# Patient Record
Sex: FEMALE | Race: BLACK OR AFRICAN AMERICAN | Employment: OTHER
[De-identification: names, ages, dates, MRNs, and addresses within clinical notes are randomized per-mention and may not be internally consistent; named-entity substitution may affect disease eponyms.]

---

## 2017-01-13 ENCOUNTER — OFFICE VISIT (OUTPATIENT)
Dept: BARIATRICS/WEIGHT MGMT | Facility: CLINIC | Age: 62
End: 2017-01-13

## 2017-01-13 VITALS
WEIGHT: 293 LBS | BODY MASS INDEX: 41.02 KG/M2 | HEIGHT: 71 IN | HEART RATE: 74 BPM | SYSTOLIC BLOOD PRESSURE: 130 MMHG | DIASTOLIC BLOOD PRESSURE: 70 MMHG

## 2017-01-13 DIAGNOSIS — D50.9 IRON DEFICIENCY ANEMIA, UNSPECIFIED IRON DEFICIENCY ANEMIA TYPE: ICD-10-CM

## 2017-01-13 DIAGNOSIS — K21.9 GASTROESOPHAGEAL REFLUX DISEASE WITHOUT ESOPHAGITIS: ICD-10-CM

## 2017-01-13 DIAGNOSIS — M19.90 ARTHRITIS: ICD-10-CM

## 2017-01-13 DIAGNOSIS — I73.9 PERIPHERAL VASCULAR DISEASE (HCC): ICD-10-CM

## 2017-01-13 DIAGNOSIS — I10 ESSENTIAL HYPERTENSION: Primary | ICD-10-CM

## 2017-01-13 DIAGNOSIS — E66.01 MORBID OBESITY WITH BMI OF 50.0-59.9, ADULT (HCC): ICD-10-CM

## 2017-01-13 DIAGNOSIS — E78.5 HYPERLIPIDEMIA, UNSPECIFIED HYPERLIPIDEMIA TYPE: ICD-10-CM

## 2017-01-13 DIAGNOSIS — N18.30 CKD (CHRONIC KIDNEY DISEASE) STAGE 3, GFR 30-59 ML/MIN (HCC): ICD-10-CM

## 2017-01-13 DIAGNOSIS — G47.30 SLEEP APNEA, UNSPECIFIED TYPE: ICD-10-CM

## 2017-01-13 PROCEDURE — 99213 OFFICE O/P EST LOW 20 MIN: CPT | Performed by: NURSE PRACTITIONER

## 2017-01-13 RX ORDER — FOLIC ACID 1 MG/1
TABLET ORAL
Qty: 30 TABLET | Refills: 0 | Status: SHIPPED | OUTPATIENT
Start: 2017-01-13 | End: 2017-02-06 | Stop reason: SDUPTHER

## 2017-01-31 ENCOUNTER — OFFICE VISIT (OUTPATIENT)
Dept: BARIATRICS/WEIGHT MGMT | Facility: CLINIC | Age: 62
End: 2017-01-31

## 2017-01-31 VITALS
HEART RATE: 70 BPM | SYSTOLIC BLOOD PRESSURE: 128 MMHG | BODY MASS INDEX: 41.02 KG/M2 | WEIGHT: 293 LBS | HEIGHT: 71 IN | DIASTOLIC BLOOD PRESSURE: 78 MMHG | RESPIRATION RATE: 18 BRPM

## 2017-01-31 DIAGNOSIS — I10 ESSENTIAL HYPERTENSION: Primary | ICD-10-CM

## 2017-01-31 DIAGNOSIS — E66.01 MORBID OBESITY WITH BMI OF 50.0-59.9, ADULT (HCC): ICD-10-CM

## 2017-01-31 DIAGNOSIS — N18.30 CKD (CHRONIC KIDNEY DISEASE) STAGE 3, GFR 30-59 ML/MIN (HCC): ICD-10-CM

## 2017-01-31 DIAGNOSIS — G47.30 SLEEP APNEA, UNSPECIFIED TYPE: ICD-10-CM

## 2017-01-31 DIAGNOSIS — K21.9 GASTROESOPHAGEAL REFLUX DISEASE WITHOUT ESOPHAGITIS: ICD-10-CM

## 2017-01-31 DIAGNOSIS — D50.9 IRON DEFICIENCY ANEMIA, UNSPECIFIED IRON DEFICIENCY ANEMIA TYPE: ICD-10-CM

## 2017-01-31 DIAGNOSIS — E78.5 HYPERLIPIDEMIA, UNSPECIFIED HYPERLIPIDEMIA TYPE: ICD-10-CM

## 2017-01-31 DIAGNOSIS — M19.90 ARTHRITIS: ICD-10-CM

## 2017-01-31 PROCEDURE — 99213 OFFICE O/P EST LOW 20 MIN: CPT | Performed by: NURSE PRACTITIONER

## 2017-02-07 RX ORDER — FOLIC ACID 1 MG/1
TABLET ORAL
Qty: 30 TABLET | Refills: 0 | Status: SHIPPED | OUTPATIENT
Start: 2017-02-07 | End: 2017-03-09 | Stop reason: SDUPTHER

## 2017-02-24 ENCOUNTER — OFFICE VISIT (OUTPATIENT)
Dept: PODIATRY | Facility: CLINIC | Age: 62
End: 2017-02-24

## 2017-02-24 VITALS
HEART RATE: 70 BPM | WEIGHT: 293 LBS | SYSTOLIC BLOOD PRESSURE: 141 MMHG | BODY MASS INDEX: 41.95 KG/M2 | HEIGHT: 70 IN | DIASTOLIC BLOOD PRESSURE: 89 MMHG

## 2017-02-24 DIAGNOSIS — M79.675 PAIN IN TOES OF BOTH FEET: ICD-10-CM

## 2017-02-24 DIAGNOSIS — L84 CALLUS: ICD-10-CM

## 2017-02-24 DIAGNOSIS — E66.09 NON MORBID OBESITY DUE TO EXCESS CALORIES: ICD-10-CM

## 2017-02-24 DIAGNOSIS — B35.1 ONYCHOMYCOSIS: Primary | ICD-10-CM

## 2017-02-24 DIAGNOSIS — M79.674 PAIN IN TOES OF BOTH FEET: ICD-10-CM

## 2017-02-24 DIAGNOSIS — M20.12 HAV (HALLUX ABDUCTO VALGUS), LEFT: ICD-10-CM

## 2017-02-24 DIAGNOSIS — M20.42 HAMMER TOE OF LEFT FOOT: ICD-10-CM

## 2017-02-24 DIAGNOSIS — M20.11 HAV (HALLUX ABDUCTO VALGUS), RIGHT: ICD-10-CM

## 2017-02-24 PROCEDURE — 11056 PARNG/CUTG B9 HYPRKR LES 2-4: CPT | Performed by: PODIATRIST

## 2017-02-24 PROCEDURE — 11721 DEBRIDE NAIL 6 OR MORE: CPT | Performed by: PODIATRIST

## 2017-03-07 ENCOUNTER — OFFICE VISIT (OUTPATIENT)
Dept: BARIATRICS/WEIGHT MGMT | Facility: CLINIC | Age: 62
End: 2017-03-07

## 2017-03-07 VITALS
WEIGHT: 293 LBS | HEIGHT: 70 IN | BODY MASS INDEX: 41.95 KG/M2 | HEART RATE: 70 BPM | DIASTOLIC BLOOD PRESSURE: 78 MMHG | SYSTOLIC BLOOD PRESSURE: 126 MMHG

## 2017-03-07 DIAGNOSIS — E66.01 MORBID OBESITY WITH BMI OF 50.0-59.9, ADULT (HCC): ICD-10-CM

## 2017-03-07 DIAGNOSIS — G47.30 SLEEP APNEA, UNSPECIFIED TYPE: ICD-10-CM

## 2017-03-07 DIAGNOSIS — K21.9 GASTROESOPHAGEAL REFLUX DISEASE WITHOUT ESOPHAGITIS: ICD-10-CM

## 2017-03-07 DIAGNOSIS — N18.30 CKD (CHRONIC KIDNEY DISEASE) STAGE 3, GFR 30-59 ML/MIN (HCC): ICD-10-CM

## 2017-03-07 DIAGNOSIS — E78.5 HYPERLIPIDEMIA, UNSPECIFIED HYPERLIPIDEMIA TYPE: ICD-10-CM

## 2017-03-07 DIAGNOSIS — M19.90 ARTHRITIS: ICD-10-CM

## 2017-03-07 DIAGNOSIS — D50.9 IRON DEFICIENCY ANEMIA, UNSPECIFIED IRON DEFICIENCY ANEMIA TYPE: ICD-10-CM

## 2017-03-07 DIAGNOSIS — I10 ESSENTIAL HYPERTENSION: Primary | ICD-10-CM

## 2017-03-07 PROCEDURE — 99213 OFFICE O/P EST LOW 20 MIN: CPT | Performed by: NURSE PRACTITIONER

## 2017-03-09 DIAGNOSIS — J30.89 ALLERGIC RHINITIS DUE TO AMERICAN HOUSE DUST MITE: ICD-10-CM

## 2017-03-09 DIAGNOSIS — I67.9 CEREBROVASCULAR DISEASE: ICD-10-CM

## 2017-03-12 RX ORDER — DOCUSATE SODIUM 100 MG/1
CAPSULE ORAL
Qty: 60 CAPSULE | Refills: 3 | Status: SHIPPED | OUTPATIENT
Start: 2017-03-12 | End: 2017-06-23 | Stop reason: SDUPTHER

## 2017-03-12 RX ORDER — FERROUS SULFATE 325(65) MG
TABLET ORAL
Qty: 30 TABLET | Refills: 3 | Status: SHIPPED | OUTPATIENT
Start: 2017-03-12 | End: 2017-05-09 | Stop reason: SDUPTHER

## 2017-03-12 RX ORDER — LORATADINE 10 MG/1
TABLET ORAL
Qty: 30 TABLET | Refills: 3 | Status: SHIPPED | OUTPATIENT
Start: 2017-03-12 | End: 2017-07-24 | Stop reason: SDUPTHER

## 2017-03-12 RX ORDER — FOLIC ACID 1 MG/1
TABLET ORAL
Qty: 30 TABLET | Refills: 3 | Status: SHIPPED | OUTPATIENT
Start: 2017-03-12 | End: 2017-05-09 | Stop reason: SDUPTHER

## 2017-03-12 RX ORDER — ASPIRIN 81 MG
TABLET, DELAYED RELEASE (ENTERIC COATED) ORAL
Qty: 30 TABLET | Refills: 3 | Status: SHIPPED | OUTPATIENT
Start: 2017-03-12 | End: 2017-05-09 | Stop reason: SDUPTHER

## 2017-04-04 ENCOUNTER — OFFICE VISIT (OUTPATIENT)
Dept: BARIATRICS/WEIGHT MGMT | Age: 62
End: 2017-04-04
Payer: COMMERCIAL

## 2017-04-04 VITALS
HEART RATE: 66 BPM | WEIGHT: 293 LBS | SYSTOLIC BLOOD PRESSURE: 136 MMHG | DIASTOLIC BLOOD PRESSURE: 70 MMHG | BODY MASS INDEX: 41.02 KG/M2 | HEIGHT: 71 IN

## 2017-04-04 DIAGNOSIS — E78.5 HYPERLIPIDEMIA, UNSPECIFIED HYPERLIPIDEMIA TYPE: ICD-10-CM

## 2017-04-04 DIAGNOSIS — M19.90 ARTHRITIS: ICD-10-CM

## 2017-04-04 DIAGNOSIS — I10 ESSENTIAL HYPERTENSION: Primary | ICD-10-CM

## 2017-04-04 DIAGNOSIS — K21.9 GASTROESOPHAGEAL REFLUX DISEASE WITHOUT ESOPHAGITIS: ICD-10-CM

## 2017-04-04 DIAGNOSIS — D50.8 OTHER IRON DEFICIENCY ANEMIA: ICD-10-CM

## 2017-04-04 DIAGNOSIS — D50.9 IRON DEFICIENCY ANEMIA, UNSPECIFIED IRON DEFICIENCY ANEMIA TYPE: ICD-10-CM

## 2017-04-04 DIAGNOSIS — G47.33 OBSTRUCTIVE SLEEP APNEA SYNDROME: ICD-10-CM

## 2017-04-04 DIAGNOSIS — E66.01 MORBID OBESITY WITH BMI OF 50.0-59.9, ADULT (HCC): ICD-10-CM

## 2017-04-04 DIAGNOSIS — I10 ESSENTIAL HYPERTENSION: ICD-10-CM

## 2017-04-04 DIAGNOSIS — N18.30 CKD (CHRONIC KIDNEY DISEASE) STAGE 3, GFR 30-59 ML/MIN (HCC): ICD-10-CM

## 2017-04-04 PROCEDURE — 99213 OFFICE O/P EST LOW 20 MIN: CPT | Performed by: NURSE PRACTITIONER

## 2017-04-05 RX ORDER — CLOPIDOGREL BISULFATE 75 MG/1
TABLET ORAL
Qty: 30 TABLET | Refills: 0 | Status: SHIPPED | OUTPATIENT
Start: 2017-04-05 | End: 2017-04-28 | Stop reason: SDUPTHER

## 2017-04-05 RX ORDER — AMLODIPINE BESYLATE 10 MG/1
TABLET ORAL
Qty: 30 TABLET | Refills: 0 | Status: SHIPPED | OUTPATIENT
Start: 2017-04-05 | End: 2017-04-28 | Stop reason: SDUPTHER

## 2017-04-28 DIAGNOSIS — D50.8 OTHER IRON DEFICIENCY ANEMIA: ICD-10-CM

## 2017-04-28 DIAGNOSIS — I10 ESSENTIAL HYPERTENSION: ICD-10-CM

## 2017-04-28 DIAGNOSIS — K21.9 GASTROESOPHAGEAL REFLUX DISEASE WITHOUT ESOPHAGITIS: ICD-10-CM

## 2017-04-28 DIAGNOSIS — E78.5 HYPERLIPIDEMIA, UNSPECIFIED HYPERLIPIDEMIA TYPE: ICD-10-CM

## 2017-04-29 RX ORDER — LOSARTAN POTASSIUM 25 MG/1
TABLET ORAL
Qty: 30 TABLET | Refills: 0 | Status: SHIPPED | OUTPATIENT
Start: 2017-04-29 | End: 2017-05-09 | Stop reason: SDUPTHER

## 2017-04-29 RX ORDER — CLOPIDOGREL BISULFATE 75 MG/1
TABLET ORAL
Qty: 30 TABLET | Refills: 0 | Status: SHIPPED | OUTPATIENT
Start: 2017-04-29 | End: 2017-05-09 | Stop reason: SDUPTHER

## 2017-04-29 RX ORDER — ATORVASTATIN CALCIUM 40 MG/1
TABLET, FILM COATED ORAL
Qty: 30 TABLET | Refills: 0 | Status: SHIPPED | OUTPATIENT
Start: 2017-04-29 | End: 2017-05-09 | Stop reason: SDUPTHER

## 2017-04-29 RX ORDER — AMLODIPINE BESYLATE 10 MG/1
TABLET ORAL
Qty: 30 TABLET | Refills: 0 | Status: SHIPPED | OUTPATIENT
Start: 2017-04-29 | End: 2017-05-09 | Stop reason: SDUPTHER

## 2017-04-29 RX ORDER — OMEPRAZOLE 20 MG/1
CAPSULE, DELAYED RELEASE ORAL
Qty: 30 CAPSULE | Refills: 0 | Status: SHIPPED | OUTPATIENT
Start: 2017-04-29 | End: 2017-05-09 | Stop reason: SDUPTHER

## 2017-04-29 RX ORDER — HYDROCHLOROTHIAZIDE 25 MG/1
TABLET ORAL
Qty: 30 TABLET | Refills: 0 | Status: SHIPPED | OUTPATIENT
Start: 2017-04-29 | End: 2017-05-09 | Stop reason: SDUPTHER

## 2017-05-01 ENCOUNTER — APPOINTMENT (OUTPATIENT)
Dept: GENERAL RADIOLOGY | Age: 62
End: 2017-05-01
Payer: COMMERCIAL

## 2017-05-01 ENCOUNTER — HOSPITAL ENCOUNTER (EMERGENCY)
Age: 62
Discharge: HOME | End: 2017-05-01
Attending: EMERGENCY MEDICINE
Payer: COMMERCIAL

## 2017-05-01 VITALS
WEIGHT: 293 LBS | TEMPERATURE: 98.1 F | OXYGEN SATURATION: 100 % | SYSTOLIC BLOOD PRESSURE: 158 MMHG | DIASTOLIC BLOOD PRESSURE: 94 MMHG | HEART RATE: 76 BPM | RESPIRATION RATE: 18 BRPM | BODY MASS INDEX: 52.3 KG/M2

## 2017-05-01 DIAGNOSIS — M54.50 ACUTE MIDLINE LOW BACK PAIN WITHOUT SCIATICA: Primary | ICD-10-CM
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
## 2017-05-01 PROCEDURE — 6370000000 HC RX 637 (ALT 250 FOR IP): Performed by: EMERGENCY MEDICINE

## 2017-05-01 PROCEDURE — 72110 X-RAY EXAM L-2 SPINE 4/>VWS: CPT

## 2017-05-01 PROCEDURE — 99284 EMERGENCY DEPT VISIT MOD MDM: CPT

## 2017-05-01 RX ORDER — HYDROCODONE BITARTRATE AND ACETAMINOPHEN 5; 325 MG/1; MG/1
2 TABLET ORAL ONCE
Status: COMPLETED | OUTPATIENT
Start: 2017-05-01 | End: 2017-05-01

## 2017-05-01 RX ORDER — HYDROCODONE BITARTRATE AND ACETAMINOPHEN 5; 325 MG/1; MG/1
1 TABLET ORAL EVERY 6 HOURS PRN
Qty: 11 TABLET | Refills: 0 | Status: SHIPPED | OUTPATIENT
Start: 2017-05-01 | End: 2017-05-08

## 2017-05-01 RX ADMIN — HYDROCODONE BITARTRATE AND ACETAMINOPHEN 2 TABLET: 5; 325 TABLET ORAL at 16:06

## 2017-05-01 ASSESSMENT — PAIN DESCRIPTION - PROGRESSION: CLINICAL_PROGRESSION: NOT CHANGED

## 2017-05-01 ASSESSMENT — PAIN SCALES - GENERAL
PAINLEVEL_OUTOF10: 2
PAINLEVEL_OUTOF10: 5
PAINLEVEL_OUTOF10: 5

## 2017-05-01 ASSESSMENT — PAIN DESCRIPTION - LOCATION
LOCATION: BACK
LOCATION: BACK

## 2017-05-01 ASSESSMENT — PAIN DESCRIPTION - ORIENTATION: ORIENTATION: LOWER

## 2017-05-01 ASSESSMENT — ENCOUNTER SYMPTOMS
RHINORRHEA: 0
BACK PAIN: 1
PHOTOPHOBIA: 0
TROUBLE SWALLOWING: 0
SHORTNESS OF BREATH: 0
ABDOMINAL PAIN: 0

## 2017-05-01 ASSESSMENT — PAIN DESCRIPTION - DESCRIPTORS: DESCRIPTORS: ACHING

## 2017-05-01 ASSESSMENT — PAIN DESCRIPTION - ONSET: ONSET: ON-GOING

## 2017-05-01 ASSESSMENT — PAIN DESCRIPTION - FREQUENCY: FREQUENCY: CONTINUOUS

## 2017-05-09 ENCOUNTER — OFFICE VISIT (OUTPATIENT)
Dept: INTERNAL MEDICINE | Age: 62
End: 2017-05-09
Payer: COMMERCIAL

## 2017-05-09 ENCOUNTER — HOSPITAL ENCOUNTER (OUTPATIENT)
Age: 62
Setting detail: SPECIMEN
Discharge: HOME | End: 2017-05-09
Payer: COMMERCIAL

## 2017-05-09 VITALS
SYSTOLIC BLOOD PRESSURE: 127 MMHG | HEIGHT: 71 IN | DIASTOLIC BLOOD PRESSURE: 76 MMHG | WEIGHT: 293 LBS | BODY MASS INDEX: 41.02 KG/M2 | HEART RATE: 63 BPM

## 2017-05-09 DIAGNOSIS — K21.9 GASTROESOPHAGEAL REFLUX DISEASE WITHOUT ESOPHAGITIS: ICD-10-CM

## 2017-05-09 DIAGNOSIS — D50.9 IRON DEFICIENCY ANEMIA, UNSPECIFIED IRON DEFICIENCY ANEMIA TYPE: ICD-10-CM

## 2017-05-09 DIAGNOSIS — E78.00 PURE HYPERCHOLESTEROLEMIA: ICD-10-CM

## 2017-05-09 DIAGNOSIS — I67.9 CEREBROVASCULAR DISEASE: ICD-10-CM

## 2017-05-09 DIAGNOSIS — N18.30 CKD (CHRONIC KIDNEY DISEASE) STAGE 3, GFR 30-59 ML/MIN (HCC): Primary | ICD-10-CM

## 2017-05-09 DIAGNOSIS — D50.8 OTHER IRON DEFICIENCY ANEMIA: ICD-10-CM

## 2017-05-09 DIAGNOSIS — I63.00 CEREBROVASCULAR ACCIDENT (CVA) DUE TO THROMBOSIS OF PRECEREBRAL ARTERY (HCC): ICD-10-CM

## 2017-05-09 DIAGNOSIS — I10 ESSENTIAL HYPERTENSION: ICD-10-CM

## 2017-05-09 DIAGNOSIS — E78.5 HYPERLIPIDEMIA, UNSPECIFIED HYPERLIPIDEMIA TYPE: ICD-10-CM

## 2017-05-09 DIAGNOSIS — G47.33 OSA (OBSTRUCTIVE SLEEP APNEA): ICD-10-CM

## 2017-05-09 DIAGNOSIS — N18.30 CKD (CHRONIC KIDNEY DISEASE) STAGE 3, GFR 30-59 ML/MIN (HCC): ICD-10-CM

## 2017-05-09 LAB
ABSOLUTE EOS #: 0.2 K/UL (ref 0–0.4)
ABSOLUTE LYMPH #: 1.6 K/UL (ref 1–4.8)
ABSOLUTE MONO #: 0.4 K/UL (ref 0.1–1.2)
ANION GAP SERPL CALCULATED.3IONS-SCNC: 15 MMOL/L (ref 9–17)
BASOPHILS # BLD: 1 %
BASOPHILS ABSOLUTE: 0 K/UL (ref 0–0.2)
BUN BLDV-MCNC: 20 MG/DL (ref 8–23)
BUN/CREAT BLD: ABNORMAL (ref 9–20)
CALCIUM SERPL-MCNC: 9.3 MG/DL (ref 8.6–10.4)
CHLORIDE BLD-SCNC: 101 MMOL/L (ref 98–107)
CO2: 28 MMOL/L (ref 20–31)
CREAT SERPL-MCNC: 1.14 MG/DL (ref 0.5–0.9)
CREATININE URINE: 213.4 MG/DL (ref 28–217)
DIFFERENTIAL TYPE: ABNORMAL
EOSINOPHILS RELATIVE PERCENT: 4 %
GFR AFRICAN AMERICAN: 58 ML/MIN
GFR NON-AFRICAN AMERICAN: 48 ML/MIN
GFR SERPL CREATININE-BSD FRML MDRD: ABNORMAL ML/MIN/{1.73_M2}
GFR SERPL CREATININE-BSD FRML MDRD: ABNORMAL ML/MIN/{1.73_M2}
GLUCOSE BLD-MCNC: 96 MG/DL (ref 70–99)
HCT VFR BLD CALC: 34.7 % (ref 36–46)
HEMOGLOBIN: 10.9 G/DL (ref 12–16)
LYMPHOCYTES # BLD: 29 %
MCH RBC QN AUTO: 22.1 PG (ref 26–34)
MCHC RBC AUTO-ENTMCNC: 31.5 G/DL (ref 31–37)
MCV RBC AUTO: 70.4 FL (ref 80–100)
MICROALBUMIN/CREAT 24H UR: <12 MG/L
MICROALBUMIN/CREAT UR-RTO: 6 MCG/MG CREAT
MONOCYTES # BLD: 7 %
PDW BLD-RTO: 17.5 % (ref 12.5–15.4)
PLATELET # BLD: 325 K/UL (ref 140–450)
PLATELET ESTIMATE: ABNORMAL
PMV BLD AUTO: 8.1 FL (ref 6–12)
POTASSIUM SERPL-SCNC: 4.3 MMOL/L (ref 3.7–5.3)
RBC # BLD: 4.92 M/UL (ref 4–5.2)
RBC # BLD: ABNORMAL 10*6/UL
SEG NEUTROPHILS: 59 %
SEGMENTED NEUTROPHILS ABSOLUTE COUNT: 3.1 K/UL (ref 1.8–7.7)
SODIUM BLD-SCNC: 144 MMOL/L (ref 135–144)
WBC # BLD: 5.3 K/UL (ref 3.5–11)
WBC # BLD: ABNORMAL 10*3/UL

## 2017-05-09 PROCEDURE — 82043 UR ALBUMIN QUANTITATIVE: CPT

## 2017-05-09 PROCEDURE — 85025 COMPLETE CBC W/AUTO DIFF WBC: CPT

## 2017-05-09 PROCEDURE — 80048 BASIC METABOLIC PNL TOTAL CA: CPT

## 2017-05-09 PROCEDURE — 82570 ASSAY OF URINE CREATININE: CPT

## 2017-05-09 PROCEDURE — 99214 OFFICE O/P EST MOD 30 MIN: CPT | Performed by: INTERNAL MEDICINE

## 2017-05-09 PROCEDURE — 36415 COLL VENOUS BLD VENIPUNCTURE: CPT

## 2017-05-09 RX ORDER — LOSARTAN POTASSIUM 25 MG/1
TABLET ORAL
Qty: 30 TABLET | Refills: 2 | Status: SHIPPED | OUTPATIENT
Start: 2017-05-09 | End: 2017-08-17 | Stop reason: SDUPTHER

## 2017-05-09 RX ORDER — FERROUS SULFATE 325(65) MG
325 TABLET ORAL
Qty: 30 TABLET | Refills: 3 | Status: SHIPPED | OUTPATIENT
Start: 2017-05-09 | End: 2017-08-10 | Stop reason: SDUPTHER

## 2017-05-09 RX ORDER — FOLIC ACID 1 MG/1
TABLET ORAL
Qty: 30 TABLET | Refills: 3 | Status: SHIPPED | OUTPATIENT
Start: 2017-05-09 | End: 2017-08-10 | Stop reason: SDUPTHER

## 2017-05-09 RX ORDER — HYDROCHLOROTHIAZIDE 25 MG/1
TABLET ORAL
Qty: 30 TABLET | Refills: 2 | Status: SHIPPED | OUTPATIENT
Start: 2017-05-09 | End: 2017-08-17 | Stop reason: SDUPTHER

## 2017-05-09 RX ORDER — CLOPIDOGREL BISULFATE 75 MG/1
75 TABLET ORAL DAILY
Qty: 30 TABLET | Refills: 2 | Status: SHIPPED | OUTPATIENT
Start: 2017-05-09 | End: 2017-08-17 | Stop reason: SDUPTHER

## 2017-05-09 RX ORDER — ATORVASTATIN CALCIUM 40 MG/1
40 TABLET, FILM COATED ORAL DAILY
Qty: 30 TABLET | Refills: 2 | Status: SHIPPED | OUTPATIENT
Start: 2017-05-09 | End: 2017-08-17 | Stop reason: SDUPTHER

## 2017-05-09 RX ORDER — OMEPRAZOLE 20 MG/1
20 CAPSULE, DELAYED RELEASE ORAL DAILY
Qty: 30 CAPSULE | Refills: 2 | Status: SHIPPED | OUTPATIENT
Start: 2017-05-09 | End: 2017-08-10 | Stop reason: ALTCHOICE

## 2017-05-09 RX ORDER — AMLODIPINE BESYLATE 10 MG/1
10 TABLET ORAL DAILY
Qty: 30 TABLET | Refills: 2 | Status: SHIPPED | OUTPATIENT
Start: 2017-05-09 | End: 2017-08-17 | Stop reason: SDUPTHER

## 2017-05-09 RX ORDER — ACETAMINOPHEN 325 MG/1
650 TABLET ORAL EVERY 4 HOURS PRN
Qty: 60 TABLET | Refills: 3 | Status: SHIPPED | OUTPATIENT
Start: 2017-05-09 | End: 2018-04-19 | Stop reason: SDUPTHER

## 2017-05-09 RX ORDER — ASPIRIN 81 MG/1
TABLET ORAL
Qty: 30 TABLET | Refills: 2 | Status: SHIPPED | OUTPATIENT
Start: 2017-05-09 | End: 2017-08-17 | Stop reason: SDUPTHER

## 2017-05-15 ENCOUNTER — HOSPITAL ENCOUNTER (OUTPATIENT)
Dept: SLEEP CENTER | Age: 62
Discharge: HOME | End: 2017-05-15
Payer: COMMERCIAL

## 2017-05-15 DIAGNOSIS — G47.33 OSA (OBSTRUCTIVE SLEEP APNEA): ICD-10-CM

## 2017-05-15 PROCEDURE — 95810 POLYSOM 6/> YRS 4/> PARAM: CPT

## 2017-05-15 ASSESSMENT — SLEEP AND FATIGUE QUESTIONNAIRES
HOW LIKELY ARE YOU TO NOD OFF OR FALL ASLEEP WHILE SITTING AND TALKING TO SOMEONE: 0
HOW LIKELY ARE YOU TO NOD OFF OR FALL ASLEEP WHILE SITTING INACTIVE IN A PUBLIC PLACE: 0
HOW LIKELY ARE YOU TO NOD OFF OR FALL ASLEEP WHILE SITTING AND READING: 0
HOW LIKELY ARE YOU TO NOD OFF OR FALL ASLEEP WHILE WATCHING TV: 0
HOW LIKELY ARE YOU TO NOD OFF OR FALL ASLEEP IN A CAR, WHILE STOPPED FOR A FEW MINUTES IN TRAFFIC: 0
HOW LIKELY ARE YOU TO NOD OFF OR FALL ASLEEP WHILE LYING DOWN TO REST IN THE AFTERNOON WHEN CIRCUMSTANCES PERMIT: 1
NECK CIRCUMFERENCE (INCHES): 38
HOW LIKELY ARE YOU TO NOD OFF OR FALL ASLEEP WHILE SITTING QUIETLY AFTER LUNCH WITHOUT ALCOHOL: 1
ESS TOTAL SCORE: 2
HOW LIKELY ARE YOU TO NOD OFF OR FALL ASLEEP WHEN YOU ARE A PASSENGER IN A CAR FOR AN HOUR WITHOUT A BREAK: 0

## 2017-05-16 VITALS — HEART RATE: 60 BPM | RESPIRATION RATE: 18 BRPM | BODY MASS INDEX: 41.02 KG/M2 | HEIGHT: 71 IN | WEIGHT: 293 LBS

## 2017-05-24 DIAGNOSIS — G47.33 OSA (OBSTRUCTIVE SLEEP APNEA): Primary | ICD-10-CM

## 2017-06-08 ENCOUNTER — HOSPITAL ENCOUNTER (OUTPATIENT)
Dept: SLEEP CENTER | Age: 62
Discharge: HOME | End: 2017-06-08
Payer: COMMERCIAL

## 2017-06-08 VITALS — HEIGHT: 71 IN | WEIGHT: 293 LBS | BODY MASS INDEX: 41.02 KG/M2

## 2017-06-08 DIAGNOSIS — G47.33 OSA (OBSTRUCTIVE SLEEP APNEA): ICD-10-CM

## 2017-06-08 PROCEDURE — 95811 POLYSOM 6/>YRS CPAP 4/> PARM: CPT

## 2017-06-08 ASSESSMENT — SLEEP AND FATIGUE QUESTIONNAIRES
HOW LIKELY ARE YOU TO NOD OFF OR FALL ASLEEP WHILE SITTING AND READING: 2
HOW LIKELY ARE YOU TO NOD OFF OR FALL ASLEEP WHEN YOU ARE A PASSENGER IN A CAR FOR AN HOUR WITHOUT A BREAK: 3
HOW LIKELY ARE YOU TO NOD OFF OR FALL ASLEEP IN A CAR, WHILE STOPPED FOR A FEW MINUTES IN TRAFFIC: 2
NECK CIRCUMFERENCE (INCHES): 37
HOW LIKELY ARE YOU TO NOD OFF OR FALL ASLEEP WHILE SITTING QUIETLY AFTER LUNCH WITHOUT ALCOHOL: 2
HOW LIKELY ARE YOU TO NOD OFF OR FALL ASLEEP WHILE SITTING INACTIVE IN A PUBLIC PLACE: 3
ESS TOTAL SCORE: 19
HOW LIKELY ARE YOU TO NOD OFF OR FALL ASLEEP WHILE WATCHING TV: 2
HOW LIKELY ARE YOU TO NOD OFF OR FALL ASLEEP WHILE LYING DOWN TO REST IN THE AFTERNOON WHEN CIRCUMSTANCES PERMIT: 3
HOW LIKELY ARE YOU TO NOD OFF OR FALL ASLEEP WHILE SITTING AND TALKING TO SOMEONE: 2

## 2017-06-23 DIAGNOSIS — J30.89 ALLERGIC RHINITIS DUE TO AMERICAN HOUSE DUST MITE: ICD-10-CM

## 2017-06-23 RX ORDER — LORATADINE 10 MG/1
TABLET ORAL
Qty: 30 TABLET | Refills: 0 | Status: CANCELLED | OUTPATIENT
Start: 2017-06-23

## 2017-06-30 ENCOUNTER — TELEPHONE (OUTPATIENT)
Dept: INTERNAL MEDICINE | Age: 62
End: 2017-06-30

## 2017-06-30 DIAGNOSIS — E66.01 MORBID OBESITY WITH BMI OF 50.0-59.9, ADULT (HCC): Primary | ICD-10-CM

## 2017-06-30 DIAGNOSIS — G47.33 OSA (OBSTRUCTIVE SLEEP APNEA): ICD-10-CM

## 2017-07-07 ENCOUNTER — OFFICE VISIT (OUTPATIENT)
Dept: PODIATRY | Age: 62
End: 2017-07-07
Payer: COMMERCIAL

## 2017-07-07 VITALS
HEIGHT: 71 IN | BODY MASS INDEX: 41.02 KG/M2 | HEART RATE: 64 BPM | WEIGHT: 293 LBS | DIASTOLIC BLOOD PRESSURE: 76 MMHG | SYSTOLIC BLOOD PRESSURE: 104 MMHG

## 2017-07-07 DIAGNOSIS — B35.1 ONYCHOMYCOSIS: ICD-10-CM

## 2017-07-07 DIAGNOSIS — M20.11 HAV (HALLUX ABDUCTO VALGUS), RIGHT: Primary | ICD-10-CM

## 2017-07-07 DIAGNOSIS — M79.672 PAIN IN BOTH FEET: ICD-10-CM

## 2017-07-07 DIAGNOSIS — L84 CALLUS OF FOOT: ICD-10-CM

## 2017-07-07 DIAGNOSIS — M79.671 PAIN IN BOTH FEET: ICD-10-CM

## 2017-07-07 DIAGNOSIS — M20.42 HAMMER TOE OF LEFT FOOT: ICD-10-CM

## 2017-07-07 PROCEDURE — 11721 DEBRIDE NAIL 6 OR MORE: CPT | Performed by: PODIATRIST

## 2017-07-07 PROCEDURE — 11056 PARNG/CUTG B9 HYPRKR LES 2-4: CPT | Performed by: PODIATRIST

## 2017-07-19 ENCOUNTER — TELEPHONE (OUTPATIENT)
Dept: INTERNAL MEDICINE | Age: 62
End: 2017-07-19

## 2017-07-19 DIAGNOSIS — Z12.31 SCREENING MAMMOGRAM, ENCOUNTER FOR: Primary | ICD-10-CM

## 2017-07-24 DIAGNOSIS — J30.89 ALLERGIC RHINITIS DUE TO AMERICAN HOUSE DUST MITE: ICD-10-CM

## 2017-07-26 RX ORDER — LORATADINE 10 MG/1
TABLET ORAL
Qty: 30 TABLET | Refills: 3 | Status: SHIPPED | OUTPATIENT
Start: 2017-07-26 | End: 2018-04-19 | Stop reason: ALTCHOICE

## 2017-08-08 RX ORDER — DOCUSATE SODIUM 100 MG/1
CAPSULE ORAL
Qty: 60 CAPSULE | Refills: 0 | Status: SHIPPED | OUTPATIENT
Start: 2017-08-08 | End: 2017-09-19 | Stop reason: SDUPTHER

## 2017-08-08 RX ORDER — FOLIC ACID 1 MG/1
TABLET ORAL
Qty: 30 TABLET | Refills: 0 | Status: SHIPPED | OUTPATIENT
Start: 2017-08-08 | End: 2018-04-12 | Stop reason: ALTCHOICE

## 2017-08-08 RX ORDER — FERROUS SULFATE 325(65) MG
TABLET ORAL
Qty: 30 TABLET | Refills: 0 | Status: SHIPPED | OUTPATIENT
Start: 2017-08-08 | End: 2018-03-08 | Stop reason: SDUPTHER

## 2017-08-10 ENCOUNTER — OFFICE VISIT (OUTPATIENT)
Dept: INTERNAL MEDICINE | Age: 62
End: 2017-08-10
Payer: COMMERCIAL

## 2017-08-10 VITALS
HEART RATE: 52 BPM | WEIGHT: 293 LBS | DIASTOLIC BLOOD PRESSURE: 74 MMHG | BODY MASS INDEX: 41.02 KG/M2 | SYSTOLIC BLOOD PRESSURE: 134 MMHG | HEIGHT: 71 IN | OXYGEN SATURATION: 97 %

## 2017-08-10 DIAGNOSIS — N18.30 CKD (CHRONIC KIDNEY DISEASE) STAGE 3, GFR 30-59 ML/MIN (HCC): ICD-10-CM

## 2017-08-10 DIAGNOSIS — E78.00 PURE HYPERCHOLESTEROLEMIA: ICD-10-CM

## 2017-08-10 DIAGNOSIS — I10 ESSENTIAL HYPERTENSION: Primary | ICD-10-CM

## 2017-08-10 DIAGNOSIS — E66.01 MORBID OBESITY WITH BMI OF 50.0-59.9, ADULT (HCC): ICD-10-CM

## 2017-08-10 DIAGNOSIS — Z11.4 SCREENING FOR HIV (HUMAN IMMUNODEFICIENCY VIRUS): ICD-10-CM

## 2017-08-10 DIAGNOSIS — Z11.59 NEED FOR HEPATITIS C SCREENING TEST: ICD-10-CM

## 2017-08-10 DIAGNOSIS — Z86.2 HISTORY OF ANEMIA DUE TO CHRONIC KIDNEY DISEASE: ICD-10-CM

## 2017-08-10 DIAGNOSIS — N18.9 HISTORY OF ANEMIA DUE TO CHRONIC KIDNEY DISEASE: ICD-10-CM

## 2017-08-10 DIAGNOSIS — K21.9 GASTROESOPHAGEAL REFLUX DISEASE WITHOUT ESOPHAGITIS: ICD-10-CM

## 2017-08-10 DIAGNOSIS — G47.33 OSA (OBSTRUCTIVE SLEEP APNEA): ICD-10-CM

## 2017-08-10 PROCEDURE — 99214 OFFICE O/P EST MOD 30 MIN: CPT | Performed by: STUDENT IN AN ORGANIZED HEALTH CARE EDUCATION/TRAINING PROGRAM

## 2017-08-10 ASSESSMENT — PATIENT HEALTH QUESTIONNAIRE - PHQ9
1. LITTLE INTEREST OR PLEASURE IN DOING THINGS: 0
SUM OF ALL RESPONSES TO PHQ9 QUESTIONS 1 & 2: 0
3. TROUBLE FALLING OR STAYING ASLEEP: 2
2. FEELING DOWN, DEPRESSED OR HOPELESS: 0
9. THOUGHTS THAT YOU WOULD BE BETTER OFF DEAD, OR OF HURTING YOURSELF: 0
7. TROUBLE CONCENTRATING ON THINGS, SUCH AS READING THE NEWSPAPER OR WATCHING TELEVISION: 0
5. POOR APPETITE OR OVEREATING: 0
6. FEELING BAD ABOUT YOURSELF - OR THAT YOU ARE A FAILURE OR HAVE LET YOURSELF OR YOUR FAMILY DOWN: 0
10. IF YOU CHECKED OFF ANY PROBLEMS, HOW DIFFICULT HAVE THESE PROBLEMS MADE IT FOR YOU TO DO YOUR WORK, TAKE CARE OF THINGS AT HOME, OR GET ALONG WITH OTHER PEOPLE: 0
SUM OF ALL RESPONSES TO PHQ QUESTIONS 1-9: 5
8. MOVING OR SPEAKING SO SLOWLY THAT OTHER PEOPLE COULD HAVE NOTICED. OR THE OPPOSITE, BEING SO FIGETY OR RESTLESS THAT YOU HAVE BEEN MOVING AROUND A LOT MORE THAN USUAL: 0
4. FEELING TIRED OR HAVING LITTLE ENERGY: 3

## 2017-08-17 DIAGNOSIS — E78.5 HYPERLIPIDEMIA, UNSPECIFIED HYPERLIPIDEMIA TYPE: ICD-10-CM

## 2017-08-17 DIAGNOSIS — K21.9 GASTROESOPHAGEAL REFLUX DISEASE WITHOUT ESOPHAGITIS: ICD-10-CM

## 2017-08-17 DIAGNOSIS — I10 ESSENTIAL HYPERTENSION: ICD-10-CM

## 2017-08-17 DIAGNOSIS — I67.9 CEREBROVASCULAR DISEASE: ICD-10-CM

## 2017-08-18 ENCOUNTER — HOSPITAL ENCOUNTER (OUTPATIENT)
Dept: MAMMOGRAPHY | Age: 62
Discharge: HOME | End: 2017-08-18
Payer: COMMERCIAL

## 2017-08-18 DIAGNOSIS — Z12.31 SCREENING MAMMOGRAM, ENCOUNTER FOR: ICD-10-CM

## 2017-08-18 PROCEDURE — G0202 SCR MAMMO BI INCL CAD: HCPCS

## 2017-08-18 RX ORDER — CLOPIDOGREL BISULFATE 75 MG/1
TABLET ORAL
Qty: 30 TABLET | Refills: 3 | Status: SHIPPED | OUTPATIENT
Start: 2017-08-18 | End: 2017-12-09 | Stop reason: SDUPTHER

## 2017-08-18 RX ORDER — OMEPRAZOLE 20 MG/1
CAPSULE, DELAYED RELEASE ORAL
Qty: 30 CAPSULE | Refills: 3 | Status: SHIPPED | OUTPATIENT
Start: 2017-08-18 | End: 2018-03-08 | Stop reason: SDUPTHER

## 2017-08-18 RX ORDER — ATORVASTATIN CALCIUM 40 MG/1
TABLET, FILM COATED ORAL
Qty: 30 TABLET | Refills: 3 | Status: SHIPPED | OUTPATIENT
Start: 2017-08-18 | End: 2017-12-09 | Stop reason: SDUPTHER

## 2017-08-18 RX ORDER — HYDROCHLOROTHIAZIDE 25 MG/1
TABLET ORAL
Qty: 30 TABLET | Refills: 3 | Status: SHIPPED | OUTPATIENT
Start: 2017-08-18 | End: 2017-12-09 | Stop reason: SDUPTHER

## 2017-08-18 RX ORDER — AMLODIPINE BESYLATE 10 MG/1
TABLET ORAL
Qty: 30 TABLET | Refills: 3 | Status: SHIPPED | OUTPATIENT
Start: 2017-08-18 | End: 2017-12-09 | Stop reason: SDUPTHER

## 2017-08-18 RX ORDER — LOSARTAN POTASSIUM 25 MG/1
TABLET ORAL
Qty: 30 TABLET | Refills: 3 | Status: SHIPPED | OUTPATIENT
Start: 2017-08-18 | End: 2017-12-09 | Stop reason: SDUPTHER

## 2017-08-18 RX ORDER — ASPIRIN 81 MG
TABLET, DELAYED RELEASE (ENTERIC COATED) ORAL
Qty: 30 TABLET | Refills: 3 | Status: SHIPPED | OUTPATIENT
Start: 2017-08-18 | End: 2017-12-09 | Stop reason: SDUPTHER

## 2017-09-19 RX ORDER — DOCUSATE SODIUM 100 MG/1
CAPSULE, LIQUID FILLED ORAL
Qty: 60 CAPSULE | Refills: 0 | Status: SHIPPED | OUTPATIENT
Start: 2017-09-19 | End: 2017-10-13 | Stop reason: SDUPTHER

## 2017-10-06 ENCOUNTER — OFFICE VISIT (OUTPATIENT)
Dept: PODIATRY | Age: 62
End: 2017-10-06
Payer: COMMERCIAL

## 2017-10-06 VITALS
HEIGHT: 71 IN | WEIGHT: 293 LBS | HEART RATE: 56 BPM | DIASTOLIC BLOOD PRESSURE: 83 MMHG | BODY MASS INDEX: 41.02 KG/M2 | SYSTOLIC BLOOD PRESSURE: 123 MMHG

## 2017-10-06 DIAGNOSIS — M79.674 PAIN IN TOES OF BOTH FEET: ICD-10-CM

## 2017-10-06 DIAGNOSIS — M20.11 HAV (HALLUX ABDUCTO VALGUS), RIGHT: Primary | ICD-10-CM

## 2017-10-06 DIAGNOSIS — B35.1 ONYCHOMYCOSIS: ICD-10-CM

## 2017-10-06 DIAGNOSIS — M20.12 HAV (HALLUX ABDUCTO VALGUS), LEFT: ICD-10-CM

## 2017-10-06 DIAGNOSIS — M79.675 PAIN IN TOES OF BOTH FEET: ICD-10-CM

## 2017-10-06 PROCEDURE — 99213 OFFICE O/P EST LOW 20 MIN: CPT | Performed by: STUDENT IN AN ORGANIZED HEALTH CARE EDUCATION/TRAINING PROGRAM

## 2017-10-06 PROCEDURE — 11721 DEBRIDE NAIL 6 OR MORE: CPT | Performed by: STUDENT IN AN ORGANIZED HEALTH CARE EDUCATION/TRAINING PROGRAM

## 2017-10-06 NOTE — PROGRESS NOTES
Patient instructed to remove shoes and socks, instructed to sit in exam chair. Current PCP name is Dr. Regina Frederick and date of last visit 8/10/17. Do you have a follow up visit scheduled?   Yes or no    If yes the date is 12/14/17

## 2017-10-06 NOTE — PROGRESS NOTES
Subjective:   Diabetes     Patient presents to clinic for follow-up calluses and thickened painful toenails. She states pain is increased when nails are elongated with shoe gear. Denies all other pedal complaints. Unable to trim her own toenails due to Right hemiparesis secondary to previous stroke. No other complaints at this time. Review of Systems   Denies n/v/f/c    Objective:   Physical Exam   Vitals:    10/06/17 1444   BP: 123/83   Pulse: 56     Gen: AAOx3, NAD  Vascular: DP and PT pulses are palpable bilateral. CFT is brisk to all digits. Skin temp is warm to warm proximal to distal, bilateral.   Neurologic: SILT  Dermatologic: Nails 1-10 are thickened, elongated, discolored with the presence of subungual debris. Webspaces 1-4 are c/d/i, bilateral. No open lesions noted. Musculoskeletal: Muscle strength 5/5 for all LE muscle groups. Pain with palpation of hyperkeratotic tissue noted in dermatologic exam. Decreased medial arch noted, b/l. Decreased ROM b/l kojo joint. Contracted digits 2-5 b/l. Abducted hallux noted b/l with medial eminence with right worse than left. Assessment:      1. HAV (hallux abducto valgus), right     2. Onychomycosis     3. Pain in toes of both feet     4. HAV (hallux abducto valgus), left        Plan:   · Patient seen and evaluated. · Discussed treatment options with patient. · Nails debrided in thickness and length 1-5 oanh without incident. · Patient to RTC in 4 months. Electronically signed by Salina Grayson DPM on 10/6/2017 at 3:11 PM    I performed a history and physical examination of the patient and discussed management with the resident. I reviewed the residents note and agree with the documented findings and plan of care. Any areas of disagreement are noted on the chart. I was personally present for the key portions of any procedures. I have documented in the chart those procedures where I was not present during the key portions.  I have reviewed the

## 2017-10-06 NOTE — MR AVS SNAPSHOT
Date Of Birth Sex Race Ethnicity Preferred Language    1955 Female Black Non-/Non  English      Problem List as of 10/6/2017  Date Reviewed: 8/10/2017                TIBURCIO (obstructive sleep apnea)    Morbid obesity with BMI of 50.0-59.9, adult (HCC)    Gastroesophageal reflux disease without esophagitis    Peripheral vascular disease (HCC)    Iron (Fe) deficiency anemia    CKD (chronic kidney disease) stage 3, GFR 30-59 ml/min    Essential hypertension    Pure hypercholesterolemia    Cerebrovascular accident (CVA), 2011, no residual deficit ([address]      Immunizations as of 10/6/2017     Name Date    Pneumococcal Polysaccharide ([id number]) 12/20/2016    Zoster 2/14/2017      Preventive Care        Date Due    Hepatitis C screening is recommended for all adults regardless of risk factors born between [facility] at least once (lifetime) who have never been tested. 1955    HIV screening is recommended for all people regardless of risk factors  aged 15-65 years at least once (lifetime) who have never been HIV tested. 7/14/1968    Yearly Flu Vaccine (1) 10/25/2017 (Originally 9/1/2017)    Tetanus Combination Vaccine (1 - Tdap) 12/10/2017 (Originally 7/14/1972)    Pap Smear 6/7/2019    Mammograms are recommended every 2 years for low/average risk patients aged 48 - 69, and every year for high risk patients per updated national guidelines. However these guidelines can be individualized by your provider. 8/18/2019    Cholesterol Screening 6/8/2021    Colonoscopy 5/30/2023            [employer] Signup           [employer] allows you to send messages to your doctor, view your test results, renew your prescriptions, schedule appointments, view visit notes, and more. How Do I Sign Up? 1. In your Internet browser, go to [email or web address]  2. Click on the Sign Up Now link in the Sign In box. You will see the New Member Sign Up page.

## 2017-10-13 NOTE — TELEPHONE ENCOUNTER
Escribe request for ferrous sulfate and folic acid . Please escribe if appropriate      Next Visit Date:  12/14/17     Health Maintenance   Topic Date Due    Hepatitis C screen  1955    HIV screen  07/14/1968    Flu vaccine (1) 10/25/2017 (Originally 9/1/2017)    DTaP/Tdap/Td vaccine (1 - Tdap) 12/10/2017 (Originally 7/14/1972)    Cervical cancer screen  06/07/2019    Breast cancer screen  08/18/2019    Lipid screen  06/08/2021    Colon cancer screen colonoscopy  05/30/2023    Zostavax vaccine  Completed       Hemoglobin A1C (%)   Date Value   06/08/2016 5.4   06/19/2014 5.5             ( goal A1C is < 7)   Microalb/Crt.  Ratio (mcg/mg creat)   Date Value   05/09/2017 6     LDL Cholesterol (mg/dL)   Date Value   06/08/2016 100   06/08/2016 100       (goal LDL is <100)   AST (U/L)   Date Value   06/08/2016 17     ALT (U/L)   Date Value   06/08/2016 13     BUN (mg/dL)   Date Value   05/09/2017 20     BP Readings from Last 3 Encounters:   10/06/17 123/83   08/10/17 134/74   07/07/17 104/76          (goal 120/80)          Patient Active Problem List:     Essential hypertension     Pure hypercholesterolemia     Cerebrovascular accident (CVA), 2011, no residual deficit (HCC)     Iron (Fe) deficiency anemia     CKD (chronic kidney disease) stage 3, GFR 30-59 ml/min     Peripheral vascular disease (HCC)     TIBURCIO (obstructive sleep apnea)     Morbid obesity with BMI of 50.0-59.9, adult (HCC)     Gastroesophageal reflux disease without esophagitis

## 2017-10-14 RX ORDER — DOCUSATE SODIUM 100 MG/1
CAPSULE, LIQUID FILLED ORAL
Qty: 60 CAPSULE | Refills: 0 | Status: SHIPPED | OUTPATIENT
Start: 2017-10-14 | End: 2017-11-15 | Stop reason: SDUPTHER

## 2017-10-14 RX ORDER — FOLIC ACID 1 MG/1
TABLET ORAL
Qty: 30 TABLET | Refills: 2 | Status: SHIPPED | OUTPATIENT
Start: 2017-10-14 | End: 2018-01-10 | Stop reason: SDUPTHER

## 2017-10-14 RX ORDER — FERROUS SULFATE 325(65) MG
TABLET ORAL
Qty: 30 TABLET | Refills: 2 | Status: SHIPPED | OUTPATIENT
Start: 2017-10-14 | End: 2018-01-10 | Stop reason: SDUPTHER

## 2017-11-16 RX ORDER — DOCUSATE SODIUM 100 MG/1
CAPSULE ORAL
Qty: 60 CAPSULE | Refills: 0 | Status: SHIPPED | OUTPATIENT
Start: 2017-11-16 | End: 2018-06-12 | Stop reason: ALTCHOICE

## 2017-11-20 ENCOUNTER — TELEPHONE (OUTPATIENT)
Dept: INTERNAL MEDICINE | Age: 62
End: 2017-11-20

## 2017-11-28 ENCOUNTER — HOSPITAL ENCOUNTER (OUTPATIENT)
Age: 62
Setting detail: SPECIMEN
Discharge: HOME | End: 2017-11-28
Payer: COMMERCIAL

## 2017-11-28 DIAGNOSIS — Z11.4 SCREENING FOR HIV (HUMAN IMMUNODEFICIENCY VIRUS): ICD-10-CM

## 2017-11-28 DIAGNOSIS — N18.9 HISTORY OF ANEMIA DUE TO CHRONIC KIDNEY DISEASE: ICD-10-CM

## 2017-11-28 DIAGNOSIS — E78.00 PURE HYPERCHOLESTEROLEMIA: ICD-10-CM

## 2017-11-28 DIAGNOSIS — Z86.2 HISTORY OF ANEMIA DUE TO CHRONIC KIDNEY DISEASE: ICD-10-CM

## 2017-11-28 DIAGNOSIS — Z11.59 NEED FOR HEPATITIS C SCREENING TEST: ICD-10-CM

## 2017-11-28 DIAGNOSIS — N18.30 CKD (CHRONIC KIDNEY DISEASE) STAGE 3, GFR 30-59 ML/MIN (HCC): ICD-10-CM

## 2017-11-28 LAB
ABSOLUTE EOS #: 0.29 K/UL (ref 0–0.44)
ABSOLUTE IMMATURE GRANULOCYTE: <0.03 K/UL (ref 0–0.3)
ABSOLUTE LYMPH #: 1.65 K/UL (ref 1.1–3.7)
ABSOLUTE MONO #: 0.43 K/UL (ref 0.1–1.2)
ANION GAP SERPL CALCULATED.3IONS-SCNC: 14 MMOL/L (ref 9–17)
BASOPHILS # BLD: 1 % (ref 0–2)
BASOPHILS ABSOLUTE: 0.06 K/UL (ref 0–0.2)
BUN BLDV-MCNC: 19 MG/DL (ref 8–23)
BUN/CREAT BLD: ABNORMAL (ref 9–20)
CALCIUM SERPL-MCNC: 9.4 MG/DL (ref 8.6–10.4)
CHLORIDE BLD-SCNC: 101 MMOL/L (ref 98–107)
CHOLESTEROL/HDL RATIO: 3.3
CHOLESTEROL: 163 MG/DL
CO2: 29 MMOL/L (ref 20–31)
CREAT SERPL-MCNC: 0.96 MG/DL (ref 0.5–0.9)
DIFFERENTIAL TYPE: ABNORMAL
EOSINOPHILS RELATIVE PERCENT: 5 % (ref 1–4)
FERRITIN: 530 UG/L (ref 13–150)
GFR AFRICAN AMERICAN: >60 ML/MIN
GFR NON-AFRICAN AMERICAN: 59 ML/MIN
GFR SERPL CREATININE-BSD FRML MDRD: ABNORMAL ML/MIN/{1.73_M2}
GFR SERPL CREATININE-BSD FRML MDRD: ABNORMAL ML/MIN/{1.73_M2}
GLUCOSE BLD-MCNC: 88 MG/DL (ref 70–99)
HCT VFR BLD CALC: 36.2 % (ref 36.3–47.1)
HDLC SERPL-MCNC: 49 MG/DL
HEMOGLOBIN: 10.8 G/DL (ref 11.9–15.1)
HEPATITIS C ANTIBODY: NONREACTIVE
HIV AG/AB: NONREACTIVE
IMMATURE GRANULOCYTES: 0 %
IRON SATURATION: 24 % (ref 20–55)
IRON: 51 UG/DL (ref 37–145)
LDL CHOLESTEROL: 92 MG/DL (ref 0–130)
LYMPHOCYTES # BLD: 30 % (ref 24–43)
MCH RBC QN AUTO: 21.8 PG (ref 25.2–33.5)
MCHC RBC AUTO-ENTMCNC: 29.8 G/DL (ref 28.4–34.8)
MCV RBC AUTO: 73 FL (ref 82.6–102.9)
MONOCYTES # BLD: 8 % (ref 3–12)
PDW BLD-RTO: 16.5 % (ref 11.8–14.4)
PLATELET # BLD: 348 K/UL (ref 138–453)
PLATELET ESTIMATE: ABNORMAL
PMV BLD AUTO: 9.9 FL (ref 8.1–13.5)
POTASSIUM SERPL-SCNC: 3.4 MMOL/L (ref 3.7–5.3)
RBC # BLD: 4.96 M/UL (ref 3.95–5.11)
RBC # BLD: ABNORMAL 10*6/UL
SEG NEUTROPHILS: 56 % (ref 36–65)
SEGMENTED NEUTROPHILS ABSOLUTE COUNT: 3.15 K/UL (ref 1.5–8.1)
SODIUM BLD-SCNC: 144 MMOL/L (ref 135–144)
TOTAL IRON BINDING CAPACITY: 215 UG/DL (ref 250–450)
TRIGL SERPL-MCNC: 111 MG/DL
UNSATURATED IRON BINDING CAPACITY: 164 UG/DL (ref 112–347)
VLDLC SERPL CALC-MCNC: NORMAL MG/DL (ref 1–30)
WBC # BLD: 5.6 K/UL (ref 3.5–11.3)
WBC # BLD: ABNORMAL 10*3/UL

## 2017-11-28 PROCEDURE — 36415 COLL VENOUS BLD VENIPUNCTURE: CPT

## 2017-11-28 PROCEDURE — 86803 HEPATITIS C AB TEST: CPT

## 2017-11-28 PROCEDURE — 83540 ASSAY OF IRON: CPT

## 2017-11-28 PROCEDURE — 87389 HIV-1 AG W/HIV-1&-2 AB AG IA: CPT

## 2017-11-28 PROCEDURE — 85025 COMPLETE CBC W/AUTO DIFF WBC: CPT

## 2017-11-28 PROCEDURE — 80061 LIPID PANEL: CPT

## 2017-11-28 PROCEDURE — 82728 ASSAY OF FERRITIN: CPT

## 2017-11-28 PROCEDURE — 80048 BASIC METABOLIC PNL TOTAL CA: CPT

## 2017-11-28 PROCEDURE — 83550 IRON BINDING TEST: CPT

## 2017-12-09 DIAGNOSIS — I10 ESSENTIAL HYPERTENSION: ICD-10-CM

## 2017-12-09 DIAGNOSIS — E78.5 HYPERLIPIDEMIA, UNSPECIFIED HYPERLIPIDEMIA TYPE: ICD-10-CM

## 2017-12-09 DIAGNOSIS — I67.9 CEREBROVASCULAR DISEASE: ICD-10-CM

## 2017-12-14 RX ORDER — ASPIRIN 81 MG/1
TABLET, DELAYED RELEASE ORAL
Qty: 30 TABLET | Refills: 0 | Status: SHIPPED | OUTPATIENT
Start: 2017-12-14 | End: 2018-01-10 | Stop reason: SDUPTHER

## 2017-12-14 RX ORDER — AMLODIPINE BESYLATE 10 MG/1
TABLET ORAL
Qty: 30 TABLET | Refills: 0 | Status: SHIPPED | OUTPATIENT
Start: 2017-12-14 | End: 2018-01-10 | Stop reason: SDUPTHER

## 2017-12-14 RX ORDER — LOSARTAN POTASSIUM 25 MG/1
TABLET ORAL
Qty: 30 TABLET | Refills: 0 | Status: SHIPPED | OUTPATIENT
Start: 2017-12-14 | End: 2018-01-10 | Stop reason: SDUPTHER

## 2017-12-14 RX ORDER — HYDROCHLOROTHIAZIDE 25 MG/1
TABLET ORAL
Qty: 30 TABLET | Refills: 0 | Status: SHIPPED | OUTPATIENT
Start: 2017-12-14 | End: 2018-01-10 | Stop reason: SDUPTHER

## 2017-12-14 RX ORDER — CLOPIDOGREL BISULFATE 75 MG/1
TABLET ORAL
Qty: 30 TABLET | Refills: 0 | Status: SHIPPED | OUTPATIENT
Start: 2017-12-14 | End: 2018-01-10 | Stop reason: SDUPTHER

## 2017-12-14 RX ORDER — ATORVASTATIN CALCIUM 40 MG/1
TABLET, FILM COATED ORAL
Qty: 30 TABLET | Refills: 0 | Status: SHIPPED | OUTPATIENT
Start: 2017-12-14 | End: 2018-01-10 | Stop reason: SDUPTHER

## 2017-12-15 NOTE — TELEPHONE ENCOUNTER
Refill on Omeprazole. Last seen on 8/10/17, medication pending. Next Visit Date:  Future Appointments  Date Time Provider Mary Marinelli   2/9/2018 2:15 PM Ricco Ifeanyi, [place] [address] Maintenance   Topic Date Due    DTaP/Tdap/Td vaccine (1 - Tdap) 07/14/1972    Smoker: low dose lung CT screening  07/14/2008    Flu vaccine (1) 09/01/2017    Cervical cancer screen  06/07/2019    Breast cancer screen  08/18/2019    Lipid screen  11/28/2022    Colon cancer screen colonoscopy  05/30/2023    Zostavax vaccine  Completed    Hepatitis C screen  Completed    HIV screen  Completed       Hemoglobin A1C (%)   Date Value   06/08/2016 5.4   06/19/2014 5.5             ( goal A1C is < 7)   Microalb/Crt.  Ratio (mcg/mg creat)   Date Value   05/09/2017 6     LDL Cholesterol (mg/dL)   Date Value   11/28/2017 92       (goal LDL is <100)   AST (U/L)   Date Value   06/08/2016 17     ALT (U/L)   Date Value   06/08/2016 13     BUN (mg/dL)   Date Value   11/28/2017 19     BP Readings from Last 3 Encounters:   10/06/17 123/83   08/10/17 134/74   07/07/17 104/76          (goal 120/80)    All Future Testing planned in [facility]  Lab Frequency Next Occurrence               Patient Active Problem List:     Essential hypertension     Pure hypercholesterolemia     Cerebrovascular accident (CVA), 2011, no residual deficit (HCC)     Iron (Fe) deficiency anemia     CKD (chronic kidney disease) stage 3, GFR 30-59 ml/min     Peripheral vascular disease (HCC)     TIBURCIO (obstructive sleep apnea)     Morbid obesity with BMI of 50.0-59.9, adult (HCC)     Gastroesophageal reflux disease without esophagitis

## 2017-12-18 RX ORDER — NICOTINE POLACRILEX 4 MG/1
GUM, CHEWING ORAL
Qty: 28 TABLET | Refills: 0 | Status: SHIPPED | OUTPATIENT
Start: 2017-12-18 | End: 2018-01-10 | Stop reason: SDUPTHER

## 2018-01-10 DIAGNOSIS — I67.9 CEREBROVASCULAR DISEASE: ICD-10-CM

## 2018-01-10 DIAGNOSIS — I10 ESSENTIAL HYPERTENSION: ICD-10-CM

## 2018-01-10 DIAGNOSIS — E78.5 HYPERLIPIDEMIA, UNSPECIFIED HYPERLIPIDEMIA TYPE: ICD-10-CM

## 2018-01-10 NOTE — TELEPHONE ENCOUNTER
E-scribing request for medications pt has no future appt. Last seen 8/10/17. Please advise. PC to pt LM on VM    Health Maintenance   Topic Date Due    DTaP/Tdap/Td vaccine (1 - Tdap) 07/14/1972    Smoker: low dose lung CT screening  07/14/2008    Flu vaccine (1) 09/01/2017    Potassium monitoring  11/28/2018    Creatinine monitoring  11/28/2018    Cervical cancer screen  06/07/2019    Breast cancer screen  08/18/2019    Lipid screen  11/28/2022    Colon cancer screen colonoscopy  05/30/2023    Zostavax vaccine  Completed    Hepatitis C screen  Completed    HIV screen  Completed             (applicable per patient's age: Cancer Screenings, Depression Screening, Fall Risk Screening, Immunizations)    Hemoglobin A1C (%)   Date Value   06/08/2016 5.4   06/19/2014 5.5     Microalb/Crt.  Ratio (mcg/mg creat)   Date Value   05/09/2017 6     LDL Cholesterol (mg/dL)   Date Value   11/28/2017 92     AST (U/L)   Date Value   06/08/2016 17     ALT (U/L)   Date Value   06/08/2016 13     BUN (mg/dL)   Date Value   11/28/2017 19      (goal A1C is < 7)   (goal LDL is <100) need 30-50% reduction from baseline     BP Readings from Last 3 Encounters:   10/06/17 123/83   08/10/17 134/74   07/07/17 104/76    (goal /80)      All Future Testing planned in [facility]:  Lab Frequency Next Occurrence       Next Visit Date:  Future Appointments  Date Time Provider Mary Marinelli   2/9/2018 2:15 PM AMINAH [address]            Patient Active Problem List:     Essential hypertension     Pure hypercholesterolemia     Cerebrovascular accident (CVA), 2011, no residual deficit (HCC)     Iron (Fe) deficiency anemia     CKD (chronic kidney disease) stage 3, GFR 30-59 ml/min     Peripheral vascular disease (HCC)     TIBURCIO (obstructive sleep apnea)     Morbid obesity with BMI of 50.0-59.9, adult (HCC)     Gastroesophageal reflux disease without esophagitis

## 2018-01-11 RX ORDER — NICOTINE POLACRILEX 4 MG/1
GUM, CHEWING ORAL
Qty: 28 TABLET | Refills: 0 | Status: SHIPPED | OUTPATIENT
Start: 2018-01-11 | End: 2018-02-02 | Stop reason: SDUPTHER

## 2018-01-11 RX ORDER — CLOPIDOGREL BISULFATE 75 MG/1
TABLET ORAL
Qty: 30 TABLET | Refills: 0 | Status: SHIPPED | OUTPATIENT
Start: 2018-01-11 | End: 2018-02-02 | Stop reason: SDUPTHER

## 2018-01-11 RX ORDER — LOSARTAN POTASSIUM 25 MG/1
TABLET ORAL
Qty: 30 TABLET | Refills: 0 | Status: SHIPPED | OUTPATIENT
Start: 2018-01-11 | End: 2018-02-02 | Stop reason: SDUPTHER

## 2018-01-11 RX ORDER — ATORVASTATIN CALCIUM 40 MG/1
TABLET, FILM COATED ORAL
Qty: 30 TABLET | Refills: 0 | Status: SHIPPED | OUTPATIENT
Start: 2018-01-11 | End: 2018-02-02 | Stop reason: SDUPTHER

## 2018-01-11 RX ORDER — HYDROCHLOROTHIAZIDE 25 MG/1
TABLET ORAL
Qty: 30 TABLET | Refills: 0 | Status: SHIPPED | OUTPATIENT
Start: 2018-01-11 | End: 2018-02-02 | Stop reason: SDUPTHER

## 2018-01-11 RX ORDER — ASPIRIN 81 MG/1
TABLET ORAL
Qty: 30 TABLET | Refills: 0 | Status: SHIPPED | OUTPATIENT
Start: 2018-01-11 | End: 2018-02-02 | Stop reason: SDUPTHER

## 2018-01-11 RX ORDER — FERROUS SULFATE 325(65) MG
TABLET ORAL
Qty: 30 TABLET | Refills: 0 | Status: SHIPPED | OUTPATIENT
Start: 2018-01-11 | End: 2018-02-02 | Stop reason: SDUPTHER

## 2018-01-11 RX ORDER — FOLIC ACID 1 MG/1
TABLET ORAL
Qty: 30 TABLET | Refills: 0 | Status: SHIPPED | OUTPATIENT
Start: 2018-01-11 | End: 2018-02-02 | Stop reason: SDUPTHER

## 2018-01-11 RX ORDER — AMLODIPINE BESYLATE 10 MG/1
TABLET ORAL
Qty: 30 TABLET | Refills: 0 | Status: SHIPPED | OUTPATIENT
Start: 2018-01-11 | End: 2018-02-02 | Stop reason: SDUPTHER

## 2018-01-26 ENCOUNTER — OFFICE VISIT (OUTPATIENT)
Dept: PODIATRY | Age: 63
End: 2018-01-26
Payer: COMMERCIAL

## 2018-01-26 VITALS
HEIGHT: 71 IN | WEIGHT: 293 LBS | HEART RATE: 68 BPM | SYSTOLIC BLOOD PRESSURE: 130 MMHG | DIASTOLIC BLOOD PRESSURE: 74 MMHG | BODY MASS INDEX: 41.02 KG/M2

## 2018-01-26 DIAGNOSIS — M79.671 PAIN IN BOTH FEET: ICD-10-CM

## 2018-01-26 DIAGNOSIS — B35.1 ONYCHOMYCOSIS: Primary | ICD-10-CM

## 2018-01-26 DIAGNOSIS — M79.672 PAIN IN BOTH FEET: ICD-10-CM

## 2018-01-26 PROCEDURE — 11721 DEBRIDE NAIL 6 OR MORE: CPT | Performed by: PODIATRIST

## 2018-01-26 NOTE — PROGRESS NOTES
Subjective:   Diabetes     Patient presents to clinic for follow-up calluses and thickened painful toenails. Patient is nondiabetic. She states her nails are elongated and painful, especially in shoes. She denies numbness, tingling, cramping or pain in the feet otherwise. Review of Systems   Denies n/v/f/c    Objective:   Physical Exam   Vitals:    01/26/18 1313   BP: 130/74   Pulse: 68     Gen: AAOx3, NAD  Vascular: DP and PT pulses are palpable bilateral. CFT is brisk to all digits. Skin temp is warm to warm proximal to distal, bilateral.   Neurologic: SILT  Dermatologic: Nails 1-10 are thickened, elongated, discolored with the presence of subungual debris. Webspaces 1-4 are c/d/i, bilateral. No open lesions noted. Musculoskeletal: Muscle strength 5/5 for all LE muscle groups. Pain with palpation of hyperkeratotic tissue noted in dermatologic exam. Decreased medial arch noted, b/l. Decreased ROM b/l kojo joint. Contracted digits 2-5 b/l. Abducted hallux noted b/l with medial eminence with right worse than left. Assessment:      1. Onychomycosis  05830 - IN DEBRIDEMENT OF NAILS, 6 OR MORE   2. Pain in both feet  31297 - IN DEBRIDEMENT OF NAILS, 6 OR MORE      Plan:   · Patient seen and evaluated. · Discussed treatment options with patient. · Nails debrided in thickness and length 1-5 oanh without incident. · Patient to RTC in 4 months. Electronically signed by Yesenia Knott DPM on 1/26/2018 at 1:33 PM   I performed a history and physical examination of the patient and discussed management with the resident. I reviewed the residents note and agree with the documented findings and plan of care. Any areas of disagreement are noted on the chart. I was personally present for the key portions of any procedures. I have documented in the chart those procedures where I was not present during the key portions. I have reviewed the Podiatry Resident progress note.  I agree with the chief complaint, past medical history, past surgical history, allergies, medications, social and family history as documented unless otherwise noted below. Documentation of the HPI, Physical Exam and Medical Decision Making performed by medical students or scribes is based on my personal performance of the HPI, PE and MDM. I have personally evaluated this patient and have completed at least one if not all key elements of the E/M (history, physical exam, and MDM). Additional findings are as noted. Lisa Lew D.P.M.

## 2018-02-02 DIAGNOSIS — E78.5 HYPERLIPIDEMIA, UNSPECIFIED HYPERLIPIDEMIA TYPE: ICD-10-CM

## 2018-02-02 DIAGNOSIS — I10 ESSENTIAL HYPERTENSION: ICD-10-CM

## 2018-02-02 DIAGNOSIS — I67.9 CEREBROVASCULAR DISEASE: ICD-10-CM

## 2018-02-02 NOTE — TELEPHONE ENCOUNTER
E-scribe request for ALL PENDED MEDICATIONS. Please review and e-scribe if applicable. Last Visit Date:  8/10/17  Next Visit Date:  No-Show 12/17/17 Appt    Hemoglobin A1C (%)   Date Value   06/08/2016 5.4   06/19/2014 5.5             ( goal A1C is < 7)   Microalb/Crt.  Ratio (mcg/mg creat)   Date Value   05/09/2017 6     LDL Cholesterol (mg/dL)   Date Value   11/28/2017 92       (goal LDL is <100)   AST (U/L)   Date Value   06/08/2016 17     ALT (U/L)   Date Value   06/08/2016 13     BUN (mg/dL)   Date Value   11/28/2017 19     BP Readings from Last 3 Encounters:   01/26/18 130/74   10/06/17 123/83   08/10/17 134/74          (goal 120/80)        Patient Active Problem List:     Essential hypertension     Pure hypercholesterolemia     Cerebrovascular accident (CVA), 2011, no residual deficit (HCC)     Iron (Fe) deficiency anemia     CKD (chronic kidney disease) stage 3, GFR 30-59 ml/min     Peripheral vascular disease (HCC)     TIBURCIO (obstructive sleep apnea)     Morbid obesity with BMI of 50.0-59.9, adult (HCC)     Gastroesophageal reflux disease without esophagitis      ----JF

## 2018-02-05 RX ORDER — ATORVASTATIN CALCIUM 40 MG/1
TABLET, FILM COATED ORAL
Qty: 30 TABLET | Refills: 0 | Status: SHIPPED | OUTPATIENT
Start: 2018-02-05 | End: 2018-03-02 | Stop reason: SDUPTHER

## 2018-02-05 RX ORDER — NICOTINE POLACRILEX 4 MG/1
GUM, CHEWING ORAL
Qty: 28 TABLET | Refills: 0 | Status: SHIPPED | OUTPATIENT
Start: 2018-02-05 | End: 2018-03-02 | Stop reason: SDUPTHER

## 2018-02-05 RX ORDER — FERROUS SULFATE 325(65) MG
TABLET ORAL
Qty: 30 TABLET | Refills: 0 | Status: SHIPPED | OUTPATIENT
Start: 2018-02-05 | End: 2018-03-02 | Stop reason: SDUPTHER

## 2018-02-05 RX ORDER — CLOPIDOGREL BISULFATE 75 MG/1
TABLET ORAL
Qty: 30 TABLET | Refills: 0 | Status: SHIPPED | OUTPATIENT
Start: 2018-02-05 | End: 2018-03-02 | Stop reason: SDUPTHER

## 2018-02-05 RX ORDER — AMLODIPINE BESYLATE 10 MG/1
TABLET ORAL
Qty: 30 TABLET | Refills: 0 | Status: SHIPPED | OUTPATIENT
Start: 2018-02-05 | End: 2018-03-02 | Stop reason: SDUPTHER

## 2018-02-05 RX ORDER — FOLIC ACID 1 MG/1
TABLET ORAL
Qty: 30 TABLET | Refills: 0 | Status: SHIPPED | OUTPATIENT
Start: 2018-02-05 | End: 2018-03-02 | Stop reason: SDUPTHER

## 2018-02-05 RX ORDER — HYDROCHLOROTHIAZIDE 25 MG/1
TABLET ORAL
Qty: 30 TABLET | Refills: 0 | Status: SHIPPED | OUTPATIENT
Start: 2018-02-05 | End: 2018-03-02 | Stop reason: SDUPTHER

## 2018-02-05 RX ORDER — ASPIRIN 81 MG/1
TABLET ORAL
Qty: 30 TABLET | Refills: 0 | Status: SHIPPED | OUTPATIENT
Start: 2018-02-05 | End: 2018-03-02 | Stop reason: SDUPTHER

## 2018-02-05 RX ORDER — LOSARTAN POTASSIUM 25 MG/1
TABLET ORAL
Qty: 30 TABLET | Refills: 0 | Status: SHIPPED | OUTPATIENT
Start: 2018-02-05 | End: 2018-03-02 | Stop reason: SDUPTHER

## 2018-02-20 ENCOUNTER — TELEPHONE (OUTPATIENT)
Dept: INTERNAL MEDICINE | Age: 63
End: 2018-02-20

## 2018-02-20 DIAGNOSIS — G47.33 OSA (OBSTRUCTIVE SLEEP APNEA): ICD-10-CM

## 2018-02-20 DIAGNOSIS — E66.01 MORBID OBESITY WITH BMI OF 50.0-59.9, ADULT (HCC): ICD-10-CM

## 2018-02-22 NOTE — TELEPHONE ENCOUNTER
I have printed the handicap placard
PC to pt script is ready for , pt said she will pick it up today. Script placed in  drawer.
ml/min     Peripheral vascular disease (HCC)     TIBURCIO (obstructive sleep apnea)     Morbid obesity with BMI of 50.0-59.9, adult (HCC)     Gastroesophageal reflux disease without esophagitis

## 2018-03-02 DIAGNOSIS — I10 ESSENTIAL HYPERTENSION: ICD-10-CM

## 2018-03-02 DIAGNOSIS — I67.9 CEREBROVASCULAR DISEASE: ICD-10-CM

## 2018-03-02 DIAGNOSIS — E78.5 HYPERLIPIDEMIA, UNSPECIFIED HYPERLIPIDEMIA TYPE: ICD-10-CM

## 2018-03-03 RX ORDER — FOLIC ACID 1 MG/1
TABLET ORAL
Qty: 30 TABLET | Refills: 0 | Status: SHIPPED | OUTPATIENT
Start: 2018-03-03 | End: 2018-03-08 | Stop reason: SDUPTHER

## 2018-03-03 RX ORDER — ATORVASTATIN CALCIUM 40 MG/1
TABLET, FILM COATED ORAL
Qty: 30 TABLET | Refills: 0 | Status: SHIPPED | OUTPATIENT
Start: 2018-03-03 | End: 2018-03-08 | Stop reason: SDUPTHER

## 2018-03-03 RX ORDER — ASPIRIN 81 MG/1
TABLET ORAL
Qty: 30 TABLET | Refills: 0 | Status: SHIPPED | OUTPATIENT
Start: 2018-03-03 | End: 2018-03-08 | Stop reason: SDUPTHER

## 2018-03-03 RX ORDER — HYDROCHLOROTHIAZIDE 25 MG/1
TABLET ORAL
Qty: 30 TABLET | Refills: 0 | Status: SHIPPED | OUTPATIENT
Start: 2018-03-03 | End: 2018-03-08 | Stop reason: SDUPTHER

## 2018-03-03 RX ORDER — NICOTINE POLACRILEX 4 MG/1
GUM, CHEWING ORAL
Qty: 28 TABLET | Refills: 0 | Status: SHIPPED | OUTPATIENT
Start: 2018-03-03 | End: 2018-03-08 | Stop reason: ALTCHOICE

## 2018-03-03 RX ORDER — FERROUS SULFATE 325(65) MG
TABLET ORAL
Qty: 30 TABLET | Refills: 0 | Status: SHIPPED | OUTPATIENT
Start: 2018-03-03 | End: 2018-03-30 | Stop reason: SDUPTHER

## 2018-03-03 RX ORDER — LOSARTAN POTASSIUM 25 MG/1
TABLET ORAL
Qty: 30 TABLET | Refills: 0 | Status: SHIPPED | OUTPATIENT
Start: 2018-03-03 | End: 2018-03-08 | Stop reason: SDUPTHER

## 2018-03-03 RX ORDER — CLOPIDOGREL BISULFATE 75 MG/1
TABLET ORAL
Qty: 30 TABLET | Refills: 0 | Status: SHIPPED | OUTPATIENT
Start: 2018-03-03 | End: 2018-03-08 | Stop reason: SDUPTHER

## 2018-03-03 RX ORDER — AMLODIPINE BESYLATE 10 MG/1
TABLET ORAL
Qty: 30 TABLET | Refills: 0 | Status: SHIPPED | OUTPATIENT
Start: 2018-03-03 | End: 2018-03-08 | Stop reason: SDUPTHER

## 2018-03-08 ENCOUNTER — OFFICE VISIT (OUTPATIENT)
Dept: INTERNAL MEDICINE | Age: 63
End: 2018-03-08
Payer: COMMERCIAL

## 2018-03-08 ENCOUNTER — HOSPITAL ENCOUNTER (OUTPATIENT)
Age: 63
Discharge: HOME | End: 2018-03-10
Payer: COMMERCIAL

## 2018-03-08 ENCOUNTER — HOSPITAL ENCOUNTER (OUTPATIENT)
Dept: GENERAL RADIOLOGY | Age: 63
Discharge: HOME | End: 2018-03-10
Payer: COMMERCIAL

## 2018-03-08 ENCOUNTER — TELEPHONE (OUTPATIENT)
Dept: INTERNAL MEDICINE | Age: 63
End: 2018-03-08

## 2018-03-08 VITALS
HEART RATE: 60 BPM | WEIGHT: 293 LBS | DIASTOLIC BLOOD PRESSURE: 75 MMHG | HEIGHT: 71 IN | SYSTOLIC BLOOD PRESSURE: 143 MMHG | BODY MASS INDEX: 41.02 KG/M2

## 2018-03-08 DIAGNOSIS — M16.12 PRIMARY OSTEOARTHRITIS OF LEFT HIP: Primary | ICD-10-CM

## 2018-03-08 DIAGNOSIS — Z23 NEED FOR DIPHTHERIA-TETANUS-PERTUSSIS (TDAP) VACCINE, ADULT/ADOLESCENT: ICD-10-CM

## 2018-03-08 DIAGNOSIS — M17.0 PRIMARY OSTEOARTHRITIS OF BOTH KNEES: ICD-10-CM

## 2018-03-08 DIAGNOSIS — M79.605 LEFT LEG PAIN: Primary | ICD-10-CM

## 2018-03-08 DIAGNOSIS — G47.33 OSA (OBSTRUCTIVE SLEEP APNEA): ICD-10-CM

## 2018-03-08 DIAGNOSIS — N18.30 CKD (CHRONIC KIDNEY DISEASE) STAGE 3, GFR 30-59 ML/MIN (HCC): ICD-10-CM

## 2018-03-08 DIAGNOSIS — E78.5 HYPERLIPIDEMIA, UNSPECIFIED HYPERLIPIDEMIA TYPE: ICD-10-CM

## 2018-03-08 DIAGNOSIS — M79.605 LEFT LEG PAIN: ICD-10-CM

## 2018-03-08 DIAGNOSIS — E66.01 MORBID OBESITY WITH BMI OF 50.0-59.9, ADULT (HCC): ICD-10-CM

## 2018-03-08 DIAGNOSIS — I67.9 CEREBROVASCULAR DISEASE: ICD-10-CM

## 2018-03-08 DIAGNOSIS — I10 ESSENTIAL HYPERTENSION: ICD-10-CM

## 2018-03-08 PROCEDURE — 73502 X-RAY EXAM HIP UNI 2-3 VIEWS: CPT

## 2018-03-08 PROCEDURE — 90471 IMMUNIZATION ADMIN: CPT | Performed by: STUDENT IN AN ORGANIZED HEALTH CARE EDUCATION/TRAINING PROGRAM

## 2018-03-08 PROCEDURE — 90715 TDAP VACCINE 7 YRS/> IM: CPT | Performed by: INTERNAL MEDICINE

## 2018-03-08 PROCEDURE — 99214 OFFICE O/P EST MOD 30 MIN: CPT

## 2018-03-08 PROCEDURE — 90472 IMMUNIZATION ADMIN EACH ADD: CPT

## 2018-03-08 PROCEDURE — 73560 X-RAY EXAM OF KNEE 1 OR 2: CPT

## 2018-03-08 PROCEDURE — 99214 OFFICE O/P EST MOD 30 MIN: CPT | Performed by: STUDENT IN AN ORGANIZED HEALTH CARE EDUCATION/TRAINING PROGRAM

## 2018-03-08 RX ORDER — AMLODIPINE BESYLATE 10 MG/1
TABLET ORAL
Qty: 30 TABLET | Refills: 5 | Status: SHIPPED | OUTPATIENT
Start: 2018-03-08 | End: 2018-07-13 | Stop reason: SDUPTHER

## 2018-03-08 RX ORDER — ASPIRIN 81 MG/1
TABLET ORAL
Qty: 30 TABLET | Refills: 3 | Status: SHIPPED | OUTPATIENT
Start: 2018-03-08 | End: 2018-07-13 | Stop reason: SDUPTHER

## 2018-03-08 RX ORDER — IBUPROFEN 800 MG/1
800 TABLET ORAL EVERY 8 HOURS PRN
Qty: 30 TABLET | Refills: 0 | Status: SHIPPED | OUTPATIENT
Start: 2018-03-08 | End: 2018-04-11 | Stop reason: SDUPTHER

## 2018-03-08 RX ORDER — LOSARTAN POTASSIUM 25 MG/1
TABLET ORAL
Qty: 30 TABLET | Refills: 5 | Status: SHIPPED | OUTPATIENT
Start: 2018-03-08 | End: 2018-07-13 | Stop reason: SDUPTHER

## 2018-03-08 RX ORDER — CLOPIDOGREL BISULFATE 75 MG/1
TABLET ORAL
Qty: 30 TABLET | Refills: 0 | Status: SHIPPED | OUTPATIENT
Start: 2018-03-08 | End: 2018-04-26 | Stop reason: SDUPTHER

## 2018-03-08 RX ORDER — HYDROCHLOROTHIAZIDE 25 MG/1
TABLET ORAL
Qty: 30 TABLET | Refills: 0 | Status: SHIPPED | OUTPATIENT
Start: 2018-03-08 | End: 2018-04-26 | Stop reason: SDUPTHER

## 2018-03-08 RX ORDER — ATORVASTATIN CALCIUM 40 MG/1
TABLET, FILM COATED ORAL
Qty: 30 TABLET | Refills: 5 | Status: SHIPPED | OUTPATIENT
Start: 2018-03-08 | End: 2018-06-12 | Stop reason: DRUGHIGH

## 2018-03-08 NOTE — PROGRESS NOTES
[facility]    Progress Note    Date of patient's visit: 3/8/2018  YOB: 1955  Patient's Name:  Pablito De    Patient Care Team:  Shani Gaffney MD as PCP - General (Internal Medicine)    REASON FOR VISIT: Routine outpatient follow up for her routine follow-up. History of hypertension well controlled on losartan, HCTZ and norvasc   History of stroke in 2011 and has not seen a neurologist since then. sHe has no residual deficits. she is on aspirin and Plavix both since then. She also has a history of TIBURCIO she had a sleep study done last year in July but  she hasn't received her CPAP machine yet. she is morbidly obese BMI is 52 she was following weight loss clinic last year but lost the referral as she stopped going  GERD is controlled she was taken off Prilosec last encounter and she has been doing fairly off the medication  Chronic  kidney disease stable   Patient does not have iron deficiency anemia as per the studies iron studies are normal but her ferritin is elevated she has anemia of chronic disease, chronic kidney disease. Patient has been advised to cut down the iron tablets  once daily    Pt  has been having pain in her left hip radiating to her groin AND anterior  the part of thigh and aso she has pain around the knee joint. She is not able to raise her left leg she has difficulty getting up from the chair, but she is able to walk, no trouble bearing weight. Denies any back pain, numbness, tingling, difficulty controlling, bladder or bowel habits. Denies any trauma or fall    HISTORY OF PRESENT ILLNESS:    History was obtained from the patient.  Pablito De is a 57 y.o. is here for a      Patient Active Problem List   Diagnosis    Essential hypertension    Pure hypercholesterolemia    Cerebrovascular accident (CVA), 2011, no residual deficit (HCC)    Iron (Fe) deficiency anemia    CKD (chronic kidney disease) stage 3, GFR 30-59 ml/min    Peripheral vascular disease (HCC)    TIBURCIO (obstructive sleep apnea)    Morbid obesity with BMI of 50.0-59.9, adult (HCC)    Gastroesophageal reflux disease without esophagitis       ALLERGIES      Allergies   Allergen Reactions    Duricef [Cefadroxil Monohydrate]     Pcn [Penicillins]          MEDICATIONS:      Current Outpatient Prescriptions on File Prior to Visit   Medication Sig Dispense Refill    amLODIPine (NORVASC) 10 MG tablet TAKE 1 TABLET DAILY 30 tablet 0    aspirin 81 MG EC tablet TAKE 1 TABLET DAILY 30 tablet 0    clopidogrel (PLAVIX) 75 MG tablet TAKE 1 TABLET DAILY 30 tablet 0    atorvastatin (LIPITOR) 40 MG tablet TAKE 1 TABLET DAILY 30 tablet 0    ferrous sulfate 325 (65 Fe) MG tablet TAKE 1 TABLET IN THE MORNING 30 tablet 0    hydrochlorothiazide (HYDRODIURIL) 25 MG tablet TAKE 1 TABLET IN THE MORNING 30 tablet 0    losartan (COZAAR) 25 MG tablet TAKE 1 TABLET DAILY 30 tablet 0    DOCQLACE 100 MG capsule TAKE 1 TABLET BY MOUTH TWICE A DAY AS NEEDED FOR CONSTIPATION 60 capsule 0    folic acid (FOLVITE) 1 MG tablet TAKE 1 TABLET DAILY 30 tablet 0    acetaminophen (AMINOFEN) 325 MG tablet Take 2 tablets by mouth every 4 hours as needed for Pain 60 tablet 3    Handicap Placard MISC by Does not apply route Patient cannot walk more than 200 feet without stopping to rest  Duration of handicap placard 6 months 1 each 0    loratadine (CLARITIN) 10 MG tablet TAKE 1 TABLET DAILY 30 tablet 3     No current facility-administered medications on file prior to visit. SOCIAL HISTORY    Reviewed and no change from previous record. Sukumar Barnhart  reports that she quit smoking about 6 years ago. She has a 70.00 pack-year smoking history.  She has never used smokeless tobacco.    FAMILY HISTORY:    Reviewed and No change from previous visit    REVIEW OF SYSTEMS:    ENT: negative  Respiratory: no cough, shortness of breath, or wheezing  Cardiovascular: no chest pain or dyspnea on

## 2018-03-19 ENCOUNTER — OFFICE VISIT (OUTPATIENT)
Dept: ORTHOPEDIC SURGERY | Age: 63
End: 2018-03-19
Payer: COMMERCIAL

## 2018-03-19 VITALS
SYSTOLIC BLOOD PRESSURE: 126 MMHG | BODY MASS INDEX: 41.02 KG/M2 | HEIGHT: 71 IN | DIASTOLIC BLOOD PRESSURE: 68 MMHG | WEIGHT: 293 LBS

## 2018-03-19 DIAGNOSIS — M17.12 PRIMARY OSTEOARTHRITIS OF LEFT KNEE: Primary | ICD-10-CM

## 2018-03-19 PROCEDURE — 99213 OFFICE O/P EST LOW 20 MIN: CPT | Performed by: STUDENT IN AN ORGANIZED HEALTH CARE EDUCATION/TRAINING PROGRAM

## 2018-03-19 PROCEDURE — G8417 CALC BMI ABV UP PARAM F/U: HCPCS | Performed by: STUDENT IN AN ORGANIZED HEALTH CARE EDUCATION/TRAINING PROGRAM

## 2018-03-19 PROCEDURE — G8427 DOCREV CUR MEDS BY ELIG CLIN: HCPCS | Performed by: STUDENT IN AN ORGANIZED HEALTH CARE EDUCATION/TRAINING PROGRAM

## 2018-03-19 PROCEDURE — 3014F SCREEN MAMMO DOC REV: CPT | Performed by: STUDENT IN AN ORGANIZED HEALTH CARE EDUCATION/TRAINING PROGRAM

## 2018-03-19 PROCEDURE — G8484 FLU IMMUNIZE NO ADMIN: HCPCS | Performed by: STUDENT IN AN ORGANIZED HEALTH CARE EDUCATION/TRAINING PROGRAM

## 2018-03-19 PROCEDURE — 1036F TOBACCO NON-USER: CPT | Performed by: STUDENT IN AN ORGANIZED HEALTH CARE EDUCATION/TRAINING PROGRAM

## 2018-03-19 PROCEDURE — 3017F COLORECTAL CA SCREEN DOC REV: CPT | Performed by: STUDENT IN AN ORGANIZED HEALTH CARE EDUCATION/TRAINING PROGRAM

## 2018-03-19 PROCEDURE — G8598 ASA/ANTIPLAT THER USED: HCPCS | Performed by: STUDENT IN AN ORGANIZED HEALTH CARE EDUCATION/TRAINING PROGRAM

## 2018-03-19 NOTE — PROGRESS NOTES
History:   Social History     Social History    Marital status: Legally      Spouse name: N/A    Number of children: N/A    Years of education: N/A     Occupational History    Not on file. Social History Main Topics    Smoking status: Former Smoker     Packs/day: 2.00     Years: 35.00     Quit date: 10/11/2011    Smokeless tobacco: Never Used    Alcohol use 3.6 oz/week     6 Cans of beer per week      Comment: 6 beers/week(on weekends)    Drug use: No      Comment: per pt did years ago; cocaine & marij    Sexual activity: Yes     Partners: Male     Other Topics Concern    Not on file     Social History Narrative    No narrative on file       Family History:  Family History   Problem Relation Age of Onset    High Blood Pressure Mother     Asthma Mother     Heart Disease Mother     Heart Disease Father     High Blood Pressure Father     Diabetes Brother     High Blood Pressure Brother          REVIEW OF SYSTEMS:  Review of Systems    PHYSICAL EXAM:  /68   Ht 5' 11\" (1.803 m)   Wt (!) 389 lb (176.4 kg)   BMI 54.25 kg/m²   Physical Exam  Gen: alert and oriented  Psych:  Appropriate affect; Appropriate knowledge base; Appropriate mood; No hallucinations; Head: normocephalic atraumatic   Chest: symmetric chest excursion  Pelvis: stable  Ortho Exam  Extremity:L Knee:  No joint effusion or erythema. Skin intact without abrasions. Normal alignment with no gross leg length discrepancy. Able to bear weightwith antalgic and limited gait. No varus/valgus thrust. Thigh circumference and size grossly equal to contralateral. Medial and anterior joint line TTP. Compartments soft. ROM from 0-100 degrees. Stable ligamentously. Mariela's and patellar grind tests positive for pain. No patellar ballottement or abnormal tracking appreciated. Negative log roll. Negative SLR. EHL/FHL/TA/GS complex motor intact. Sural, saphenous, superificial/deep peroneal, and plantar nerve distribution SILT.

## 2018-04-02 ENCOUNTER — HOSPITAL ENCOUNTER (OUTPATIENT)
Dept: PHYSICAL THERAPY | Age: 63
Setting detail: THERAPIES SERIES
Discharge: HOME | End: 2018-04-02
Payer: COMMERCIAL

## 2018-04-02 PROCEDURE — 97162 PT EVAL MOD COMPLEX 30 MIN: CPT

## 2018-04-02 PROCEDURE — 97116 GAIT TRAINING THERAPY: CPT

## 2018-04-02 PROCEDURE — G8979 MOBILITY GOAL STATUS: HCPCS

## 2018-04-02 PROCEDURE — 97110 THERAPEUTIC EXERCISES: CPT

## 2018-04-02 PROCEDURE — G8978 MOBILITY CURRENT STATUS: HCPCS

## 2018-04-02 RX ORDER — FERROUS SULFATE 325(65) MG
TABLET ORAL
Qty: 30 TABLET | Refills: 0 | Status: SHIPPED | OUTPATIENT
Start: 2018-04-02 | End: 2018-04-12 | Stop reason: ALTCHOICE

## 2018-04-02 RX ORDER — FOLIC ACID 1 MG/1
TABLET ORAL
Qty: 30 TABLET | Refills: 0 | Status: SHIPPED | OUTPATIENT
Start: 2018-04-02 | End: 2018-04-26 | Stop reason: SDUPTHER

## 2018-04-04 ENCOUNTER — HOSPITAL ENCOUNTER (OUTPATIENT)
Dept: PHYSICAL THERAPY | Age: 63
Setting detail: THERAPIES SERIES
Discharge: HOME | End: 2018-04-04
Payer: COMMERCIAL

## 2018-04-04 PROCEDURE — 97110 THERAPEUTIC EXERCISES: CPT

## 2018-04-11 ENCOUNTER — HOSPITAL ENCOUNTER (OUTPATIENT)
Dept: PHYSICAL THERAPY | Age: 63
Setting detail: THERAPIES SERIES
Discharge: HOME | End: 2018-04-11
Payer: COMMERCIAL

## 2018-04-11 DIAGNOSIS — M79.605 LEFT LEG PAIN: ICD-10-CM

## 2018-04-11 PROCEDURE — 97110 THERAPEUTIC EXERCISES: CPT

## 2018-04-11 RX ORDER — IBUPROFEN 800 MG/1
800 TABLET ORAL EVERY 8 HOURS PRN
Qty: 30 TABLET | Refills: 0 | Status: SHIPPED | OUTPATIENT
Start: 2018-04-11 | End: 2018-07-13 | Stop reason: SDUPTHER

## 2018-04-12 ENCOUNTER — OFFICE VISIT (OUTPATIENT)
Dept: NEUROLOGY | Age: 63
End: 2018-04-12
Payer: COMMERCIAL

## 2018-04-12 VITALS
HEART RATE: 55 BPM | HEIGHT: 71 IN | WEIGHT: 293 LBS | DIASTOLIC BLOOD PRESSURE: 85 MMHG | SYSTOLIC BLOOD PRESSURE: 140 MMHG | BODY MASS INDEX: 41.02 KG/M2

## 2018-04-12 DIAGNOSIS — Z86.73 HISTORY OF STROKE: Primary | ICD-10-CM

## 2018-04-12 PROCEDURE — 99205 OFFICE O/P NEW HI 60 MIN: CPT | Performed by: PSYCHIATRY & NEUROLOGY

## 2018-04-13 ENCOUNTER — HOSPITAL ENCOUNTER (OUTPATIENT)
Dept: PHYSICAL THERAPY | Age: 63
Setting detail: THERAPIES SERIES
Discharge: HOME | End: 2018-04-13
Payer: COMMERCIAL

## 2018-04-13 PROCEDURE — 97110 THERAPEUTIC EXERCISES: CPT

## 2018-04-17 ENCOUNTER — HOSPITAL ENCOUNTER (OUTPATIENT)
Dept: PHYSICAL THERAPY | Age: 63
Setting detail: THERAPIES SERIES
Discharge: HOME | End: 2018-04-17
Payer: COMMERCIAL

## 2018-04-19 ENCOUNTER — HOSPITAL ENCOUNTER (OUTPATIENT)
Age: 63
Setting detail: SPECIMEN
Discharge: HOME | End: 2018-04-19
Payer: COMMERCIAL

## 2018-04-19 ENCOUNTER — TELEPHONE (OUTPATIENT)
Dept: ONCOLOGY | Age: 63
End: 2018-04-19

## 2018-04-19 ENCOUNTER — OFFICE VISIT (OUTPATIENT)
Dept: INTERNAL MEDICINE | Age: 63
End: 2018-04-19
Payer: COMMERCIAL

## 2018-04-19 VITALS — HEART RATE: 67 BPM | SYSTOLIC BLOOD PRESSURE: 139 MMHG | DIASTOLIC BLOOD PRESSURE: 81 MMHG | HEIGHT: 67 IN

## 2018-04-19 DIAGNOSIS — M17.12 PRIMARY OSTEOARTHRITIS OF LEFT KNEE: ICD-10-CM

## 2018-04-19 DIAGNOSIS — G47.33 OSA (OBSTRUCTIVE SLEEP APNEA): ICD-10-CM

## 2018-04-19 DIAGNOSIS — Z87.891 PERSONAL HISTORY OF TOBACCO USE: ICD-10-CM

## 2018-04-19 DIAGNOSIS — I63.00 CEREBROVASCULAR ACCIDENT (CVA) DUE TO THROMBOSIS OF PRECEREBRAL ARTERY (HCC): ICD-10-CM

## 2018-04-19 DIAGNOSIS — Z86.73 HISTORY OF STROKE: ICD-10-CM

## 2018-04-19 DIAGNOSIS — N18.30 CKD (CHRONIC KIDNEY DISEASE) STAGE 3, GFR 30-59 ML/MIN (HCC): ICD-10-CM

## 2018-04-19 DIAGNOSIS — Z23 NEED FOR PROPHYLACTIC VACCINATION AND INOCULATION AGAINST VARICELLA: ICD-10-CM

## 2018-04-19 DIAGNOSIS — I10 ESSENTIAL HYPERTENSION: Primary | ICD-10-CM

## 2018-04-19 DIAGNOSIS — E66.01 MORBID OBESITY WITH BMI OF 50.0-59.9, ADULT (HCC): ICD-10-CM

## 2018-04-19 LAB
ABSOLUTE EOS #: 0.22 K/UL (ref 0–0.44)
ABSOLUTE IMMATURE GRANULOCYTE: <0.03 K/UL (ref 0–0.3)
ABSOLUTE LYMPH #: 1.38 K/UL (ref 1.1–3.7)
ABSOLUTE MONO #: 0.47 K/UL (ref 0.1–1.2)
ALBUMIN SERPL-MCNC: 4.1 G/DL (ref 3.5–5.2)
ALBUMIN/GLOBULIN RATIO: 1.2 (ref 1–2.5)
ALP BLD-CCNC: 129 U/L (ref 35–104)
ALT SERPL-CCNC: 14 U/L (ref 5–33)
ANION GAP SERPL CALCULATED.3IONS-SCNC: 16 MMOL/L (ref 9–17)
AST SERPL-CCNC: 20 U/L
BASOPHILS # BLD: 1 % (ref 0–2)
BASOPHILS ABSOLUTE: 0.05 K/UL (ref 0–0.2)
BILIRUB SERPL-MCNC: 0.51 MG/DL (ref 0.3–1.2)
BUN BLDV-MCNC: 19 MG/DL (ref 8–23)
BUN/CREAT BLD: ABNORMAL (ref 9–20)
CALCIUM SERPL-MCNC: 9.4 MG/DL (ref 8.6–10.4)
CHLORIDE BLD-SCNC: 96 MMOL/L (ref 98–107)
CHOLESTEROL/HDL RATIO: 3.6
CHOLESTEROL: 154 MG/DL
CO2: 27 MMOL/L (ref 20–31)
CREAT SERPL-MCNC: 1.09 MG/DL (ref 0.5–0.9)
DIFFERENTIAL TYPE: ABNORMAL
EOSINOPHILS RELATIVE PERCENT: 4 % (ref 1–4)
ESTIMATED AVERAGE GLUCOSE: 117 MG/DL
GFR AFRICAN AMERICAN: >60 ML/MIN
GFR NON-AFRICAN AMERICAN: 51 ML/MIN
GFR SERPL CREATININE-BSD FRML MDRD: ABNORMAL ML/MIN/{1.73_M2}
GFR SERPL CREATININE-BSD FRML MDRD: ABNORMAL ML/MIN/{1.73_M2}
GLUCOSE BLD-MCNC: 90 MG/DL (ref 70–99)
HBA1C MFR BLD: 5.7 % (ref 4–6)
HCT VFR BLD CALC: 37.2 % (ref 36.3–47.1)
HDLC SERPL-MCNC: 43 MG/DL
HEMOGLOBIN: 10.8 G/DL (ref 11.9–15.1)
IMMATURE GRANULOCYTES: 0 %
LDL CHOLESTEROL: 85 MG/DL (ref 0–130)
LYMPHOCYTES # BLD: 26 % (ref 24–43)
MCH RBC QN AUTO: 21.5 PG (ref 25.2–33.5)
MCHC RBC AUTO-ENTMCNC: 29 G/DL (ref 28.4–34.8)
MCV RBC AUTO: 74.1 FL (ref 82.6–102.9)
MONOCYTES # BLD: 9 % (ref 3–12)
NRBC AUTOMATED: 0 PER 100 WBC
PDW BLD-RTO: 17 % (ref 11.8–14.4)
PLATELET # BLD: 388 K/UL (ref 138–453)
PLATELET ESTIMATE: ABNORMAL
PMV BLD AUTO: 9.9 FL (ref 8.1–13.5)
POTASSIUM SERPL-SCNC: 4 MMOL/L (ref 3.7–5.3)
RBC # BLD: 5.02 M/UL (ref 3.95–5.11)
RBC # BLD: ABNORMAL 10*6/UL
SEG NEUTROPHILS: 60 % (ref 36–65)
SEGMENTED NEUTROPHILS ABSOLUTE COUNT: 3.09 K/UL (ref 1.5–8.1)
SODIUM BLD-SCNC: 139 MMOL/L (ref 135–144)
TOTAL PROTEIN: 7.4 G/DL (ref 6.4–8.3)
TRIGL SERPL-MCNC: 128 MG/DL
VITAMIN B-12: 761 PG/ML (ref 232–1245)
VLDLC SERPL CALC-MCNC: NORMAL MG/DL (ref 1–30)
WBC # BLD: 5.2 K/UL (ref 3.5–11.3)
WBC # BLD: ABNORMAL 10*3/UL

## 2018-04-19 PROCEDURE — G8427 DOCREV CUR MEDS BY ELIG CLIN: HCPCS | Performed by: INTERNAL MEDICINE

## 2018-04-19 PROCEDURE — 80061 LIPID PANEL: CPT

## 2018-04-19 PROCEDURE — 3017F COLORECTAL CA SCREEN DOC REV: CPT | Performed by: INTERNAL MEDICINE

## 2018-04-19 PROCEDURE — 83036 HEMOGLOBIN GLYCOSYLATED A1C: CPT

## 2018-04-19 PROCEDURE — 1036F TOBACCO NON-USER: CPT | Performed by: INTERNAL MEDICINE

## 2018-04-19 PROCEDURE — 80053 COMPREHEN METABOLIC PANEL: CPT

## 2018-04-19 PROCEDURE — 3014F SCREEN MAMMO DOC REV: CPT | Performed by: INTERNAL MEDICINE

## 2018-04-19 PROCEDURE — 36415 COLL VENOUS BLD VENIPUNCTURE: CPT

## 2018-04-19 PROCEDURE — 99213 OFFICE O/P EST LOW 20 MIN: CPT | Performed by: STUDENT IN AN ORGANIZED HEALTH CARE EDUCATION/TRAINING PROGRAM

## 2018-04-19 PROCEDURE — 82607 VITAMIN B-12: CPT

## 2018-04-19 PROCEDURE — G8417 CALC BMI ABV UP PARAM F/U: HCPCS | Performed by: INTERNAL MEDICINE

## 2018-04-19 PROCEDURE — 85025 COMPLETE CBC W/AUTO DIFF WBC: CPT

## 2018-04-19 PROCEDURE — G0296 VISIT TO DETERM LDCT ELIG: HCPCS | Performed by: STUDENT IN AN ORGANIZED HEALTH CARE EDUCATION/TRAINING PROGRAM

## 2018-04-19 PROCEDURE — 99213 OFFICE O/P EST LOW 20 MIN: CPT

## 2018-04-19 PROCEDURE — G8598 ASA/ANTIPLAT THER USED: HCPCS | Performed by: INTERNAL MEDICINE

## 2018-04-19 RX ORDER — NEBULIZER ACCESSORIES
1 EACH MISCELLANEOUS DAILY
Qty: 1 EACH | Refills: 0 | Status: SHIPPED | OUTPATIENT
Start: 2018-04-19 | End: 2018-07-13

## 2018-04-19 RX ORDER — NEBULIZER ACCESSORIES
1 EACH MISCELLANEOUS DAILY
Qty: 1 EACH | Refills: 0 | Status: SHIPPED | OUTPATIENT
Start: 2018-04-19 | End: 2018-04-19 | Stop reason: SDUPTHER

## 2018-04-19 RX ORDER — ACETAMINOPHEN 325 MG/1
650 TABLET ORAL EVERY 4 HOURS PRN
Qty: 60 TABLET | Refills: 3 | Status: SHIPPED | OUTPATIENT
Start: 2018-04-19 | End: 2018-07-13 | Stop reason: SDUPTHER

## 2018-04-20 ENCOUNTER — HOSPITAL ENCOUNTER (OUTPATIENT)
Dept: PHYSICAL THERAPY | Age: 63
Setting detail: THERAPIES SERIES
Discharge: HOME | End: 2018-04-20
Payer: COMMERCIAL

## 2018-04-20 PROCEDURE — 97110 THERAPEUTIC EXERCISES: CPT

## 2018-04-25 ENCOUNTER — HOSPITAL ENCOUNTER (OUTPATIENT)
Dept: PHYSICAL THERAPY | Age: 63
Setting detail: THERAPIES SERIES
Discharge: HOME | End: 2018-04-25
Payer: COMMERCIAL

## 2018-04-25 PROCEDURE — 97110 THERAPEUTIC EXERCISES: CPT

## 2018-04-26 DIAGNOSIS — I10 ESSENTIAL HYPERTENSION: ICD-10-CM

## 2018-04-26 DIAGNOSIS — I67.9 CEREBROVASCULAR DISEASE: ICD-10-CM

## 2018-04-26 RX ORDER — FOLIC ACID 1 MG/1
TABLET ORAL
Qty: 30 TABLET | Refills: 0 | Status: SHIPPED | OUTPATIENT
Start: 2018-04-26 | End: 2018-05-24 | Stop reason: SDUPTHER

## 2018-04-26 RX ORDER — FERROUS SULFATE 325(65) MG
TABLET ORAL
Qty: 30 TABLET | Refills: 0 | Status: SHIPPED | OUTPATIENT
Start: 2018-04-26 | End: 2018-05-24 | Stop reason: SDUPTHER

## 2018-04-26 RX ORDER — CLOPIDOGREL BISULFATE 75 MG/1
TABLET ORAL
Qty: 30 TABLET | Refills: 0 | Status: SHIPPED | OUTPATIENT
Start: 2018-04-26 | End: 2018-05-24 | Stop reason: SDUPTHER

## 2018-04-26 RX ORDER — HYDROCHLOROTHIAZIDE 25 MG/1
TABLET ORAL
Qty: 30 TABLET | Refills: 0 | Status: SHIPPED | OUTPATIENT
Start: 2018-04-26 | End: 2018-05-24 | Stop reason: SDUPTHER

## 2018-04-27 ENCOUNTER — OFFICE VISIT (OUTPATIENT)
Dept: PODIATRY | Age: 63
End: 2018-04-27
Payer: COMMERCIAL

## 2018-04-27 ENCOUNTER — HOSPITAL ENCOUNTER (OUTPATIENT)
Dept: PHYSICAL THERAPY | Age: 63
Setting detail: THERAPIES SERIES
Discharge: HOME | End: 2018-04-27
Payer: COMMERCIAL

## 2018-04-27 VITALS
HEART RATE: 60 BPM | HEIGHT: 71 IN | SYSTOLIC BLOOD PRESSURE: 115 MMHG | BODY MASS INDEX: 41.02 KG/M2 | DIASTOLIC BLOOD PRESSURE: 74 MMHG | WEIGHT: 293 LBS

## 2018-04-27 DIAGNOSIS — M20.11 HAV (HALLUX ABDUCTO VALGUS), RIGHT: ICD-10-CM

## 2018-04-27 DIAGNOSIS — M79.672 PAIN IN BOTH FEET: ICD-10-CM

## 2018-04-27 DIAGNOSIS — M79.671 PAIN IN BOTH FEET: ICD-10-CM

## 2018-04-27 DIAGNOSIS — B35.1 ONYCHOMYCOSIS: Primary | ICD-10-CM

## 2018-04-27 DIAGNOSIS — M20.12 HAV (HALLUX ABDUCTO VALGUS), LEFT: ICD-10-CM

## 2018-04-27 DIAGNOSIS — L84 CALLUS OF FOOT: ICD-10-CM

## 2018-04-27 PROCEDURE — 11056 PARNG/CUTG B9 HYPRKR LES 2-4: CPT | Performed by: PODIATRIST

## 2018-04-27 PROCEDURE — 11721 DEBRIDE NAIL 6 OR MORE: CPT | Performed by: PODIATRIST

## 2018-04-27 PROCEDURE — G8417 CALC BMI ABV UP PARAM F/U: HCPCS | Performed by: PODIATRIST

## 2018-04-27 PROCEDURE — 1036F TOBACCO NON-USER: CPT | Performed by: PODIATRIST

## 2018-04-27 PROCEDURE — G8427 DOCREV CUR MEDS BY ELIG CLIN: HCPCS | Performed by: PODIATRIST

## 2018-04-27 PROCEDURE — 97110 THERAPEUTIC EXERCISES: CPT

## 2018-04-27 PROCEDURE — 3017F COLORECTAL CA SCREEN DOC REV: CPT | Performed by: PODIATRIST

## 2018-04-27 PROCEDURE — G8598 ASA/ANTIPLAT THER USED: HCPCS | Performed by: PODIATRIST

## 2018-05-01 ENCOUNTER — OFFICE VISIT (OUTPATIENT)
Dept: BARIATRICS/WEIGHT MGMT | Age: 63
End: 2018-05-01
Payer: COMMERCIAL

## 2018-05-01 VITALS
DIASTOLIC BLOOD PRESSURE: 76 MMHG | WEIGHT: 293 LBS | SYSTOLIC BLOOD PRESSURE: 126 MMHG | HEIGHT: 71 IN | RESPIRATION RATE: 20 BRPM | HEART RATE: 72 BPM | BODY MASS INDEX: 41.02 KG/M2

## 2018-05-01 DIAGNOSIS — N18.30 CKD (CHRONIC KIDNEY DISEASE) STAGE 3, GFR 30-59 ML/MIN (HCC): ICD-10-CM

## 2018-05-01 DIAGNOSIS — I10 ESSENTIAL HYPERTENSION: Primary | ICD-10-CM

## 2018-05-01 DIAGNOSIS — E66.01 MORBID OBESITY WITH BMI OF 50.0-59.9, ADULT (HCC): ICD-10-CM

## 2018-05-01 DIAGNOSIS — I73.9 PERIPHERAL VASCULAR DISEASE (HCC): ICD-10-CM

## 2018-05-01 DIAGNOSIS — G47.33 OSA (OBSTRUCTIVE SLEEP APNEA): ICD-10-CM

## 2018-05-01 DIAGNOSIS — M19.90 ARTHRITIS: ICD-10-CM

## 2018-05-01 DIAGNOSIS — I63.00 CEREBROVASCULAR ACCIDENT (CVA) DUE TO THROMBOSIS OF PRECEREBRAL ARTERY (HCC): ICD-10-CM

## 2018-05-01 DIAGNOSIS — E78.00 PURE HYPERCHOLESTEROLEMIA: ICD-10-CM

## 2018-05-01 DIAGNOSIS — G44.229 CHRONIC TENSION-TYPE HEADACHE, NOT INTRACTABLE: ICD-10-CM

## 2018-05-01 PROCEDURE — 1036F TOBACCO NON-USER: CPT | Performed by: NURSE PRACTITIONER

## 2018-05-01 PROCEDURE — G8417 CALC BMI ABV UP PARAM F/U: HCPCS | Performed by: NURSE PRACTITIONER

## 2018-05-01 PROCEDURE — 99214 OFFICE O/P EST MOD 30 MIN: CPT | Performed by: NURSE PRACTITIONER

## 2018-05-01 PROCEDURE — G8598 ASA/ANTIPLAT THER USED: HCPCS | Performed by: NURSE PRACTITIONER

## 2018-05-01 PROCEDURE — 3017F COLORECTAL CA SCREEN DOC REV: CPT | Performed by: NURSE PRACTITIONER

## 2018-05-01 PROCEDURE — G8427 DOCREV CUR MEDS BY ELIG CLIN: HCPCS | Performed by: NURSE PRACTITIONER

## 2018-05-02 ENCOUNTER — HOSPITAL ENCOUNTER (OUTPATIENT)
Dept: PHYSICAL THERAPY | Age: 63
Setting detail: THERAPIES SERIES
Discharge: HOME | End: 2018-05-02
Payer: COMMERCIAL

## 2018-05-02 ENCOUNTER — HOSPITAL ENCOUNTER (OUTPATIENT)
Age: 63
Discharge: HOME | End: 2018-05-02
Payer: COMMERCIAL

## 2018-05-02 DIAGNOSIS — I73.9 PERIPHERAL VASCULAR DISEASE (HCC): ICD-10-CM

## 2018-05-02 DIAGNOSIS — M19.90 ARTHRITIS: ICD-10-CM

## 2018-05-02 DIAGNOSIS — G47.33 OSA (OBSTRUCTIVE SLEEP APNEA): ICD-10-CM

## 2018-05-02 DIAGNOSIS — E78.00 PURE HYPERCHOLESTEROLEMIA: ICD-10-CM

## 2018-05-02 DIAGNOSIS — I63.00 CEREBROVASCULAR ACCIDENT (CVA) DUE TO THROMBOSIS OF PRECEREBRAL ARTERY (HCC): ICD-10-CM

## 2018-05-02 DIAGNOSIS — N18.30 CKD (CHRONIC KIDNEY DISEASE) STAGE 3, GFR 30-59 ML/MIN (HCC): ICD-10-CM

## 2018-05-02 DIAGNOSIS — G44.229 CHRONIC TENSION-TYPE HEADACHE, NOT INTRACTABLE: ICD-10-CM

## 2018-05-02 DIAGNOSIS — I10 ESSENTIAL HYPERTENSION: ICD-10-CM

## 2018-05-02 DIAGNOSIS — E66.01 MORBID OBESITY WITH BMI OF 50.0-59.9, ADULT (HCC): ICD-10-CM

## 2018-05-02 LAB
TSH SERPL DL<=0.05 MIU/L-ACNC: 1.76 MIU/L (ref 0.3–5)
URIC ACID: 7.1 MG/DL (ref 2.4–5.7)

## 2018-05-02 PROCEDURE — 84443 ASSAY THYROID STIM HORMONE: CPT

## 2018-05-02 PROCEDURE — 97110 THERAPEUTIC EXERCISES: CPT

## 2018-05-02 PROCEDURE — 36415 COLL VENOUS BLD VENIPUNCTURE: CPT

## 2018-05-02 PROCEDURE — 84550 ASSAY OF BLOOD/URIC ACID: CPT

## 2018-05-04 ENCOUNTER — HOSPITAL ENCOUNTER (OUTPATIENT)
Dept: PHYSICAL THERAPY | Age: 63
Setting detail: THERAPIES SERIES
Discharge: HOME | End: 2018-05-04
Payer: COMMERCIAL

## 2018-05-04 PROCEDURE — 97110 THERAPEUTIC EXERCISES: CPT

## 2018-05-07 ENCOUNTER — TELEPHONE (OUTPATIENT)
Dept: NEUROLOGY | Age: 63
End: 2018-05-07

## 2018-05-09 ENCOUNTER — HOSPITAL ENCOUNTER (OUTPATIENT)
Dept: PHYSICAL THERAPY | Age: 63
Setting detail: THERAPIES SERIES
Discharge: HOME | End: 2018-05-09
Payer: COMMERCIAL

## 2018-05-10 ENCOUNTER — HOSPITAL ENCOUNTER (OUTPATIENT)
Dept: CT IMAGING | Age: 63
Discharge: HOME | End: 2018-05-12
Payer: COMMERCIAL

## 2018-05-10 DIAGNOSIS — Z87.891 PERSONAL HISTORY OF TOBACCO USE: ICD-10-CM

## 2018-05-10 PROCEDURE — G0297 LDCT FOR LUNG CA SCREEN: HCPCS

## 2018-05-11 ENCOUNTER — HOSPITAL ENCOUNTER (OUTPATIENT)
Dept: PHYSICAL THERAPY | Age: 63
Setting detail: THERAPIES SERIES
Discharge: HOME | End: 2018-05-11
Payer: COMMERCIAL

## 2018-05-14 ENCOUNTER — TELEPHONE (OUTPATIENT)
Dept: CASE MANAGEMENT | Age: 63
End: 2018-05-14

## 2018-05-15 ENCOUNTER — OFFICE VISIT (OUTPATIENT)
Dept: BARIATRICS/WEIGHT MGMT | Age: 63
End: 2018-05-15
Payer: COMMERCIAL

## 2018-05-15 VITALS
SYSTOLIC BLOOD PRESSURE: 126 MMHG | WEIGHT: 293 LBS | HEART RATE: 68 BPM | HEIGHT: 71 IN | DIASTOLIC BLOOD PRESSURE: 74 MMHG | BODY MASS INDEX: 41.02 KG/M2

## 2018-05-15 DIAGNOSIS — G44.229 CHRONIC TENSION-TYPE HEADACHE, NOT INTRACTABLE: ICD-10-CM

## 2018-05-15 DIAGNOSIS — G47.33 OSA (OBSTRUCTIVE SLEEP APNEA): ICD-10-CM

## 2018-05-15 DIAGNOSIS — N18.30 CKD (CHRONIC KIDNEY DISEASE) STAGE 3, GFR 30-59 ML/MIN (HCC): ICD-10-CM

## 2018-05-15 DIAGNOSIS — I73.9 PERIPHERAL VASCULAR DISEASE (HCC): ICD-10-CM

## 2018-05-15 DIAGNOSIS — E66.01 MORBID OBESITY WITH BMI OF 50.0-59.9, ADULT (HCC): ICD-10-CM

## 2018-05-15 DIAGNOSIS — I63.00 CEREBROVASCULAR ACCIDENT (CVA) DUE TO THROMBOSIS OF PRECEREBRAL ARTERY (HCC): ICD-10-CM

## 2018-05-15 DIAGNOSIS — I10 ESSENTIAL HYPERTENSION: Primary | ICD-10-CM

## 2018-05-15 DIAGNOSIS — E78.00 PURE HYPERCHOLESTEROLEMIA: ICD-10-CM

## 2018-05-15 DIAGNOSIS — M19.90 ARTHRITIS: ICD-10-CM

## 2018-05-15 PROCEDURE — G8427 DOCREV CUR MEDS BY ELIG CLIN: HCPCS | Performed by: NURSE PRACTITIONER

## 2018-05-15 PROCEDURE — G8598 ASA/ANTIPLAT THER USED: HCPCS | Performed by: NURSE PRACTITIONER

## 2018-05-15 PROCEDURE — G8417 CALC BMI ABV UP PARAM F/U: HCPCS | Performed by: NURSE PRACTITIONER

## 2018-05-15 PROCEDURE — 99213 OFFICE O/P EST LOW 20 MIN: CPT | Performed by: NURSE PRACTITIONER

## 2018-05-15 PROCEDURE — 3017F COLORECTAL CA SCREEN DOC REV: CPT | Performed by: NURSE PRACTITIONER

## 2018-05-15 PROCEDURE — 1036F TOBACCO NON-USER: CPT | Performed by: NURSE PRACTITIONER

## 2018-05-16 ENCOUNTER — APPOINTMENT (OUTPATIENT)
Dept: PHYSICAL THERAPY | Age: 63
End: 2018-05-16
Payer: COMMERCIAL

## 2018-05-22 ENCOUNTER — HOSPITAL ENCOUNTER (OUTPATIENT)
Dept: NON INVASIVE DIAGNOSTICS | Age: 63
Discharge: HOME | End: 2018-05-22
Payer: COMMERCIAL

## 2018-05-22 ENCOUNTER — HOSPITAL ENCOUNTER (OUTPATIENT)
Dept: MRI IMAGING | Age: 63
Discharge: HOME | End: 2018-05-24
Payer: COMMERCIAL

## 2018-05-22 DIAGNOSIS — Z86.73 HISTORY OF STROKE: ICD-10-CM

## 2018-05-22 DIAGNOSIS — R90.89 ABNORMAL IMAGING OF CENTRAL NERVOUS SYSTEM: Primary | ICD-10-CM

## 2018-05-22 LAB
LV EF: 58 %
LVEF MODALITY: NORMAL

## 2018-05-22 PROCEDURE — 6360000004 HC RX CONTRAST MEDICATION: Performed by: PSYCHIATRY & NEUROLOGY

## 2018-05-22 PROCEDURE — 70549 MR ANGIOGRAPH NECK W/O&W/DYE: CPT

## 2018-05-22 PROCEDURE — 70551 MRI BRAIN STEM W/O DYE: CPT

## 2018-05-22 PROCEDURE — A9579 GAD-BASE MR CONTRAST NOS,1ML: HCPCS | Performed by: PSYCHIATRY & NEUROLOGY

## 2018-05-22 PROCEDURE — 70546 MR ANGIOGRAPH HEAD W/O&W/DYE: CPT

## 2018-05-22 PROCEDURE — 93306 TTE W/DOPPLER COMPLETE: CPT

## 2018-05-22 RX ORDER — SODIUM CHLORIDE 0.9 % (FLUSH) 0.9 %
30 SYRINGE (ML) INJECTION PRN
Status: DISCONTINUED | OUTPATIENT
Start: 2018-05-22 | End: 2018-05-25 | Stop reason: HOSPADM

## 2018-05-22 RX ADMIN — GADOTERIDOL 20 ML: 279.3 INJECTION, SOLUTION INTRAVENOUS at 16:22

## 2018-05-24 DIAGNOSIS — I67.9 CEREBROVASCULAR DISEASE: ICD-10-CM

## 2018-05-24 DIAGNOSIS — I10 ESSENTIAL HYPERTENSION: ICD-10-CM

## 2018-05-25 RX ORDER — HYDROCHLOROTHIAZIDE 25 MG/1
TABLET ORAL
Qty: 30 TABLET | Refills: 0 | Status: SHIPPED | OUTPATIENT
Start: 2018-05-25 | End: 2018-06-20 | Stop reason: SDUPTHER

## 2018-05-25 RX ORDER — FERROUS SULFATE 325(65) MG
TABLET ORAL
Qty: 30 TABLET | Refills: 0 | Status: SHIPPED | OUTPATIENT
Start: 2018-05-25 | End: 2018-06-20 | Stop reason: SDUPTHER

## 2018-05-25 RX ORDER — FOLIC ACID 1 MG/1
TABLET ORAL
Qty: 30 TABLET | Refills: 0 | Status: SHIPPED | OUTPATIENT
Start: 2018-05-25 | End: 2018-06-20 | Stop reason: SDUPTHER

## 2018-05-25 RX ORDER — CLOPIDOGREL BISULFATE 75 MG/1
TABLET ORAL
Qty: 30 TABLET | Refills: 0 | Status: SHIPPED | OUTPATIENT
Start: 2018-05-25 | End: 2018-06-20 | Stop reason: SDUPTHER

## 2018-06-07 ENCOUNTER — HOSPITAL ENCOUNTER (OUTPATIENT)
Dept: CT IMAGING | Age: 63
Discharge: HOME | End: 2018-06-09
Payer: COMMERCIAL

## 2018-06-07 ENCOUNTER — HOSPITAL ENCOUNTER (OUTPATIENT)
Age: 63
Discharge: HOME | End: 2018-06-07
Payer: COMMERCIAL

## 2018-06-07 DIAGNOSIS — I77.1 ARTERY STENOSIS (HCC): Primary | ICD-10-CM

## 2018-06-07 DIAGNOSIS — I63.89 CEREBROVASCULAR ACCIDENT (CVA) DUE TO OTHER MECHANISM (HCC): ICD-10-CM

## 2018-06-07 DIAGNOSIS — R90.89 ABNORMAL IMAGING OF CENTRAL NERVOUS SYSTEM: ICD-10-CM

## 2018-06-07 LAB
CREAT SERPL-MCNC: 1.12 MG/DL (ref 0.5–0.9)
GFR AFRICAN AMERICAN: 59 ML/MIN
GFR NON-AFRICAN AMERICAN: 49 ML/MIN
GFR SERPL CREATININE-BSD FRML MDRD: ABNORMAL ML/MIN/{1.73_M2}
GFR SERPL CREATININE-BSD FRML MDRD: ABNORMAL ML/MIN/{1.73_M2}

## 2018-06-07 PROCEDURE — 70498 CT ANGIOGRAPHY NECK: CPT

## 2018-06-07 PROCEDURE — 6360000004 HC RX CONTRAST MEDICATION: Performed by: PSYCHIATRY & NEUROLOGY

## 2018-06-07 PROCEDURE — 82565 ASSAY OF CREATININE: CPT

## 2018-06-07 PROCEDURE — 70496 CT ANGIOGRAPHY HEAD: CPT

## 2018-06-07 PROCEDURE — 36415 COLL VENOUS BLD VENIPUNCTURE: CPT

## 2018-06-07 RX ADMIN — IOPAMIDOL 90 ML: 755 INJECTION, SOLUTION INTRAVENOUS at 15:08

## 2018-06-08 ENCOUNTER — OFFICE VISIT (OUTPATIENT)
Dept: BARIATRICS/WEIGHT MGMT | Age: 63
End: 2018-06-08
Payer: COMMERCIAL

## 2018-06-08 VITALS
HEART RATE: 68 BPM | DIASTOLIC BLOOD PRESSURE: 74 MMHG | HEIGHT: 71 IN | WEIGHT: 293 LBS | SYSTOLIC BLOOD PRESSURE: 130 MMHG | BODY MASS INDEX: 41.02 KG/M2

## 2018-06-08 DIAGNOSIS — I10 ESSENTIAL HYPERTENSION: Primary | ICD-10-CM

## 2018-06-08 DIAGNOSIS — G44.229 CHRONIC TENSION-TYPE HEADACHE, NOT INTRACTABLE: ICD-10-CM

## 2018-06-08 DIAGNOSIS — N18.30 CKD (CHRONIC KIDNEY DISEASE) STAGE 3, GFR 30-59 ML/MIN (HCC): ICD-10-CM

## 2018-06-08 DIAGNOSIS — G47.33 OSA (OBSTRUCTIVE SLEEP APNEA): ICD-10-CM

## 2018-06-08 DIAGNOSIS — I73.9 PERIPHERAL VASCULAR DISEASE (HCC): ICD-10-CM

## 2018-06-08 DIAGNOSIS — E78.00 PURE HYPERCHOLESTEROLEMIA: ICD-10-CM

## 2018-06-08 DIAGNOSIS — E66.01 MORBID OBESITY WITH BMI OF 50.0-59.9, ADULT (HCC): ICD-10-CM

## 2018-06-08 DIAGNOSIS — M19.90 ARTHRITIS: ICD-10-CM

## 2018-06-08 DIAGNOSIS — I63.00 CEREBROVASCULAR ACCIDENT (CVA) DUE TO THROMBOSIS OF PRECEREBRAL ARTERY (HCC): ICD-10-CM

## 2018-06-08 PROCEDURE — 99213 OFFICE O/P EST LOW 20 MIN: CPT | Performed by: NURSE PRACTITIONER

## 2018-06-08 PROCEDURE — G8427 DOCREV CUR MEDS BY ELIG CLIN: HCPCS | Performed by: NURSE PRACTITIONER

## 2018-06-08 PROCEDURE — 1036F TOBACCO NON-USER: CPT | Performed by: NURSE PRACTITIONER

## 2018-06-08 PROCEDURE — 3017F COLORECTAL CA SCREEN DOC REV: CPT | Performed by: NURSE PRACTITIONER

## 2018-06-08 PROCEDURE — G8417 CALC BMI ABV UP PARAM F/U: HCPCS | Performed by: NURSE PRACTITIONER

## 2018-06-08 PROCEDURE — G8598 ASA/ANTIPLAT THER USED: HCPCS | Performed by: NURSE PRACTITIONER

## 2018-06-12 ENCOUNTER — OFFICE VISIT (OUTPATIENT)
Dept: NEUROLOGY | Age: 63
End: 2018-06-12
Payer: COMMERCIAL

## 2018-06-12 VITALS
HEART RATE: 65 BPM | WEIGHT: 293 LBS | BODY MASS INDEX: 41.02 KG/M2 | HEIGHT: 71 IN | SYSTOLIC BLOOD PRESSURE: 133 MMHG | DIASTOLIC BLOOD PRESSURE: 81 MMHG

## 2018-06-12 DIAGNOSIS — E78.5 HYPERLIPIDEMIA, UNSPECIFIED HYPERLIPIDEMIA TYPE: ICD-10-CM

## 2018-06-12 DIAGNOSIS — I63.9 ISCHEMIC STROKE (HCC): Primary | ICD-10-CM

## 2018-06-12 DIAGNOSIS — Z86.73 HISTORY OF STROKE: ICD-10-CM

## 2018-06-12 PROCEDURE — G8598 ASA/ANTIPLAT THER USED: HCPCS | Performed by: PSYCHIATRY & NEUROLOGY

## 2018-06-12 PROCEDURE — 3017F COLORECTAL CA SCREEN DOC REV: CPT | Performed by: PSYCHIATRY & NEUROLOGY

## 2018-06-12 PROCEDURE — 1036F TOBACCO NON-USER: CPT | Performed by: PSYCHIATRY & NEUROLOGY

## 2018-06-12 PROCEDURE — 99214 OFFICE O/P EST MOD 30 MIN: CPT | Performed by: PSYCHIATRY & NEUROLOGY

## 2018-06-12 PROCEDURE — G8417 CALC BMI ABV UP PARAM F/U: HCPCS | Performed by: PSYCHIATRY & NEUROLOGY

## 2018-06-12 PROCEDURE — G8427 DOCREV CUR MEDS BY ELIG CLIN: HCPCS | Performed by: PSYCHIATRY & NEUROLOGY

## 2018-06-12 RX ORDER — ATORVASTATIN CALCIUM 40 MG/1
TABLET, FILM COATED ORAL
Qty: 45 TABLET | Refills: 5 | Status: SHIPPED | OUTPATIENT
Start: 2018-06-12 | End: 2018-07-13 | Stop reason: SDUPTHER

## 2018-06-15 ENCOUNTER — OFFICE VISIT (OUTPATIENT)
Dept: BARIATRICS/WEIGHT MGMT | Age: 63
End: 2018-06-15
Payer: COMMERCIAL

## 2018-06-15 VITALS
DIASTOLIC BLOOD PRESSURE: 68 MMHG | HEART RATE: 80 BPM | BODY MASS INDEX: 41.02 KG/M2 | HEIGHT: 71 IN | WEIGHT: 293 LBS | SYSTOLIC BLOOD PRESSURE: 124 MMHG

## 2018-06-15 DIAGNOSIS — I10 ESSENTIAL HYPERTENSION: Primary | ICD-10-CM

## 2018-06-15 DIAGNOSIS — E66.01 MORBID OBESITY WITH BMI OF 50.0-59.9, ADULT (HCC): ICD-10-CM

## 2018-06-15 DIAGNOSIS — N18.30 CKD (CHRONIC KIDNEY DISEASE) STAGE 3, GFR 30-59 ML/MIN (HCC): ICD-10-CM

## 2018-06-15 DIAGNOSIS — E78.00 PURE HYPERCHOLESTEROLEMIA: ICD-10-CM

## 2018-06-15 DIAGNOSIS — I63.00 CEREBROVASCULAR ACCIDENT (CVA) DUE TO THROMBOSIS OF PRECEREBRAL ARTERY (HCC): ICD-10-CM

## 2018-06-15 DIAGNOSIS — G47.33 OSA (OBSTRUCTIVE SLEEP APNEA): ICD-10-CM

## 2018-06-15 DIAGNOSIS — M19.90 ARTHRITIS: ICD-10-CM

## 2018-06-15 DIAGNOSIS — I73.9 PERIPHERAL VASCULAR DISEASE (HCC): ICD-10-CM

## 2018-06-15 DIAGNOSIS — G44.229 CHRONIC TENSION-TYPE HEADACHE, NOT INTRACTABLE: ICD-10-CM

## 2018-06-15 PROCEDURE — G8417 CALC BMI ABV UP PARAM F/U: HCPCS | Performed by: NURSE PRACTITIONER

## 2018-06-15 PROCEDURE — G8427 DOCREV CUR MEDS BY ELIG CLIN: HCPCS | Performed by: NURSE PRACTITIONER

## 2018-06-15 PROCEDURE — 99213 OFFICE O/P EST LOW 20 MIN: CPT | Performed by: NURSE PRACTITIONER

## 2018-06-15 PROCEDURE — 3017F COLORECTAL CA SCREEN DOC REV: CPT | Performed by: NURSE PRACTITIONER

## 2018-06-15 PROCEDURE — G8598 ASA/ANTIPLAT THER USED: HCPCS | Performed by: NURSE PRACTITIONER

## 2018-06-15 PROCEDURE — 1036F TOBACCO NON-USER: CPT | Performed by: NURSE PRACTITIONER

## 2018-06-20 DIAGNOSIS — I10 ESSENTIAL HYPERTENSION: ICD-10-CM

## 2018-06-20 DIAGNOSIS — I67.9 CEREBROVASCULAR DISEASE: ICD-10-CM

## 2018-06-20 RX ORDER — FERROUS SULFATE 325(65) MG
TABLET ORAL
Qty: 30 TABLET | Refills: 0 | Status: SHIPPED | OUTPATIENT
Start: 2018-06-20 | End: 2018-07-18 | Stop reason: SDUPTHER

## 2018-06-20 RX ORDER — FOLIC ACID 1 MG/1
TABLET ORAL
Qty: 30 TABLET | Refills: 0 | Status: SHIPPED | OUTPATIENT
Start: 2018-06-20 | End: 2018-07-13 | Stop reason: SDUPTHER

## 2018-06-20 RX ORDER — HYDROCHLOROTHIAZIDE 25 MG/1
TABLET ORAL
Qty: 30 TABLET | Refills: 0 | Status: SHIPPED | OUTPATIENT
Start: 2018-06-20 | End: 2018-07-13 | Stop reason: SDUPTHER

## 2018-06-20 RX ORDER — CLOPIDOGREL BISULFATE 75 MG/1
TABLET ORAL
Qty: 30 TABLET | Refills: 0 | Status: SHIPPED | OUTPATIENT
Start: 2018-06-20 | End: 2018-07-13 | Stop reason: SDUPTHER

## 2018-06-22 ENCOUNTER — OFFICE VISIT (OUTPATIENT)
Dept: BARIATRICS/WEIGHT MGMT | Age: 63
End: 2018-06-22
Payer: COMMERCIAL

## 2018-06-22 VITALS
DIASTOLIC BLOOD PRESSURE: 72 MMHG | HEIGHT: 71 IN | HEART RATE: 74 BPM | WEIGHT: 293 LBS | BODY MASS INDEX: 41.02 KG/M2 | SYSTOLIC BLOOD PRESSURE: 128 MMHG

## 2018-06-22 DIAGNOSIS — I63.00 CEREBROVASCULAR ACCIDENT (CVA) DUE TO THROMBOSIS OF PRECEREBRAL ARTERY (HCC): ICD-10-CM

## 2018-06-22 DIAGNOSIS — E78.00 PURE HYPERCHOLESTEROLEMIA: ICD-10-CM

## 2018-06-22 DIAGNOSIS — G47.33 OSA (OBSTRUCTIVE SLEEP APNEA): ICD-10-CM

## 2018-06-22 DIAGNOSIS — I73.9 PERIPHERAL VASCULAR DISEASE (HCC): ICD-10-CM

## 2018-06-22 DIAGNOSIS — I10 ESSENTIAL HYPERTENSION: Primary | ICD-10-CM

## 2018-06-22 DIAGNOSIS — N18.30 CKD (CHRONIC KIDNEY DISEASE) STAGE 3, GFR 30-59 ML/MIN (HCC): ICD-10-CM

## 2018-06-22 DIAGNOSIS — M19.90 ARTHRITIS: ICD-10-CM

## 2018-06-22 DIAGNOSIS — G44.229 CHRONIC TENSION-TYPE HEADACHE, NOT INTRACTABLE: ICD-10-CM

## 2018-06-22 DIAGNOSIS — E66.01 MORBID OBESITY WITH BMI OF 50.0-59.9, ADULT (HCC): ICD-10-CM

## 2018-06-22 PROCEDURE — G8427 DOCREV CUR MEDS BY ELIG CLIN: HCPCS | Performed by: NURSE PRACTITIONER

## 2018-06-22 PROCEDURE — G8417 CALC BMI ABV UP PARAM F/U: HCPCS | Performed by: NURSE PRACTITIONER

## 2018-06-22 PROCEDURE — 3017F COLORECTAL CA SCREEN DOC REV: CPT | Performed by: NURSE PRACTITIONER

## 2018-06-22 PROCEDURE — 99213 OFFICE O/P EST LOW 20 MIN: CPT | Performed by: NURSE PRACTITIONER

## 2018-06-22 PROCEDURE — G8598 ASA/ANTIPLAT THER USED: HCPCS | Performed by: NURSE PRACTITIONER

## 2018-06-22 PROCEDURE — 1036F TOBACCO NON-USER: CPT | Performed by: NURSE PRACTITIONER

## 2018-06-28 ENCOUNTER — OFFICE VISIT (OUTPATIENT)
Dept: NEUROLOGY | Age: 63
End: 2018-06-28
Payer: COMMERCIAL

## 2018-06-28 VITALS
DIASTOLIC BLOOD PRESSURE: 78 MMHG | BODY MASS INDEX: 41.02 KG/M2 | SYSTOLIC BLOOD PRESSURE: 138 MMHG | WEIGHT: 293 LBS | HEIGHT: 71 IN | HEART RATE: 66 BPM

## 2018-06-28 DIAGNOSIS — I65.21 INTRACRANIAL CAROTID STENOSIS, RIGHT: ICD-10-CM

## 2018-06-28 DIAGNOSIS — I69.30 CHRONIC ISCHEMIC LEFT MIDDLE CEREBRAL ARTERY (MCA) STROKE: Primary | ICD-10-CM

## 2018-06-28 DIAGNOSIS — I65.22 OCCLUSION OF LEFT INTERNAL CAROTID ARTERY: ICD-10-CM

## 2018-06-28 PROCEDURE — G8417 CALC BMI ABV UP PARAM F/U: HCPCS | Performed by: PSYCHIATRY & NEUROLOGY

## 2018-06-28 PROCEDURE — 99204 OFFICE O/P NEW MOD 45 MIN: CPT | Performed by: PSYCHIATRY & NEUROLOGY

## 2018-06-28 PROCEDURE — G8427 DOCREV CUR MEDS BY ELIG CLIN: HCPCS | Performed by: PSYCHIATRY & NEUROLOGY

## 2018-06-28 PROCEDURE — G8598 ASA/ANTIPLAT THER USED: HCPCS | Performed by: PSYCHIATRY & NEUROLOGY

## 2018-06-28 PROCEDURE — 1036F TOBACCO NON-USER: CPT | Performed by: PSYCHIATRY & NEUROLOGY

## 2018-06-28 PROCEDURE — 3017F COLORECTAL CA SCREEN DOC REV: CPT | Performed by: PSYCHIATRY & NEUROLOGY

## 2018-06-28 ASSESSMENT — ENCOUNTER SYMPTOMS
SHORTNESS OF BREATH: 0
COLOR CHANGE: 0
VOMITING: 0
VOICE CHANGE: 0
COUGH: 0
PHOTOPHOBIA: 0
NAUSEA: 0

## 2018-06-29 ENCOUNTER — OFFICE VISIT (OUTPATIENT)
Dept: BARIATRICS/WEIGHT MGMT | Age: 63
End: 2018-06-29
Payer: COMMERCIAL

## 2018-06-29 VITALS
HEART RATE: 74 BPM | DIASTOLIC BLOOD PRESSURE: 70 MMHG | BODY MASS INDEX: 41.02 KG/M2 | WEIGHT: 293 LBS | SYSTOLIC BLOOD PRESSURE: 122 MMHG | HEIGHT: 71 IN

## 2018-06-29 DIAGNOSIS — I73.9 PERIPHERAL VASCULAR DISEASE (HCC): ICD-10-CM

## 2018-06-29 DIAGNOSIS — G47.33 OSA (OBSTRUCTIVE SLEEP APNEA): ICD-10-CM

## 2018-06-29 DIAGNOSIS — I10 ESSENTIAL HYPERTENSION: Primary | ICD-10-CM

## 2018-06-29 DIAGNOSIS — E78.00 PURE HYPERCHOLESTEROLEMIA: ICD-10-CM

## 2018-06-29 DIAGNOSIS — N18.30 CKD (CHRONIC KIDNEY DISEASE) STAGE 3, GFR 30-59 ML/MIN (HCC): ICD-10-CM

## 2018-06-29 DIAGNOSIS — M19.90 ARTHRITIS: ICD-10-CM

## 2018-06-29 DIAGNOSIS — E66.01 MORBID OBESITY WITH BMI OF 50.0-59.9, ADULT (HCC): ICD-10-CM

## 2018-06-29 DIAGNOSIS — I63.00 CEREBROVASCULAR ACCIDENT (CVA) DUE TO THROMBOSIS OF PRECEREBRAL ARTERY (HCC): ICD-10-CM

## 2018-06-29 PROCEDURE — 1036F TOBACCO NON-USER: CPT | Performed by: NURSE PRACTITIONER

## 2018-06-29 PROCEDURE — G8427 DOCREV CUR MEDS BY ELIG CLIN: HCPCS | Performed by: NURSE PRACTITIONER

## 2018-06-29 PROCEDURE — 3017F COLORECTAL CA SCREEN DOC REV: CPT | Performed by: NURSE PRACTITIONER

## 2018-06-29 PROCEDURE — G8598 ASA/ANTIPLAT THER USED: HCPCS | Performed by: NURSE PRACTITIONER

## 2018-06-29 PROCEDURE — G8417 CALC BMI ABV UP PARAM F/U: HCPCS | Performed by: NURSE PRACTITIONER

## 2018-06-29 PROCEDURE — 99213 OFFICE O/P EST LOW 20 MIN: CPT | Performed by: NURSE PRACTITIONER

## 2018-07-06 ENCOUNTER — OFFICE VISIT (OUTPATIENT)
Dept: BARIATRICS/WEIGHT MGMT | Age: 63
End: 2018-07-06
Payer: MEDICARE

## 2018-07-06 VITALS
WEIGHT: 293 LBS | SYSTOLIC BLOOD PRESSURE: 120 MMHG | HEART RATE: 76 BPM | HEIGHT: 71 IN | BODY MASS INDEX: 41.02 KG/M2 | DIASTOLIC BLOOD PRESSURE: 70 MMHG

## 2018-07-06 DIAGNOSIS — I10 ESSENTIAL HYPERTENSION: Primary | ICD-10-CM

## 2018-07-06 DIAGNOSIS — N18.30 CKD (CHRONIC KIDNEY DISEASE) STAGE 3, GFR 30-59 ML/MIN (HCC): ICD-10-CM

## 2018-07-06 DIAGNOSIS — I73.9 PERIPHERAL VASCULAR DISEASE (HCC): ICD-10-CM

## 2018-07-06 DIAGNOSIS — G47.33 OSA (OBSTRUCTIVE SLEEP APNEA): ICD-10-CM

## 2018-07-06 DIAGNOSIS — M19.90 ARTHRITIS: ICD-10-CM

## 2018-07-06 DIAGNOSIS — E78.00 PURE HYPERCHOLESTEROLEMIA: ICD-10-CM

## 2018-07-06 DIAGNOSIS — G44.229 CHRONIC TENSION-TYPE HEADACHE, NOT INTRACTABLE: ICD-10-CM

## 2018-07-06 DIAGNOSIS — I63.00 CEREBROVASCULAR ACCIDENT (CVA) DUE TO THROMBOSIS OF PRECEREBRAL ARTERY (HCC): ICD-10-CM

## 2018-07-06 DIAGNOSIS — E66.01 MORBID OBESITY WITH BMI OF 50.0-59.9, ADULT (HCC): ICD-10-CM

## 2018-07-06 PROCEDURE — 99213 OFFICE O/P EST LOW 20 MIN: CPT | Performed by: NURSE PRACTITIONER

## 2018-07-06 NOTE — PROGRESS NOTES
Grandfather        Social History:  Social History     Social History    Marital status: Legally      Spouse name: N/A    Number of children: N/A    Years of education: N/A     Occupational History    Not on file. Social History Main Topics    Smoking status: Former Smoker     Packs/day: 2.00     Years: 35.00     Quit date: 10/11/2011    Smokeless tobacco: Never Used    Alcohol use 3.6 oz/week     6 Cans of beer per week      Comment: 6 beers/week(on weekends)    Drug use: No      Comment: per pt did years ago; cocaine & marij    Sexual activity: Yes     Partners: Male     Other Topics Concern    Not on file     Social History Narrative    No narrative on file       Current Medications:  Current Outpatient Prescriptions   Medication Sig Dispense Refill    clopidogrel (PLAVIX) 75 MG tablet TAKE 1 TABLET DAILY 30 tablet 0    ferrous sulfate 325 (65 Fe) MG tablet TAKE 1 TABLET IN THE MORNING 30 tablet 0    folic acid (FOLVITE) 1 MG tablet TAKE 1 TABLET DAILY 30 tablet 0    hydrochlorothiazide (HYDRODIURIL) 25 MG tablet TAKE 1 TABLET IN THE MORNING 30 tablet 0    atorvastatin (LIPITOR) 40 MG tablet TAKE 1.5 TABLET DAILY 45 tablet 5    acetaminophen (AMINOFEN) 325 MG tablet Take 2 tablets by mouth every 4 hours as needed for Pain 60 tablet 3    Misc.  Devices (QUAD CANE) MISC 1 each by Does not apply route daily 1 each 0    Respiratory Therapy Supplies (ADULT MASK) MISC 1 each by Does not apply route daily CPAP mask Diagnosis TIBURCIO 1 each 0    ibuprofen (ADVIL;MOTRIN) 800 MG tablet TAKE 1 TABLET BY MOUTH EVERY 8 HOURS AS NEEDED FOR PAIN 30 tablet 0    amLODIPine (NORVASC) 10 MG tablet TAKE 1 TABLET DAILY 30 tablet 5    aspirin 81 MG EC tablet TAKE 1 TABLET DAILY 30 tablet 3    losartan (COZAAR) 25 MG tablet TAKE 1 TABLET DAILY 30 tablet 5    Handicap Placard MISC by Does not apply route Patient cannot walk more than 200 feet without stopping to rest  Duration of handicap placard 6 months 1 each 0     No current facility-administered medications for this visit. Vital Signs:  /70 (Site: Right Arm, Position: Sitting, Cuff Size: Large Adult)   Pulse 76   Ht 5' 10.98\" (1.803 m)   Wt (!) 366 lb 8 oz (166.2 kg)   BMI 51.14 kg/m²     BMI/Height/Weight:  Body mass index is 51.14 kg/m². Review of Systems  Constitutional: Weight loss      Objective:      Physical Exam   Vital signs reviewed. General Appearance: Well-developed and well-nourished. No acute distress. Skin: Warm, dry. Head: Normocephalic. Pulmonary/Chest: Normal respiratory effort. Musculoskeletal: Movement x4. Neurological:  Alert and oriented. Individual goal for this encounter:  Want to lose 150 lbs and stop taking so many meds. [x] Vital signs reviewed and discussed with patient.   [] Labs/EKG reviewed and discussed with patient. [] Blood sugar log reviewed and discussed with patient. [] Physical activity discussed. [] Medication changes recommended. [] Smoking cessation discussed. [x] Specific questions/concerns addressed. Specific group medical question(s) addressed in this encounter:  Discussion about gallbladder disease.     Group discussion topic for this encounter:     [] Welcome Jumpstart   [] Be a Fat & Calorie    [] Healthy Eating   [] Move Those Muscles   [x] Tip the Calorie Balance   [] Take charge of What's Around You   [] Problem Solving    [] Four Keys to Healthy Eating Out   [] [place] of Lifestyle Change   [] Jumpstart your Activity Plan   [] Make Social Cues Work for [place]   [] Ways to Stay Motivated   [] Preparing for Long Term Self-Management   [] More Volume, Fewer Calories   [] Balance Your Thoughts   [] Strengthen your Exercise Program   [] Mindful Eating   [] Stress and Time Managment   [] Standing Up for Your Health   [] Heart Health   [] Stretching:  The Truth About Flexibility   [] Looking Back and Looking Forward    Total time:  90 minutes, greater than 50% of visit spent in group counseling/education. Assessment:       Diagnosis Orders   1. Essential hypertension     2. Pure hypercholesterolemia     3. Cerebrovascular accident (CVA), 2011, no residual deficit ([facility]     4. CKD (chronic kidney disease) stage 3, GFR 30-59 ml/min     5. Peripheral vascular disease ([facility]     6. TIBURCIO (obstructive sleep apnea)     7. Chronic tension-type headache, not intractable     8. Arthritis     9. Morbid obesity with BMI of 50.0-59.9, adult ([facility]         Plan:      Track food and weight. Return to clinic as per group medical appointment schedule. Follow-up:  Return in about 1 week (around 7/13/2018). Orders:  No orders of the defined types were placed in this encounter. Prescriptions:  No orders of the defined types were placed in this encounter.       Electronically signed by:  Joleen Magaña CNP

## 2018-07-13 ENCOUNTER — HOSPITAL ENCOUNTER (OUTPATIENT)
Age: 63
Setting detail: SPECIMEN
Discharge: HOME | End: 2018-07-13
Payer: MEDICARE

## 2018-07-13 ENCOUNTER — OFFICE VISIT (OUTPATIENT)
Dept: INTERNAL MEDICINE | Age: 63
End: 2018-07-13
Payer: MEDICARE

## 2018-07-13 ENCOUNTER — OFFICE VISIT (OUTPATIENT)
Dept: BARIATRICS/WEIGHT MGMT | Age: 63
End: 2018-07-13
Payer: MEDICARE

## 2018-07-13 VITALS
SYSTOLIC BLOOD PRESSURE: 124 MMHG | DIASTOLIC BLOOD PRESSURE: 80 MMHG | WEIGHT: 293 LBS | BODY MASS INDEX: 51.17 KG/M2 | HEART RATE: 68 BPM

## 2018-07-13 VITALS
WEIGHT: 293 LBS | DIASTOLIC BLOOD PRESSURE: 69 MMHG | SYSTOLIC BLOOD PRESSURE: 121 MMHG | BODY MASS INDEX: 51.07 KG/M2 | HEART RATE: 60 BPM

## 2018-07-13 DIAGNOSIS — I63.00 CEREBROVASCULAR ACCIDENT (CVA) DUE TO THROMBOSIS OF PRECEREBRAL ARTERY (HCC): ICD-10-CM

## 2018-07-13 DIAGNOSIS — I67.9 CEREBROVASCULAR DISEASE: ICD-10-CM

## 2018-07-13 DIAGNOSIS — M19.90 ARTHRITIS: ICD-10-CM

## 2018-07-13 DIAGNOSIS — G47.33 OSA (OBSTRUCTIVE SLEEP APNEA): ICD-10-CM

## 2018-07-13 DIAGNOSIS — E66.01 MORBID OBESITY WITH BMI OF 50.0-59.9, ADULT (HCC): ICD-10-CM

## 2018-07-13 DIAGNOSIS — D64.9 ANEMIA, UNSPECIFIED TYPE: ICD-10-CM

## 2018-07-13 DIAGNOSIS — E78.5 HYPERLIPIDEMIA, UNSPECIFIED HYPERLIPIDEMIA TYPE: ICD-10-CM

## 2018-07-13 DIAGNOSIS — E78.00 PURE HYPERCHOLESTEROLEMIA: ICD-10-CM

## 2018-07-13 DIAGNOSIS — G44.229 CHRONIC TENSION-TYPE HEADACHE, NOT INTRACTABLE: ICD-10-CM

## 2018-07-13 DIAGNOSIS — R91.1 LUNG NODULE, SOLITARY: ICD-10-CM

## 2018-07-13 DIAGNOSIS — M17.12 PRIMARY OSTEOARTHRITIS OF LEFT KNEE: ICD-10-CM

## 2018-07-13 DIAGNOSIS — I73.9 PERIPHERAL VASCULAR DISEASE (HCC): ICD-10-CM

## 2018-07-13 DIAGNOSIS — N18.30 CKD (CHRONIC KIDNEY DISEASE) STAGE 3, GFR 30-59 ML/MIN (HCC): ICD-10-CM

## 2018-07-13 DIAGNOSIS — I10 ESSENTIAL HYPERTENSION: Primary | ICD-10-CM

## 2018-07-13 DIAGNOSIS — M79.605 LEFT LEG PAIN: ICD-10-CM

## 2018-07-13 LAB
ABSOLUTE EOS #: 0.27 K/UL (ref 0–0.44)
ABSOLUTE IMMATURE GRANULOCYTE: 0.03 K/UL (ref 0–0.3)
ABSOLUTE LYMPH #: 1.79 K/UL (ref 1.1–3.7)
ABSOLUTE MONO #: 0.53 K/UL (ref 0.1–1.2)
BASOPHILS # BLD: 1 % (ref 0–2)
BASOPHILS ABSOLUTE: 0.07 K/UL (ref 0–0.2)
DIFFERENTIAL TYPE: ABNORMAL
EOSINOPHILS RELATIVE PERCENT: 4 % (ref 1–4)
FERRITIN: 626 UG/L (ref 13–150)
FOLATE: >20 NG/ML
HCT VFR BLD CALC: 37.7 % (ref 36.3–47.1)
HEMOGLOBIN: 10.8 G/DL (ref 11.9–15.1)
IMMATURE GRANULOCYTES: 0 %
IRON SATURATION: 28 % (ref 20–55)
IRON: 67 UG/DL (ref 37–145)
LYMPHOCYTES # BLD: 26 % (ref 24–43)
MCH RBC QN AUTO: 20.9 PG (ref 25.2–33.5)
MCHC RBC AUTO-ENTMCNC: 28.6 G/DL (ref 28.4–34.8)
MCV RBC AUTO: 73.1 FL (ref 82.6–102.9)
MONOCYTES # BLD: 8 % (ref 3–12)
NRBC AUTOMATED: 0 PER 100 WBC
PDW BLD-RTO: 17.6 % (ref 11.8–14.4)
PLATELET # BLD: 376 K/UL (ref 138–453)
PLATELET ESTIMATE: ABNORMAL
PMV BLD AUTO: 10 FL (ref 8.1–13.5)
RBC # BLD: 5.16 M/UL (ref 3.95–5.11)
RBC # BLD: ABNORMAL 10*6/UL
SEG NEUTROPHILS: 61 % (ref 36–65)
SEGMENTED NEUTROPHILS ABSOLUTE COUNT: 4.14 K/UL (ref 1.5–8.1)
TOTAL IRON BINDING CAPACITY: 241 UG/DL (ref 250–450)
UNSATURATED IRON BINDING CAPACITY: 174 UG/DL (ref 112–347)
VITAMIN B-12: 768 PG/ML (ref 232–1245)
WBC # BLD: 6.8 K/UL (ref 3.5–11.3)
WBC # BLD: ABNORMAL 10*3/UL

## 2018-07-13 PROCEDURE — 82728 ASSAY OF FERRITIN: CPT

## 2018-07-13 PROCEDURE — 83540 ASSAY OF IRON: CPT

## 2018-07-13 PROCEDURE — 99213 OFFICE O/P EST LOW 20 MIN: CPT | Performed by: STUDENT IN AN ORGANIZED HEALTH CARE EDUCATION/TRAINING PROGRAM

## 2018-07-13 PROCEDURE — 36415 COLL VENOUS BLD VENIPUNCTURE: CPT

## 2018-07-13 PROCEDURE — 82746 ASSAY OF FOLIC ACID SERUM: CPT

## 2018-07-13 PROCEDURE — 83550 IRON BINDING TEST: CPT

## 2018-07-13 PROCEDURE — 99213 OFFICE O/P EST LOW 20 MIN: CPT | Performed by: NURSE PRACTITIONER

## 2018-07-13 PROCEDURE — 99213 OFFICE O/P EST LOW 20 MIN: CPT | Performed by: INTERNAL MEDICINE

## 2018-07-13 PROCEDURE — 85025 COMPLETE CBC W/AUTO DIFF WBC: CPT

## 2018-07-13 PROCEDURE — 82607 VITAMIN B-12: CPT

## 2018-07-13 RX ORDER — LOSARTAN POTASSIUM 25 MG/1
TABLET ORAL
Qty: 30 TABLET | Refills: 5 | Status: SHIPPED | OUTPATIENT
Start: 2018-07-13 | End: 2018-11-30 | Stop reason: SDUPTHER

## 2018-07-13 RX ORDER — FOLIC ACID 1 MG/1
TABLET ORAL
Qty: 30 TABLET | Refills: 5 | Status: SHIPPED | OUTPATIENT
Start: 2018-07-13 | End: 2019-01-03 | Stop reason: SDUPTHER

## 2018-07-13 RX ORDER — IBUPROFEN 800 MG/1
800 TABLET ORAL EVERY 8 HOURS PRN
Qty: 30 TABLET | Refills: 0 | Status: SHIPPED | OUTPATIENT
Start: 2018-07-13 | End: 2018-10-19

## 2018-07-13 RX ORDER — HYDROCHLOROTHIAZIDE 25 MG/1
TABLET ORAL
Qty: 30 TABLET | Refills: 5 | Status: SHIPPED | OUTPATIENT
Start: 2018-07-13 | End: 2018-11-30 | Stop reason: SDUPTHER

## 2018-07-13 RX ORDER — CLOPIDOGREL BISULFATE 75 MG/1
TABLET ORAL
Qty: 30 TABLET | Refills: 0 | Status: SHIPPED | OUTPATIENT
Start: 2018-07-13 | End: 2018-08-15 | Stop reason: SDUPTHER

## 2018-07-13 RX ORDER — ACETAMINOPHEN 325 MG/1
650 TABLET ORAL EVERY 4 HOURS PRN
Qty: 60 TABLET | Refills: 3 | Status: SHIPPED | OUTPATIENT
Start: 2018-07-13 | End: 2018-11-30 | Stop reason: SDUPTHER

## 2018-07-13 RX ORDER — ASPIRIN 81 MG/1
TABLET ORAL
Qty: 30 TABLET | Refills: 5 | Status: SHIPPED | OUTPATIENT
Start: 2018-07-13 | End: 2018-11-30 | Stop reason: SDUPTHER

## 2018-07-13 RX ORDER — ATORVASTATIN CALCIUM 40 MG/1
TABLET, FILM COATED ORAL
Qty: 45 TABLET | Refills: 5 | Status: SHIPPED | OUTPATIENT
Start: 2018-07-13 | End: 2018-07-26 | Stop reason: SDUPTHER

## 2018-07-13 RX ORDER — AMLODIPINE BESYLATE 10 MG/1
TABLET ORAL
Qty: 30 TABLET | Refills: 5 | Status: SHIPPED | OUTPATIENT
Start: 2018-07-13 | End: 2018-11-30 | Stop reason: SDUPTHER

## 2018-07-13 NOTE — PROGRESS NOTES
Grandfather        Social History:  Social History     Social History    Marital status: Legally      Spouse name: N/A    Number of children: N/A    Years of education: N/A     Occupational History    Not on file. Social History Main Topics    Smoking status: Former Smoker     Packs/day: 2.00     Years: 35.00     Quit date: 10/11/2011    Smokeless tobacco: Never Used    Alcohol use 3.6 oz/week     6 Cans of beer per week      Comment: 6 beers/week(on weekends)    Drug use: No      Comment: per pt did years ago; cocaine & marij    Sexual activity: Yes     Partners: Male     Other Topics Concern    Not on file     Social History Narrative    No narrative on file       Current Medications:  Current Outpatient Prescriptions   Medication Sig Dispense Refill    clopidogrel (PLAVIX) 75 MG tablet TAKE 1 TABLET DAILY 30 tablet 0    losartan (COZAAR) 25 MG tablet TAKE 1 TABLET DAILY 30 tablet 5    aspirin 81 MG EC tablet TAKE 1 TABLET DAILY 30 tablet 5    amLODIPine (NORVASC) 10 MG tablet TAKE 1 TABLET DAILY 30 tablet 5    atorvastatin (LIPITOR) 40 MG tablet TAKE 1.5 TABLET DAILY 45 tablet 5    hydrochlorothiazide (HYDRODIURIL) 25 MG tablet TAKE 1 TABLET IN THE MORNING 30 tablet 5    folic acid (FOLVITE) 1 MG tablet TAKE 1 TABLET DAILY 30 tablet 5    acetaminophen (AMINOFEN) 325 MG tablet Take 2 tablets by mouth every 4 hours as needed for Pain 60 tablet 3    ibuprofen (ADVIL;MOTRIN) 800 MG tablet Take 1 tablet by mouth every 8 hours as needed for Pain 30 tablet 0    ferrous sulfate 325 (65 Fe) MG tablet TAKE 1 TABLET IN THE MORNING 30 tablet 0     No current facility-administered medications for this visit. Vital Signs:  /80 (Site: Right Arm, Position: Sitting, Cuff Size: Large Adult)   Pulse 68   Wt (!) 366 lb 11.2 oz (166.3 kg)   BMI 51.17 kg/m²     BMI/Height/Weight:  Body mass index is 51.17 kg/m².       Review of Systems  Constitutional: Weight loss      Objective:

## 2018-07-13 NOTE — PROGRESS NOTES
Visit Information    Have you changed or started any medications since your last visit including any over-the-counter medicines, vitamins, or herbal medicines? no   Are you having any side effects from any of your medications? -  no  Have you stopped taking any of your medications? Is so, why? -  no    Have you seen any other physician or provider since your last visit? No  Have you had any other diagnostic tests since your last visit? No  Have you been seen in the emergency room and/or had an admission to a hospital since we last saw you? No  Have you had your routine dental cleaning in the past 6 months? yes -     Have you activated your SDI account? If not, what are your barriers?  No:      Patient Care Team:  Leesa Liriano MD as PCP - General (Internal Medicine)  Anant Smith RN as Nurse Navigator    Medical History Review  Past Medical, Family, and Social History reviewed and does contribute to the patient presenting condition    Health Maintenance   Topic Date Due    Flu vaccine (1) 09/01/2018    Shingles Vaccine (2 of 2 - 2 Dose Series) 12/26/2018    A1C test (Diabetic or Prediabetic)  04/19/2019    Potassium monitoring  04/19/2019    Low dose CT lung screening  05/10/2019    Cervical cancer screen  06/07/2019    Creatinine monitoring  06/07/2019    Breast cancer screen  08/18/2019    Lipid screen  04/19/2023    Colon cancer screen colonoscopy  05/30/2023    DTaP/Tdap/Td vaccine (2 - Td) 03/08/2028    Hepatitis C screen  Completed    HIV screen  Completed

## 2018-07-13 NOTE — PATIENT INSTRUCTIONS
programs and medicines. These can increase your chances of quitting for good. · Limit alcohol to 2 drinks a day for men and 1 drink a day for women. Too much alcohol can cause health problems. If you have a BMI higher than 25  · Your doctor may do other tests to check your risk for weight-related health problems. This may include measuring the distance around your waist. A waist measurement of more than 40 inches in men or 35 inches in women can increase the risk of weight-related health problems. · Talk with your doctor about steps you can take to stay healthy or improve your health. You may need to make lifestyle changes to lose weight and stay healthy, such as changing your diet and getting regular exercise. If you have a BMI lower than 18.5  · Your doctor may do other tests to check your risk for health problems. · Talk with your doctor about steps you can take to stay healthy or improve your health. You may need to make lifestyle changes to gain or maintain weight and stay healthy, such as getting more healthy foods in your diet and doing exercises to build muscle. Where can you learn more? Go to [email or web address]://[email or web address] org and sign in to your [email or web address] account. Enter S176 in the Metric Insights box to learn more about \"Body Mass Index: Care Instructions. \"     If you do not have an account, please click on the \"Sign Up Now\" link. Current as of: October 9, 2017  Content Version: 11.6  © 2671-5500 [employer]. Care instructions adapted under license by [facility] ([facility]). If you have questions about a medical condition or this instruction, always ask your healthcare professional. [place] [address] any warranty or liability for your use of this information. Return To Clinic 10/19/2018. After Visit Summary  given and reviewed. It is very important for your care that you keep your appointment.  If for some reason you are unable to keep your appointment it

## 2018-07-13 NOTE — PROGRESS NOTES
[facility]    Progress Note    Date of patient's visit: 7/13/2018  YOB: 1955  Patient's Name:  Keaton Cunningham    Patient Care Team:  Juanita Rich MD as PCP - General (Internal Medicine)  Joshua Xiao RN as Nurse Navigator    REASON FOR VISIT: Routine outpatient follow     HISTORY OF PRESENT ILLNESS:    History was obtained from the patient. Keaton Cunningham is a 57 y.o. is here for a  Follow-up on following chronic problems. Patient Active Problem List   Diagnosis    Essential hypertension    Pure hypercholesterolemia    Cerebrovascular accident (CVA), 2011, no residual deficit (HCC)    CKD (chronic kidney disease) stage 3, GFR 30-59 ml/min    Peripheral vascular disease ([facility] [address]    TIBURCIO (obstructive sleep apnea)    Morbid obesity with BMI of 50.0-59.9, adult (HCC)    Arthritis    Chronic tension-type headache, not intractable     Hypertension- stable on amlodipine 10 and losartan 25 and hydrochlorothiazide 25  Hypercholesterolemia- on Lipitor 40  CVA- saw Dr. Betsey Schmitz -CTA head and neck as below- No plans of any surgical intervention at present. Any intervention in future if patient is symptomatic or new stroke happens. Follow-up with repeat CTA head and neck in one year. On aspirin and Plavix and statin    peripheral vascular disease- stable on aspirin and Plavix and statin  TIBURCIO- patient had CPAP but she did not use it for one week because she had cold, her CPAP machine has been taken back. CTA head  Impression   Occlusion or near occlusion left petrous segment and cavernous segment   internal carotid artery.       Severe focal stenosis cavernous segment right internal carotid artery. CTA  neck  Impression   Complete or near complete occlusion of the left internal carotid artery. Relatively normal left common carotid artery.       Mild stenosis proximal right internal carotid artery.       Moderate stenosis proximal right vertebral artery.     Proximal left vertebral artery not well visualized.           CT lung  A 3 mm calcified anterior middle lobe nodule.       No focal consolidation.       LUNG RADS:   Per ACR Lung-RADS Version 1.0       Category 2, Benign appearance or behavior.  Management:  Continue annual lung   screening with LDCT in 12 months. (probability of malignancy <1%). ALLERGIES      Allergies   Allergen Reactions    Duricef [Cefadroxil Monohydrate]     Fish-Derived Products     Pcn [Penicillins]     Tomato          MEDICATIONS:      Current Outpatient Prescriptions on File Prior to Visit   Medication Sig Dispense Refill    ferrous sulfate 325 (65 Fe) MG tablet TAKE 1 TABLET IN THE MORNING 30 tablet 0     No current facility-administered medications on file prior to visit. SOCIAL HISTORY    Reviewed and no change from previous record. [facility]  reports that she quit smoking about 6 years ago. She has a 70.00 pack-year smoking history.  She has never used smokeless tobacco.    FAMILY HISTORY:    Reviewed and No change from previous visit    REVIEW OF SYSTEMS:    ENT: negative  Respiratory: no cough, shortness of breath, or wheezing  Cardiovascular: no chest pain or dyspnea on exertion  Gastrointestinal: no abdominal pain, black or bloody stools  Neurological: no TIA or stroke symptoms  Endocrine: negative  Genito-Urinary: no dysuria, trouble voiding, or hematuria  Musculoskeletal: negative  Dermatological: negative    PHYSICAL EXAM:      Vitals:    07/13/18 0832   BP: 121/69   Pulse: 60     General appearance - alert, well appearing, and in no distress  Mental status - alert, oriented to person, place, and time  Chest - clear to auscultation, no wheezes, rales or rhonchi, symmetric air entry  Heart - normal rate, regular rhythm, normal S1, S2, no murmurs, rubs, clicks or gallops  Abdomen - soft, nontender, nondistended, no masses or organomegaly  Neurological - alert, oriented, normal speech with some slurring, no focal

## 2018-07-17 ENCOUNTER — HOSPITAL ENCOUNTER (OUTPATIENT)
Age: 63
Discharge: HOME | End: 2018-07-17
Payer: MEDICARE

## 2018-07-17 DIAGNOSIS — Z12.11 COLON CANCER SCREENING: Primary | ICD-10-CM

## 2018-07-17 LAB
CONTROL: NORMAL
HEMOCCULT STL QL: NORMAL

## 2018-07-17 PROCEDURE — 82274 ASSAY TEST FOR BLOOD FECAL: CPT | Performed by: INTERNAL MEDICINE

## 2018-07-18 NOTE — TELEPHONE ENCOUNTER
E-scribe request for Ferrous Sulfate last appt 2018. Please review and e-scribe if applicable. Next Visit Date:  Future Appointments  Date Time Provider Mary Isis   2018 1:00 PM Antonino Harrell, APRN - CNP Weight Mgmt [facility]   2018 1:00 PM SCHEDULE, [facility] MGMT Weight Mgmt [facility]   2018 1:45 PM Abran Kayser, DPM [facility] Podiatry [facility]   8/3/2018 1:00 PM Antonino Harrell APRN - CNP Weight Mgmt [facility]   8/10/2018 1:00 PM Antonino Harrell APRN - CNP Weight Mgmt [facility]   2018 10:00 AM Selene Boast, MD Neuro Spec [facility]   10/19/2018 8:30 AM Fifi Welch MD [facility] IM [facility]   7/3/2019 1:00 PM Jessica Bean MD Neuro Endo [facility]       Hemoglobin A1C (%)   Date Value   2018 5.7   2016 5.4   2014 5.5             ( goal A1C is < 7)   Microalb/Crt.  Ratio (mcg/mg creat)   Date Value   2017 6     LDL Cholesterol (mg/dL)   Date Value   2018 85       (goal LDL is <100)   AST (U/L)   Date Value   2018 20     ALT (U/L)   Date Value   2018 14     BUN (mg/dL)   Date Value   2018 19     BP Readings from Last 3 Encounters:   18 124/80   18 121/69   18 120/70          (goal 120/80)        Patient Active Problem List:     Essential hypertension     Pure hypercholesterolemia     Cerebrovascular accident (CVA), , no residual deficit (HCC)     CKD (chronic kidney disease) stage 3, GFR 30-59 ml/min     Peripheral vascular disease (HCC)     TIBURCIO (obstructive sleep apnea)     Morbid obesity with BMI of 50.0-59.9, adult (HCC)     Arthritis     Chronic tension-type headache, not intractable

## 2018-07-20 ENCOUNTER — OFFICE VISIT (OUTPATIENT)
Dept: BARIATRICS/WEIGHT MGMT | Age: 63
End: 2018-07-20
Payer: MEDICARE

## 2018-07-20 VITALS
BODY MASS INDEX: 41.02 KG/M2 | HEART RATE: 70 BPM | SYSTOLIC BLOOD PRESSURE: 122 MMHG | WEIGHT: 293 LBS | DIASTOLIC BLOOD PRESSURE: 80 MMHG | HEIGHT: 71 IN

## 2018-07-20 DIAGNOSIS — G47.33 OSA (OBSTRUCTIVE SLEEP APNEA): ICD-10-CM

## 2018-07-20 DIAGNOSIS — I73.9 PERIPHERAL VASCULAR DISEASE (HCC): ICD-10-CM

## 2018-07-20 DIAGNOSIS — I10 ESSENTIAL HYPERTENSION: Primary | ICD-10-CM

## 2018-07-20 DIAGNOSIS — N18.30 CKD (CHRONIC KIDNEY DISEASE) STAGE 3, GFR 30-59 ML/MIN (HCC): ICD-10-CM

## 2018-07-20 DIAGNOSIS — E78.00 PURE HYPERCHOLESTEROLEMIA: ICD-10-CM

## 2018-07-20 DIAGNOSIS — I63.00 CEREBROVASCULAR ACCIDENT (CVA) DUE TO THROMBOSIS OF PRECEREBRAL ARTERY (HCC): ICD-10-CM

## 2018-07-20 DIAGNOSIS — E66.01 MORBID OBESITY WITH BMI OF 50.0-59.9, ADULT (HCC): ICD-10-CM

## 2018-07-20 PROCEDURE — 99213 OFFICE O/P EST LOW 20 MIN: CPT | Performed by: NURSE PRACTITIONER

## 2018-07-20 RX ORDER — FERROUS SULFATE 325(65) MG
TABLET ORAL
Qty: 30 TABLET | Refills: 0 | Status: SHIPPED | OUTPATIENT
Start: 2018-07-20 | End: 2018-08-15 | Stop reason: SDUPTHER

## 2018-07-20 NOTE — PROGRESS NOTES
Grandfather        Social History:  Social History     Social History    Marital status: Legally      Spouse name: N/A    Number of children: N/A    Years of education: N/A     Occupational History    Not on file. Social History Main Topics    Smoking status: Former Smoker     Packs/day: 2.00     Years: 35.00     Quit date: 10/11/2011    Smokeless tobacco: Never Used    Alcohol use 3.6 oz/week     6 Cans of beer per week      Comment: 6 beers/week(on weekends)    Drug use: No      Comment: per pt did years ago; cocaine & marij    Sexual activity: Yes     Partners: Male     Other Topics Concern    Not on file     Social History Narrative    No narrative on file       Current Medications:  Current Outpatient Prescriptions   Medication Sig Dispense Refill    ferrous sulfate 325 (65 Fe) MG tablet TAKE 1 TABLET IN THE MORNING 30 tablet 0    clopidogrel (PLAVIX) 75 MG tablet TAKE 1 TABLET DAILY 30 tablet 0    losartan (COZAAR) 25 MG tablet TAKE 1 TABLET DAILY 30 tablet 5    aspirin 81 MG EC tablet TAKE 1 TABLET DAILY 30 tablet 5    amLODIPine (NORVASC) 10 MG tablet TAKE 1 TABLET DAILY 30 tablet 5    atorvastatin (LIPITOR) 40 MG tablet TAKE 1.5 TABLET DAILY 45 tablet 5    hydrochlorothiazide (HYDRODIURIL) 25 MG tablet TAKE 1 TABLET IN THE MORNING 30 tablet 5    folic acid (FOLVITE) 1 MG tablet TAKE 1 TABLET DAILY 30 tablet 5    acetaminophen (AMINOFEN) 325 MG tablet Take 2 tablets by mouth every 4 hours as needed for Pain 60 tablet 3    ibuprofen (ADVIL;MOTRIN) 800 MG tablet Take 1 tablet by mouth every 8 hours as needed for Pain 30 tablet 0     No current facility-administered medications for this visit. Vital Signs:  /80 (Site: Right Arm, Position: Sitting, Cuff Size: Large Adult)   Pulse 70   Ht 5' 10.98\" (1.803 m)   Wt (!) 365 lb 12.8 oz (165.9 kg)   BMI 51.04 kg/m²     BMI/Height/Weight:  Body mass index is 51.04 kg/m².       Review of Systems  Constitutional: Weight 7. Morbid obesity with BMI of 50.0-59.9, adult ([facility] [address]         Plan:      Track food and weight. Return to clinic as per group medical appointment schedule. Follow-up:  Return in about 1 week (around 7/27/2018). Orders:  No orders of the defined types were placed in this encounter. Prescriptions:  No orders of the defined types were placed in this encounter.       Electronically signed by:  Arleen Baum CNP

## 2018-07-25 ENCOUNTER — TELEPHONE (OUTPATIENT)
Dept: INTERNAL MEDICINE | Age: 63
End: 2018-07-25

## 2018-07-25 DIAGNOSIS — E78.5 HYPERLIPIDEMIA, UNSPECIFIED HYPERLIPIDEMIA TYPE: ICD-10-CM

## 2018-07-26 RX ORDER — ATORVASTATIN CALCIUM 40 MG/1
40 TABLET, FILM COATED ORAL DAILY
Qty: 30 TABLET | Refills: 5 | Status: SHIPPED | OUTPATIENT
Start: 2018-07-26 | End: 2018-11-30 | Stop reason: SDUPTHER

## 2018-07-27 ENCOUNTER — OFFICE VISIT (OUTPATIENT)
Dept: PODIATRY | Age: 63
End: 2018-07-27
Payer: MEDICARE

## 2018-07-27 VITALS
SYSTOLIC BLOOD PRESSURE: 131 MMHG | WEIGHT: 293 LBS | HEIGHT: 71 IN | BODY MASS INDEX: 41.02 KG/M2 | DIASTOLIC BLOOD PRESSURE: 77 MMHG | HEART RATE: 52 BPM

## 2018-07-27 DIAGNOSIS — M79.672 PAIN IN BOTH FEET: ICD-10-CM

## 2018-07-27 DIAGNOSIS — B35.1 ONYCHOMYCOSIS: Primary | ICD-10-CM

## 2018-07-27 DIAGNOSIS — M79.671 PAIN IN BOTH FEET: ICD-10-CM

## 2018-07-27 DIAGNOSIS — L84 CALLUS: ICD-10-CM

## 2018-07-27 PROCEDURE — 11721 DEBRIDE NAIL 6 OR MORE: CPT | Performed by: PODIATRIST

## 2018-07-27 PROCEDURE — 11056 PARNG/CUTG B9 HYPRKR LES 2-4: CPT | Performed by: PODIATRIST

## 2018-07-27 PROCEDURE — 99213 OFFICE O/P EST LOW 20 MIN: CPT | Performed by: PODIATRIST

## 2018-07-27 NOTE — PROGRESS NOTES
Patient instructed to remove shoes and socks, instructed to sit in exam chair. Current PCP name is Keisha  and date of last visit 7-13-18. Do you have a follow up visit scheduled?   Yes or no    If yes the date is 10-19-18

## 2018-08-02 ENCOUNTER — TELEPHONE (OUTPATIENT)
Dept: INTERNAL MEDICINE | Age: 63
End: 2018-08-02

## 2018-08-02 DIAGNOSIS — I63.00 CEREBROVASCULAR ACCIDENT (CVA) DUE TO THROMBOSIS OF PRECEREBRAL ARTERY (HCC): ICD-10-CM

## 2018-08-02 DIAGNOSIS — M19.90 ARTHRITIS: ICD-10-CM

## 2018-08-02 DIAGNOSIS — E66.01 MORBID OBESITY WITH BMI OF 50.0-59.9, ADULT (HCC): Primary | ICD-10-CM

## 2018-08-10 ENCOUNTER — OFFICE VISIT (OUTPATIENT)
Dept: BARIATRICS/WEIGHT MGMT | Age: 63
End: 2018-08-10
Payer: MEDICARE

## 2018-08-10 VITALS
DIASTOLIC BLOOD PRESSURE: 80 MMHG | WEIGHT: 293 LBS | SYSTOLIC BLOOD PRESSURE: 124 MMHG | BODY MASS INDEX: 41.02 KG/M2 | HEIGHT: 71 IN | HEART RATE: 80 BPM

## 2018-08-10 DIAGNOSIS — I10 ESSENTIAL HYPERTENSION: Primary | ICD-10-CM

## 2018-08-10 DIAGNOSIS — G44.229 CHRONIC TENSION-TYPE HEADACHE, NOT INTRACTABLE: ICD-10-CM

## 2018-08-10 DIAGNOSIS — E66.01 MORBID OBESITY WITH BMI OF 50.0-59.9, ADULT (HCC): ICD-10-CM

## 2018-08-10 DIAGNOSIS — N18.30 CKD (CHRONIC KIDNEY DISEASE) STAGE 3, GFR 30-59 ML/MIN (HCC): ICD-10-CM

## 2018-08-10 DIAGNOSIS — I63.00 CEREBROVASCULAR ACCIDENT (CVA) DUE TO THROMBOSIS OF PRECEREBRAL ARTERY (HCC): ICD-10-CM

## 2018-08-10 DIAGNOSIS — G47.33 OSA (OBSTRUCTIVE SLEEP APNEA): ICD-10-CM

## 2018-08-10 DIAGNOSIS — I73.9 PERIPHERAL VASCULAR DISEASE (HCC): ICD-10-CM

## 2018-08-10 DIAGNOSIS — M19.90 ARTHRITIS: ICD-10-CM

## 2018-08-10 DIAGNOSIS — E78.00 PURE HYPERCHOLESTEROLEMIA: ICD-10-CM

## 2018-08-10 PROCEDURE — G8427 DOCREV CUR MEDS BY ELIG CLIN: HCPCS | Performed by: NURSE PRACTITIONER

## 2018-08-10 PROCEDURE — 4040F PNEUMOC VAC/ADMIN/RCVD: CPT | Performed by: NURSE PRACTITIONER

## 2018-08-10 PROCEDURE — 1123F ACP DISCUSS/DSCN MKR DOCD: CPT | Performed by: NURSE PRACTITIONER

## 2018-08-10 PROCEDURE — 1101F PT FALLS ASSESS-DOCD LE1/YR: CPT | Performed by: NURSE PRACTITIONER

## 2018-08-10 PROCEDURE — 99213 OFFICE O/P EST LOW 20 MIN: CPT | Performed by: NURSE PRACTITIONER

## 2018-08-10 PROCEDURE — 1036F TOBACCO NON-USER: CPT | Performed by: NURSE PRACTITIONER

## 2018-08-10 PROCEDURE — 1090F PRES/ABSN URINE INCON ASSESS: CPT | Performed by: NURSE PRACTITIONER

## 2018-08-10 PROCEDURE — 3017F COLORECTAL CA SCREEN DOC REV: CPT | Performed by: NURSE PRACTITIONER

## 2018-08-10 PROCEDURE — G8400 PT W/DXA NO RESULTS DOC: HCPCS | Performed by: NURSE PRACTITIONER

## 2018-08-10 PROCEDURE — G8417 CALC BMI ABV UP PARAM F/U: HCPCS | Performed by: NURSE PRACTITIONER

## 2018-08-10 PROCEDURE — G8598 ASA/ANTIPLAT THER USED: HCPCS | Performed by: NURSE PRACTITIONER

## 2018-08-10 NOTE — PROGRESS NOTES
Group Lifestyle Balance Follow-up Progress Note      Subjective     Patient is here for group medical appointment for Group Lifestyle Balance weight management program follow-up for the chronic conditions of HTN, TIBURCIO, HLD, Hx CVA, CKD, PVD, Arthritis, Headache. Patient continues on the program and tolerating well. Total weight loss of 10 lbs. No current issues. Allergies: Allergies   Allergen Reactions    Duricef [Cefadroxil Monohydrate]     Fish-Derived Products     Pcn [Penicillins]     Tomato        Past Medical History:     Past Medical History:   Diagnosis Date    Bleeding     while on aggrenox    Cataracts, bilateral     Cerebrovascular disease 2003,2011    cva    Chronic pain in left foot     CKD (chronic kidney disease) stage 3, GFR 30-59 ml/min     GERD (gastroesophageal reflux disease)     Hx of blood clots     Hyperlipidemia     Hypertension     Iron (Fe) deficiency anemia     Nicotine abuse     Obesity     Osteoarthritis     Peripheral vascular disease ([facility] [address] 3/13/2014    Substance abuse     cocaine, canabis    Thalassemia minor     Unspecified cerebral artery occlusion with cerebral infarction     Unspecified sleep apnea    .     Past Surgical History:  Past Surgical History:   Procedure Laterality Date    BREAST SURGERY      biopsy    COLONOSCOPY  12/2007    adenomatous polyp    COLONOSCOPY  2013    normal, repeat in 5 years   [address]      TUBAL LIGATION      UPPER GASTROINTESTINAL ENDOSCOPY  2009    negative       Family History:  Family History   Problem Relation Age of Onset    High Blood Pressure Mother     Asthma Mother     Heart Disease Mother     Heart Disease Father     High Blood Pressure Father     Diabetes Brother     High Blood Pressure Brother     Heart Disease Brother     High Blood Pressure Maternal Grandmother     High Blood Pressure Maternal Grandfather     Heart Disease Maternal

## 2018-08-15 DIAGNOSIS — I67.9 CEREBROVASCULAR DISEASE: ICD-10-CM

## 2018-08-15 RX ORDER — FERROUS SULFATE 325(65) MG
TABLET ORAL
Qty: 30 TABLET | Refills: 0 | Status: SHIPPED | OUTPATIENT
Start: 2018-08-15 | End: 2018-08-22 | Stop reason: SDUPTHER

## 2018-08-15 RX ORDER — CLOPIDOGREL BISULFATE 75 MG/1
TABLET ORAL
Qty: 30 TABLET | Refills: 0 | Status: SHIPPED | OUTPATIENT
Start: 2018-08-15 | End: 2018-08-22 | Stop reason: SDUPTHER

## 2018-08-15 NOTE — TELEPHONE ENCOUNTER
Pure hypercholesterolemia     Cerebrovascular accident (CVA), 2011, no residual deficit (HCC)     CKD (chronic kidney disease) stage 3, GFR 30-59 ml/min     Peripheral vascular disease (HCC)     TIBURCIO (obstructive sleep apnea)     Morbid obesity with BMI of 50.0-59.9, adult (HCC)     Arthritis     Chronic tension-type headache, not intractable

## 2018-08-22 DIAGNOSIS — I67.9 CEREBROVASCULAR DISEASE: ICD-10-CM

## 2018-08-23 RX ORDER — CLOPIDOGREL BISULFATE 75 MG/1
TABLET ORAL
Qty: 30 TABLET | Refills: 3 | Status: SHIPPED | OUTPATIENT
Start: 2018-08-23 | End: 2018-11-30 | Stop reason: SDUPTHER

## 2018-08-23 RX ORDER — FERROUS SULFATE 325(65) MG
1 TABLET ORAL
Qty: 30 TABLET | Refills: 3 | Status: SHIPPED | OUTPATIENT
Start: 2018-08-23 | End: 2019-01-08 | Stop reason: SDUPTHER

## 2018-08-24 DIAGNOSIS — E78.5 HYPERLIPIDEMIA, UNSPECIFIED HYPERLIPIDEMIA TYPE: ICD-10-CM

## 2018-08-30 NOTE — TELEPHONE ENCOUNTER
Rhett Sy calling requesting clarification on how pt it to be taking Atorvastatin 40 MG-- last script (07/26/18) states take 1 tablet by mouth daily, take 1.5 tablet by mouth daily    States that pt was previously on 1 tablet once daily and then increased to 1.5 tablet daily which was denied per insurance   Pt is bubble packed and packs are to go out tomorrow,     Verbal clarification of script is best per Santos Fleming at pharmacy     Please review and advise

## 2018-08-31 RX ORDER — ATORVASTATIN CALCIUM 40 MG/1
40 TABLET, FILM COATED ORAL DAILY
Qty: 30 TABLET | Refills: 5 | Status: SHIPPED | OUTPATIENT
Start: 2018-08-31 | End: 2018-10-19 | Stop reason: SDUPTHER

## 2018-09-04 NOTE — TELEPHONE ENCOUNTER
Pt called again about her Handicap Placard pt said she has called many times would like this address and done

## 2018-09-07 NOTE — TELEPHONE ENCOUNTER
Patient notified that handicap parking placard is completed and mailed to her per her request.  Copy in media

## 2018-09-11 ENCOUNTER — OFFICE VISIT (OUTPATIENT)
Dept: BARIATRICS/WEIGHT MGMT | Age: 63
End: 2018-09-11
Payer: MEDICARE

## 2018-09-11 VITALS
HEART RATE: 70 BPM | DIASTOLIC BLOOD PRESSURE: 70 MMHG | HEIGHT: 71 IN | WEIGHT: 293 LBS | SYSTOLIC BLOOD PRESSURE: 126 MMHG | BODY MASS INDEX: 41.02 KG/M2

## 2018-09-11 DIAGNOSIS — I10 ESSENTIAL HYPERTENSION: Primary | ICD-10-CM

## 2018-09-11 DIAGNOSIS — G44.229 CHRONIC TENSION-TYPE HEADACHE, NOT INTRACTABLE: ICD-10-CM

## 2018-09-11 DIAGNOSIS — N18.30 CKD (CHRONIC KIDNEY DISEASE) STAGE 3, GFR 30-59 ML/MIN (HCC): ICD-10-CM

## 2018-09-11 DIAGNOSIS — M19.90 ARTHRITIS: ICD-10-CM

## 2018-09-11 DIAGNOSIS — I73.9 PERIPHERAL VASCULAR DISEASE (HCC): ICD-10-CM

## 2018-09-11 DIAGNOSIS — E78.00 PURE HYPERCHOLESTEROLEMIA: ICD-10-CM

## 2018-09-11 DIAGNOSIS — E66.01 MORBID OBESITY WITH BMI OF 50.0-59.9, ADULT (HCC): ICD-10-CM

## 2018-09-11 DIAGNOSIS — I63.00 CEREBROVASCULAR ACCIDENT (CVA) DUE TO THROMBOSIS OF PRECEREBRAL ARTERY (HCC): ICD-10-CM

## 2018-09-11 DIAGNOSIS — G47.33 OSA (OBSTRUCTIVE SLEEP APNEA): ICD-10-CM

## 2018-09-11 PROCEDURE — 99213 OFFICE O/P EST LOW 20 MIN: CPT | Performed by: NURSE PRACTITIONER

## 2018-09-13 ENCOUNTER — OFFICE VISIT (OUTPATIENT)
Dept: NEUROLOGY | Age: 63
End: 2018-09-13
Payer: MEDICARE

## 2018-09-13 VITALS
DIASTOLIC BLOOD PRESSURE: 69 MMHG | SYSTOLIC BLOOD PRESSURE: 138 MMHG | BODY MASS INDEX: 41.02 KG/M2 | HEIGHT: 71 IN | HEART RATE: 49 BPM | WEIGHT: 293 LBS

## 2018-09-13 DIAGNOSIS — R20.2 PARESTHESIA: Primary | ICD-10-CM

## 2018-09-13 DIAGNOSIS — Z86.73 HISTORY OF STROKE: ICD-10-CM

## 2018-09-13 DIAGNOSIS — I63.9 STROKE, LARGE VESSEL (HCC): ICD-10-CM

## 2018-09-13 PROCEDURE — 99214 OFFICE O/P EST MOD 30 MIN: CPT | Performed by: PSYCHIATRY & NEUROLOGY

## 2018-09-13 NOTE — PROGRESS NOTES
Seizures: absent Trouble walking or imbalance: absent, Dizziness: absent, Weakness: absent, Numbness: present Tremor: absent, Spasm: absent, Speech difficulty: absent, Headache: absent, Light sensitivity: absent    PSYCHIATRIC Anxiety: absent, Hallucination: absent, Mood disorder: absent    HEMATOLOGIC Abnormal bleeding: absent, Anemia: absent, Clotting disorder: absent, Lymph gland changes: absent       VITALS  /69 (Site: Left Upper Arm, Position: Sitting)   Pulse (!) 49   Ht 5' 11\" (1.803 m)   Wt (!) 364 lb (165.1 kg)   BMI 50.77 kg/m²       PHYSICAL EXAMINATIONS:     General appearance: cooperative, obese  Skin: no rash or skin lesions. HEENT: normocephalic  Optic Fundi: deferred  Neck: supple, no cervcical adenopathy or carotid bruit  Lungs: clear to auscultation  Heart: Regular rate and rhythm, normal S1, S2. No murmurs, clicks or gallops. Peripheral pulses: radial pulses palpable  Abdominal: BS present, soft, NT, ND  Extremities: no edema    NEUROLOGICAL EXAMINATION:     MS: awake, alert and oriented. No aphasia, dysarthria, or neglect  CNs: PERRLA, EOMI, VF full, sensation intact, face symmetric, hearing intact, soft palate rises on phonation, sternocleidomastoid and trapezius intact. Tongue midline, no fasciculations. Motor: no abnormal movements, tone and bulk okay. RUE: delta 4/5, biceps 4+/5, triceps 4+/5,  4+/5  LUE: delta 4/5, biceps 4+/5, triceps 4+/5,  4+/5  RLE: hf 4/5, ke 4/5, df 4/5, pf 4+/5  LLE: hf 4+/5, ke 4/5, df 4/5, pf 4+/5  Reflexes: 2+ right UE, 1+ in left UE, could not induce in LEs, babinski not present. Coordination: FNF no dysmetria, heel to shin okay, MOISES okay, negative Rhomberg. Gait: wide based, walk with a cane, not able to do Tandem. Sensory: inconsistent to temp/pp on both sides, no extinction.     ASSRSSMENT/PLANS:      //  Paresthesia  - sounds like bilateral CTS  - EMG/NCV  - bengay topical, sling at night    // History of strokes  - BP ok today,

## 2018-09-13 NOTE — PATIENT INSTRUCTIONS
1. EMG/NCV  2.  Continue stroke secondary prevention with good blood pressure, sugar, lipid control    Return in 3-4 months    Nadir Morales MD, MS

## 2018-09-28 ENCOUNTER — HOSPITAL ENCOUNTER (OUTPATIENT)
Dept: MAMMOGRAPHY | Age: 63
Discharge: HOME | End: 2018-09-30
Payer: MEDICARE

## 2018-09-28 DIAGNOSIS — Z12.39 SCREENING BREAST EXAMINATION: ICD-10-CM

## 2018-09-28 PROCEDURE — 77067 SCR MAMMO BI INCL CAD: CPT

## 2018-10-09 ENCOUNTER — OFFICE VISIT (OUTPATIENT)
Dept: BARIATRICS/WEIGHT MGMT | Age: 63
End: 2018-10-09
Payer: MEDICARE

## 2018-10-09 VITALS
HEART RATE: 68 BPM | SYSTOLIC BLOOD PRESSURE: 118 MMHG | DIASTOLIC BLOOD PRESSURE: 74 MMHG | BODY MASS INDEX: 41.02 KG/M2 | WEIGHT: 293 LBS | HEIGHT: 71 IN

## 2018-10-09 DIAGNOSIS — I10 ESSENTIAL HYPERTENSION: Primary | ICD-10-CM

## 2018-10-09 DIAGNOSIS — I73.9 PERIPHERAL VASCULAR DISEASE (HCC): ICD-10-CM

## 2018-10-09 DIAGNOSIS — G47.33 OSA (OBSTRUCTIVE SLEEP APNEA): ICD-10-CM

## 2018-10-09 DIAGNOSIS — M19.90 ARTHRITIS: ICD-10-CM

## 2018-10-09 DIAGNOSIS — E78.00 PURE HYPERCHOLESTEROLEMIA: ICD-10-CM

## 2018-10-09 DIAGNOSIS — E66.01 MORBID OBESITY WITH BMI OF 50.0-59.9, ADULT (HCC): ICD-10-CM

## 2018-10-09 DIAGNOSIS — N18.30 CKD (CHRONIC KIDNEY DISEASE) STAGE 3, GFR 30-59 ML/MIN (HCC): ICD-10-CM

## 2018-10-09 PROCEDURE — 99213 OFFICE O/P EST LOW 20 MIN: CPT | Performed by: NURSE PRACTITIONER

## 2018-10-09 NOTE — PROGRESS NOTES
Maternal Grandfather        Social History:  Social History     Social History    Marital status: Legally      Spouse name: N/A    Number of children: N/A    Years of education: N/A     Occupational History    Not on file.      Social History Main Topics    Smoking status: Former Smoker     Packs/day: 2.00     Years: 35.00     Quit date: 10/11/2011    Smokeless tobacco: Never Used    Alcohol use 3.6 oz/week     6 Cans of beer per week      Comment: 6 beers/week(on weekends)    Drug use: No      Comment: per pt did years ago; cocaine & marij    Sexual activity: Yes     Partners: Male     Other Topics Concern    Not on file     Social History Narrative    No narrative on file       Current Medications:  Current Outpatient Prescriptions   Medication Sig Dispense Refill    Handicap Placard MISC by Does not apply route Cannot walk 200 feet without stopping  Duration - 5 y 1 each 0    atorvastatin (LIPITOR) 40 MG tablet Take 1 tablet by mouth daily TAKE 1 TABLET DAILY 30 tablet 5    Handicap Placard MISC by Does not apply route degenerative arthritis   For 6 mths 1 each 0    ferrous sulfate 325 (65 Fe) MG tablet Take 1 tablet by mouth daily (with breakfast) 30 tablet 3    clopidogrel (PLAVIX) 75 MG tablet TAKE 1 TABLET DAILY 30 tablet 3    atorvastatin (LIPITOR) 40 MG tablet Take 1 tablet by mouth daily TAKE 1.5 TABLET DAILY 30 tablet 5    losartan (COZAAR) 25 MG tablet TAKE 1 TABLET DAILY 30 tablet 5    aspirin 81 MG EC tablet TAKE 1 TABLET DAILY 30 tablet 5    amLODIPine (NORVASC) 10 MG tablet TAKE 1 TABLET DAILY 30 tablet 5    hydrochlorothiazide (HYDRODIURIL) 25 MG tablet TAKE 1 TABLET IN THE MORNING 30 tablet 5    folic acid (FOLVITE) 1 MG tablet TAKE 1 TABLET DAILY 30 tablet 5    acetaminophen (AMINOFEN) 325 MG tablet Take 2 tablets by mouth every 4 hours as needed for Pain 60 tablet 3    ibuprofen (ADVIL;MOTRIN) 800 MG tablet Take 1 tablet by mouth every 8 hours as needed for Pain 30

## 2018-10-11 ENCOUNTER — OFFICE VISIT (OUTPATIENT)
Dept: NEUROLOGY | Age: 63
End: 2018-10-11
Payer: MEDICARE

## 2018-10-11 DIAGNOSIS — R20.2 PARESTHESIA: ICD-10-CM

## 2018-10-11 DIAGNOSIS — R20.0 NUMBNESS IN BOTH HANDS: Primary | ICD-10-CM

## 2018-10-11 PROCEDURE — 95913 NRV CNDJ TEST 13/> STUDIES: CPT | Performed by: PSYCHIATRY & NEUROLOGY

## 2018-10-19 ENCOUNTER — OFFICE VISIT (OUTPATIENT)
Dept: INTERNAL MEDICINE | Age: 63
End: 2018-10-19
Payer: MEDICARE

## 2018-10-19 ENCOUNTER — HOSPITAL ENCOUNTER (OUTPATIENT)
Age: 63
Setting detail: SPECIMEN
Discharge: HOME | End: 2018-10-19
Payer: MEDICARE

## 2018-10-19 VITALS
BODY MASS INDEX: 41.02 KG/M2 | HEART RATE: 62 BPM | WEIGHT: 293 LBS | SYSTOLIC BLOOD PRESSURE: 153 MMHG | DIASTOLIC BLOOD PRESSURE: 79 MMHG | HEIGHT: 71 IN

## 2018-10-19 DIAGNOSIS — I10 ESSENTIAL HYPERTENSION: ICD-10-CM

## 2018-10-19 DIAGNOSIS — G56.03 BILATERAL CARPAL TUNNEL SYNDROME: ICD-10-CM

## 2018-10-19 DIAGNOSIS — Z78.0 POST-MENOPAUSAL: ICD-10-CM

## 2018-10-19 DIAGNOSIS — Z23 NEED FOR PNEUMOCOCCAL VACCINE: ICD-10-CM

## 2018-10-19 DIAGNOSIS — R79.89 AZOTEMIA: ICD-10-CM

## 2018-10-19 DIAGNOSIS — I67.9 CEREBROVASCULAR DISEASE: ICD-10-CM

## 2018-10-19 DIAGNOSIS — Z00.00 HEALTHCARE MAINTENANCE: ICD-10-CM

## 2018-10-19 DIAGNOSIS — E66.01 MORBID OBESITY WITH BMI OF 50.0-59.9, ADULT (HCC): Primary | ICD-10-CM

## 2018-10-19 DIAGNOSIS — Z23 NEEDS FLU SHOT: ICD-10-CM

## 2018-10-19 LAB
ANION GAP SERPL CALCULATED.3IONS-SCNC: 11 MMOL/L (ref 9–17)
BUN BLDV-MCNC: 16 MG/DL (ref 8–23)
BUN/CREAT BLD: ABNORMAL (ref 9–20)
CALCIUM SERPL-MCNC: 9.5 MG/DL (ref 8.6–10.4)
CHLORIDE BLD-SCNC: 99 MMOL/L (ref 98–107)
CO2: 29 MMOL/L (ref 20–31)
CREAT SERPL-MCNC: 1.12 MG/DL (ref 0.5–0.9)
GFR AFRICAN AMERICAN: 59 ML/MIN
GFR NON-AFRICAN AMERICAN: 49 ML/MIN
GFR SERPL CREATININE-BSD FRML MDRD: ABNORMAL ML/MIN/{1.73_M2}
GFR SERPL CREATININE-BSD FRML MDRD: ABNORMAL ML/MIN/{1.73_M2}
GLUCOSE BLD-MCNC: 76 MG/DL (ref 70–99)
POTASSIUM SERPL-SCNC: 3.7 MMOL/L (ref 3.7–5.3)
SODIUM BLD-SCNC: 139 MMOL/L (ref 135–144)

## 2018-10-19 PROCEDURE — 96160 PT-FOCUSED HLTH RISK ASSMT: CPT | Performed by: STUDENT IN AN ORGANIZED HEALTH CARE EDUCATION/TRAINING PROGRAM

## 2018-10-19 PROCEDURE — 99211 OFF/OP EST MAY X REQ PHY/QHP: CPT | Performed by: INTERNAL MEDICINE

## 2018-10-19 PROCEDURE — 90686 IIV4 VACC NO PRSV 0.5 ML IM: CPT | Performed by: INTERNAL MEDICINE

## 2018-10-19 PROCEDURE — 99213 OFFICE O/P EST LOW 20 MIN: CPT | Performed by: STUDENT IN AN ORGANIZED HEALTH CARE EDUCATION/TRAINING PROGRAM

## 2018-10-19 PROCEDURE — 90670 PCV13 VACCINE IM: CPT | Performed by: INTERNAL MEDICINE

## 2018-10-19 PROCEDURE — 36415 COLL VENOUS BLD VENIPUNCTURE: CPT

## 2018-10-19 PROCEDURE — 80048 BASIC METABOLIC PNL TOTAL CA: CPT

## 2018-10-19 ASSESSMENT — PATIENT HEALTH QUESTIONNAIRE - PHQ9
1. LITTLE INTEREST OR PLEASURE IN DOING THINGS: 0
9. THOUGHTS THAT YOU WOULD BE BETTER OFF DEAD, OR OF HURTING YOURSELF: 0
7. TROUBLE CONCENTRATING ON THINGS, SUCH AS READING THE NEWSPAPER OR WATCHING TELEVISION: 0
4. FEELING TIRED OR HAVING LITTLE ENERGY: 0
6. FEELING BAD ABOUT YOURSELF - OR THAT YOU ARE A FAILURE OR HAVE LET YOURSELF OR YOUR FAMILY DOWN: 0
10. IF YOU CHECKED OFF ANY PROBLEMS, HOW DIFFICULT HAVE THESE PROBLEMS MADE IT FOR YOU TO DO YOUR WORK, TAKE CARE OF THINGS AT HOME, OR GET ALONG WITH OTHER PEOPLE: 0
8. MOVING OR SPEAKING SO SLOWLY THAT OTHER PEOPLE COULD HAVE NOTICED. OR THE OPPOSITE, BEING SO FIGETY OR RESTLESS THAT YOU HAVE BEEN MOVING AROUND A LOT MORE THAN USUAL: 0
SUM OF ALL RESPONSES TO PHQ9 QUESTIONS 1 & 2: 0
SUM OF ALL RESPONSES TO PHQ QUESTIONS 1-9: 0
3. TROUBLE FALLING OR STAYING ASLEEP: 0
2. FEELING DOWN, DEPRESSED OR HOPELESS: 0
SUM OF ALL RESPONSES TO PHQ QUESTIONS 1-9: 0
5. POOR APPETITE OR OVEREATING: 0

## 2018-10-19 NOTE — PROGRESS NOTES
04/19/2019    Cervical cancer screen  06/07/2019    Creatinine monitoring  06/07/2019    Breast cancer screen  09/28/2020    Lipid screen  04/19/2023    DTaP/Tdap/Td vaccine (2 - Td) 03/08/2028    Hepatitis C screen  Completed    HIV screen  Completed

## 2018-10-25 ENCOUNTER — HOSPITAL ENCOUNTER (OUTPATIENT)
Dept: MAMMOGRAPHY | Age: 63
Discharge: HOME | End: 2018-10-27
Payer: MEDICARE

## 2018-10-25 DIAGNOSIS — Z00.00 HEALTHCARE MAINTENANCE: ICD-10-CM

## 2018-10-25 DIAGNOSIS — Z78.0 POST-MENOPAUSAL: ICD-10-CM

## 2018-10-25 PROCEDURE — 77081 DXA BONE DENSITY APPENDICULR: CPT

## 2018-11-02 ENCOUNTER — OFFICE VISIT (OUTPATIENT)
Dept: PODIATRY | Age: 63
End: 2018-11-02
Payer: COMMERCIAL

## 2018-11-02 VITALS
SYSTOLIC BLOOD PRESSURE: 114 MMHG | BODY MASS INDEX: 41.02 KG/M2 | HEIGHT: 71 IN | HEART RATE: 71 BPM | DIASTOLIC BLOOD PRESSURE: 73 MMHG | WEIGHT: 293 LBS

## 2018-11-02 DIAGNOSIS — B35.1 ONYCHOMYCOSIS: Primary | ICD-10-CM

## 2018-11-02 DIAGNOSIS — M79.675 PAIN IN TOES OF BOTH FEET: ICD-10-CM

## 2018-11-02 DIAGNOSIS — L84 CALLUS: ICD-10-CM

## 2018-11-02 DIAGNOSIS — M20.12 HAV (HALLUX ABDUCTO VALGUS), LEFT: ICD-10-CM

## 2018-11-02 DIAGNOSIS — M79.674 PAIN IN TOES OF BOTH FEET: ICD-10-CM

## 2018-11-02 DIAGNOSIS — M20.11 HAV (HALLUX ABDUCTO VALGUS), RIGHT: ICD-10-CM

## 2018-11-02 PROCEDURE — 99212 OFFICE O/P EST SF 10 MIN: CPT

## 2018-11-02 PROCEDURE — 11721 DEBRIDE NAIL 6 OR MORE: CPT | Performed by: PODIATRIST

## 2018-11-02 PROCEDURE — 11056 PARNG/CUTG B9 HYPRKR LES 2-4: CPT | Performed by: PODIATRIST

## 2018-11-02 NOTE — PROGRESS NOTES
Patient instructed to remove shoes and socks, instructed to sit in exam chair. Current PCP name is  and date of last visit 10/19/18. Do you have a follow up visit scheduled?   Yes    If yes the date is 11/30/18

## 2018-11-13 ENCOUNTER — OFFICE VISIT (OUTPATIENT)
Dept: BARIATRICS/WEIGHT MGMT | Age: 63
End: 2018-11-13
Payer: MEDICAID

## 2018-11-13 VITALS
WEIGHT: 293 LBS | BODY MASS INDEX: 41.02 KG/M2 | DIASTOLIC BLOOD PRESSURE: 72 MMHG | HEART RATE: 80 BPM | SYSTOLIC BLOOD PRESSURE: 122 MMHG | HEIGHT: 71 IN

## 2018-11-13 DIAGNOSIS — N18.30 CKD (CHRONIC KIDNEY DISEASE) STAGE 3, GFR 30-59 ML/MIN (HCC): ICD-10-CM

## 2018-11-13 DIAGNOSIS — G47.33 OSA (OBSTRUCTIVE SLEEP APNEA): ICD-10-CM

## 2018-11-13 DIAGNOSIS — I73.9 PERIPHERAL VASCULAR DISEASE (HCC): ICD-10-CM

## 2018-11-13 DIAGNOSIS — I10 ESSENTIAL HYPERTENSION: Primary | ICD-10-CM

## 2018-11-13 DIAGNOSIS — E78.00 PURE HYPERCHOLESTEROLEMIA: ICD-10-CM

## 2018-11-13 DIAGNOSIS — E66.01 MORBID OBESITY WITH BMI OF 50.0-59.9, ADULT (HCC): ICD-10-CM

## 2018-11-13 DIAGNOSIS — M19.90 ARTHRITIS: ICD-10-CM

## 2018-11-13 PROCEDURE — 99213 OFFICE O/P EST LOW 20 MIN: CPT | Performed by: NURSE PRACTITIONER

## 2018-11-30 ENCOUNTER — HOSPITAL ENCOUNTER (OUTPATIENT)
Age: 63
Setting detail: SPECIMEN
Discharge: HOME | End: 2018-11-30
Payer: COMMERCIAL

## 2018-11-30 ENCOUNTER — OFFICE VISIT (OUTPATIENT)
Dept: INTERNAL MEDICINE | Age: 63
End: 2018-11-30
Payer: MEDICAID

## 2018-11-30 VITALS
DIASTOLIC BLOOD PRESSURE: 85 MMHG | BODY MASS INDEX: 41.02 KG/M2 | WEIGHT: 293 LBS | HEIGHT: 71 IN | HEART RATE: 59 BPM | SYSTOLIC BLOOD PRESSURE: 147 MMHG

## 2018-11-30 DIAGNOSIS — E55.9 VITAMIN D DEFICIENCY: ICD-10-CM

## 2018-11-30 DIAGNOSIS — G56.03 BILATERAL CARPAL TUNNEL SYNDROME: ICD-10-CM

## 2018-11-30 DIAGNOSIS — I67.9 CEREBROVASCULAR DISEASE: ICD-10-CM

## 2018-11-30 DIAGNOSIS — I10 ESSENTIAL HYPERTENSION: Primary | ICD-10-CM

## 2018-11-30 DIAGNOSIS — E78.5 HYPERLIPIDEMIA, UNSPECIFIED HYPERLIPIDEMIA TYPE: ICD-10-CM

## 2018-11-30 DIAGNOSIS — L30.9 ECZEMA, UNSPECIFIED TYPE: ICD-10-CM

## 2018-11-30 DIAGNOSIS — G47.33 OSA (OBSTRUCTIVE SLEEP APNEA): ICD-10-CM

## 2018-11-30 DIAGNOSIS — M65.321 TRIGGER INDEX FINGER OF RIGHT HAND: ICD-10-CM

## 2018-11-30 DIAGNOSIS — D63.8 ANEMIA OF CHRONIC DISEASE: ICD-10-CM

## 2018-11-30 DIAGNOSIS — M17.12 PRIMARY OSTEOARTHRITIS OF LEFT KNEE: ICD-10-CM

## 2018-11-30 LAB — VITAMIN D 25-HYDROXY: 22.4 NG/ML (ref 30–100)

## 2018-11-30 PROCEDURE — 99211 OFF/OP EST MAY X REQ PHY/QHP: CPT | Performed by: INTERNAL MEDICINE

## 2018-11-30 PROCEDURE — 82306 VITAMIN D 25 HYDROXY: CPT

## 2018-11-30 PROCEDURE — 99214 OFFICE O/P EST MOD 30 MIN: CPT | Performed by: STUDENT IN AN ORGANIZED HEALTH CARE EDUCATION/TRAINING PROGRAM

## 2018-11-30 PROCEDURE — 36415 COLL VENOUS BLD VENIPUNCTURE: CPT

## 2018-11-30 RX ORDER — BETAMETHASONE DIPROPIONATE 0.5 MG/G
CREAM TOPICAL
Qty: 1 TUBE | Refills: 1 | Status: SHIPPED | OUTPATIENT
Start: 2018-11-30 | End: 2018-12-30

## 2018-11-30 RX ORDER — ATORVASTATIN CALCIUM 40 MG/1
40 TABLET, FILM COATED ORAL DAILY
Qty: 30 TABLET | Refills: 5 | Status: SHIPPED | OUTPATIENT
Start: 2018-11-30 | End: 2019-03-25 | Stop reason: ALTCHOICE

## 2018-11-30 RX ORDER — CLOPIDOGREL BISULFATE 75 MG/1
TABLET ORAL
Qty: 30 TABLET | Refills: 3 | Status: SHIPPED | OUTPATIENT
Start: 2018-11-30 | End: 2019-03-30 | Stop reason: SDUPTHER

## 2018-11-30 RX ORDER — IBUPROFEN 800 MG/1
800 TABLET ORAL EVERY 8 HOURS PRN
Qty: 180 TABLET | Refills: 1 | Status: SHIPPED | OUTPATIENT
Start: 2018-11-30 | End: 2018-12-30 | Stop reason: ALTCHOICE

## 2018-11-30 RX ORDER — HYDROCHLOROTHIAZIDE 25 MG/1
TABLET ORAL
Qty: 30 TABLET | Refills: 5 | Status: SHIPPED | OUTPATIENT
Start: 2018-11-30 | End: 2019-06-07 | Stop reason: SDUPTHER

## 2018-11-30 RX ORDER — ACETAMINOPHEN 325 MG/1
650 TABLET ORAL EVERY 4 HOURS PRN
Qty: 60 TABLET | Refills: 3 | Status: SHIPPED | OUTPATIENT
Start: 2018-11-30 | End: 2019-06-07 | Stop reason: SDUPTHER

## 2018-11-30 RX ORDER — ASPIRIN 81 MG/1
TABLET ORAL
Qty: 30 TABLET | Refills: 5 | Status: SHIPPED | OUTPATIENT
Start: 2018-11-30 | End: 2019-01-03 | Stop reason: SDUPTHER

## 2018-11-30 RX ORDER — LOSARTAN POTASSIUM 50 MG/1
TABLET ORAL
Qty: 30 TABLET | Refills: 2 | Status: SHIPPED | OUTPATIENT
Start: 2018-11-30 | End: 2019-03-01 | Stop reason: SDUPTHER

## 2018-11-30 RX ORDER — AMLODIPINE BESYLATE 10 MG/1
TABLET ORAL
Qty: 30 TABLET | Refills: 5 | Status: SHIPPED | OUTPATIENT
Start: 2018-11-30 | End: 2019-06-07 | Stop reason: SDUPTHER

## 2018-11-30 NOTE — PATIENT INSTRUCTIONS
[email or web address] and sign in to your [employer] account. Enter D030 in the [place] box to learn more about \"Carpal Tunnel Syndrome: Exercises. \"     If you do not have an account, please click on the \"Sign Up Now\" link. Current as of: November 29, 2017  Content Version: 11.8  © 1057-6621 Smartsheet. Care instructions adapted under license by [facility] ([facility]). If you have questions about a medical condition or this instruction, always ask your healthcare professional. [address] any warranty or liability for your use of this information. Patient Education        Learning About CPAP for Sleep Apnea  What is CPAP? CPAP is a small machine that you use at home every night while you sleep. It increases air pressure in your throat to keep your airway open. When you have sleep apnea, this can help you sleep better so you feel much better. CPAP stands for \"continuous positive airway pressure. \"  The CPAP machine will have one of the following:  · A mask that covers your nose and mouth  · Prongs that fit into your nose  · A mask that covers your nose only, the most common type. This type is called NCPAP. The N stands for \"nasal.\"  Why is it done? CPAP is usually the best treatment for obstructive sleep apnea. It is the first treatment choice and the most widely used. Your doctor may suggest CPAP if you have:  · Moderate to severe sleep apnea. · Sleep apnea and coronary artery disease (CAD). · Sleep apnea and heart failure. How does it help? · CPAP can help you have more normal sleep, so you feel less sleepy and more alert during the daytime. · CPAP may help keep heart failure or other heart problems from getting worse. · CPAP may help lower your blood pressure. · If you use CPAP, your bed partner may also sleep better because you are not snoring or restless. What are the side effects?   Some people who use CPAP have:  · A dry or stuffy nose and a sore throat. · Irritated skin on the face. · Sore eyes. · Bloating. If you have any of these problems, work with your doctor to fix them. Here are some things you can try:  · Be sure the mask or nasal prongs fit well. · See if your doctor can adjust the pressure of your CPAP. · If your nose is dry, try a humidifier. · If your nose is runny or stuffy, try decongestant medicine or a steroid nasal spray. Be safe with medicines. Read and follow all instructions on the label. Do not use the medicine longer than the label says. If these things do not help, you might try a different type of machine. Some machines have air pressure that adjusts on its own. Others have air pressures that are different when you breathe in than when you breathe out. This may reduce discomfort caused by too much pressure in your nose. Where can you learn more? Go to [email or web address] and sign in to your [employer] account. Enter K525 in the MyColorScreen box to learn more about \"Learning About CPAP for Sleep Apnea. \"     If you do not have an account, please click on the \"Sign Up Now\" link. Current as of: December 6, 2017  Content Version: 11.8  © 9534-0789 Healthwise, Incorporated. Care instructions adapted under license by [facility] ([facility]). If you have questions about a medical condition or this instruction, always ask your healthcare professional. [place] [address] any warranty or liability for your use of this information. Patient Education        DASH Diet: Care Instructions  Your Care Instructions    The DASH diet is an eating plan that can help lower your blood pressure. DASH stands for Dietary Approaches to Stop Hypertension. Hypertension is high blood pressure. The DASH diet focuses on eating foods that are high in calcium, potassium, and magnesium. These nutrients can lower blood pressure.  The foods that are highest in these nutrients are fruits, vegetables, 2017  Content Version: 11.8  © 0500-5899 Healthwise, Incorporated. Care instructions adapted under license by [facility] ([facility]). If you have questions about a medical condition or this instruction, always ask your healthcare professional. [address]  any warranty or liability for your use of this information. Printed script for    Wrist splint  signed and  Mailed  to pt       Follow-up appointment scheduled for 12/21/18    , AVS given to patient. Medications e-scribe to pharmacy of pt's choice. Labs given to patient, they will have them done before their next visit. S[employer] tv

## 2018-12-03 DIAGNOSIS — E55.9 VITAMIN D DEFICIENCY: Primary | ICD-10-CM

## 2018-12-03 RX ORDER — MELATONIN
1 DAILY
Qty: 90 TABLET | Refills: 1 | Status: SHIPPED | OUTPATIENT
Start: 2018-12-03 | End: 2018-12-21 | Stop reason: SDUPTHER

## 2018-12-04 ENCOUNTER — OFFICE VISIT (OUTPATIENT)
Dept: BARIATRICS/WEIGHT MGMT | Age: 63
End: 2018-12-04
Payer: MEDICAID

## 2018-12-04 VITALS
HEART RATE: 74 BPM | SYSTOLIC BLOOD PRESSURE: 126 MMHG | DIASTOLIC BLOOD PRESSURE: 74 MMHG | BODY MASS INDEX: 41.02 KG/M2 | WEIGHT: 293 LBS | HEIGHT: 71 IN

## 2018-12-04 DIAGNOSIS — E78.00 PURE HYPERCHOLESTEROLEMIA: ICD-10-CM

## 2018-12-04 DIAGNOSIS — I63.00 CEREBROVASCULAR ACCIDENT (CVA) DUE TO THROMBOSIS OF PRECEREBRAL ARTERY (HCC): ICD-10-CM

## 2018-12-04 DIAGNOSIS — N18.30 CKD (CHRONIC KIDNEY DISEASE) STAGE 3, GFR 30-59 ML/MIN (HCC): ICD-10-CM

## 2018-12-04 DIAGNOSIS — M19.90 ARTHRITIS: ICD-10-CM

## 2018-12-04 DIAGNOSIS — I73.9 PERIPHERAL VASCULAR DISEASE (HCC): ICD-10-CM

## 2018-12-04 DIAGNOSIS — I10 ESSENTIAL HYPERTENSION: Primary | ICD-10-CM

## 2018-12-04 DIAGNOSIS — G47.33 OSA (OBSTRUCTIVE SLEEP APNEA): ICD-10-CM

## 2018-12-04 DIAGNOSIS — E66.01 MORBID OBESITY WITH BMI OF 50.0-59.9, ADULT (HCC): ICD-10-CM

## 2018-12-04 PROCEDURE — 99213 OFFICE O/P EST LOW 20 MIN: CPT | Performed by: NURSE PRACTITIONER

## 2018-12-17 ENCOUNTER — OFFICE VISIT (OUTPATIENT)
Dept: NEUROLOGY | Age: 63
End: 2018-12-17
Payer: MEDICAID

## 2018-12-17 VITALS
SYSTOLIC BLOOD PRESSURE: 118 MMHG | BODY MASS INDEX: 41.02 KG/M2 | HEIGHT: 71 IN | WEIGHT: 293 LBS | DIASTOLIC BLOOD PRESSURE: 68 MMHG | HEART RATE: 62 BPM

## 2018-12-17 DIAGNOSIS — R29.898 LIMB WEAKNESS: ICD-10-CM

## 2018-12-17 DIAGNOSIS — Z86.73 HISTORY OF STROKE: ICD-10-CM

## 2018-12-17 DIAGNOSIS — G56.03 BILATERAL CARPAL TUNNEL SYNDROME: Primary | ICD-10-CM

## 2018-12-17 DIAGNOSIS — E66.01 OBESITY, MORBID, BMI 50 OR HIGHER (HCC): ICD-10-CM

## 2018-12-17 PROCEDURE — 99215 OFFICE O/P EST HI 40 MIN: CPT | Performed by: PSYCHIATRY & NEUROLOGY

## 2018-12-17 NOTE — PROGRESS NOTES
left frontal cortex near the vertex. Severe cystic encephalomalacia, left greater than right, vertex. MRA head, neck 5/22/2018  SEVERELY MOTION LIMITED EXAM. RECOMMEND FOLLOW-UP CTA. Narrowed left common carotid artery and occluded or nearly occluded left internal carotid artery. Decreased flow proximal right vertebral artery. CTA head, neck 6/7/2018  Complete or near complete occlusion of the left internal carotid artery. Relatively normal left common carotid artery. Mild stenosis proximal right internal carotid artery. Moderate stenosis proximal right vertebral artery. Proximal left vertebral artery not well visualized.     EMG/NCV 10/11/2018  CTS L>R    PMH       Diagnosis Date    Bleeding     while on aggrenox    Cataracts, bilateral     Cerebrovascular disease 2003,2011    cva    Chronic pain in left foot     CKD (chronic kidney disease) stage 3, GFR 30-59 ml/min ([facility])     GERD (gastroesophageal reflux disease)     Hx of blood clots     Hyperlipidemia     Hypertension     Iron (Fe) deficiency anemia     Nicotine abuse     Obesity     Osteoarthritis     Peripheral vascular disease ([facility] [address] 3/13/2014    Substance abuse ([facility])     cocaine, canabis    Thalassemia minor     Unspecified cerebral artery occlusion with cerebral infarction     Unspecified sleep apnea         PSH/SH/FMH: Remain unchanged since last visit  Hospital Outpatient Visit on 11/30/2018   Component Date Value Ref Range Status    Vit D, 25-Hydroxy 11/30/2018 22.4* 30.0 - 100.0 ng/mL Final    Comment:    Reference Range:  Vitamin D status         Range   Deficiency              <20 ng/mL   Mild Deficiency       20-30 ng/mL   Sufficiency           ng/mL   Toxicity               >100 ng/mL         ALLERGIES:   Allergies   Allergen Reactions    Duricef [Cefadroxil Monohydrate]     Fish-Derived Products     Pcn [Penicillins]     Tomato        MEDICATIONS:   Current Outpatient Prescriptions   Medication Sig Dispense Refill    Cholecalciferol (VITAMIN D3) 1000 units TABS Take 1 tablet by mouth daily 90 tablet 1    clopidogrel (PLAVIX) 75 MG tablet TAKE 1 TABLET DAILY 30 tablet 3    atorvastatin (LIPITOR) 40 MG tablet Take 1 tablet by mouth daily TAKE 1.5 TABLET DAILY 30 tablet 5    losartan (COZAAR) 50 MG tablet TAKE 1 TABLET DAILY 30 tablet 2    aspirin 81 MG EC tablet TAKE 1 TABLET DAILY 30 tablet 5    amLODIPine (NORVASC) 10 MG tablet TAKE 1 TABLET DAILY 30 tablet 5    hydrochlorothiazide (HYDRODIURIL) 25 MG tablet TAKE 1 TABLET IN THE MORNING 30 tablet 5    acetaminophen (AMINOFEN) 325 MG tablet Take 2 tablets by mouth every 4 hours as needed for Pain 60 tablet 3    ibuprofen (ADVIL;MOTRIN) 800 MG tablet Take 1 tablet by mouth every 8 hours as needed for Pain (trigger finger) 180 tablet 1    augmented betamethasone dipropionate (DIPROLENE AF) 0.05 % cream Apply topically 2 times daily. 1 Tube 1    Misc. Devices (WRIST BRACE) MISC Use as directed, Dx Carpal tunnel 2 each 0    ferrous sulfate 325 (65 Fe) MG tablet Take 1 tablet by mouth daily (with breakfast) 30 tablet 3    folic acid (FOLVITE) 1 MG tablet TAKE 1 TABLET DAILY 30 tablet 5     No current facility-administered medications for this visit.         LABS & TESTS:      Lab Results   Component Value Date    WBC 6.8 07/13/2018    HGB 10.8 (L) 07/13/2018    HCT 37.7 07/13/2018    MCV 73.1 (L) 07/13/2018     07/13/2018         REVIEW OF SYSTEMS:      CONSTITUTIONAL Weight change: absent, Appetite change: absent, Fatigue: absent    HEENT Ears: normal, Visual disturbance: absent    RESPIRATORY Shortness of breath: absent, Cough: absent    CARDIOVASCULAR Chest pain: absent, Leg swelling :present    GI Constipation: absent, Diarrhea: absent, Swallowing change: absent     Urinary frequency: absent, Urinary urgency: absent    MUSCULOSKELETAL Neck pain: absent, Back pain: absent, Stiffness: absent, Muscle pain: absent, Joint pain: absent Restless legs:

## 2018-12-21 ENCOUNTER — OFFICE VISIT (OUTPATIENT)
Dept: INTERNAL MEDICINE | Age: 63
End: 2018-12-21
Payer: COMMERCIAL

## 2018-12-21 VITALS
SYSTOLIC BLOOD PRESSURE: 138 MMHG | WEIGHT: 293 LBS | HEART RATE: 67 BPM | BODY MASS INDEX: 41.02 KG/M2 | DIASTOLIC BLOOD PRESSURE: 75 MMHG | HEIGHT: 71 IN

## 2018-12-21 DIAGNOSIS — I10 ESSENTIAL HYPERTENSION: ICD-10-CM

## 2018-12-21 DIAGNOSIS — E66.01 MORBID OBESITY WITH BMI OF 50.0-59.9, ADULT (HCC): ICD-10-CM

## 2018-12-21 DIAGNOSIS — E55.9 VITAMIN D DEFICIENCY: ICD-10-CM

## 2018-12-21 DIAGNOSIS — G47.33 OSA (OBSTRUCTIVE SLEEP APNEA): Primary | ICD-10-CM

## 2018-12-21 PROCEDURE — 99213 OFFICE O/P EST LOW 20 MIN: CPT | Performed by: STUDENT IN AN ORGANIZED HEALTH CARE EDUCATION/TRAINING PROGRAM

## 2018-12-21 PROCEDURE — 99211 OFF/OP EST MAY X REQ PHY/QHP: CPT | Performed by: INTERNAL MEDICINE

## 2018-12-21 RX ORDER — MELATONIN
1 DAILY
Qty: 90 TABLET | Refills: 3 | Status: SHIPPED | OUTPATIENT
Start: 2018-12-21 | End: 2019-05-23 | Stop reason: SDUPTHER

## 2018-12-21 NOTE — PROGRESS NOTES
Attending Physician Statement GE  I have discussed the care of Kristi Chiang, including pertinent history and exam findings with the resident. I have reviewed the key elements of all parts of the encounter with the resident.      Pt here to follow up on  Htn- continue current dose of losartan  TIBURCIO- CPAP was taken away because of non-compliance  Needs a new sleep study  Vit D def-she is on supplements  CTS- has appointment with ortho  Obesity- has appointment with weight management  H/o CVA- follows with neuro      Follow up in 3 months    Michael Del Real MD

## 2018-12-21 NOTE — PROGRESS NOTES
with BMI of 50.0-59.9, adult (HCC)    Arthritis    Chronic tension-type headache, not intractable    Vitamin D deficiency       ALLERGIES      Allergies   Allergen Reactions    Duricef [Cefadroxil Monohydrate]     Fish-Derived Products     Pcn [Penicillins]     Tomato          MEDICATIONS:      Current Outpatient Prescriptions on File Prior to Visit   Medication Sig Dispense Refill    clopidogrel (PLAVIX) 75 MG tablet TAKE 1 TABLET DAILY 30 tablet 3    atorvastatin (LIPITOR) 40 MG tablet Take 1 tablet by mouth daily TAKE 1.5 TABLET DAILY 30 tablet 5    losartan (COZAAR) 50 MG tablet TAKE 1 TABLET DAILY 30 tablet 2    aspirin 81 MG EC tablet TAKE 1 TABLET DAILY 30 tablet 5    amLODIPine (NORVASC) 10 MG tablet TAKE 1 TABLET DAILY 30 tablet 5    hydrochlorothiazide (HYDRODIURIL) 25 MG tablet TAKE 1 TABLET IN THE MORNING 30 tablet 5    acetaminophen (AMINOFEN) 325 MG tablet Take 2 tablets by mouth every 4 hours as needed for Pain 60 tablet 3    ibuprofen (ADVIL;MOTRIN) 800 MG tablet Take 1 tablet by mouth every 8 hours as needed for Pain (trigger finger) 180 tablet 1    augmented betamethasone dipropionate (DIPROLENE AF) 0.05 % cream Apply topically 2 times daily. 1 Tube 1    Misc. Devices (WRIST BRACE) MISC Use as directed, Dx Carpal tunnel 2 each 0    ferrous sulfate 325 (65 Fe) MG tablet Take 1 tablet by mouth daily (with breakfast) 30 tablet 3    folic acid (FOLVITE) 1 MG tablet TAKE 1 TABLET DAILY 30 tablet 5     No current facility-administered medications on file prior to visit. SOCIAL HISTORY    Reviewed and no change from previous record. Cathy Martinez  reports that she quit smoking about 7 years ago. She has a 70.00 pack-year smoking history.  She has never used smokeless tobacco.    FAMILY HISTORY:    Reviewed and No change from previous visit    REVIEW OF SYSTEMS:    ENT: negative  Respiratory: no cough, shortness of breath, or wheezing  Cardiovascular: no chest pain or dyspnea on exertion  Gastrointestinal: no abdominal pain, black or bloody stools  Neurological: no TIA or stroke symptoms  Endocrine: negative  Genito-Urinary: no dysuria, trouble voiding, or hematuria  Musculoskeletal: negative  Dermatological: negative    PHYSICAL EXAM:      Vitals:    12/21/18 0832   BP: 138/75   Pulse: 67     General appearance - alert, well appearing, and in no distress  Chest - clear to auscultation, no wheezes, rales or rhonchi, symmetric air entry  Heart - normal rate, regular rhythm, normal S1, S2, no murmurs, rubs, clicks or gallops  Abdomen - soft, nontender, nondistended, no masses or organomegaly  Neurological - alert, oriented, normal speech, no focal findings or movement disorder noted  Musculoskeletal - no joint tenderness, deformity or swelling  Extremities - peripheral pulses normal, no pedal edema, no clubbing or cyanosis  Skin - normal coloration and turgor, no rashes, no suspicious skin lesions noted    LABORATORY FINDINGS:    CBC:  Lab Results   Component Value Date    WBC 6.8 07/13/2018    HGB 10.8 07/13/2018     07/13/2018     04/05/2012     BMP:    Lab Results   Component Value Date     10/19/2018    K 3.7 10/19/2018    CL 99 10/19/2018    CO2 29 10/19/2018    BUN 16 10/19/2018    CREATININE 1.12 10/19/2018    GLUCOSE 76 10/19/2018    GLUCOSE 88 04/12/2012     HEMOGLOBIN A1C:   Lab Results   Component Value Date    LABA1C 5.7 04/19/2018     MICROALBUMIN URINE:   Lab Results   Component Value Date    MICROALBUR <12 05/09/2017     FASTING LIPID PANEL:  Lab Results   Component Value Date    CHOL 154 04/19/2018    HDL 43 04/19/2018    TRIG 128 04/19/2018     LIVER PROFILE:  Lab Results   Component Value Date    ALT 14 04/19/2018    AST 20 04/19/2018    PROT 7.4 04/19/2018    PROT 6.3 10/02/2011    BILITOT 0.51 04/19/2018    BILIDIR 0.10 05/05/2014    LABALBU 4.1 04/19/2018    LABALBU 3.5 10/03/2011      THYROID FUNCTION:   Lab Results   Component Value Date    TSH 1.76 05/02/2018      URINE ANALYSIS: No results found for: LABURIN  ASSESSMENT AND PLAN:    1. TIBURCIO (obstructive sleep apnea)  - will contact the [place] [facility] whether new referral is needed or not  She needs new referral for sleep study - orders in    2. Essential hypertension  - confirmed with pharmacy - was getting 25 mg losartan in bubble pack. Will get 50 mg losartan from now on.    3. Vitamin D deficiency    - Cholecalciferol (VITAMIN D3) 1000 units TABS; Take 1 tablet by mouth daily  Dispense: 90 tablet; Refill: 3    4. Morbid obesity with BMI of 50.0-59.9, adult ([facility] [address]  Follow up with bariatric surgery      FOLLOW UP:       1. Filomena received counseling on the following healthy behaviors: nutrition, exercise, medication adherence and tobacco cessation  2. Patient given educational materials - see patient instructions  3. Discussed use, benefit, and side effects of prescribed medications. Barriers to medication compliance addressed. All patient questions answered. Pt voiced understanding. 4.  Reviewed prior labs and health maintenance  5. Continue current medications, diet and exercise.   Follow up in 3 mths      Meg Mccormick MD, PGY-3 Internal Medicine Resident  [address] [facility]  12/21/2018  11:18 AM

## 2018-12-30 ENCOUNTER — HOSPITAL ENCOUNTER (EMERGENCY)
Age: 63
Discharge: HOME | End: 2018-12-30
Attending: EMERGENCY MEDICINE
Payer: COMMERCIAL

## 2018-12-30 ENCOUNTER — APPOINTMENT (OUTPATIENT)
Dept: GENERAL RADIOLOGY | Age: 63
End: 2018-12-30
Payer: COMMERCIAL

## 2018-12-30 ENCOUNTER — APPOINTMENT (OUTPATIENT)
Dept: CT IMAGING | Age: 63
End: 2018-12-30
Payer: COMMERCIAL

## 2018-12-30 VITALS
TEMPERATURE: 96.1 F | OXYGEN SATURATION: 98 % | HEART RATE: 76 BPM | DIASTOLIC BLOOD PRESSURE: 80 MMHG | RESPIRATION RATE: 18 BRPM | HEIGHT: 71 IN | WEIGHT: 293 LBS | BODY MASS INDEX: 41.02 KG/M2 | SYSTOLIC BLOOD PRESSURE: 162 MMHG

## 2018-12-30 DIAGNOSIS — M79.602 LEFT ARM PAIN: ICD-10-CM

## 2018-12-30 DIAGNOSIS — M25.552 LEFT HIP PAIN: ICD-10-CM
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
## 2018-12-30 DIAGNOSIS — M25.512 ACUTE PAIN OF LEFT SHOULDER: ICD-10-CM

## 2018-12-30 DIAGNOSIS — S39.92XA INJURY OF LOW BACK, INITIAL ENCOUNTER: ICD-10-CM

## 2018-12-30 PROCEDURE — 73090 X-RAY EXAM OF FOREARM: CPT

## 2018-12-30 PROCEDURE — 73552 X-RAY EXAM OF FEMUR 2/>: CPT

## 2018-12-30 PROCEDURE — 73030 X-RAY EXAM OF SHOULDER: CPT

## 2018-12-30 PROCEDURE — 6370000000 HC RX 637 (ALT 250 FOR IP): Performed by: PHYSICIAN ASSISTANT

## 2018-12-30 PROCEDURE — 70450 CT HEAD/BRAIN W/O DYE: CPT

## 2018-12-30 PROCEDURE — 73502 X-RAY EXAM HIP UNI 2-3 VIEWS: CPT

## 2018-12-30 PROCEDURE — 72100 X-RAY EXAM L-S SPINE 2/3 VWS: CPT

## 2018-12-30 PROCEDURE — G0382 LEV 3 HOSP TYPE B ED VISIT: HCPCS

## 2018-12-30 RX ORDER — HYDROCODONE BITARTRATE AND ACETAMINOPHEN 5; 325 MG/1; MG/1
1 TABLET ORAL ONCE
Status: COMPLETED | OUTPATIENT
Start: 2018-12-30 | End: 2018-12-30

## 2018-12-30 RX ORDER — IBUPROFEN 800 MG/1
800 TABLET ORAL EVERY 8 HOURS PRN
Qty: 20 TABLET | Refills: 0 | Status: ON HOLD | OUTPATIENT
Start: 2018-12-30 | End: 2019-08-21 | Stop reason: HOSPADM

## 2018-12-30 RX ORDER — HYDROCODONE BITARTRATE AND ACETAMINOPHEN 5; 325 MG/1; MG/1
1 TABLET ORAL EVERY 8 HOURS PRN
Qty: 9 TABLET | Refills: 0 | Status: SHIPPED | OUTPATIENT
Start: 2018-12-30 | End: 2019-01-02

## 2018-12-30 RX ADMIN — HYDROCODONE BITARTRATE AND ACETAMINOPHEN 1 TABLET: 5; 325 TABLET ORAL at 12:14

## 2018-12-30 ASSESSMENT — ENCOUNTER SYMPTOMS
COUGH: 0
BACK PAIN: 1
VOMITING: 0
NAUSEA: 0
WHEEZING: 0
ABDOMINAL PAIN: 0
COLOR CHANGE: 0

## 2018-12-30 ASSESSMENT — PAIN SCALES - GENERAL
PAINLEVEL_OUTOF10: 8
PAINLEVEL_OUTOF10: 8

## 2019-01-03 ENCOUNTER — OFFICE VISIT (OUTPATIENT)
Dept: ORTHOPEDIC SURGERY | Age: 64
End: 2019-01-03
Payer: MEDICAID

## 2019-01-03 VITALS — WEIGHT: 293 LBS | HEIGHT: 71 IN | BODY MASS INDEX: 41.02 KG/M2

## 2019-01-03 DIAGNOSIS — G56.91 NEUROPATHY OF UPPER EXTREMITY, RIGHT: ICD-10-CM

## 2019-01-03 DIAGNOSIS — I67.9 CEREBROVASCULAR DISEASE: ICD-10-CM

## 2019-01-03 DIAGNOSIS — M65.321 TRIGGER INDEX FINGER OF RIGHT HAND: ICD-10-CM

## 2019-01-03 DIAGNOSIS — G56.92 NEUROPATHY OF LEFT UPPER EXTREMITY: Primary | ICD-10-CM

## 2019-01-03 DIAGNOSIS — G56.03 BILATERAL CARPAL TUNNEL SYNDROME: ICD-10-CM

## 2019-01-03 PROCEDURE — 99204 OFFICE O/P NEW MOD 45 MIN: CPT | Performed by: ORTHOPAEDIC SURGERY

## 2019-01-03 RX ORDER — METHYLPREDNISOLONE 4 MG/1
TABLET ORAL
Qty: 1 KIT | Refills: 0 | Status: ON HOLD | OUTPATIENT
Start: 2019-01-03 | End: 2019-01-06 | Stop reason: HOSPADM

## 2019-01-03 ASSESSMENT — ENCOUNTER SYMPTOMS
CHEST TIGHTNESS: 0
VOMITING: 0
APNEA: 0
ABDOMINAL PAIN: 0
COUGH: 0

## 2019-01-04 ENCOUNTER — TELEPHONE (OUTPATIENT)
Dept: INTERNAL MEDICINE | Age: 64
End: 2019-01-04

## 2019-01-04 RX ORDER — FOLIC ACID 1 MG/1
TABLET ORAL
Qty: 30 TABLET | Refills: 5 | Status: SHIPPED | OUTPATIENT
Start: 2019-01-04 | End: 2019-05-23 | Stop reason: SDUPTHER

## 2019-01-04 RX ORDER — ASPIRIN 81 MG/1
TABLET ORAL
Qty: 30 TABLET | Refills: 5 | Status: SHIPPED | OUTPATIENT
Start: 2019-01-04 | End: 2019-06-07 | Stop reason: SDUPTHER

## 2019-01-05 ENCOUNTER — HOSPITAL ENCOUNTER (INPATIENT)
Age: 64
LOS: 1 days | Discharge: HOME | DRG: 194 | End: 2019-01-06
Attending: EMERGENCY MEDICINE | Admitting: INTERNAL MEDICINE
Payer: MEDICARE

## 2019-01-05 ENCOUNTER — APPOINTMENT (OUTPATIENT)
Dept: GENERAL RADIOLOGY | Age: 64
DRG: 194 | End: 2019-01-05
Payer: MEDICARE

## 2019-01-05 DIAGNOSIS — N18.30 CKD (CHRONIC KIDNEY DISEASE) STAGE 3, GFR 30-59 ML/MIN (HCC): ICD-10-CM

## 2019-01-05 DIAGNOSIS — J18.9 PNEUMONIA DUE TO ORGANISM: Primary | ICD-10-CM

## 2019-01-05 DIAGNOSIS — I10 ESSENTIAL HYPERTENSION: ICD-10-CM

## 2019-01-05 LAB
ABSOLUTE EOS #: <0.03 K/UL (ref 0–0.44)
ABSOLUTE IMMATURE GRANULOCYTE: 0.08 K/UL (ref 0–0.3)
ABSOLUTE LYMPH #: 1.86 K/UL (ref 1.1–3.7)
ABSOLUTE MONO #: 0.27 K/UL (ref 0.1–1.2)
ALBUMIN SERPL-MCNC: 4 G/DL (ref 3.5–5.2)
ALBUMIN/GLOBULIN RATIO: 1.1 (ref 1–2.5)
ALP BLD-CCNC: 105 U/L (ref 35–104)
ALT SERPL-CCNC: 12 U/L (ref 5–33)
ANION GAP SERPL CALCULATED.3IONS-SCNC: 17 MMOL/L (ref 9–17)
AST SERPL-CCNC: 16 U/L
BASOPHILS # BLD: 0 % (ref 0–2)
BASOPHILS ABSOLUTE: 0.04 K/UL (ref 0–0.2)
BILIRUB SERPL-MCNC: <0.1 MG/DL (ref 0.3–1.2)
BILIRUBIN DIRECT: <0.08 MG/DL
BILIRUBIN, INDIRECT: ABNORMAL MG/DL (ref 0–1)
BUN BLDV-MCNC: 22 MG/DL (ref 8–23)
BUN/CREAT BLD: ABNORMAL (ref 9–20)
CALCIUM SERPL-MCNC: 9.7 MG/DL (ref 8.6–10.4)
CHLORIDE BLD-SCNC: 101 MMOL/L (ref 98–107)
CO2: 23 MMOL/L (ref 20–31)
CREAT SERPL-MCNC: 1.1 MG/DL (ref 0.5–0.9)
DIFFERENTIAL TYPE: ABNORMAL
DIRECT EXAM: NORMAL
EOSINOPHILS RELATIVE PERCENT: 0 % (ref 1–4)
GFR AFRICAN AMERICAN: >60 ML/MIN
GFR NON-AFRICAN AMERICAN: 50 ML/MIN
GFR SERPL CREATININE-BSD FRML MDRD: ABNORMAL ML/MIN/{1.73_M2}
GFR SERPL CREATININE-BSD FRML MDRD: ABNORMAL ML/MIN/{1.73_M2}
GLOBULIN: ABNORMAL G/DL (ref 1.5–3.8)
GLUCOSE BLD-MCNC: 159 MG/DL (ref 70–99)
HCT VFR BLD CALC: 35.5 % (ref 36.3–47.1)
HEMOGLOBIN: 10.7 G/DL (ref 11.9–15.1)
IMMATURE GRANULOCYTES: 1 %
LYMPHOCYTES # BLD: 20 % (ref 24–43)
Lab: NORMAL
MCH RBC QN AUTO: 21.7 PG (ref 25.2–33.5)
MCHC RBC AUTO-ENTMCNC: 30.1 G/DL (ref 28.4–34.8)
MCV RBC AUTO: 71.9 FL (ref 82.6–102.9)
MONOCYTES # BLD: 3 % (ref 3–12)
NRBC AUTOMATED: 0 PER 100 WBC
PDW BLD-RTO: 17.1 % (ref 11.8–14.4)
PLATELET # BLD: ABNORMAL K/UL (ref 138–453)
PLATELET ESTIMATE: ABNORMAL
PLATELET, FLUORESCENCE: 363 K/UL (ref 138–453)
PLATELET, IMMATURE FRACTION: 1 % (ref 1.1–10.3)
PMV BLD AUTO: ABNORMAL FL (ref 8.1–13.5)
POTASSIUM SERPL-SCNC: 3.4 MMOL/L (ref 3.7–5.3)
RBC # BLD: 4.94 M/UL (ref 3.95–5.11)
RBC # BLD: ABNORMAL 10*6/UL
SEG NEUTROPHILS: 75 % (ref 36–65)
SEGMENTED NEUTROPHILS ABSOLUTE COUNT: 6.96 K/UL (ref 1.5–8.1)
SODIUM BLD-SCNC: 141 MMOL/L (ref 135–144)
SPECIMEN DESCRIPTION: NORMAL
STATUS: NORMAL
TOTAL PROTEIN: 7.6 G/DL (ref 6.4–8.3)
TROPONIN INTERP: ABNORMAL
TROPONIN T: ABNORMAL NG/ML
TROPONIN, HIGH SENSITIVITY: 18 NG/L (ref 0–14)
WBC # BLD: 9.2 K/UL (ref 3.5–11.3)
WBC # BLD: ABNORMAL 10*3/UL

## 2019-01-05 PROCEDURE — 85025 COMPLETE CBC W/AUTO DIFF WBC: CPT

## 2019-01-05 PROCEDURE — 80048 BASIC METABOLIC PNL TOTAL CA: CPT

## 2019-01-05 PROCEDURE — 6360000002 HC RX W HCPCS: Performed by: STUDENT IN AN ORGANIZED HEALTH CARE EDUCATION/TRAINING PROGRAM

## 2019-01-05 PROCEDURE — 96366 THER/PROPH/DIAG IV INF ADDON: CPT

## 2019-01-05 PROCEDURE — 6360000002 HC RX W HCPCS: Performed by: PHYSICIAN ASSISTANT

## 2019-01-05 PROCEDURE — 87449 NOS EACH ORGANISM AG IA: CPT

## 2019-01-05 PROCEDURE — 85055 RETICULATED PLATELET ASSAY: CPT

## 2019-01-05 PROCEDURE — 86738 MYCOPLASMA ANTIBODY: CPT

## 2019-01-05 PROCEDURE — 87040 BLOOD CULTURE FOR BACTERIA: CPT

## 2019-01-05 PROCEDURE — 93005 ELECTROCARDIOGRAM TRACING: CPT

## 2019-01-05 PROCEDURE — 36415 COLL VENOUS BLD VENIPUNCTURE: CPT

## 2019-01-05 PROCEDURE — 6370000000 HC RX 637 (ALT 250 FOR IP): Performed by: PHYSICIAN ASSISTANT

## 2019-01-05 PROCEDURE — 94760 N-INVAS EAR/PLS OXIMETRY 1: CPT

## 2019-01-05 PROCEDURE — 80076 HEPATIC FUNCTION PANEL: CPT

## 2019-01-05 PROCEDURE — 87899 AGENT NOS ASSAY W/OPTIC: CPT

## 2019-01-05 PROCEDURE — 6370000000 HC RX 637 (ALT 250 FOR IP): Performed by: STUDENT IN AN ORGANIZED HEALTH CARE EDUCATION/TRAINING PROGRAM

## 2019-01-05 PROCEDURE — 84484 ASSAY OF TROPONIN QUANT: CPT

## 2019-01-05 PROCEDURE — 6360000002 HC RX W HCPCS: Performed by: EMERGENCY MEDICINE

## 2019-01-05 PROCEDURE — 99285 EMERGENCY DEPT VISIT HI MDM: CPT

## 2019-01-05 PROCEDURE — 2580000003 HC RX 258: Performed by: STUDENT IN AN ORGANIZED HEALTH CARE EDUCATION/TRAINING PROGRAM

## 2019-01-05 PROCEDURE — 71046 X-RAY EXAM CHEST 2 VIEWS: CPT

## 2019-01-05 PROCEDURE — 96365 THER/PROPH/DIAG IV INF INIT: CPT

## 2019-01-05 PROCEDURE — 87804 INFLUENZA ASSAY W/OPTIC: CPT

## 2019-01-05 PROCEDURE — 1200000000 HC SEMI PRIVATE

## 2019-01-05 PROCEDURE — 94640 AIRWAY INHALATION TREATMENT: CPT

## 2019-01-05 RX ORDER — BENZONATATE 100 MG/1
200 CAPSULE ORAL ONCE
Status: COMPLETED | OUTPATIENT
Start: 2019-01-05 | End: 2019-01-05

## 2019-01-05 RX ORDER — ONDANSETRON 2 MG/ML
4 INJECTION INTRAMUSCULAR; INTRAVENOUS EVERY 8 HOURS PRN
Status: DISCONTINUED | OUTPATIENT
Start: 2019-01-05 | End: 2019-01-06 | Stop reason: HOSPADM

## 2019-01-05 RX ORDER — PREDNISONE 20 MG/1
20 TABLET ORAL DAILY
Status: DISCONTINUED | OUTPATIENT
Start: 2019-01-05 | End: 2019-01-06 | Stop reason: HOSPADM

## 2019-01-05 RX ORDER — ACETAMINOPHEN 325 MG/1
650 TABLET ORAL EVERY 4 HOURS PRN
Status: DISCONTINUED | OUTPATIENT
Start: 2019-01-05 | End: 2019-01-06 | Stop reason: HOSPADM

## 2019-01-05 RX ORDER — SODIUM CHLORIDE 0.9 % (FLUSH) 0.9 %
10 SYRINGE (ML) INJECTION PRN
Status: DISCONTINUED | OUTPATIENT
Start: 2019-01-05 | End: 2019-01-06 | Stop reason: HOSPADM

## 2019-01-05 RX ORDER — IPRATROPIUM BROMIDE AND ALBUTEROL SULFATE 2.5; .5 MG/3ML; MG/3ML
1 SOLUTION RESPIRATORY (INHALATION)
Status: DISCONTINUED | OUTPATIENT
Start: 2019-01-05 | End: 2019-01-05

## 2019-01-05 RX ORDER — CLOPIDOGREL BISULFATE 75 MG/1
75 TABLET ORAL DAILY
Status: DISCONTINUED | OUTPATIENT
Start: 2019-01-05 | End: 2019-01-06 | Stop reason: HOSPADM

## 2019-01-05 RX ORDER — HYDROCHLOROTHIAZIDE 25 MG/1
25 TABLET ORAL DAILY
Status: DISCONTINUED | OUTPATIENT
Start: 2019-01-05 | End: 2019-01-06

## 2019-01-05 RX ORDER — IPRATROPIUM BROMIDE AND ALBUTEROL SULFATE 2.5; .5 MG/3ML; MG/3ML
1 SOLUTION RESPIRATORY (INHALATION)
Status: DISCONTINUED | OUTPATIENT
Start: 2019-01-05 | End: 2019-01-06 | Stop reason: HOSPADM

## 2019-01-05 RX ORDER — ASPIRIN 81 MG/1
81 TABLET ORAL DAILY
Status: DISCONTINUED | OUTPATIENT
Start: 2019-01-05 | End: 2019-01-06 | Stop reason: HOSPADM

## 2019-01-05 RX ORDER — LOSARTAN POTASSIUM 50 MG/1
50 TABLET ORAL DAILY
Status: DISCONTINUED | OUTPATIENT
Start: 2019-01-05 | End: 2019-01-06 | Stop reason: HOSPADM

## 2019-01-05 RX ORDER — LEVOFLOXACIN 5 MG/ML
750 INJECTION, SOLUTION INTRAVENOUS ONCE
Status: COMPLETED | OUTPATIENT
Start: 2019-01-05 | End: 2019-01-05

## 2019-01-05 RX ORDER — LEVOFLOXACIN 750 MG/1
750 TABLET ORAL DAILY
Status: DISCONTINUED | OUTPATIENT
Start: 2019-01-06 | End: 2019-01-06 | Stop reason: HOSPADM

## 2019-01-05 RX ORDER — ALBUTEROL SULFATE 90 UG/1
2 AEROSOL, METERED RESPIRATORY (INHALATION)
Status: DISCONTINUED | OUTPATIENT
Start: 2019-01-05 | End: 2019-01-05

## 2019-01-05 RX ORDER — SODIUM CHLORIDE 0.9 % (FLUSH) 0.9 %
10 SYRINGE (ML) INJECTION EVERY 12 HOURS SCHEDULED
Status: DISCONTINUED | OUTPATIENT
Start: 2019-01-05 | End: 2019-01-06 | Stop reason: HOSPADM

## 2019-01-05 RX ORDER — AMLODIPINE BESYLATE 10 MG/1
10 TABLET ORAL DAILY
Status: DISCONTINUED | OUTPATIENT
Start: 2019-01-05 | End: 2019-01-06 | Stop reason: HOSPADM

## 2019-01-05 RX ORDER — ALBUTEROL SULFATE 2.5 MG/3ML
5 SOLUTION RESPIRATORY (INHALATION)
Status: DISCONTINUED | OUTPATIENT
Start: 2019-01-05 | End: 2019-01-05

## 2019-01-05 RX ORDER — ONDANSETRON 2 MG/ML
4 INJECTION INTRAMUSCULAR; INTRAVENOUS EVERY 6 HOURS PRN
Status: DISCONTINUED | OUTPATIENT
Start: 2019-01-05 | End: 2019-01-06 | Stop reason: HOSPADM

## 2019-01-05 RX ORDER — POTASSIUM CHLORIDE 20 MEQ/1
40 TABLET, EXTENDED RELEASE ORAL PRN
Status: DISCONTINUED | OUTPATIENT
Start: 2019-01-05 | End: 2019-01-06 | Stop reason: HOSPADM

## 2019-01-05 RX ORDER — ATORVASTATIN CALCIUM 40 MG/1
40 TABLET, FILM COATED ORAL DAILY
Status: DISCONTINUED | OUTPATIENT
Start: 2019-01-05 | End: 2019-01-06 | Stop reason: HOSPADM

## 2019-01-05 RX ORDER — POTASSIUM CHLORIDE 20MEQ/15ML
40 LIQUID (ML) ORAL PRN
Status: DISCONTINUED | OUTPATIENT
Start: 2019-01-05 | End: 2019-01-06 | Stop reason: HOSPADM

## 2019-01-05 RX ORDER — FLUTICASONE PROPIONATE 50 MCG
2 SPRAY, SUSPENSION (ML) NASAL DAILY
Status: DISCONTINUED | OUTPATIENT
Start: 2019-01-05 | End: 2019-01-06 | Stop reason: HOSPADM

## 2019-01-05 RX ORDER — POTASSIUM CHLORIDE 7.45 MG/ML
10 INJECTION INTRAVENOUS PRN
Status: DISCONTINUED | OUTPATIENT
Start: 2019-01-05 | End: 2019-01-06 | Stop reason: HOSPADM

## 2019-01-05 RX ADMIN — ASPIRIN 81 MG: 81 TABLET ORAL at 21:24

## 2019-01-05 RX ADMIN — LEVOFLOXACIN 750 MG: 5 INJECTION, SOLUTION INTRAVENOUS at 14:58

## 2019-01-05 RX ADMIN — CLOPIDOGREL 75 MG: 75 TABLET, FILM COATED ORAL at 21:24

## 2019-01-05 RX ADMIN — IPRATROPIUM BROMIDE 0.5 MG: 0.5 SOLUTION RESPIRATORY (INHALATION) at 13:09

## 2019-01-05 RX ADMIN — LOSARTAN POTASSIUM 50 MG: 50 TABLET, FILM COATED ORAL at 21:24

## 2019-01-05 RX ADMIN — AMLODIPINE BESYLATE 10 MG: 10 TABLET ORAL at 21:24

## 2019-01-05 RX ADMIN — POTASSIUM CHLORIDE 40 MEQ: 20 TABLET, EXTENDED RELEASE ORAL at 22:24

## 2019-01-05 RX ADMIN — DESMOPRESSIN ACETATE 40 MG: 0.2 TABLET ORAL at 21:24

## 2019-01-05 RX ADMIN — HYDROCHLOROTHIAZIDE 25 MG: 25 TABLET ORAL at 21:24

## 2019-01-05 RX ADMIN — ENOXAPARIN SODIUM 40 MG: 40 INJECTION SUBCUTANEOUS at 21:25

## 2019-01-05 RX ADMIN — Medication 10 ML: at 21:27

## 2019-01-05 RX ADMIN — ALBUTEROL SULFATE 10 MG: 5 SOLUTION RESPIRATORY (INHALATION) at 13:09

## 2019-01-05 RX ADMIN — PREDNISONE 20 MG: 20 TABLET ORAL at 21:25

## 2019-01-05 RX ADMIN — BENZONATATE 200 MG: 100 CAPSULE ORAL at 13:10

## 2019-01-05 RX ADMIN — IPRATROPIUM BROMIDE AND ALBUTEROL SULFATE 1 AMPULE: .5; 3 SOLUTION RESPIRATORY (INHALATION) at 21:29

## 2019-01-05 RX ADMIN — FLUTICASONE PROPIONATE 2 SPRAY: 50 SPRAY, METERED NASAL at 21:24

## 2019-01-05 ASSESSMENT — ENCOUNTER SYMPTOMS
CHEST TIGHTNESS: 0
STRIDOR: 0
VOICE CHANGE: 0
APNEA: 0
SHORTNESS OF BREATH: 1
WHEEZING: 0
NAUSEA: 0
EYES NEGATIVE: 1
DIARRHEA: 0
BACK PAIN: 0
WHEEZING: 1
SINUS PAIN: 0
COUGH: 1
RHINORRHEA: 0
ABDOMINAL PAIN: 0
FACIAL SWELLING: 0
SORE THROAT: 1
CHOKING: 0
VOMITING: 0
TROUBLE SWALLOWING: 0
SHORTNESS OF BREATH: 0
CONSTIPATION: 0
COLOR CHANGE: 0
ABDOMINAL DISTENTION: 0

## 2019-01-05 ASSESSMENT — PAIN DESCRIPTION - FREQUENCY: FREQUENCY: CONTINUOUS

## 2019-01-05 ASSESSMENT — PAIN DESCRIPTION - PAIN TYPE: TYPE: ACUTE PAIN

## 2019-01-05 ASSESSMENT — PAIN DESCRIPTION - ONSET: ONSET: ON-GOING

## 2019-01-05 ASSESSMENT — PAIN DESCRIPTION - PROGRESSION: CLINICAL_PROGRESSION: GRADUALLY WORSENING

## 2019-01-05 ASSESSMENT — PAIN DESCRIPTION - DESCRIPTORS: DESCRIPTORS: ACHING

## 2019-01-05 ASSESSMENT — PAIN SCALES - GENERAL
PAINLEVEL_OUTOF10: 0
PAINLEVEL_OUTOF10: 0
PAINLEVEL_OUTOF10: 6

## 2019-01-05 ASSESSMENT — PAIN DESCRIPTION - LOCATION: LOCATION: GENERALIZED

## 2019-01-06 VITALS
WEIGHT: 293 LBS | RESPIRATION RATE: 16 BRPM | HEART RATE: 71 BPM | OXYGEN SATURATION: 95 % | DIASTOLIC BLOOD PRESSURE: 67 MMHG | HEIGHT: 71 IN | SYSTOLIC BLOOD PRESSURE: 134 MMHG | BODY MASS INDEX: 41.02 KG/M2 | TEMPERATURE: 98.7 F

## 2019-01-06 PROBLEM — E87.20 LACTIC ACIDOSIS: Status: ACTIVE | Noted: 2019-01-06

## 2019-01-06 PROBLEM — G56.03 CARPAL TUNNEL SYNDROME, BILATERAL: Status: ACTIVE | Noted: 2019-01-06

## 2019-01-06 LAB
ABSOLUTE EOS #: <0.03 K/UL (ref 0–0.44)
ABSOLUTE IMMATURE GRANULOCYTE: 0.06 K/UL (ref 0–0.3)
ABSOLUTE LYMPH #: 0.88 K/UL (ref 1.1–3.7)
ABSOLUTE MONO #: 0.36 K/UL (ref 0.1–1.2)
ANION GAP SERPL CALCULATED.3IONS-SCNC: 16 MMOL/L (ref 9–17)
BASOPHILS # BLD: 0 % (ref 0–2)
BASOPHILS ABSOLUTE: <0.03 K/UL (ref 0–0.2)
BUN BLDV-MCNC: 22 MG/DL (ref 8–23)
BUN/CREAT BLD: ABNORMAL (ref 9–20)
CALCIUM SERPL-MCNC: 9.7 MG/DL (ref 8.6–10.4)
CHLORIDE BLD-SCNC: 97 MMOL/L (ref 98–107)
CO2: 26 MMOL/L (ref 20–31)
CREAT SERPL-MCNC: 0.98 MG/DL (ref 0.5–0.9)
DIFFERENTIAL TYPE: ABNORMAL
DIRECT EXAM: NORMAL
EOSINOPHILS RELATIVE PERCENT: 0 % (ref 1–4)
GFR AFRICAN AMERICAN: >60 ML/MIN
GFR NON-AFRICAN AMERICAN: 57 ML/MIN
GFR SERPL CREATININE-BSD FRML MDRD: ABNORMAL ML/MIN/{1.73_M2}
GFR SERPL CREATININE-BSD FRML MDRD: ABNORMAL ML/MIN/{1.73_M2}
GLUCOSE BLD-MCNC: 132 MG/DL (ref 70–99)
HCT VFR BLD CALC: 33.6 % (ref 36.3–47.1)
HEMOGLOBIN: 9.9 G/DL (ref 11.9–15.1)
IMMATURE GRANULOCYTES: 1 %
LACTIC ACID, WHOLE BLOOD: 2.5 MMOL/L (ref 0.7–2.1)
LACTIC ACID, WHOLE BLOOD: 3 MMOL/L (ref 0.7–2.1)
LACTIC ACID: ABNORMAL MMOL/L
LACTIC ACID: ABNORMAL MMOL/L
LYMPHOCYTES # BLD: 9 % (ref 24–43)
Lab: NORMAL
Lab: NORMAL
MCH RBC QN AUTO: 21.2 PG (ref 25.2–33.5)
MCHC RBC AUTO-ENTMCNC: 29.5 G/DL (ref 28.4–34.8)
MCV RBC AUTO: 71.8 FL (ref 82.6–102.9)
MONOCYTES # BLD: 4 % (ref 3–12)
NRBC AUTOMATED: 0 PER 100 WBC
PDW BLD-RTO: 17.2 % (ref 11.8–14.4)
PLATELET # BLD: 399 K/UL (ref 138–453)
PLATELET ESTIMATE: ABNORMAL
PMV BLD AUTO: 9.6 FL (ref 8.1–13.5)
POTASSIUM SERPL-SCNC: 4.2 MMOL/L (ref 3.7–5.3)
RBC # BLD: 4.68 M/UL (ref 3.95–5.11)
RBC # BLD: ABNORMAL 10*6/UL
SEG NEUTROPHILS: 87 % (ref 36–65)
SEGMENTED NEUTROPHILS ABSOLUTE COUNT: 8.63 K/UL (ref 1.5–8.1)
SODIUM BLD-SCNC: 139 MMOL/L (ref 135–144)
SPECIMEN DESCRIPTION: NORMAL
SPECIMEN DESCRIPTION: NORMAL
STATUS: NORMAL
STATUS: NORMAL
TROPONIN INTERP: ABNORMAL
TROPONIN T: ABNORMAL NG/ML
TROPONIN, HIGH SENSITIVITY: 17 NG/L (ref 0–14)
WBC # BLD: 10 K/UL (ref 3.5–11.3)
WBC # BLD: ABNORMAL 10*3/UL

## 2019-01-06 PROCEDURE — 83605 ASSAY OF LACTIC ACID: CPT

## 2019-01-06 PROCEDURE — 84484 ASSAY OF TROPONIN QUANT: CPT

## 2019-01-06 PROCEDURE — 2580000003 HC RX 258: Performed by: STUDENT IN AN ORGANIZED HEALTH CARE EDUCATION/TRAINING PROGRAM

## 2019-01-06 PROCEDURE — 85025 COMPLETE CBC W/AUTO DIFF WBC: CPT

## 2019-01-06 PROCEDURE — 36415 COLL VENOUS BLD VENIPUNCTURE: CPT

## 2019-01-06 PROCEDURE — 80048 BASIC METABOLIC PNL TOTAL CA: CPT

## 2019-01-06 PROCEDURE — 94640 AIRWAY INHALATION TREATMENT: CPT

## 2019-01-06 PROCEDURE — 97530 THERAPEUTIC ACTIVITIES: CPT

## 2019-01-06 PROCEDURE — 6360000002 HC RX W HCPCS: Performed by: STUDENT IN AN ORGANIZED HEALTH CARE EDUCATION/TRAINING PROGRAM

## 2019-01-06 PROCEDURE — 6370000000 HC RX 637 (ALT 250 FOR IP): Performed by: STUDENT IN AN ORGANIZED HEALTH CARE EDUCATION/TRAINING PROGRAM

## 2019-01-06 PROCEDURE — 99223 1ST HOSP IP/OBS HIGH 75: CPT | Performed by: INTERNAL MEDICINE

## 2019-01-06 PROCEDURE — 97162 PT EVAL MOD COMPLEX 30 MIN: CPT

## 2019-01-06 RX ORDER — SODIUM CHLORIDE 9 MG/ML
INJECTION, SOLUTION INTRAVENOUS CONTINUOUS
Status: DISCONTINUED | OUTPATIENT
Start: 2019-01-06 | End: 2019-01-06 | Stop reason: HOSPADM

## 2019-01-06 RX ORDER — BENZONATATE 100 MG/1
100 CAPSULE ORAL 3 TIMES DAILY PRN
Status: DISCONTINUED | OUTPATIENT
Start: 2019-01-06 | End: 2019-01-06 | Stop reason: HOSPADM

## 2019-01-06 RX ORDER — BENZONATATE 100 MG/1
100 CAPSULE ORAL 3 TIMES DAILY PRN
Qty: 21 CAPSULE | Refills: 0 | Status: SHIPPED | OUTPATIENT
Start: 2019-01-06 | End: 2019-01-13

## 2019-01-06 RX ORDER — LEVOFLOXACIN 750 MG/1
750 TABLET ORAL DAILY
Qty: 7 TABLET | Refills: 0 | Status: SHIPPED | OUTPATIENT
Start: 2019-01-07 | End: 2019-01-14

## 2019-01-06 RX ORDER — FLUTICASONE PROPIONATE 50 MCG
2 SPRAY, SUSPENSION (ML) NASAL DAILY
Qty: 1 BOTTLE | Refills: 3 | Status: SHIPPED | OUTPATIENT
Start: 2019-01-07 | End: 2019-03-25 | Stop reason: ALTCHOICE

## 2019-01-06 RX ORDER — PREDNISONE 20 MG/1
20 TABLET ORAL DAILY
Qty: 5 TABLET | Refills: 0 | Status: SHIPPED | OUTPATIENT
Start: 2019-01-07 | End: 2019-01-12

## 2019-01-06 RX ADMIN — LOSARTAN POTASSIUM 50 MG: 50 TABLET, FILM COATED ORAL at 08:50

## 2019-01-06 RX ADMIN — PREDNISONE 20 MG: 20 TABLET ORAL at 08:50

## 2019-01-06 RX ADMIN — AMLODIPINE BESYLATE 10 MG: 10 TABLET ORAL at 08:50

## 2019-01-06 RX ADMIN — IPRATROPIUM BROMIDE AND ALBUTEROL SULFATE 1 AMPULE: .5; 3 SOLUTION RESPIRATORY (INHALATION) at 09:29

## 2019-01-06 RX ADMIN — HYDROCHLOROTHIAZIDE 25 MG: 25 TABLET ORAL at 08:50

## 2019-01-06 RX ADMIN — ENOXAPARIN SODIUM 40 MG: 40 INJECTION SUBCUTANEOUS at 08:50

## 2019-01-06 RX ADMIN — SODIUM CHLORIDE: 9 INJECTION, SOLUTION INTRAVENOUS at 09:33

## 2019-01-06 RX ADMIN — CLOPIDOGREL 75 MG: 75 TABLET, FILM COATED ORAL at 08:50

## 2019-01-06 RX ADMIN — BENZONATATE 100 MG: 100 CAPSULE ORAL at 14:11

## 2019-01-06 RX ADMIN — Medication 10 ML: at 08:50

## 2019-01-06 RX ADMIN — ASPIRIN 81 MG: 81 TABLET ORAL at 08:50

## 2019-01-06 RX ADMIN — DESMOPRESSIN ACETATE 40 MG: 0.2 TABLET ORAL at 08:50

## 2019-01-06 RX ADMIN — IPRATROPIUM BROMIDE AND ALBUTEROL SULFATE 1 AMPULE: .5; 3 SOLUTION RESPIRATORY (INHALATION) at 12:25

## 2019-01-06 RX ADMIN — LEVOFLOXACIN 750 MG: 750 TABLET, FILM COATED ORAL at 08:50

## 2019-01-06 RX ADMIN — FLUTICASONE PROPIONATE 2 SPRAY: 50 SPRAY, METERED NASAL at 08:50

## 2019-01-06 ASSESSMENT — ENCOUNTER SYMPTOMS
ALLERGIC/IMMUNOLOGIC NEGATIVE: 1
CHOKING: 0
CHEST TIGHTNESS: 0
FACIAL SWELLING: 0
SHORTNESS OF BREATH: 0
EYES NEGATIVE: 1
COUGH: 0
ABDOMINAL DISTENTION: 0
ABDOMINAL PAIN: 0

## 2019-01-06 ASSESSMENT — PAIN SCALES - GENERAL: PAINLEVEL_OUTOF10: 0

## 2019-01-07 ENCOUNTER — TELEPHONE (OUTPATIENT)
Dept: INTERNAL MEDICINE | Age: 64
End: 2019-01-07

## 2019-01-07 LAB
EKG ATRIAL RATE: 75 BPM
EKG P AXIS: 57 DEGREES
EKG P-R INTERVAL: 166 MS
EKG Q-T INTERVAL: 410 MS
EKG QRS DURATION: 104 MS
EKG QTC CALCULATION (BAZETT): 457 MS
EKG R AXIS: 23 DEGREES
EKG T AXIS: 26 DEGREES
EKG VENTRICULAR RATE: 75 BPM
MYCOPLASMA PNEUMONIAE IGM: 0.66

## 2019-01-08 ENCOUNTER — OFFICE VISIT (OUTPATIENT)
Dept: BARIATRICS/WEIGHT MGMT | Age: 64
End: 2019-01-08
Payer: COMMERCIAL

## 2019-01-08 VITALS
WEIGHT: 293 LBS | DIASTOLIC BLOOD PRESSURE: 72 MMHG | SYSTOLIC BLOOD PRESSURE: 122 MMHG | HEIGHT: 71 IN | HEART RATE: 77 BPM | BODY MASS INDEX: 41.02 KG/M2

## 2019-01-08 DIAGNOSIS — I63.00 CEREBROVASCULAR ACCIDENT (CVA) DUE TO THROMBOSIS OF PRECEREBRAL ARTERY (HCC): ICD-10-CM

## 2019-01-08 DIAGNOSIS — G47.33 OSA (OBSTRUCTIVE SLEEP APNEA): ICD-10-CM

## 2019-01-08 DIAGNOSIS — N18.30 CKD (CHRONIC KIDNEY DISEASE) STAGE 3, GFR 30-59 ML/MIN (HCC): ICD-10-CM

## 2019-01-08 DIAGNOSIS — M19.90 ARTHRITIS: ICD-10-CM

## 2019-01-08 DIAGNOSIS — E66.01 OBESITY, CLASS III, BMI 40-49.9 (MORBID OBESITY) (HCC): ICD-10-CM

## 2019-01-08 DIAGNOSIS — I73.9 PERIPHERAL VASCULAR DISEASE (HCC): ICD-10-CM

## 2019-01-08 DIAGNOSIS — I10 ESSENTIAL HYPERTENSION: Primary | ICD-10-CM

## 2019-01-08 DIAGNOSIS — E78.00 PURE HYPERCHOLESTEROLEMIA: ICD-10-CM

## 2019-01-08 PROCEDURE — 99213 OFFICE O/P EST LOW 20 MIN: CPT | Performed by: NURSE PRACTITIONER

## 2019-01-09 RX ORDER — PNV NO.95/FERROUS FUM/FOLIC AC 28MG-0.8MG
TABLET ORAL
Qty: 30 TABLET | Refills: 0 | Status: SHIPPED | OUTPATIENT
Start: 2019-01-09 | End: 2019-02-04 | Stop reason: SDUPTHER

## 2019-01-11 LAB
CULTURE: NORMAL
CULTURE: NORMAL
Lab: NORMAL
Lab: NORMAL
SPECIMEN DESCRIPTION: NORMAL
SPECIMEN DESCRIPTION: NORMAL
STATUS: NORMAL
STATUS: NORMAL

## 2019-01-17 ENCOUNTER — OFFICE VISIT (OUTPATIENT)
Dept: INTERNAL MEDICINE | Age: 64
End: 2019-01-17
Payer: MEDICARE

## 2019-01-17 VITALS
DIASTOLIC BLOOD PRESSURE: 66 MMHG | BODY MASS INDEX: 41.95 KG/M2 | WEIGHT: 293 LBS | SYSTOLIC BLOOD PRESSURE: 112 MMHG | HEIGHT: 70 IN | HEART RATE: 81 BPM

## 2019-01-17 DIAGNOSIS — I10 ESSENTIAL HYPERTENSION: ICD-10-CM

## 2019-01-17 DIAGNOSIS — E66.01 OBESITY, CLASS III, BMI 40-49.9 (MORBID OBESITY) (HCC): ICD-10-CM

## 2019-01-17 DIAGNOSIS — J18.9 PNEUMONIA OF BOTH LOWER LOBES DUE TO INFECTIOUS ORGANISM: Primary | ICD-10-CM

## 2019-01-17 PROCEDURE — 99214 OFFICE O/P EST MOD 30 MIN: CPT | Performed by: INTERNAL MEDICINE

## 2019-01-17 PROCEDURE — 1111F DSCHRG MED/CURRENT MED MERGE: CPT | Performed by: INTERNAL MEDICINE

## 2019-01-17 PROCEDURE — 99211 OFF/OP EST MAY X REQ PHY/QHP: CPT | Performed by: INTERNAL MEDICINE

## 2019-01-18 ENCOUNTER — HOSPITAL ENCOUNTER (OUTPATIENT)
Dept: SLEEP CENTER | Age: 64
Discharge: HOME | End: 2019-01-20
Payer: MEDICAID

## 2019-01-18 DIAGNOSIS — G47.33 OSA (OBSTRUCTIVE SLEEP APNEA): ICD-10-CM

## 2019-01-18 PROCEDURE — 95810 POLYSOM 6/> YRS 4/> PARAM: CPT

## 2019-01-24 ENCOUNTER — OFFICE VISIT (OUTPATIENT)
Dept: ORTHOPEDIC SURGERY | Age: 64
End: 2019-01-24
Payer: COMMERCIAL

## 2019-01-24 VITALS — WEIGHT: 293 LBS | HEIGHT: 70 IN | BODY MASS INDEX: 41.95 KG/M2

## 2019-01-24 DIAGNOSIS — G56.03 BILATERAL CARPAL TUNNEL SYNDROME: ICD-10-CM

## 2019-01-24 DIAGNOSIS — M25.512 LEFT SHOULDER PAIN, UNSPECIFIED CHRONICITY: ICD-10-CM

## 2019-01-24 DIAGNOSIS — G56.92 NEUROPATHY OF LEFT UPPER EXTREMITY: Primary | ICD-10-CM

## 2019-01-24 PROCEDURE — 99213 OFFICE O/P EST LOW 20 MIN: CPT | Performed by: ORTHOPAEDIC SURGERY

## 2019-01-24 ASSESSMENT — ENCOUNTER SYMPTOMS
APNEA: 0
COUGH: 0
CHEST TIGHTNESS: 0
ABDOMINAL PAIN: 0
VOMITING: 0

## 2019-01-25 RX ORDER — METHYLPREDNISOLONE 4 MG/1
TABLET ORAL
Qty: 1 KIT | Refills: 0 | Status: SHIPPED | OUTPATIENT
Start: 2019-01-25 | End: 2019-01-30

## 2019-02-05 ENCOUNTER — OFFICE VISIT (OUTPATIENT)
Dept: BARIATRICS/WEIGHT MGMT | Age: 64
End: 2019-02-05
Payer: COMMERCIAL

## 2019-02-05 VITALS
HEART RATE: 76 BPM | WEIGHT: 293 LBS | HEIGHT: 70 IN | SYSTOLIC BLOOD PRESSURE: 124 MMHG | DIASTOLIC BLOOD PRESSURE: 74 MMHG | BODY MASS INDEX: 41.95 KG/M2

## 2019-02-05 DIAGNOSIS — I10 ESSENTIAL HYPERTENSION: Primary | ICD-10-CM

## 2019-02-05 DIAGNOSIS — I73.9 PERIPHERAL VASCULAR DISEASE (HCC): ICD-10-CM

## 2019-02-05 DIAGNOSIS — E78.00 PURE HYPERCHOLESTEROLEMIA: ICD-10-CM

## 2019-02-05 DIAGNOSIS — G47.33 OSA (OBSTRUCTIVE SLEEP APNEA): ICD-10-CM

## 2019-02-05 DIAGNOSIS — I63.00 CEREBROVASCULAR ACCIDENT (CVA) DUE TO THROMBOSIS OF PRECEREBRAL ARTERY (HCC): ICD-10-CM

## 2019-02-05 DIAGNOSIS — M19.90 ARTHRITIS: ICD-10-CM

## 2019-02-05 DIAGNOSIS — G44.229 CHRONIC TENSION-TYPE HEADACHE, NOT INTRACTABLE: ICD-10-CM

## 2019-02-05 DIAGNOSIS — E66.01 MORBID OBESITY WITH BMI OF 50.0-59.9, ADULT (HCC): ICD-10-CM

## 2019-02-05 DIAGNOSIS — N18.30 CKD (CHRONIC KIDNEY DISEASE) STAGE 3, GFR 30-59 ML/MIN (HCC): ICD-10-CM

## 2019-02-05 PROCEDURE — 99213 OFFICE O/P EST LOW 20 MIN: CPT | Performed by: NURSE PRACTITIONER

## 2019-02-05 RX ORDER — PNV NO.95/FERROUS FUM/FOLIC AC 28MG-0.8MG
TABLET ORAL
Qty: 30 TABLET | Refills: 0 | Status: SHIPPED | OUTPATIENT
Start: 2019-02-05 | End: 2019-03-01 | Stop reason: SDUPTHER

## 2019-02-07 ENCOUNTER — TELEPHONE (OUTPATIENT)
Dept: INTERNAL MEDICINE | Age: 64
End: 2019-02-07

## 2019-02-07 DIAGNOSIS — M19.90 ARTHRITIS: Primary | ICD-10-CM

## 2019-02-08 ENCOUNTER — OFFICE VISIT (OUTPATIENT)
Dept: PODIATRY | Age: 64
End: 2019-02-08
Payer: MEDICARE

## 2019-02-08 VITALS
HEIGHT: 71 IN | DIASTOLIC BLOOD PRESSURE: 84 MMHG | WEIGHT: 293 LBS | SYSTOLIC BLOOD PRESSURE: 132 MMHG | BODY MASS INDEX: 41.02 KG/M2

## 2019-02-08 DIAGNOSIS — M79.675 PAIN IN TOES OF BOTH FEET: ICD-10-CM

## 2019-02-08 DIAGNOSIS — M20.11 HAV (HALLUX ABDUCTO VALGUS), RIGHT: ICD-10-CM

## 2019-02-08 DIAGNOSIS — M79.674 PAIN IN TOES OF BOTH FEET: ICD-10-CM

## 2019-02-08 DIAGNOSIS — M20.12 HAV (HALLUX ABDUCTO VALGUS), LEFT: ICD-10-CM

## 2019-02-08 DIAGNOSIS — B35.1 ONYCHOMYCOSIS: Primary | ICD-10-CM

## 2019-02-08 PROCEDURE — 99212 OFFICE O/P EST SF 10 MIN: CPT | Performed by: PODIATRIST

## 2019-02-08 PROCEDURE — 99213 OFFICE O/P EST LOW 20 MIN: CPT | Performed by: PODIATRIST

## 2019-02-08 PROCEDURE — 11721 DEBRIDE NAIL 6 OR MORE: CPT | Performed by: PODIATRIST

## 2019-02-10 ENCOUNTER — HOSPITAL ENCOUNTER (EMERGENCY)
Age: 64
Discharge: HOME | End: 2019-02-10
Attending: EMERGENCY MEDICINE
Payer: MEDICARE

## 2019-02-10 ENCOUNTER — APPOINTMENT (OUTPATIENT)
Dept: GENERAL RADIOLOGY | Age: 64
End: 2019-02-10
Payer: MEDICARE

## 2019-02-10 VITALS
HEIGHT: 71 IN | BODY MASS INDEX: 41.02 KG/M2 | HEART RATE: 75 BPM | OXYGEN SATURATION: 97 % | RESPIRATION RATE: 16 BRPM | DIASTOLIC BLOOD PRESSURE: 81 MMHG | TEMPERATURE: 96.6 F | SYSTOLIC BLOOD PRESSURE: 127 MMHG | WEIGHT: 293 LBS

## 2019-02-10 DIAGNOSIS — M25.531 RIGHT WRIST PAIN: ICD-10-CM
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
## 2019-02-10 DIAGNOSIS — S80.01XA CONTUSION OF RIGHT KNEE, INITIAL ENCOUNTER: ICD-10-CM

## 2019-02-10 PROCEDURE — 73100 X-RAY EXAM OF WRIST: CPT

## 2019-02-10 PROCEDURE — 6370000000 HC RX 637 (ALT 250 FOR IP): Performed by: EMERGENCY MEDICINE

## 2019-02-10 PROCEDURE — 73090 X-RAY EXAM OF FOREARM: CPT

## 2019-02-10 PROCEDURE — 99283 EMERGENCY DEPT VISIT LOW MDM: CPT

## 2019-02-10 PROCEDURE — 73590 X-RAY EXAM OF LOWER LEG: CPT

## 2019-02-10 PROCEDURE — 73562 X-RAY EXAM OF KNEE 3: CPT

## 2019-02-10 RX ORDER — IBUPROFEN 800 MG/1
800 TABLET ORAL ONCE
Status: COMPLETED | OUTPATIENT
Start: 2019-02-10 | End: 2019-02-10

## 2019-02-10 RX ORDER — ACETAMINOPHEN 325 MG/1
650 TABLET ORAL ONCE
Status: COMPLETED | OUTPATIENT
Start: 2019-02-10 | End: 2019-02-10

## 2019-02-10 RX ADMIN — IBUPROFEN 800 MG: 800 TABLET, FILM COATED ORAL at 10:53

## 2019-02-10 RX ADMIN — ACETAMINOPHEN 650 MG: 325 TABLET ORAL at 10:53

## 2019-02-10 ASSESSMENT — ENCOUNTER SYMPTOMS
DIARRHEA: 0
NAUSEA: 0
COUGH: 0
ABDOMINAL PAIN: 0
CONSTIPATION: 0
SHORTNESS OF BREATH: 0
COLOR CHANGE: 1

## 2019-02-10 ASSESSMENT — PAIN SCALES - GENERAL
PAINLEVEL_OUTOF10: 5
PAINLEVEL_OUTOF10: 5

## 2019-02-14 LAB — STATUS: NORMAL

## 2019-02-15 ENCOUNTER — HOSPITAL ENCOUNTER (OUTPATIENT)
Dept: SLEEP CENTER | Age: 64
Discharge: HOME | End: 2019-02-17
Payer: MEDICAID

## 2019-02-15 DIAGNOSIS — G47.33 OSA (OBSTRUCTIVE SLEEP APNEA): ICD-10-CM

## 2019-02-15 PROCEDURE — 95811 POLYSOM 6/>YRS CPAP 4/> PARM: CPT | Performed by: PSYCHIATRY & NEUROLOGY

## 2019-02-15 PROCEDURE — 95811 POLYSOM 6/>YRS CPAP 4/> PARM: CPT

## 2019-02-16 VITALS — HEART RATE: 53 BPM | HEIGHT: 71 IN | WEIGHT: 293 LBS | RESPIRATION RATE: 14 BRPM | BODY MASS INDEX: 41.02 KG/M2

## 2019-03-01 ENCOUNTER — HOSPITAL ENCOUNTER (OUTPATIENT)
Age: 64
Discharge: HOME | End: 2019-03-03
Payer: MEDICARE

## 2019-03-01 ENCOUNTER — HOSPITAL ENCOUNTER (OUTPATIENT)
Age: 64
Discharge: HOME | End: 2019-03-01
Payer: MEDICARE

## 2019-03-01 ENCOUNTER — OFFICE VISIT (OUTPATIENT)
Dept: INTERNAL MEDICINE | Age: 64
End: 2019-03-01
Payer: MEDICARE

## 2019-03-01 ENCOUNTER — HOSPITAL ENCOUNTER (OUTPATIENT)
Dept: GENERAL RADIOLOGY | Age: 64
Discharge: HOME | End: 2019-03-03
Payer: MEDICARE

## 2019-03-01 VITALS
HEART RATE: 62 BPM | SYSTOLIC BLOOD PRESSURE: 121 MMHG | WEIGHT: 293 LBS | DIASTOLIC BLOOD PRESSURE: 81 MMHG | BODY MASS INDEX: 41.02 KG/M2 | HEIGHT: 71 IN

## 2019-03-01 DIAGNOSIS — J18.9 PNEUMONIA OF BOTH LOWER LOBES DUE TO INFECTIOUS ORGANISM: ICD-10-CM

## 2019-03-01 DIAGNOSIS — D50.9 MICROCYTIC ANEMIA: ICD-10-CM

## 2019-03-01 DIAGNOSIS — M19.90 ARTHRITIS: ICD-10-CM

## 2019-03-01 DIAGNOSIS — I10 ESSENTIAL HYPERTENSION: ICD-10-CM

## 2019-03-01 DIAGNOSIS — N18.30 CKD (CHRONIC KIDNEY DISEASE) STAGE 3, GFR 30-59 ML/MIN (HCC): ICD-10-CM

## 2019-03-01 DIAGNOSIS — G47.33 OSA (OBSTRUCTIVE SLEEP APNEA): ICD-10-CM

## 2019-03-01 DIAGNOSIS — I63.00 CEREBROVASCULAR ACCIDENT (CVA) DUE TO THROMBOSIS OF PRECEREBRAL ARTERY (HCC): ICD-10-CM

## 2019-03-01 DIAGNOSIS — E66.01 OBESITY, CLASS III, BMI 40-49.9 (MORBID OBESITY) (HCC): Primary | ICD-10-CM

## 2019-03-01 DIAGNOSIS — M17.12 PRIMARY OSTEOARTHRITIS OF LEFT KNEE: ICD-10-CM

## 2019-03-01 PROBLEM — E87.20 LACTIC ACIDOSIS: Status: RESOLVED | Noted: 2019-01-06 | Resolved: 2019-03-01

## 2019-03-01 LAB
ABSOLUTE EOS #: 0.23 K/UL (ref 0–0.44)
ABSOLUTE IMMATURE GRANULOCYTE: <0.03 K/UL (ref 0–0.3)
ABSOLUTE LYMPH #: 1.44 K/UL (ref 1.1–3.7)
ABSOLUTE MONO #: 0.46 K/UL (ref 0.1–1.2)
ABSOLUTE RETIC #: 0.09 M/UL (ref 0.03–0.08)
BASOPHILS # BLD: 1 % (ref 0–2)
BASOPHILS ABSOLUTE: 0.04 K/UL (ref 0–0.2)
DIFFERENTIAL TYPE: ABNORMAL
EOSINOPHILS RELATIVE PERCENT: 4 % (ref 1–4)
HCT VFR BLD CALC: 34.5 % (ref 36.3–47.1)
HEMOGLOBIN: 10.5 G/DL (ref 11.9–15.1)
IMMATURE GRANULOCYTES: 0 %
IMMATURE RETIC FRACT: 11.7 % (ref 2.7–18.3)
LYMPHOCYTES # BLD: 24 % (ref 24–43)
MCH RBC QN AUTO: 22.4 PG (ref 25.2–33.5)
MCHC RBC AUTO-ENTMCNC: 30.4 G/DL (ref 28.4–34.8)
MCV RBC AUTO: 73.6 FL (ref 82.6–102.9)
MONOCYTES # BLD: 8 % (ref 3–12)
NRBC AUTOMATED: 0 PER 100 WBC
PDW BLD-RTO: 18.4 % (ref 11.8–14.4)
PLATELET # BLD: 367 K/UL (ref 138–453)
PLATELET ESTIMATE: ABNORMAL
PMV BLD AUTO: 9.6 FL (ref 8.1–13.5)
RBC # BLD: 4.69 M/UL (ref 3.95–5.11)
RBC # BLD: ABNORMAL 10*6/UL
RETIC %: 1.9 % (ref 0.5–1.9)
RETIC HEMOGLOBIN: 25.3 PG (ref 28.2–35.7)
SEG NEUTROPHILS: 63 % (ref 36–65)
SEGMENTED NEUTROPHILS ABSOLUTE COUNT: 3.73 K/UL (ref 1.5–8.1)
STATUS: NORMAL
WBC # BLD: 5.9 K/UL (ref 3.5–11.3)
WBC # BLD: ABNORMAL 10*3/UL

## 2019-03-01 PROCEDURE — 71046 X-RAY EXAM CHEST 2 VIEWS: CPT

## 2019-03-01 PROCEDURE — 83020 HEMOGLOBIN ELECTROPHORESIS: CPT

## 2019-03-01 PROCEDURE — 99211 OFF/OP EST MAY X REQ PHY/QHP: CPT | Performed by: INTERNAL MEDICINE

## 2019-03-01 PROCEDURE — 36415 COLL VENOUS BLD VENIPUNCTURE: CPT

## 2019-03-01 PROCEDURE — 85025 COMPLETE CBC W/AUTO DIFF WBC: CPT

## 2019-03-01 PROCEDURE — 99213 OFFICE O/P EST LOW 20 MIN: CPT | Performed by: STUDENT IN AN ORGANIZED HEALTH CARE EDUCATION/TRAINING PROGRAM

## 2019-03-01 PROCEDURE — 85045 AUTOMATED RETICULOCYTE COUNT: CPT

## 2019-03-04 LAB
HGB ELECTROPHORESIS INTERP: NORMAL
PATHOLOGIST REVIEW: NORMAL
PATHOLOGIST: NORMAL
SURGICAL PATHOLOGY REPORT: NORMAL

## 2019-03-08 RX ORDER — PNV NO.95/FERROUS FUM/FOLIC AC 28MG-0.8MG
TABLET ORAL
Qty: 30 TABLET | Refills: 0 | Status: SHIPPED | OUTPATIENT
Start: 2019-03-08 | End: 2019-03-30 | Stop reason: SDUPTHER

## 2019-03-08 RX ORDER — LOSARTAN POTASSIUM 50 MG/1
TABLET ORAL
Qty: 30 TABLET | Refills: 2 | Status: SHIPPED | OUTPATIENT
Start: 2019-03-08 | End: 2019-05-23 | Stop reason: SDUPTHER

## 2019-03-18 ENCOUNTER — TELEPHONE (OUTPATIENT)
Dept: INTERNAL MEDICINE | Age: 64
End: 2019-03-18

## 2019-03-18 DIAGNOSIS — D50.9 IRON DEFICIENCY ANEMIA, UNSPECIFIED IRON DEFICIENCY ANEMIA TYPE: Primary | ICD-10-CM

## 2019-03-25 ENCOUNTER — HOSPITAL ENCOUNTER (OUTPATIENT)
Facility: MEDICAL CENTER | Age: 64
Discharge: HOME | End: 2019-03-25
Payer: MEDICARE

## 2019-03-25 ENCOUNTER — INITIAL CONSULT (OUTPATIENT)
Dept: ONCOLOGY | Age: 64
End: 2019-03-25
Payer: MEDICAID

## 2019-03-25 ENCOUNTER — TELEPHONE (OUTPATIENT)
Dept: ONCOLOGY | Age: 64
End: 2019-03-25

## 2019-03-25 VITALS
TEMPERATURE: 98 F | SYSTOLIC BLOOD PRESSURE: 140 MMHG | HEART RATE: 56 BPM | RESPIRATION RATE: 24 BRPM | HEIGHT: 68 IN | WEIGHT: 293 LBS | BODY MASS INDEX: 44.41 KG/M2 | DIASTOLIC BLOOD PRESSURE: 91 MMHG

## 2019-03-25 DIAGNOSIS — E61.1 IRON DEFICIENCY: ICD-10-CM

## 2019-03-25 DIAGNOSIS — D64.9 ANEMIA, UNSPECIFIED TYPE: ICD-10-CM

## 2019-03-25 DIAGNOSIS — D50.9 MICROCYTIC ANEMIA: ICD-10-CM

## 2019-03-25 DIAGNOSIS — E66.01 MORBID OBESITY WITH BMI OF 50.0-59.9, ADULT (HCC): ICD-10-CM

## 2019-03-25 DIAGNOSIS — D50.9 MICROCYTIC ANEMIA: Primary | ICD-10-CM

## 2019-03-25 DIAGNOSIS — G47.33 OSA (OBSTRUCTIVE SLEEP APNEA): ICD-10-CM

## 2019-03-25 DIAGNOSIS — E55.9 VITAMIN D DEFICIENCY: ICD-10-CM

## 2019-03-25 LAB
ABSOLUTE EOS #: 0.28 K/UL (ref 0–0.4)
ABSOLUTE IMMATURE GRANULOCYTE: ABNORMAL K/UL (ref 0–0.3)
ABSOLUTE LYMPH #: 1.65 K/UL (ref 1–4.8)
ABSOLUTE MONO #: 0.5 K/UL (ref 0.2–0.8)
ABSOLUTE RETIC #: 0.12 M/UL (ref 0.02–0.1)
ALBUMIN SERPL-MCNC: 4.1 G/DL (ref 3.5–5.2)
ALBUMIN/GLOBULIN RATIO: ABNORMAL (ref 1–2.5)
ALP BLD-CCNC: 102 U/L (ref 35–104)
ALT SERPL-CCNC: 11 U/L (ref 5–33)
ANION GAP SERPL CALCULATED.3IONS-SCNC: 11 MMOL/L (ref 9–17)
AST SERPL-CCNC: 14 U/L
BASOPHILS # BLD: 1 %
BASOPHILS ABSOLUTE: 0.06 K/UL (ref 0–0.2)
BILIRUB SERPL-MCNC: 0.29 MG/DL (ref 0.3–1.2)
BUN BLDV-MCNC: 22 MG/DL (ref 8–23)
BUN/CREAT BLD: 21 (ref 9–20)
CALCIUM SERPL-MCNC: 9.8 MG/DL (ref 8.6–10.4)
CHLORIDE BLD-SCNC: 102 MMOL/L (ref 98–107)
CO2: 29 MMOL/L (ref 20–31)
CREAT SERPL-MCNC: 1.05 MG/DL (ref 0.5–0.9)
DIFFERENTIAL TYPE: ABNORMAL
EOSINOPHILS RELATIVE PERCENT: 5 % (ref 1–4)
FERRITIN: 461 UG/L (ref 13–150)
FREE KAPPA/LAMBDA RATIO: 1.37 (ref 0.26–1.65)
GFR AFRICAN AMERICAN: >60 ML/MIN
GFR NON-AFRICAN AMERICAN: 53 ML/MIN
GFR SERPL CREATININE-BSD FRML MDRD: ABNORMAL ML/MIN/{1.73_M2}
GFR SERPL CREATININE-BSD FRML MDRD: ABNORMAL ML/MIN/{1.73_M2}
GLUCOSE BLD-MCNC: 89 MG/DL (ref 70–99)
HAPTOGLOBIN: 202 MG/DL (ref 30–200)
HCT VFR BLD CALC: 33.5 % (ref 36–46)
HEMOGLOBIN: 10.6 G/DL (ref 12–16)
IMMATURE GRANULOCYTES: ABNORMAL %
IMMATURE RETIC FRACT: ABNORMAL %
IRON SATURATION: 19 % (ref 20–55)
IRON: 43 UG/DL (ref 37–145)
KAPPA FREE LIGHT CHAINS QNT: 2.12 MG/DL (ref 0.37–1.94)
LACTATE DEHYDROGENASE: 200 U/L (ref 135–214)
LAMBDA FREE LIGHT CHAINS QNT: 1.55 MG/DL (ref 0.57–2.63)
LYMPHOCYTES # BLD: 30 % (ref 24–44)
MCH RBC QN AUTO: 22.5 PG (ref 26–34)
MCHC RBC AUTO-ENTMCNC: 31.6 G/DL (ref 31–37)
MCV RBC AUTO: 71.2 FL (ref 80–100)
MONOCYTES # BLD: 9 % (ref 1–7)
MORPHOLOGY: ABNORMAL
NRBC AUTOMATED: ABNORMAL PER 100 WBC
PDW BLD-RTO: 16 % (ref 11.5–14.5)
PLATELET # BLD: 414 K/UL (ref 130–400)
PLATELET ESTIMATE: ABNORMAL
PMV BLD AUTO: ABNORMAL FL (ref 6–12)
POTASSIUM SERPL-SCNC: 4.2 MMOL/L (ref 3.7–5.3)
RBC # BLD: 4.71 M/UL (ref 4–5.2)
RBC # BLD: ABNORMAL 10*6/UL
RETIC %: 2.6 % (ref 0.5–2)
RETIC HEMOGLOBIN: ABNORMAL PG (ref 28.2–35.7)
SEG NEUTROPHILS: 55 % (ref 36–66)
SEGMENTED NEUTROPHILS ABSOLUTE COUNT: 3.01 K/UL (ref 1.8–7.7)
SODIUM BLD-SCNC: 142 MMOL/L (ref 135–144)
TOTAL IRON BINDING CAPACITY: 228 UG/DL (ref 250–450)
TOTAL PROTEIN: 7.3 G/DL (ref 6.4–8.3)
UNSATURATED IRON BINDING CAPACITY: 185 UG/DL (ref 112–347)
WBC # BLD: 5.5 K/UL (ref 3.5–11)
WBC # BLD: ABNORMAL 10*3/UL

## 2019-03-25 PROCEDURE — 85045 AUTOMATED RETICULOCYTE COUNT: CPT

## 2019-03-25 PROCEDURE — 84165 PROTEIN E-PHORESIS SERUM: CPT

## 2019-03-25 PROCEDURE — 83615 LACTATE (LD) (LDH) ENZYME: CPT

## 2019-03-25 PROCEDURE — 36415 COLL VENOUS BLD VENIPUNCTURE: CPT

## 2019-03-25 PROCEDURE — 82728 ASSAY OF FERRITIN: CPT

## 2019-03-25 PROCEDURE — 81269 HBA1/HBA2 GENE DUP/DEL VRNTS: CPT

## 2019-03-25 PROCEDURE — 83010 ASSAY OF HAPTOGLOBIN QUANT: CPT

## 2019-03-25 PROCEDURE — 84155 ASSAY OF PROTEIN SERUM: CPT

## 2019-03-25 PROCEDURE — 83540 ASSAY OF IRON: CPT

## 2019-03-25 PROCEDURE — 83655 ASSAY OF LEAD: CPT

## 2019-03-25 PROCEDURE — 99204 OFFICE O/P NEW MOD 45 MIN: CPT | Performed by: INTERNAL MEDICINE

## 2019-03-25 PROCEDURE — 80053 COMPREHEN METABOLIC PANEL: CPT

## 2019-03-25 PROCEDURE — 83883 ASSAY NEPHELOMETRY NOT SPEC: CPT

## 2019-03-25 PROCEDURE — 86334 IMMUNOFIX E-PHORESIS SERUM: CPT

## 2019-03-25 PROCEDURE — 83550 IRON BINDING TEST: CPT

## 2019-03-25 PROCEDURE — 85025 COMPLETE CBC W/AUTO DIFF WBC: CPT

## 2019-03-26 LAB
ALBUMIN (CALCULATED): 4.3 G/DL (ref 3.2–5.2)
ALBUMIN PERCENT: 62 % (ref 45–65)
ALPHA 1 PERCENT: 3 % (ref 3–6)
ALPHA 2 PERCENT: 12 % (ref 6–13)
ALPHA-1-GLOBULIN: 0.2 G/DL (ref 0.1–0.4)
ALPHA-2-GLOBULIN: 0.9 G/DL (ref 0.5–0.9)
BETA GLOBULIN: 0.8 G/DL (ref 0.5–1.1)
BETA PERCENT: 11 % (ref 11–19)
GAMMA GLOBULIN %: 12 % (ref 9–20)
GAMMA GLOBULIN: 0.8 G/DL (ref 0.5–1.5)
LEAD BLOOD: 1 UG/DL (ref 0–4)
PATHOLOGIST: NORMAL
PATHOLOGIST: NORMAL
PROTEIN ELECTROPHORESIS, SERUM: NORMAL
SERUM IFX INTERP: NORMAL
TOTAL PROT. SUM,%: 100 % (ref 98–102)
TOTAL PROT. SUM: 7 G/DL (ref 6.3–8.2)
TOTAL PROTEIN: 7 G/DL (ref 6.4–8.3)

## 2019-03-30 DIAGNOSIS — I67.9 CEREBROVASCULAR DISEASE: ICD-10-CM

## 2019-04-01 NOTE — TELEPHONE ENCOUNTER
Escribe request for Plavix . Please escribe if appropriate      Next Visit Date:  4/19/2019     Health Maintenance   Topic Date Due    Colon cancer screen colonoscopy  05/30/2018    Shingles Vaccine (3 of 3) 08/21/2018    A1C test (Diabetic or Prediabetic)  04/19/2019    Low dose CT lung screening  05/10/2019    Cervical cancer screen  06/07/2019    Potassium monitoring  03/25/2020    Creatinine monitoring  03/25/2020    Breast cancer screen  09/28/2020    Pneumococcal 65+ years Vaccine (2 of 2 - PPSV23) 12/20/2021    Lipid screen  04/19/2023    DTaP/Tdap/Td vaccine (2 - Td) 03/08/2028    DEXA (modify frequency per FRAX score)  Completed    Flu vaccine  Completed    Hepatitis C screen  Completed    HIV screen  Completed       Hemoglobin A1C (%)   Date Value   04/19/2018 5.7   06/08/2016 5.4   06/19/2014 5.5             ( goal A1C is < 7)   Microalb/Crt.  Ratio (mcg/mg creat)   Date Value   05/09/2017 6     LDL Cholesterol (mg/dL)   Date Value   04/19/2018 85       (goal LDL is <100)   AST (U/L)   Date Value   03/25/2019 14     ALT (U/L)   Date Value   03/25/2019 11     BUN (mg/dL)   Date Value   03/25/2019 22     BP Readings from Last 3 Encounters:   03/25/19 (!) 140/91   03/01/19 121/81   02/10/19 127/81          (goal 120/80)          Patient Active Problem List:     Essential hypertension     Pure hypercholesterolemia     Cerebrovascular accident (CVA), 2011, no residual deficit (HCC)     CKD (chronic kidney disease) stage 3, GFR 30-59 ml/min (HCC)     Peripheral vascular disease (HCC)     TIBURCIO (obstructive sleep apnea)     Morbid obesity with BMI of 50.0-59.9, adult (HCC)     Primary osteoarthritis of left knee     Chronic tension-type headache, not intractable     Vitamin D deficiency     Carpal tunnel syndrome, bilateral-not on medication due to kidney disease

## 2019-04-03 LAB
SEND OUT REPORT: NORMAL
TEST NAME: NORMAL

## 2019-04-05 RX ORDER — PNV NO.95/FERROUS FUM/FOLIC AC 28MG-0.8MG
TABLET ORAL
Qty: 30 TABLET | Refills: 0 | Status: SHIPPED | OUTPATIENT
Start: 2019-04-05 | End: 2019-04-26 | Stop reason: SDUPTHER

## 2019-04-05 RX ORDER — CLOPIDOGREL BISULFATE 75 MG/1
TABLET ORAL
Qty: 30 TABLET | Refills: 3 | Status: SHIPPED | OUTPATIENT
Start: 2019-04-05 | End: 2019-06-07 | Stop reason: SDUPTHER

## 2019-04-09 ENCOUNTER — HOSPITAL ENCOUNTER (OUTPATIENT)
Facility: MEDICAL CENTER | Age: 64
End: 2019-04-09
Payer: MEDICAID

## 2019-04-09 ENCOUNTER — OFFICE VISIT (OUTPATIENT)
Dept: BARIATRICS/WEIGHT MGMT | Age: 64
End: 2019-04-09
Payer: MEDICAID

## 2019-04-09 VITALS
HEIGHT: 68 IN | HEART RATE: 72 BPM | BODY MASS INDEX: 44.41 KG/M2 | DIASTOLIC BLOOD PRESSURE: 78 MMHG | SYSTOLIC BLOOD PRESSURE: 118 MMHG | WEIGHT: 293 LBS

## 2019-04-09 DIAGNOSIS — I73.9 PERIPHERAL VASCULAR DISEASE (HCC): ICD-10-CM

## 2019-04-09 DIAGNOSIS — N18.30 CKD (CHRONIC KIDNEY DISEASE) STAGE 3, GFR 30-59 ML/MIN (HCC): ICD-10-CM

## 2019-04-09 DIAGNOSIS — I63.00 CEREBROVASCULAR ACCIDENT (CVA) DUE TO THROMBOSIS OF PRECEREBRAL ARTERY (HCC): ICD-10-CM

## 2019-04-09 DIAGNOSIS — G44.229 CHRONIC TENSION-TYPE HEADACHE, NOT INTRACTABLE: ICD-10-CM

## 2019-04-09 DIAGNOSIS — M19.90 ARTHRITIS: ICD-10-CM

## 2019-04-09 DIAGNOSIS — E66.01 MORBID OBESITY WITH BMI OF 50.0-59.9, ADULT (HCC): ICD-10-CM

## 2019-04-09 DIAGNOSIS — E78.00 PURE HYPERCHOLESTEROLEMIA: ICD-10-CM

## 2019-04-09 DIAGNOSIS — G47.33 OSA (OBSTRUCTIVE SLEEP APNEA): ICD-10-CM

## 2019-04-09 DIAGNOSIS — I10 ESSENTIAL HYPERTENSION: Primary | ICD-10-CM

## 2019-04-09 PROCEDURE — 99213 OFFICE O/P EST LOW 20 MIN: CPT | Performed by: NURSE PRACTITIONER

## 2019-04-09 NOTE — PROGRESS NOTES
Group Lifestyle Balance Follow-up Progress Note      Subjective     Patient is here for group medical appointment for Group Lifestyle Balance weight management program follow-up for the chronic conditions of HTN, TIBURCIO, HLD, Hx CVA, CKD, PVD, Arthritis, Headache. Patient continues on the program and tolerating well. Total weight loss of 6 lbs. No current issues. Allergies: Allergies   Allergen Reactions    Duricef [Cefadroxil Monohydrate]     Fish-Derived Products     Pcn [Penicillins]     Tomato        Past Medical History:     Past Medical History:   Diagnosis Date    Bleeding     while on aggrenox    Cataracts, bilateral     Cerebrovascular disease 2003,2011    cva    Chronic pain in left foot     CKD (chronic kidney disease) stage 3, GFR 30-59 ml/min ([facility])     GERD (gastroesophageal reflux disease)     Hx of blood clots     Hyperlipidemia     Hypertension     Iron (Fe) deficiency anemia     Nicotine abuse     Obesity     Osteoarthritis     Peripheral vascular disease ([facility] [address] 3/13/2014    Substance abuse ([facility])     cocaine, canabis    Thalassemia minor     Unspecified cerebral artery occlusion with cerebral infarction     Unspecified sleep apnea    .     Past Surgical History:  Past Surgical History:   Procedure Laterality Date    BREAST SURGERY      biopsy    COLONOSCOPY  12/2007    adenomatous polyp    COLONOSCOPY  2013    normal, repeat in 5 years   [address]      TUBAL LIGATION      UPPER GASTROINTESTINAL ENDOSCOPY  2009    negative       Family History:  Family History   Problem Relation Age of Onset    High Blood Pressure Mother     Asthma Mother     Heart Disease Mother     Heart Disease Father     High Blood Pressure Father     Diabetes Brother     High Blood Pressure Brother     Heart Disease Brother     High Blood Pressure Maternal Grandmother     High Blood Pressure Maternal Grandfather     Heart Disease Maternal Grandfather        Social History:  Social History     Socioeconomic History    Marital status:      Spouse name: Not on file    Number of children: Not on file    Years of education: Not on file    Highest education level: Not on file   Occupational History    Not on file   Social Needs    Financial resource strain: Not on file    Food insecurity:     Worry: Not on file     Inability: Not on file    Transportation needs:     Medical: Not on file     Non-medical: Not on file   Tobacco Use    Smoking status: Former Smoker     Packs/day: 2.00     Years: 35.00     Pack years: 70.00     Last attempt to quit: 10/11/2011     Years since quittin.4    Smokeless tobacco: Never Used   Substance and Sexual Activity    Alcohol use:  Yes     Alcohol/week: 3.6 oz     Types: 6 Cans of beer per week     Comment: 6 beers/week(on weekends)    Drug use: No     Types: Cocaine, Marijuana     Comment: per pt did years ago; cocaine & marij    Sexual activity: Yes     Partners: Male   Lifestyle    Physical activity:     Days per week: Not on file     Minutes per session: Not on file    Stress: Not on file   Relationships    Social connections:     Talks on phone: Not on file     Gets together: Not on file     Attends Yazdanism service: Not on file     Active member of club or organization: Not on file     Attends meetings of clubs or organizations: Not on file     Relationship status: Not on file    Intimate partner violence:     Fear of current or ex partner: Not on file     Emotionally abused: Not on file     Physically abused: Not on file     Forced sexual activity: Not on file   Other Topics Concern    Not on file   Social History Narrative    Not on file       Current Medications:  Current Outpatient Medications   Medication Sig Dispense Refill    Ferrous Sulfate (IRON) 325 (65 Fe) MG TABS TAKE 1 TABLET IN THE MORNING WITH BREAKFAST 30 tablet 0    clopidogrel (PLAVIX) 75 MG tablet TAKE 1 TABLET DAILY 30 tablet 3    losartan (COZAAR) 50 MG tablet TAKE 1 TABLET DAILY 30 tablet 2    diclofenac sodium 1 % GEL Apply 2 g topically 2 times daily 1 Tube 3    aspirin 81 MG EC tablet TAKE 1 TABLET DAILY 30 tablet 5    folic acid (FOLVITE) 1 MG tablet TAKE 1 TABLET DAILY 30 tablet 5    ibuprofen (ADVIL;MOTRIN) 800 MG tablet Take 1 tablet by mouth every 8 hours as needed for Pain 20 tablet 0    Cholecalciferol (VITAMIN D3) 1000 units TABS Take 1 tablet by mouth daily 90 tablet 3    amLODIPine (NORVASC) 10 MG tablet TAKE 1 TABLET DAILY 30 tablet 5    hydrochlorothiazide (HYDRODIURIL) 25 MG tablet TAKE 1 TABLET IN THE MORNING 30 tablet 5    acetaminophen (AMINOFEN) 325 MG tablet Take 2 tablets by mouth every 4 hours as needed for Pain 60 tablet 3     No current facility-administered medications for this visit. Vital Signs:  /78 (Site: Right Upper Arm, Position: Sitting, Cuff Size: Large Adult)   Pulse 72   Ht 5' 7.99\" (1.727 m)   Wt (!) 367 lb (166.5 kg)   BMI 55.82 kg/m²     BMI/Height/Weight:  Body mass index is 55.82 kg/m². Review of Systems  Constitutional: Weight loss      Objective:      Physical Exam   Vital signs reviewed. General Appearance: Well-developed and well-nourished. No acute distress. Skin: Warm, dry. Head: Normocephalic. Pulmonary/Chest: Normal respiratory effort. Musculoskeletal: Movement x4. Neurological:  Alert and oriented. Individual goal for this encounter:  Want to lose 150 lbs and stop taking so many meds. [x] Vital signs reviewed and discussed with patient.   [] Labs/EKG reviewed and discussed with patient. [] Blood sugar log reviewed and discussed with patient. [] Physical activity discussed. [] Medication changes recommended. [] Smoking cessation discussed. [x] Specific questions/concerns addressed.       Specific group medical question(s) addressed in this encounter:  Discussion about Vitamin D.    Group discussion topic for this encounter:     [] Welcome Breanna Mata   [] Be a Fat & Calorie    [] Healthy Eating   [] Move Those Muscles   [] Tip the Calorie Balance   [x] Take charge of What's Around You   [] Problem Solving    [] Four Keys to Healthy Eating Out   [] [place] of Lifestyle Change   [] Jumpstart your Activity Plan   [] Make Social Cues Work for [place]   [] Ways to Stay Motivated   [] Preparing for Long Term Self-Management   [] More Volume, Fewer Calories   [] Balance Your Thoughts   [] Strengthen your Exercise Program   [] Mindful Eating   [] Stress and Time Managment   [] Standing Up for Your Health   [] Heart Health   [] Stretching:  The Truth About Flexibility   [] Looking Back and Looking Forward    Total time:  90 minutes, greater than 50% of visit spent in group counseling/education. Assessment:       Diagnosis Orders   1. Essential hypertension     2. Pure hypercholesterolemia     3. Cerebrovascular accident (CVA), 2011, no residual deficit ([address]     4. CKD (chronic kidney disease) stage 3, GFR 30-59 ml/min (HCC)     5. Peripheral vascular disease ([address]     6. TIBURCIO (obstructive sleep apnea)     7. Primary osteoarthritis of left knee     8. Chronic tension-type headache, not intractable     9. Morbid obesity with BMI of 50.0-59.9, adult ([address]         Plan:      Track food and weight. Return to clinic as per group medical appointment schedule. Follow-up:  Return in about 1 month (around 5/9/2019). Orders:  No orders of the defined types were placed in this encounter. Prescriptions:  No orders of the defined types were placed in this encounter.       Electronically signed by:  Heidy Degroot CNP

## 2019-04-15 ENCOUNTER — TELEPHONE (OUTPATIENT)
Dept: ONCOLOGY | Age: 64
End: 2019-04-15

## 2019-04-15 ENCOUNTER — OFFICE VISIT (OUTPATIENT)
Dept: ONCOLOGY | Age: 64
End: 2019-04-15
Payer: MEDICARE

## 2019-04-15 VITALS
BODY MASS INDEX: 56.15 KG/M2 | WEIGHT: 293 LBS | HEART RATE: 56 BPM | DIASTOLIC BLOOD PRESSURE: 83 MMHG | TEMPERATURE: 98.1 F | SYSTOLIC BLOOD PRESSURE: 149 MMHG

## 2019-04-15 DIAGNOSIS — D56.3 THALASSEMIA TRAIT, ALPHA: ICD-10-CM

## 2019-04-15 DIAGNOSIS — D64.9 ANEMIA, UNSPECIFIED TYPE: Primary | ICD-10-CM

## 2019-04-15 DIAGNOSIS — E61.1 IRON DEFICIENCY: ICD-10-CM

## 2019-04-15 PROCEDURE — 99214 OFFICE O/P EST MOD 30 MIN: CPT | Performed by: INTERNAL MEDICINE

## 2019-04-15 PROCEDURE — 99212 OFFICE O/P EST SF 10 MIN: CPT | Performed by: INTERNAL MEDICINE

## 2019-04-15 NOTE — TELEPHONE ENCOUNTER
Marge Hull MD VISIT  DR CAMPA IN TO SEE PATIENT  ORDERS RECEIVED  REFERRAL TO DR SU  RV 4 MONTHS W/ LABS PRIOR  LABS CDP FE TIBC FERRITIN 8/12/19  REFERRAL TO DR SU 6/27/19 @ 1:45 PM  MD VISIT 8/19/19 @ 11AM  AVS PRINTED AND GIVEN TO PATIENT W/ INSTRUCTIONS  PATIENT DISCHARGED AMBULATORY

## 2019-04-15 NOTE — PROGRESS NOTES
foot, CKD (chronic kidney disease) stage 3, GFR 30-59 ml/min ([facility]), GERD (gastroesophageal reflux disease), Hx of blood clots, Hyperlipidemia, Hypertension, Iron (Fe) deficiency anemia, Nicotine abuse, Obesity, Osteoarthritis, Peripheral vascular disease ([facility] [address] Substance abuse ([facility] [address] Thalassemia minor, Unspecified cerebral artery occlusion with cerebral infarction, and Unspecified sleep apnea. Past Surgical History:   has a past surgical history that includes Tonsillectomy and adenoidectomy; Tubal ligation; Breast surgery; Endometrial biopsy (1998); Upper gastrointestinal endoscopy (2009); Colonoscopy (12/2007); and Colonoscopy (2013). Medications:    Prior to Admission medications    Medication Sig Start Date End Date Taking?  Authorizing Provider   Ferrous Sulfate (IRON) 325 (65 Fe) MG TABS TAKE 1 TABLET IN THE MORNING WITH BREAKFAST 4/5/19  Yes Trini Rao MD   clopidogrel (PLAVIX) 75 MG tablet TAKE 1 TABLET DAILY 4/5/19  Yes Trini Rao MD   losartan (COZAAR) 50 MG tablet TAKE 1 TABLET DAILY 3/8/19  Yes Trini Rao MD   diclofenac sodium 1 % GEL Apply 2 g topically 2 times daily 2/8/19  Yes Trini Rao MD   aspirin 81 MG EC tablet TAKE 1 TABLET DAILY 1/4/19  Yes Trini Rao MD   folic acid (FOLVITE) 1 MG tablet TAKE 1 TABLET DAILY 1/4/19  Yes Trini Rao MD   ibuprofen (ADVIL;MOTRIN) 800 MG tablet Take 1 tablet by mouth every 8 hours as needed for Pain 12/30/18  Yes Abdi Dewey PA-C   Cholecalciferol (VITAMIN D3) 1000 units TABS Take 1 tablet by mouth daily 12/21/18  Yes Danita Martin MD   amLODIPine (NORVASC) 10 MG tablet TAKE 1 TABLET DAILY 11/30/18  Yes Martha Rousseau MD   hydrochlorothiazide (HYDRODIURIL) 25 MG tablet TAKE 1 TABLET IN THE MORNING 11/30/18  Yes Martha Rousseau MD   acetaminophen (AMINOFEN) 325 MG tablet Take 2 tablets by mouth every 4 hours as needed for Pain 11/30/18  Yes Martha Rousseau MD     Current Outpatient Medications   Medication Sig Dispense Refill    Ferrous Sulfate (IRON) 325 (65 Fe) MG TABS TAKE 1 TABLET IN THE MORNING WITH BREAKFAST 30 tablet 0    clopidogrel (PLAVIX) 75 MG tablet TAKE 1 TABLET DAILY 30 tablet 3    losartan (COZAAR) 50 MG tablet TAKE 1 TABLET DAILY 30 tablet 2    diclofenac sodium 1 % GEL Apply 2 g topically 2 times daily 1 Tube 3    aspirin 81 MG EC tablet TAKE 1 TABLET DAILY 30 tablet 5    folic acid (FOLVITE) 1 MG tablet TAKE 1 TABLET DAILY 30 tablet 5    ibuprofen (ADVIL;MOTRIN) 800 MG tablet Take 1 tablet by mouth every 8 hours as needed for Pain 20 tablet 0    Cholecalciferol (VITAMIN D3) 1000 units TABS Take 1 tablet by mouth daily 90 tablet 3    amLODIPine (NORVASC) 10 MG tablet TAKE 1 TABLET DAILY 30 tablet 5    hydrochlorothiazide (HYDRODIURIL) 25 MG tablet TAKE 1 TABLET IN THE MORNING 30 tablet 5    acetaminophen (AMINOFEN) 325 MG tablet Take 2 tablets by mouth every 4 hours as needed for Pain 60 tablet 3     No current facility-administered medications for this visit. Allergies:  Duricef [cefadroxil monohydrate]; Fish-derived products; Pcn [penicillins]; and Tomato    Social History:   reports that she quit smoking about 7 years ago. She has a 70.00 pack-year smoking history. She has never used smokeless tobacco. She reports that she drinks about 3.6 oz of alcohol per week. She reports that she does not use drugs. Family History: family history includes Asthma in her mother; Diabetes in her brother; Heart Disease in her brother, father, maternal grandfather, and mother; High Blood Pressure in her brother, father, maternal grandfather, maternal grandmother, and mother. REVIEW OF SYSTEMS:      Constitutional: No fever or chills.  No night sweats, no weight loss   Eyes: No eye discharge, double vision, or eye pain   HEENT: negative for sore mouth, sore throat, hoarseness and voice change   Respiratory: negative for cough , sputum, dyspnea, wheezing, hemoptysis, chest pain   Cardiovascular: negative for chest pain, dyspnea, palpitations, orthopnea, PND   Gastrointestinal: negative for nausea, vomiting, diarrhea, constipation, abdominal pain, Dysphagia, hematemesis and hematochezia   Genitourinary: negative for frequency, dysuria, nocturia, urinary incontinence, and hematuria   Integument: negative for rash, skin lesions, bruises.    Hematologic/Lymphatic: negative for easy bruising, bleeding, lymphadenopathy, or petechiae   Endocrine: negative for heat or cold intolerance,weight changes, change in bowel habits and hair loss   Musculoskeletal: negative for myalgias, arthralgias, pain, joint swelling,and bone pain   Neurological: negative for headaches, dizziness, seizures, weakness, numbness    PHYSICAL EXAM:        BP (!) 149/83   Pulse 56   Temp 98.1 °F (36.7 °C)   Wt (!) 369 lb 3.2 oz (167.5 kg)   BMI 56.15 kg/m²    General appearance - well appearing, no in pain or distress   Mental status - alert and cooperative   Eyes - pupils equal and reactive, extraocular eye movements intact   Ears - bilateral TM's and external ear canals normal   Mouth - mucous membranes moist, pharynx normal without lesions   Neck - supple, no significant adenopathy   Lymphatics - no palpable lymphadenopathy, no hepatosplenomegaly   Chest - clear to auscultation, no wheezes, rales or rhonchi, symmetric air entry   Heart - normal rate, regular rhythm, normal S1, S2, no murmurs  Abdomen - soft, nontender, nondistended, no masses or organomegaly   Neurological - alert, oriented, normal speech, no focal findings or movement disorder noted   Musculoskeletal - no joint tenderness, deformity or swelling   Extremities - peripheral pulses normal, no pedal edema, no clubbing or cyanosis   Skin - normal coloration and turgor, no rashes, no suspicious skin lesions noted ,      DATA:      Labs:     Results for orders placed or performed during the hospital encounter of 03/25/19   Immunofixation Serum Profile   Result Value Ref Range    Serum IFX Interp IMMUNOFIXATION IS NEGATIVE FOR MONOCLONAL IMMUNOGLOBULIN. Pathologist ELECTRONICALLY SIGNED. Kylie Nguyen M.D. Reticulocytes   Result Value Ref Range    Retic % 2.6 (H) 0.5 - 2.0 %    Absolute Retic # 0.125 (H) 0.0245 - 0.098 M/uL    Immature Retic Fract NOT REPORTED %    Retic Hemoglobin NOT REPORTED 28.2 - 35.7 pg   Lactate Dehydrogenase   Result Value Ref Range     135 - 214 U/L   Iron and TIBC   Result Value Ref Range    Iron 43 37 - 145 ug/dL    TIBC 228 (L) 250 - 450 ug/dL    Iron Saturation 19 (L) 20 - 55 %    UIBC 185 112 - 347 ug/dL   Haptoglobin   Result Value Ref Range    Haptoglobin 202 (H) 30 - 200 mg/dL   [place]/Lambda Free Lt Chains, Serum Quant   Result Value Ref Range    Kappa Free Light Chains QNT 2.12 (H) 0.37 - 1.94 mg/dL    Lambda Free Light Chains QNT 1.55 0.57 - 2.63 mg/dL    Free Kappa/Lambda Ratio 1.37 0.26 - 1.65   Electrophoresis Protein, Serum without Reflex to Immunofixation   Result Value Ref Range    Total Protein 7.0 6.4 - 8.3 g/dL    Albumin (calculated) 4.3 3.2 - 5.2 g/dL    Albumin % 62 45 - 65 %    Alpha-1-Globulin 0.2 0.1 - 0.4 g/dL    Alpha 1 % 3 3 - 6 %    Alpha-2-Globulin 0.9 0.5 - 0.9 g/dL    Alpha 2 % 12 6 - 13 %    Beta Globulin 0.8 0.5 - 1.1 g/dL    Beta Percent 11 11 - 19 %    Gamma Globulin 0.8 0.5 - 1.5 g/dL    Gamma Globulin % 12 9 - 20 %    Total Prot. Sum 7.0 6.3 - 8.2 g/dL    Total Prot. Sum,% 100 98 - 102 %    Protein Electrophoresis, Serum NORMAL ELECTROPHORETIC PATTERN     Pathologist ELECTRONICALLY SIGNED.  Kylie Nguyen M.D.    CBC Auto Differential   Result Value Ref Range    WBC 5.5 3.5 - 11.0 k/uL    RBC 4.71 4.0 - 5.2 m/uL    Hemoglobin 10.6 (L) 12.0 - 16.0 g/dL    Hematocrit 33.5 (L) 36 - 46 %    MCV 71.2 (L) 80 - 100 fL    MCH 22.5 (L) 26 - 34 pg    MCHC 31.6 31 - 37 g/dL    RDW 16.0 (H) 11.5 - 14.5 %    Platelets 683 (H) 836 - 400 k/uL    MPV NOT REPORTED 6.0 - 12.0 fL    NRBC Automated NOT REPORTED per 100 WBC    Differential Type NOT REPORTED     Immature Granulocytes NOT REPORTED 0 %    Absolute Immature Granulocyte NOT REPORTED 0.00 - 0.30 k/uL    WBC Morphology NOT REPORTED     RBC Morphology NOT REPORTED     Platelet Estimate NOT REPORTED     Seg Neutrophils 55 36 - 66 %    Lymphocytes 30 24 - 44 %    Monocytes 9 (H) 1 - 7 %    Eosinophils % 5 (H) 1 - 4 %    Basophils 1 %    Segs Absolute 3.01 1.8 - 7.7 k/uL    Absolute Lymph # 1.65 1.0 - 4.8 k/uL    Absolute Mono # 0.50 0.2 - 0.8 k/uL    Absolute Eos # 0.28 0.0 - 0.4 k/uL    Basophils # 0.06 0.0 - 0.2 k/uL    Morphology HYPOCHROMIA PRESENT    Comprehensive Metabolic Panel   Result Value Ref Range    Glucose 89 70 - 99 mg/dL    BUN 22 8 - 23 mg/dL    CREATININE 1.05 (H) 0.50 - 0.90 mg/dL    Bun/Cre Ratio 21 (H) 9 - 20    Calcium 9.8 8.6 - 10.4 mg/dL    Sodium 142 135 - 144 mmol/L    Potassium 4.2 3.7 - 5.3 mmol/L    Chloride 102 98 - 107 mmol/L    CO2 29 20 - 31 mmol/L    Anion Gap 11 9 - 17 mmol/L    Alkaline Phosphatase 102 35 - 104 U/L    ALT 11 5 - 33 U/L    AST 14 <32 U/L    Total Bilirubin 0.29 (L) 0.3 - 1.2 mg/dL    Total Protein 7.3 6.4 - 8.3 g/dL    Alb 4.1 3.5 - 5.2 g/dL    Albumin/Globulin Ratio NOT REPORTED 1.0 - 2.5    GFR Non-African American 53 (L) >60 mL/min    GFR African American >60 >60 mL/min    GFR Comment          GFR Staging NOT REPORTED    Ferritin   Result Value Ref Range    Ferritin 461 (H) 13 - 150 ug/L   MISCELLANEOUS TESTING   Result Value Ref Range    Test Name Christopher Purvis [id number] ALPHA DEL DUP THALASEMIA     Send Out Report          Lead, Blood   Result Value Ref Range    Lead 1 0 - 4 ug/dL     Hgb Electrophoresis Interp 03/01/2019 10:37  [address] NORMAL Hb ELECTROPHORETIC PATTERN. THE % HbA2 IS NORMAL. DECREASED MCV MAY BE OBSERVED WITH Fe DEFICIENCY AND/OR ALPHA THALASSEMIA TRAIT. SUGGEST Fe STUDIES. IMPRESSION:  NORMAL Hb ELECTROPHORETIC PATTERN WITH MICROCYTOSIS. Comment: HBA = 97.9%. HBA2 = 2.1%. MCV = 73.4 (82.6 .9). Date Complete:     3/1/2019     By:  Elda Abad M.D. Date Reported:     3/4/2019       INTERPRETATION     Peripheral Blood:          Microcytic hypochromic anemia with reticulocytosis consistent   with response to iron therapy for hypoproliferative anemia. Clinical   correlation is necessary. IMAGING DATA:    Xr Chest Standard (2 Vw)    Result Date: 3/1/2019  EXAMINATION: TWO VIEWS OF THE CHEST 3/1/2019 10:12 am COMPARISON: January 5, 2019, October 1, 2011 HISTORY: ORDERING SYSTEM PROVIDED HISTORY: Pneumonia of both lower lobes due to infectious organism [facility]) TECHNOLOGIST PROVIDED HISTORY: follow up on pneumonia resolution FINDINGS: The cardiomediastinal silhouette is unchanged in appearance. Mild cardiac silhouette enlargement. There is no consolidation, pneumothorax, or evidence of edema. No effusion is appreciated. The osseous structures are unchanged in appearance. Degenerative change in the midthoracic spine and chronic appearing mild vertebral body height loss is again noted. No acute airspace disease identified. Impression No mammographic findings of malignancy. BIRADS: BIRADS - CATEGORY 1   Negative, no evidence of malignancy. Normal interval follow-up is recommended in 12 months. OVERALL ASSESSMENT - NEGATIVE   A letter of notification will be sent to the patient regarding the results. The [employer] recommends annual mammograms for women 40 years and older. IMPRESSION:   Microcytic anemia, chronic  Alpha thalassemia trait  History of iron deficiency now on iron supplement  Morbid obesity  Obstructive sleep apnea  Vitamin D deficiency    RECOMMENDATIONS:  Personally reviewed the results of lab workup imaging studies and other relevant clinical data with the patient   I believe patient microcytic anemia is due to alpha thalassemia trait as well as iron deficiency.   Patient still has not been seen by the GI team.  I will put in a new request for a referral for evaluation for iron deficiency anemia. Patient continues to be on iron supplement. Discussed Natural history of for alpha thalassemia trait. Discussed possible implications. None off patient's kids has any severe anemia. We will see patient back in office in 4 months for repeat iron studies and CBC. We will also follow-up on evaluation by the GI team.  Continue vitamin D supplements  Patient was counseled on active lifestyle. Continue age-appropriate screening studies. She had multiple questions which I answered to the best of my ability. Patient expressed understanding of the plan and was in agreement. Thank you for asking us to see this patient. Santiago Irizarry MD          This note is created with the assistance of a speech recognition program.  While intending to generate a document that actually reflects the content of the visit, the document can still have some errors including those of syntax and sound a like substitutions which may escape proof reading. It such instances, actual meaning can be extrapolated by contextual diversion.

## 2019-04-19 ENCOUNTER — APPOINTMENT (OUTPATIENT)
Dept: CT IMAGING | Age: 64
End: 2019-04-19
Payer: MEDICARE

## 2019-04-19 ENCOUNTER — HOSPITAL ENCOUNTER (EMERGENCY)
Age: 64
Discharge: HOME | End: 2019-04-19
Attending: EMERGENCY MEDICINE
Payer: MEDICARE

## 2019-04-19 VITALS
DIASTOLIC BLOOD PRESSURE: 82 MMHG | SYSTOLIC BLOOD PRESSURE: 176 MMHG | RESPIRATION RATE: 16 BRPM | OXYGEN SATURATION: 98 % | TEMPERATURE: 97.9 F | HEART RATE: 84 BPM

## 2019-04-19 DIAGNOSIS — S16.1XXA STRAIN OF NECK MUSCLE, INITIAL ENCOUNTER: ICD-10-CM

## 2019-04-19 DIAGNOSIS — S06.0X0A CONCUSSION WITHOUT LOSS OF CONSCIOUSNESS, INITIAL ENCOUNTER: ICD-10-CM
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
## 2019-04-19 DIAGNOSIS — S39.012A BACK STRAIN, INITIAL ENCOUNTER: ICD-10-CM

## 2019-04-19 PROCEDURE — 72131 CT LUMBAR SPINE W/O DYE: CPT

## 2019-04-19 PROCEDURE — 72125 CT NECK SPINE W/O DYE: CPT

## 2019-04-19 PROCEDURE — 70450 CT HEAD/BRAIN W/O DYE: CPT

## 2019-04-19 PROCEDURE — 99284 EMERGENCY DEPT VISIT MOD MDM: CPT

## 2019-04-19 PROCEDURE — 72128 CT CHEST SPINE W/O DYE: CPT

## 2019-04-19 RX ORDER — CYCLOBENZAPRINE HCL 5 MG
5 TABLET ORAL 3 TIMES DAILY PRN
Qty: 30 TABLET | Refills: 0 | Status: SHIPPED | OUTPATIENT
Start: 2019-04-19 | End: 2019-04-29

## 2019-04-19 ASSESSMENT — ENCOUNTER SYMPTOMS
BACK PAIN: 1
NAUSEA: 0
VOMITING: 0
SHORTNESS OF BREATH: 0
COUGH: 0
ABDOMINAL PAIN: 0
WHEEZING: 0
COLOR CHANGE: 0
RHINORRHEA: 0

## 2019-04-19 ASSESSMENT — PAIN DESCRIPTION - DESCRIPTORS: DESCRIPTORS: ACHING;SHARP

## 2019-04-19 ASSESSMENT — PAIN DESCRIPTION - PROGRESSION: CLINICAL_PROGRESSION: GRADUALLY WORSENING

## 2019-04-19 ASSESSMENT — PAIN DESCRIPTION - LOCATION: LOCATION: HEAD;NECK

## 2019-04-19 ASSESSMENT — PAIN DESCRIPTION - PAIN TYPE: TYPE: ACUTE PAIN

## 2019-04-19 ASSESSMENT — PAIN SCALES - GENERAL: PAINLEVEL_OUTOF10: 6

## 2019-04-19 ASSESSMENT — PAIN DESCRIPTION - FREQUENCY: FREQUENCY: CONTINUOUS

## 2019-04-19 ASSESSMENT — PAIN DESCRIPTION - ORIENTATION: ORIENTATION: POSTERIOR

## 2019-04-19 ASSESSMENT — PAIN DESCRIPTION - ONSET: ONSET: SUDDEN

## 2019-04-19 NOTE — ED PROVIDER NOTES
[facility] ED     Emergency Department     Faculty Attestation        I performed a history and physical examination of the patient and discussed management with the resident. I reviewed the residents note and agree with the documented findings and plan of care. Any areas of disagreement are noted on the chart. I was personally present for the key portions of any procedures. I have documented in the chart those procedures where I was not present during the key portions. I have reviewed the emergency nurses triage note. I agree with the chief complaint, past medical history, past surgical history, allergies, medications, social and family history as documented unless otherwise noted below. For Physician Assistant/ Nurse Practitioner cases/documentation I have personally evaluated this patient and have completed at least one if not all key elements of the E/M (history, physical exam, and MDM). Additional findings are as noted. PCP:  Trini Rao MD    Pertinent Comments:     Patient is a 49-year-old female yesterday had a mechanical fall and steps slipping down 6-8 steps on her buttocks and back with injury or head as well as neck and lower lumbar spine. Patient does have midline tenderness and no bony step-offs is currently neurologically intact with denial of loss of bowel or bladder function and no numbness/tingling in extremities as well as denial of weakness in the extremities. Patient is on Plavix however. Physical examination: Clear to auscultation bilaterally with midline trachea, heart regular rate and rhythm, abdomen soft/nontender/nondistended with no pelvic tenderness to palpation and no pelvic instability. No obvious pain in any of the extremities. Cranial nerves II through XII intact as well.       Plan: We'll obtain CT of the head as well as CT of the C-spine/T-spine/L spine as well as symptomatic control and reevaluate after    Critical Care  None      (Please note that portions of this note were completed with a voice recognition program. Efforts were made to edit the dictations but occasionally words are mis-transcribed.  Whenever words are used in this note in any gender, they shall be construed as though they were used in the gender appropriate to the circumstances; and whenever words are used in this note in the singular or plural form, they shall be construed as though they were used in the form appropriate to the circumstances.)    MD Radha Mackey  Attending Emergency Medicine Physician              Marica Hay MD  04/19/19 [id number]       Marcia Hay MD  04/19/19 [id number]

## 2019-04-19 NOTE — ED TRIAGE NOTES
Pt reports that yesterday she was walking down a set of stairs when she slipped and fell, landing on her bottom and hit her head. Pt denies any loss of consciousness, but states that she was dazed. Pt reports that she has a hedache and posterior c-spine pain with palpation, no step offs noted. Pt reports that she also has an episode of urinary incontinence shortly after the fall. Pt reports that she is on aspirin and plavix. Pt placed in cervical collar. Call light within reach, will continue to monitor. Resident at bedside.

## 2019-04-19 NOTE — ED PROVIDER NOTES
[facility]  ED  Emergency Department Encounter  Emergency Medicine Resident     Pt Name: Sabas Salas  MRN: [id number]  Marygfkalpesh 1955  Date ofevaluation: 4/19/19  PCP:  Chinyere Cook MD    [address] [place] [address]       Chief Complaint   Patient presents with   Edwena Blood    Headache    Neck Pain       HISTORY OF PRESENT ILLNESS  (Location/Symptom, Timing/Onset, Context/Setting, Quality, Duration, Modifying Factors, Severity.)      Sabas Salas is a 70 y.o. female who presents with complaints of neck pain, headache, and back pain after a fall. Patient says that she fell yesterday afternoon. She says she fell down approximately 6 steps. She did hit her head but did not lose consciousness. She says that she takes Plavix but no anticoagulants. She denies any weakness, numbness, or tingling. She says that she is having neck pain and thoracic and lumbar pain. She is able to ambulate. She denies any bowel or bladder dysfunction. She denies any chest pain, abdominal pain, nausea, vomiting. She denies any vision changes. Her symptoms have no alleviating or exacerbating factors. PAST MEDICAL / SURGICAL / SOCIAL / FAMILY HISTORY      has a past medical history of Bleeding, Cataracts, bilateral, Cerebrovascular disease, Chronic pain in left foot, CKD (chronic kidney disease) stage 3, GFR 30-59 ml/min ([facility]), GERD (gastroesophageal reflux disease), Hx of blood clots, Hyperlipidemia, Hypertension, Iron (Fe) deficiency anemia, Nicotine abuse, Obesity, Osteoarthritis, Peripheral vascular disease ([address] Substance abuse ([address] Thalassemia minor, Unspecified cerebral artery occlusion with cerebral infarction, and Unspecified sleep apnea. has a past surgical history that includes Tonsillectomy and adenoidectomy; Tubal ligation; Breast surgery; Endometrial biopsy (1998); Upper gastrointestinal endoscopy (2009); Colonoscopy (12/2007); and Colonoscopy (2013).     Social History     Socioeconomic History    Marital status:      Spouse name: Not on file    Number of children: Not on file    Years of education: Not on file    Highest education level: Not on file   Occupational History    Not on file   Social Needs    Financial resource strain: Not on file    Food insecurity:     Worry: Not on file     Inability: Not on file    Transportation needs:     Medical: Not on file     Non-medical: Not on file   Tobacco Use    Smoking status: Former Smoker     Packs/day: 2.00     Years: 35.00     Pack years: 70.00     Last attempt to quit: 10/11/2011     Years since quittin.5    Smokeless tobacco: Never Used   Substance and Sexual Activity    Alcohol use:  Yes     Alcohol/week: 3.6 oz     Types: 6 Cans of beer per week     Comment: 6 beers/week(on weekends)    Drug use: No     Types: Cocaine, Marijuana     Comment: per pt did years ago; cocaine & marij    Sexual activity: Yes     Partners: Male   Lifestyle    Physical activity:     Days per week: Not on file     Minutes per session: Not on file    Stress: Not on file   Relationships    Social connections:     Talks on phone: Not on file     Gets together: Not on file     Attends Buddhism service: Not on file     Active member of club or organization: Not on file     Attends meetings of clubs or organizations: Not on file     Relationship status: Not on file    Intimate partner violence:     Fear of current or ex partner: Not on file     Emotionally abused: Not on file     Physically abused: Not on file     Forced sexual activity: Not on file   Other Topics Concern    Not on file   Social History Narrative    Not on file       Family History   Problem Relation Age of Onset    High Blood Pressure Mother     Asthma Mother     Heart Disease Mother     Heart Disease Father     High Blood Pressure Father     Diabetes Brother     High Blood Pressure Brother     Heart Disease Brother     High Blood Pressure Maternal Grandmother     High for abdominal pain, nausea and vomiting. Genitourinary: Negative for dysuria and hematuria. Musculoskeletal: Positive for back pain and neck pain. Skin: Negative for color change and rash. Neurological: Positive for headaches. Negative for dizziness, weakness, light-headedness and numbness. Psychiatric/Behavioral: Negative for agitation and confusion. PHYSICAL EXAM   (up to 7 for level 4, 8or more for level 5)      INITIAL VITALS:   BP (!) 176/82   Pulse 84   Temp 97.9 °F (36.6 °C) (Oral)   Resp 16   SpO2 98%     Physical Exam   Constitutional: She is oriented to person, place, and time. She appears well-developed and well-nourished. HENT:   Head: Normocephalic and atraumatic. Eyes: Conjunctivae and EOM are normal.   Neck:   Tenderness palpation over C7 spinous process. No step-offs or deformities. Cardiovascular: Normal rate, regular rhythm and normal heart sounds. Exam reveals no gallop and no friction rub. No murmur heard. Pulmonary/Chest: Effort normal and breath sounds normal. No respiratory distress. She has no wheezes. She has no rales. Abdominal: Soft. She exhibits no distension. There is no tenderness. There is no rebound. Musculoskeletal: Normal range of motion. She exhibits no edema. Tenderness to palpation over the thoracic and lumbar spinous processes. No step-offs or deformities. No ecchymosis, crepitus, or other overt signs of trauma   Neurological: She is alert and oriented to person, place, and time. Skin: Skin is warm and dry. No rash noted. No erythema. Psychiatric: She has a normal mood and affect.  Thought content normal.       DIFFERENTIAL  DIAGNOSIS     PLAN (LABS / IMAGING / EKG):  Orders Placed This Encounter   Procedures    CT HEAD WO CONTRAST    CT CERVICAL SPINE WO CONTRAST    CT THORACIC SPINE WO CONTRAST    CT Lumbar Spine WO Contrast       MEDICATIONS ORDERED:  Orders Placed This Encounter   Medications    cyclobenzaprine (FLEXERIL) 5 MG tablet     Sig: Take 1 tablet by mouth 3 times daily as needed for Muscle spasms     Dispense:  30 tablet     Refill:  0       DDX: Fracture, strain, ICH, post traumatic pain, soft tissue injury      DIAGNOSTIC RESULTS / [address] [place] [address] / [facility]     LABS:  No results found for this visit on 04/19/19. RADIOLOGY:  Ct Head Wo Contrast    Result Date: 4/19/2019  EXAMINATION: CT OF THE HEAD WITHOUT CONTRAST  4/19/2019 9:35 am TECHNIQUE: CT of the head was performed without the administration of intravenous contrast. Dose modulation, iterative reconstruction, and/or weight based adjustment of the mA/kV was utilized to reduce the radiation dose to as low as reasonably achievable. COMPARISON: December 30, 2018 HISTORY: ORDERING SYSTEM PROVIDED HISTORY: Fall yesterday, headache TECHNOLOGIST PROVIDED HISTORY: FINDINGS: BRAIN/VENTRICLES: No acute intracranial hemorrhage, mass effect or midline shift. No abnormal extra-axial fluid collection. Stable left greater than right extensive bilateral high frontoparietal encephalomalacia consistent with remote ischemia. Stable focus of hyperdensity likely calcification in the left centrum semiovale within the encephalomalacia. Mild chronic small vessel ischemic changes are noted. No evidence of hydrocephalus. ORBITS: The visualized portion of the orbits demonstrate no acute abnormality. SINUSES: Stable retention cyst or polyp right maxillary sinus otherwise sinuses and mastoids appear clear. SOFT TISSUES/SKULL:  No acute abnormality of the visualized skull or soft tissues. Stable CT brain with no acute intracranial abnormality and chronic ischemic findings as described.      Ct Cervical Spine Wo Contrast    Result Date: 4/19/2019  EXAMINATION: CT OF THE THORACIC SPINE WITHOUT CONTRAST; CT OF THE CERVICAL SPINE WITHOUT CONTRAST; CT OF THE LUMBAR SPINE WITHOUT CONTRAST  4/19/2019 9:36 am; 4/19/2019 9:35 am TECHNIQUE: CT of the thoracic spine was performed without the administration of intravenous contrast. Multiplanar reformatted images are provided for review. Dose modulation, iterative reconstruction, and/or weight based adjustment of the mA/kV was utilized to reduce the radiation dose to as low as reasonably achievable. CT of the cervical spine was performed without the administration of intravenous contrast. Multiplanar reformatted images are provided for review. Dose modulation, iterative reconstruction, and/or weight based adjustment of the mA/kV was utilized to reduce the radiation dose to as low as reasonably achievable. CT of the lumbar spine was performed without the administration of intravenous contrast. Multiplanar reformatted images are provided for review. Dose modulation, iterative reconstruction, and/or weight based adjustment of the mA/kV was utilized to reduce the radiation dose to as low as reasonably achievable. COMPARISON: None HISTORY: ORDERING SYSTEM PROVIDED HISTORY: Fall, TTP over thoracic spine; ORDERING SYSTEM PROVIDED HISTORY: Fall, TTP over C7; ORDERING SYSTEM PROVIDED HISTORY: Fall, TTP over thoracic spine TECHNOLOGIST PROVIDED HISTORY: Fall, TTP over thoracic spine FINDINGS: Cervical spine: No acute fracture. No subluxation. Mild multilevel degenerative changes. No prevertebral soft tissue swelling. Thoracic spine: No acute fracture. No subluxation. Mild multilevel degenerative changes. No acute paraspinal abnormality. Lumbar spine: No acute fracture. 4 mm L3-L4 anterolisthesis. Multilevel degenerative changes. No acute paraspinal abnormality. No acute traumatic findings within the cervical, thoracic, or lumbar spine.      Ct Thoracic Spine Wo Contrast    Result Date: 4/19/2019  EXAMINATION: CT OF THE THORACIC SPINE WITHOUT CONTRAST; CT OF THE CERVICAL SPINE WITHOUT CONTRAST; CT OF THE LUMBAR SPINE WITHOUT CONTRAST  4/19/2019 9:36 am; 4/19/2019 9:35 am TECHNIQUE: CT of the thoracic spine was performed without the administration of intravenous contrast. Multiplanar reformatted images are provided for review. Dose modulation, iterative reconstruction, and/or weight based adjustment of the mA/kV was utilized to reduce the radiation dose to as low as reasonably achievable. CT of the cervical spine was performed without the administration of intravenous contrast. Multiplanar reformatted images are provided for review. Dose modulation, iterative reconstruction, and/or weight based adjustment of the mA/kV was utilized to reduce the radiation dose to as low as reasonably achievable. CT of the lumbar spine was performed without the administration of intravenous contrast. Multiplanar reformatted images are provided for review. Dose modulation, iterative reconstruction, and/or weight based adjustment of the mA/kV was utilized to reduce the radiation dose to as low as reasonably achievable. COMPARISON: None HISTORY: ORDERING SYSTEM PROVIDED HISTORY: Fall, TTP over thoracic spine; ORDERING SYSTEM PROVIDED HISTORY: Fall, TTP over C7; ORDERING SYSTEM PROVIDED HISTORY: Fall, TTP over thoracic spine TECHNOLOGIST PROVIDED HISTORY: Fall, TTP over thoracic spine FINDINGS: Cervical spine: No acute fracture. No subluxation. Mild multilevel degenerative changes. No prevertebral soft tissue swelling. Thoracic spine: No acute fracture. No subluxation. Mild multilevel degenerative changes. No acute paraspinal abnormality. Lumbar spine: No acute fracture. 4 mm L3-L4 anterolisthesis. Multilevel degenerative changes. No acute paraspinal abnormality. No acute traumatic findings within the cervical, thoracic, or lumbar spine.      Ct Lumbar Spine Wo Contrast    Result Date: 4/19/2019  EXAMINATION: CT OF THE THORACIC SPINE WITHOUT CONTRAST; CT OF THE CERVICAL SPINE WITHOUT CONTRAST; CT OF THE LUMBAR SPINE WITHOUT CONTRAST  4/19/2019 9:36 am; 4/19/2019 9:35 am TECHNIQUE: CT of the thoracic spine was performed without the administration of intravenous contrast. Multiplanar reformatted images are provided for review. Dose modulation, iterative reconstruction, and/or weight based adjustment of the mA/kV was utilized to reduce the radiation dose to as low as reasonably achievable. CT of the cervical spine was performed without the administration of intravenous contrast. Multiplanar reformatted images are provided for review. Dose modulation, iterative reconstruction, and/or weight based adjustment of the mA/kV was utilized to reduce the radiation dose to as low as reasonably achievable. CT of the lumbar spine was performed without the administration of intravenous contrast. Multiplanar reformatted images are provided for review. Dose modulation, iterative reconstruction, and/or weight based adjustment of the mA/kV was utilized to reduce the radiation dose to as low as reasonably achievable. COMPARISON: None HISTORY: ORDERING SYSTEM PROVIDED HISTORY: Fall, TTP over thoracic spine; ORDERING SYSTEM PROVIDED HISTORY: Fall, TTP over C7; ORDERING SYSTEM PROVIDED HISTORY: Fall, TTP over thoracic spine TECHNOLOGIST PROVIDED HISTORY: Fall, TTP over thoracic spine FINDINGS: Cervical spine: No acute fracture. No subluxation. Mild multilevel degenerative changes. No prevertebral soft tissue swelling. Thoracic spine: No acute fracture. No subluxation. Mild multilevel degenerative changes. No acute paraspinal abnormality. Lumbar spine: No acute fracture. 4 mm L3-L4 anterolisthesis. Multilevel degenerative changes. No acute paraspinal abnormality. No acute traumatic findings within the cervical, thoracic, or lumbar spine. EKG  None     All EKG's are interpreted by the Emergency Department Physician who either signs or Co-signs this chart in the absence of a cardiologist.    EMERGENCY DEPARTMENT COURSE:  [facility] emergency department for evaluation of headache, neck pain, back pain after fall down approximately 6 steps the night prior.   On initial evaluation, vitals

## 2019-04-26 NOTE — TELEPHONE ENCOUNTER
Ferrous sulfate pending for refill     Health Maintenance   Topic Date Due    Colon cancer screen colonoscopy  05/30/2018    Shingles Vaccine (3 of 3) 08/21/2018    A1C test (Diabetic or Prediabetic)  04/19/2019    Low dose CT lung screening  05/10/2019    Cervical cancer screen  06/07/2019    Potassium monitoring  03/25/2020    Creatinine monitoring  03/25/2020    Breast cancer screen  09/28/2020    Pneumococcal 65+ years Vaccine (2 of 2 - PPSV23) 12/20/2021    Lipid screen  04/19/2023    DTaP/Tdap/Td vaccine (2 - Td) 03/08/2028    DEXA (modify frequency per FRAX score)  Completed    Flu vaccine  Completed    Hepatitis C screen  Completed    HIV screen  Completed             (applicable per patient's age: Cancer Screenings, Depression Screening, Fall Risk Screening, Immunizations)    Hemoglobin A1C (%)   Date Value   04/19/2018 5.7   06/08/2016 5.4   06/19/2014 5.5     Microalb/Crt.  Ratio (mcg/mg creat)   Date Value   05/09/2017 6     LDL Cholesterol (mg/dL)   Date Value   04/19/2018 85     AST (U/L)   Date Value   03/25/2019 14     ALT (U/L)   Date Value   03/25/2019 11     BUN (mg/dL)   Date Value   03/25/2019 22      (goal A1C is < 7)   (goal LDL is <100) need 30-50% reduction from baseline     BP Readings from Last 3 Encounters:   04/19/19 (!) 176/82   04/15/19 (!) 149/83   04/09/19 118/78    (goal /80)      All Future Testing planned in [facility]:  Lab Frequency Next Occurrence   CTA Neck W Contrast Once 06/26/2019   BUN & Creatinine Once 74/35/1307   Basic Metabolic Panel Once 80/71/6606   Iron And TIBC Once 07/31/2019   Ferritin Once 07/31/2019   Transferrin Once 07/31/2019   Reticulocytes Once 07/31/2019   Vitamin B12 & Folate Once 07/31/2019   Miscellaneous Sendout 1 Once 03/25/2019   Lead, Blood Once 03/25/2019   Ferritin Once 04/15/2019   Iron and TIBC Once 04/15/2019   Comprehensive Metabolic Panel     CBC Auto Differential     Electrophoresis Protein, Serum without Reflex to Immunofixation     [place]/Lambda Free Lt Chains, Serum Quant     CBC Auto Differential         Next Visit Date:  Future Appointments   Date Time Provider Mary Marinelli   5/13/2019  1:15 PM Atif Dupont DPM [address] [facility] Podiatry [facility]   6/7/2019  8:30 AM Roselyn Dela Cruz MD [facility] IM [facility]   6/17/2019  9:00 AM Taylor Prado MD Neuro Spec [address] [facility]   6/27/2019  1:45 PM Aicha Shaikh MD grtlk exc [facility]   7/3/2019  1:00 PM Virginia Monahan MD Neuro Endo [facility]   8/12/2019 10:00 AM SCHEDULE, [facility] CANCER [facility]   8/19/2019 11:00 AM Dunia Egan MD [facility]            Patient Active Problem List:     Essential hypertension     Pure hypercholesterolemia     Cerebrovascular accident (CVA), 2011, no residual deficit (HCC)     CKD (chronic kidney disease) stage 3, GFR 30-59 ml/min (HCC)     Peripheral vascular disease (HCC)     TIBURCIO (obstructive sleep apnea)     Morbid obesity with BMI of 50.0-59.9, adult (HCC)     Primary osteoarthritis of left knee     Chronic tension-type headache, not intractable     Vitamin D deficiency     Carpal tunnel syndrome, bilateral-not on medication due to kidney disease

## 2019-05-01 RX ORDER — PNV NO.95/FERROUS FUM/FOLIC AC 28MG-0.8MG
TABLET ORAL
Qty: 30 TABLET | Refills: 0 | Status: SHIPPED | OUTPATIENT
Start: 2019-05-01 | End: 2019-05-23 | Stop reason: SDUPTHER

## 2019-05-23 DIAGNOSIS — I10 ESSENTIAL HYPERTENSION: ICD-10-CM

## 2019-05-23 DIAGNOSIS — E55.9 VITAMIN D DEFICIENCY: ICD-10-CM

## 2019-05-23 NOTE — TELEPHONE ENCOUNTER
Differential     Electrophoresis Protein, Serum without Reflex to Immunofixation     [place]/Lambda Free Lt Chains, Serum Quant     CBC Auto Differential         Next Visit Date:  Future Appointments   Date Time Provider Mary Marinelli   6/3/2019  2:15 PM Nathan Hart DPM [facility]   6/7/2019  8:30 AM Trini Rao MD [address] IM [facility]   6/17/2019  9:00 AM Marivel Wesley MD Neuro Spec [address] [facility]   6/27/2019  1:45 PM Tacho Thompson MD grtlk exc [facility]   7/3/2019  1:00 PM Piedad Garcia MD Neuro Endo [facility]   8/12/2019 10:00 AM SCHEDULE, [facility] SV CANCER [facility]   8/19/2019 11:00 AM Meliza Stroud MD [facility]            Patient Active Problem List:     Essential hypertension     Pure hypercholesterolemia     Cerebrovascular accident (CVA), 2011, no residual deficit (HCC)     CKD (chronic kidney disease) stage 3, GFR 30-59 ml/min (HCC)     Peripheral vascular disease (HCC)     TIBURCIO (obstructive sleep apnea)     Morbid obesity with BMI of 50.0-59.9, adult (HCC)     Primary osteoarthritis of left knee     Chronic tension-type headache, not intractable     Vitamin D deficiency     Carpal tunnel syndrome, bilateral-not on medication due to kidney disease

## 2019-05-23 NOTE — TELEPHONE ENCOUNTER
E-scribe request for VItamin D. Please review and e-scribe if applicable. Next Visit Date:  5/23/2019    Health Maintenance   Topic Date Due    Colon cancer screen colonoscopy  05/30/2018    Shingles Vaccine (3 of 3) 08/21/2018    A1C test (Diabetic or Prediabetic)  04/19/2019    Low dose CT lung screening  05/10/2019    Cervical cancer screen  06/07/2019    Potassium monitoring  03/25/2020    Creatinine monitoring  03/25/2020    Breast cancer screen  09/28/2020    Pneumococcal 65+ years Vaccine (2 of 2 - PPSV23) 12/20/2021    Lipid screen  04/19/2023    DTaP/Tdap/Td vaccine (2 - Td) 03/08/2028    DEXA (modify frequency per FRAX score)  Completed    Flu vaccine  Completed    Hepatitis C screen  Completed    HIV screen  Completed             (applicable per patient's age: Cancer Screenings, Depression Screening, Fall Risk Screening, Immunizations)    Hemoglobin A1C (%)   Date Value   04/19/2018 5.7   06/08/2016 5.4   06/19/2014 5.5     Microalb/Crt.  Ratio (mcg/mg creat)   Date Value   05/09/2017 6     LDL Cholesterol (mg/dL)   Date Value   04/19/2018 85     AST (U/L)   Date Value   03/25/2019 14     ALT (U/L)   Date Value   03/25/2019 11     BUN (mg/dL)   Date Value   03/25/2019 22      (goal A1C is < 7)   (goal LDL is <100) need 30-50% reduction from baseline     BP Readings from Last 3 Encounters:   04/19/19 (!) 176/82   04/15/19 (!) 149/83   04/09/19 118/78    (goal /80)      All Future Testing planned in [facility]:  Lab Frequency Next Occurrence   CTA Neck W Contrast Once 06/26/2019   BUN & Creatinine Once 69/91/5774   Basic Metabolic Panel Once 24/01/1740   Iron And TIBC Once 07/31/2019   Ferritin Once 07/31/2019   Transferrin Once 07/31/2019   Reticulocytes Once 07/31/2019   Vitamin B12 & Folate Once 07/31/2019   Miscellaneous Sendout 1 Once 03/25/2019   Lead, Blood Once 03/25/2019   Ferritin Once 04/15/2019   Iron and TIBC Once 04/15/2019   Comprehensive Metabolic Panel     CBC Auto Differential     Electrophoresis Protein, Serum without Reflex to Immunofixation     [place]/Lambda Free Lt Chains, Serum Quant     CBC Auto Differential         Next Visit Date:  Future Appointments   Date Time Provider Mary Bakeri   6/3/2019  2:15 PM Tyrone Castle DPM [facility]   6/7/2019  8:30 AM Quintin Orozco MD [address] IM [facility]   6/17/2019  9:00 AM Stephan Kline MD Neuro Spec [address] [facility]   6/27/2019  1:45 PM Emilia Cruz MD grtlk exc [facility]   7/3/2019  1:00 PM Xenia Talbot MD Neuro Endo [facility]   8/12/2019 10:00 AM SCHEDULE, [facility] SV CANCER [facility]   8/19/2019 11:00 AM Yoel Villeda MD [facility]            Patient Active Problem List:     Essential hypertension     Pure hypercholesterolemia     Cerebrovascular accident (CVA), 2011, no residual deficit (HCC)     CKD (chronic kidney disease) stage 3, GFR 30-59 ml/min (HCC)     Peripheral vascular disease (HCC)     TIBURCIO (obstructive sleep apnea)     Morbid obesity with BMI of 50.0-59.9, adult (HCC)     Primary osteoarthritis of left knee     Chronic tension-type headache, not intractable     Vitamin D deficiency     Carpal tunnel syndrome, bilateral-not on medication due to kidney disease

## 2019-05-23 NOTE — TELEPHONE ENCOUNTER
E-scribe request for Folic Acid and Ferrous Sulfate . Please review and e-scribe if applicable. Next Visit Date:  5/23/2019    Health Maintenance   Topic Date Due    Colon cancer screen colonoscopy  05/30/2018    Shingles Vaccine (3 of 3) 08/21/2018    A1C test (Diabetic or Prediabetic)  04/19/2019    Low dose CT lung screening  05/10/2019    Cervical cancer screen  06/07/2019    Potassium monitoring  03/25/2020    Creatinine monitoring  03/25/2020    Breast cancer screen  09/28/2020    Pneumococcal 65+ years Vaccine (2 of 2 - PPSV23) 12/20/2021    Lipid screen  04/19/2023    DTaP/Tdap/Td vaccine (2 - Td) 03/08/2028    DEXA (modify frequency per FRAX score)  Completed    Flu vaccine  Completed    Hepatitis C screen  Completed    HIV screen  Completed             (applicable per patient's age: Cancer Screenings, Depression Screening, Fall Risk Screening, Immunizations)    Hemoglobin A1C (%)   Date Value   04/19/2018 5.7   06/08/2016 5.4   06/19/2014 5.5     Microalb/Crt.  Ratio (mcg/mg creat)   Date Value   05/09/2017 6     LDL Cholesterol (mg/dL)   Date Value   04/19/2018 85     AST (U/L)   Date Value   03/25/2019 14     ALT (U/L)   Date Value   03/25/2019 11     BUN (mg/dL)   Date Value   03/25/2019 22      (goal A1C is < 7)   (goal LDL is <100) need 30-50% reduction from baseline     BP Readings from Last 3 Encounters:   04/19/19 (!) 176/82   04/15/19 (!) 149/83   04/09/19 118/78    (goal /80)      All Future Testing planned in [facility]:  Lab Frequency Next Occurrence   CTA Neck W Contrast Once 06/26/2019   BUN & Creatinine Once 00/23/2696   Basic Metabolic Panel Once 89/35/6000   Iron And TIBC Once 07/31/2019   Ferritin Once 07/31/2019   Transferrin Once 07/31/2019   Reticulocytes Once 07/31/2019   Vitamin B12 & Folate Once 07/31/2019   Miscellaneous Sendout 1 Once 03/25/2019   Lead, Blood Once 03/25/2019   Ferritin Once 04/15/2019   Iron and TIBC Once 04/15/2019   Comprehensive Metabolic Panel     CBC Auto Differential     Electrophoresis Protein, Serum without Reflex to Immunofixation     [place]/Lambda Free Lt Chains, Serum Quant     CBC Auto Differential         Next Visit Date:  Future Appointments   Date Time Provider Mary Bakeri   6/3/2019  2:15 PM Vipul Lynne DPM [facility]   6/7/2019  8:30 AM Lincoln Alvarenga MD [address] [place] [address] [address] [facility]   6/17/2019  9:00 AM Tamara Godorich MD Neuro Spec [address] [employer]   6/27/2019  1:45 PM Lindy Seip, MD grtlk exc [facility]   7/3/2019  1:00 PM Cristine Kocher, MD Neuro Endo [facility]   8/12/2019 10:00 AM SCHEDULE, [facility]   8/19/2019 11:00 AM Jennifer Hernandez MD [facility] [address] [facility]            Patient Active Problem List:     Essential hypertension     Pure hypercholesterolemia     Cerebrovascular accident (CVA), 2011, no residual deficit (HCC)     CKD (chronic kidney disease) stage 3, GFR 30-59 ml/min (HCC)     Peripheral vascular disease (HCC)     TIBURCIO (obstructive sleep apnea)     Morbid obesity with BMI of 50.0-59.9, adult (HCC)     Primary osteoarthritis of left knee     Chronic tension-type headache, not intractable     Vitamin D deficiency     Carpal tunnel syndrome, bilateral-not on medication due to kidney disease

## 2019-05-24 RX ORDER — MELATONIN
Qty: 90 TABLET | Refills: 1 | Status: SHIPPED | OUTPATIENT
Start: 2019-05-24 | End: 2019-06-07 | Stop reason: SDUPTHER

## 2019-05-24 RX ORDER — FOLIC ACID 1 MG/1
TABLET ORAL
Qty: 30 TABLET | Refills: 5 | Status: SHIPPED | OUTPATIENT
Start: 2019-05-24 | End: 2019-11-06 | Stop reason: SDUPTHER

## 2019-05-24 RX ORDER — LOSARTAN POTASSIUM 50 MG/1
TABLET ORAL
Qty: 30 TABLET | Refills: 2 | Status: SHIPPED | OUTPATIENT
Start: 2019-05-24 | End: 2019-06-07 | Stop reason: SDUPTHER

## 2019-05-24 RX ORDER — PNV NO.95/FERROUS FUM/FOLIC AC 28MG-0.8MG
TABLET ORAL
Qty: 30 TABLET | Refills: 0 | Status: SHIPPED | OUTPATIENT
Start: 2019-05-24 | End: 2019-06-24 | Stop reason: SDUPTHER

## 2019-06-03 ENCOUNTER — OFFICE VISIT (OUTPATIENT)
Dept: PODIATRY | Age: 64
End: 2019-06-03
Payer: MEDICARE

## 2019-06-03 VITALS
HEART RATE: 68 BPM | DIASTOLIC BLOOD PRESSURE: 76 MMHG | HEIGHT: 71 IN | SYSTOLIC BLOOD PRESSURE: 113 MMHG | BODY MASS INDEX: 41.02 KG/M2 | WEIGHT: 293 LBS

## 2019-06-03 DIAGNOSIS — M79.675 PAIN IN TOES OF BOTH FEET: ICD-10-CM

## 2019-06-03 DIAGNOSIS — M19.90 ARTHRITIS: ICD-10-CM

## 2019-06-03 DIAGNOSIS — M20.12 HAV (HALLUX ABDUCTO VALGUS), LEFT: ICD-10-CM

## 2019-06-03 DIAGNOSIS — L84 CALLUS: ICD-10-CM

## 2019-06-03 DIAGNOSIS — M20.11 HAV (HALLUX ABDUCTO VALGUS), RIGHT: ICD-10-CM

## 2019-06-03 DIAGNOSIS — B35.1 ONYCHOMYCOSIS: Primary | ICD-10-CM

## 2019-06-03 DIAGNOSIS — M79.674 PAIN IN TOES OF BOTH FEET: ICD-10-CM

## 2019-06-03 PROCEDURE — 11055 PARING/CUTG B9 HYPRKER LES 1: CPT | Performed by: STUDENT IN AN ORGANIZED HEALTH CARE EDUCATION/TRAINING PROGRAM

## 2019-06-03 PROCEDURE — 11721 DEBRIDE NAIL 6 OR MORE: CPT | Performed by: STUDENT IN AN ORGANIZED HEALTH CARE EDUCATION/TRAINING PROGRAM

## 2019-06-03 PROCEDURE — 99212 OFFICE O/P EST SF 10 MIN: CPT | Performed by: STUDENT IN AN ORGANIZED HEALTH CARE EDUCATION/TRAINING PROGRAM

## 2019-06-03 NOTE — TELEPHONE ENCOUNTER
Diclofenac gel pending for refill   Health Maintenance   Topic Date Due    Colon cancer screen colonoscopy  05/30/2018    Shingles Vaccine (3 of 3) 08/21/2018    A1C test (Diabetic or Prediabetic)  04/19/2019    Low dose CT lung screening  05/10/2019    Cervical cancer screen  06/07/2019    Potassium monitoring  03/25/2020    Creatinine monitoring  03/25/2020    Breast cancer screen  09/28/2020    Pneumococcal 65+ years Vaccine (2 of 2 - PPSV23) 12/20/2021    Lipid screen  04/19/2023    DTaP/Tdap/Td vaccine (2 - Td) 03/08/2028    DEXA (modify frequency per FRAX score)  Completed    Flu vaccine  Completed    Hepatitis C screen  Completed    HIV screen  Completed             (applicable per patient's age: Cancer Screenings, Depression Screening, Fall Risk Screening, Immunizations)    Hemoglobin A1C (%)   Date Value   04/19/2018 5.7   06/08/2016 5.4   06/19/2014 5.5     Microalb/Crt.  Ratio (mcg/mg creat)   Date Value   05/09/2017 6     LDL Cholesterol (mg/dL)   Date Value   04/19/2018 85     AST (U/L)   Date Value   03/25/2019 14     ALT (U/L)   Date Value   03/25/2019 11     BUN (mg/dL)   Date Value   03/25/2019 22      (goal A1C is < 7)   (goal LDL is <100) need 30-50% reduction from baseline     BP Readings from Last 3 Encounters:   04/19/19 (!) 176/82   04/15/19 (!) 149/83   04/09/19 118/78    (goal /80)      All Future Testing planned in [facility]:  Lab Frequency Next Occurrence   CTA Neck W Contrast Once 06/26/2019   BUN & Creatinine Once 25/50/3283   Basic Metabolic Panel Once 54/88/7909   Iron And TIBC Once 07/31/2019   Ferritin Once 07/31/2019   Transferrin Once 07/31/2019   Reticulocytes Once 07/31/2019   Vitamin B12 & Folate Once 07/31/2019   Miscellaneous Sendout 1 Once 03/25/2019   Lead, Blood Once 03/25/2019   Ferritin Once 04/15/2019   Iron and TIBC Once 04/15/2019   Comprehensive Metabolic Panel     CBC Auto Differential     Electrophoresis Protein, Serum without Reflex to Immunofixation     [place]/Lambda Free Lt Chains, Serum Quant     CBC Auto Differential         Next Visit Date:  Future Appointments   Date Time Provider Mary Marinelli   6/3/2019  2:15 PM Kiersten Freitas, DPM [address] [facility] Podiatry [facility]   6/7/2019  8:30 AM Li Locke MD [facility] IM [facility]   6/17/2019  9:00 AM Madhavi Rossi MD Neuro Spec [facility]   6/27/2019  1:45 PM Hillary Barajas MD grtlk exc [facility]   7/3/2019  1:00 PM Xavier Elias MD Neuro Endo [facility]   8/12/2019 10:00 AM SCHEDULE, [facility] SV CANCER [facility]   8/19/2019 11:00 AM Louis Angeles MD [facility]            Patient Active Problem List:     Essential hypertension     Pure hypercholesterolemia     Cerebrovascular accident (CVA), 2011, no residual deficit (HCC)     CKD (chronic kidney disease) stage 3, GFR 30-59 ml/min (HCC)     Peripheral vascular disease (HCC)     TIBURCIO (obstructive sleep apnea)     Morbid obesity with BMI of 50.0-59.9, adult (HCC)     Primary osteoarthritis of left knee     Chronic tension-type headache, not intractable     Vitamin D deficiency     Carpal tunnel syndrome, bilateral-not on medication due to kidney disease

## 2019-06-03 NOTE — PROGRESS NOTES
Subjective:   Diabetes     Toe Pain      Patient is a 70 y.o. female that presents to clinic for follow-up of thickened painful toenails. Patient is non-diabetic. She is unable to reach her toe nails due to obesity. States that the nails get painful when they get too long. Hallux on the right has been aching at night. Relates that she has been doing more walking lately. No injury. She denies numbness, tingling, cramping or pain in the feet otherwise. Denies open wounds. Denies fevers, chills, nausea, or vomiting.      Review of Systems   Denies n/v/f/c    Past Medical History:   Diagnosis Date    Bleeding     while on aggrenox    Cataracts, bilateral     Cerebrovascular disease 2003,2011    cva    Chronic pain in left foot     CKD (chronic kidney disease) stage 3, GFR 30-59 ml/min ([facility])     GERD (gastroesophageal reflux disease)     Hx of blood clots     Hyperlipidemia     Hypertension     Iron (Fe) deficiency anemia     Nicotine abuse     Obesity     Osteoarthritis     Peripheral vascular disease ([facility] [address] 3/13/2014    Substance abuse ([facility])     cocaine, canabis    Thalassemia minor     Unspecified cerebral artery occlusion with cerebral infarction     Unspecified sleep apnea      Current Outpatient Medications on File Prior to Visit   Medication Sig Dispense Refill    folic acid (FOLVITE) 1 MG tablet TAKE 1 TABLET DAILY 30 tablet 5    Cholecalciferol (VITAMIN D3) 1000 units TABS TAKE 1 TABLET DAILY 90 tablet 1    losartan (COZAAR) 50 MG tablet TAKE 1 TABLET BY MOUTH DAILY 30 tablet 2    Ferrous Sulfate (IRON) 325 (65 Fe) MG TABS TAKE 1 TABLET IN THE MORNING 30 tablet 0    clopidogrel (PLAVIX) 75 MG tablet TAKE 1 TABLET DAILY 30 tablet 3    diclofenac sodium 1 % GEL Apply 2 g topically 2 times daily 1 Tube 3    aspirin 81 MG EC tablet TAKE 1 TABLET DAILY 30 tablet 5    ibuprofen (ADVIL;MOTRIN) 800 MG tablet Take 1 tablet by mouth every 8 hours as needed for Pain 20 tablet 0    amLODIPine (NORVASC) 10 MG tablet TAKE 1 TABLET DAILY 30 tablet 5    hydrochlorothiazide (HYDRODIURIL) 25 MG tablet TAKE 1 TABLET IN THE MORNING 30 tablet 5    acetaminophen (AMINOFEN) 325 MG tablet Take 2 tablets by mouth every 4 hours as needed for Pain 60 tablet 3     No current facility-administered medications on file prior to visit. Objective:   Physical Exam   Vitals:    06/03/19 1414   BP: 113/76   Pulse: 68        Lab Results   Component Value Date    LABA1C 5.7 04/19/2018       Gen: AAOx3, NAD    Vascular: DP and PT pulses are not palpable bilateral. CFT is brisk to all digits. Skin temp is warm to warm proximal to distal, bilateral.     Neurologic: SILT to the digits. Dermatologic: Nails 1-10 are thickened, elongated, discolored with the presence of subungual debris. Webspaces 1-4 are c/d/i, bilateral. No open lesions noted. 2nd digit L distal tuft callus. Musculoskeletal: Muscle strength 5/5 for all LE muscle groups. Decreased medial arch noted, b/l. Decreased ROM b/l kojo joint. Contracted digits 2-5 b/l. Abducted hallux noted b/l with medial eminence with right worse than left. Assessment:      Diagnosis Orders   1. Onychomycosis  UT DEBRIDEMENT OF NAILS, 6 OR MORE   2. Hav (hallux abducto valgus), right     3. Hav (hallux abducto valgus), left     4. Pain in toes of both feet  UT DEBRIDEMENT OF NAILS, 6 OR MORE    UT TRIM HYPERKERATOTIC SKIN LESION, ONE   5. Callus  UT TRIM HYPERKERATOTIC SKIN LESION, ONE      Plan:   · Patient seen and evaluated. · Discussed treatment options with patient. · Nails debrided with sterile nail nippers in thickness and length 1-5 oanh without incident. · Pared callus to 2nd digit Left foot with sterile #15 blade without incident. Pt tolerated well with improved pain. · Recommended supportive wide toe box shoe gear. · Patient to RTC in 3 months for foot care. No orders of the defined types were placed in this encounter.     Orders Placed This Encounter Procedures    CO DEBRIDEMENT OF NAILS, 6 OR MORE    CO TRIM HYPERKERATOTIC SKIN LESION, ONE         Electronically signed by Keyana Jewell DPM on 6/3/2019 at 3:11 PM

## 2019-06-03 NOTE — PATIENT INSTRUCTIONS
if:    · You have signs of infection, such as:  ? Increased pain, swelling, warmth, or redness. ? Red streaks leading from the site. ? Pus draining from the site. ? A fever.     · You have new or increased toe pain.    Watch closely for changes in your health, and be sure to contact your doctor if:    · You do not get better as expected. Where can you learn more? Go to [email or web address] and sign in to your [employer] account. Enter D202 in the [employer] box to learn more about \"Toenail Fungus: Care Instructions. \"     If you do not have an account, please click on the \"Sign Up Now\" link. Current as of: April 17, 2018  Content Version: 12.0  © 5902-3993 Healthwise, Incorporated. Care instructions adapted under license by [facility] ([facility]). If you have questions about a medical condition or this instruction, always ask your healthcare professional. [place] [address] any warranty or liability for your use of this information.

## 2019-06-06 ENCOUNTER — TELEPHONE (OUTPATIENT)
Dept: ONCOLOGY | Age: 64
End: 2019-06-06

## 2019-06-06 DIAGNOSIS — Z87.891 PERSONAL HISTORY OF NICOTINE DEPENDENCE: Primary | ICD-10-CM

## 2019-06-07 ENCOUNTER — OFFICE VISIT (OUTPATIENT)
Dept: INTERNAL MEDICINE | Age: 64
End: 2019-06-07
Payer: COMMERCIAL

## 2019-06-07 VITALS
HEART RATE: 74 BPM | SYSTOLIC BLOOD PRESSURE: 134 MMHG | OXYGEN SATURATION: 97 % | WEIGHT: 293 LBS | TEMPERATURE: 98 F | HEIGHT: 71 IN | DIASTOLIC BLOOD PRESSURE: 79 MMHG | BODY MASS INDEX: 41.02 KG/M2

## 2019-06-07 DIAGNOSIS — Z00.00 HEALTH CARE MAINTENANCE: ICD-10-CM

## 2019-06-07 DIAGNOSIS — E55.9 VITAMIN D DEFICIENCY: ICD-10-CM

## 2019-06-07 DIAGNOSIS — I10 ESSENTIAL HYPERTENSION: ICD-10-CM

## 2019-06-07 DIAGNOSIS — M17.12 PRIMARY OSTEOARTHRITIS OF LEFT KNEE: ICD-10-CM

## 2019-06-07 DIAGNOSIS — G56.03 BILATERAL CARPAL TUNNEL SYNDROME: ICD-10-CM

## 2019-06-07 DIAGNOSIS — Z12.11 COLON CANCER SCREENING: Primary | ICD-10-CM

## 2019-06-07 DIAGNOSIS — I67.9 CEREBROVASCULAR DISEASE: ICD-10-CM

## 2019-06-07 LAB — HBA1C MFR BLD: 5.4 %

## 2019-06-07 PROCEDURE — 83036 HEMOGLOBIN GLYCOSYLATED A1C: CPT | Performed by: STUDENT IN AN ORGANIZED HEALTH CARE EDUCATION/TRAINING PROGRAM

## 2019-06-07 PROCEDURE — 99214 OFFICE O/P EST MOD 30 MIN: CPT | Performed by: STUDENT IN AN ORGANIZED HEALTH CARE EDUCATION/TRAINING PROGRAM

## 2019-06-07 PROCEDURE — 99211 OFF/OP EST MAY X REQ PHY/QHP: CPT | Performed by: INTERNAL MEDICINE

## 2019-06-07 RX ORDER — LOSARTAN POTASSIUM 50 MG/1
TABLET ORAL
Qty: 30 TABLET | Refills: 2 | Status: SHIPPED | OUTPATIENT
Start: 2019-06-07 | End: 2019-09-25

## 2019-06-07 RX ORDER — ACETAMINOPHEN 325 MG/1
650 TABLET ORAL EVERY 4 HOURS PRN
Qty: 60 TABLET | Refills: 3 | Status: SHIPPED | OUTPATIENT
Start: 2019-06-07 | End: 2019-06-12 | Stop reason: SDUPTHER

## 2019-06-07 RX ORDER — MELATONIN
Qty: 90 TABLET | Refills: 1 | Status: SHIPPED | OUTPATIENT
Start: 2019-06-07 | End: 2019-11-27 | Stop reason: SDUPTHER

## 2019-06-07 RX ORDER — ASPIRIN 81 MG/1
TABLET ORAL
Qty: 30 TABLET | Refills: 5 | Status: SHIPPED | OUTPATIENT
Start: 2019-06-07 | End: 2019-11-27 | Stop reason: SDUPTHER

## 2019-06-07 RX ORDER — ATORVASTATIN CALCIUM 40 MG/1
40 TABLET, FILM COATED ORAL DAILY
Qty: 30 TABLET | Refills: 5 | Status: SHIPPED | OUTPATIENT
Start: 2019-06-07 | End: 2019-11-27 | Stop reason: SDUPTHER

## 2019-06-07 RX ORDER — HYDROCHLOROTHIAZIDE 25 MG/1
TABLET ORAL
Qty: 30 TABLET | Refills: 5 | Status: SHIPPED | OUTPATIENT
Start: 2019-06-07 | End: 2019-11-27 | Stop reason: SDUPTHER

## 2019-06-07 RX ORDER — CLOPIDOGREL BISULFATE 75 MG/1
TABLET ORAL
Qty: 30 TABLET | Refills: 3 | Status: SHIPPED | OUTPATIENT
Start: 2019-06-07 | End: 2019-11-27 | Stop reason: SDUPTHER

## 2019-06-07 RX ORDER — AMLODIPINE BESYLATE 10 MG/1
TABLET ORAL
Qty: 30 TABLET | Refills: 5 | Status: SHIPPED | OUTPATIENT
Start: 2019-06-07 | End: 2019-11-27 | Stop reason: SDUPTHER

## 2019-06-07 ASSESSMENT — PATIENT HEALTH QUESTIONNAIRE - PHQ9
2. FEELING DOWN, DEPRESSED OR HOPELESS: 0
SUM OF ALL RESPONSES TO PHQ QUESTIONS 1-9: 0
SUM OF ALL RESPONSES TO PHQ QUESTIONS 1-9: 0
SUM OF ALL RESPONSES TO PHQ9 QUESTIONS 1 & 2: 0
1. LITTLE INTEREST OR PLEASURE IN DOING THINGS: 0

## 2019-06-07 NOTE — PROGRESS NOTES
Visit Information    Have you changed or started any medications since your last visit including any over-the-counter medicines, vitamins, or herbal medicines? no   Have you stopped taking any of your medications? Is so, why? -  no  Are you having any side effects from any of your medications? - no    Have you seen any other physician or provider since your last visit?  no   Have you had any other diagnostic tests since your last visit?  no   Have you been seen in the emergency room and/or had an admission in a hospital since we last saw you?  no   Have you had your routine dental cleaning in the past 6 months?  no     Do you have an active MyChart account? If no, what is the barrier?   Yes    Patient Care Team:  Sallie Lemon MD as PCP - General (Internal Medicine)  May Marino RN as Nurse Navigator    Medical History Review  Past Medical, Family, and Social History reviewed and does contribute to the patient presenting condition    Health Maintenance   Topic Date Due    Colon cancer screen colonoscopy  05/30/2018    Shingles Vaccine (3 of 3) 08/21/2018    A1C test (Diabetic or Prediabetic)  04/19/2019    Low dose CT lung screening  05/10/2019    Cervical cancer screen  06/07/2019    Potassium monitoring  03/25/2020    Creatinine monitoring  03/25/2020    Breast cancer screen  09/28/2020    Pneumococcal 65+ years Vaccine (2 of 2 - PPSV23) 12/20/2021    Lipid screen  04/19/2023    DTaP/Tdap/Td vaccine (2 - Td) 03/08/2028    DEXA (modify frequency per FRAX score)  Completed    Flu vaccine  Completed    Hepatitis C screen  Completed    HIV screen  Completed

## 2019-06-07 NOTE — PROGRESS NOTES
tablet 3    aspirin 81 MG EC tablet TAKE 1 TABLET DAILY 30 tablet 5    ibuprofen (ADVIL;MOTRIN) 800 MG tablet Take 1 tablet by mouth every 8 hours as needed for Pain 20 tablet 0    amLODIPine (NORVASC) 10 MG tablet TAKE 1 TABLET DAILY 30 tablet 5    hydrochlorothiazide (HYDRODIURIL) 25 MG tablet TAKE 1 TABLET IN THE MORNING 30 tablet 5    acetaminophen (AMINOFEN) 325 MG tablet Take 2 tablets by mouth every 4 hours as needed for Pain 60 tablet 3     No current facility-administered medications on file prior to visit. SOCIAL HISTORY    Reviewed and no change from previous record. Kindra Martínez  reports that she quit smoking about 7 years ago. She has a 70.00 pack-year smoking history.  She has never used smokeless tobacco.    FAMILY HISTORY:    Reviewed and No change from previous visit    REVIEW OF SYSTEMS:    ENT: negative  Respiratory: no cough, shortness of breath, or wheezing  Cardiovascular: no chest pain or dyspnea on exertion  Gastrointestinal: no abdominal pain, black or bloody stools  Neurological: no TIA or stroke symptoms  Endocrine: negative  Genito-Urinary: no dysuria, trouble voiding, or hematuria  Musculoskeletal:c/o knee pains rt >lt  Dermatological: negative    PHYSICAL EXAM:      Vitals:    06/07/19 0815   BP: 134/79   Pulse: 74   Temp: 98 °F (36.7 °C)   SpO2: 97%     General appearance - alert, well appearing, and in no distress, morbid obesity  Chest - clear to auscultation, no wheezes, rales or rhonchi, symmetric air entry  Heart - normal rate, regular rhythm, normal S1, S2, no murmurs, rubs, clicks or gallops  Abdomen - soft, nontender, nondistended, no masses or organomegaly  Neurological - alert, oriented, normal speech, no focal findings or movement disorder noted  Musculoskeletal - ROM restricted, b/l OA of knees  Extremities - peripheral pulses normal, no pedal edema, no clubbing or cyanosis, thenar wasting b/l  Skin - normal coloration and turgor, no rashes, no suspicious skin lesions noted    LABORATORY FINDINGS:    CBC:  Lab Results   Component Value Date    WBC 5.5 03/25/2019    HGB 10.6 03/25/2019     03/25/2019     04/05/2012     BMP:    Lab Results   Component Value Date     03/25/2019    K 4.2 03/25/2019     03/25/2019    CO2 29 03/25/2019    BUN 22 03/25/2019    CREATININE 1.05 03/25/2019    GLUCOSE 89 03/25/2019    GLUCOSE 88 04/12/2012     HEMOGLOBIN A1C:   Lab Results   Component Value Date    LABA1C 5.7 04/19/2018     MICROALBUMIN URINE:   Lab Results   Component Value Date    MICROALBUR <12 05/09/2017     FASTING LIPID PANEL:  Lab Results   Component Value Date    CHOL 154 04/19/2018    HDL 43 04/19/2018    TRIG 128 04/19/2018     LIVER PROFILE:  Lab Results   Component Value Date    ALT 11 03/25/2019    AST 14 03/25/2019    PROT 7.0 03/25/2019    PROT 7.3 03/25/2019    PROT 6.3 10/02/2011    BILITOT 0.29 03/25/2019    BILIDIR <0.08 01/05/2019    LABALBU 4.1 03/25/2019    LABALBU 3.5 10/03/2011      THYROID FUNCTION:   Lab Results   Component Value Date    TSH 1.76 05/02/2018      URINE ANALYSIS: No results found for: LABURIN  ASSESSMENT AND PLAN:    1. Primary osteoarthritis of left knee    - acetaminophen (AMINOFEN) 325 MG tablet; Take 2 tablets by mouth every 4 hours as needed for Pain  Dispense: 60 tablet; Refill: 3  - Ibuprofen prn  - is geting home physical therapy equipment    2. Essential hypertension    - hydrochlorothiazide (HYDRODIURIL) 25 MG tablet; TAKE 1 TABLET IN THE MORNING  Dispense: 30 tablet; Refill: 5  - amLODIPine (NORVASC) 10 MG tablet; TAKE 1 TABLET DAILY  Dispense: 30 tablet; Refill: 5  - losartan (COZAAR) 50 MG tablet  Dispense: 30 tablet; Refill: 2    3. Cerebrovascular disease    - aspirin 81 MG EC tablet; TAKE 1 TABLET DAILY  Dispense: 30 tablet; Refill: 5  - clopidogrel (PLAVIX) 75 MG tablet  Dispense: 30 tablet; Refill: 3    4. Vitamin D deficiency    - Cholecalciferol (VITAMIN D3) 1000 units TABS  Dispense: 90 tablet;  Refill: 1    5. Colon cancer screening - GI appointment on June27    6. Morbid obesity - in follow up with bariatric surgery    7. Microcytic anemia - GI appointment coming up 6/27/19    8. Alpha thalassemia trait    9/ B/l carpel tunnel syndrome - EMG  Not able to wear wrist splints at night due to CPAP usage  Having weakness and dropping/ slipping things from hands      FOLLOW UP:       1. Filomena received counseling on the following healthy behaviors: nutrition, exercise and medication adherence  2. Patient given educational materials - see patient instructions  3. Discussed use, benefit, and side effects of prescribed medications. Barriers to medication compliance addressed. All patient questions answered. Pt voiced understanding. 4.  Reviewed prior labs and health maintenance  5. Continue current medications, diet and exercise.   Follow up in 3 mths         Orlando Caruso MD, PGY-3 Internal Medicine Resident  [facility]  6/7/2019  8:56 AM

## 2019-06-07 NOTE — PATIENT INSTRUCTIONS
Order for EMG faxed to [address] they will all pt for appt. Please call [phone] in not heard within 2 weeks. Return To Clinic 9/11/2019 at 8:30 am. After Visit Summary  given and reviewed. It is very important for your care that you keep your appointment. If for some reason you are unable to keep your appointment it is equally important that you call our office at [phone] to cancel your appointment and reschedule. Failure to do so may result in your termination from our practice.     SL

## 2019-06-07 NOTE — PROGRESS NOTES
ATTENDING PHYSICIAN STATEMENT     I have discussed the care of Bryson Barnett, including pertinent history and exam findings with the resident. I have reviewed the key elements of all parts of the encounter with the resident. I have seen and examined the patient with the resident and the key elements of all parts of the encounter have been performed by me. I agree with the assessment, and status of the problem list as documented. The plan and orders should include   Diagnosis Orders   1. Colon cancer screening     2. Primary osteoarthritis of left knee  acetaminophen (AMINOFEN) 325 MG tablet   3. Essential hypertension  hydrochlorothiazide (HYDRODIURIL) 25 MG tablet    amLODIPine (NORVASC) 10 MG tablet    losartan (COZAAR) 50 MG tablet   4. Cerebrovascular disease  aspirin 81 MG EC tablet    clopidogrel (PLAVIX) 75 MG tablet    atorvastatin (LIPITOR) 40 MG tablet   5. Vitamin D deficiency  Cholecalciferol (VITAMIN D3) 1000 units TABS   6. Bilateral carpal tunnel syndrome  EMG   7. Health care maintenance     8. 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 John Hugo MD  Attending and Faculty Internal Medicine  [facility]  Internal Medicine [address] [place] [address]   6/7/19 9:11 AM        This note is created with the assistance of a speech-recognition program. While intending to generate a document that actually reflects the content of the visit, the document can still have some mistakes which may not have been identified and corrected by editing.

## 2019-06-12 DIAGNOSIS — M17.12 PRIMARY OSTEOARTHRITIS OF LEFT KNEE: ICD-10-CM

## 2019-06-12 RX ORDER — ACETAMINOPHEN 325 MG/1
TABLET, COATED ORAL
Qty: 60 TABLET | Refills: 3 | Status: SHIPPED | OUTPATIENT
Start: 2019-06-12 | End: 2019-11-27 | Stop reason: SDUPTHER

## 2019-06-12 NOTE — TELEPHONE ENCOUNTER
E-scribing request for tylenol. Pt has future appt. Health Maintenance   Topic Date Due    Colon cancer screen colonoscopy  05/30/2018    Shingles Vaccine (3 of 3) 08/21/2018    Low dose CT lung screening  05/10/2019    Cervical cancer screen  06/07/2019    Potassium monitoring  03/25/2020    Creatinine monitoring  03/25/2020    Breast cancer screen  09/28/2020    Pneumococcal 65+ years Vaccine (2 of 2 - PPSV23) 12/20/2021    Lipid screen  04/19/2023    DTaP/Tdap/Td vaccine (2 - Td) 03/08/2028    DEXA (modify frequency per FRAX score)  Completed    Flu vaccine  Completed    Hepatitis C screen  Completed    HIV screen  Completed             (applicable per patient's age: Cancer Screenings, Depression Screening, Fall Risk Screening, Immunizations)    Hemoglobin A1C (%)   Date Value   06/07/2019 5.4   04/19/2018 5.7   06/08/2016 5.4     Microalb/Crt.  Ratio (mcg/mg creat)   Date Value   05/09/2017 6     LDL Cholesterol (mg/dL)   Date Value   04/19/2018 85     AST (U/L)   Date Value   03/25/2019 14     ALT (U/L)   Date Value   03/25/2019 11     BUN (mg/dL)   Date Value   03/25/2019 22      (goal A1C is < 7)   (goal LDL is <100) need 30-50% reduction from baseline     BP Readings from Last 3 Encounters:   06/07/19 134/79   06/03/19 113/76   04/19/19 (!) 176/82    (goal /80)      All Future Testing planned in [facility]:  Lab Frequency Next Occurrence   CTA Neck W Contrast Once 06/26/2019   BUN & Creatinine Once 05/71/0443   Basic Metabolic Panel Once 00/84/1861   Iron And TIBC Once 07/31/2019   Ferritin Once 07/31/2019   Transferrin Once 07/31/2019   Reticulocytes Once 07/31/2019   Vitamin B12 & Folate Once 07/31/2019   Miscellaneous Sendout 1 Once 03/25/2019   Lead, Blood Once 03/25/2019   Ferritin Once 04/15/2019   Iron and TIBC Once 04/15/2019   EMG Once 08/06/2019   Comprehensive Metabolic Panel     CBC Auto Differential     Electrophoresis Protein, Serum without Reflex to Immunofixation [place]/Lambda Free Lt Chains, Serum Quant     CBC Auto Differential         Next Visit Date:  Future Appointments   Date Time Provider Mary Isis   6/17/2019  9:00 AM Raad De Dios MD Neuro Spec [facility]   6/27/2019  1:45 PM Roldan Desouza MD grtlk exc [facility]   7/3/2019  1:00 PM Leatha Llanes MD Neuro Endo [facility]   8/12/2019 10:00 AM SCHEDULE, [facility] SV CANCER [facility]   8/14/2019  1:30 PM [facility] EMG  [facility] EMG None   8/19/2019 11:00 AM Mckayla Bourgeois MD SV Cancer Ct [facility]   9/9/2019  2:15 PM Saima Cheung DPM [facility] Podiatry [facility]   9/11/2019  8:30 AM Nakul Ortiz MD [address] [place] [address] Aime [facility]            Patient Active Problem List:     Essential hypertension     Pure hypercholesterolemia     Cerebrovascular accident (CVA), 2011, no residual deficit (HCC)     CKD (chronic kidney disease) stage 3, GFR 30-59 ml/min (HCC)     Peripheral vascular disease (HCC)     TIBURCIO (obstructive sleep apnea)     Morbid obesity with BMI of 50.0-59.9, adult (HCC)     Primary osteoarthritis of left knee     Chronic tension-type headache, not intractable     Vitamin D deficiency     Carpal tunnel syndrome, bilateral-not on medication due to kidney disease

## 2019-06-17 ENCOUNTER — OFFICE VISIT (OUTPATIENT)
Dept: NEUROLOGY | Age: 64
End: 2019-06-17
Payer: COMMERCIAL

## 2019-06-17 VITALS
HEIGHT: 71 IN | WEIGHT: 293 LBS | DIASTOLIC BLOOD PRESSURE: 76 MMHG | HEART RATE: 52 BPM | BODY MASS INDEX: 41.02 KG/M2 | SYSTOLIC BLOOD PRESSURE: 135 MMHG

## 2019-06-17 DIAGNOSIS — G62.9 PERIPHERAL POLYNEUROPATHY: ICD-10-CM

## 2019-06-17 DIAGNOSIS — M54.2 NECK PAIN: Primary | ICD-10-CM

## 2019-06-17 DIAGNOSIS — M79.10 MUSCLE PAIN: ICD-10-CM

## 2019-06-17 DIAGNOSIS — R29.898 LIMB WEAKNESS: ICD-10-CM

## 2019-06-17 DIAGNOSIS — E66.01 MORBID OBESITY WITH BMI OF 50.0-59.9, ADULT (HCC): ICD-10-CM

## 2019-06-17 DIAGNOSIS — G56.03 BILATERAL CARPAL TUNNEL SYNDROME: ICD-10-CM

## 2019-06-17 DIAGNOSIS — G47.33 OSA (OBSTRUCTIVE SLEEP APNEA): ICD-10-CM

## 2019-06-17 PROCEDURE — 99214 OFFICE O/P EST MOD 30 MIN: CPT | Performed by: PSYCHIATRY & NEUROLOGY

## 2019-06-17 NOTE — PROGRESS NOTES
[facility]            [facility], [address]; [employer], [address]    [address], [address], [place], [address] [facility]            Dept: [phone]          Dept Fax: [phone]             Claudene Roe, MD Glo Longest, MD Ahmed B. Linda Schultze, MD Doneen Gambles, MD Brandon Spalding, MD Aloysius Highman, CNP               NEUROLOGY FOLLOW UP NOTE                                          PATIENT NAME: Nina Moseley   PATIENT MRN: [id number]  FOLLOW UP TODAY: 6/17/2019     Dear Dr. Evita Moe MD,     I had the pleasure of seeing your patient Nina Moseley, who comes for follow up. CHIEF COMPLAINT: Follow-up and Neurologic Problem     INITIAL & INTERVAL HISTORY:     Baldev Fonseca is a 70year old LH AAF, I saw her on 12/17/2018, pt returns for follow up regarding her stroke and bilateral carpal tunnel. Last time, referred her to orthopedics for CTS (EMG/NCV on 10/11/2018: bilateral CTS), pt was seen by Dr. Iker León twice, s/p oral steroid, pt told surgeon that even after two days of taking oral steroid, her pain and numbness in hands and shoulders got better, pt said strength in both arms better too. Sometime neck pain, 3/10, 2-3 times a week, lasted for 5 minutes, sometime radiated to shoulders but not to arms. She has pending EMG/NCV. No issues with bowel movement or urination. Mood is good. Since last visit, she has had no new stroke like symptoms. She had left ICA occlusion, right carotid stenosis, followed with endovascular. She had sleep study done in Feb, was told she has TIBURCIO, she used CPAP every night, sleep is better. She follows with physical therapist currently. Initial clinic visit on 4/12/2018  Stroke x2  First one 2011  Features: speech problem, both hand numbness, hospitalized at [facility] for 1 week.    Risk factors: TIBURCIO, HTN, HLD. No family history of stroke  Social: lives alone, weekend drinking beer, no smoking, sleep with [facility]  Medications: on aspirin and Plavix      NEUROLOGICAL TESTS  MRI brain 5/22/2018  Possible isolated punctate infarct left frontal cortex near the vertex. Severe cystic encephalomalacia, left greater than right, vertex. MRA head, neck 5/22/2018  SEVERELY MOTION LIMITED EXAM. RECOMMEND FOLLOW-UP CTA. Narrowed left common carotid artery and occluded or nearly occluded left internal carotid artery. Decreased flow proximal right vertebral artery. CTA head, neck 6/7/2018  Complete or near complete occlusion of the left internal carotid artery. Relatively normal left common carotid artery. Mild stenosis proximal right internal carotid artery. Moderate stenosis proximal right vertebral artery. Proximal left vertebral artery not well visualized. EMG/NCV 10/11/2018  CTS L>R     PMH/PSH/SH/FMH: Remain unchanged since last visit except those listed in the interval history    Office Visit on 06/07/2019   Component Date Value Ref Range Status    Hemoglobin A1C 06/07/2019 5.4  % Final    except those listed in the interval history.        Diagnosis Date    Bleeding     while on aggrenox    Cataracts, bilateral     Cerebrovascular disease 2003,2011    cva    Chronic pain in left foot     CKD (chronic kidney disease) stage 3, GFR 30-59 ml/min ([facility])     GERD (gastroesophageal reflux disease)     Hx of blood clots     Hyperlipidemia     Hypertension     Iron (Fe) deficiency anemia     Nicotine abuse     Obesity     Osteoarthritis     Peripheral vascular disease ([facility] [address] 3/13/2014    Substance abuse ([facility])     cocaine, canabis    Thalassemia minor     Unspecified cerebral artery occlusion with cerebral infarction     Unspecified sleep apnea         ALLERGIES:   Allergies   Allergen Reactions    Duricef [Cefadroxil Monohydrate]     Fish-Derived Products     Pcn [Penicillins]  Tomato        MEDICATIONS:   Current Outpatient Medications   Medication Sig Dispense Refill    SM PAIN RELIEVER 325 MG tablet TAKE 2 TABLETS BY MOUTH EVERY 4 HOURS AS NEEDED FOR PAIN 60 tablet 3    hydrochlorothiazide (HYDRODIURIL) 25 MG tablet TAKE 1 TABLET IN THE MORNING 30 tablet 5    amLODIPine (NORVASC) 10 MG tablet TAKE 1 TABLET DAILY 30 tablet 5    aspirin 81 MG EC tablet TAKE 1 TABLET DAILY 30 tablet 5    clopidogrel (PLAVIX) 75 MG tablet Once every day by mouth 30 tablet 3    losartan (COZAAR) 50 MG tablet TAKE 1 TABLET BY MOUTH DAILY 30 tablet 2    Cholecalciferol (VITAMIN D3) 1000 units TABS TAKE 1 TABLET DAILY 90 tablet 1    atorvastatin (LIPITOR) 40 MG tablet Take 1 tablet by mouth daily 30 tablet 5    diclofenac sodium 1 % GEL APPLY 2GRAMS TOPICALLY TWICE A  g 3    folic acid (FOLVITE) 1 MG tablet TAKE 1 TABLET DAILY 30 tablet 5    Ferrous Sulfate (IRON) 325 (65 Fe) MG TABS TAKE 1 TABLET IN THE MORNING 30 tablet 0    ibuprofen (ADVIL;MOTRIN) 800 MG tablet Take 1 tablet by mouth every 8 hours as needed for Pain 20 tablet 0     No current facility-administered medications for this visit.         LABS & TESTS:      Lab Results   Component Value Date    WBC 5.5 03/25/2019    HGB 10.6 (L) 03/25/2019    HCT 33.5 (L) 03/25/2019    MCV 71.2 (L) 03/25/2019     (H) 03/25/2019       REVIEW OF SYSTEMS:      CONSTITUTIONAL Weight change: absent, Appetite change: absent, Fatigue: absent    HEENT Ears: normal, Visual disturbance: absent    RESPIRATORY Shortness of breath: present, Cough: absent    CARDIOVASCULAR Chest pain: absent, Leg swelling :present    GI Constipation: absent, Diarrhea: absent, Swallowing change: absent     Urinary frequency: absent, Urinary urgency: absent, Urinary incontinence: absent    MUSCULOSKELETAL Neck pain: absent, Back pain: absent, Stiffness: absent, Muscle pain: absent, Joint pain: absent Restless legs: absent    DERMATOLOGIC Hair loss: absent, Skin changes: absent    NEUROLOGIC Memory loss: absent, Confusion: absent, Seizures: absent Trouble walking or imbalance: present, Dizziness: absent, Weakness: absent, Numbness: present Tremor: absent, Spasm: absent, Speech difficulty: absent, Headache: present, Light sensitivity: absent    PSYCHIATRIC Anxiety: absent, Hallucination: absent, Mood disorder: absent    HEMATOLOGIC Abnormal bleeding: absent, Anemia: absent, Clotting disorder: absent, Lymph gland changes: absent     VITALS  /76 (Site: Left Lower Arm, Position: Sitting)   Pulse 52   Ht 5' 11\" (1.803 m)   Wt (!) 367 lb (166.5 kg)   BMI 51.19 kg/m²        PHYSICAL EXAMINATIONS:     General appearance: cooperative, obese  Skin: no rash or skin lesions. HEENT: normocephalic  Optic Fundi: deferred  Neck: supple, no cervcical adenopathy or carotid bruit  Lungs: clear to auscultation  Heart: Regular rate and rhythm, normal S1, S2. No murmurs, clicks or gallops. Peripheral pulses: radial pulses palpable  Abdominal: BS present, soft, NT, ND  Extremities: no edema    NEUROLOGICAL EXAMINATION:     MS: awake, alert and oriented. No aphasia, dysarthria, or neglect  CNs: PERRLA, EOMI, VF full, sensation intact, face symmetric, hearing intact, soft palate rises on phonation, sternocleidomastoid and trapezius intact. Tongue midline, no fasciculations. Motor: no abnormal movements, tone and bulk okay. RUE: delta 4/5, biceps 4/5, triceps 4+/5,  4+/5  LUE: delta 4/5, biceps 4/5, triceps 4+/5,  4+/5  RLE: hf 4-/5, ke 4-/5, df 4/5, pf 4/5  LLE: hf 4/5, ke 4/5, df 4/5, pf 4/5  Reflexes: 2+ in UEs, could not induce in LEs, babinski not present. Coordination: FNF no dysmetria, heel to shin could not do due to body habitus, negative Rhomberg. Gait: wide based, very cautious, very slow, unsteady, not able to do Tandem. Sensory: stock distribution reduction to temp/pp below knees and elbows. no extinction.     ASSRSSMENT/PLANS:      // Limb weakness  - both upper and lower extremities, worse on proximal UEs, and right leg  - MRI brain showed extensive encephalomalacia, left more than right, which could be, or at least partially the reason for her bilateral weakness  - MRI cervical to r/o any cervical patholgy  - repeat EMG/NCV pending  - continue PT    // peripheral polyneuropathy  - length dependent  - unclear etiology, no history of drinking or DM, A1c this month 5.4  - EMG/NCV, blood work    // diffuse pain  - B12, ESR, CRP, B12, folate check    // CTS  - last EMG/NCV reported CTS  - followed with orthopedics  - repeat EMG/NCV     // History of stroke  - left ICA occlusion, right carotid stenosis, followed with endovascular  - continue stroke secondary prevention  - continue ASA and plavix, control LDL, BG     // Obesity  - BMI 51.5  - encouraged pt to lose weight    // TIBURCIO  - continue CPAP, and lose weight      RTC in 8-9 weeks      Keyana Yip MD, MS

## 2019-06-17 NOTE — PATIENT INSTRUCTIONS
1. Get EMG/NCV done  2. MRI cervical   3. Continue Physical therapy  4. Fall precaution  5.  Blood work including CPK    Return in 8-9 weeks    Artemio Saeed MD, MS

## 2019-06-18 ENCOUNTER — HOSPITAL ENCOUNTER (OUTPATIENT)
Age: 64
Discharge: HOME | End: 2019-06-18
Payer: MEDICARE

## 2019-06-18 DIAGNOSIS — M79.10 MUSCLE PAIN: ICD-10-CM

## 2019-06-18 LAB
C-REACTIVE PROTEIN: 29.7 MG/L (ref 0–5)
FOLATE: >20 NG/ML
SEDIMENTATION RATE, ERYTHROCYTE: 37 MM (ref 0–20)
TOTAL CK: 188 U/L (ref 26–192)
VITAMIN B-12: 748 PG/ML (ref 232–1245)

## 2019-06-18 PROCEDURE — 86140 C-REACTIVE PROTEIN: CPT

## 2019-06-18 PROCEDURE — 82746 ASSAY OF FOLIC ACID SERUM: CPT

## 2019-06-18 PROCEDURE — 82550 ASSAY OF CK (CPK): CPT

## 2019-06-18 PROCEDURE — 82607 VITAMIN B-12: CPT

## 2019-06-18 PROCEDURE — 85651 RBC SED RATE NONAUTOMATED: CPT

## 2019-06-18 PROCEDURE — 36415 COLL VENOUS BLD VENIPUNCTURE: CPT

## 2019-06-19 ENCOUNTER — TELEPHONE (OUTPATIENT)
Dept: INTERNAL MEDICINE | Age: 64
End: 2019-06-19

## 2019-06-21 ENCOUNTER — TELEPHONE (OUTPATIENT)
Dept: CARDIOLOGY | Age: 64
End: 2019-06-21

## 2019-06-21 ENCOUNTER — HOSPITAL ENCOUNTER (OUTPATIENT)
Dept: MRI IMAGING | Age: 64
Discharge: HOME | End: 2019-06-23
Payer: MEDICARE

## 2019-06-21 DIAGNOSIS — R29.898 LIMB WEAKNESS: ICD-10-CM

## 2019-06-21 DIAGNOSIS — Z87.891 PERSONAL HISTORY OF TOBACCO USE, PRESENTING HAZARDS TO HEALTH: ICD-10-CM

## 2019-06-21 DIAGNOSIS — Z12.2 ENCOUNTER FOR SCREENING FOR LUNG CANCER: Primary | ICD-10-CM

## 2019-06-21 DIAGNOSIS — Z12.2 ENCOUNTER FOR SCREENING FOR MALIGNANT NEOPLASM OF LUNG: ICD-10-CM

## 2019-06-21 DIAGNOSIS — M48.02 CERVICAL SPINAL STENOSIS: Primary | ICD-10-CM

## 2019-06-21 DIAGNOSIS — M54.2 NECK PAIN: ICD-10-CM

## 2019-06-21 PROCEDURE — 72141 MRI NECK SPINE W/O DYE: CPT

## 2019-06-25 ENCOUNTER — TELEPHONE (OUTPATIENT)
Dept: INTERNAL MEDICINE | Age: 64
End: 2019-06-25

## 2019-06-25 NOTE — TELEPHONE ENCOUNTER
Lashell Arriaga from centralized scheduling called. .. Patient can not have testing done until the order is changed. .. She has been unable to reach the office, I tried to reach someone but was also unable to reach anyone. .. Please call back. .. [phone]. .. Thanks!

## 2019-06-25 NOTE — TELEPHONE ENCOUNTER
PC to scheduling--- DX code needed to be corrected on order-- dx code corrected and they will contact pt to set up an appt

## 2019-06-26 RX ORDER — PNV NO.95/FERROUS FUM/FOLIC AC 28MG-0.8MG
TABLET ORAL
Qty: 30 TABLET | Refills: 0 | Status: SHIPPED | OUTPATIENT
Start: 2019-06-26 | End: 2019-07-19 | Stop reason: SDUPTHER

## 2019-06-26 NOTE — TELEPHONE ENCOUNTER
Scheduling calling Order for CT is not correct the order that is pending is correct. Please sign order that is pending on 6/6/19. Pt last saw Dr Huber Plummer who was attended by Dr Fahad Schuler.

## 2019-06-27 ENCOUNTER — OFFICE VISIT (OUTPATIENT)
Dept: GASTROENTEROLOGY | Age: 64
End: 2019-06-27
Payer: COMMERCIAL

## 2019-06-27 VITALS — HEART RATE: 58 BPM | DIASTOLIC BLOOD PRESSURE: 78 MMHG | SYSTOLIC BLOOD PRESSURE: 133 MMHG

## 2019-06-27 DIAGNOSIS — D50.9 IRON DEFICIENCY ANEMIA, UNSPECIFIED IRON DEFICIENCY ANEMIA TYPE: Primary | ICD-10-CM

## 2019-06-27 PROCEDURE — 1036F TOBACCO NON-USER: CPT | Performed by: INTERNAL MEDICINE

## 2019-06-27 PROCEDURE — 99205 OFFICE O/P NEW HI 60 MIN: CPT | Performed by: INTERNAL MEDICINE

## 2019-06-27 PROCEDURE — G8427 DOCREV CUR MEDS BY ELIG CLIN: HCPCS | Performed by: INTERNAL MEDICINE

## 2019-06-27 PROCEDURE — 4040F PNEUMOC VAC/ADMIN/RCVD: CPT | Performed by: INTERNAL MEDICINE

## 2019-06-27 PROCEDURE — G8399 PT W/DXA RESULTS DOCUMENT: HCPCS | Performed by: INTERNAL MEDICINE

## 2019-06-27 PROCEDURE — G8417 CALC BMI ABV UP PARAM F/U: HCPCS | Performed by: INTERNAL MEDICINE

## 2019-06-27 PROCEDURE — 1090F PRES/ABSN URINE INCON ASSESS: CPT | Performed by: INTERNAL MEDICINE

## 2019-06-27 PROCEDURE — 3017F COLORECTAL CA SCREEN DOC REV: CPT | Performed by: INTERNAL MEDICINE

## 2019-06-27 PROCEDURE — 1123F ACP DISCUSS/DSCN MKR DOCD: CPT | Performed by: INTERNAL MEDICINE

## 2019-06-27 PROCEDURE — G8598 ASA/ANTIPLAT THER USED: HCPCS | Performed by: INTERNAL MEDICINE

## 2019-06-27 RX ORDER — POLYETHYLENE GLYCOL 3350, SODIUM CHLORIDE, POTASSIUM CHLORIDE, SODIUM BICARBONATE, AND SODIUM SULFATE 240; 5.84; 2.98; 6.72; 22.72 G/4L; G/4L; G/4L; G/4L; G/4L
4000 POWDER, FOR SOLUTION ORAL ONCE
Qty: 4000 ML | Refills: 0 | Status: SHIPPED | OUTPATIENT
Start: 2019-06-27 | End: 2019-06-27

## 2019-06-27 NOTE — PROGRESS NOTES
INITIAL NOTE    HISTORY OF PRESENT ILLNESS: Ms. Karishma Plaza is a 70 y.o. female referred for evaluation of Fe def anemia. She reports no gross bleeding. No change in bowel habits. No weight loss. No prior EGD or colonoscopy. She is on aspirin and Plavix for prior CVA. Past Medical, Family, and Social History reviewed and does not contribute to the patient presenting condition. Patient's PMH/PSH,SH,PSYCH Hx, MEDs, ALLERGIES, and ROS were all reviewed and updated in the appropriate sections.     PAST MEDICAL HISTORY:  Past Medical History:   Diagnosis Date    Bleeding     while on aggrenox    Cataracts, bilateral     Cerebrovascular disease 2003,2011    cva    Chronic pain in left foot     CKD (chronic kidney disease) stage 3, GFR 30-59 ml/min (HCC)     GERD (gastroesophageal reflux disease)     Hx of blood clots     Hyperlipidemia     Hypertension     Iron (Fe) deficiency anemia     Nicotine abuse     Obesity     Osteoarthritis     Peripheral vascular disease ([facility] [address] 3/13/2014    Substance abuse ([facility])     cocaine, canabis    Thalassemia minor     Unspecified cerebral artery occlusion with cerebral infarction     Unspecified sleep apnea        Past Surgical History:   Procedure Laterality Date    BREAST SURGERY      biopsy    COLONOSCOPY  12/2007    adenomatous polyp    COLONOSCOPY  2013    normal, repeat in 5 years   [address] [facility] GASTROINTESTINAL ENDOSCOPY  2009    negative       CURRENT MEDICATIONS:    Current Outpatient Medications:     Ferrous Sulfate (IRON) 325 (65 Fe) MG TABS, TAKE 1 TABLET IN THE MORNING, Disp: 30 tablet, Rfl: 0    SM PAIN RELIEVER 325 MG tablet, TAKE 2 TABLETS BY MOUTH EVERY 4 HOURS AS NEEDED FOR PAIN, Disp: 60 tablet, Rfl: 3    hydrochlorothiazide (HYDRODIURIL) 25 MG tablet, TAKE 1 TABLET IN THE MORNING, Disp: 30 tablet, Rfl: 5    amLODIPine (NORVASC) 10 MG tablet, TAKE 1 TABLET DAILY, Disp: 30 tablet, Rfl: 5    aspirin 81 MG EC tablet, TAKE 1 TABLET DAILY, Disp: 30 tablet, Rfl: 5    clopidogrel (PLAVIX) 75 MG tablet, Once every day by mouth, Disp: 30 tablet, Rfl: 3    losartan (COZAAR) 50 MG tablet, TAKE 1 TABLET BY MOUTH DAILY, Disp: 30 tablet, Rfl: 2    Cholecalciferol (VITAMIN D3) 1000 units TABS, TAKE 1 TABLET DAILY, Disp: 90 tablet, Rfl: 1    atorvastatin (LIPITOR) 40 MG tablet, Take 1 tablet by mouth daily, Disp: 30 tablet, Rfl: 5    diclofenac sodium 1 % GEL, APPLY 2GRAMS TOPICALLY TWICE A DAY, Disp: 100 g, Rfl: 3    folic acid (FOLVITE) 1 MG tablet, TAKE 1 TABLET DAILY, Disp: 30 tablet, Rfl: 5    ibuprofen (ADVIL;MOTRIN) 800 MG tablet, Take 1 tablet by mouth every 8 hours as needed for Pain, Disp: 20 tablet, Rfl: 0    ALLERGIES:   Allergies   Allergen Reactions    Duricef [Cefadroxil Monohydrate]     Fish-Derived Products     Pcn [Penicillins]     Tomato        FAMILY HISTORY:       Problem Relation Age of Onset    High Blood Pressure Mother     Asthma Mother     Heart Disease Mother     Heart Disease Father     High Blood Pressure Father     Diabetes Brother     High Blood Pressure Brother     Heart Disease Brother     High Blood Pressure Maternal Grandmother     High Blood Pressure Maternal Grandfather     Heart Disease Maternal Grandfather      No known GI malignancy.     SOCIAL HISTORY:   Social History     Socioeconomic History    Marital status:      Spouse name: Not on file    Number of children: Not on file    Years of education: Not on file    Highest education level: Not on file   Occupational History    Not on file   Social Needs    Financial resource strain: Not on file    Food insecurity:     Worry: Not on file     Inability: Not on file    Transportation needs:     Medical: Not on file     Non-medical: Not on file   Tobacco Use    Smoking status: Former Smoker     Packs/day: 2.00     Years: 35.00     Pack years: 70.00 Cardiovascular: S1 S2 RRR no rubs or murmurs. Pulmonary: clear BL. No accessory muscle usage. Abdominal Exam: Soft, NT ND, no hepato or spleno megaly, +BS, no ascites. Morbidly obese  No groin masses or lymphadenopathy. Extremities: no lower ext edema. Skin: Warm skin. No skin rash. No spider nevi palmar erythema nail dystrophy. Joint: No joint swelling or deformity. Neurological: intact sensory. DTR+. LABORATORY DATA: Reviewed  Lab Results   Component Value Date    WBC 5.5 03/25/2019    HGB 10.6 (L) 03/25/2019    HCT 33.5 (L) 03/25/2019    MCV 71.2 (L) 03/25/2019     (H) 03/25/2019     03/25/2019    K 4.2 03/25/2019     03/25/2019    CO2 29 03/25/2019    BUN 22 03/25/2019    CREATININE 1.05 (H) 03/25/2019    LABPROT 5.9 (L) 10/03/2011    LABALBU 4.1 03/25/2019    BILITOT 0.29 (L) 03/25/2019    ALKPHOS 102 03/25/2019    AST 14 03/25/2019    ALT 11 03/25/2019    INR 0.9 10/06/2011         Lab Results   Component Value Date    RBC 4.71 03/25/2019    HGB 10.6 (L) 03/25/2019    MCV 71.2 (L) 03/25/2019    MCH 22.5 (L) 03/25/2019    MCHC 31.6 03/25/2019    RDW 16.0 (H) 03/25/2019    MPV NOT REPORTED 03/25/2019    BASOPCT 1 03/25/2019    LYMPHSABS 1.65 03/25/2019    MONOSABS 0.50 03/25/2019    NEUTROABS 3.01 03/25/2019    EOSABS 0.28 03/25/2019    BASOSABS 0.06 03/25/2019         DIAGNOSTIC TESTING:     Mri Cervical Spine Wo Contrast    Result Date: 6/21/2019  EXAMINATION: MRI OF THE CERVICAL SPINE WITHOUT CONTRAST 6/21/2019 12:10 pm TECHNIQUE: Multiplanar multisequence MRI of the cervical spine was performed without the administration of intravenous contrast. COMPARISON: None. HISTORY: ORDERING SYSTEM PROVIDED HISTORY: Neck pain TECHNOLOGIST PROVIDED HISTORY: neck pain, arm/leg weakness Initial evaluation. FINDINGS: BONES/ALIGNMENT: The vertebral body heights appear maintained. No evidence of spondylolisthesis. Minimal loss of disc space height at C4-C5.   The bone marrow signal demonstrates no acute abnormality. SPINAL CORD: No abnormal cord signal is seen. SOFT TISSUES: No paraspinal mass identified. C2-C3: There is no significant disc bulge, spinal canal stenosis or neural foraminal narrowing. C3-C4: There is a disc bulge with buckling of the ligamentum flavum contributing to minimal spinal canal stenosis. Uncovertebral joint and facet arthrosis contributes mild-to-moderate right and moderate left neural foraminal narrowing. C4-C5: There is a disc bulge with buckling of the ligamentum flavum contributing to mild-to-moderate spinal canal stenosis. Uncovertebral joint and facet arthrosis contribute to moderate bilateral neural foraminal narrowing. C5-C6: There is a disc bulge with a left paracentral disc protrusion along with buckling of the ligamentum flavum contributing to moderate narrowing of the left aspect of the spinal canal.  Uncovertebral joint and facet arthrosis contribute to severe right and moderate to severe left neural foraminal narrowing. C6-C7: There is a disc bulge with buckling of the ligamentum flavum along with a left paracentral disc protrusion. Mild-to-moderate narrowing of the left aspect of the spinal canal.  Uncovertebral joint and facet arthrosis contribute to moderate right and moderate to severe left neural foraminal narrowing. C7-T1: There is no significant disc bulge, spinal canal stenosis or neural foraminal narrowing. 1. Moderate spinal canal stenosis at C5-C6 with mild-to-moderate spinal canal stenosis at C4-C5 and C6-C7. Minimal spinal canal stenosis at C3-C4. 2. Multilevel neural foraminal narrowing of the cervical spine as described above. 3. No evidence of spondylolisthesis. IMPRESSION: Ms. Rita Brandt is a 70 y.o. female with iron deficiency anemia. We discussed differential diagnosis. She needs EGD and colonoscopy. Has multiple comorbidities. She is a high risk due to that.   Given prior CVA we will continue on aspirin and Plavix for the procedure. Thank you for allowing me to participate in the care of Ms. Romana Bureau. For any further questions please do not hesitate to contact me. Rashida Ricci MD [facility]    Please note that this chart was generated using voice recognition Dragon dictation software. Although every effort was made to ensure the accuracy of this automated transcription, some errors in transcription may have occurred.

## 2019-07-03 ENCOUNTER — OFFICE VISIT (OUTPATIENT)
Dept: NEUROLOGY | Age: 64
End: 2019-07-03
Payer: COMMERCIAL

## 2019-07-03 VITALS
SYSTOLIC BLOOD PRESSURE: 125 MMHG | WEIGHT: 293 LBS | HEART RATE: 81 BPM | BODY MASS INDEX: 51.33 KG/M2 | DIASTOLIC BLOOD PRESSURE: 80 MMHG

## 2019-07-03 DIAGNOSIS — I65.22 OCCLUSION OF LEFT INTERNAL CAROTID ARTERY: ICD-10-CM

## 2019-07-03 DIAGNOSIS — I69.30 CHRONIC LEFT ARTERIAL ISCHEMIC STROKE, MCA (MIDDLE CEREBRAL ARTERY): ICD-10-CM

## 2019-07-03 DIAGNOSIS — I65.21 INTRACRANIAL CAROTID STENOSIS, RIGHT: ICD-10-CM

## 2019-07-03 PROCEDURE — 4040F PNEUMOC VAC/ADMIN/RCVD: CPT | Performed by: PSYCHIATRY & NEUROLOGY

## 2019-07-03 PROCEDURE — 3017F COLORECTAL CA SCREEN DOC REV: CPT | Performed by: PSYCHIATRY & NEUROLOGY

## 2019-07-03 PROCEDURE — 1123F ACP DISCUSS/DSCN MKR DOCD: CPT | Performed by: PSYCHIATRY & NEUROLOGY

## 2019-07-03 PROCEDURE — G8427 DOCREV CUR MEDS BY ELIG CLIN: HCPCS | Performed by: PSYCHIATRY & NEUROLOGY

## 2019-07-03 PROCEDURE — 1090F PRES/ABSN URINE INCON ASSESS: CPT | Performed by: PSYCHIATRY & NEUROLOGY

## 2019-07-03 PROCEDURE — G8598 ASA/ANTIPLAT THER USED: HCPCS | Performed by: PSYCHIATRY & NEUROLOGY

## 2019-07-03 PROCEDURE — 99214 OFFICE O/P EST MOD 30 MIN: CPT | Performed by: PSYCHIATRY & NEUROLOGY

## 2019-07-03 PROCEDURE — G8417 CALC BMI ABV UP PARAM F/U: HCPCS | Performed by: PSYCHIATRY & NEUROLOGY

## 2019-07-03 PROCEDURE — G8399 PT W/DXA RESULTS DOCUMENT: HCPCS | Performed by: PSYCHIATRY & NEUROLOGY

## 2019-07-03 PROCEDURE — 1036F TOBACCO NON-USER: CPT | Performed by: PSYCHIATRY & NEUROLOGY

## 2019-07-03 NOTE — PATIENT INSTRUCTIONS
TESTING INSTRUCTIONS    Your doctor has ordered a/an CTA NEck and US carotid for you, this will be done at any [facility] location of your choice    CTA done ASAP and the US done in one year prior to next appointment with Dr Yossi Ramos. On the day of your appointment, please report to the Admitting Department, located on the main floor of the medical center behind the information desk. If you cannot keep this appointment, please call [phone] to cancel and reschedule your appointment.

## 2019-07-03 NOTE — PROGRESS NOTES
Endovascular Neurosurgery - [address], [address] [facility], [address] [place] [address]  P: [phone]  F: [phone]      Dear Dr. Suzi Owen    HPI:     HPI    Sherin Quiles is a 70 y.o. female right handed. History of 2011 cva with expressive aphasia at that time from the left mca cva, htn, hld, obesity and ongoing workup of her weakness. She last saw me 6- for initial evaluation of her chronic left ica occlusion with intracranial right carotid stenosis. She has been doing well without any recurrent stroke symptoms. She has not had any followup imaging in the interim period since her last visit. Allergies   Allergen Reactions    Duricef [Cefadroxil Monohydrate]     Fish-Derived Products     Pcn [Penicillins]     Tomato      Current Outpatient Medications   Medication Sig Dispense Refill    Ferrous Sulfate (IRON) 325 (65 Fe) MG TABS TAKE 1 TABLET IN THE MORNING 30 tablet 0    SM PAIN RELIEVER 325 MG tablet TAKE 2 TABLETS BY MOUTH EVERY 4 HOURS AS NEEDED FOR PAIN 60 tablet 3    hydrochlorothiazide (HYDRODIURIL) 25 MG tablet TAKE 1 TABLET IN THE MORNING 30 tablet 5    amLODIPine (NORVASC) 10 MG tablet TAKE 1 TABLET DAILY 30 tablet 5    aspirin 81 MG EC tablet TAKE 1 TABLET DAILY 30 tablet 5    clopidogrel (PLAVIX) 75 MG tablet Once every day by mouth 30 tablet 3    losartan (COZAAR) 50 MG tablet TAKE 1 TABLET BY MOUTH DAILY 30 tablet 2    Cholecalciferol (VITAMIN D3) 1000 units TABS TAKE 1 TABLET DAILY 90 tablet 1    atorvastatin (LIPITOR) 40 MG tablet Take 1 tablet by mouth daily 30 tablet 5    diclofenac sodium 1 % GEL APPLY 2GRAMS TOPICALLY TWICE A  g 3    folic acid (FOLVITE) 1 MG tablet TAKE 1 TABLET DAILY 30 tablet 5    ibuprofen (ADVIL;MOTRIN) 800 MG tablet Take 1 tablet by mouth every 8 hours as needed for Pain 20 tablet 0     No current facility-administered medications for this visit.       Past Medical History:   Diagnosis Date    Bleeding     while on aggrenox    Cataracts, bilateral     Cerebrovascular disease 2573,0757    cva    Chronic pain in left foot     CKD (chronic kidney disease) stage 3, GFR 30-59 ml/min ([facility])     GERD (gastroesophageal reflux disease)     Hx of blood clots     Hyperlipidemia     Hypertension     Iron (Fe) deficiency anemia     Nicotine abuse     Obesity     Osteoarthritis     Peripheral vascular disease ([facility] [address] 3/13/2014    Substance abuse ([facility])     cocaine, canabis    Thalassemia minor     Unspecified cerebral artery occlusion with cerebral infarction     Unspecified sleep apnea       Past Surgical History:   Procedure Laterality Date    BREAST SURGERY      biopsy    COLONOSCOPY  2007    adenomatous polyp    COLONOSCOPY      normal, repeat in 5 years   [address] UPPER GASTROINTESTINAL ENDOSCOPY  2009    negative     Family History   Problem Relation Age of Onset    High Blood Pressure Mother     Asthma Mother     Heart Disease Mother     Heart Disease Father     High Blood Pressure Father     Diabetes Brother     High Blood Pressure Brother     Heart Disease Brother     High Blood Pressure Maternal Grandmother     High Blood Pressure Maternal Grandfather     Heart Disease Maternal Grandfather      Social History     Tobacco Use    Smoking status: Former Smoker     Packs/day: 2.00     Years: 35.00     Pack years: 70.00     Last attempt to quit: 10/11/2011     Years since quittin.7    Smokeless tobacco: Never Used   Substance Use Topics    Alcohol use:  Yes     Alcohol/week: 3.6 oz     Types: 6 Cans of beer per week     Comment: 6 beers/week(on weekends)        Subjective:     Review of Systems      Objective:     /80 (Site: Right Upper Arm, Position: Sitting, Cuff Size: Large Adult)   Pulse 81   Wt (!) 368 lb (166.9 kg)   BMI 51.33 kg/m²   Physical Exam  Gen: Sitting in chair, nad  CV:RRR  NEURO: alert and oriented x 3 intact language attention and knowledge  CN: eomi, perrl, v1-v3 intact no facial asymmetry, midline tongue  Motor 5/5 bue/ble  COORD: on dysmetria  Sensory: intact lt  Assessment:        Heron Downing is a 70 y.o. female who with 2011 left mca cva from occluded left cervical carotid doing well with no new deficits. Diagnosis Orders   1. Chronic left arterial ischemic stroke, MCA (middle cerebral artery)  CTA Neck W Contrast    BUN & Creatinine    VL DUP CAROTID BILATERAL   2. Occlusion of left internal carotid artery  CTA Neck W Contrast    BUN & Creatinine    VL DUP CAROTID BILATERAL   3. Intracranial carotid stenosis, right  CTA Neck W Contrast    BUN & Creatinine    VL DUP CAROTID BILATERAL       Recommendations:      Return in about 1 year (around 7/3/2020) for carotid u/s prior to visit. Orders Placed This Encounter   Procedures    VL DUP CAROTID BILATERAL     Standing Status:   Future     Standing Expiration Date:   1/3/2021     Scheduling Instructions:      One year followup     Order Specific Question:   Reason for exam:     Answer:   one year followup left carotid occlusion    BUN & Creatinine     Standing Status:   Future     Standing Expiration Date:   7/2/2020     1. cta neck with contrast for followup  2. Carotid u/s in one year with office visit for annual followup  3. Cont aspirin, clopidogrel, statins,  4. Sbp<140, avoid hypotension         Established Patient Visit Time: 25 minutes  Time Spent in Counseling:  15 minutes  Greater than 50% of the time in this visit was spent counseling and coordinating care of this patient. Discussed use, benefit, and side effects of prescribed medications. Personally reviewed imaging with patients and all questions answered. Pt voiced understanding. Patient agreed with treatment plan. Follow up as directed above. If you have any questions, please do not hesitate to call me.   I look forward to following Stephanie Correa

## 2019-07-08 ENCOUNTER — OFFICE VISIT (OUTPATIENT)
Dept: ORTHOPEDIC SURGERY | Age: 64
End: 2019-07-08
Payer: COMMERCIAL

## 2019-07-08 DIAGNOSIS — M48.02 CERVICAL STENOSIS OF SPINE: Primary | ICD-10-CM

## 2019-07-08 DIAGNOSIS — M54.2 NECK PAIN: ICD-10-CM

## 2019-07-08 DIAGNOSIS — M54.12 CERVICAL RADICULAR PAIN: ICD-10-CM

## 2019-07-08 PROCEDURE — 99203 OFFICE O/P NEW LOW 30 MIN: CPT | Performed by: ORTHOPAEDIC SURGERY

## 2019-07-10 ENCOUNTER — TELEPHONE (OUTPATIENT)
Dept: INTERNAL MEDICINE | Age: 64
End: 2019-07-10

## 2019-07-10 DIAGNOSIS — F17.201 NICOTINE DEPENDENCE IN REMISSION, UNSPECIFIED NICOTINE PRODUCT TYPE: Primary | ICD-10-CM

## 2019-07-16 NOTE — TELEPHONE ENCOUNTER
PC from Brian Erwin Dr at Scheduling-- she is still waiting for the CT LUNG SCREEN to be signed-- order is pended in this encounter with all the correct information it just needs to be signed. Can you please sign this. THank you!

## 2019-07-20 RX ORDER — PNV NO.95/FERROUS FUM/FOLIC AC 28MG-0.8MG
TABLET ORAL
Qty: 30 TABLET | Refills: 0 | Status: SHIPPED | OUTPATIENT
Start: 2019-07-20 | End: 2019-08-14 | Stop reason: SDUPTHER

## 2019-07-24 ENCOUNTER — TELEPHONE (OUTPATIENT)
Dept: ONCOLOGY | Age: 64
End: 2019-07-24

## 2019-07-24 ENCOUNTER — HOSPITAL ENCOUNTER (OUTPATIENT)
Dept: CT IMAGING | Age: 64
Discharge: HOME | End: 2019-07-26
Payer: COMMERCIAL

## 2019-07-24 DIAGNOSIS — I69.30 CHRONIC LEFT ARTERIAL ISCHEMIC STROKE, MCA (MIDDLE CEREBRAL ARTERY): ICD-10-CM

## 2019-07-24 LAB
CREAT SERPL-MCNC: 1.06 MG/DL (ref 0.5–0.9)
GFR AFRICAN AMERICAN: >60 ML/MIN
GFR NON-AFRICAN AMERICAN: 52 ML/MIN
GFR SERPL CREATININE-BSD FRML MDRD: ABNORMAL ML/MIN/{1.73_M2}
GFR SERPL CREATININE-BSD FRML MDRD: ABNORMAL ML/MIN/{1.73_M2}

## 2019-07-24 PROCEDURE — 36415 COLL VENOUS BLD VENIPUNCTURE: CPT

## 2019-07-24 PROCEDURE — 82565 ASSAY OF CREATININE: CPT

## 2019-07-24 PROCEDURE — 2580000003 HC RX 258: Performed by: PSYCHIATRY & NEUROLOGY

## 2019-07-24 PROCEDURE — 6360000004 HC RX CONTRAST MEDICATION: Performed by: PSYCHIATRY & NEUROLOGY

## 2019-07-24 PROCEDURE — 70498 CT ANGIOGRAPHY NECK: CPT

## 2019-07-24 RX ORDER — 0.9 % SODIUM CHLORIDE 0.9 %
80 INTRAVENOUS SOLUTION INTRAVENOUS ONCE
Status: COMPLETED | OUTPATIENT
Start: 2019-07-24 | End: 2019-07-24

## 2019-07-24 RX ORDER — SODIUM CHLORIDE 0.9 % (FLUSH) 0.9 %
10 SYRINGE (ML) INJECTION
Status: COMPLETED | OUTPATIENT
Start: 2019-07-24 | End: 2019-07-24

## 2019-07-24 RX ADMIN — Medication 10 ML: at 14:11

## 2019-07-24 RX ADMIN — IOPAMIDOL 75 ML: 755 INJECTION, SOLUTION INTRAVENOUS at 14:10

## 2019-07-24 RX ADMIN — SODIUM CHLORIDE 80 ML: 9 INJECTION, SOLUTION INTRAVENOUS at 14:10

## 2019-07-31 ENCOUNTER — HOSPITAL ENCOUNTER (OUTPATIENT)
Dept: CT IMAGING | Age: 64
Discharge: HOME | End: 2019-08-02
Payer: COMMERCIAL

## 2019-07-31 DIAGNOSIS — Z87.891 PERSONAL HISTORY OF NICOTINE DEPENDENCE: ICD-10-CM

## 2019-07-31 PROCEDURE — G0297 LDCT FOR LUNG CA SCREEN: HCPCS

## 2019-08-06 ENCOUNTER — HOSPITAL ENCOUNTER (OUTPATIENT)
Facility: MEDICAL CENTER | Age: 64
End: 2019-08-06
Payer: COMMERCIAL

## 2019-08-11 ENCOUNTER — HOSPITAL ENCOUNTER (EMERGENCY)
Age: 64
Discharge: HOME | End: 2019-08-11
Attending: EMERGENCY MEDICINE
Payer: COMMERCIAL

## 2019-08-11 ENCOUNTER — APPOINTMENT (OUTPATIENT)
Dept: GENERAL RADIOLOGY | Age: 64
End: 2019-08-11
Payer: COMMERCIAL

## 2019-08-11 VITALS
WEIGHT: 293 LBS | HEART RATE: 60 BPM | SYSTOLIC BLOOD PRESSURE: 126 MMHG | TEMPERATURE: 97.7 F | BODY MASS INDEX: 51.6 KG/M2 | DIASTOLIC BLOOD PRESSURE: 74 MMHG | RESPIRATION RATE: 18 BRPM | OXYGEN SATURATION: 99 %

## 2019-08-11 DIAGNOSIS — M25.561 ACUTE PAIN OF RIGHT KNEE: Primary | ICD-10-CM

## 2019-08-11 PROCEDURE — 96372 THER/PROPH/DIAG INJ SC/IM: CPT

## 2019-08-11 PROCEDURE — 6360000002 HC RX W HCPCS: Performed by: STUDENT IN AN ORGANIZED HEALTH CARE EDUCATION/TRAINING PROGRAM

## 2019-08-11 PROCEDURE — 99283 EMERGENCY DEPT VISIT LOW MDM: CPT

## 2019-08-11 PROCEDURE — 73562 X-RAY EXAM OF KNEE 3: CPT

## 2019-08-11 RX ORDER — KETOROLAC TROMETHAMINE 30 MG/ML
60 INJECTION, SOLUTION INTRAMUSCULAR; INTRAVENOUS ONCE
Status: COMPLETED | OUTPATIENT
Start: 2019-08-11 | End: 2019-08-11

## 2019-08-11 RX ADMIN — KETOROLAC TROMETHAMINE 60 MG: 30 INJECTION, SOLUTION INTRAMUSCULAR at 14:50

## 2019-08-11 ASSESSMENT — PAIN SCALES - GENERAL: PAINLEVEL_OUTOF10: 8

## 2019-08-11 NOTE — ED PROVIDER NOTES
without acute bony   process. Medical decision making  (MDM) / ED Course     Patient presents with right knee pain and mild swelling after injury. Neurovascularly intact distally. Given focal tenderness, considered ligamentous injury but there is no gross instability. No tibial plateau tenderness. XR without chel fracture. Low suspicion for vascular injury with dislocation-relocation. No ankle or hip pain. No back pain with low supicion for significant axial load. No systemic symptoms and nontoxic; given exam and history, low suspicion for septic arthritis, pyomyositis or necrotizing fascitis. No evidence of compartment syndrome or DVT. Pain control. Follow up with PMD and ortho as needed. Cautious return precautions discussed w/ full understanding. Procedures     None    Final impression      1.  Acute pain of right knee         Disposition with plan     Disposition: Decision To Discharge    PATIENT REFERRED TO:  Jovany Zavala MD  [address] [place] [address]  [phone]    Schedule an appointment as soon as possible for a visit       Vannessa Mercedes DO  [facility]  [facility], [address]  [phone]    Schedule an appointment as soon as possible for a visit   As needed, If symptoms worsen      DISCHARGE MEDICATIONS:  New Prescriptions    No medications on file       Josh Zamora MD  Emergency Medicine Resident    (Please note that portions of this note were completed with a voice recognition program.  Efforts were made to edit the dictations but occasionally words are mis-transcribed.)       Diogo Curiel MD  Resident  08/11/19 2207

## 2019-08-12 ENCOUNTER — HOSPITAL ENCOUNTER (OUTPATIENT)
Age: 64
Discharge: HOME | End: 2019-08-12
Payer: COMMERCIAL

## 2019-08-12 DIAGNOSIS — I65.22 OCCLUSION OF LEFT INTERNAL CAROTID ARTERY: ICD-10-CM

## 2019-08-12 DIAGNOSIS — I65.21 INTRACRANIAL CAROTID STENOSIS, RIGHT: ICD-10-CM

## 2019-08-12 DIAGNOSIS — I69.30 CHRONIC LEFT ARTERIAL ISCHEMIC STROKE, MCA (MIDDLE CEREBRAL ARTERY): ICD-10-CM

## 2019-08-12 LAB
BUN BLDV-MCNC: 25 MG/DL (ref 8–23)
CREAT SERPL-MCNC: 1.18 MG/DL (ref 0.5–0.9)
GFR AFRICAN AMERICAN: 56 ML/MIN
GFR NON-AFRICAN AMERICAN: 46 ML/MIN
GFR SERPL CREATININE-BSD FRML MDRD: ABNORMAL ML/MIN/{1.73_M2}
GFR SERPL CREATININE-BSD FRML MDRD: ABNORMAL ML/MIN/{1.73_M2}

## 2019-08-12 PROCEDURE — 36415 COLL VENOUS BLD VENIPUNCTURE: CPT

## 2019-08-12 PROCEDURE — 84520 ASSAY OF UREA NITROGEN: CPT

## 2019-08-12 PROCEDURE — 82565 ASSAY OF CREATININE: CPT

## 2019-08-14 ENCOUNTER — HOSPITAL ENCOUNTER (OUTPATIENT)
Facility: MEDICAL CENTER | Age: 64
End: 2019-08-14
Payer: COMMERCIAL

## 2019-08-14 ENCOUNTER — HOSPITAL ENCOUNTER (OUTPATIENT)
Dept: NEUROLOGY | Age: 64
Discharge: HOME | End: 2019-08-14
Payer: COMMERCIAL

## 2019-08-14 DIAGNOSIS — I10 ESSENTIAL HYPERTENSION: ICD-10-CM

## 2019-08-14 PROCEDURE — 95910 NRV CNDJ TEST 7-8 STUDIES: CPT

## 2019-08-14 PROCEDURE — 95886 MUSC TEST DONE W/N TEST COMP: CPT

## 2019-08-16 RX ORDER — LOSARTAN POTASSIUM 50 MG/1
TABLET ORAL
Qty: 30 TABLET | Refills: 0 | Status: SHIPPED | OUTPATIENT
Start: 2019-08-16 | End: 2019-09-13 | Stop reason: SDUPTHER

## 2019-08-16 RX ORDER — PNV NO.95/FERROUS FUM/FOLIC AC 28MG-0.8MG
TABLET ORAL
Qty: 30 TABLET | Refills: 0 | Status: SHIPPED | OUTPATIENT
Start: 2019-08-16 | End: 2019-09-13 | Stop reason: SDUPTHER

## 2019-08-19 ENCOUNTER — TELEPHONE (OUTPATIENT)
Dept: GASTROENTEROLOGY | Age: 64
End: 2019-08-19

## 2019-08-19 ENCOUNTER — HOSPITAL ENCOUNTER (OUTPATIENT)
Dept: VASCULAR LAB | Age: 64
Discharge: HOME | End: 2019-08-19
Payer: COMMERCIAL

## 2019-08-19 DIAGNOSIS — I69.30 CHRONIC LEFT ARTERIAL ISCHEMIC STROKE, MCA (MIDDLE CEREBRAL ARTERY): ICD-10-CM

## 2019-08-19 DIAGNOSIS — I65.21 INTRACRANIAL CAROTID STENOSIS, RIGHT: ICD-10-CM

## 2019-08-19 DIAGNOSIS — I65.22 OCCLUSION OF LEFT INTERNAL CAROTID ARTERY: ICD-10-CM

## 2019-08-19 PROCEDURE — 93880 EXTRACRANIAL BILAT STUDY: CPT

## 2019-08-20 ENCOUNTER — TELEPHONE (OUTPATIENT)
Dept: ONCOLOGY | Age: 64
End: 2019-08-20

## 2019-08-20 ENCOUNTER — OFFICE VISIT (OUTPATIENT)
Dept: NEUROLOGY | Age: 64
End: 2019-08-20
Payer: COMMERCIAL

## 2019-08-20 VITALS
WEIGHT: 293 LBS | SYSTOLIC BLOOD PRESSURE: 115 MMHG | HEART RATE: 52 BPM | HEIGHT: 70 IN | DIASTOLIC BLOOD PRESSURE: 71 MMHG | BODY MASS INDEX: 41.95 KG/M2

## 2019-08-20 DIAGNOSIS — G47.33 OSA (OBSTRUCTIVE SLEEP APNEA): ICD-10-CM

## 2019-08-20 DIAGNOSIS — M48.02 CERVICAL STENOSIS OF SPINAL CANAL: ICD-10-CM

## 2019-08-20 DIAGNOSIS — D50.9 MICROCYTIC ANEMIA: ICD-10-CM

## 2019-08-20 DIAGNOSIS — Z86.73 HISTORY OF STROKE: ICD-10-CM

## 2019-08-20 DIAGNOSIS — R29.898 LIMB WEAKNESS: Primary | ICD-10-CM

## 2019-08-20 DIAGNOSIS — G62.9 PERIPHERAL POLYNEUROPATHY: ICD-10-CM

## 2019-08-20 DIAGNOSIS — D56.3 THALASSEMIA TRAIT, ALPHA: ICD-10-CM

## 2019-08-20 DIAGNOSIS — R52 DIFFUSE PAIN: ICD-10-CM

## 2019-08-20 DIAGNOSIS — D64.9 ANEMIA, UNSPECIFIED TYPE: ICD-10-CM

## 2019-08-20 DIAGNOSIS — M25.50 ARTHRALGIA, UNSPECIFIED JOINT: ICD-10-CM

## 2019-08-20 DIAGNOSIS — E61.1 IRON DEFICIENCY: ICD-10-CM

## 2019-08-20 DIAGNOSIS — E66.01 MORBID OBESITY WITH BMI OF 50.0-59.9, ADULT (HCC): ICD-10-CM

## 2019-08-20 DIAGNOSIS — E66.01 MORBID OBESITY (HCC): ICD-10-CM

## 2019-08-20 PROCEDURE — 99215 OFFICE O/P EST HI 40 MIN: CPT | Performed by: PSYCHIATRY & NEUROLOGY

## 2019-08-20 NOTE — PROGRESS NOTES
changes, canal stenosis, foraminal stenosis, pt does not want surgery   - EMG/NCV pending report  - continue exercise     // peripheral polyneuropathy  - length dependent  - no history of drinking or DM   cervical MRI showed multilevel neural foraminal narrowing and canal stenosis    // Cervical canal stenosis  - shown on MRI   - candidate per surgeon but pt has no interested in surgery     // diffuse pain, diffuse joint pain   - last EMG/NCV reported CTS, followed with orthopedics  - B12, folate check, A1c WNL   - ESR/CRP elevated, follow with PCP for inflammation/infection workup  - discussed with pt about rheumatology referral, pt would like to wait     // History of stroke  - left ICA occlusion, right carotid stenosis, followed with endovascular  - continue stroke secondary prevention  - continue ASA and plavix, control LDL, BG     // Obesity  - BMI 51.5->50.91, continue exercise, pt follows with bariatric      // TIBURCIO  - continue CPAP, and lose weight    >50% of 40 minute face to face time spent counseling patient, multiple issues discussed, all questions answered.      RTC in 6-8 months or PRN      Addison Velez MD, MS

## 2019-08-21 ENCOUNTER — ANESTHESIA EVENT (OUTPATIENT)
Dept: ENDOSCOPY | Age: 64
End: 2019-08-21
Payer: COMMERCIAL

## 2019-08-21 ENCOUNTER — HOSPITAL ENCOUNTER (OUTPATIENT)
Age: 64
Setting detail: OUTPATIENT SURGERY
Discharge: HOME | End: 2019-08-21
Attending: INTERNAL MEDICINE | Admitting: INTERNAL MEDICINE
Payer: COMMERCIAL

## 2019-08-21 ENCOUNTER — ANESTHESIA (OUTPATIENT)
Dept: ENDOSCOPY | Age: 64
End: 2019-08-21
Payer: COMMERCIAL

## 2019-08-21 VITALS
DIASTOLIC BLOOD PRESSURE: 70 MMHG | OXYGEN SATURATION: 98 % | TEMPERATURE: 97.2 F | WEIGHT: 293 LBS | BODY MASS INDEX: 41.95 KG/M2 | HEART RATE: 50 BPM | HEIGHT: 70 IN | RESPIRATION RATE: 15 BRPM | SYSTOLIC BLOOD PRESSURE: 139 MMHG

## 2019-08-21 VITALS
SYSTOLIC BLOOD PRESSURE: 141 MMHG | OXYGEN SATURATION: 100 % | DIASTOLIC BLOOD PRESSURE: 84 MMHG | RESPIRATION RATE: 23 BRPM

## 2019-08-21 DIAGNOSIS — G56.03 BILATERAL CARPAL TUNNEL SYNDROME: ICD-10-CM

## 2019-08-21 PROCEDURE — 2709999900 HC NON-CHARGEABLE SUPPLY: Performed by: INTERNAL MEDICINE

## 2019-08-21 PROCEDURE — 3700000000 HC ANESTHESIA ATTENDED CARE: Performed by: INTERNAL MEDICINE

## 2019-08-21 PROCEDURE — 43239 EGD BIOPSY SINGLE/MULTIPLE: CPT | Performed by: INTERNAL MEDICINE

## 2019-08-21 PROCEDURE — 88342 IMHCHEM/IMCYTCHM 1ST ANTB: CPT

## 2019-08-21 PROCEDURE — 3700000001 HC ADD 15 MINUTES (ANESTHESIA): Performed by: INTERNAL MEDICINE

## 2019-08-21 PROCEDURE — 45385 COLONOSCOPY W/LESION REMOVAL: CPT | Performed by: INTERNAL MEDICINE

## 2019-08-21 PROCEDURE — 3609012400 HC EGD TRANSORAL BIOPSY SINGLE/MULTIPLE: Performed by: INTERNAL MEDICINE

## 2019-08-21 PROCEDURE — 6360000002 HC RX W HCPCS: Performed by: NURSE ANESTHETIST, CERTIFIED REGISTERED

## 2019-08-21 PROCEDURE — 45380 COLONOSCOPY AND BIOPSY: CPT | Performed by: INTERNAL MEDICINE

## 2019-08-21 PROCEDURE — 3609010600 HC COLONOSCOPY POLYPECTOMY SNARE/COLD BIOPSY: Performed by: INTERNAL MEDICINE

## 2019-08-21 PROCEDURE — 88305 TISSUE EXAM BY PATHOLOGIST: CPT

## 2019-08-21 PROCEDURE — 7100000011 HC PHASE II RECOVERY - ADDTL 15 MIN: Performed by: INTERNAL MEDICINE

## 2019-08-21 PROCEDURE — 2500000003 HC RX 250 WO HCPCS: Performed by: NURSE ANESTHETIST, CERTIFIED REGISTERED

## 2019-08-21 PROCEDURE — 2580000003 HC RX 258: Performed by: NURSE ANESTHETIST, CERTIFIED REGISTERED

## 2019-08-21 PROCEDURE — 7100000010 HC PHASE II RECOVERY - FIRST 15 MIN: Performed by: INTERNAL MEDICINE

## 2019-08-21 PROCEDURE — 2580000003 HC RX 258: Performed by: INTERNAL MEDICINE

## 2019-08-21 RX ORDER — GLYCOPYRROLATE 1 MG/5 ML
SYRINGE (ML) INTRAVENOUS PRN
Status: DISCONTINUED | OUTPATIENT
Start: 2019-08-21 | End: 2019-08-21 | Stop reason: SDUPTHER

## 2019-08-21 RX ORDER — OMEPRAZOLE 40 MG/1
40 CAPSULE, DELAYED RELEASE ORAL
Qty: 30 CAPSULE | Refills: 5 | Status: SHIPPED | OUTPATIENT
Start: 2019-08-21 | End: 2019-08-23

## 2019-08-21 RX ORDER — SODIUM CHLORIDE 9 MG/ML
INJECTION, SOLUTION INTRAVENOUS CONTINUOUS PRN
Status: DISCONTINUED | OUTPATIENT
Start: 2019-08-21 | End: 2019-08-21 | Stop reason: SDUPTHER

## 2019-08-21 RX ORDER — LIDOCAINE HYDROCHLORIDE 10 MG/ML
INJECTION, SOLUTION EPIDURAL; INFILTRATION; INTRACAUDAL; PERINEURAL PRN
Status: DISCONTINUED | OUTPATIENT
Start: 2019-08-21 | End: 2019-08-21 | Stop reason: SDUPTHER

## 2019-08-21 RX ORDER — SODIUM CHLORIDE 9 MG/ML
INJECTION, SOLUTION INTRAVENOUS ONCE
Status: COMPLETED | OUTPATIENT
Start: 2019-08-21 | End: 2019-08-21

## 2019-08-21 RX ORDER — PROPOFOL 10 MG/ML
INJECTION, EMULSION INTRAVENOUS PRN
Status: DISCONTINUED | OUTPATIENT
Start: 2019-08-21 | End: 2019-08-21 | Stop reason: SDUPTHER

## 2019-08-21 RX ADMIN — Medication 0.2 MG: at 12:15

## 2019-08-21 RX ADMIN — SODIUM CHLORIDE: 9 INJECTION, SOLUTION INTRAVENOUS at 11:36

## 2019-08-21 RX ADMIN — PROPOFOL 550 MG: 10 INJECTION, EMULSION INTRAVENOUS at 12:20

## 2019-08-21 RX ADMIN — SODIUM CHLORIDE: 9 INJECTION, SOLUTION INTRAVENOUS at 12:05

## 2019-08-21 RX ADMIN — Medication 0.2 MG: at 12:29

## 2019-08-21 RX ADMIN — LIDOCAINE HYDROCHLORIDE 25 MG: 10 INJECTION, SOLUTION EPIDURAL; INFILTRATION; INTRACAUDAL at 12:20

## 2019-08-21 ASSESSMENT — LIFESTYLE VARIABLES: SMOKING_STATUS: 0

## 2019-08-21 ASSESSMENT — PAIN SCALES - GENERAL
PAINLEVEL_OUTOF10: 0

## 2019-08-21 ASSESSMENT — PAIN DESCRIPTION - FREQUENCY: FREQUENCY: CONTINUOUS

## 2019-08-21 NOTE — ANESTHESIA PRE PROCEDURE
Allergies:     Allergies   Allergen Reactions    Duricef [Cefadroxil Monohydrate]     Fish-Derived Products     Pcn [Penicillins]     Tomato        Problem List:    Patient Active Problem List   Diagnosis Code    Essential hypertension I10    Pure hypercholesterolemia E78.00    Cerebrovascular accident (CVA), 2011, no residual deficit ([facility]) I63.00    CKD (chronic kidney disease) stage 3, GFR 30-59 ml/min ([facility]) N18.3    Peripheral vascular disease ([facility]) I73.9    TIBURCIO (obstructive sleep apnea) G47.33    Morbid obesity with BMI of 50.0-59.9, adult ([facility]) E66.01, Z68.43    Primary osteoarthritis of left knee M19.90    Chronic tension-type headache, not intractable G44.229    Vitamin D deficiency E55.9    Carpal tunnel syndrome, bilateral-not on medication due to kidney disease G56.03       Past Medical History:        Diagnosis Date    Bleeding     while on aggrenox    Cataracts, bilateral     Cerebrovascular disease 2003,2011    cva    Chronic pain in left foot     CKD (chronic kidney disease) stage 3, GFR 30-59 ml/min ([facility])     GERD (gastroesophageal reflux disease)     Hx of blood clots     Hyperlipidemia     Hypertension     Iron (Fe) deficiency anemia     Nicotine abuse     Obesity     Osteoarthritis     Peripheral vascular disease ([facility] [address] 3/13/2014    Substance abuse ([facility])     cocaine, canabis    Thalassemia minor     Unspecified cerebral artery occlusion with cerebral infarction     Unspecified sleep apnea        Past Surgical History:        Procedure Laterality Date    BREAST SURGERY      biopsy    COLONOSCOPY  12/2007    adenomatous polyp    COLONOSCOPY  2013    normal, repeat in 5 years    ENDOMETRIAL BIOPSY  1998    TONSILLECTOMY AND ADENOIDECTOMY      TUBAL LIGATION      UPPER GASTROINTESTINAL ENDOSCOPY  2009    negative       Social History:    Social History     Tobacco Use    Smoking status: Former Smoker     Packs/day: 2.00     Years: 35.00     Pack years: 70.00 9.7 10/06/2011    INR 0.9 10/06/2011    APTT 22.7 10/02/2011       HCG (If Applicable): No results found for: PREGTESTUR, PREGSERUM, HCG, HCGQUANT     ABGs: No results found for: PHART, PO2ART, VZA5GGH, OZZ9VAZ, BEART, E6VIFEJA     Type & Screen (If Applicable):  No results found for: LABABO, [address]    Anesthesia Evaluation  Patient summary reviewed no history of anesthetic complications:   Airway: Mallampati: III        Dental:    (+) edentulous      Pulmonary:normal exam  breath sounds clear to auscultation  (+) sleep apnea: on CPAP,      (-) not a current smoker                           Cardiovascular:    (+) hypertension:,     (-) past MI, CAD and  CHF      Rhythm: regular  Rate: normal                    Neuro/Psych:   (+) CVA:, neuromuscular disease:, headaches:,              ROS comment: Substance abuse (HCC) cocaine, canabis  GI/Hepatic/Renal:   (+) GERD:, renal disease:, morbid obesity          Endo/Other: Negative Endo/Other ROS       (-) diabetes mellitus, hypothyroidism, hyperthyroidism               Abdominal:           Vascular:   + PVD, aortic or cerebral, . Anesthesia Plan      MAC     ASA 3       Induction: intravenous. Anesthetic plan and risks discussed with patient. Plan discussed with CRNA.                   Tomeka Gonsalez MD   8/21/2019

## 2019-08-21 NOTE — OP NOTE
[facility]     PROCEDURE DATE: 08/21/19    REFERRING PHYSICIAN: No ref. provider found     PRIMARY CARE PROVIDER: Austyn Steiner MD    ATTENDING PHYSICIAN: Abel Zhang MD     HISTORY: Ms. Tiffanie Barcenas is a 75 y.o. female who presents to the [place] [address] Endoscopy unit for colonoscopy. The patient's clinical history is remarkable for Fe def anemia. On ASA and Plavix. She is currently medically stable and appropriate for the planned procedure. PREOPERATIVE DIAGNOSIS: iron deficiency anemia. PROCEDURES:   Transanal Colonoscopy with polypectomy (cold snare), polypectomy (cold biopsy). POSTPROCEDURE DIAGNOSIS:  Two polyps  Sub-optimal prep  Mild left sided diverticulosis    MEDICATIONS:     MAC per anesthesia    EBL: minimal    INSTRUMENT: Olympus PCF-H190 AL flexible Colonoscope. PREPARATION: The nature and character of the procedure as well as risks, benefits, and alternatives were discussed with the patient and informed consent was obtained. Complications were said to include, but were not limited to: medication allergy, medication reaction, cardiovascular and respiratory problems, bleeding, perforation, infection, and/or missed diagnosis. Following arrival in the endoscopy room, the patient was placed in the left lateral decubitus position and final time-out accomplished in the presence of the nursing staff. Baseline vital signs were obtained and reviewed, and IV sedation was subsequently initiated. FINDINGS: Rectal examination demonstrated no significant visible external abnormality and digital palpation was unremarkable. Following adequate conscious sedation the colonoscope was introduced and advanced under direct visualization to the cecum, which was identified by the ileocecal valve and appendiceal orifice. The bowel preparation was felt to be sub-optimal. This included small amounts of thick stool that was not able to be adequately irrigated and aspirated.
NSAIDs. 2) Follow up on pathology report. 3) Omeprazole 40 mg haddad. 4) proceed with planned colonoscopy.              Lefty Adhikari Wallace

## 2019-08-21 NOTE — H&P
 Father      Brother  Alive    MGM  (Not Specified)    MGF  (Not Specified)         Social History  Social History     Socioeconomic History    Marital status:      Spouse name: Not on file    Number of children: Not on file    Years of education: Not on file    Highest education level: Not on file   Occupational History    Not on file   Social Needs    Financial resource strain: Not on file    Food insecurity:     Worry: Not on file     Inability: Not on file    Transportation needs:     Medical: Not on file     Non-medical: Not on file   Tobacco Use    Smoking status: Former Smoker     Packs/day: 2.00     Years: 35.00     Pack years: 70.00     Last attempt to quit: 10/11/2011     Years since quittin.8    Smokeless tobacco: Never Used   Substance and Sexual Activity    Alcohol use: Yes     Alcohol/week: 6.0 standard drinks     Types: 6 Cans of beer per week     Comment: 6 beers/week(on weekends)    Drug use: No     Types: Cocaine, Marijuana     Comment: per pt did years ago; cocaine & marij    Sexual activity: Yes     Partners: Male   Lifestyle    Physical activity:     Days per week: Not on file     Minutes per session: Not on file    Stress: Not on file   Relationships    Social connections:     Talks on phone: Not on file     Gets together: Not on file     Attends Gnosticism service: Not on file     Active member of club or organization: Not on file     Attends meetings of clubs or organizations: Not on file     Relationship status: Not on file    Intimate partner violence:     Fear of current or ex partner: Not on file     Emotionally abused: Not on file     Physically abused: Not on file     Forced sexual activity: Not on file   Other Topics Concern    Not on file   Social History Narrative    Not on file                 Hobbies:   Great grandmother , plays cards     OBJECTIVE:   VITALS:  height is 5' 10\" (1.778 m) and weight is 354 lb (160.6 kg) (abnormal).  Her blood

## 2019-08-21 NOTE — ANESTHESIA POSTPROCEDURE EVALUATION
Department of Anesthesiology  Postprocedure Note    Patient: Maico Rainey  MRN: [id number]  YOB: 1955  Date of evaluation: 8/21/2019  Time:  3:05 PM     Procedure Summary     Date:  08/21/19 Room / Location:  [facility] 04 / [facility]    Anesthesia Start:  9860 Anesthesia Stop:  1250    Procedures:       EGD BIOPSY (N/A )      COLONOSCOPY POLYPECTOMY SNARE/COLD BIOPSY (N/A ) Diagnosis:  (IRON DEFICIENCY ANEMIA, UNSPECIFIED)    Surgeon:  Hugo Verdin MD Responsible Provider:  Nahomi Mayer MD    Anesthesia Type:  MAC ASA Status:  3          Anesthesia Type: MAC    Fadia Phase I:      Fadia Phase II: Fadia Score: 10    Last vitals: Reviewed and per EMR flowsheets.        Anesthesia Post Evaluation    Patient location during evaluation: PACU  Patient participation: complete - patient participated  Level of consciousness: awake  Pain score: 1  Airway patency: patent  Nausea & Vomiting: no nausea and no vomiting  Complications: no  Cardiovascular status: blood pressure returned to baseline and hemodynamically stable  Respiratory status: acceptable  Hydration status: euvolemic

## 2019-08-23 ENCOUNTER — OFFICE VISIT (OUTPATIENT)
Dept: BARIATRICS/WEIGHT MGMT | Age: 64
End: 2019-08-23
Payer: COMMERCIAL

## 2019-08-23 VITALS
DIASTOLIC BLOOD PRESSURE: 70 MMHG | WEIGHT: 293 LBS | HEIGHT: 67 IN | SYSTOLIC BLOOD PRESSURE: 110 MMHG | BODY MASS INDEX: 45.99 KG/M2 | HEART RATE: 64 BPM

## 2019-08-23 DIAGNOSIS — I10 ESSENTIAL HYPERTENSION: ICD-10-CM

## 2019-08-23 DIAGNOSIS — G47.33 OBSTRUCTIVE SLEEP APNEA: Primary | ICD-10-CM

## 2019-08-23 DIAGNOSIS — E66.01 MORBID OBESITY (HCC): ICD-10-CM

## 2019-08-23 LAB — SURGICAL PATHOLOGY REPORT: NORMAL

## 2019-08-23 PROCEDURE — 99204 OFFICE O/P NEW MOD 45 MIN: CPT | Performed by: SURGERY

## 2019-08-25 NOTE — PROGRESS NOTES
chest pains when walking up two flights of stairs? [] Yes     [x] No    Do you or have you had any of the following? Cardiovascular YES NO Respiratory YES NO   High Blood Pressure   [x]   [] COPD   []   [x]   Heart Attack   []   [x] TB/Positive skin Test   []   [x]   Congestive Heart Failure   []   [x] Obstructive Sleep Apnea   [x]   []   Coronary Artery Disease   []   [x] Asthma   []   [x]   Circulation Problems   []   [x]      Activity Intolerance   []   [x] Gastrointestinal YES NO   Peripheral Vascular Disease   []   [x] Gastric Problems   []   [x]        Colorectal problems   []   [x]   Hematological YES NO Ulcer disease   []   [x]   Bleeding Tendencies   []   [x] Liver disease   []   [x]   Blood Transfusion last 30d   []   [x] Gallstones   []   [x]   Anemia   []   [x] Refulx or Heartburn   []   [x]   Blood Clots   [x]   []      High Cholesterol   [x]   [] Muscoloskeletal YES NO   High Triglycerides   []   [x] Joint Limitations   []   [x]      Muscle Weakness   []   [x]   Eyes, Ears, Nose, Throat YES NO Multiple Sclerosis   []   [x]   Cataracts   [x]   [] Arthritis   [x]   []   Glasses   [x]   []      Blurred Vision   []   [x] Cancer   []   [x]   Hearing Aids   []   [x] Type:     Ringing in Ears   []   [x]      Difficulty Swallowing   []   [x] Encodrine YES NO      Diabetes   []   [x]   Neurological YES NO Thyroid   []   [x]   Stroke   []   [x]      Seizure   []   [x] Psychiatric Disorder YES NO   Dizziness/Blackouts/Fainting   []   [x] Depression   []   [x]   Memory Impairement   []   [x] Bipolar   []   [x]   Parkinson's   []   [x] Anxiety disorder   []   [x]           Genitourinary/Gyn YES NO Skin Intact   [x]   []   Urinary Infection   []   [x]      Stones   []   [x] Sleep   YES NO   Kidney Disease   []   [x] Excessive daytime sleepiness   [x]   []   Incontinent   []   [x] Snoring   [x]   []   Irregular menstrual cycles   []   [] Unrefreshed sleep   [x]   []   Possibly Pregnant?    []     [] Other: possibility of insufficient weight loss or weight gain after 2 years post-operative time. PLAN:       Diagnosis Orders   1. Obstructive sleep apnea  CBC Auto Differential    Comprehensive Metabolic Panel    Ferritin    Iron and TIBC    Lipid Panel    Magnesium    PTH, Intact    T4, Free    TSH without Reflex    Vitamin D 25 Hydroxy    Zinc    Vitamin B1    Vitamin A    Amb External Referral To Cardiology   2. Essential hypertension  CBC Auto Differential    Comprehensive Metabolic Panel    Ferritin    Iron and TIBC    Lipid Panel    Magnesium    PTH, Intact    T4, Free    TSH without Reflex    Vitamin D 25 Hydroxy    Zinc    Vitamin B1    Vitamin A    Amb External Referral To Cardiology   3. Morbid obesity (HCC)  CBC Auto Differential    Comprehensive Metabolic Panel    Ferritin    Iron and TIBC    Lipid Panel    Magnesium    PTH, Intact    T4, Free    TSH without Reflex    Vitamin D 25 Hydroxy    Zinc    Vitamin B1    Vitamin A    Amb External Referral To Cardiology          Initial Testing     Primary Procedure: Sleeve Gastrectomy     Other Procedures:None    Labwork: Initial Pre-surgical Lab Tests (CMP, TSH, Fasting Lipid Profile, Mg, Zinc, Vit B1 (whole blood), Vit B12, 25-OH Vit D, Fe,  Ferritin,  Folate)    Imaging: None    Psychological Assessment: Psychological Evaluation and Clearance    Nutrition Assessment: Bariatric Nutrition Assessment and Clearance    Cardiology Risk Stratification:  Cardiology consult for clearance    Pulmonary Evaluation: CPAP Settings    Other  Consultations: PCP clearance    Physician Supervised Diet and Exercise required by the patients insurance company: 6 months.       Surgical Diet requirement:  2 weeks    Final Testing  Screening Chest Xray  and EKG within 6 months of date of surgery    Labwork:  Final Lab Tests  within 3 months of date of surgery (CBC, PT/PTT, BMP)     Electronically signed by Ramirez Rene DO on 8/25/2019 at 6:44 PM

## 2019-08-26 ENCOUNTER — HOSPITAL ENCOUNTER (OUTPATIENT)
Age: 64
Discharge: HOME | End: 2019-08-26
Payer: COMMERCIAL

## 2019-08-26 ENCOUNTER — OFFICE VISIT (OUTPATIENT)
Dept: ONCOLOGY | Age: 64
End: 2019-08-26
Payer: COMMERCIAL

## 2019-08-26 ENCOUNTER — HOSPITAL ENCOUNTER (OUTPATIENT)
Facility: MEDICAL CENTER | Age: 64
Discharge: HOME | End: 2019-08-26
Payer: COMMERCIAL

## 2019-08-26 ENCOUNTER — TELEPHONE (OUTPATIENT)
Dept: ONCOLOGY | Age: 64
End: 2019-08-26

## 2019-08-26 VITALS
RESPIRATION RATE: 18 BRPM | SYSTOLIC BLOOD PRESSURE: 112 MMHG | HEART RATE: 53 BPM | DIASTOLIC BLOOD PRESSURE: 63 MMHG | WEIGHT: 293 LBS | TEMPERATURE: 98 F | BODY MASS INDEX: 55.77 KG/M2

## 2019-08-26 DIAGNOSIS — D56.3 THALASSEMIA TRAIT, ALPHA: ICD-10-CM

## 2019-08-26 DIAGNOSIS — D64.9 ANEMIA, UNSPECIFIED TYPE: ICD-10-CM

## 2019-08-26 DIAGNOSIS — E61.1 IRON DEFICIENCY: ICD-10-CM

## 2019-08-26 DIAGNOSIS — D64.9 ANEMIA, UNSPECIFIED TYPE: Primary | ICD-10-CM

## 2019-08-26 LAB
ABSOLUTE EOS #: 0.29 K/UL (ref 0–0.44)
ABSOLUTE EOS #: NORMAL K/UL (ref 0–0.4)
ABSOLUTE IMMATURE GRANULOCYTE: 0.02 K/UL (ref 0–0.3)
ABSOLUTE IMMATURE GRANULOCYTE: NORMAL K/UL (ref 0–0.3)
ABSOLUTE LYMPH #: 1.69 K/UL (ref 1.1–3.7)
ABSOLUTE LYMPH #: NORMAL K/UL (ref 1–4.8)
ABSOLUTE MONO #: 0.42 K/UL (ref 0.1–1.2)
ABSOLUTE MONO #: NORMAL K/UL (ref 0.2–0.8)
BASOPHILS # BLD: 1 % (ref 0–2)
BASOPHILS # BLD: NORMAL % (ref 0–2)
BASOPHILS ABSOLUTE: 0.07 K/UL (ref 0–0.2)
BASOPHILS ABSOLUTE: NORMAL K/UL (ref 0–0.2)
DIFFERENTIAL TYPE: ABNORMAL
DIFFERENTIAL TYPE: NORMAL
EOSINOPHILS RELATIVE PERCENT: 5 % (ref 1–4)
EOSINOPHILS RELATIVE PERCENT: NORMAL % (ref 1–4)
FERRITIN: 701 UG/L (ref 13–150)
FOLATE: >20 NG/ML
HCT VFR BLD CALC: 37.3 % (ref 36.3–47.1)
HCT VFR BLD CALC: NORMAL % (ref 36–46)
HEMOGLOBIN: 10.9 G/DL (ref 11.9–15.1)
HEMOGLOBIN: NORMAL G/DL (ref 12–16)
IMMATURE GRANULOCYTES: 0 %
IMMATURE GRANULOCYTES: NORMAL %
IRON SATURATION: 13 % (ref 20–55)
IRON: 34 UG/DL (ref 37–145)
LYMPHOCYTES # BLD: 28 % (ref 24–43)
LYMPHOCYTES # BLD: NORMAL % (ref 24–44)
MCH RBC QN AUTO: 21.4 PG (ref 25.2–33.5)
MCH RBC QN AUTO: NORMAL PG (ref 26–34)
MCHC RBC AUTO-ENTMCNC: 29.2 G/DL (ref 28.4–34.8)
MCHC RBC AUTO-ENTMCNC: NORMAL G/DL (ref 31–37)
MCV RBC AUTO: 73.3 FL (ref 82.6–102.9)
MCV RBC AUTO: NORMAL FL (ref 80–100)
MONOCYTES # BLD: 7 % (ref 3–12)
MONOCYTES # BLD: NORMAL % (ref 1–7)
NRBC AUTOMATED: 0 PER 100 WBC
NRBC AUTOMATED: NORMAL PER 100 WBC
PDW BLD-RTO: 16.9 % (ref 11.8–14.4)
PDW BLD-RTO: NORMAL % (ref 11.5–14.5)
PLATELET # BLD: 371 K/UL (ref 138–453)
PLATELET # BLD: NORMAL K/UL (ref 130–400)
PLATELET ESTIMATE: ABNORMAL
PLATELET ESTIMATE: NORMAL
PMV BLD AUTO: 9.5 FL (ref 8.1–13.5)
PMV BLD AUTO: NORMAL FL (ref 6–12)
RBC # BLD: 5.09 M/UL (ref 3.95–5.11)
RBC # BLD: ABNORMAL 10*6/UL
RBC # BLD: NORMAL 10*6/UL
RBC # BLD: NORMAL M/UL (ref 4–5.2)
SEG NEUTROPHILS: 59 % (ref 36–65)
SEG NEUTROPHILS: NORMAL % (ref 36–66)
SEGMENTED NEUTROPHILS ABSOLUTE COUNT: 3.59 K/UL (ref 1.5–8.1)
SEGMENTED NEUTROPHILS ABSOLUTE COUNT: NORMAL K/UL (ref 1.8–7.7)
TOTAL IRON BINDING CAPACITY: 258 UG/DL (ref 250–450)
UNSATURATED IRON BINDING CAPACITY: 224 UG/DL (ref 112–347)
VITAMIN B-12: 900 PG/ML (ref 232–1245)
WBC # BLD: 6.1 K/UL (ref 3.5–11.3)
WBC # BLD: ABNORMAL 10*3/UL
WBC # BLD: NORMAL 10*3/UL
WBC # BLD: NORMAL K/UL (ref 3.5–11)

## 2019-08-26 PROCEDURE — 83540 ASSAY OF IRON: CPT

## 2019-08-26 PROCEDURE — 82746 ASSAY OF FOLIC ACID SERUM: CPT

## 2019-08-26 PROCEDURE — 36415 COLL VENOUS BLD VENIPUNCTURE: CPT

## 2019-08-26 PROCEDURE — 82728 ASSAY OF FERRITIN: CPT

## 2019-08-26 PROCEDURE — 99213 OFFICE O/P EST LOW 20 MIN: CPT | Performed by: INTERNAL MEDICINE

## 2019-08-26 PROCEDURE — 85025 COMPLETE CBC W/AUTO DIFF WBC: CPT

## 2019-08-26 PROCEDURE — 83550 IRON BINDING TEST: CPT

## 2019-08-26 PROCEDURE — 99211 OFF/OP EST MAY X REQ PHY/QHP: CPT | Performed by: INTERNAL MEDICINE

## 2019-08-26 PROCEDURE — 82607 VITAMIN B-12: CPT

## 2019-08-26 NOTE — PROGRESS NOTES
([facility] [address] GERD (gastroesophageal reflux disease), Hx of blood clots, Hyperlipidemia, Hypertension, Iron (Fe) deficiency anemia, Nicotine abuse, Obesity, Osteoarthritis, Peripheral vascular disease ([facility] [address] Substance abuse ([facility] [address] Thalassemia minor, Unspecified cerebral artery occlusion with cerebral infarction, and Unspecified sleep apnea. Past Surgical History:   has a past surgical history that includes Tonsillectomy and adenoidectomy; Tubal ligation; Breast surgery; Endometrial biopsy (1998); Upper gastrointestinal endoscopy (2009); Colonoscopy (12/2007); Colonoscopy (2013); Upper gastrointestinal endoscopy (N/A, 8/21/2019); and Colonoscopy (N/A, 8/21/2019). Medications:    Prior to Admission medications    Medication Sig Start Date End Date Taking?  Authorizing Provider   Ferrous Sulfate (IRON) 325 (65 Fe) MG TABS TAKE 1 TABLET BY MOUTH IN THE MORNING 8/16/19   Stanton Marshall DO   losartan (COZAAR) 50 MG tablet TAKE 1 TABLET BY MOUTH DAILY 8/16/19   Stanton Marshall DO   SM PAIN RELIEVER 325 MG tablet TAKE 2 TABLETS BY MOUTH EVERY 4 HOURS AS NEEDED FOR PAIN 6/12/19   Harshad Echavarria MD   hydrochlorothiazide (HYDRODIURIL) 25 MG tablet TAKE 1 TABLET IN THE MORNING 6/7/19   Jose Long Arm, MD   amLODIPine (NORVASC) 10 MG tablet TAKE 1 TABLET DAILY 6/7/19   Jose Long Arm, MD   aspirin 81 MG EC tablet TAKE 1 TABLET DAILY 6/7/19   Jose Long Arm, MD   clopidogrel (PLAVIX) 75 MG tablet Once every day by mouth 6/7/19   Jose Long Arm, MD   losartan (COZAAR) 50 MG tablet TAKE 1 TABLET BY MOUTH DAILY 6/7/19   Jose Long Arm, MD   Cholecalciferol (VITAMIN D3) 1000 units TABS TAKE 1 TABLET DAILY 6/7/19   Jose Long Arm, MD   atorvastatin (LIPITOR) 40 MG tablet Take 1 tablet by mouth daily 6/7/19   Jaleel Phipps MD   diclofenac sodium 1 % GEL APPLY 2GRAMS TOPICALLY TWICE A DAY 6/5/19   Harshad Echavarria MD   folic acid (FOLVITE) 1 MG tablet TAKE 1 TABLET DAILY 5/24/19   Harshad Echavarria MD     Current Outpatient Medications   Medication Sig Dispense Refill    Ferrous Sulfate (IRON) 325 (65 Fe) MG TABS TAKE 1 TABLET BY MOUTH IN THE MORNING 30 tablet 0    losartan (COZAAR) 50 MG tablet TAKE 1 TABLET BY MOUTH DAILY 30 tablet 0    SM PAIN RELIEVER 325 MG tablet TAKE 2 TABLETS BY MOUTH EVERY 4 HOURS AS NEEDED FOR PAIN 60 tablet 3    hydrochlorothiazide (HYDRODIURIL) 25 MG tablet TAKE 1 TABLET IN THE MORNING 30 tablet 5    amLODIPine (NORVASC) 10 MG tablet TAKE 1 TABLET DAILY 30 tablet 5    aspirin 81 MG EC tablet TAKE 1 TABLET DAILY 30 tablet 5    clopidogrel (PLAVIX) 75 MG tablet Once every day by mouth 30 tablet 3    losartan (COZAAR) 50 MG tablet TAKE 1 TABLET BY MOUTH DAILY 30 tablet 2    Cholecalciferol (VITAMIN D3) 1000 units TABS TAKE 1 TABLET DAILY 90 tablet 1    atorvastatin (LIPITOR) 40 MG tablet Take 1 tablet by mouth daily 30 tablet 5    diclofenac sodium 1 % GEL APPLY 2GRAMS TOPICALLY TWICE A  g 3    folic acid (FOLVITE) 1 MG tablet TAKE 1 TABLET DAILY 30 tablet 5     No current facility-administered medications for this visit. Allergies:  Duricef [cefadroxil monohydrate]; Fish-derived products; Pcn [penicillins]; and Tomato    Social History:   reports that she quit smoking about 7 years ago. She has a 70.00 pack-year smoking history. She has never used smokeless tobacco. She reports that she drinks about 6.0 standard drinks of alcohol per week. She reports that she does not use drugs. Family History: family history includes Asthma in her mother; Diabetes in her brother; Heart Disease in her brother, father, maternal grandfather, and mother; High Blood Pressure in her brother, father, maternal grandfather, maternal grandmother, and mother. REVIEW OF SYSTEMS:      Constitutional: No fever or chills.  No night sweats, no weight loss   Eyes: No eye discharge, double vision, or eye pain   HEENT: negative for sore mouth, sore throat, hoarseness and voice change   Respiratory: negative for cough , sputum, dyspnea, wheezing, hemoptysis, chest pain   Cardiovascular: negative for chest pain, dyspnea, palpitations, orthopnea, PND   Gastrointestinal: negative for nausea, vomiting, diarrhea, constipation, abdominal pain, Dysphagia, hematemesis and hematochezia   Genitourinary: negative for frequency, dysuria, nocturia, urinary incontinence, and hematuria   Integument: negative for rash, skin lesions, bruises.    Hematologic/Lymphatic: negative for easy bruising, bleeding, lymphadenopathy, or petechiae   Endocrine: negative for heat or cold intolerance,weight changes, change in bowel habits and hair loss   Musculoskeletal: negative for myalgias, arthralgias, pain, joint swelling,and bone pain   Neurological: negative for headaches, dizziness, seizures, weakness, numbness    PHYSICAL EXAM:        /63   Pulse 53   Temp 98 °F (36.7 °C) (Oral)   Resp 18   Wt (!) 356 lb 1.6 oz (161.5 kg)   BMI 55.77 kg/m²    General appearance - well appearing, no in pain or distress   Mental status - alert and cooperative   Eyes - pupils equal and reactive, extraocular eye movements intact   Ears - bilateral TM's and external ear canals normal   Mouth - mucous membranes moist, pharynx normal without lesions   Neck - supple, no significant adenopathy   Lymphatics - no palpable lymphadenopathy, no hepatosplenomegaly   Chest - clear to auscultation, no wheezes, rales or rhonchi, symmetric air entry   Heart - normal rate, regular rhythm, normal S1, S2, no murmurs  Abdomen - soft, nontender, nondistended, no masses or organomegaly   Neurological - alert, oriented, normal speech, no focal findings or movement disorder noted   Musculoskeletal - no joint tenderness, deformity or swelling   Extremities - peripheral pulses normal, no pedal edema, no clubbing or cyanosis   Skin - normal coloration and turgor, no rashes, no suspicious skin lesions noted ,      DATA:      Labs:     Results for orders placed or performed during the hospital encounter of 08/21/19   Surgical Pathology   Result Value Ref Range    Surgical Pathology Report       [id number]  [facility]  ANATOMIC PATHOLOGY  [address] [place] [address],  [address]. [facility], [address]  [phone]  Fax: [phone] [address] [facility]     Patient Name: Mart Stern Med Rec: [id number]  Path Number: [id number]  Collected: 8/21/2019  Received: 8/21/2019  Reported: 8/23/2019 10:56    -- Diagnosis --    1. Stomach, biopsy:         Mild chronic inflammation. Negative for Helicobacter. 2.  Colon, polyps, biopsies:          Sessile serrated adenoma. Hyperplastic polyp. GURJIT Rodriguez  **Electronically Signed Out**         rdd/8/22/2019       Clinical Information  Pre-op Diagnosis:  IRON DEFICIENCY ANEMIA, UNSPECIFIED   Operative Findings:  GASTRIC BX'S PLEASE INCLUDE H. PYLORI; COLON  POLYPS  Operation Performed:  EGD BIOPSY, COLONOSCOPY, POLYPECTOMY SNARE/COLD  BIOPSY     Source of Specimen  1: GASTRIC BXS (A)  2: COLON POLYPS (B)    Gross Description  1. \"RICO LARANCE, GASTRIC BX'S\" Tw o tan-white tissue fragments from  0.3 to 0.7 cm and are 0.7 x 0.7 x 0.1 cm in aggregate. Entirely 1cs. 2. \"RICO LARANCE, COLON POLYPS\" Four tan-white tissue fragments  from 0.1 to 0.6 cm and are 1.5 x 0.6 x 0.1 cm in aggregate. Entirely  1cs. po tm      Microscopic Description  1. Oxyntic mucosa shows mild patchy increase in lamina propria  lymphocytes and plasma cells. Architecture is intact and there is no  intestinal metaplasia and no dysplasia. There is no malignancy. Because of the inflammation, staining for Helicobacter detection is  performed. The control reactive immunostain for H. pylori antigen is  negative. 2.  Microscopic examination performed. EGD and colonoscopy:    PREOPERATIVE DIAGNOSIS: iron deficiency anemia.       PROCEDURES:   Transanal Colonoscopy with polypectomy (cold snare), polypectomy (cold biopsy). POSTPROCEDURE DIAGNOSIS:  Two polyps  Sub-optimal prep  Mild left sided diverticulosis      PREOPERATIVE DIAGNOSIS: Fe def anemia. PROCEDURES:   1) Transoral Upper Endoscopy, biopsy. POSTOPERATIVE DIAGNOSIS:  Erosive gastritis          Hgb Electrophoresis Interp 03/01/2019 10:37  [address] NORMAL Hb ELECTROPHORETIC PATTERN. THE % HbA2 IS NORMAL. DECREASED MCV MAY BE OBSERVED WITH Fe DEFICIENCY AND/OR ALPHA THALASSEMIA TRAIT. SUGGEST Fe STUDIES. IMPRESSION:  NORMAL Hb ELECTROPHORETIC PATTERN WITH MICROCYTOSIS. Comment: HBA = 97.9%. HBA2 = 2.1%. MCV = 73.4 (82.6 .9). Date Complete:     3/1/2019     By:  Estelita Martinze M.D. Date Reported:     3/4/2019       INTERPRETATION     Peripheral Blood:          Microcytic hypochromic anemia with reticulocytosis consistent   with response to iron therapy for hypoproliferative anemia. Clinical   correlation is necessary. IMAGING DATA:    Xr Chest Standard (2 Vw)    Result Date: 3/1/2019  EXAMINATION: TWO VIEWS OF THE CHEST 3/1/2019 10:12 am COMPARISON: January 5, 2019, October 1, 2011 HISTORY: ORDERING SYSTEM PROVIDED HISTORY: Pneumonia of both lower lobes due to infectious organism [facility]) TECHNOLOGIST PROVIDED HISTORY: follow up on pneumonia resolution FINDINGS: The cardiomediastinal silhouette is unchanged in appearance. Mild cardiac silhouette enlargement. There is no consolidation, pneumothorax, or evidence of edema. No effusion is appreciated. The osseous structures are unchanged in appearance. Degenerative change in the midthoracic spine and chronic appearing mild vertebral body height loss is again noted. No acute airspace disease identified. Impression No mammographic findings of malignancy. BIRADS: BIRADS - CATEGORY 1   Negative, no evidence of malignancy. Normal interval follow-up is recommended in 12 months.    OVERALL ASSESSMENT - NEGATIVE   A letter of

## 2019-09-09 ENCOUNTER — OFFICE VISIT (OUTPATIENT)
Dept: PODIATRY | Age: 64
End: 2019-09-09
Payer: COMMERCIAL

## 2019-09-09 VITALS
WEIGHT: 293 LBS | SYSTOLIC BLOOD PRESSURE: 126 MMHG | BODY MASS INDEX: 45.99 KG/M2 | HEART RATE: 67 BPM | HEIGHT: 67 IN | DIASTOLIC BLOOD PRESSURE: 80 MMHG

## 2019-09-09 DIAGNOSIS — M79.675 PAIN IN TOES OF BOTH FEET: ICD-10-CM

## 2019-09-09 DIAGNOSIS — M20.12 HAV (HALLUX ABDUCTO VALGUS), LEFT: ICD-10-CM

## 2019-09-09 DIAGNOSIS — B35.1 ONYCHOMYCOSIS: Primary | ICD-10-CM

## 2019-09-09 DIAGNOSIS — M20.11 HAV (HALLUX ABDUCTO VALGUS), RIGHT: ICD-10-CM

## 2019-09-09 DIAGNOSIS — M79.674 PAIN IN TOES OF BOTH FEET: ICD-10-CM

## 2019-09-09 PROCEDURE — 99213 OFFICE O/P EST LOW 20 MIN: CPT | Performed by: STUDENT IN AN ORGANIZED HEALTH CARE EDUCATION/TRAINING PROGRAM

## 2019-09-09 PROCEDURE — 99212 OFFICE O/P EST SF 10 MIN: CPT

## 2019-09-10 ENCOUNTER — NURSE ONLY (OUTPATIENT)
Dept: BARIATRICS/WEIGHT MGMT | Age: 64
End: 2019-09-10

## 2019-09-10 VITALS — WEIGHT: 293 LBS | BODY MASS INDEX: 54.19 KG/M2

## 2019-09-11 ENCOUNTER — HOSPITAL ENCOUNTER (OUTPATIENT)
Age: 64
Setting detail: SPECIMEN
Discharge: HOME | End: 2019-09-11
Payer: COMMERCIAL

## 2019-09-11 DIAGNOSIS — E66.01 MORBID OBESITY (HCC): ICD-10-CM

## 2019-09-11 DIAGNOSIS — G47.33 OBSTRUCTIVE SLEEP APNEA: ICD-10-CM

## 2019-09-11 DIAGNOSIS — I10 ESSENTIAL HYPERTENSION: ICD-10-CM

## 2019-09-11 LAB
ABSOLUTE EOS #: 0.22 K/UL (ref 0–0.44)
ABSOLUTE IMMATURE GRANULOCYTE: <0.03 K/UL (ref 0–0.3)
ABSOLUTE LYMPH #: 1.61 K/UL (ref 1.1–3.7)
ABSOLUTE MONO #: 0.35 K/UL (ref 0.1–1.2)
ALBUMIN SERPL-MCNC: 3.9 G/DL (ref 3.5–5.2)
ALBUMIN/GLOBULIN RATIO: 1.2 (ref 1–2.5)
ALP BLD-CCNC: 115 U/L (ref 35–104)
ALT SERPL-CCNC: 13 U/L (ref 5–33)
ANION GAP SERPL CALCULATED.3IONS-SCNC: 14 MMOL/L (ref 9–17)
AST SERPL-CCNC: 18 U/L
BASOPHILS # BLD: 1 % (ref 0–2)
BASOPHILS ABSOLUTE: 0.07 K/UL (ref 0–0.2)
BILIRUB SERPL-MCNC: 0.41 MG/DL (ref 0.3–1.2)
BUN BLDV-MCNC: 20 MG/DL (ref 8–23)
BUN/CREAT BLD: ABNORMAL (ref 9–20)
CALCIUM SERPL-MCNC: 9.7 MG/DL (ref 8.6–10.4)
CHLORIDE BLD-SCNC: 100 MMOL/L (ref 98–107)
CHOLESTEROL/HDL RATIO: 3.3
CHOLESTEROL: 138 MG/DL
CO2: 27 MMOL/L (ref 20–31)
CREAT SERPL-MCNC: 1.05 MG/DL (ref 0.5–0.9)
DIFFERENTIAL TYPE: ABNORMAL
EOSINOPHILS RELATIVE PERCENT: 4 % (ref 1–4)
FERRITIN: 646 UG/L (ref 13–150)
GFR AFRICAN AMERICAN: >60 ML/MIN
GFR NON-AFRICAN AMERICAN: 52 ML/MIN
GFR SERPL CREATININE-BSD FRML MDRD: ABNORMAL ML/MIN/{1.73_M2}
GFR SERPL CREATININE-BSD FRML MDRD: ABNORMAL ML/MIN/{1.73_M2}
GLUCOSE BLD-MCNC: 93 MG/DL (ref 70–99)
HCT VFR BLD CALC: 36.1 % (ref 36.3–47.1)
HDLC SERPL-MCNC: 42 MG/DL
HEMOGLOBIN: 10.3 G/DL (ref 11.9–15.1)
IMMATURE GRANULOCYTES: 0 %
IRON SATURATION: 30 % (ref 20–55)
IRON: 62 UG/DL (ref 37–145)
LDL CHOLESTEROL: 77 MG/DL (ref 0–130)
LYMPHOCYTES # BLD: 30 % (ref 24–43)
MAGNESIUM: 2.3 MG/DL (ref 1.6–2.6)
MCH RBC QN AUTO: 20.9 PG (ref 25.2–33.5)
MCHC RBC AUTO-ENTMCNC: 28.5 G/DL (ref 28.4–34.8)
MCV RBC AUTO: 73.4 FL (ref 82.6–102.9)
MONOCYTES # BLD: 6 % (ref 3–12)
NRBC AUTOMATED: 0 PER 100 WBC
PDW BLD-RTO: 17.1 % (ref 11.8–14.4)
PLATELET # BLD: 365 K/UL (ref 138–453)
PLATELET ESTIMATE: ABNORMAL
PMV BLD AUTO: 10.1 FL (ref 8.1–13.5)
POTASSIUM SERPL-SCNC: 3.5 MMOL/L (ref 3.7–5.3)
PTH INTACT: 43.21 PG/ML (ref 15–65)
RBC # BLD: 4.92 M/UL (ref 3.95–5.11)
RBC # BLD: ABNORMAL 10*6/UL
SEG NEUTROPHILS: 59 % (ref 36–65)
SEGMENTED NEUTROPHILS ABSOLUTE COUNT: 3.18 K/UL (ref 1.5–8.1)
SODIUM BLD-SCNC: 141 MMOL/L (ref 135–144)
THYROXINE, FREE: 1.26 NG/DL (ref 0.93–1.7)
TOTAL IRON BINDING CAPACITY: 204 UG/DL (ref 250–450)
TOTAL PROTEIN: 7.1 G/DL (ref 6.4–8.3)
TRIGL SERPL-MCNC: 96 MG/DL
TSH SERPL DL<=0.05 MIU/L-ACNC: 1.57 MIU/L (ref 0.3–5)
UNSATURATED IRON BINDING CAPACITY: 142 UG/DL (ref 112–347)
VITAMIN D 25-HYDROXY: 35.3 NG/ML (ref 30–100)
VLDLC SERPL CALC-MCNC: NORMAL MG/DL (ref 1–30)
WBC # BLD: 5.5 K/UL (ref 3.5–11.3)
WBC # BLD: ABNORMAL 10*3/UL

## 2019-09-11 PROCEDURE — 84630 ASSAY OF ZINC: CPT

## 2019-09-11 PROCEDURE — 80061 LIPID PANEL: CPT

## 2019-09-11 PROCEDURE — 82728 ASSAY OF FERRITIN: CPT

## 2019-09-11 PROCEDURE — 83540 ASSAY OF IRON: CPT

## 2019-09-11 PROCEDURE — 83970 ASSAY OF PARATHORMONE: CPT

## 2019-09-11 PROCEDURE — 84443 ASSAY THYROID STIM HORMONE: CPT

## 2019-09-11 PROCEDURE — 85025 COMPLETE CBC W/AUTO DIFF WBC: CPT

## 2019-09-11 PROCEDURE — 84439 ASSAY OF FREE THYROXINE: CPT

## 2019-09-11 PROCEDURE — 80053 COMPREHEN METABOLIC PANEL: CPT

## 2019-09-11 PROCEDURE — 84425 ASSAY OF VITAMIN B-1: CPT

## 2019-09-11 PROCEDURE — 82306 VITAMIN D 25 HYDROXY: CPT

## 2019-09-11 PROCEDURE — 36415 COLL VENOUS BLD VENIPUNCTURE: CPT

## 2019-09-11 PROCEDURE — 83550 IRON BINDING TEST: CPT

## 2019-09-11 PROCEDURE — 84590 ASSAY OF VITAMIN A: CPT

## 2019-09-11 PROCEDURE — 83735 ASSAY OF MAGNESIUM: CPT

## 2019-09-13 DIAGNOSIS — I10 ESSENTIAL HYPERTENSION: ICD-10-CM

## 2019-09-13 LAB
RETINYL PALMITATE: <0.02 MG/L (ref 0–0.1)
VITAMIN A LEVEL: 0.61 MG/L (ref 0.3–1.2)
VITAMIN A, INTERP: NORMAL
ZINC: 93.8 UG/DL (ref 60–120)

## 2019-09-13 NOTE — TELEPHONE ENCOUNTER
E-scribing request for losartan and Ferrous Sulfate. Pt has future appt. Health Maintenance   Topic Date Due    Annual Wellness Visit (AWV)  06/27/2019    Flu vaccine (1) 09/01/2019    Low dose CT lung screening  07/31/2020    Potassium monitoring  09/11/2020    Creatinine monitoring  09/11/2020    Breast cancer screen  09/28/2020    Pneumococcal 65+ years Vaccine (2 of 2 - PPSV23) 12/20/2021    Colon cancer screen colonoscopy  08/21/2024    Lipid screen  09/11/2024    DTaP/Tdap/Td vaccine (2 - Td) 03/08/2028    DEXA (modify frequency per FRAX score)  Completed    Shingles Vaccine  Completed    Hepatitis C screen  Completed             (applicable per patient's age: Cancer Screenings, Depression Screening, Fall Risk Screening, Immunizations)    Hemoglobin A1C (%)   Date Value   06/07/2019 5.4   04/19/2018 5.7   06/08/2016 5.4     Microalb/Crt.  Ratio (mcg/mg creat)   Date Value   05/09/2017 6     LDL Cholesterol (mg/dL)   Date Value   09/11/2019 77     AST (U/L)   Date Value   09/11/2019 18     ALT (U/L)   Date Value   09/11/2019 13     BUN (mg/dL)   Date Value   09/11/2019 20      (goal A1C is < 7)   (goal LDL is <100) need 30-50% reduction from baseline     BP Readings from Last 3 Encounters:   09/09/19 126/80   08/26/19 112/63   08/23/19 110/70    (goal /80)      All Future Testing planned in [facility]:  Lab Frequency Next Occurrence   BUN & Creatinine Once 48/36/7241   Basic Metabolic Panel Once 77/09/9794   Iron And TIBC Once 10/08/2019   Ferritin Once 10/08/2019   Transferrin Once 10/08/2019   Reticulocytes Once 10/08/2019   Vitamin B12 & Folate Once 10/08/2019   EGD Once 09/27/2019   Comprehensive Metabolic Panel     CBC Auto Differential     Electrophoresis Protein, Serum without Reflex to Immunofixation     Rubicon/Lambda Free Lt Chains, Serum Quant     CBC Auto Differential     CBC Auto Differential         Next Visit Date:  Future Appointments   Date Time Provider Mary Marinelli

## 2019-09-15 RX ORDER — LOSARTAN POTASSIUM 50 MG/1
TABLET ORAL
Qty: 30 TABLET | Refills: 0 | Status: SHIPPED | OUTPATIENT
Start: 2019-09-15 | End: 2019-10-09 | Stop reason: SDUPTHER

## 2019-09-15 RX ORDER — PNV NO.95/FERROUS FUM/FOLIC AC 28MG-0.8MG
TABLET ORAL
Qty: 30 TABLET | Refills: 0 | Status: SHIPPED | OUTPATIENT
Start: 2019-09-15 | End: 2019-10-09 | Stop reason: SDUPTHER

## 2019-09-18 LAB — VITAMIN B1 WHOLE BLOOD: 63 NMOL/L (ref 70–180)

## 2019-09-25 ENCOUNTER — OFFICE VISIT (OUTPATIENT)
Dept: INTERNAL MEDICINE | Age: 64
End: 2019-09-25
Payer: COMMERCIAL

## 2019-09-25 VITALS
HEIGHT: 67 IN | SYSTOLIC BLOOD PRESSURE: 117 MMHG | BODY MASS INDEX: 45.99 KG/M2 | WEIGHT: 293 LBS | DIASTOLIC BLOOD PRESSURE: 69 MMHG | HEART RATE: 72 BPM

## 2019-09-25 DIAGNOSIS — I10 ESSENTIAL HYPERTENSION: Primary | ICD-10-CM

## 2019-09-25 DIAGNOSIS — I63.00 CEREBROVASCULAR ACCIDENT (CVA) DUE TO THROMBOSIS OF PRECEREBRAL ARTERY (HCC): ICD-10-CM

## 2019-09-25 DIAGNOSIS — Z23 FLU VACCINE NEED: ICD-10-CM

## 2019-09-25 DIAGNOSIS — E66.01 OBESITY, CLASS III, BMI 40-49.9 (MORBID OBESITY) (HCC): ICD-10-CM

## 2019-09-25 DIAGNOSIS — M17.12 PRIMARY OSTEOARTHRITIS OF LEFT KNEE: ICD-10-CM

## 2019-09-25 DIAGNOSIS — D50.9 IRON DEFICIENCY ANEMIA, UNSPECIFIED IRON DEFICIENCY ANEMIA TYPE: ICD-10-CM

## 2019-09-25 DIAGNOSIS — N18.30 CKD (CHRONIC KIDNEY DISEASE) STAGE 3, GFR 30-59 ML/MIN (HCC): ICD-10-CM

## 2019-09-25 PROCEDURE — 90688 IIV4 VACCINE SPLT 0.5 ML IM: CPT | Performed by: STUDENT IN AN ORGANIZED HEALTH CARE EDUCATION/TRAINING PROGRAM

## 2019-09-25 PROCEDURE — 99211 OFF/OP EST MAY X REQ PHY/QHP: CPT | Performed by: INTERNAL MEDICINE

## 2019-09-25 PROCEDURE — 99213 OFFICE O/P EST LOW 20 MIN: CPT | Performed by: STUDENT IN AN ORGANIZED HEALTH CARE EDUCATION/TRAINING PROGRAM

## 2019-09-25 NOTE — PATIENT INSTRUCTIONS
Return To Clinic 1/29/2020 for 3 month follow up. After Visit Summary given and reviewed. The medication list included in this document is our record of what you are currently taking, including any changes that were made at today's visit. If you find any differences when compared to your medications at home, or have any questions that were not answered at your visit, please contact the office. It is very important for your care that you keep your appointment. If for some reason you are unable to keep your appointment, it is equally important that you call our office at [phone] to cancel your appointment and reschedule. Failure to do so may result in your termination from our practice. Patient was administered vaccination(s) for Influenza in the office. VIS given to patient for each vaccination given. An order for [address] was given to patient. Patient was advised that this order must be taken to a [facility] in order to receive product.  List of DME Suppliers was given to patient along with order.     -TERI Farah

## 2019-09-25 NOTE — PROGRESS NOTES
(modify frequency per FRAX score)  Completed    Shingles Vaccine  Completed    Hepatitis C screen  Completed
Jovana Manley was evaluated today and a DME order was entered for a wheeled walker with seat because she requires this to successfully complete daily living tasks of eating, bathing, toileting, personal cares, ambulating, meal preparation and taking own medications. A wheeled walker with seat is necessary due to the patient's unsteady gait, upper body weakness, inability to  and ambulation device, ambulating only short distances by pushing a walker, and the need to sit for a short time before resuming ambulation. These tasks cannot be completed with a lesser ambulation device such as a cane, crutch, or standard walker. The need for this equipment was discussed with the patient and she understands and is in agreement. FOLLOW UP AND INSTRUCTIONS:  · Follow up in 3-4 months after surgery    · Pablo Aguilar received counseling on the following healthy behaviors: nutrition, exercise, medication adherence, tobacco cessation and decrease in alcohol consumption    · Discussed use, benefit, and side effects of prescribed medications. Barriers to medication compliance addressed. All patient questions answered. Pt voiced understanding. · Patient given educational materials - see patient instructions    Charleen Landaverde MD  PGY-2, Department of Internal Medicine  [address] [place] [address]  9/25/2019 9:35 AM    This note is created with the assistance of a speech-recognition program. While intending to generate a document that actually reflects the content of thevisit, the document can still have some mistakes which may not have been identified and corrected by editing. Attending Physician Statement  I have discussed the care of Anny Grubbs, including pertinent history and exam findings,  with the resident. I have reviewed the key elements of all parts of the encounter with the resident. I agree with the assessment, plan and orders as documented by the resident.   (GE Modifier)    Will provide

## 2019-10-04 ENCOUNTER — OFFICE VISIT (OUTPATIENT)
Dept: BARIATRICS/WEIGHT MGMT | Age: 64
End: 2019-10-04

## 2019-10-04 VITALS
HEIGHT: 67 IN | HEART RATE: 76 BPM | RESPIRATION RATE: 18 BRPM | BODY MASS INDEX: 45.99 KG/M2 | SYSTOLIC BLOOD PRESSURE: 118 MMHG | DIASTOLIC BLOOD PRESSURE: 78 MMHG | WEIGHT: 293 LBS

## 2019-10-04 DIAGNOSIS — I73.9 PERIPHERAL VASCULAR DISEASE (HCC): ICD-10-CM

## 2019-10-04 DIAGNOSIS — G47.33 OSA (OBSTRUCTIVE SLEEP APNEA): ICD-10-CM

## 2019-10-04 DIAGNOSIS — E78.00 PURE HYPERCHOLESTEROLEMIA: ICD-10-CM

## 2019-10-04 DIAGNOSIS — I63.00 CEREBROVASCULAR ACCIDENT (CVA) DUE TO THROMBOSIS OF PRECEREBRAL ARTERY (HCC): ICD-10-CM

## 2019-10-04 DIAGNOSIS — N18.30 CKD (CHRONIC KIDNEY DISEASE) STAGE 3, GFR 30-59 ML/MIN (HCC): ICD-10-CM

## 2019-10-04 DIAGNOSIS — G44.229 CHRONIC TENSION-TYPE HEADACHE, NOT INTRACTABLE: ICD-10-CM

## 2019-10-04 DIAGNOSIS — I10 ESSENTIAL HYPERTENSION: Primary | ICD-10-CM

## 2019-10-04 DIAGNOSIS — M19.90 ARTHRITIS: ICD-10-CM

## 2019-10-04 DIAGNOSIS — E66.01 MORBID OBESITY WITH BMI OF 50.0-59.9, ADULT (HCC): ICD-10-CM

## 2019-10-04 DIAGNOSIS — E87.6 HYPOKALEMIA: ICD-10-CM

## 2019-10-04 RX ORDER — POTASSIUM CHLORIDE 750 MG/1
10 TABLET, EXTENDED RELEASE ORAL DAILY
Qty: 30 TABLET | Refills: 0 | Status: SHIPPED | OUTPATIENT
Start: 2019-10-04 | End: 2019-11-27 | Stop reason: SDUPTHER

## 2019-10-07 ENCOUNTER — NURSE ONLY (OUTPATIENT)
Dept: BARIATRICS/WEIGHT MGMT | Age: 64
End: 2019-10-07

## 2019-10-09 DIAGNOSIS — I10 ESSENTIAL HYPERTENSION: ICD-10-CM

## 2019-10-11 RX ORDER — PNV NO.95/FERROUS FUM/FOLIC AC 28MG-0.8MG
TABLET ORAL
Qty: 30 TABLET | Refills: 0 | Status: SHIPPED | OUTPATIENT
Start: 2019-10-11 | End: 2019-11-06 | Stop reason: SDUPTHER

## 2019-10-11 RX ORDER — LOSARTAN POTASSIUM 50 MG/1
TABLET ORAL
Qty: 30 TABLET | Refills: 0 | Status: SHIPPED | OUTPATIENT
Start: 2019-10-11 | End: 2019-11-06 | Stop reason: SDUPTHER

## 2019-10-18 ENCOUNTER — TELEPHONE (OUTPATIENT)
Dept: INTERNAL MEDICINE | Age: 64
End: 2019-10-18

## 2019-10-22 ENCOUNTER — CLINICAL DOCUMENTATION (OUTPATIENT)
Dept: INTERNAL MEDICINE CLINIC | Age: 64
End: 2019-10-22

## 2019-10-22 ENCOUNTER — TELEPHONE (OUTPATIENT)
Dept: INTERNAL MEDICINE CLINIC | Age: 64
End: 2019-10-22

## 2019-11-06 DIAGNOSIS — I10 ESSENTIAL HYPERTENSION: ICD-10-CM

## 2019-11-06 DIAGNOSIS — E55.9 VITAMIN D DEFICIENCY: ICD-10-CM

## 2019-11-06 RX ORDER — MELATONIN
Qty: 90 TABLET | Refills: 1 | Status: SHIPPED | OUTPATIENT
Start: 2019-11-06 | End: 2019-11-08 | Stop reason: SDUPTHER

## 2019-11-06 RX ORDER — PNV NO.95/FERROUS FUM/FOLIC AC 28MG-0.8MG
TABLET ORAL
Qty: 30 TABLET | Refills: 0 | Status: SHIPPED | OUTPATIENT
Start: 2019-11-06 | End: 2019-11-27 | Stop reason: SDUPTHER

## 2019-11-06 RX ORDER — LOSARTAN POTASSIUM 50 MG/1
TABLET ORAL
Qty: 30 TABLET | Refills: 0 | Status: SHIPPED | OUTPATIENT
Start: 2019-11-06 | End: 2019-11-27 | Stop reason: SDUPTHER

## 2019-11-06 RX ORDER — FOLIC ACID 1 MG/1
TABLET ORAL
Qty: 30 TABLET | Refills: 5 | Status: SHIPPED | OUTPATIENT
Start: 2019-11-06 | End: 2019-11-26 | Stop reason: SDUPTHER

## 2019-11-08 ENCOUNTER — OFFICE VISIT (OUTPATIENT)
Dept: BARIATRICS/WEIGHT MGMT | Age: 64
End: 2019-11-08
Payer: COMMERCIAL

## 2019-11-08 VITALS
RESPIRATION RATE: 20 BRPM | WEIGHT: 293 LBS | DIASTOLIC BLOOD PRESSURE: 76 MMHG | SYSTOLIC BLOOD PRESSURE: 128 MMHG | HEART RATE: 76 BPM | BODY MASS INDEX: 45.99 KG/M2 | HEIGHT: 67 IN

## 2019-11-08 DIAGNOSIS — G47.33 OSA (OBSTRUCTIVE SLEEP APNEA): ICD-10-CM

## 2019-11-08 DIAGNOSIS — E66.01 MORBID OBESITY WITH BMI OF 50.0-59.9, ADULT (HCC): ICD-10-CM

## 2019-11-08 DIAGNOSIS — E78.00 PURE HYPERCHOLESTEROLEMIA: ICD-10-CM

## 2019-11-08 DIAGNOSIS — N18.30 CKD (CHRONIC KIDNEY DISEASE) STAGE 3, GFR 30-59 ML/MIN (HCC): ICD-10-CM

## 2019-11-08 DIAGNOSIS — I10 ESSENTIAL HYPERTENSION: Primary | ICD-10-CM

## 2019-11-08 DIAGNOSIS — G44.229 CHRONIC TENSION-TYPE HEADACHE, NOT INTRACTABLE: ICD-10-CM

## 2019-11-08 DIAGNOSIS — M19.90 ARTHRITIS: ICD-10-CM

## 2019-11-08 DIAGNOSIS — I73.9 PERIPHERAL VASCULAR DISEASE (HCC): ICD-10-CM

## 2019-11-08 DIAGNOSIS — I63.00 CEREBROVASCULAR ACCIDENT (CVA) DUE TO THROMBOSIS OF PRECEREBRAL ARTERY (HCC): ICD-10-CM

## 2019-11-08 PROCEDURE — 99213 OFFICE O/P EST LOW 20 MIN: CPT | Performed by: NURSE PRACTITIONER

## 2019-11-08 RX ORDER — VITAMIN B COMPLEX
1 CAPSULE ORAL DAILY
COMMUNITY
End: 2020-07-16

## 2019-11-26 DIAGNOSIS — M17.12 PRIMARY OSTEOARTHRITIS OF LEFT KNEE: ICD-10-CM

## 2019-11-26 DIAGNOSIS — E87.6 HYPOKALEMIA: ICD-10-CM

## 2019-11-26 DIAGNOSIS — I10 ESSENTIAL HYPERTENSION: ICD-10-CM

## 2019-11-26 DIAGNOSIS — E55.9 VITAMIN D DEFICIENCY: ICD-10-CM

## 2019-11-26 DIAGNOSIS — I67.9 CEREBROVASCULAR DISEASE: ICD-10-CM

## 2019-11-27 RX ORDER — CLOPIDOGREL BISULFATE 75 MG/1
TABLET ORAL
Qty: 30 TABLET | Refills: 3 | Status: SHIPPED | OUTPATIENT
Start: 2019-11-27 | End: 2020-03-11

## 2019-11-27 RX ORDER — HYDROCHLOROTHIAZIDE 25 MG/1
TABLET ORAL
Qty: 30 TABLET | Refills: 5 | Status: SHIPPED | OUTPATIENT
Start: 2019-11-27 | End: 2020-04-29

## 2019-11-27 RX ORDER — ASPIRIN 81 MG/1
TABLET ORAL
Qty: 30 TABLET | Refills: 5 | Status: SHIPPED | OUTPATIENT
Start: 2019-11-27 | End: 2020-04-29

## 2019-11-27 RX ORDER — MELATONIN
Qty: 90 TABLET | Refills: 1 | Status: SHIPPED | OUTPATIENT
Start: 2019-11-27 | End: 2020-04-29

## 2019-11-27 RX ORDER — ACETAMINOPHEN 325 MG/1
TABLET ORAL
Qty: 60 TABLET | Refills: 3 | Status: SHIPPED | OUTPATIENT
Start: 2019-11-27 | End: 2021-12-03 | Stop reason: SDUPTHER

## 2019-11-27 RX ORDER — ATORVASTATIN CALCIUM 40 MG/1
40 TABLET, FILM COATED ORAL DAILY
Qty: 30 TABLET | Refills: 5 | Status: SHIPPED | OUTPATIENT
Start: 2019-11-27 | End: 2020-04-29

## 2019-11-27 RX ORDER — POTASSIUM CHLORIDE 750 MG/1
10 TABLET, EXTENDED RELEASE ORAL DAILY
Qty: 30 TABLET | Refills: 0 | Status: SHIPPED | OUTPATIENT
Start: 2019-11-27 | End: 2020-01-29

## 2019-11-27 RX ORDER — AMLODIPINE BESYLATE 10 MG/1
TABLET ORAL
Qty: 30 TABLET | Refills: 5 | Status: SHIPPED | OUTPATIENT
Start: 2019-11-27 | End: 2020-04-29

## 2019-11-27 RX ORDER — PNV NO.95/FERROUS FUM/FOLIC AC 28MG-0.8MG
TABLET ORAL
Qty: 30 TABLET | Refills: 2 | Status: SHIPPED | OUTPATIENT
Start: 2019-11-27 | End: 2020-03-11

## 2019-11-27 RX ORDER — FOLIC ACID 1 MG/1
1 TABLET ORAL DAILY
Qty: 30 TABLET | Refills: 5 | Status: SHIPPED | OUTPATIENT
Start: 2019-11-27 | End: 2020-04-29

## 2019-11-27 RX ORDER — LOSARTAN POTASSIUM 50 MG/1
TABLET ORAL
Qty: 30 TABLET | Refills: 3 | Status: SHIPPED | OUTPATIENT
Start: 2019-11-27 | End: 2020-04-02

## 2019-12-09 DIAGNOSIS — E87.6 HYPOKALEMIA: ICD-10-CM

## 2019-12-09 RX ORDER — POTASSIUM CHLORIDE 750 MG/1
TABLET, FILM COATED, EXTENDED RELEASE ORAL
Qty: 30 TABLET | Refills: 0 | OUTPATIENT
Start: 2019-12-09

## 2019-12-11 ENCOUNTER — OFFICE VISIT (OUTPATIENT)
Dept: BARIATRICS/WEIGHT MGMT | Age: 64
End: 2019-12-11
Payer: COMMERCIAL

## 2019-12-11 VITALS
HEIGHT: 67 IN | BODY MASS INDEX: 45.99 KG/M2 | HEART RATE: 68 BPM | SYSTOLIC BLOOD PRESSURE: 118 MMHG | WEIGHT: 293 LBS | DIASTOLIC BLOOD PRESSURE: 80 MMHG

## 2019-12-11 DIAGNOSIS — E66.01 MORBID OBESITY WITH BMI OF 50.0-59.9, ADULT (HCC): ICD-10-CM

## 2019-12-11 DIAGNOSIS — N18.30 CKD (CHRONIC KIDNEY DISEASE) STAGE 3, GFR 30-59 ML/MIN (HCC): ICD-10-CM

## 2019-12-11 DIAGNOSIS — I73.9 PERIPHERAL VASCULAR DISEASE (HCC): ICD-10-CM

## 2019-12-11 DIAGNOSIS — M19.90 ARTHRITIS: ICD-10-CM

## 2019-12-11 DIAGNOSIS — E78.00 PURE HYPERCHOLESTEROLEMIA: ICD-10-CM

## 2019-12-11 DIAGNOSIS — G47.33 OSA (OBSTRUCTIVE SLEEP APNEA): ICD-10-CM

## 2019-12-11 DIAGNOSIS — I10 ESSENTIAL HYPERTENSION: Primary | ICD-10-CM

## 2019-12-11 DIAGNOSIS — I63.00 CEREBROVASCULAR ACCIDENT (CVA) DUE TO THROMBOSIS OF PRECEREBRAL ARTERY (HCC): ICD-10-CM

## 2019-12-11 DIAGNOSIS — G44.229 CHRONIC TENSION-TYPE HEADACHE, NOT INTRACTABLE: ICD-10-CM

## 2019-12-11 PROCEDURE — 99213 OFFICE O/P EST LOW 20 MIN: CPT | Performed by: NURSE PRACTITIONER

## 2019-12-12 ENCOUNTER — OFFICE VISIT (OUTPATIENT)
Dept: NEUROLOGY | Age: 64
End: 2019-12-12
Payer: COMMERCIAL

## 2019-12-12 VITALS
WEIGHT: 293 LBS | DIASTOLIC BLOOD PRESSURE: 77 MMHG | HEART RATE: 66 BPM | BODY MASS INDEX: 45.99 KG/M2 | SYSTOLIC BLOOD PRESSURE: 112 MMHG | HEIGHT: 67 IN

## 2019-12-12 DIAGNOSIS — R29.898 LIMB WEAKNESS: ICD-10-CM

## 2019-12-12 DIAGNOSIS — Z86.73 HISTORY OF STROKE: Primary | ICD-10-CM

## 2019-12-12 DIAGNOSIS — E66.01 MORBID OBESITY (HCC): ICD-10-CM

## 2019-12-12 DIAGNOSIS — R20.2 PARESTHESIA: ICD-10-CM

## 2019-12-12 DIAGNOSIS — M48.02 CERVICAL STENOSIS OF SPINAL CANAL: ICD-10-CM

## 2019-12-12 PROCEDURE — 99214 OFFICE O/P EST MOD 30 MIN: CPT | Performed by: PSYCHIATRY & NEUROLOGY

## 2019-12-12 RX ORDER — OMEPRAZOLE 40 MG/1
40 CAPSULE, DELAYED RELEASE ORAL DAILY
COMMUNITY
End: 2020-01-29

## 2019-12-20 DIAGNOSIS — E87.6 HYPOKALEMIA: ICD-10-CM

## 2019-12-22 RX ORDER — POTASSIUM CHLORIDE 750 MG/1
10 TABLET, EXTENDED RELEASE ORAL DAILY
Qty: 30 TABLET | Refills: 0 | OUTPATIENT
Start: 2019-12-22

## 2020-01-22 ENCOUNTER — OFFICE VISIT (OUTPATIENT)
Dept: BARIATRICS/WEIGHT MGMT | Age: 65
End: 2020-01-22
Payer: COMMERCIAL

## 2020-01-22 VITALS
BODY MASS INDEX: 45.99 KG/M2 | SYSTOLIC BLOOD PRESSURE: 118 MMHG | RESPIRATION RATE: 18 BRPM | HEART RATE: 72 BPM | HEIGHT: 67 IN | DIASTOLIC BLOOD PRESSURE: 70 MMHG | WEIGHT: 293 LBS

## 2020-01-22 PROCEDURE — 99213 OFFICE O/P EST LOW 20 MIN: CPT | Performed by: NURSE PRACTITIONER

## 2020-01-22 NOTE — PROGRESS NOTES
Medical Nutrition Therapy   Metabolic and Bariatric Surgery         Supervised diet and exercise preparation  Visit 4 out of 3  Pt reports:     Pt currently following structured meal plan see goal 23 for weight management. Reviewed with pt. Vitals: Wt Readings from Last 3 Encounters:   01/22/20 (!) 337 lb (152.9 kg)   12/12/19 (!) 334 lb 6.4 oz (151.7 kg)   12/11/19 (!) 334 lb (151.5 kg)     gained 3 lbs over 1 month. Nutrition Assessment:   PES: Knowledge deficit related to healthy behaviors that support weight management post weight loss surgery as evidenced by Body mass index is 52.77 kg/m². Nutrition Assessment of Goal Attainment:  TREATMENT GOALS:    1. Pt  Completed 6 out of 6 goals. 2.TREATMENT GOALS FOR UPCOMING WEEK: continue all previous goals and add: # 0    All goals were planned with and agreed on by the patient. Goal Card  Name                                                                                                                           Milderd Petra  1. I will read the entire patient educational binder provided to me prior to my second appointment at [facility]. 2. I will make my psychological evaluation appointment prior to my second appointment at [facility]. 3. I will bring this tracking tool to every appointment with a health care provider at [facility]. 4. I will eliminate all nicotine, tobacco and e-cigarettes prior to surgery. 5. I will limit alcoholic beverages prior to surgery to 1 mixed drink or glass of wine (4-6oz). 6. I will limit dining out including fast food to 3 times a week prior to surgery. 7. I will eliminate sugary beverages prior to surgery. 8. I will eliminate carbonated beverages prior to surgery. 9. I will eliminate drinking with a straw prior to surgery. 10. I will limit caffeinated beverages prior to my surgery to [address] daily. 11. I will eliminate cold cereals prepared with milk prior to surgery.     12. I will do a 5 minute 3  x  18    100        3  x  19    100         x   20                21            4    22            4   I will eat 3 times daily. I will follow portion controlled diet structure. 23  100 100 100            24                 25                                                                   Do you understand your goals? y    Do you have the information you need to achieve your goals? y    Do you have any questions  right now? n        [x]  Consistent goal achievement in the program thus far and further success with goals is expected. []  Unable to consistently make progress in goal achievement. At this time patient is not moving forward  in developing the skills needed for success after surgery. Plan:    Continue to follow monthly and review goals.          []  Nutrition visits complete    [x]

## 2020-01-22 NOTE — PROGRESS NOTES
ENDOSCOPY  2009    negative    UPPER GASTROINTESTINAL ENDOSCOPY N/A 2019    EGD BIOPSY performed by Jefferson Mejia MD at [facility]       Family History:  Family History   Problem Relation Age of Onset    High Blood Pressure Mother     Asthma Mother     Heart Disease Mother     Heart Disease Father     High Blood Pressure Father     Diabetes Brother     High Blood Pressure Brother     Heart Disease Brother     High Blood Pressure Maternal Grandmother     High Blood Pressure Maternal Grandfather     Heart Disease Maternal Grandfather        Social History:  Social History     Socioeconomic History    Marital status:      Spouse name: Not on file    Number of children: Not on file    Years of education: Not on file    Highest education level: Not on file   Occupational History    Not on file   Social Needs    Financial resource strain: Not on file    Food insecurity:     Worry: Not on file     Inability: Not on file    Transportation needs:     Medical: Not on file     Non-medical: Not on file   Tobacco Use    Smoking status: Former Smoker     Packs/day: 2.00     Years: 35.00     Pack years: 70.00     Last attempt to quit: 10/11/2011     Years since quittin.2    Smokeless tobacco: Never Used   Substance and Sexual Activity    Alcohol use: Not Currently     Alcohol/week: 6.0 standard drinks     Types: 6 Cans of beer per week     Comment: 6 beers/week(on weekends)    Drug use: No     Types: Cocaine, Marijuana     Comment: per pt did years ago; cocaine & marij, 17 denies current use    Sexual activity: Yes     Partners: Male   Lifestyle    Physical activity:     Days per week: Not on file     Minutes per session: Not on file    Stress: Not on file   Relationships    Social connections:     Talks on phone: Not on file     Gets together: Not on file     Attends Latter day service: Not on file     Active member of club or organization: Not on file     Attends meetings of clubs or organizations: Not on file     Relationship status: Not on file    Intimate partner violence:     Fear of current or ex partner: Not on file     Emotionally abused: Not on file     Physically abused: Not on file     Forced sexual activity: Not on file   Other Topics Concern    Not on file   Social History Narrative    Not on file       Current Medications:  Current Outpatient Medications   Medication Sig Dispense Refill    omeprazole (PRILOSEC) 40 MG delayed release capsule Take 40 mg by mouth daily      folic acid (FOLVITE) 1 MG tablet Take 1 tablet by mouth daily 30 tablet 5    losartan (COZAAR) 50 MG tablet TAKE 1 TABLET DAILY 30 tablet 3    Ferrous Sulfate (IRON) 325 (65 Fe) MG TABS TAKE 1 TABLET IN THE MORNING 30 tablet 2    potassium chloride (KLOR-CON M) 10 MEQ extended release tablet Take 1 tablet by mouth daily 30 tablet 0    acetaminophen (SM PAIN RELIEVER) 325 MG tablet TAKE 2 TABLETS BY MOUTH EVERY 4 HOURS AS NEEDED FOR PAIN 60 tablet 3    hydrochlorothiazide (HYDRODIURIL) 25 MG tablet TAKE 1 TABLET IN THE MORNING 30 tablet 5    aspirin 81 MG EC tablet TAKE 1 TABLET DAILY 30 tablet 5    amLODIPine (NORVASC) 10 MG tablet TAKE 1 TABLET DAILY 30 tablet 5    clopidogrel (PLAVIX) 75 MG tablet Once every day by mouth 30 tablet 3    Cholecalciferol (VITAMIN D3) 25 MCG (1000 UT) TABS TAKE 1 TABLET DAILY 90 tablet 1    atorvastatin (LIPITOR) 40 MG tablet Take 1 tablet by mouth daily 30 tablet 5    b complex vitamins capsule Take 1 capsule by mouth daily      diclofenac sodium 1 % GEL APPLY 2GRAMS TOPICALLY TWICE A  g 3     No current facility-administered medications for this visit. Vital Signs:  /70 (Site: Right Upper Arm, Position: Sitting, Cuff Size: Large Adult)   Pulse 72   Resp 18   Ht 5' 7.01\" (1.702 m)   Wt (!) 337 lb (152.9 kg)   BMI 52.77 kg/m²     BMI/Height/Weight:  Body mass index is 52.77 kg/m².       Review of Systems - A review of systems was

## 2020-01-29 ENCOUNTER — HOSPITAL ENCOUNTER (OUTPATIENT)
Age: 65
Setting detail: SPECIMEN
Discharge: HOME | End: 2020-01-29
Payer: COMMERCIAL

## 2020-01-29 ENCOUNTER — OFFICE VISIT (OUTPATIENT)
Dept: INTERNAL MEDICINE | Age: 65
End: 2020-01-29
Payer: COMMERCIAL

## 2020-01-29 VITALS
WEIGHT: 293 LBS | HEART RATE: 62 BPM | SYSTOLIC BLOOD PRESSURE: 124 MMHG | DIASTOLIC BLOOD PRESSURE: 77 MMHG | HEIGHT: 67 IN | BODY MASS INDEX: 45.99 KG/M2

## 2020-01-29 LAB
ANION GAP SERPL CALCULATED.3IONS-SCNC: 13 MMOL/L (ref 9–17)
BUN BLDV-MCNC: 22 MG/DL (ref 8–23)
BUN/CREAT BLD: ABNORMAL (ref 9–20)
CALCIUM SERPL-MCNC: 9.6 MG/DL (ref 8.6–10.4)
CHLORIDE BLD-SCNC: 99 MMOL/L (ref 98–107)
CO2: 28 MMOL/L (ref 20–31)
CREAT SERPL-MCNC: 1.13 MG/DL (ref 0.5–0.9)
GFR AFRICAN AMERICAN: 58 ML/MIN
GFR NON-AFRICAN AMERICAN: 48 ML/MIN
GFR SERPL CREATININE-BSD FRML MDRD: ABNORMAL ML/MIN/{1.73_M2}
GFR SERPL CREATININE-BSD FRML MDRD: ABNORMAL ML/MIN/{1.73_M2}
GLUCOSE BLD-MCNC: 82 MG/DL (ref 70–99)
POTASSIUM SERPL-SCNC: 4 MMOL/L (ref 3.7–5.3)
SODIUM BLD-SCNC: 140 MMOL/L (ref 135–144)

## 2020-01-29 PROCEDURE — 99211 OFF/OP EST MAY X REQ PHY/QHP: CPT | Performed by: INTERNAL MEDICINE

## 2020-01-29 PROCEDURE — 80048 BASIC METABOLIC PNL TOTAL CA: CPT

## 2020-01-29 PROCEDURE — 36415 COLL VENOUS BLD VENIPUNCTURE: CPT

## 2020-01-29 PROCEDURE — 99213 OFFICE O/P EST LOW 20 MIN: CPT | Performed by: STUDENT IN AN ORGANIZED HEALTH CARE EDUCATION/TRAINING PROGRAM

## 2020-01-29 RX ORDER — OMEPRAZOLE 40 MG/1
CAPSULE, DELAYED RELEASE ORAL
Qty: 30 CAPSULE | Refills: 5 | Status: SHIPPED | OUTPATIENT
Start: 2020-01-29 | End: 2020-07-16

## 2020-01-29 ASSESSMENT — PATIENT HEALTH QUESTIONNAIRE - PHQ9
SUM OF ALL RESPONSES TO PHQ9 QUESTIONS 1 & 2: 0
2. FEELING DOWN, DEPRESSED OR HOPELESS: 0
1. LITTLE INTEREST OR PLEASURE IN DOING THINGS: 0
SUM OF ALL RESPONSES TO PHQ QUESTIONS 1-9: 0
SUM OF ALL RESPONSES TO PHQ QUESTIONS 1-9: 0

## 2020-01-29 NOTE — PROGRESS NOTES
Patient  is here for follow-up of hypertension and other chronic problems. Revealed to have bariatric surgery soon. She is morbidly obese. She is compliant with medications. She is up-to-date with her health maintenance issues. She is up-to-date with flu and pneumococcal and Shingrix vaccines. He has microcytic anemia. Iron panel was normal.  Follows up with hematology for this. She has a history of alpha thalassemia trait as well. Attending Physician Statement  I have discussed the care of Judy Vences, including pertinent history and exam findings,  with the resident. I have reviewed the key elements of all parts of the encounter with the resident. I agree with the assessment, plan and orders as documented by the resident.   (GE Modifier)
 Flu vaccine  Completed    Shingles Vaccine  Completed    Hepatitis C screen  Completed

## 2020-02-14 ENCOUNTER — HOSPITAL ENCOUNTER (OUTPATIENT)
Facility: MEDICAL CENTER | Age: 65
End: 2020-02-14

## 2020-02-21 ENCOUNTER — APPOINTMENT (OUTPATIENT)
Dept: GENERAL RADIOLOGY | Age: 65
End: 2020-02-21
Payer: COMMERCIAL

## 2020-02-21 ENCOUNTER — HOSPITAL ENCOUNTER (EMERGENCY)
Age: 65
Discharge: HOME | End: 2020-02-21
Attending: EMERGENCY MEDICINE
Payer: COMMERCIAL

## 2020-02-21 VITALS
HEIGHT: 67 IN | OXYGEN SATURATION: 99 % | DIASTOLIC BLOOD PRESSURE: 76 MMHG | WEIGHT: 293 LBS | HEART RATE: 67 BPM | BODY MASS INDEX: 45.99 KG/M2 | TEMPERATURE: 97.2 F | RESPIRATION RATE: 18 BRPM | SYSTOLIC BLOOD PRESSURE: 132 MMHG

## 2020-02-21 PROCEDURE — 73630 X-RAY EXAM OF FOOT: CPT

## 2020-02-21 PROCEDURE — 6370000000 HC RX 637 (ALT 250 FOR IP): Performed by: EMERGENCY MEDICINE

## 2020-02-21 PROCEDURE — 99283 EMERGENCY DEPT VISIT LOW MDM: CPT

## 2020-02-21 PROCEDURE — 73610 X-RAY EXAM OF ANKLE: CPT

## 2020-02-21 RX ORDER — ACETAMINOPHEN 500 MG
1000 TABLET ORAL ONCE
Status: COMPLETED | OUTPATIENT
Start: 2020-02-21 | End: 2020-02-21

## 2020-02-21 RX ADMIN — ACETAMINOPHEN 1000 MG: 500 TABLET ORAL at 12:45

## 2020-02-21 ASSESSMENT — PAIN DESCRIPTION - LOCATION: LOCATION: FOOT

## 2020-02-21 ASSESSMENT — PAIN SCALES - GENERAL
PAINLEVEL_OUTOF10: 7
PAINLEVEL_OUTOF10: 7

## 2020-02-21 ASSESSMENT — PAIN DESCRIPTION - ORIENTATION: ORIENTATION: LEFT

## 2020-02-21 ASSESSMENT — ENCOUNTER SYMPTOMS
VOMITING: 0
SHORTNESS OF BREATH: 0
ABDOMINAL PAIN: 0
NAUSEA: 0
COUGH: 0
BACK PAIN: 0

## 2020-02-21 ASSESSMENT — PAIN DESCRIPTION - PAIN TYPE: TYPE: ACUTE PAIN

## 2020-02-21 NOTE — ED PROVIDER NOTES
[facility] ED  Emergency Department Encounter  EmergencyMedicine Resident     Pt Name:Filomena Murphy  MRN: [id number]  [place] 1955  Date of evaluation: 2/21/20  PCP:  Lexi Nelson MD    [address] [place] [address]       Chief Complaint   Patient presents with    Foot Injury     Pt c/o twisting foot on Tuesday while walking, pain located to arch area of foot         HISTORY OF PRESENT ILLNESS  (Location/Symptom, Timing/Onset, Context/Setting, Quality, Duration,Modifying Factors, Severity.)      Ro Umana is a 75 y.o. female who presents with foot pain. Patient had a mechanical slip in the bathtub on Tuesday this past week. Says that she caught herself did not hit her head or fall the way to the ground. She injured her left foot during this fall. States that since then the pain is been worsening. She was initially able to bear weight but now says that she is limping. She is tried Motrin for the pain which took the edge off. Nothing seems to make the pain completely go away. Pain made worse with ambulation. No numbness no tingling or weakness. No previous injuries to this foot. Pain located on medial aspect. Severity is mild duration is constant context is left foot pain. She does follow with a foot doctor. PAST MEDICAL / SURGICAL / SOCIAL / FAMILY HISTORY      has a past medical history of Bleeding, Cataracts, bilateral, Cerebrovascular disease, Chronic pain in left foot, CKD (chronic kidney disease) stage 3, GFR 30-59 ml/min ([facility]), GERD (gastroesophageal reflux disease), Hx of blood clots, Hyperlipidemia, Hypertension, Iron (Fe) deficiency anemia, Nicotine abuse, Obesity, Osteoarthritis, Peripheral vascular disease ([address] Substance abuse ([address] Thalassemia minor, Unspecified cerebral artery occlusion with cerebral infarction, and Unspecified sleep apnea. has a past surgical history that includes Tonsillectomy and adenoidectomy;  Tubal ligation; Breast surgery; to avoid tobacco use. Family History   Problem Relation Age of Onset    High Blood Pressure Mother     Asthma Mother     Heart Disease Mother     Heart Disease Father     High Blood Pressure Father     Diabetes Brother     High Blood Pressure Brother     Heart Disease Brother     High Blood Pressure Maternal Grandmother     High Blood Pressure Maternal Grandfather     Heart Disease Maternal Grandfather         Allergies:  Duricef [cefadroxil monohydrate]; Fish-derived products; Pcn [penicillins]; and Tomato    HomeMedications:  Prior to Admission medications    Medication Sig Start Date End Date Taking?  Authorizing Provider   omeprazole (PRILOSEC) 40 MG delayed release capsule TAKE 1 CAPSULE BY MOUTH EVERY MORNING (BEFORE BREAKFAST) 1/29/20   Sherri Griffin MD   folic acid (FOLVITE) 1 MG tablet Take 1 tablet by mouth daily 11/27/19   Darrius Pereira MD   losartan (COZAAR) 50 MG tablet TAKE 1 TABLET DAILY 11/27/19   Darrius Pereira MD   Ferrous Sulfate (IRON) 325 (65 Fe) MG TABS TAKE 1 TABLET IN THE MORNING 11/27/19   Darrius Pereira MD   acetaminophen (SM PAIN RELIEVER) 325 MG tablet TAKE 2 TABLETS BY MOUTH EVERY 4 HOURS AS NEEDED FOR PAIN 11/27/19   Darrius Pereira MD   hydrochlorothiazide (HYDRODIURIL) 25 MG tablet TAKE 1 TABLET IN THE MORNING 11/27/19   Darrius Pereira MD   aspirin 81 MG EC tablet TAKE 1 TABLET DAILY 11/27/19   Darrius Pereira MD   amLODIPine (NORVASC) 10 MG tablet TAKE 1 TABLET DAILY 11/27/19   Darrius Pereira MD   clopidogrel (PLAVIX) 75 MG tablet Once every day by mouth 11/27/19   Darrius Pereira MD   Cholecalciferol (VITAMIN D3) 25 MCG (1000 UT) TABS TAKE 1 TABLET DAILY 11/27/19   Darrius Pereira MD   atorvastatin (LIPITOR) 40 MG tablet Take 1 tablet by mouth daily 11/27/19   Darrius Pereira MD   b complex vitamins capsule Take 1 capsule by mouth daily    Historical Provider, MD   diclofenac sodium 1 % GEL APPLY 2GRAMS TOPICALLY TWICE A DAY 6/5/19   Lobito Rueda MD       REVIEW appointment as soon as possible for a visit       [facility] ED  [address] [facility]  [phone]    As needed, If symptoms persist or worsen    [place], [address]  [phone]    Schedule an appointment as soon as possible for a visit         DISCHARGE MEDICATIONS:  Discharge Medication List as of 2/21/2020  1:23 PM          Yonatan Nice DO  Emergency Medicine Resident    (Please note that portions of thisnote were completed with a voice recognition program.  Efforts were made to edit the dictations but occasionally words are mis-transcribed.)     Yonatan Nice DO  02/21/20 1506

## 2020-02-21 NOTE — ED PROVIDER NOTES
[address] [facility]     Emergency Department     Faculty Note/ Attestation      Pt Name: Freedom Lambert                                       MRN: [id number]  [place] 1955  Date of evaluation: 2/21/2020    Patients PCP:    Reina Saul MD      Attestation  I performed a history and physical examination of the patient and discussed management with the resident. I reviewed the residents note and agree with the documented findings and plan of care. Any areas of disagreement are noted on the chart. I was personally present for the key portions of any procedures. I have documented in the chart those procedures where I was not present during the key portions. I have reviewed the emergency nurses triage note. I agree with the chief complaint, past medical history, past surgical history, allergies, medications, social and family history as documented unless otherwise noted below. For Physician Assistant/ Nurse Practitioner cases/documentation I have personally evaluated this patient and have completed at least one if not all key elements of the E/M (history, physical exam, and MDM). Additional findings are as noted.       Initial Screens:        Darden Coma Scale  Eye Opening: Spontaneous  Best Verbal Response: Oriented  Best Motor Response: Obeys commands  Martha Coma Scale Score: 15    Vitals:    Vitals:    02/21/20 1210   BP: 132/76   Pulse: 67   Resp: 20   Temp: 97.2 °F (36.2 °C)   TempSrc: Oral   SpO2: 99%   Weight: (!) 340 lb (154.2 kg)   Height: 5' 7\" (1.702 m)       CHIEF COMPLAINT       Chief Complaint   Patient presents with    Foot Injury     Pt c/o twisting foot on Tuesday while walking, pain located to arch area of foot             DIAGNOSTIC RESULTS             RADIOLOGY:   XR FOOT LEFT (MIN 3 VIEWS)   Final Result   Diffuse soft tissue swelling of the left foot and ankle without fracture or   dislocation         XR ANKLE LEFT (MIN 3 VIEWS)   Final Result   Diffuse soft tissue swelling of the left foot and ankle without fracture or   dislocation               LABS:  Labs Reviewed - No data to display      EMERGENCY DEPARTMENT COURSE:     -------------------------  BP: 132/76, Temp: 97.2 °F (36.2 °C), Pulse: 67, Resp: 20      Comments    XR neg, follows with podiatry      (Please note that portions of this note were completed with a voice recognition program.  Efforts were made to edit the dictations but occasionally words are mis-transcribed.)      Marcus MD  Attending Emergency Physician         Rama Hobbs MD  02/21/20 8736

## 2020-02-24 ENCOUNTER — HOSPITAL ENCOUNTER (OUTPATIENT)
Facility: MEDICAL CENTER | Age: 65
Discharge: HOME | End: 2020-02-24
Payer: COMMERCIAL

## 2020-02-24 ENCOUNTER — TELEPHONE (OUTPATIENT)
Dept: ONCOLOGY | Age: 65
End: 2020-02-24

## 2020-02-24 ENCOUNTER — OFFICE VISIT (OUTPATIENT)
Dept: ONCOLOGY | Age: 65
End: 2020-02-24
Payer: COMMERCIAL

## 2020-02-24 VITALS
DIASTOLIC BLOOD PRESSURE: 83 MMHG | TEMPERATURE: 96.3 F | RESPIRATION RATE: 20 BRPM | HEART RATE: 51 BPM | SYSTOLIC BLOOD PRESSURE: 135 MMHG | WEIGHT: 293 LBS | BODY MASS INDEX: 53.42 KG/M2

## 2020-02-24 DIAGNOSIS — D50.9 MICROCYTIC ANEMIA: ICD-10-CM

## 2020-02-24 DIAGNOSIS — D56.3 THALASSEMIA TRAIT, ALPHA: ICD-10-CM

## 2020-02-24 DIAGNOSIS — D64.9 ANEMIA, UNSPECIFIED TYPE: ICD-10-CM

## 2020-02-24 DIAGNOSIS — E66.01 MORBID OBESITY WITH BMI OF 50.0-59.9, ADULT (HCC): ICD-10-CM

## 2020-02-24 DIAGNOSIS — E61.1 IRON DEFICIENCY: ICD-10-CM

## 2020-02-24 LAB
ABSOLUTE EOS #: 0.27 K/UL (ref 0–0.44)
ABSOLUTE IMMATURE GRANULOCYTE: 0.02 K/UL (ref 0–0.3)
ABSOLUTE LYMPH #: 1.39 K/UL (ref 1.1–3.7)
ABSOLUTE MONO #: 0.4 K/UL (ref 0.1–1.2)
ALBUMIN SERPL-MCNC: 3.8 G/DL (ref 3.5–5.2)
ALBUMIN/GLOBULIN RATIO: ABNORMAL (ref 1–2.5)
ALP BLD-CCNC: 123 U/L (ref 35–104)
ALT SERPL-CCNC: 9 U/L (ref 5–33)
ANION GAP SERPL CALCULATED.3IONS-SCNC: 11 MMOL/L (ref 9–17)
AST SERPL-CCNC: 15 U/L
BASOPHILS # BLD: 1 % (ref 0–2)
BASOPHILS ABSOLUTE: 0.06 K/UL (ref 0–0.2)
BILIRUB SERPL-MCNC: 0.3 MG/DL (ref 0.3–1.2)
BUN BLDV-MCNC: 21 MG/DL (ref 8–23)
BUN/CREAT BLD: 17 (ref 9–20)
CALCIUM SERPL-MCNC: 9.5 MG/DL (ref 8.6–10.4)
CHLORIDE BLD-SCNC: 98 MMOL/L (ref 98–107)
CO2: 30 MMOL/L (ref 20–31)
CREAT SERPL-MCNC: 1.21 MG/DL (ref 0.5–0.9)
DIFFERENTIAL TYPE: ABNORMAL
EOSINOPHILS RELATIVE PERCENT: 5 % (ref 1–4)
FREE KAPPA/LAMBDA RATIO: 1.25 (ref 0.26–1.65)
GFR AFRICAN AMERICAN: 54 ML/MIN
GFR NON-AFRICAN AMERICAN: 45 ML/MIN
GFR SERPL CREATININE-BSD FRML MDRD: ABNORMAL ML/MIN/{1.73_M2}
GFR SERPL CREATININE-BSD FRML MDRD: ABNORMAL ML/MIN/{1.73_M2}
GLUCOSE BLD-MCNC: 81 MG/DL (ref 70–99)
HCT VFR BLD CALC: 33.8 % (ref 36.3–47.1)
HEMOGLOBIN: 10 G/DL (ref 11.9–15.1)
IMMATURE GRANULOCYTES: 0 %
KAPPA FREE LIGHT CHAINS QNT: 2.3 MG/DL (ref 0.37–1.94)
LAMBDA FREE LIGHT CHAINS QNT: 1.84 MG/DL (ref 0.57–2.63)
LYMPHOCYTES # BLD: 25 % (ref 24–43)
MCH RBC QN AUTO: 21.7 PG (ref 25.2–33.5)
MCHC RBC AUTO-ENTMCNC: 29.6 G/DL (ref 28.4–34.8)
MCV RBC AUTO: 73.3 FL (ref 82.6–102.9)
MONOCYTES # BLD: 7 % (ref 3–12)
NRBC AUTOMATED: 0 PER 100 WBC
PDW BLD-RTO: 16.9 % (ref 11.8–14.4)
PLATELET # BLD: 367 K/UL (ref 138–453)
PLATELET ESTIMATE: ABNORMAL
PMV BLD AUTO: 9.5 FL (ref 8.1–13.5)
POTASSIUM SERPL-SCNC: 3.6 MMOL/L (ref 3.7–5.3)
RBC # BLD: 4.61 M/UL (ref 3.95–5.11)
RBC # BLD: ABNORMAL 10*6/UL
SEG NEUTROPHILS: 62 % (ref 36–65)
SEGMENTED NEUTROPHILS ABSOLUTE COUNT: 3.45 K/UL (ref 1.5–8.1)
SODIUM BLD-SCNC: 139 MMOL/L (ref 135–144)
TOTAL PROTEIN: 7 G/DL (ref 6.4–8.3)
WBC # BLD: 5.6 K/UL (ref 3.5–11.3)
WBC # BLD: ABNORMAL 10*3/UL

## 2020-02-24 PROCEDURE — 84155 ASSAY OF PROTEIN SERUM: CPT

## 2020-02-24 PROCEDURE — 36415 COLL VENOUS BLD VENIPUNCTURE: CPT

## 2020-02-24 PROCEDURE — 80053 COMPREHEN METABOLIC PANEL: CPT

## 2020-02-24 PROCEDURE — 85025 COMPLETE CBC W/AUTO DIFF WBC: CPT

## 2020-02-24 PROCEDURE — 99213 OFFICE O/P EST LOW 20 MIN: CPT | Performed by: INTERNAL MEDICINE

## 2020-02-24 PROCEDURE — 99211 OFF/OP EST MAY X REQ PHY/QHP: CPT | Performed by: INTERNAL MEDICINE

## 2020-02-24 PROCEDURE — 83883 ASSAY NEPHELOMETRY NOT SPEC: CPT

## 2020-02-24 PROCEDURE — 84165 PROTEIN E-PHORESIS SERUM: CPT

## 2020-02-24 NOTE — PROGRESS NOTES
Today's Date: 2/24/2020  Patient Name: Ricco Aragon  Patient's age: 75 y.o., 1955      Reason for Consult: management recommendations    CHIEF COMPLAINT:  Microcytic anemia    History Obtained From:  patient, electronic medical record    Interval history:    Patient presents to the clinic for a follow-up visit and to discuss results of her blood work-up. Unfortunately patient did not get any labs done. EGD and colonoscopy done previously did not show any obvious source of bleeding. Patient denies any nausea vomiting fever chills. Overall denies any interval event. During this visit patient's allergy, social, medical, surgical history and medications were reviewed and updated. HISTORY OF PRESENT ILLNESS:      The patient is a 75 y.o.  female who presents to the office to establish care and for further treatment evaluation and recommendations. Patient gives a long-standing history of microcytic anemia. According to the patient she was first diagnosed with anemia when she was pregnant and has been anemic since. Patient has had GI workup done in the past including colonoscopy which only revealed a polyp without any malignancy. Patient's blood workup in the past has shown iron deficiency but her latest iron studies showed sufficient iron stores, K79 and folic acid levels. Patient complains of fatigue at times. Denies noticing any gross bleeding. Patient is on iron supplement with one tablet a day. Patient peripheral blood smear showed microcytic anemia with reticulocytosis. Patient also had hemoglobin electrophoresis which came back within normal range. Patient denies any history of gastric bypass surgery.     Past Medical History:   has a past medical history of Bleeding, Cataracts, bilateral, Cerebrovascular disease, Chronic pain in left foot, CKD (chronic kidney disease) stage 3, GFR 30-59 ml/min ([facility]), GERD (gastroesophageal reflux disease), Hx of blood clots, correlation is necessary. IMAGING DATA:    Xr Chest Standard (2 Vw)    Result Date: 3/1/2019  EXAMINATION: TWO VIEWS OF THE CHEST 3/1/2019 10:12 am COMPARISON: January 5, 2019, October 1, 2011 HISTORY: ORDERING SYSTEM PROVIDED HISTORY: Pneumonia of both lower lobes due to infectious organism [facility]) TECHNOLOGIST PROVIDED HISTORY: follow up on pneumonia resolution FINDINGS: The cardiomediastinal silhouette is unchanged in appearance. Mild cardiac silhouette enlargement. There is no consolidation, pneumothorax, or evidence of edema. No effusion is appreciated. The osseous structures are unchanged in appearance. Degenerative change in the midthoracic spine and chronic appearing mild vertebral body height loss is again noted. No acute airspace disease identified. Impression No mammographic findings of malignancy. BIRADS: BIRADS - CATEGORY 1   Negative, no evidence of malignancy. Normal interval follow-up is recommended in 12 months. OVERALL ASSESSMENT - NEGATIVE   A letter of notification will be sent to the patient regarding the results. The [employer] recommends annual mammograms for women 40 years and older. 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 Based on the previous studies patient iron is suggested sufficient iron stores. I believe patient's microcytic anemia secondary to alpha thalassemia trait. No intervention recommended at this point. We will continue to follow labs. Thank you for asking us to see this patient.     Rickie Irizarry MD            This note is created with the assistance of a speech recognition program.  While intending to generate a document that actually reflects the content of the visit, the document can still have some errors including those of syntax and sound a like substitutions which may escape proof reading. It such instances, actual meaning can be extrapolated by contextual diversion.

## 2020-02-26 LAB
ALBUMIN (CALCULATED): 3.8 G/DL (ref 3.2–5.2)
ALBUMIN PERCENT: 58 % (ref 45–65)
ALPHA 1 PERCENT: 3 % (ref 3–6)
ALPHA 2 PERCENT: 14 % (ref 6–13)
ALPHA-1-GLOBULIN: 0.2 G/DL (ref 0.1–0.4)
ALPHA-2-GLOBULIN: 0.9 G/DL (ref 0.5–0.9)
BETA GLOBULIN: 0.8 G/DL (ref 0.5–1.1)
BETA PERCENT: 12 % (ref 11–19)
GAMMA GLOBULIN %: 14 % (ref 9–20)
GAMMA GLOBULIN: 0.9 G/DL (ref 0.5–1.5)
PATHOLOGIST: ABNORMAL
PROTEIN ELECTROPHORESIS, SERUM: ABNORMAL
TOTAL PROT. SUM,%: 101 % (ref 98–102)
TOTAL PROT. SUM: 6.6 G/DL (ref 6.3–8.2)
TOTAL PROTEIN: 6.6 G/DL (ref 6.4–8.3)

## 2020-03-02 ENCOUNTER — OFFICE VISIT (OUTPATIENT)
Dept: PODIATRY | Age: 65
End: 2020-03-02
Payer: COMMERCIAL

## 2020-03-02 VITALS
DIASTOLIC BLOOD PRESSURE: 76 MMHG | HEIGHT: 67 IN | HEART RATE: 65 BPM | WEIGHT: 293 LBS | BODY MASS INDEX: 45.99 KG/M2 | SYSTOLIC BLOOD PRESSURE: 133 MMHG

## 2020-03-02 PROCEDURE — 99213 OFFICE O/P EST LOW 20 MIN: CPT | Performed by: STUDENT IN AN ORGANIZED HEALTH CARE EDUCATION/TRAINING PROGRAM

## 2020-03-02 PROCEDURE — 11721 DEBRIDE NAIL 6 OR MORE: CPT | Performed by: STUDENT IN AN ORGANIZED HEALTH CARE EDUCATION/TRAINING PROGRAM

## 2020-03-02 PROCEDURE — 99212 OFFICE O/P EST SF 10 MIN: CPT | Performed by: STUDENT IN AN ORGANIZED HEALTH CARE EDUCATION/TRAINING PROGRAM

## 2020-03-02 NOTE — PROGRESS NOTES
[address] [facility] [address] [facility],  NAHUM Colbert  Tel: [phone]   Fax: [phone]    Subjective     CC: L ankle sprain    HPI:  Pascale Martines is a 75y.o. year old female who presents to clinic today for f/u L ankle sprain. Patient was seen in ED on 2/21/20 and reported mechanical fall in bathtub on 2/17/20. Patient relates an eversion mechanism. XR foot/ankle in ED was negative for fracture/dislocation. She was discharged with air cast, was offered crutches but deferred and decided to use her cane. She states pain has improved but she still relates some difficulty full weightbearing. Admits she has not been wearing air cast because it slides off easily. Patient also requesting debridement of thickened/elongated/painful nails. Denies burning/numbness/tingling to feet or cramping in legs with activity or at rest. A1C wnl on 6/7/19. No open wounds or any issues. Primary care physician is Surekha Schafer MD.    ROS:    Constitutional: Denies nausea, vomiting, fever, chills. Neurologic: Denies numbness, tingling, and burning in the feet. Vascular: Denies symptoms of lower extremity claudication. Skin: Denies open wounds. Otherwise negative except as noted in the HPI.      PMH:  Past Medical History:   Diagnosis Date    Bleeding     while on aggrenox    Cataracts, bilateral     Cerebrovascular disease 2003,2011    cva    Chronic pain in left foot     CKD (chronic kidney disease) stage 3, GFR 30-59 ml/min ([facility])     GERD (gastroesophageal reflux disease)     Hx of blood clots     Hyperlipidemia     Hypertension     Iron (Fe) deficiency anemia     Nicotine abuse     Obesity     Osteoarthritis     Peripheral vascular disease ([facility] [address] 3/13/2014    Substance abuse ([facility])     cocaine, canabis    Thalassemia minor     Unspecified cerebral artery occlusion with cerebral infarction     Unspecified sleep apnea        Surgical History:   Past Surgical History: Marium Lopez MD   amLODIPine (NORVASC) 10 MG tablet TAKE 1 TABLET DAILY 11/27/19  Yes Xiang Moreau MD   clopidogrel (PLAVIX) 75 MG tablet Once every day by mouth 11/27/19  Yes Xiang Moreau MD   Cholecalciferol (VITAMIN D3) 25 MCG (1000 UT) TABS TAKE 1 TABLET DAILY 11/27/19  Yes Xiang Moreau MD   atorvastatin (LIPITOR) 40 MG tablet Take 1 tablet by mouth daily 11/27/19  Yes Xiang Moreau MD   b complex vitamins capsule Take 1 capsule by mouth daily   Yes Historical Provider, MD   diclofenac sodium 1 % GEL APPLY 2GRAMS TOPICALLY TWICE A DAY 6/5/19  Yes Valeriano Murcia MD     Objective     Vitals:    03/02/20 1433   BP: 133/76   Pulse: 65       Lab Results   Component Value Date    LABA1C 5.4 06/07/2019   Physical Exam:  General:  Alert and oriented x3. In no acute distress. Lower Extremity Physical Exam:    Vascular: DP pulses palpable, bilateral. PT pulses non-palpable, Bilateral. CFT <3 sec to all digits. Skin temp is warm to warm proximal to distal, bilateral.     Neuro: Saph/sural/SP/DP/plantar sensation intact to light touch. SWMF intact to all 10/10 sites b/l as tested w/ a SWMF. Musculoskeletal: Focal TTP to L medial malleolus distal aspect along course of deltoid ligaments. No TTP along LLE proximal medial malleolus, fibula, medial/lateral ankle gutters, posterior tibial tendon, peroneal tendons, 5th metatarsal base, navicular or ATFL. Pain with resisted eversion, LLE. No pain with resisted inversion, LLE. TTP with palpation nails 1-5, b/l. Dermatologic: Nails 1-10 are thickened, elongated, discolored with the presence of subungual debris. Webspaces 1-4   are c/d/i, bilateral. No open lesions noted. No focal HKT tissue.     2/21/20 L Foot/Ankle XR:  Narrative   EXAMINATION:   THREE XRAY VIEWS OF THE LEFT FOOT; THREE XRAY VIEWS OF THE LEFT ANKLE       2/21/2020 1:07 pm       COMPARISON:   None.       HISTORY:   ORDERING SYSTEM PROVIDED HISTORY: fall, medial mal pain   TECHNOLOGIST PROVIDED HISTORY: fall, medial mal pain       FINDINGS:   There is diffuse swelling of the ankle into the foot.  No fracture is   demonstrated throughout the left ankle and foot and no dislocation           Impression   Diffuse soft tissue swelling of the left foot and ankle without fracture or   dislocation             Assessment   Nahum Christian is a 75 y.o. female with     Diagnosis Orders   1. Sprain of left ankle, unspecified ligament, initial encounter     2. Tear of deltoid ligament of ankle, initial encounter     3. Acute left ankle pain     4. Pain due to onychomycosis of toenails of both feet  [address]1462 - [place] DEBRIDEMENT OF NAILS, 6 OR MORE   5. Hav (hallux abducto valgus), right     6. Hav (hallux abducto valgus), left       Plan   · Patient examined and evaluated  · Diagnosis and treatment options discussed in detail  · L foot/ankle XR reviewed - no evidence of fracture/dislocation, preserved medial clear space  · Clinically suspect eversion ankle sprain affecting deltoids  · Dispensed ACE bandage for edema control L ankle  · Dispensed CAM boot to properly immobilize LLE   · Instructed patient regarding RICE therapy  · Nails 1-5 b/l sharply debrided w/ sterile nipper w/o incident  · Patient to RTC in 2 weeks for f/u or sooner if issues arise  · Please, call the office with any questions or concerns   · Discussed w/ Dr. Lan Camarillo    No orders of the defined types were placed in this encounter.     Orders Placed This Encounter   Procedures    [address] - [place] DEBRIDEMENT OF NAILS, [place] [address], [address] [facility]   3/2/2020 at 3:50 PM

## 2020-03-16 ENCOUNTER — TELEPHONE (OUTPATIENT)
Dept: PODIATRY | Age: 65
End: 2020-03-16

## 2020-06-26 ENCOUNTER — OFFICE VISIT (OUTPATIENT)
Dept: PODIATRY | Age: 65
End: 2020-06-26
Payer: COMMERCIAL

## 2020-06-26 VITALS
WEIGHT: 293 LBS | HEART RATE: 66 BPM | TEMPERATURE: 96.8 F | BODY MASS INDEX: 45.99 KG/M2 | DIASTOLIC BLOOD PRESSURE: 73 MMHG | SYSTOLIC BLOOD PRESSURE: 124 MMHG | HEIGHT: 67 IN

## 2020-06-26 PROCEDURE — G0127 TRIM NAIL(S): HCPCS | Performed by: STUDENT IN AN ORGANIZED HEALTH CARE EDUCATION/TRAINING PROGRAM

## 2020-06-26 PROCEDURE — 99213 OFFICE O/P EST LOW 20 MIN: CPT | Performed by: STUDENT IN AN ORGANIZED HEALTH CARE EDUCATION/TRAINING PROGRAM

## 2020-06-26 PROCEDURE — 99212 OFFICE O/P EST SF 10 MIN: CPT

## 2020-06-26 NOTE — PROGRESS NOTES
Patient instructed to remove shoes and socks, instructed to sit in exam chair. Current PCP name is Mauricio Guzman and date of last visit 1/29/20.  Do you have a follow up visit scheduled?  no    If yes the date is

## 2020-06-28 ENCOUNTER — HOSPITAL ENCOUNTER (EMERGENCY)
Age: 65
Discharge: HOME | End: 2020-06-28
Attending: EMERGENCY MEDICINE
Payer: COMMERCIAL

## 2020-06-28 ENCOUNTER — APPOINTMENT (OUTPATIENT)
Dept: GENERAL RADIOLOGY | Age: 65
End: 2020-06-28
Payer: COMMERCIAL

## 2020-06-28 VITALS
HEART RATE: 80 BPM | RESPIRATION RATE: 19 BRPM | OXYGEN SATURATION: 98 % | DIASTOLIC BLOOD PRESSURE: 92 MMHG | TEMPERATURE: 98.1 F | SYSTOLIC BLOOD PRESSURE: 169 MMHG

## 2020-06-28 PROCEDURE — 99283 EMERGENCY DEPT VISIT LOW MDM: CPT

## 2020-06-28 PROCEDURE — 73502 X-RAY EXAM HIP UNI 2-3 VIEWS: CPT

## 2020-06-28 PROCEDURE — 6370000000 HC RX 637 (ALT 250 FOR IP): Performed by: STUDENT IN AN ORGANIZED HEALTH CARE EDUCATION/TRAINING PROGRAM

## 2020-06-28 RX ORDER — LIDOCAINE 50 MG/G
1 PATCH TOPICAL DAILY
Qty: 30 PATCH | Refills: 0 | Status: SHIPPED | OUTPATIENT
Start: 2020-06-28 | End: 2020-08-24

## 2020-06-28 RX ORDER — HYDROCODONE BITARTRATE AND ACETAMINOPHEN 5; 325 MG/1; MG/1
2 TABLET ORAL ONCE
Status: COMPLETED | OUTPATIENT
Start: 2020-06-28 | End: 2020-06-28

## 2020-06-28 RX ADMIN — HYDROCODONE BITARTRATE AND ACETAMINOPHEN 2 TABLET: 5; 325 TABLET ORAL at 17:16

## 2020-06-28 ASSESSMENT — ENCOUNTER SYMPTOMS
RHINORRHEA: 0
SHORTNESS OF BREATH: 0
ABDOMINAL PAIN: 0
VOMITING: 0

## 2020-06-28 ASSESSMENT — PAIN DESCRIPTION - FREQUENCY: FREQUENCY: CONTINUOUS

## 2020-06-28 ASSESSMENT — PAIN SCALES - GENERAL
PAINLEVEL_OUTOF10: 10
PAINLEVEL_OUTOF10: 10

## 2020-06-28 ASSESSMENT — PAIN DESCRIPTION - DESCRIPTORS: DESCRIPTORS: THROBBING

## 2020-06-28 ASSESSMENT — PAIN DESCRIPTION - PAIN TYPE: TYPE: ACUTE PAIN

## 2020-06-28 ASSESSMENT — PAIN DESCRIPTION - ORIENTATION: ORIENTATION: LEFT

## 2020-06-28 ASSESSMENT — PAIN DESCRIPTION - LOCATION: LOCATION: HIP;LEG

## 2020-06-28 NOTE — ED PROVIDER NOTES
[address] [facility]     Emergency Department     Faculty Attestation        I performed a history and physical examination of the patient and discussed management with the resident. I reviewed the residents note and agree with the documented findings and plan of care. Any areas of disagreement are noted on the chart. I was personally present for the key portions of any procedures. I have documented in the chart those procedures where I was not present during the key portions. I have reviewed the emergency nurses triage note. I agree with the chief complaint, past medical history, past surgical history, allergies, medications, social and family history as documented unless otherwise noted below. For mid-level providers such as nurse practitioners as well as physicians assistants:    I have personally seen and evaluated the patient. I find the patient's history and physical exam are consistent with NP/PA documentation. I agree with the care provided, treatment rendered, disposition, & follow-up plan. Additional findings are as noted. Vital Signs: There were no vitals taken for this visit. PCP:  Idalia Linder MD    Pertinent Comments:     Patient presents the emergency department for evaluation of left hip pain. She ran to an object in her house and hit her left hip and since then has had hip pain. She is been able walk and bear weight. She denies numbness tingling bowel or bladder incontinence.   Will check x-rays, symptomatic treatment, reassessment      Critical Care  None          Sendy Levy MD  Attending Emergency Medicine Physician              Vikram Pro MD  06/28/20 1170

## 2020-06-28 NOTE — ED NOTES
Writer went over discharge instructions with patient. Patient states she understands instructions.  All questions addressed     Sarthak Mcgee RN  06/28/20 9934

## 2020-06-28 NOTE — ED NOTES
Patient came to ED via EMS- Patient alert and orientated x4. Patient states last week she was walking and hit left hip on counter. Since hitting hip she has had increased pain in left hip and increased pain in leg. Patient rates pain 10/10.  Patient took ibuprofen and tylenol this morning     Cristian Ceja RN  06/28/20 7055

## 2020-06-28 NOTE — ED NOTES
Bed: 27  Expected date:   Expected time:   Means of arrival:   Comments:  [address] [facility] Montse Real RN  06/28/20 1218

## 2020-06-28 NOTE — ED PROVIDER NOTES
[facility] ED  Emergency Department Encounter  EmergencyMedicine Resident     Pt Name:Filomena Knapp  MRN: [id number]  [place] 1955  Date of evaluation: 6/28/20  PCP:  Kirsty Estrada MD    [address] [place] [address]       Chief Complaint   Patient presents with    Hip Pain     left hip    Leg Pain     left leg pain       HISTORY OF PRESENT ILLNESS  (Location/Symptom, Timing/Onset, Context/Setting, Quality, Duration, Modifying Factors, Severity.)      Luma Auguste is a 75 y.o. female who presents with complaint of left hip pain. Patient states that approximate 1 week ago she bumped into the counter at her home in this area she has been ambulating since with some discomfort however this is been controlled by home doses of acetaminophen. Patient states that today she had worsening of her pain and called EMS to bring her to the emergency department for further evaluation. No loss of consciousness patient denies any other injuries or any recent trauma outside of this 1 event that occurred approximately a week ago. PAST MEDICAL / SURGICAL / SOCIAL / FAMILY HISTORY      has a past medical history of Bleeding, Cataracts, bilateral, Cerebrovascular disease, Chronic pain in left foot, CKD (chronic kidney disease) stage 3, GFR 30-59 ml/min ([facility]), GERD (gastroesophageal reflux disease), Hx of blood clots, Hyperlipidemia, Hypertension, Iron (Fe) deficiency anemia, Nicotine abuse, Obesity, Osteoarthritis, Peripheral vascular disease ([address] Substance abuse ([address] Thalassemia minor, Unspecified cerebral artery occlusion with cerebral infarction, and Unspecified sleep apnea. has a past surgical history that includes Tonsillectomy and adenoidectomy; Tubal ligation; Breast surgery; Endometrial biopsy (1998); Upper gastrointestinal endoscopy (2009); Colonoscopy (12/2007); Colonoscopy (2013); Upper gastrointestinal endoscopy (N/A, 8/21/2019); and Colonoscopy (N/A, 8/21/2019).       Social History Socioeconomic History    Marital status:      Spouse name: Not on file    Number of children: Not on file    Years of education: Not on file    Highest education level: Not on file   Occupational History    Not on file   Social Needs    Financial resource strain: Not on file    Food insecurity     Worry: Not on file     Inability: Not on file    Transportation needs     Medical: Not on file     Non-medical: Not on file   Tobacco Use    Smoking status: Former Smoker     Packs/day: 2.00     Years: 35.00     Pack years: 70.00     Last attempt to quit: 10/11/2011     Years since quittin.7    Smokeless tobacco: Never Used   Substance and Sexual Activity    Alcohol use: Not Currently     Alcohol/week: 6.0 standard drinks     Types: 6 Cans of beer per week     Comment: 6 beers/week(on weekends)    Drug use: No     Types: Cocaine, Marijuana     Comment: per pt did years ago; cocaine & jasonj, 17 denies current use    Sexual activity: Yes     Partners: Male   Lifestyle    Physical activity     Days per week: Not on file     Minutes per session: Not on file    Stress: Not on file   Relationships    Social connections     Talks on phone: Not on file     Gets together: Not on file     Attends [employer] service: Not on file     Active member of club or organization: Not on file     Attends meetings of clubs or organizations: Not on file     Relationship status: Not on file    Intimate partner violence     Fear of current or ex partner: Not on file     Emotionally abused: Not on file     Physically abused: Not on file     Forced sexual activity: Not on file   Other Topics Concern    Not on file   Social History Narrative    Not on file       Family History   Problem Relation Age of Onset    High Blood Pressure Mother     Asthma Mother     Heart Disease Mother     Heart Disease Father     High Blood Pressure Father     Diabetes Brother     High Blood Pressure Brother     Heart Disease Brother     High Blood Pressure Maternal Grandmother     High Blood Pressure Maternal Grandfather     Heart Disease Maternal Grandfather        Allergies:  Duricef [cefadroxil monohydrate]; Fish-derived products; Pcn [penicillins]; and Tomato    Home Medications:  Prior to Admission medications    Medication Sig Start Date End Date Taking? Authorizing Provider   amLODIPine (NORVASC) 10 MG tablet TAKE 1 TABLET DAILY 4/29/20  Yes Sue Tanner MD   aspirin (ASPIRIN LOW DOSE) 81 MG EC tablet TAKE 1 TABLET DAILY 4/29/20  Yes Sue Tanner MD   atorvastatin (LIPITOR) 40 MG tablet TAKE 1 TABLET BY MOUTH DAILY 4/29/20  Yes Sue Tanner MD   folic acid (FOLVITE) 1 MG tablet TAKE 1 TABLET BY MOUTH DAILY 4/29/20  Yes Sue Tanner MD   hydroCHLOROthiazide (HYDRODIURIL) 25 MG tablet TAKE 1 TABLET BY MOUTH DAILY 4/29/20  Yes Sue Tanner MD   Vitamin D (CHOLECALCIFEROL) 25 MCG (1000 UT) TABS tablet TAKE 1 TABLET DAILY 4/29/20  Yes Sue Tanner MD   losartan (COZAAR) 50 MG tablet TAKE 1 TABLET DAILY 4/2/20  Yes Sue Tanner MD   clopidogrel (PLAVIX) 75 MG tablet TAEK 1 TABLET BY MOUTH DAILY 3/11/20  Yes Sue Tanner MD   Ferrous Sulfate (IRON) 325 (65 Fe) MG TABS TAKE 1 TABLET IN THE MORNING 3/11/20  Yes Sue Tanner MD   omeprazole (PRILOSEC) 40 MG delayed release capsule TAKE 1 CAPSULE BY MOUTH EVERY MORNING (BEFORE BREAKFAST) 1/29/20  Yes Steven Owen MD   acetaminophen (SM PAIN RELIEVER) 325 MG tablet TAKE 2 TABLETS BY MOUTH EVERY 4 HOURS AS NEEDED FOR PAIN 11/27/19  Yes uSe Tanner MD   b complex vitamins capsule Take 1 capsule by mouth daily   Yes Historical Provider, MD   diclofenac sodium 1 % GEL APPLY 2GRAMS TOPICALLY TWICE A DAY 6/5/19  Yes Kun Schafer MD       REVIEW OF SYSTEMS    (2-9 systems for level 4, 10 or more for level 5)      Review of Systems   Constitutional: Negative for fever. HENT: Negative for congestion and rhinorrhea.     Respiratory: Negative for shortness of breath. Cardiovascular: Negative for chest pain. Gastrointestinal: Negative for abdominal pain and vomiting. Genitourinary: Negative for difficulty urinating and dysuria. Musculoskeletal: Positive for arthralgias. Negative for neck pain. Skin: Negative for rash and wound. Neurological: Negative for weakness and numbness. Psychiatric/Behavioral: Negative for agitation. PHYSICAL EXAM   (up to 7 for level 4, 8 or more for level 5)      INITIAL VITALS:   There were no vitals taken for this visit. Physical Exam  Vitals signs and nursing note reviewed. Constitutional:       Appearance: She is obese. She is not toxic-appearing or diaphoretic. HENT:      Head: Normocephalic. Right Ear: External ear normal.      Left Ear: External ear normal.      Nose: Nose normal.      Mouth/Throat:      Mouth: Mucous membranes are moist.   Eyes:      Extraocular Movements: Extraocular movements intact. Neck:      Musculoskeletal: Normal range of motion. Cardiovascular:      Rate and Rhythm: Normal rate. Pulmonary:      Effort: Pulmonary effort is normal.   Abdominal:      Palpations: Abdomen is soft. Tenderness: There is no abdominal tenderness. There is no guarding. Musculoskeletal:         General: Tenderness present. No deformity. Skin:     General: Skin is warm. Capillary Refill: Capillary refill takes less than 2 seconds. Neurological:      Mental Status: She is alert and oriented to person, place, and time.    Psychiatric:         Mood and Affect: Mood normal.         DIFFERENTIAL  DIAGNOSIS     PLAN (LABS / IMAGING / EKG):  Orders Placed This Encounter   Procedures    XR Hip Left Min 4Vw W Pelvis       MEDICATIONS ORDERED:  Orders Placed This Encounter   Medications    HYDROcodone-acetaminophen ([address]) 5-325 MG per tablet 2 tablet       DDX: Occult fracture, muscle strain    DIAGNOSTIC RESULTS / [address] [place] [address] / [facility]   LAB RESULTS:  No results found for this visit [address] [id number]  [phone]  Go to   As needed, If symptoms worsen     [address] [place] [address]  [phone]  Call   If symptoms worsen      DISCHARGE MEDICATIONS:  New Prescriptions    DICLOFENAC SODIUM (VOLTAREN) 1 % GEL    Apply 4 g topically 4 times daily    LIDOCAINE (LIDODERM) 5 %    Place 1 patch onto the skin daily 12 hours on, 12 hours off.        Charis Santiago DO  Emergency Medicine Resident    (Please note that portions of thisnote were completed with a voice recognition program.  Efforts were made to edit the dictations but occasionally words are mis-transcribed.)       Charis Santiago DO  Resident  06/28/20 0609

## 2020-07-14 ENCOUNTER — OFFICE VISIT (OUTPATIENT)
Dept: NEUROLOGY | Age: 65
End: 2020-07-14
Payer: COMMERCIAL

## 2020-07-14 VITALS
TEMPERATURE: 97.4 F | DIASTOLIC BLOOD PRESSURE: 79 MMHG | SYSTOLIC BLOOD PRESSURE: 135 MMHG | HEIGHT: 67 IN | HEART RATE: 66 BPM | WEIGHT: 293 LBS | BODY MASS INDEX: 45.99 KG/M2

## 2020-07-14 PROCEDURE — 99215 OFFICE O/P EST HI 40 MIN: CPT | Performed by: PSYCHIATRY & NEUROLOGY

## 2020-07-14 NOTE — PATIENT INSTRUCTIONS
1. Continue aspirin, plavix and lipitor  2. Continue good blood pressure control  3. Follow with weight management   4.  Fall precaution    Return in 6 months    Sarahp MD Precious, MS

## 2020-07-14 NOTE — PROGRESS NOTES
[facility]            [facility], [address]; [facility], [address] [facility]    [address], [address], [place], [address] [facility]            Dept: [phone]          Dept Fax: [phone]             MD Javy Zafar MD Ahmed B. Earlene Mouton, MD Seferino Ano, MD Veverly Puff, MD Zona Churn, CNP               NEUROLOGY FOLLOW UP NOTE                                          PATIENT NAME: Valeria Diaz   PATIENT MRN: [id number]  FOLLOW UP TODAY: 7/14/2020     Dear Dr. Bhavna Vasquez MD,     I had the pleasure of seeing your patient Valeria Diaz, who comes for follow up. CHIEF COMPLAINT: Follow-up; Cerebrovascular Accident; and Extremity Weakness     INITIAL & INTERVAL HISTORY:     James Waldrop is a 77year old  AAF, I saw her on 12/2/2019, pt returns for follow up regarding her stroke and bilateral carpal tunnel.      Pt came to clinic alone today. Since last visit, no new stroke but she said she has had worsening left arm/leg numbness for one month although she has baseline bilateral numbness, left more than right. Pt had left ICA occlusion, two strokes, one in 2011, one in 2018, she has been on ASA and plavix. BP well controlled. She takes over the counter B1 vitamin supplement. TSH, B12 WNL. LDL last year was 68 which is close to goal (<70), she is also on lipitor. She also has left hip down to left knee pain for the past one week even though on 6/28/2020, she had ED visit for left hip pain.  Due to covid-19 pandemic, originally planned bariatric surgery was cancelled, she has not been told when will be scheduled again.      EMG/NCV on 8/21/2018, reported bilateral median nerve mononeuropathy of the wrist, indicated CTS, moderate on right, moderate to severe on left, also evidence of chronic bilateral c5-C6 radiculopathy. She has never followed with orthopedics, she does not want surgery because her granddaughter had CTS surgery but very poor outcome. She also has cervical pathology, MRI on  6/21/2019, showed moderate spinal canal stenosis at c5-c6 and mild to moderate spinal canal stenosis at c4-C5 and C6-C7. She was evaluated by Dr. Delon Hendrickson, discussed about C5 corpectomy with anterior cervical disectomy and fusion of C4-5 and C5-6 but pt said she has no intention to do surgery. Sleep is good, on CPAP. Bowel movement and urination all fine. Mood is good.      Initial clinic visit on 4/12/2018  Stroke x2  First one 2011  Features: speech problem, both hand numbness, hospitalized at [facility] for 1 week. Risk factors: TIBURCIO, HTN, HLD. No family history of stroke  Social: lives alone, weekend drinking beer, no smoking, sleep with [facility]  Medications: on aspirin and Plavix      NEUROLOGICAL TESTS  MRI brain 5/22/2018  Possible isolated punctate infarct left frontal cortex near the vertex. Severe cystic encephalomalacia, left greater than right, vertex.            MRA head, neck 5/22/2018  SEVERELY MOTION LIMITED EXAM. RECOMMEND FOLLOW-UP CTA. Narrowed left common carotid artery and occluded or nearly occluded left internal carotid artery. Decreased flow proximal right vertebral artery.     MRI cervical 6/21/2019  1. Moderate spinal canal stenosis at C5-C6 with mild-to-moderate spinal canal   stenosis at C4-C5 and C6-C7.  Minimal spinal canal stenosis at C3-C4. 2. Multilevel neural foraminal narrowing of the cervical spine as described   above. 3. No evidence of spondylolisthesis.          CTA head, neck 6/7/2018  Complete or near complete occlusion of the left internal carotid artery. Relatively normal left common carotid artery. Mild stenosis proximal right internal carotid artery. Moderate stenosis proximal right vertebral artery.   Proximal left vertebral artery not well visualized.     EMG/NCV 10/11/2018  CTS L>R     9/11/2020   B1: 63    6/18/2019  CRP 29.7  ESR 37    PMH/PSH/SH/FMH: Remain unchanged since last visit except those listed in the interval history    Hospital Outpatient Visit on 02/24/2020   Component Date Value Ref Range Status    WBC 02/24/2020 5.6  3.5 - 11.3 k/uL Final    RBC 02/24/2020 4.61  3.95 - 5.11 m/uL Final    Hemoglobin 02/24/2020 10.0* 11.9 - 15.1 g/dL Final    Hematocrit 02/24/2020 33.8* 36.3 - 47.1 % Final    MCV 02/24/2020 73.3* 82.6 - 102.9 fL Final    MCH 02/24/2020 21.7* 25.2 - 33.5 pg Final    MCHC 02/24/2020 29.6  28.4 - 34.8 g/dL Final    RDW 02/24/2020 16.9* 11.8 - 14.4 % Final    Platelets 32/67/1865 367  138 - 453 k/uL Final    MPV 02/24/2020 9.5  8.1 - 13.5 fL Final    NRBC Automated 02/24/2020 0.0  0.0 per 100 WBC Final    Differential Type 02/24/2020 NOT REPORTED   Final    Seg Neutrophils 02/24/2020 62  36 - 65 % Final    Lymphocytes 02/24/2020 25  24 - 43 % Final    Monocytes 02/24/2020 7  3 - 12 % Final    Eosinophils % 02/24/2020 5* 1 - 4 % Final    Basophils 02/24/2020 1  0 - 2 % Final    Immature Granulocytes 02/24/2020 0  0 % Final    Segs Absolute 02/24/2020 3.45  1.50 - 8.10 k/uL Final    Absolute Lymph # 02/24/2020 1.39  1.10 - 3.70 k/uL Final    Absolute Mono # 02/24/2020 0.40  0.10 - 1.20 k/uL Final    Absolute Eos # 02/24/2020 0.27  0.00 - 0.44 k/uL Final    Basophils Absolute 02/24/2020 0.06  0.00 - 0.20 k/uL Final    Absolute Immature Granulocyte 02/24/2020 0.02  0.00 - 0.30 k/uL Final    WBC Morphology 02/24/2020 NOT REPORTED   Final    RBC Morphology 02/24/2020 ANISOCYTOSIS PRESENT   Final    MICROCYTOSIS PRESENT    Platelet Estimate 78/31/7787 NOT REPORTED   Final    [place] Free Light Chains QNT 02/24/2020 2.30* 0.37 - 1.94 mg/dL Final    Lambda Free Light Chains QNT 02/24/2020 1.84  0.57 - 2.63 mg/dL Final    Free Kappa/Lambda Ratio 02/24/2020 1.25  0.26 - 1.65 Final    Total Protein 02/24/2020 6.6  6.4 - 8.3 g/dL Final    Albumin (calculated) 02/24/2020 3.8  3.2 - 5.2 g/dL Final    Albumin % 02/24/2020 58  45 - 65 % Final    Alpha-1-Globulin 02/24/2020 0.2  0.1 - 0.4 g/dL Final    Alpha 1 % 02/24/2020 3  3 - 6 % Final    Alpha-2-Globulin 02/24/2020 0.9  0.5 - 0.9 g/dL Final    Alpha 2 % 02/24/2020 14* 6 - 13 % Final    Beta Globulin 02/24/2020 0.8  0.5 - 1.1 g/dL Final    Beta Percent 02/24/2020 12  11 - 19 % Final    Gamma Globulin 02/24/2020 0.9  0.5 - 1.5 g/dL Final    Gamma Globulin % 02/24/2020 14  9 - 20 % Final    Total Prot. Sum 02/24/2020 6.6  6.3 - 8.2 g/dL Final    Total Prot. Sum,% 02/24/2020 101  98 - 102 % Final    Protein Electrophoresis, Serum 02/24/2020 NORMAL ELECTROPHORETIC PATTERN   Final    Pathologist 02/24/2020 ELECTRONICALLY SIGNED. Anat Gold M.D.    Final    Glucose 02/24/2020 81  70 - 99 mg/dL Final    BUN 02/24/2020 21  8 - 23 mg/dL Final    CREATININE 02/24/2020 1.21* 0.50 - 0.90 mg/dL Final    Bun/Cre Ratio 02/24/2020 17  9 - 20 Final    Calcium 02/24/2020 9.5  8.6 - 10.4 mg/dL Final    Sodium 02/24/2020 139  135 - 144 mmol/L Final    Potassium 02/24/2020 3.6* 3.7 - 5.3 mmol/L Final    Chloride 02/24/2020 98  98 - 107 mmol/L Final    CO2 02/24/2020 30  20 - 31 mmol/L Final    Anion Gap 02/24/2020 11  9 - 17 mmol/L Final    Alkaline Phosphatase 02/24/2020 123* 35 - 104 U/L Final    ALT 02/24/2020 9  5 - 33 U/L Final    AST 02/24/2020 15  <32 U/L Final    Total Bilirubin 02/24/2020 0.30  0.3 - 1.2 mg/dL Final    Total Protein 02/24/2020 7.0  6.4 - 8.3 g/dL Final    Alb 02/24/2020 3.8  3.5 - 5.2 g/dL Final    Albumin/Globulin Ratio 02/24/2020 NOT REPORTED  1.0 - 2.5 Final    GFR Non- 02/24/2020 45* >60 mL/min Final    GFR  02/24/2020 54* >60 mL/min Final    GFR Comment 02/24/2020        Final    Comment: Average GFR for 61-76 years old:   80 mL/min/1.73sq m  Chronic Kidney Disease:   <60 mL/min/1.73sq m  Kidney failure:   <15 mL/min/1.73sq m              eGFR calculated using average adult body mass. Additional eGFR calculator available at:        [email or web address]            GFR Staging 02/24/2020 NOT REPORTED   Final    except those listed in the interval history.        Diagnosis Date    Bleeding     while on aggrenox    Cataracts, bilateral     Cerebrovascular disease 2003,2011    cva    Chronic pain in left foot     CKD (chronic kidney disease) stage 3, GFR 30-59 ml/min (HCC)     GERD (gastroesophageal reflux disease)     Hx of blood clots     Hyperlipidemia     Hypertension     Iron (Fe) deficiency anemia     Nicotine abuse     Obesity     Osteoarthritis     Peripheral vascular disease ([facility] 3/13/2014    Substance abuse ([facility])     cocaine, canabis    Thalassemia minor     Unspecified cerebral artery occlusion with cerebral infarction     Unspecified sleep apnea         ALLERGIES:   Allergies   Allergen Reactions    Duricef [Cefadroxil Monohydrate]     Fish-Derived Products     Pcn [Penicillins]     Tomato        MEDICATIONS:   Current Outpatient Medications   Medication Sig Dispense Refill    losartan (COZAAR) 50 MG tablet TAKE 1 TABLET DAILY 60 tablet 2    clopidogrel (PLAVIX) 75 MG tablet TAKE 1 TABLET BY MOUTH IN THE MORNING 60 tablet 2    ferrous sulfate (FEROSUL) 325 (65 Fe) MG tablet TAKE 1 TABLET BY MOUTH IN THE MORNING 120 tablet 2    diclofenac sodium (VOLTAREN) 1 % GEL Apply 4 g topically 4 times daily 4 Tube 1    amLODIPine (NORVASC) 10 MG tablet TAKE 1 TABLET DAILY 60 tablet 3    aspirin (ASPIRIN LOW DOSE) 81 MG EC tablet TAKE 1 TABLET DAILY 60 tablet 3    atorvastatin (LIPITOR) 40 MG tablet TAKE 1 TABLET BY MOUTH DAILY 60 tablet 3    folic acid (FOLVITE) 1 MG tablet TAKE 1 TABLET BY MOUTH DAILY 60 tablet 3    hydroCHLOROthiazide (HYDRODIURIL) 25 MG tablet TAKE 1 TABLET BY MOUTH DAILY 60 tablet 3    Vitamin D (CHOLECALCIFEROL) 25 MCG (1000 UT) TABS tablet TAKE 1 TABLET DAILY 60 tablet 3    omeprazole (PRILOSEC) 40 MG delayed release capsule TAKE 1 CAPSULE BY MOUTH EVERY MORNING (BEFORE BREAKFAST) 30 capsule 5    acetaminophen (SM PAIN RELIEVER) 325 MG tablet TAKE 2 TABLETS BY MOUTH EVERY 4 HOURS AS NEEDED FOR PAIN 60 tablet 3    b complex vitamins capsule Take 1 capsule by mouth daily      lidocaine (LIDODERM) 5 % Place 1 patch onto the skin daily 12 hours on, 12 hours off. (Patient not taking: Reported on 7/14/2020) 30 patch 0     No current facility-administered medications for this visit.         LABS & TESTS:      Lab Results   Component Value Date    WBC 5.6 02/24/2020    HGB 10.0 (L) 02/24/2020    HCT 33.8 (L) 02/24/2020    MCV 73.3 (L) 02/24/2020     02/24/2020     REVIEW OF SYSTEMS:      CONSTITUTIONAL Weight change: absent, Appetite change: absent, Fatigue: present    HEENT Ears: normal, Visual disturbance: absent    RESPIRATORY Shortness of breath: present, Cough: present    CARDIOVASCULAR Chest pain: absent, Leg swelling :present    GI Constipation: present, Diarrhea: absent, Swallowing change: absent     Urinary frequency: absent, Urinary urgency: absent, Urinary incontinence: absent    MUSCULOSKELETAL Neck pain: absent, Back pain: absent, Stiffness: absent, Muscle pain: absent, Joint pain: absent Restless legs: absent    DERMATOLOGIC Hair loss: absent, Skin changes: absent    NEUROLOGIC Memory loss: absent, Confusion: absent, Seizures: absent Trouble walking or imbalance: present, Dizziness: present, Weakness: present, Numbness: present Tremor: absent, Spasm: absent, Speech difficulty: absent, Headache: absent, Light sensitivity: absent    PSYCHIATRIC Anxiety: absent, Hallucination: absent, Mood disorder: absent    HEMATOLOGIC Abnormal bleeding: absent, Anemia: absent, Clotting disorder: absent, Lymph gland changes: absent     VITALS  /79 (Site: Left Upper Arm, Position: Sitting)   Pulse 66   Temp 97.4 °F (36.3 °C)   Ht 5' 7\" (1.702 m) Wt (!) 338 lb (153.3 kg)   BMI 52.94 kg/m²      PHYSICAL EXAMINATIONS:     General appearance: cooperative  Skin: no rash or skin lesions. HEENT: normocephalic  Optic Fundi: deferred  Neck: supple, no cervcical adenopathy or carotid bruit  Lungs: clear to auscultation  Heart: Regular rate and rhythm, normal S1, S2. No murmurs, clicks or gallops. Peripheral pulses: radial pulses palpable  Abdominal: BS present, soft, NT, ND  Extremities: no edema    NEUROLOGICAL EXAMINATION:     MS: awake, alert and oriented. No aphasia, dysarthria, or neglect  CNs: PERRLA, EOMI, VF full, sensation intact, face symmetric, hearing intact, soft palate rises on phonation, sternocleidomastoid and trapezius intact. Tongue midline, no fasciculations. Motor: no abnormal movements, tone and bulk okay. RUE: delta 4/5, biceps 4/5, triceps 4+/5,  4+/5  LUE: delta 4-/5, biceps 4-/5, triceps 4+/5,  4+/5  RLE: hf 4/5, ke 4/5, df 4/5, pf 4+/5  LLE: hf 4-/5, ke 4-/5, df 4-/5, pf 4+/5   Reflexes: 2+ in UEs, could not induce in LEs, babinski not present. Coordination: FNF no dysmetria, heel to shin deferred, MOISES okay, negative Rhomberg.    Gait: walks with a cane, slow, cautious, slightly wide based, Tandem deferred  Sensory: stock distribution reduction to temp/pp below knees and elbows, no extinction     ASSRSSMENT/PLANS:      // Cervical stenosis  - Paresthesia/weakness, bilateral, left more than right, multifactorial, cervical stenosis, foraminal stenosis, history stroke   MRI brain showed extensive encephalomalacia, left more than right, which are not consistent with patient's more weak/tingling/numbess on left but patient also has bilateral CTS  - pt has no interest in cervical or CTS surgery  - conservative management like exercise, splint, NSAID     // History of stroke  - Continue ASA, plavix, lipitor  - continue stroke secondary prevention with good BP, BG  - follow with PCP for yearly lipid panel check    // Elevated ESR,

## 2020-07-16 ENCOUNTER — OFFICE VISIT (OUTPATIENT)
Dept: INTERNAL MEDICINE | Age: 65
End: 2020-07-16
Payer: COMMERCIAL

## 2020-07-16 ENCOUNTER — HOSPITAL ENCOUNTER (OUTPATIENT)
Age: 65
Setting detail: SPECIMEN
Discharge: HOME | End: 2020-07-16
Payer: COMMERCIAL

## 2020-07-16 VITALS
HEIGHT: 67 IN | TEMPERATURE: 96.1 F | HEART RATE: 74 BPM | BODY MASS INDEX: 45.99 KG/M2 | WEIGHT: 293 LBS | DIASTOLIC BLOOD PRESSURE: 71 MMHG | SYSTOLIC BLOOD PRESSURE: 117 MMHG

## 2020-07-16 LAB
ANION GAP SERPL CALCULATED.3IONS-SCNC: 15 MMOL/L (ref 9–17)
BUN BLDV-MCNC: 27 MG/DL (ref 8–23)
BUN/CREAT BLD: ABNORMAL (ref 9–20)
CALCIUM SERPL-MCNC: 9.7 MG/DL (ref 8.6–10.4)
CHLORIDE BLD-SCNC: 99 MMOL/L (ref 98–107)
CO2: 27 MMOL/L (ref 20–31)
CREAT SERPL-MCNC: 1.38 MG/DL (ref 0.5–0.9)
GFR AFRICAN AMERICAN: 46 ML/MIN
GFR NON-AFRICAN AMERICAN: 38 ML/MIN
GFR SERPL CREATININE-BSD FRML MDRD: ABNORMAL ML/MIN/{1.73_M2}
GFR SERPL CREATININE-BSD FRML MDRD: ABNORMAL ML/MIN/{1.73_M2}
GLUCOSE BLD-MCNC: 79 MG/DL (ref 70–99)
POTASSIUM SERPL-SCNC: 3.7 MMOL/L (ref 3.7–5.3)
SODIUM BLD-SCNC: 141 MMOL/L (ref 135–144)

## 2020-07-16 PROCEDURE — 99211 OFF/OP EST MAY X REQ PHY/QHP: CPT | Performed by: INTERNAL MEDICINE

## 2020-07-16 PROCEDURE — 99213 OFFICE O/P EST LOW 20 MIN: CPT | Performed by: STUDENT IN AN ORGANIZED HEALTH CARE EDUCATION/TRAINING PROGRAM

## 2020-07-16 RX ORDER — TRAMADOL HYDROCHLORIDE 50 MG/1
50 TABLET ORAL EVERY 6 HOURS PRN
Qty: 30 TABLET | Refills: 0 | Status: SHIPPED | OUTPATIENT
Start: 2020-07-16 | End: 2020-07-19

## 2020-07-16 ASSESSMENT — ENCOUNTER SYMPTOMS
COLOR CHANGE: 0
BACK PAIN: 0
SHORTNESS OF BREATH: 0
ABDOMINAL DISTENTION: 0
DIARRHEA: 0
CONSTIPATION: 0
VOMITING: 0
CHEST TIGHTNESS: 0
WHEEZING: 0
NAUSEA: 0

## 2020-07-16 NOTE — PROGRESS NOTES
Visit Information    Have you changed or started any medications since your last visit including any over-the-counter medicines, vitamins, or herbal medicines? yes - icy hot   Have you stopped taking any of your medications? Is so, why? -  no  Are you having any side effects from any of your medications? - no    Have you seen any other physician or provider since your last visit?  no   Have you had any other diagnostic tests since your last visit?  no   Have you been seen in the emergency room and/or had an admission in a hospital since we last saw you?  yes -    Have you had your routine dental cleaning in the past 6 months?  no     Do you have an active MyChart account? If no, what is the barrier?   Yes    Patient Care Team:  Nabila Walker MD as PCP - General (Internal Medicine)  Inge Abad RN as Nurse Navigator  Chas Hendricks DPM (Podiatry)    Medical History Review  Past Medical, Family, and Social History reviewed and does contribute to the patient presenting condition    Health Maintenance   Topic Date Due    Annual Wellness Visit (AWV)  06/27/2019    Low dose CT lung screening  07/31/2020    Flu vaccine (1) 09/01/2020    Lipid screen  09/11/2020    Breast cancer screen  09/28/2020    Potassium monitoring  02/24/2021    Creatinine monitoring  02/24/2021    Pneumococcal 65+ years Vaccine (2 of 2 - PPSV23) 12/20/2021    Colon cancer screen colonoscopy  08/21/2022    DTaP/Tdap/Td vaccine (2 - Td) 03/08/2028    DEXA (modify frequency per FRAX score)  Completed    Shingles Vaccine  Completed    Hepatitis C screen  Completed    Hepatitis A vaccine  Aged Out    Hepatitis B vaccine  Aged Out    Hib vaccine  Aged Out    Meningococcal (ACWY) vaccine  Aged Out

## 2020-07-16 NOTE — PATIENT INSTRUCTIONS
Printed script for Tramadol signed and given to pt    Referral to PT was placed, summary of care printed and faxed to office, phone numbers given to pt, they will contact office for an appt    Labs given to patient, they will have them done before their next visit. Patient was put on a wait list and will be contacted to schedule their next follow up appointment once the schedule is available. If the patient is in need of an appointment before their next visit please call the office at [phone]. After Visit Summary  given and reviewed.     SL

## 2020-07-16 NOTE — PROGRESS NOTES
Attending Physician Statement  I have discussed the care of [place] [address] including pertinent history and exam findings,  with the resident. I have reviewed the key elements of all parts of the encounter with the resident. I agree with the assessment, plan and orders as documented by the resident.   (GE Modifier)    MD JANIA Ballesteros  Attending Physician, [address] [place] [address], Internal Medicine Residency Program  [address] [place] [address]  7/16/2020, 3:36 PM

## 2020-07-16 NOTE — PROGRESS NOTES
[facility] [address] [address]  Dept: [phone]  Dept Fax: [phone]    Office Progress/Follow Up Note  Date ofpatient's visit: 7/16/2020  Patient's Name:  Darren Rojas YOB: 1955            Patient Care Team:  Cyndi An MD as PCP - General (Internal Medicine)  Yuli Lam RN as Nurse Navigator  Jeyson Villar DPM (Podiatry)  ================================================================    REASON FOR VISIT/CHIEF COMPLAINT:  Establish Care    HISTORY OF PRESENTING ILLNESS:  History was obtained from: patient, electronic medical record. Concepción pressley 77 y.o. is here for a routine follow-up visit and follow-up after discharge from the hospital.    The patient presented to the emergency department in June for complaints of pain in the left hip. X-ray of the left hip was done which showed mild degenerative changes with no evidence of an acute injury. The patient does not have any muscle weakness or any sensory weakness in the lower extremities. She just feels numb in the anterior thigh of the left lower extremity sometimes. The patient is able to walk and able to bear weight. She denies any loss of bowel or bladder control. The patient takes Tylenol, ibuprofen and Voltaren gel for pain. She has a history of CKD stage III so advised her not to take ibuprofen. The patient has worked with physical therapy in the past and is agreeable to working with physical therapy again.     Patient Active Problem List   Diagnosis    Essential hypertension    Pure hypercholesterolemia    Cerebrovascular accident (CVA), 2011, no residual deficit (HCC)    CKD (chronic kidney disease) stage 3, GFR 30-59 ml/min (HCC)    Peripheral vascular disease ([facility]    TIBURCIO (obstructive sleep apnea)    Morbid obesity with BMI of 50.0-59.9, adult ([facility]    Primary osteoarthritis of left knee    Chronic tension-type headache, not intractable    Vitamin D deficiency    Carpal tunnel syndrome, bilateral-not on medication due to kidney disease    Hypokalemia       Health Maintenance Due   Topic Date Due    Annual Wellness Visit (AWV)  2019       Allergies   Allergen Reactions    Duricef [Cefadroxil Monohydrate]     Fish-Derived Products     Pcn [Penicillins]     Tomato          Current Outpatient Medications   Medication Sig Dispense Refill    clopidogrel (PLAVIX) 75 MG tablet TAKE 1 TABLET BY MOUTH IN THE MORNING 60 tablet 2    amLODIPine (NORVASC) 10 MG tablet TAKE 1 TABLET DAILY 60 tablet 3    aspirin (ASPIRIN LOW DOSE) 81 MG EC tablet TAKE 1 TABLET DAILY 60 tablet 3    acetaminophen (SM PAIN RELIEVER) 325 MG tablet TAKE 2 TABLETS BY MOUTH EVERY 4 HOURS AS NEEDED FOR PAIN 60 tablet 3    losartan (COZAAR) 50 MG tablet TAKE 1 TABLET DAILY 60 tablet 2    ferrous sulfate (FEROSUL) 325 (65 Fe) MG tablet TAKE 1 TABLET BY MOUTH IN THE MORNING 120 tablet 2    diclofenac sodium (VOLTAREN) 1 % GEL Apply 4 g topically 4 times daily 4 Tube 1    lidocaine (LIDODERM) 5 % Place 1 patch onto the skin daily 12 hours on, 12 hours off. (Patient not taking: Reported on 2020) 30 patch 0    atorvastatin (LIPITOR) 40 MG tablet TAKE 1 TABLET BY MOUTH DAILY 60 tablet 3    folic acid (FOLVITE) 1 MG tablet TAKE 1 TABLET BY MOUTH DAILY 60 tablet 3    hydroCHLOROthiazide (HYDRODIURIL) 25 MG tablet TAKE 1 TABLET BY MOUTH DAILY 60 tablet 3    Vitamin D (CHOLECALCIFEROL) 25 MCG (1000 UT) TABS tablet TAKE 1 TABLET DAILY 60 tablet 3     No current facility-administered medications for this visit. Social History     Tobacco Use    Smoking status: Former Smoker     Packs/day: 2.00     Years: 35.00     Pack years: 70.00     Last attempt to quit: 10/11/2011     Years since quittin.7    Smokeless tobacco: Never Used   Substance Use Topics    Alcohol use:  Yes     Alcohol/week: 6.0 standard drinks     Types: 6 Cans of beer per week Comment: rare    Drug use: No     Types: Cocaine, Marijuana     Comment: per pt did years ago; cocaine & marij, 8-23-19 denies current use       Family History   Problem Relation Age of Onset    High Blood Pressure Mother     Asthma Mother     Heart Disease Mother     Heart Disease Father     High Blood Pressure Father     Diabetes Brother     High Blood Pressure Brother     Heart Disease Brother     High Blood Pressure Maternal Grandmother     High Blood Pressure Maternal Grandfather     Heart Disease Maternal Grandfather         REVIEW OF SYSTEMS:  Review of Systems   Constitutional: Negative for chills, diaphoresis, fatigue and fever. HENT: Negative for congestion. Respiratory: Negative for chest tightness, shortness of breath and wheezing. Cardiovascular: Negative for chest pain, palpitations and leg swelling. Gastrointestinal: Negative for abdominal distention, constipation, diarrhea, nausea and vomiting. Genitourinary: Negative for difficulty urinating. Musculoskeletal: Positive for arthralgias. Negative for back pain, gait problem, joint swelling and myalgias. Skin: Negative for color change, pallor, rash and wound. Neurological: Negative for dizziness, facial asymmetry, light-headedness and numbness. PHYSICAL EXAM:  Vitals:    07/16/20 1421   BP: 117/71   Site: Left Lower Arm   Position: Sitting   Cuff Size: Medium Adult   Pulse: 74   Temp: 96.1 °F (35.6 °C)   TempSrc: Temporal   Weight: (!) 339 lb (153.8 kg)   Height: 5' 7\" (1.702 m)     BP Readings from Last 3 Encounters:   07/16/20 117/71   07/14/20 135/79   06/28/20 (!) 169/92        Physical Exam  Constitutional:       Appearance: Normal appearance. She is normal weight. Comments: Patient is morbidly obese. HENT:      Head: Normocephalic and atraumatic. Mouth/Throat:      Mouth: Mucous membranes are dry. Pharynx: Oropharynx is clear. Eyes:      Pupils: Pupils are equal, round, and reactive to light. Neck:      Musculoskeletal: Normal range of motion and neck supple. Cardiovascular:      Rate and Rhythm: Normal rate and regular rhythm. Pulmonary:      Effort: Pulmonary effort is normal.      Breath sounds: Normal breath sounds. Abdominal:      General: Abdomen is flat. Palpations: Abdomen is soft. Musculoskeletal: Normal range of motion. Skin:     General: Skin is warm and dry. Neurological:      General: No focal deficit present. Mental Status: She is alert and oriented to person, place, and time. DIAGNOSTIC FINDINGS:  CBC:  Lab Results   Component Value Date    WBC 5.6 02/24/2020    HGB 10.0 02/24/2020     02/24/2020     04/05/2012       BMP:    Lab Results   Component Value Date     02/24/2020    K 3.6 02/24/2020    CL 98 02/24/2020    CO2 30 02/24/2020    BUN 21 02/24/2020    CREATININE 1.21 02/24/2020    GLUCOSE 81 02/24/2020    GLUCOSE 88 04/12/2012       HEMOGLOBIN A1C:   Lab Results   Component Value Date    LABA1C 5.4 06/07/2019       FASTING LIPID PANEL:  Lab Results   Component Value Date    CHOL 138 09/11/2019    HDL 42 09/11/2019    TRIG 96 09/11/2019       ASSESSMENT AND PLAN:  Farzaneh Mcguire was seen today for establish care. Diagnoses and all orders for this visit:    Primary osteoarthritis of left hip  Considering patient's history of CKD stage III and use of Tylenol, ibuprofen and Voltaren gel in the past, we will start the patient on tramadol. We will give her 30 pills prescription one-time and see her response. At high risk for falls  On the basis of positive falls risk screening, assessment and plan is as follows: home safety tips provided, medications adjusted- , referral to physical therapy provided for strength and balance training, vitamin D supplementation started. Essential hypertension  Continue taking Norvasc 10 mg daily, losartan 50 mg daily and hydrochlorothiazide 25 mg daily.     CKD (chronic kidney disease) stage 3, GFR 30-59 ml/min (HCC)  Will check BMP. FOLLOW UP AND INSTRUCTIONS:  Follow up in three months. · Suyapa Farfan received counseling on the following healthy behaviors: nutrition, exercise, medication adherence, tobacco cessation and decrease in alcohol consumption    · Discussed use, benefit, and side effects of prescribed medications. Barriers to medication compliance addressed. All patient questions answered. Pt voiced understanding. · Patient given educational materials - see patient instructions    Miriam Montano MD  PGY-3, Department of Internal Medicine  [place], [place]  7/16/2020 3:16 PM    This note is created with the assistance of a speech-recognition program. While intending to generate a document that actually reflects the content of thevisit, the document can still have some mistakes which may not have been identified and corrected by editing.

## 2020-07-28 ENCOUNTER — HOSPITAL ENCOUNTER (OUTPATIENT)
Dept: PHYSICAL THERAPY | Age: 65
Setting detail: THERAPIES SERIES
Discharge: HOME | End: 2020-07-28
Payer: COMMERCIAL

## 2020-07-28 NOTE — FLOWSHEET NOTE
[x] [address]  [address]    [phone]  F: [phone]   [] [address] [facility]   [address]  P: [phone]  F: [phone]  [] [address]  [address] [facility]  P: [phone]  F: [phone]  [] [address]  [facility]   [address]   [place]: [phone]  F: [phone]   [] [place] [address] [facility] [address]  [place]: [phone]   F: [phone]     Physical Therapy Cancel/No Show note    Date: 2020  Patient: Bushra Florez  : 1955  MRN: [id number]    Cancels/No Shows to date:     For today's appointment patient:    [x]  Cancelled    [x] Rescheduled appointment    [] No-show     Reason given by patient:    []  Patient ill    [x]  Conflicting appointment    [] No transportation      [] Conflict with work    [] No reason given    [] Weather related    [] COVID-19    [] Other:      Comments: Pt cancelled initial evaluation this date due to a conflicting appt.        [] Next appointment was confirmed    Electronically signed by: Karena Boss PT

## 2020-08-03 ENCOUNTER — HOSPITAL ENCOUNTER (OUTPATIENT)
Dept: PHYSICAL THERAPY | Age: 65
Setting detail: THERAPIES SERIES
Discharge: HOME | End: 2020-08-03
Payer: COMMERCIAL

## 2020-08-03 PROCEDURE — 97110 THERAPEUTIC EXERCISES: CPT

## 2020-08-03 PROCEDURE — 97161 PT EVAL LOW COMPLEX 20 MIN: CPT

## 2020-08-03 NOTE — CONSULTS
[x] [address] [facility]        Outpatient Physical                Therapy       [address]       Phone: [phone]       Fax: [phone] [] [facility]       Promotion at [address] [facility]       Phone: [phone]       Fax: [phone] [] Chanel Corona      for Health Promotion    [address]     Phone: [phone]     Fax:  [phone]     Physical Therapy Lower Extremity Evaluation    Date:  8/3/2020  Patient: Charissa Dennison  : 1955  MRN: [id number]  Physician: Jackelyn Joe MD  Insurance:   Medical Diagnosis: Primary OA of left hip; at high risk for falls  Rehab Codes: M25.652, R53.1, R29.3, R26.9, M25.552  Onset date: 7/3/20  Next Dr's appt.: TBD with PCP        Subjective:   CC: Pt reports L hip pain after bumping it into a cabinet about 4 weeks ago - didn't think anything of it, but then pain started occurring in posterior hip down into knee. Went to ER, x-rays negative for any significant pathology, mild OA. Notes she also is getting L foot and calf numbness now as well. Intermittent pain reported, inconsistent things worsen pain, but sometimes with laying down or longer walking makes the hip hurt more. Notes LLE does occasionally give out on her, denies any falls since this past February. No lasting injuries, xrays negative for fractures at that time.       Prior Level of Function: walking unlimited distances, unable to do flight of stairs prior to injury     Current Level of Function: limited ability for prolonged standing/sitting >10 min, not greeting at [employer] anymore, has to take even more time when going up/down stairs now and needs bilateral railing      Red Flags:      Yes  No Comments   Fatigue [] [x]     Fever/chills/sweats [] [x]    Malaise  [] [x]    Mental status change [] [x]     Paresthesias/numbness/weakness [x] []     Unexplained weight changes [] [x]     Lightheaded/dizziness standing/ambulation  4. ? Function: Pt will be able to ambulate at least 100 ft with least restrictive device, minimal trunk lean, and improving step length and heel strike bilaterally to improve efficiency of gait after injury  5. Independent with Home Exercise Programs  6. Demonstrate Knowledge of fall prevention  LTG: (to be met in 12 treatments)     7. ? Pain: No more than 2/10 max pain reported with daily activity. 8. ? ROM:   a. Improvement in quad length as evidenced by ability to complete supine quad stretch to knee flexion of 100 deg with hip extended to ~20 deg  b. L hip ROM to the following degrees to indicate improving mobility post injury:  i. Flexion to 100 deg, ER/IR to 40/30 deg respectfully  9. ? Strength: At least 5-/5 hip ext/abd strength to improve stability in hip with prolonged standing/ambulation  10. ? Function:   a. Pt will be able to ambulate at least 300 ft with least restrictive device, minimal trunk lean, and consistently equal, typical step length and heel strike bilaterally to improve efficiency of gait after injury  b. Pt will report at least 45% fxn per LEFI to indicate improving function of L hip. Patient goals: \"to get better\"      Outcome Measure on Initial Eval:  LEFI: 24/80, 30% function     Additional Outcome Measures Used: None        Rehab Potential:  [x] Good  [] Fair  [] Poor   Suggested Professional Referral:  [x] No  [] Yes:  Barriers to Goal Achievement[de-identified]  [] No  [x] Yes: limited mobility at baseline,   Domestic Concerns:  [x] No  [] Yes:    Pt. Education:  [x] Plans/Goals, Risks/Benefits discussed  [x] Home exercise program    Method of Education: [x] Verbal  [x] Demo  [x] Written  Comprehension of Education:  [x] Verbalizes understanding. [x] Demonstrates understanding. [] Needs Review. [] Demonstrates/verbalizes understanding of HEP/Ed previously given.       Treatment Plan:  [x] Therapeutic Exercise   40150  [] Iontophoresis: 4 mg/mL Dexamethasone Sodium Phosphate  mAmin  88950   [x] Therapeutic Activity  73924 [] Vasopneumatic cold with compression  28099    [x] Gait Training   94118 [] Ultrasound   52869   [x] Neuromuscular Re-education  45205 [] Electrical Stimulation Unattended  13777   [x] Manual Therapy  96608 [] Electrical Stimulation Attended  71782   [x] Instruction in HEP  [] Dry Needling   [] Aquatic Therapy   92549 [x] Cold/hotpack    [] Massage   24342      [] Lumbar/Cervical Traction  15074     []  Medication allergies reviewed for use of    Dexamethasone Sodium Phosphate 4mg/ml     with iontophoresis treatments. Pt is not allergic.     Frequency:  2 x/week for 12 visits        Todays Treatment:  Modalities:   Precautions:  Exercises:  Exercise Reps/ Time Weight/ Level Comments   Seated quad stretch 2x30\"  Leg under table, look/lean away   Seated figure four stretch 2x30\"  Cross left ankle over right, hinge forward at hips         Standing hip ext 15x  Leaning forward on table   Standing hip abd 15x  Standing upright, hand support at table   Other: Pt education provided re: current impairments, PT POC to address this and progress to function - billed with therex    Specific Instructions for next treatment: FALL PREVENTION HANDOUT, L hip PROM, hip flexor/quad stretching, piriformis/glute med/IT band stretching, global hip strengthening all planes in both supine/standing, progress gait to increased heel strike/step length and less trunk lean; glute med strengthening in standing for less hip drop      Evaluation Complexity:  History (Personal factors, comorbidities) [] 0 [] 1-2 [x] 3+   Exam (limitations, restrictions) [] 1-2 [] 3 [x] 4+   Clinical presentation (progression) [x] Stable [] Evolving  [] Unstable   Decision Making [x] Low [] Moderate [] High    [x] Low Complexity [] Moderate Complexity [] High Complexity       Treatment Charges: Mins Units   [x] Evaluation       [x]  Low       []  Moderate       []  High 28 1 []  Modalities     [x]  Ther Exercise 12 1   []  Manual Therapy     []  Ther Activities     []  Aquatics     []  Vasocompression     []  Other       TOTAL TREATMENT TIME: 40 min    Time in: 3:04 pm   Time Out: 3:48 pm    Electronically signed by: Yaya Joe PT        Physician Signature:________________________________Date:__________________  By signing above or cosigning this note, I have reviewed this plan of care and certify a need for medically necessary rehabilitation services.      *PLEASE SIGN ABOVE AND FAX BACK ALL PAGES*

## 2020-08-03 NOTE — FLOWSHEET NOTE
[facility] Fall Risk Assessment    Patient Name:  Cecily Brown  : 1955        Risk Factor Scale  Score   History of Falls [x] Yes  [] No 25  0 25   Secondary Diagnosis [] Yes  [x] No 15  0 0   Ambulatory Aid [] Furniture  [x] Crutches/cane/walker  [] None/bedrest/wheelchair/nurse 30  15  0 15   IV/Heparin Lock [] Yes  [x] No 20  0 0   Gait/Transferring [] Impaired  [] Weak  [x] Normal/bedrest/immobile 20  10  0 0   Mental Status [] Forgets limitations  [x] Oriented to own ability 15  0 0      Total: 40     Based on the Assessment score: check the appropriate box.     []  No intervention needed   Low =   Score of 0-24    [x]  Use standard prevention interventions Moderate =  Score of 24-44   [x] Give patient handout and discuss fall prevention strategies - WILL PROVIDE IN UPCOMING SESSION   [x] Establish goal of education for patient/family RE: fall prevention strategies    []  Use high risk prevention interventions High = Score of 45 and higher   [] Give patient handout and discuss fall prevention strategies   [] Establish goal of education for patient/family Re: fall prevention strategies   [] Discuss lifeline / other resources    Electronically signed by:   Lacey Quiñones PT  Date: 8/3/2020

## 2020-08-10 NOTE — PRE-CERTIFICATION NOTE
[x] [facility]  Therapy  [address]  [phone]  F: [phone] [] [address] [facility] [address]   [address]  P: [phone]  F: [phone] [] [place]  [address] [facility]  P: [phone]  F: [phone] [] [address]  [facility]   [address]   [place]: [phone]  F: [phone]  [x] [facility]   Outpatient Occupational Therapy  [address]  P: [phone]  F: [phone]          Therapy Pre-certification Note      8/10/2020    Andrae Tejada  1955   [id number]      Insurance approval was received for Physical Therapy from [facility] on 8/10/20. Approval was received for 12 visits, from 8/3/20 to 9/18/20. Authorization number [id number].     Patient was contacted to be scheduled and is scheduled for 8/13/20 per her request.    Approved for   [facility]  [phone]      Electronically signed by Tobi Marion PT on 8/10/2020 at 10:48 AM

## 2020-08-13 ENCOUNTER — HOSPITAL ENCOUNTER (OUTPATIENT)
Dept: PHYSICAL THERAPY | Age: 65
Setting detail: THERAPIES SERIES
Discharge: HOME | End: 2020-08-13
Payer: COMMERCIAL

## 2020-08-13 PROCEDURE — 97110 THERAPEUTIC EXERCISES: CPT

## 2020-08-13 NOTE — FLOWSHEET NOTE
[x] [facility]) [facility] &  Therapy  [address]  [phone]  F: [phone] [] [address]  [facility]   [address]  P: [phone]  F: [phone] [] [place]  [address]  P: [phone]  F: [phone] [] [address]  [facility]   [address]   [place]: [phone]  F: [phone]      Physical Therapy Daily Treatment Note    Date:  2020  Patient Name:  Steven Canada    :  1955  MRN: [id number]  Physician: Alanis Mooney MD  Insurance: [employer] Approval was received for 12 visits, from 8/3/20 to 20. Authorization number [id number]. Medical Diagnosis: Primary OA of left hip; at high risk for falls  Rehab Codes: M25.652, R53.1, R29.3, R26.9, M25.552  Onset date: 7/3/20                 Next Dr's appt.: TBD with PCP    Visit# / total visits: 2     Cancels/No Shows: 0/0    Subjective:     Pain:  []? Yes  [x]? No   Location: N/A today, but reports lateral pain into lateral thigh  Pain Rating: (0-10 scale) 0/10  Pain altered Tx:  []? Yes  [x]? No  Action:   Comments: Patient arrives stating no pain today that she would rate numerically.      Objective:  Modalities:   Precautions:  Exercises:  Exercise Reps/ Time Weight/ Level Comments   Scifit 5m 2.0          Gastroc stretch 3x30\"           Standing      Hip abd 15x     Hip ext 15x     Marches 15x     HS curls 15x           Seated      Marches 15x     HS curls 10x Lime    LAQ 15x     HS stretch 3x20\"  Stool   Quad stretch 3x20\"  Leg under table         Supine      SLR 15x     Bridge 10x     SAQ 10x     Glut set 10x3\"     Piriformis stretch 3x20\"  Knee to opp shoulder, towel         Sidelying   Next   clamshell 15x     Hip abduction 15x           Other: Pt education provided re: current impairments, PT POC to address this and progress to function - billed with therex     Specific Instructions for next treatment: FALL PREVENTION HANDOUT, L hip PROM, hip flexor/quad stretching, piriformis/glute med/IT band stretching, global hip strengthening all planes in both supine/standing, progress gait to increased heel strike/step length and less trunk lean; glute med strengthening in standing for less hip drop        Treatment Charges: Mins Units   []  Modalities     [x]  Ther Exercise 45 3   []  Manual Therapy     []  Ther Activities     []  Aquatics     []  Vasocompression     []  Other     Total Treatment time 45 3       Assessment: [x] Progressing toward goals. Initiated therapeutic exercise program as noted above with fair tolerance from patient. Patient stated significant stretch felt with newly added HS and gastroc stretch this date, educated patient to keep all stretches within tolerable pain-free ROM with good carryover post. Patient with continuous verbalizations of feeling quad activation throughout all mat exercises, educated that feeling activation is good and some fatigue through muscles being worked by exercises is normal. Issued written handout including seated HS stretch, bridges, and glut sets for HEP with good verbal understanding noted. [] No change. [] Other:  [x] Patient would continue to benefit from skilled physical therapy services in order to: decrease pain in hip and decrease risk of falls    STG: (to be met in 8 treatments)  1. ? Pain: No more than 4/10 max pain reported with daily activity. 2. ? ROM:   a. Improvement in quad length as evidenced by ability to complete supine quad stretch to knee flexion of 90 deg with hip extended to ~10 deg  b. L hip ROM to the following degrees to indicate improving mobility post injury:  i. Flexion to 90 deg, ER/IR to 35/20 deg respectfully  3. ? Strength:  At least 4+/5 hip ext/abd strength to improve stability in hip with prolonged standing/ambulation  4. ? Function: Pt will be able to ambulate at least 100 ft with least restrictive device, minimal trunk lean, and improving step length and heel strike bilaterally to improve efficiency of gait after injury  5. Independent with Home Exercise Programs  6. Demonstrate Knowledge of fall prevention    LTG: (to be met in 12 treatments)     7. ? Pain: No more than 2/10 max pain reported with daily activity. 8. ? ROM:   a. Improvement in quad length as evidenced by ability to complete supine quad stretch to knee flexion of 100 deg with hip extended to ~20 deg  b. L hip ROM to the following degrees to indicate improving mobility post injury:  i. Flexion to 100 deg, ER/IR to 40/30 deg respectfully  9. ? Strength: At least 5-/5 hip ext/abd strength to improve stability in hip with prolonged standing/ambulation  10. ? Function:   a. Pt will be able to ambulate at least 300 ft with least restrictive device, minimal trunk lean, and consistently equal, typical step length and heel strike bilaterally to improve efficiency of gait after injury  b. Pt will report at least 45% fxn per LEFI to indicate improving function of L hip.                    Patient goals: \"to get better\"    Pt. Education:  [x] Yes  [] No  [] Reviewed Prior HEP/Ed  Method of Education: [x] Verbal  [] Demo  [x] Written  Comprehension of Education:  [] Verbalizes understanding. [] Demonstrates understanding. [x] Needs review. [] Demonstrates/verbalizes HEP/Ed previously given. Plan: [x] Continue current frequency toward long and short term goals.     [x] Specific Instructions for subsequent treatments:     Frequency:  2 x/week for 12 visits      Time In: 205 pm            Time Out: 251 pm    Electronically signed by:  Koki Garcia PTA

## 2020-08-18 ENCOUNTER — HOSPITAL ENCOUNTER (OUTPATIENT)
Facility: MEDICAL CENTER | Age: 65
End: 2020-08-18

## 2020-08-21 ENCOUNTER — HOSPITAL ENCOUNTER (OUTPATIENT)
Dept: PHYSICAL THERAPY | Age: 65
Setting detail: THERAPIES SERIES
Discharge: HOME | End: 2020-08-21
Payer: COMMERCIAL

## 2020-08-21 PROCEDURE — 97110 THERAPEUTIC EXERCISES: CPT

## 2020-08-21 NOTE — FLOWSHEET NOTE
[x] [phone]  - [facility] &  Therapy  [address]  [phone]  F: [phone] [] [address]  [place] [address]   [address]  P: [phone]  F: [phone] [] [place]  [address]  P: [phone]  F: [phone] [] [address]  [facility]   [address]   [place]: [phone]  F: [phone]      Physical Therapy Daily Treatment Note    Date:  2020  Patient Name:  Darren Rojas    :  1955  MRN: [id number]  Physician: Ollie Cordero MD  Insurance: [employer] Approval was received for 12 visits, from 8/3/20 to 20. Authorization number [id number]. Medical Diagnosis: Primary OA of left hip; at high risk for falls  Rehab Codes: M25.652, R53.1, R29.3, R26.9, M25.552  Onset date: 7/3/20                 Next Dr's appt.: TBD with PCP    Visit# / total visits: 3/12     Cancels/No Shows: 0/1    Subjective:     Pain:  []? Yes  [x]? No   Location: oanh hip/knees  Pain Rating: (0-10 scale) 0/10  Pain altered Tx:  []? Yes  [x]? No  Action:   Comments: Patient arrives noting no pain today and feeling well.       Objective:  Modalities:   Precautions:  Exercises:  Exercise Reps/ Time Weight/ Level Comments   Scifit 5m 2.0          Gastroc stretch 3x30\"           Standing      Hip abd 15x     Hip ext 15x     Marches 15x     HS curls 15x           Seated      Marches 15x     HS curls 10x Lime    LAQ 15x     HS stretch 3x20\"  Stool   Quad stretch 3x20\"  Leg under table         Supine      SLR 15x     Bridge 10x     SAQ 15x     Glut set 10x3\"     Piriformis stretch 3x20\"  Knee to opp shoulder, towel         Sidelying      clamshell 10x     Hip abduction 10x  Manual assist           Specific Instructions for next treatment: L hip PROM, hip flexor/quad stretching, piriformis/glute med/IT band stretching, global hip strengthening all planes in both supine/standing, progress gait to increased heel strike/step length and less trunk lean; glute med strengthening in standing for less hip drop        Treatment Charges: Mins Units   []  Modalities     [x]  Ther Exercise 40 3   []  Manual Therapy     []  Ther Activities     []  Aquatics     []  Vasocompression     []  Other     Total Treatment time 40 3       Assessment: [x] Progressing toward goals. Continued with therapeutic exercise program as noted above with addition of sidelying hip abduction and clamshells for strengthening through oanh hip musculature with fair tolerance. Patient with difficulty assuming proper sidelying position due to body composition, required tactile and verbal cueing for proper technique and muscle activation with poor-fair carryover. Required manual assist to lift LE. Patient states overall fatigue through oanh LE, latrice quad, however notes no onset of pain and feels good post treatment. Issued and reviewed fall prevention handout with patient this date and encouraged her to ask any questions at following treatments. [] No change. [] Other:  [x] Patient would continue to benefit from skilled physical therapy services in order to: decrease pain in hip and decrease risk of falls    STG: (to be met in 8 treatments)  1. ? Pain: No more than 4/10 max pain reported with daily activity. 2. ? ROM:   a. Improvement in quad length as evidenced by ability to complete supine quad stretch to knee flexion of 90 deg with hip extended to ~10 deg  b. L hip ROM to the following degrees to indicate improving mobility post injury:  i. Flexion to 90 deg, ER/IR to 35/20 deg respectfully  3. ? Strength:  At least 4+/5 hip ext/abd strength to improve stability in hip with prolonged standing/ambulation  4. ? Function: Pt will be able to ambulate at least 100 ft with least restrictive device, minimal trunk lean, and improving step length

## 2020-08-24 ENCOUNTER — TELEPHONE (OUTPATIENT)
Dept: ONCOLOGY | Age: 65
End: 2020-08-24

## 2020-08-24 ENCOUNTER — HOSPITAL ENCOUNTER (OUTPATIENT)
Facility: MEDICAL CENTER | Age: 65
Discharge: HOME | End: 2020-08-24
Payer: COMMERCIAL

## 2020-08-24 ENCOUNTER — OFFICE VISIT (OUTPATIENT)
Dept: ONCOLOGY | Age: 65
End: 2020-08-24
Payer: COMMERCIAL

## 2020-08-24 VITALS
TEMPERATURE: 97.9 F | BODY MASS INDEX: 53.69 KG/M2 | WEIGHT: 293 LBS | DIASTOLIC BLOOD PRESSURE: 79 MMHG | SYSTOLIC BLOOD PRESSURE: 128 MMHG | HEART RATE: 64 BPM | RESPIRATION RATE: 20 BRPM

## 2020-08-24 DIAGNOSIS — E66.01 MORBID OBESITY WITH BMI OF 50.0-59.9, ADULT (HCC): ICD-10-CM

## 2020-08-24 DIAGNOSIS — D64.9 ANEMIA, UNSPECIFIED TYPE: ICD-10-CM

## 2020-08-24 DIAGNOSIS — D50.9 MICROCYTIC ANEMIA: ICD-10-CM

## 2020-08-24 DIAGNOSIS — D56.3 THALASSEMIA TRAIT, ALPHA: ICD-10-CM

## 2020-08-24 LAB
ABSOLUTE EOS #: 0.25 K/UL (ref 0–0.44)
ABSOLUTE IMMATURE GRANULOCYTE: 0.03 K/UL (ref 0–0.3)
ABSOLUTE LYMPH #: 1.65 K/UL (ref 1.1–3.7)
ABSOLUTE MONO #: 0.45 K/UL (ref 0.1–1.2)
BASOPHILS # BLD: 1 % (ref 0–2)
BASOPHILS ABSOLUTE: 0.07 K/UL (ref 0–0.2)
DIFFERENTIAL TYPE: ABNORMAL
EOSINOPHILS RELATIVE PERCENT: 4 % (ref 1–4)
HCT VFR BLD CALC: 35.6 % (ref 36.3–47.1)
HEMOGLOBIN: 10.5 G/DL (ref 11.9–15.1)
IMMATURE GRANULOCYTES: 1 %
LYMPHOCYTES # BLD: 27 % (ref 24–43)
MCH RBC QN AUTO: 21.6 PG (ref 25.2–33.5)
MCHC RBC AUTO-ENTMCNC: 29.5 G/DL (ref 28.4–34.8)
MCV RBC AUTO: 73.1 FL (ref 82.6–102.9)
MONOCYTES # BLD: 7 % (ref 3–12)
NRBC AUTOMATED: 0 PER 100 WBC
PDW BLD-RTO: 16.6 % (ref 11.8–14.4)
PLATELET # BLD: 345 K/UL (ref 138–453)
PLATELET ESTIMATE: ABNORMAL
PMV BLD AUTO: 8.9 FL (ref 8.1–13.5)
RBC # BLD: 4.87 M/UL (ref 3.95–5.11)
RBC # BLD: ABNORMAL 10*6/UL
SEG NEUTROPHILS: 60 % (ref 36–65)
SEGMENTED NEUTROPHILS ABSOLUTE COUNT: 3.76 K/UL (ref 1.5–8.1)
WBC # BLD: 6.2 K/UL (ref 3.5–11.3)
WBC # BLD: ABNORMAL 10*3/UL

## 2020-08-24 PROCEDURE — 36415 COLL VENOUS BLD VENIPUNCTURE: CPT

## 2020-08-24 PROCEDURE — 85025 COMPLETE CBC W/AUTO DIFF WBC: CPT

## 2020-08-24 PROCEDURE — 99213 OFFICE O/P EST LOW 20 MIN: CPT | Performed by: INTERNAL MEDICINE

## 2020-08-24 PROCEDURE — 99211 OFF/OP EST MAY X REQ PHY/QHP: CPT | Performed by: INTERNAL MEDICINE

## 2020-08-24 NOTE — PROGRESS NOTES
Obesity, Osteoarthritis, Peripheral vascular disease ([facility] Substance abuse ([facility] [facility] Thalassemia minor, Unspecified cerebral artery occlusion with cerebral infarction, and Unspecified sleep apnea. Past Surgical History:   has a past surgical history that includes Tonsillectomy and adenoidectomy; Tubal ligation; Breast surgery; Endometrial biopsy (1998); Upper gastrointestinal endoscopy (2009); Colonoscopy (12/2007); Colonoscopy (2013); Upper gastrointestinal endoscopy (N/A, 8/21/2019); and Colonoscopy (N/A, 8/21/2019). Medications:    Prior to Admission medications    Medication Sig Start Date End Date Taking?  Authorizing Provider   losartan (COZAAR) 50 MG tablet TAKE 1 TABLET DAILY 6/30/20  Yes Oz Casillas MD   clopidogrel (PLAVIX) 75 MG tablet TAKE 1 TABLET BY MOUTH IN THE MORNING 6/30/20  Yes Oz Casillas MD   ferrous sulfate (FEROSUL) 325 (65 Fe) MG tablet TAKE 1 TABLET BY MOUTH IN THE MORNING 6/30/20  Yes Oz Casillas MD   diclofenac sodium (VOLTAREN) 1 % GEL Apply 4 g topically 4 times daily 6/28/20  Yes Wilber John DO   amLODIPine (NORVASC) 10 MG tablet TAKE 1 TABLET DAILY 4/29/20  Yes Oz Casillas MD   aspirin (ASPIRIN LOW DOSE) 81 MG EC tablet TAKE 1 TABLET DAILY 4/29/20  Yes Oz Casillas MD   atorvastatin (LIPITOR) 40 MG tablet TAKE 1 TABLET BY MOUTH DAILY 4/29/20  Yes Oz Casillas MD   folic acid (FOLVITE) 1 MG tablet TAKE 1 TABLET BY MOUTH DAILY 4/29/20  Yes Oz Casillas MD   hydroCHLOROthiazide (HYDRODIURIL) 25 MG tablet TAKE 1 TABLET BY MOUTH DAILY 4/29/20  Yes Oz Casillas MD   Vitamin D (CHOLECALCIFEROL) 25 MCG (1000 UT) TABS tablet TAKE 1 TABLET DAILY 4/29/20  Yes Oz Casillas MD   acetaminophen (SM PAIN RELIEVER) 325 MG tablet TAKE 2 TABLETS BY MOUTH EVERY 4 HOURS AS NEEDED FOR PAIN 11/27/19  Yes Oz Casillas MD     Current Outpatient Medications   Medication Sig Dispense Refill    losartan (COZAAR) 50 MG tablet TAKE 1 TABLET DAILY 60 tablet 2    clopidogrel (PLAVIX) 75 MG tablet TAKE 1 TABLET BY MOUTH IN THE MORNING 60 tablet 2    ferrous sulfate (FEROSUL) 325 (65 Fe) MG tablet TAKE 1 TABLET BY MOUTH IN THE MORNING 120 tablet 2    diclofenac sodium (VOLTAREN) 1 % GEL Apply 4 g topically 4 times daily 4 Tube 1    amLODIPine (NORVASC) 10 MG tablet TAKE 1 TABLET DAILY 60 tablet 3    aspirin (ASPIRIN LOW DOSE) 81 MG EC tablet TAKE 1 TABLET DAILY 60 tablet 3    atorvastatin (LIPITOR) 40 MG tablet TAKE 1 TABLET BY MOUTH DAILY 60 tablet 3    folic acid (FOLVITE) 1 MG tablet TAKE 1 TABLET BY MOUTH DAILY 60 tablet 3    hydroCHLOROthiazide (HYDRODIURIL) 25 MG tablet TAKE 1 TABLET BY MOUTH DAILY 60 tablet 3    Vitamin D (CHOLECALCIFEROL) 25 MCG (1000 UT) TABS tablet TAKE 1 TABLET DAILY 60 tablet 3    acetaminophen (SM PAIN RELIEVER) 325 MG tablet TAKE 2 TABLETS BY MOUTH EVERY 4 HOURS AS NEEDED FOR PAIN 60 tablet 3     No current facility-administered medications for this visit. Allergies:  Duricef [cefadroxil monohydrate]; Fish-derived products; Pcn [penicillins]; and Tomato    Social History:   reports that she quit smoking about 8 years ago. She has a 70.00 pack-year smoking history. She has never used smokeless tobacco. She reports current alcohol use of about 6.0 standard drinks of alcohol per week. She reports that she does not use drugs. Family History: family history includes Asthma in her mother; Diabetes in her brother; Heart Disease in her brother, father, maternal grandfather, and mother; High Blood Pressure in her brother, father, maternal grandfather, maternal grandmother, and mother. REVIEW OF SYSTEMS:      Constitutional: No fever or chills.  No night sweats, no weight loss   Eyes: No eye discharge, double vision, or eye pain   HEENT: negative for sore mouth, sore throat, hoarseness and voice change   Respiratory: negative for cough , sputum, dyspnea, wheezing, hemoptysis, chest pain   Cardiovascular: negative for chest pain, dyspnea, palpitations, orthopnea, PND   Gastrointestinal: negative for nausea, vomiting, diarrhea, constipation, abdominal pain, Dysphagia, hematemesis and hematochezia   Genitourinary: negative for frequency, dysuria, nocturia, urinary incontinence, and hematuria   Integument: negative for rash, skin lesions, bruises.    Hematologic/Lymphatic: negative for easy bruising, bleeding, lymphadenopathy, or petechiae   Endocrine: negative for heat or cold intolerance,weight changes, change in bowel habits and hair loss   Musculoskeletal: negative for myalgias, arthralgias, pain, joint swelling,and bone pain   Neurological: negative for headaches, dizziness, seizures, weakness, numbness    PHYSICAL EXAM:        /79   Pulse 64   Temp 97.9 °F (36.6 °C) (Temporal)   Resp 20   Wt (!) 342 lb 12.8 oz (155.5 kg)   BMI 53.69 kg/m²    General appearance - well appearing, no in pain or distress   Mental status - alert and cooperative   Eyes - pupils equal and reactive, extraocular eye movements intact   Ears - bilateral TM's and external ear canals normal   Mouth - mucous membranes moist, pharynx normal without lesions   Neck - supple, no significant adenopathy   Lymphatics - no palpable lymphadenopathy, no hepatosplenomegaly   Chest - clear to auscultation, no wheezes, rales or rhonchi, symmetric air entry   Heart - normal rate, regular rhythm, normal S1, S2, no murmurs  Abdomen - soft, nontender, nondistended, no masses or organomegaly   Neurological - alert, oriented, normal speech, no focal findings or movement disorder noted   Musculoskeletal - no joint tenderness, deformity or swelling   Extremities - peripheral pulses normal, no pedal edema, no clubbing or cyanosis   Skin - normal coloration and turgor, no rashes, no suspicious skin lesions noted ,      DATA:      Labs:     Results for orders placed or performed during the hospital encounter of 81/29/61   Basic Metabolic Panel   Result Value Ref Range    Glucose 79 70 - 99 mg/dL    BUN 27 (H) 8 - 23 mg/dL    CREATININE 1.38 (H) 0.50 - 0.90 mg/dL    Bun/Cre Ratio NOT REPORTED 9 - 20    Calcium 9.7 8.6 - 10.4 mg/dL    Sodium 141 135 - 144 mmol/L    Potassium 3.7 3.7 - 5.3 mmol/L    Chloride 99 98 - 107 mmol/L    CO2 27 20 - 31 mmol/L    Anion Gap 15 9 - 17 mmol/L    GFR Non-African American 38 (L) >60 mL/min    GFR  46 (L) >60 mL/min    GFR Comment          GFR Staging NOT REPORTED      EGD and colonoscopy:    PREOPERATIVE DIAGNOSIS: iron deficiency anemia. PROCEDURES:   Transanal Colonoscopy with polypectomy (cold snare), polypectomy (cold biopsy). POSTPROCEDURE DIAGNOSIS:  Two polyps  Sub-optimal prep  Mild left sided diverticulosis      PREOPERATIVE DIAGNOSIS: Fe def anemia. PROCEDURES:   1) Transoral Upper Endoscopy, biopsy. POSTOPERATIVE DIAGNOSIS:  Erosive gastritis          Hgb Electrophoresis Interp 03/01/2019 10:37  [facility] NORMAL Hb ELECTROPHORETIC PATTERN. THE % HbA2 IS NORMAL. DECREASED MCV MAY BE OBSERVED WITH Fe DEFICIENCY AND/OR ALPHA THALASSEMIA TRAIT. SUGGEST Fe STUDIES. IMPRESSION:  NORMAL Hb ELECTROPHORETIC PATTERN WITH MICROCYTOSIS. Comment: HBA = 97.9%. HBA2 = 2.1%. MCV = 73.4 (82.6 .9). Date Complete:     3/1/2019     By:  Erika Dunn M.D. Date Reported:     3/4/2019       INTERPRETATION     Peripheral Blood:          Microcytic hypochromic anemia with reticulocytosis consistent   with response to iron therapy for hypoproliferative anemia. Clinical   correlation is necessary.        IMAGING DATA:    Xr Chest Standard (2 Vw)    Result Date: 3/1/2019  EXAMINATION: TWO VIEWS OF THE CHEST 3/1/2019 10:12 am COMPARISON: January 5, 2019, October 1, 2011 HISTORY: ORDERING SYSTEM PROVIDED HISTORY: Pneumonia of both lower lobes due to infectious organism [facility] TECHNOLOGIST PROVIDED HISTORY: follow up on pneumonia resolution FINDINGS: The cardiomediastinal silhouette is unchanged in appearance. Mild cardiac silhouette enlargement. There is no consolidation, pneumothorax, or evidence of edema. No effusion is appreciated. The osseous structures are unchanged in appearance. Degenerative change in the midthoracic spine and chronic appearing mild vertebral body height loss is again noted. No acute airspace disease identified. Impression No mammographic findings of malignancy. BIRADS: BIRADS - CATEGORY 1   Negative, no evidence of malignancy. Normal interval follow-up is recommended in 12 months. OVERALL ASSESSMENT - NEGATIVE   A letter of notification will be sent to the patient regarding the results. The [employer] recommends annual mammograms for women 40 years and older. 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 Patient hemoglobin is stable at 10.5. We do not have any iron studies drawn recently. Patient is on on supplement of iron daily. Recommend continued surveillance. I believe alpha thalassemia trait is also contributing to patient's anemia. We will recheck iron studies with the next office visit. If iron studies show sufficient iron stores we will discontinue iron supplements. Return to clinic in 6 months with repeat labs. Thank you for asking us to see this patient. Umair Irizarry MD            This note is created with the assistance of a speech recognition program.  While intending to generate a document that actually reflects the content of the visit, the document can still have some errors including those of syntax and sound a like substitutions which may escape proof reading. It such instances, actual meaning can be extrapolated by contextual diversion.

## 2020-08-25 ENCOUNTER — HOSPITAL ENCOUNTER (OUTPATIENT)
Dept: PHYSICAL THERAPY | Age: 65
Setting detail: THERAPIES SERIES
Discharge: HOME | End: 2020-08-25
Payer: COMMERCIAL

## 2020-08-25 PROCEDURE — 97110 THERAPEUTIC EXERCISES: CPT

## 2020-08-28 ENCOUNTER — APPOINTMENT (OUTPATIENT)
Dept: PHYSICAL THERAPY | Age: 65
End: 2020-08-28
Payer: COMMERCIAL

## 2020-08-31 ENCOUNTER — HOSPITAL ENCOUNTER (OUTPATIENT)
Dept: PHYSICAL THERAPY | Age: 65
Setting detail: THERAPIES SERIES
Discharge: HOME | End: 2020-08-31
Payer: COMMERCIAL

## 2020-08-31 PROCEDURE — 97110 THERAPEUTIC EXERCISES: CPT

## 2020-08-31 NOTE — FLOWSHEET NOTE
[x] [facility] &  Therapy  [address]  [phone]  F: [phone] [] [address]  [facility]   [address]  P: [phone]  F: [phone] [] [address]  [address] [facility] [address]  P: [phone]  F: [phone] [] [address]  [facility]   [address]   [place]: [phone]  F: [phone]      Physical Therapy Daily Treatment Note    Date:  2020  Patient Name:  Charissa Dennison    :  1955  MRN: [id number]  Physician: Jackelyn Joe MD  Insurance: [employer] Approval was received for 12 visits, from 8/3/20 to 20. Authorization number [id number]. Medical Diagnosis: Primary OA of left hip; at high risk for falls  Rehab Codes: M25.652, R53.1, R29.3, R26.9, M25.552  Onset date: 7/3/20                 Next Dr's appt.: TBD with PCP    Visit# / total visits:      Cancels/No Shows: 0/1    Subjective:     Pain:  []? Yes  [x]? No   Location: oanh hip/knees  Pain Rating: (0-10 scale) 0/10  Pain altered Tx:  []? Yes  [x]? No  Action:   Comments: Patient continues rate no pain or significant issues.       Objective:  Modalities:   Precautions:  Exercises:  Exercise Reps/ Time Weight/ Level Comments   Scifit 6m 2.0          Gastroc stretch 3x30\"           Standing      Heel raises 20x     Hip abd 20x     Hip ext 20x     Marches 20x     HS curls 20x     Step ups 10x 4\" Added 8.         Seated      Marches 15x     HS curls 20x blue    LAQ 20x     HS stretch 3x20\"  Stool   Quad stretch 3x20\"  Leg under table         Supine      SLR 15x     Bridge 20x     SAQ 20x     Glut set 10x3\"     Piriformis stretch 3x20\"  Knee to opp shoulder, towel         Sidelying      clamshell 10x     Hip abduction 10x  Manual assist           Specific Instructions for next treatment: L hip complete supine quad stretch to knee flexion of 100 deg with hip extended to ~20 deg  b. L hip ROM to the following degrees to indicate improving mobility post injury:  i. Flexion to 100 deg, ER/IR to 40/30 deg respectfully  9. ? Strength: At least 5-/5 hip ext/abd strength to improve stability in hip with prolonged standing/ambulation  10. ? Function:   a. Pt will be able to ambulate at least 300 ft with least restrictive device, minimal trunk lean, and consistently equal, typical step length and heel strike bilaterally to improve efficiency of gait after injury  b. Pt will report at least 45% fxn per LEFI to indicate improving function of L hip.                    Patient goals: \"to get better\"    Pt. Education:  [x] Yes  [] No  [] Reviewed Prior HEP/Ed  Method of Education: [x] Verbal  [] Demo  [x] Written  Comprehension of Education:  [x] Verbalizes understanding. [] Demonstrates understanding. [x] Needs review. [] Demonstrates/verbalizes HEP/Ed previously given. Plan: [x] Continue current frequency toward long and short term goals.     [x] Specific Instructions for subsequent treatments:     Frequency:  2 x/week for 12 visits      Time In: 210 pm            Time Out: 350 pm    Electronically signed by:  Saima Dyson PTA

## 2020-09-02 ENCOUNTER — APPOINTMENT (OUTPATIENT)
Dept: PHYSICAL THERAPY | Age: 65
End: 2020-09-02
Payer: COMMERCIAL

## 2020-09-03 ENCOUNTER — VIRTUAL VISIT (OUTPATIENT)
Dept: INTERNAL MEDICINE | Age: 65
End: 2020-09-03
Payer: COMMERCIAL

## 2020-09-03 ENCOUNTER — HOSPITAL ENCOUNTER (OUTPATIENT)
Dept: PHYSICAL THERAPY | Age: 65
Setting detail: THERAPIES SERIES
Discharge: HOME | End: 2020-09-03
Payer: COMMERCIAL

## 2020-09-03 PROBLEM — D56.3 ALPHA THALASSEMIA TRAIT: Status: ACTIVE | Noted: 2020-09-03

## 2020-09-03 PROBLEM — D50.9 MICROCYTIC ANEMIA: Status: ACTIVE | Noted: 2020-09-03

## 2020-09-03 PROCEDURE — 97110 THERAPEUTIC EXERCISES: CPT

## 2020-09-03 PROCEDURE — 99443 PR PHYS/QHP TELEPHONE EVALUATION 21-30 MIN: CPT | Performed by: INTERNAL MEDICINE

## 2020-09-03 RX ORDER — VITAMIN B COMPLEX
TABLET ORAL
Qty: 60 TABLET | Refills: 3 | Status: SHIPPED | OUTPATIENT
Start: 2020-09-03 | End: 2021-04-15 | Stop reason: SDUPTHER

## 2020-09-03 RX ORDER — AMLODIPINE BESYLATE 10 MG/1
10 TABLET ORAL DAILY
Qty: 60 TABLET | Refills: 3 | Status: SHIPPED | OUTPATIENT
Start: 2020-09-03 | End: 2021-04-15 | Stop reason: SDUPTHER

## 2020-09-03 RX ORDER — ATORVASTATIN CALCIUM 40 MG/1
40 TABLET, FILM COATED ORAL DAILY
Qty: 60 TABLET | Refills: 3 | Status: SHIPPED | OUTPATIENT
Start: 2020-09-03 | End: 2021-04-15 | Stop reason: SDUPTHER

## 2020-09-03 RX ORDER — ASPIRIN 81 MG/1
TABLET ORAL
Qty: 60 TABLET | Refills: 3 | Status: SHIPPED | OUTPATIENT
Start: 2020-09-03 | End: 2021-04-15 | Stop reason: SDUPTHER

## 2020-09-03 RX ORDER — FOLIC ACID 1 MG/1
1 TABLET ORAL DAILY
Qty: 60 TABLET | Refills: 3 | Status: SHIPPED | OUTPATIENT
Start: 2020-09-03 | End: 2021-04-15 | Stop reason: SDUPTHER

## 2020-09-03 NOTE — FLOWSHEET NOTE
[x] [facility]  [address]  [phone]  F: [phone] [] [address]  [facility]   [address]  P: [phone]  F: [phone] [] [place]  [address]  P: [phone]  F: [phone] [] [address]  [facility]   [address]   [place]: [phone]  F: [phone]      Physical Therapy Daily Treatment Note    Date:  9/3/2020  Patient Name:  Herb Martel    :  1955  MRN: [id number]  Physician: Emily Stroud MD  Insurance: [employer] Approval was received for 12 visits, from 8/3/20 to 20. Authorization number [id number]. Medical Diagnosis: Primary OA of left hip; at high risk for falls  Rehab Codes: M25.652, R53.1, R29.3, R26.9, M25.552  Onset date: 7/3/20                 Next Dr's appt.: TBD with PCP    Visit# / total visits:      Cancels/No Shows: 0/1    Subjective:     Pain:  []? Yes  [x]? No   Location: oanh hip/knees  Pain Rating: (0-10 scale) 0/10  Pain altered Tx:  []? Yes  [x]? No  Action:   Comments: Patient reported no pain this date.      Objective:  Modalities:   Precautions:  Exercises:  Exercise Reps/ Time Weight/ Level Comments   Scifit 6m 2.0          Gastroc stretch 3x30\"           Standing      Heel raises 20x     Hip abd 20x     Hip ext 20x     Marches 20x     HS curls 20x     Step ups 10x 4\" Added 8.31.20         Seated      Marches 15x     HS curls 20x blue    LAQ 20x     HS stretch 3x20\"  Stool   Quad stretch 3x20\"  Leg under table         Supine      SLR 15x     Bridge 20x     SAQ 20x     Glut set 15x3\"     Piriformis stretch 3x20\"  Knee to opp shoulder, towel         Sidelying      clamshell 10x     Hip abduction 10x  Manual assist           Specific Instructions for next treatment: L hip PROM, hip flexor/quad stretching, piriformis/glute med/IT band stretching, global hip strengthening all planes in both supine/standing, progress gait to increased heel strike/step length and less trunk lean; glute med strengthening in standing for less hip drop        Treatment Charges: Mins Units   []  Modalities     [x]  Ther Exercise 30 2   []  Manual Therapy     []  Ther Activities     []  Aquatics     []  Vasocompression     []  Other     Total Treatment time 30 2       Assessment: [x] Progressing toward goals. Pt continued with activities listed above, held side lying activities due to patient time constraint. No progressions this date due to pt fatigue upon arrival.    [] No change. [] Other:  [x] Patient would continue to benefit from skilled physical therapy services in order to: decrease pain in hip and decrease risk of falls    STG: (to be met in 8 treatments)  1. ? Pain: No more than 4/10 max pain reported with daily activity. 2. ? ROM:   a. Improvement in quad length as evidenced by ability to complete supine quad stretch to knee flexion of 90 deg with hip extended to ~10 deg  b. L hip ROM to the following degrees to indicate improving mobility post injury:  i. Flexion to 90 deg, ER/IR to 35/20 deg respectfully  3. ? Strength: At least 4+/5 hip ext/abd strength to improve stability in hip with prolonged standing/ambulation  4. ? Function: Pt will be able to ambulate at least 100 ft with least restrictive device, minimal trunk lean, and improving step length and heel strike bilaterally to improve efficiency of gait after injury  5. Independent with Home Exercise Programs  6. Demonstrate Knowledge of fall prevention - MET on 8/21    LTG: (to be met in 12 treatments)     7. ? Pain: No more than 2/10 max pain reported with daily activity. 8. ? ROM:   a.  Improvement in quad length as evidenced by ability to complete supine quad stretch to knee flexion of 100 deg with hip extended to ~20 deg  b. L hip ROM to the

## 2020-09-03 NOTE — PATIENT INSTRUCTIONS
Your doctor has ordered fasting blood work. It can be done at [facility] or at the [facility]. Paulieljadalberto Clineck will need to fast for 12 hours and bring order with you to registration department. Medications e-scribed to pharmacy of patient's choice. Order for   CT OF LUNGS      faxed to [address] [place] [address] they will all pt for appt. Please call [phone] in not heard within 2 weeks. Order for mammogram faxed to scheduling, they will contact patient with an appt original order given to patient along with scheduling phone number       OFFICE WILL CALL PT IN LATE November TO SCHEDULE December APPOINTMENT     TV      Your doctor has ordered fasting blood work. It can be done at [facility] or at the [facility]. Paulieljadalberto Olsoncock will need to fast for 12 hours and bring order with you to registration department. Order for mammogram faxed to scheduling, they will contact patient with an appt original order given to patient along with scheduling phone number     Order for     Ct of the lungs  faxed to [facility] Scheduling they will all pt for appt. Please call [phone] in not heard within 2 weeks.       medicare annual wellness visit scheduled for 09/10/2020    tv

## 2020-09-03 NOTE — PROGRESS NOTES
Luma Auguste is a 77 y.o. female evaluated via telephone on 9/3/2020. Consent:  She and/or health care decision maker is aware that that she may receive a bill for this telephone service, depending on her insurance coverage, and has provided verbal consent to proceed: Yes      Documentation:  I communicated with the patient and/or health care decision maker about hypertension, hip pain. Details of this discussion including any medical advice provided: The patient is a 68-year-old female with past medical history of hypertension, CKD stage III with a baseline creatinine of 1.1-1.2. Today, during this virtual visit, the patient's review of system was negative for any pain. The patient denied any complaints of fever, chills, lightheadedness, dizziness, chest pain, palpitations. The patient requested refills for medications. Upon discussion, the patient agreed to getting the low-dose CT scan of the chest for annual screening of lung cancer and mammogram done. The patient verbalized understanding. The patient also discussed that she was told about a medicine which she is supposed to stop. The patient is not sure about the name of the medicine. Went through the patient's medication list, all the medications are up-to-date. Will update the pharmacy regarding the bubble pack. Commended the patient to follow-up in person in 3 months. If there is anything urgent or acute, the patient can schedule an appointment early. I affirm this is a Patient Initiated Episode with a Patient who has not had a related appointment within my department in the past 7 days or scheduled within the next 24 hours.     Patient identification was verified at the start of the visit: Yes    Total Time: minutes: 21-30 minutes    Note: not billable if this call serves to triage the patient into an appointment for the relevant concern    Kirsty Estrada MD  PGY-3, Department of Internal Medicine  [address] [facility] [place], [place]  9/3/2020 1:18 PM

## 2020-09-08 ENCOUNTER — HOSPITAL ENCOUNTER (OUTPATIENT)
Dept: PHYSICAL THERAPY | Age: 65
Setting detail: THERAPIES SERIES
Discharge: HOME | End: 2020-09-08
Payer: COMMERCIAL

## 2020-09-08 NOTE — FLOWSHEET NOTE
[] [address]  [address]    [phone]  F: [phone]   [] [address]  [facility] [address]   [address]  P: [phone]  F: [phone]  [] [address]  [address] [facility]  P: [phone]  F: [phone]  [] [address]  [address] [facility] [address]   [address]   [place]: [phone]  F: [phone]   [] [place] [id number] [facility] [address]  [place]: [phone]   F: [phone]     Physical Therapy Cancel/No Show note    Date: 2020  Patient: Media Lamp  : 1955  MRN: [id number]    Cancels/No Shows to date:     For today's appointment patient:    [x]  Cancelled    [] Rescheduled appointment    [] No-show     Reason given by patient:    []  Patient ill    []  Conflicting appointment    [] No transportation      [] Conflict with work    [x] No reason given    [] Weather related    [] [id number]    [] Other:      Comments:        [x] Next appointment was confirmed    Electronically signed by: Nieves Vergara PTA

## 2020-09-10 ENCOUNTER — VIRTUAL VISIT (OUTPATIENT)
Dept: INTERNAL MEDICINE | Age: 65
End: 2020-09-10
Payer: COMMERCIAL

## 2020-09-10 PROCEDURE — G0438 PPPS, INITIAL VISIT: HCPCS | Performed by: NURSE PRACTITIONER

## 2020-09-10 ASSESSMENT — LIFESTYLE VARIABLES
HOW OFTEN DO YOU HAVE A DRINK CONTAINING ALCOHOL: 1
HOW MANY STANDARD DRINKS CONTAINING ALCOHOL DO YOU HAVE ON A TYPICAL DAY: 0

## 2020-09-10 ASSESSMENT — PATIENT HEALTH QUESTIONNAIRE - PHQ9
SUM OF ALL RESPONSES TO PHQ QUESTIONS 1-9: 0
SUM OF ALL RESPONSES TO PHQ QUESTIONS 1-9: 0
1. LITTLE INTEREST OR PLEASURE IN DOING THINGS: 0
SUM OF ALL RESPONSES TO PHQ9 QUESTIONS 1 & 2: 0
2. FEELING DOWN, DEPRESSED OR HOPELESS: 0

## 2020-09-10 NOTE — PATIENT INSTRUCTIONS
Body Mass Index: Care Instructions  Your Care Instructions     Body mass index (BMI) can help you see if your weight is raising your risk for health problems. It uses a formula to compare how much you weigh with how tall you are. · A BMI lower than 18.5 is considered underweight. · A BMI between 18.5 and 24.9 is considered healthy. · A BMI between 25 and 29.9 is considered overweight. A BMI of 30 or higher is considered obese. If your BMI is in the normal range, it means that you have a lower risk for weight-related health problems. If your BMI is in the overweight or obese range, you may be at increased risk for weight-related health problems, such as high blood pressure, heart disease, stroke, arthritis or joint pain, and diabetes. If your BMI is in the underweight range, you may be at increased risk for health problems such as fatigue, lower protection (immunity) against illness, muscle loss, bone loss, hair loss, and hormone problems. BMI is just one measure of your risk for weight-related health problems. You may be at higher risk for health problems if you are not active, you eat an unhealthy diet, or you drink too much alcohol or use tobacco products. Follow-up care is a key part of your treatment and safety. Be sure to make and go to all appointments, and call your doctor if you are having problems. It's also a good idea to know your test results and keep a list of the medicines you take. How can you care for yourself at home? · Practice healthy eating habits. This includes eating plenty of fruits, vegetables, whole grains, lean protein, and low-fat dairy. · If your doctor recommends it, get more exercise. Walking is a good choice. Bit by bit, increase the amount you walk every day. Try for at least 30 minutes on most days of the week. · Do not smoke. Smoking can increase your risk for health problems. If you need help quitting, talk to your doctor about stop-smoking programs and medicines. yogurt, and starchy vegetables like potatoes, beans, and corn. It also avoids foods and drinks that have added sugar. Instead, low-carb diets include foods that are high in protein and fat. Why might you follow a low-carb diet? Low-carb diets may be used for a variety of reasons, such as for weight loss. People who have diabetes may use a low-carb diet to help manage their blood sugar levels. What should you do before you start the diet? Talk to your doctor before you try any diet. This is even more important if you have health problems like kidney disease, heart disease, or diabetes. Your doctor may suggest that you meet with a registered dietitian. A dietitian can help you make an eating plan that works for you. What foods do you eat on a low-carb diet? On a low-carb diet, you choose foods that are high in protein and fat. Examples of these are:  · Meat, poultry, and fish. · Eggs. · Nuts, such as walnuts, pecans, almonds, and peanuts. · Peanut butter and other nut butters. · Tofu. · Avocado. · Tamra Red. · Non-starchy vegetables like broccoli, cauliflower, green beans, mushrooms, peppers, lettuce, and spinach. · Unsweetened non-dairy milks like almond milk and coconut milk. · Cheese, cottage cheese, and cream cheese. Current as of: August 22, 2019               Content Version: 12.5  © 3750-7938 Healthwise, [employer]. Care instructions adapted under license by [facility] ([facility]). If you have questions about a medical condition or this instruction, always ask your healthcare professional. [place] [address] any warranty or liability for your use of this information. Eating Healthy Foods: Care Instructions  Your Care Instructions     Eating healthy foods can help lower your risk for disease. Healthy food gives you energy and keeps your heart strong, your brain active, your muscles working, and your bones strong.   A healthy diet includes a variety of foods from the basic food groups: grains, vegetables, fruits, milk and milk products, and meat and beans. Some people may eat more of their favorite foods from only one food group and, as a result, miss getting the nutrients they need. So, it is important to pay attention not only to what you eat but also to what you are missing from your diet. You can eat a healthy, balanced diet by making a few small changes. Follow-up care is a key part of your treatment and safety. Be sure to make and go to all appointments, and call your doctor if you are having problems. It's also a good idea to know your test results and keep a list of the medicines you take. How can you care for yourself at home? Look at what you eat  · Keep a food diary for a week or two and record everything you eat or drink. Track the number of servings you eat from each food group. · For a balanced diet every day, eat a variety of:  ? 6 or more ounce-equivalents of grains, such as cereals, breads, crackers, rice, or pasta, every day. An ounce-equivalent is 1 slice of bread, 1 cup of ready-to-eat cereal, or ½ cup of cooked rice, cooked pasta, or cooked cereal.  ? 2½ cups of vegetables, especially:  § Dark-green vegetables such as broccoli and spinach. § Orange vegetables such as carrots and sweet potatoes. § Dry beans (such as doshi and kidney beans) and peas (such as lentils). ? 2 cups of fresh, frozen, or canned fruit. A small apple or 1 banana or orange equals 1 cup. ? 3 cups of nonfat or low-fat milk, yogurt, or other milk products. ? 5½ ounces of meat and beans, such as chicken, fish, lean meat, beans, nuts, and seeds. One egg, 1 tablespoon of peanut butter, ½ ounce nuts or seeds, or ¼ cup of cooked beans equals 1 ounce of meat. · Learn how to read food labels for serving sizes and ingredients. Fast-food and convenience-food meals often contain few or no fruits or vegetables. Make sure you eat some fruits and vegetables to make the meal more nutritious.   · Look at your food diary. For each food group, add up what you have eaten and then divide the total by the number of days. This will give you an idea of how much you are eating from each food group. See if you can find some ways to change your diet to make it more healthy. Start small  · Do not try to make dramatic changes to your diet all at once. You might feel that you are missing out on your favorite foods and then be more likely to fail. · Start slowly, and gradually change your habits. Try some of the following:  ? Use whole wheat bread instead of white bread. ? Use nonfat or low-fat milk instead of whole milk. ? Eat brown rice instead of white rice, and eat whole wheat pasta instead of white-flour pasta. ? Try low-fat cheeses and low-fat yogurt. ? Add more fruits and vegetables to meals and have them for snacks. ? Add lettuce, tomato, cucumber, and onion to sandwiches. ? Add fruit to yogurt and cereal.  Enjoy food  · You can still eat your favorite foods. You just may need to eat less of them. If your favorite foods are high in fat, salt, and sugar, limit how often you eat them, but do not cut them out entirely. · Eat a wide variety of foods. Make healthy choices when eating out  · The type of restaurant you choose can help you make healthy choices. Even fast-food chains are now offering more low-fat or healthier choices on the menu. · Choose smaller portions, or take half of your meal home. · When eating out, try:  ? A veggie pizza with a whole wheat crust or grilled chicken (instead of sausage or pepperoni). ? Pasta with roasted vegetables, grilled chicken, or marinara sauce instead of cream sauce. ? A vegetable wrap or grilled chicken wrap. ? Broiled or poached food instead of fried or breaded items. Make healthy choices easy  · Buy packaged, prewashed, ready-to-eat fresh vegetables and fruits, such as baby carrots, salad mixes, and chopped or shredded broccoli and cauliflower.   · Buy packaged, presliced fruits, such as melon or pineapple. · Choose 100% fruit or vegetable juice instead of soda. Limit juice intake to 4 to 6 oz (½ to ¾ cup) a day. · Blend low-fat yogurt, fruit juice, and canned or frozen fruit to make a smoothie for breakfast or a snack. Where can you learn more? Go to https://BuscoTurnopepiceweb.Next [employer] Greatness. org and sign in to your [employer] account. Enter O141 in the [employer] box to learn more about \"Eating Healthy Foods: Care Instructions. \"     If you do not have an account, please click on the \"Sign Up Now\" link. Current as of: August 22, 2019               Content Version: 12.5  © 9318-2376 Healthwise, [employer]. Care instructions adapted under license by [facility] ([facility]). If you have questions about a medical condition or this instruction, always ask your healthcare professional. [place] [address] any warranty or liability for your use of this information. Personalized Preventive Plan for Paulina Amaya - 9/10/2020  Medicare offers a range of preventive health benefits. Some of the tests and screenings are paid in full while other may be subject to a deductible, co-insurance, and/or copay. Some of these benefits include a comprehensive review of your medical history including lifestyle, illnesses that may run in your family, and various assessments and screenings as appropriate. After reviewing your medical record and screening and assessments performed today your provider may have ordered immunizations, labs, imaging, and/or referrals for you. A list of these orders (if applicable) as well as your Preventive Care list are included within your After Visit Summary for your review. Other Preventive Recommendations:    · A preventive eye exam performed by an eye specialist is recommended every 1-2 years to screen for glaucoma; cataracts, macular degeneration, and other eye disorders.   · A preventive dental visit is recommended every 6 months. · Try to get at least 150 minutes of exercise per week or 10,000 steps per day on a pedometer . · Order or download the FREE \"Exercise & Physical Activity: Your Everyday Guide\" from [employer]. Call 4-106.284.5909 or search [employer] online. · You need 3021-0373 mg of calcium and 0806-1996 IU of vitamin D per day. It is possible to meet your calcium requirement with diet alone, but a vitamin D supplement is usually necessary to meet this goal.  · When exposed to the sun, use a sunscreen that protects against both UVA and UVB radiation with an SPF of 30 or greater. Reapply every 2 to 3 hours or after sweating, drying off with a towel, or swimming. · Always wear a seat belt when traveling in a car. Always wear a helmet when riding a bicycle or motorcycle.

## 2020-09-10 NOTE — PROGRESS NOTES
cva    Chronic pain in left foot     CKD (chronic kidney disease) stage 3, GFR 30-59 ml/min ([facility])     GERD (gastroesophageal reflux disease)     Hx of blood clots     Hyperlipidemia     Hypertension     Iron (Fe) deficiency anemia     Nicotine abuse     Obesity     Osteoarthritis     Peripheral vascular disease ([facility] [address] 3/13/2014    Substance abuse ([facility])     cocaine, canabis    Thalassemia minor     Unspecified cerebral artery occlusion with cerebral infarction     Unspecified sleep apnea        Past Surgical History:   Procedure Laterality Date    BREAST SURGERY      biopsy    COLONOSCOPY  12/2007    adenomatous polyp    COLONOSCOPY  2013    normal, repeat in 5 years    COLONOSCOPY N/A 8/21/2019    COLONOSCOPY POLYPECTOMY SNARE/COLD BIOPSY performed by Ermelinda Veloz MD at [facility]. Darian Ellen [address]      UPPER GASTROINTESTINAL ENDOSCOPY  2009    negative    UPPER GASTROINTESTINAL ENDOSCOPY N/A 8/21/2019    EGD BIOPSY performed by Ermelinda Veloz MD at [facility]         Family History   Problem Relation Age of Onset    High Blood Pressure Mother     Asthma Mother     Heart Disease Mother     Heart Disease Father     High Blood Pressure Father     Diabetes Brother     High Blood Pressure Brother     Heart Disease Brother     High Blood Pressure Maternal Grandmother     High Blood Pressure Maternal Grandfather     Heart Disease Maternal Grandfather        CareTeam (Including outside providers/suppliers regularly involved in providing care):   Patient Care Team:  Lyndsey Bealsey MD as PCP - General (Internal Medicine)  Niesha Hooks, RN as Nurse Navigator  Estelle Curling, DPM (Podiatry)    Wt Readings from Last 3 Encounters:   08/24/20 (!) 342 lb 12.8 oz (155.5 kg)   07/16/20 (!) 339 lb (153.8 kg)   07/14/20 (!) 338 lb (153.3 kg)      No flowsheet data found.    There is no height or weight on file to calculate BMI.    Based upon direct observation of the patient, evaluation of cognition reveals recent and remote memory intact. Wt Readings from Last 3 Encounters:   08/24/20 (!) 342 lb 12.8 oz (155.5 kg)   07/16/20 (!) 339 lb (153.8 kg)   07/14/20 (!) 338 lb (153.3 kg)     Temp Readings from Last 3 Encounters:   08/24/20 97.9 °F (36.6 °C) (Temporal)   07/16/20 96.1 °F (35.6 °C) (Temporal)   07/14/20 97.4 °F (36.3 °C)     BP Readings from Last 3 Encounters:   08/24/20 128/79   07/16/20 117/71   07/14/20 135/79     Pulse Readings from Last 3 Encounters:   08/24/20 64   07/16/20 74   07/14/20 66       Patient's complete Health Risk Assessment and screening values have been reviewed and are found in Flowsheets. The following problems were reviewed today and where indicated follow up appointments were made and/or referrals ordered. Positive Risk Factor Screenings with Interventions:     Cognitive: Words recalled: 2 Words Recalled  Clock Drawing Test (CDT) Score: (sarah)  Total Score Interpretation: Positive Mini-Cog  Cognitive Impairment Interventions:  · Patient declines any further evaluation/treatment for cognitive impairment    General Health:  General  In general, how would you say your health is?: Very Good  In the past 7 days, have you experienced any of the following? New or Increased Pain, New or Increased Fatigue, Loneliness, Social Isolation, Stress or Anger?: None of These  Do you get the social and emotional support that you need?: Yes  Do you have a Living Will?: (!) No  General Health Risk Interventions:  · No Living Will: patient declined    Health Habits/Nutrition:  Health Habits/Nutrition  Do you exercise for at least 20 minutes 2-3 times per week?: (!) No  Have you lost any weight without trying in the past 3 months?: No  Do you eat fewer than 2 meals per day?: No  Have you seen a dentist within the past year?: (!) No  There is no height or weight on file to calculate BMI.   Health Habits/Nutrition Interventions:  · Inadequate physical activity:  patient is not ready to increase his/her physical activity level at this time  · Nutritional issues:  educational materials for healthy, well-balanced diet provided  · Dental exam overdue:  patient declines dental evaluation    Personalized Preventive Plan   Current Health Maintenance Status  Immunization History   Administered Date(s) Administered    Influenza Virus Vaccine 09/25/2019    Influenza, Tami Fling, IM, (6 mo and older Fluzone, Flulaval, Fluarix and 3 yrs and older Afluria) 09/25/2019    Influenza, Quadv, IM, PF (6 mo and older Fluzone, Flulaval, Fluarix, and 3 yrs and older Afluria) 10/19/2018    Pneumococcal Conjugate 13-valent ([id number]) 10/19/2018    Pneumococcal Polysaccharide ([id number]) 12/20/2016    Tdap (Boostrix, Adacel) 03/08/2018    Zoster Live (Zostavax) 08/03/2016, 02/14/2017    Zoster Recombinant (Shingrix) 04/20/2018, 06/26/2018        Health Maintenance   Topic Date Due    Annual Wellness Visit (AWV)  06/27/2019    Low dose CT lung screening  07/31/2020    Flu vaccine (1) 09/01/2020    Lipid screen  09/11/2020    Breast cancer screen  09/28/2020    Potassium monitoring  07/16/2021    Creatinine monitoring  07/16/2021    Pneumococcal 65+ years Vaccine (2 of 2 - PPSV23) 12/20/2021    Colon cancer screen colonoscopy  08/21/2022    DTaP/Tdap/Td vaccine (2 - Td) 03/08/2028    DEXA (modify frequency per FRAX score)  Completed    Shingles Vaccine  Completed    Hepatitis C screen  Completed    Hepatitis A vaccine  Aged Out    Hepatitis B vaccine  Aged Out    Hib vaccine  Aged Out    Meningococcal (ACWY) vaccine  Aged Out     Recommendations for [employer] Due: see orders and patient instructions/AVS.  . Recommended screening schedule for the next 5-10 years is provided to the patient in written form: see Patient Beena Prado was seen today for medicare awv.     Diagnoses and all orders for this visit:    BMI 50.0-59.9, adult [facility])    Routine general medical examination at a health care facility  -     [address] [facility] is a 77 y.o. female being evaluated by a Virtual Visit (phone) encounter to address concerns as mentioned above. A caregiver was present when appropriate. Due to this being a TeleHealth encounter (During UHGJY-29 public health emergency), evaluation of the following organ systems was limited: Vitals/Constitutional/EENT/Resp/CV/GI//MS/Neuro/Skin/Heme-Lymph-Imm. Pursuant to the emergency declaration under the [facility], [address] [place] [address] authority and the [employer] Act, this Virtual Visit was conducted with patient's (and/or legal guardian's) consent, to reduce the patient's risk of exposure to COVID-19 and provide necessary medical care. The patient (and/or legal guardian) has also been advised to contact this office for worsening conditions or problems, and seek emergency medical treatment and/or call 911 if deemed necessary. Patient identification was verified at the start of the visit: Yes    Services were provided through phone to substitute for in-person clinic visit. Patient and provider were located at their individual homes. --LOY Scanlon CNP on 9/10/2020 at 2:20 PM    An electronic signature was used to authenticate this note. Obesity Counseling: Assessed behavioral health risks and factors affecting choice of behavior. Suggested weight control approaches, including dietary changes behavioral modification and follow up plan. Provided educational and support documentation.   Time spent (minutes): 3

## 2020-09-11 ENCOUNTER — HOSPITAL ENCOUNTER (OUTPATIENT)
Dept: PHYSICAL THERAPY | Age: 65
Setting detail: THERAPIES SERIES
Discharge: HOME | End: 2020-09-11
Payer: COMMERCIAL

## 2020-09-11 PROCEDURE — 97110 THERAPEUTIC EXERCISES: CPT

## 2020-09-11 NOTE — FLOWSHEET NOTE
[x] [facility]) [facility] &  Therapy  [address]  [phone]  F: [phone] [] [address]  [facility]   [address]  P: [phone]  F: [phone] [] [place]  [address] [facility]  P: [phone]  F: [phone] [] [address]  [facility]   [address]   [place]: [phone]  F: [phone]      Physical Therapy Daily Treatment Note    Date:  2020  Patient Name:  Herb Martel    :  1955  MRN: [id number]  Physician: Emily Stroud MD  Insurance: [facility] Approval was received for 12 visits, from 8/3/20 to 20. Authorization number [id number]. Medical Diagnosis: Primary OA of left hip; at high risk for falls  Rehab Codes: M25.652, R53.1, R29.3, R26.9, M25.552  Onset date: 7/3/20                 Next Dr's appt.: TBD with PCP    Visit# / total visits:      Cancels/No Shows: 0/1    Subjective:     Pain:  []? Yes  [x]? No   Location: oanh hip/knees  Pain Rating: (0-10 scale) 0/10  Pain altered Tx:  []? Yes  [x]? No  Action:   Comments: Patient continues to report no pain or issues.       Objective:  Modalities:   Precautions:  Exercises:  Exercise Reps/ Time Weight/ Level Comments   Scifit 6m 2.0          Gastroc stretch 3x30\"           Standing      Heel raises 20x     Hip abd 20x     Hip ext 20x     Marches 20x     HS curls 20x     Step ups 10x 4\" Fwd/lat         Seated      Marches 20x     HS curls 20x blue    LAQ 20x     HS stretch 3x20\"  Stool   Quad stretch 3x20\"  Leg under table         Supine      SLR 15x 1lb    Bridge 20x     SAQ 20x     Glut set 15x3\"     Piriformis stretch   Knee to opp shoulder, towel         Sidelying      clamshell 20     Hip abduction 20x 1lb            Specific Instructions for next treatment: L hip PROM, hip flexor/quad stretching, piriformis/glute med/IT band stretching, global hip strengthening all planes in both supine/standing, progress gait to increased heel strike/step length and less trunk lean; glute med strengthening in standing for less hip drop        Treatment Charges: Mins Units   []  Modalities     [x]  Ther Exercise 40 3   []  Manual Therapy     []  Ther Activities     []  Aquatics     []  Vasocompression     []  Other     Total Treatment time 40 3       Assessment: [x] Progressing toward goals. Progressed this date with addition of lateral step ups for further oanh hip strengthening with good tolerance. No rest break required during standing program this date. Also added 1lb ankle weights for supine and sidelying exs with fair tolerance. Limited ROM with sidelying hip abduction due to body habitus, however has improved with AROM and activation of hip musculature. [] No change. [] Other:   [x] Patient would continue to benefit from skilled physical therapy services in order to: decrease pain in hip and decrease risk of falls    STG: (to be met in 8 treatments)  1. ? Pain: No more than 4/10 max pain reported with daily activity. 2. ? ROM:   a. Improvement in quad length as evidenced by ability to complete supine quad stretch to knee flexion of 90 deg with hip extended to ~10 deg  b. L hip ROM to the following degrees to indicate improving mobility post injury:  i. Flexion to 90 deg, ER/IR to 35/20 deg respectfully  3. ? Strength: At least 4+/5 hip ext/abd strength to improve stability in hip with prolonged standing/ambulation  4. ? Function: Pt will be able to ambulate at least 100 ft with least restrictive device, minimal trunk lean, and improving step length and heel strike bilaterally to improve efficiency of gait after injury  5. Independent with Home Exercise Programs  6.  Demonstrate Knowledge of fall prevention - MET on 8/21    LTG: (to be met in 12 treatments)     7. ? Pain: No more than 2/10 max pain reported with daily activity. 8. ? ROM:   a. Improvement in quad length as evidenced by ability to complete supine quad stretch to knee flexion of 100 deg with hip extended to ~20 deg  b. L hip ROM to the following degrees to indicate improving mobility post injury:  i. Flexion to 100 deg, ER/IR to 40/30 deg respectfully  9. ? Strength: At least 5-/5 hip ext/abd strength to improve stability in hip with prolonged standing/ambulation  10. ? Function:   a. Pt will be able to ambulate at least 300 ft with least restrictive device, minimal trunk lean, and consistently equal, typical step length and heel strike bilaterally to improve efficiency of gait after injury  b. Pt will report at least 45% fxn per LEFI to indicate improving function of L hip.                    Patient goals: \"to get better\"    Pt. Education:  [x] Yes  [] No  [] Reviewed Prior HEP/Ed  Method of Education: [x] Verbal  [] Demo  [x] Written  Comprehension of Education:  [x] Verbalizes understanding. [] Demonstrates understanding. [x] Needs review. [] Demonstrates/verbalizes HEP/Ed previously given. Plan: [x] Continue current frequency toward long and short term goals.     [x] Specific Instructions for subsequent treatments:     Frequency:  2 x/week for 12 visits      Time In: 255 pm            Time Out: 340 pm    Electronically signed by:  Wing Francois PTA

## 2020-09-17 ENCOUNTER — HOSPITAL ENCOUNTER (OUTPATIENT)
Dept: PHYSICAL THERAPY | Age: 65
Setting detail: THERAPIES SERIES
Discharge: HOME | End: 2020-09-17
Payer: COMMERCIAL

## 2020-09-17 PROCEDURE — 97110 THERAPEUTIC EXERCISES: CPT

## 2020-09-17 NOTE — FLOWSHEET NOTE
[x] [facility] &  Therapy  [address]  [phone]  F: [phone] [] [address]  [place] [address]   [address]  P: [phone]  F: [phone] [] [place]  [address] [facility]  P: [phone]  F: [phone] [] [address]  [facility]   [address]   [place]: [phone]  F: [phone]      Physical Therapy Daily Treatment Note    Date:  2020  Patient Name:  Lety Sosa    :  1955  MRN: [id number]  Physician: Kt Nunez MD  Insurance: [employer] Approval was received for 12 visits, from 8/3/20 to 20. Authorization number [id number]. Medical Diagnosis: Primary OA of left hip; at high risk for falls  Rehab Codes: M25.652, R53.1, R29.3, R26.9, M25.552  Onset date: 7/3/20                 Next Dr's appt.: TBD with PCP    Visit# / total visits:      Cancels/No Shows: 0/1    Subjective:     Pain:  []? Yes  [x]? No   Location: oanh hip/knees  Pain Rating: (0-10 scale) 0/10  Pain altered Tx:  []? Yes  [x]? No  Action:   Comments:   Continues to note no pain, just tired from doing some running around prior to therapy this date.      Objective:  Modalities:   Precautions:  Exercises:  Exercise Reps/ Time Weight/ Level Comments   Scifit 6m 2.0          Gastroc stretch 3x30\"           Standing      Heel raises 20x     Hip abd 20x     Hip ext 20x     Marches 20x     HS curls 20x     Step ups 10x 4\" Fwd/lat   Mini squats 2x10  Chair behind for improved form          Seated      Marches 20x     HS curls 20x blue    LAQ 20x  Yellow ball b/n knees    HS stretch 3x20\"  Stool   Quad stretch 3x20\"  Leg under table   Hip adduction 10x2\"  Yellow ball    Supine      SLR 20x 1lb    Bridge 20x     SAQ 20x 1lb     Glut set 15x3\"     Piriformis stretch   Knee to opp shoulder, towel         Sidelying      clamshell 20     Hip abduction 20x 1lb            Specific Instructions for next treatment: L hip PROM, hip flexor/quad stretching, piriformis/glute med/IT band stretching, global hip strengthening all planes in both supine/standing, progress gait to increased heel strike/step length and less trunk lean; glute med strengthening in standing for less hip drop        Treatment Charges: Mins Units   []  Modalities     [x]  Ther Exercise 45 3   []  Manual Therapy     []  Ther Activities     []  Aquatics     []  Vasocompression     []  Other     Total Treatment time 45 3       Assessment: [x] Progressing toward goals. Able to add mini lunges to standing program this date with fair tolerance. Verbal cues for proper technique and pushing back into gluts and keeping knees behind toes with fair carryover following adding chair behind for visual cue. Patient verbalized post treatment she feels her strength is improving in oanh LE and she is able to stand for longer periods of time. No rest breaks required throughout standing program this date. [] No change. [] Other:   [x] Patient would continue to benefit from skilled physical therapy services in order to: decrease pain in hip and decrease risk of falls    STG: (to be met in 8 treatments)  1. ? Pain: No more than 4/10 max pain reported with daily activity. 2. ? ROM:   a. Improvement in quad length as evidenced by ability to complete supine quad stretch to knee flexion of 90 deg with hip extended to ~10 deg  b. L hip ROM to the following degrees to indicate improving mobility post injury:  i. Flexion to 90 deg, ER/IR to 35/20 deg respectfully  3. ? Strength:  At least 4+/5 hip ext/abd strength to improve stability in hip with prolonged standing/ambulation  4. ? Function: Pt will be able to ambulate at least 100 ft with least restrictive device, minimal trunk lean, and improving step length and heel strike bilaterally to improve efficiency of gait after injury  5. Independent with Home Exercise Programs  6. Demonstrate Knowledge of fall prevention - MET on 8/21    LTG: (to be met in 12 treatments)     7. ? Pain: No more than 2/10 max pain reported with daily activity. 8. ? ROM:   a. Improvement in quad length as evidenced by ability to complete supine quad stretch to knee flexion of 100 deg with hip extended to ~20 deg  b. L hip ROM to the following degrees to indicate improving mobility post injury:  i. Flexion to 100 deg, ER/IR to 40/30 deg respectfully  9. ? Strength: At least 5-/5 hip ext/abd strength to improve stability in hip with prolonged standing/ambulation  10. ? Function:   a. Pt will be able to ambulate at least 300 ft with least restrictive device, minimal trunk lean, and consistently equal, typical step length and heel strike bilaterally to improve efficiency of gait after injury  b. Pt will report at least 45% fxn per LEFI to indicate improving function of L hip.                    Patient goals: \"to get better\"    Pt. Education:  [x] Yes  [] No  [] Reviewed Prior HEP/Ed  Method of Education: [x] Verbal  [] Demo  [x] Written  Comprehension of Education:  [x] Verbalizes understanding. [] Demonstrates understanding. [x] Needs review. [] Demonstrates/verbalizes HEP/Ed previously given. Plan: [x] Continue current frequency toward long and short term goals.     [x] Specific Instructions for subsequent treatments:     Frequency:  2 x/week for 12 visits      Time In: 255 pm            Time Out: 350 pm    Electronically signed by:  Floridalma Brandt PTA

## 2020-09-18 ENCOUNTER — HOSPITAL ENCOUNTER (OUTPATIENT)
Dept: PHYSICAL THERAPY | Age: 65
Setting detail: THERAPIES SERIES
Discharge: HOME | End: 2020-09-18
Payer: COMMERCIAL

## 2020-09-24 ENCOUNTER — TELEPHONE (OUTPATIENT)
Dept: INTERNAL MEDICINE | Age: 65
End: 2020-09-24

## 2020-09-24 NOTE — TELEPHONE ENCOUNTER
Order came off fax, states,\" Medicare will not accept diagnosis code of z12.2 encounter for screening for lung cancer the only mcare compliant diagnosis for a CT Lung screening is Z87.891. Please put update order in epic.  Thanks

## 2020-09-29 ENCOUNTER — HOSPITAL ENCOUNTER (OUTPATIENT)
Age: 65
Setting detail: SPECIMEN
Discharge: HOME | End: 2020-09-29
Payer: COMMERCIAL

## 2020-09-29 LAB
CHOLESTEROL, FASTING: 164 MG/DL
CHOLESTEROL/HDL RATIO: 2.6
HDLC SERPL-MCNC: 64 MG/DL
LDL CHOLESTEROL: 85 MG/DL (ref 0–130)
TRIGLYCERIDE, FASTING: 76 MG/DL
VLDLC SERPL CALC-MCNC: NORMAL MG/DL (ref 1–30)

## 2020-10-09 ENCOUNTER — OFFICE VISIT (OUTPATIENT)
Dept: PODIATRY | Age: 65
End: 2020-10-09
Payer: COMMERCIAL

## 2020-10-09 ENCOUNTER — HOSPITAL ENCOUNTER (OUTPATIENT)
Dept: VASCULAR LAB | Age: 65
Discharge: HOME | End: 2020-10-09
Payer: COMMERCIAL

## 2020-10-09 VITALS
WEIGHT: 293 LBS | DIASTOLIC BLOOD PRESSURE: 78 MMHG | HEART RATE: 72 BPM | SYSTOLIC BLOOD PRESSURE: 137 MMHG | HEIGHT: 67 IN | OXYGEN SATURATION: 98 % | BODY MASS INDEX: 45.99 KG/M2

## 2020-10-09 PROCEDURE — 93970 EXTREMITY STUDY: CPT

## 2020-10-09 PROCEDURE — 11719 TRIM NAIL(S) ANY NUMBER: CPT | Performed by: STUDENT IN AN ORGANIZED HEALTH CARE EDUCATION/TRAINING PROGRAM

## 2020-10-09 PROCEDURE — 99213 OFFICE O/P EST LOW 20 MIN: CPT | Performed by: STUDENT IN AN ORGANIZED HEALTH CARE EDUCATION/TRAINING PROGRAM

## 2020-10-09 RX ORDER — AMMONIUM LACTATE 12 G/100G
LOTION TOPICAL
Qty: 1 BOTTLE | Refills: 2 | Status: SHIPPED | OUTPATIENT
Start: 2020-10-09 | End: 2021-09-20

## 2020-10-09 NOTE — PROGRESS NOTES
[address] [facility] [address] [facility],  [address]  Tel: [phone]   Fax: [phone]    Subjective     CC: Painful toenails    HPI:  Abdiel Rubio is a 77y.o. year old female who presents to clinic today for painful elongated toenails. Patient is non-diabetic. She is unable to reach her toe nails due to obesity. States that the nails get painful when they get too long. She relates occasional numbness, tingling to feet. No new trauma/falls. Denies any claudication pain. Relates ongoing swelling in legs, notes increasing swelling within last few weeks. Relates hx of blood clots and stroke, on plavix. Denies open wounds. Denies fevers, chills, nausea, or vomiting, new SOB/CP. Does not wear/have compression stockings. Primary care physician is Rosalio Moore MD.    ROS:    Constitutional: Denies nausea, vomiting, fever, chills. Neurologic: Denies numbness, tingling, and burning in the feet. Vascular: Denies symptoms of lower extremity claudication. Skin: Denies open wounds. Otherwise negative except as noted in the HPI.      PMH:  Past Medical History:   Diagnosis Date    Bleeding     while on aggrenox    Cataracts, bilateral     Cerebrovascular disease 2003,2011    cva    Chronic pain in left foot     CKD (chronic kidney disease) stage 3, GFR 30-59 ml/min ([facility])     GERD (gastroesophageal reflux disease)     Hx of blood clots     Hyperlipidemia     Hypertension     Iron (Fe) deficiency anemia     Nicotine abuse     Obesity     Osteoarthritis     Peripheral vascular disease ([facility] [address] 3/13/2014    Substance abuse ([facility] [address]     cocaine, canabis    Thalassemia minor     Unspecified cerebral artery occlusion with cerebral infarction     Unspecified sleep apnea        Surgical History:   Past Surgical History:   Procedure Laterality Date    BREAST SURGERY      biopsy    COLONOSCOPY  12/2007    adenomatous polyp    COLONOSCOPY  2013    normal, repeat in 5 years    COLONOSCOPY N/A 2019    COLONOSCOPY POLYPECTOMY SNARE/COLD BIOPSY performed by Dong Ann MD at [address] [facility] GASTROINTESTINAL ENDOSCOPY  2009    negative    UPPER GASTROINTESTINAL ENDOSCOPY N/A 2019    EGD BIOPSY performed by Dong Ann MD at [facility]       Social History:  Social History     Tobacco Use    Smoking status: Former Smoker     Packs/day: 2.00     Years: 35.00     Pack years: 70.00     Last attempt to quit: 10/11/2011     Years since quittin.0    Smokeless tobacco: Never Used   Substance Use Topics    Alcohol use: Yes     Alcohol/week: 6.0 standard drinks     Types: 6 Cans of beer per week     Comment: rare    Drug use: No     Types: Cocaine, Marijuana     Comment: per pt did years ago; cocaine & marij, 17 denies current use       Medications:  Prior to Admission medications    Medication Sig Start Date End Date Taking? Authorizing Provider   ammonium lactate (LAC-HYDRIN) 12 % lotion Apply topically daily.  10/9/20  Yes González Mead DPM   amLODIPine (NORVASC) 10 MG tablet Take 1 tablet by mouth daily 9/3/20  Yes Juany Patiño MD   aspirin (ASPIRIN LOW DOSE) 81 MG EC tablet TAKE 1 TABLET DAILY 9/3/20  Yes Juany Patiño MD   atorvastatin (LIPITOR) 40 MG tablet Take 1 tablet by mouth daily 9/3/20  Yes Juany Patiño MD   folic acid (FOLVITE) 1 MG tablet Take 1 tablet by mouth daily 9/3/20  Yes Juany Patiño MD   Vitamin D (CHOLECALCIFEROL) 25 MCG (1000 UT) TABS tablet TAKE 1 TABLET DAILY 9/3/20  Yes Juany Patiño MD   losartan (COZAAR) 50 MG tablet TAKE 1 TABLET DAILY 20  Yes Elsie Wagner MD   clopidogrel (PLAVIX) 75 MG tablet TAKE 1 TABLET BY MOUTH IN THE MORNING 20  Yes Elsie Wagner MD   ferrous sulfate (FEROSUL) 325 (65 Fe) MG tablet TAKE 1 TABLET BY MOUTH IN THE MORNING 20  Yes Elsie Wagner MD   diclofenac sodium (VOLTAREN) 1 % GEL

## 2020-10-09 NOTE — PROGRESS NOTES
Patient instructed to remove shoes and socks, instructed to sit in exam chair. Current PCP name is Marika De La Cruz and date of last visit 9/10/20.  Do you have a follow up visit scheduled?   no    If yes the date is

## 2020-10-15 ENCOUNTER — TELEPHONE (OUTPATIENT)
Dept: PODIATRY | Age: 65
End: 2020-10-15

## 2020-10-15 NOTE — LETTER
[facility] Podiatry [facility]  [address] [facility] [address] [facility] [address]  Phone: [phone]  Fax: AWILDA/Jonathon Fu 1106, AMIN[place]        October 15, 2020    Myke Abdi  [address] [facility] [address] [place] [address]      Dear Jessica Cost:    PLEASE HAVE THIS TEST DONE 3 DAYS PRIOR TO YOUR NEXT APPOINTMENT. THE ORDER IS ATTACHED WITH SCHEDULING'S NUMBER. NEXT APPT IS 1/15/21 AT 1:45 PM.     If you have any questions or concerns, please don't hesitate to call.     Sincerely,        Maggi Hanna DPM

## 2020-10-19 ENCOUNTER — TELEPHONE (OUTPATIENT)
Dept: INTERNAL MEDICINE | Age: 65
End: 2020-10-19

## 2020-10-19 NOTE — LETTER
[address]  [address] [address]  Phone: [phone]  Fax: [phone]    Mariano Bauer MD        October 19, 2020    Juan Hauser  [address] [place] [id number]      Dear Miguel Severance: We are sending this letter because your PCP ordered [facility] for you to have done at your last visit here and they have not yet been completed. If you can please come to our office on the 2nd floor to  your orders to have them compelted. If you do not have a follow-up appointment scheduled you can either contact the office to make an appointment with us or you can make one when you come in to pick-up your orders. If you have any questions or concerns, please don't hesitate to call.     Sincerely,        Mariano Bauer MD

## 2020-10-19 NOTE — DISCHARGE SUMMARY
[x] [facility]        Outpatient Physical                Therapy       [address]       Phone: [phone]       Fax: [phone] [] [facility]       Promotion at [address] [facility]       Phone: [phone]       Fax: [phone] [] [facility]      for Health Promotion     [address] [facility]     Phone: [phone]     Fax:  [phone]     Physical Therapy Discharge Note    Date: 10/19/2020      Patient: Kat Streeter  : 1955  MRN: [id number]    Dong Garcia MD  Insurance: [facility] [employer] Approval was received for 12 visits, from 8/3/20 to 20.  Authorization number [id number]. Medical Diagnosis: Primary OA of left hip; at high risk for falls  Rehab Codes: M25.652, R53.1, R29.3, R26.9, M25.552  Onset date: 7/3/20                 Next 's appt.: TBD with PCP     Total visits attended:   Cancels/No shows: 0 cx / 1 ns  Date of initial visit: 8/3/20                Date of final visit: 20        Discharge Status:     Pt failed to make additional appointments for therapy after end date extension. Pt. Is now discharged. Electronically signed by: Deacon Underwood PT    If you have any questions or concerns, please don't hesitate to call.   Thank you for your referral.

## 2020-10-21 ENCOUNTER — HOSPITAL ENCOUNTER (OUTPATIENT)
Dept: VASCULAR LAB | Age: 65
Discharge: HOME | End: 2020-10-21
Payer: COMMERCIAL

## 2020-10-21 PROCEDURE — 93923 UPR/LXTR ART STDY 3+ LVLS: CPT

## 2020-11-04 ENCOUNTER — HOSPITAL ENCOUNTER (OUTPATIENT)
Dept: MAMMOGRAPHY | Age: 65
Discharge: HOME | End: 2020-11-06
Payer: COMMERCIAL

## 2020-11-04 PROCEDURE — 77063 BREAST TOMOSYNTHESIS BI: CPT

## 2020-12-17 ENCOUNTER — VIRTUAL VISIT (OUTPATIENT)
Dept: INTERNAL MEDICINE | Age: 65
End: 2020-12-17
Payer: COMMERCIAL

## 2020-12-17 PROCEDURE — 99442 PR PHYS/QHP TELEPHONE EVALUATION 11-20 MIN: CPT | Performed by: INTERNAL MEDICINE

## 2020-12-17 NOTE — PROGRESS NOTES
Capo Bui is a 77 y.o. female evaluated via telephone on 12/17/2020. Consent:  She and/or health care decision maker is aware that that she may receive a bill for this telephone service, depending on her insurance coverage, and has provided verbal consent to proceed: Yes      Documentation:  I communicated with the patient and/or health care decision maker about patient's hypertension, history of stroke, encounter for lung cancer screening. .   Details of this discussion including any medical advice provided: The patient is a 78-year-old female with no active complaints at this time. She has been taking her medications regularly. For blood pressure, she is on hydrochlorothiazide, losartan and amlodipine. She checks her blood pressure and states that blood pressure is usually in good control. The patient denies any active complaints at this time. She has microcytic anemia with low iron stores and she follows up with hematology oncology at [facility].  The patient has a history of vitamin D deficiency as well. The patient verbalized understanding when it was discussed that she has outstanding labs by her hematologist oncologist that needs to be performed. The patient is also due for her CT scan low-dose for lung cancer screening. She states that she has called them multiple times but she is unable to schedule an appointment. Told her that I will help her from the clinic in order to scheduling her appointment. Will discuss with MA.    I affirm this is a Patient Initiated Episode with a Patient who has not had a related appointment within my department in the past 7 days or scheduled within the next 24 hours.     Patient identification was verified at the start of the visit: Yes    Total Time: minutes: 11-20 minutes    Note: not billable if this call serves to triage the patient into an appointment for the relevant concern    Elsie Wagner MD  PGY-3, Department of Internal Medicine [facility], [place], [place]  12/17/2020 4:11 PM

## 2020-12-17 NOTE — PROGRESS NOTES
Attending Physician Statement  I have discussed the care of Sergey Garcia, including pertinent history and exam findings with the resident. I agree with the assessment, and status of the problem list as documented. Diagnosis Orders   1. Essential hypertension     2. Cerebrovascular disease     3. Encounter for screening for lung cancer     4. BMI 50.0-59.9, adult ([address]     5. Vitamin D deficiency  Vitamin D 25 Hydroxy   6. Microcytic anemia       The plan and orders should include   Orders Placed This Encounter   Procedures    Vitamin D 25 Hydroxy    and this was also documented by the resident.        Dr Araceli Rich MD, [address]  Associate , Department of Internal Medicine  Resident Ambulatory Site Medical Director  [address] [facility] Internal Medicine  [address] [facility],4Th Floor  Internal Medicine Clerkship - Hyacinth Waggoner    12/17/2020, 4:15 PM

## 2021-01-04 ENCOUNTER — TELEPHONE (OUTPATIENT)
Dept: INTERNAL MEDICINE | Age: 66
End: 2021-01-04

## 2021-01-04 NOTE — LETTER
[address]  [address] [address]  Phone: [phone]  Fax: [phone]    Florian Osgood, MD        January 4, 2021    Maninder Carlin  [address] [facility] [address]      Dear Berto Brooks: This letter is a reminder that you may have diagnostic testing that has not been completed. It is important to your well-being that these test(s) are performed. Please find the outstanding test(s) attached. If you could please have these completed before your next appointment. You can have the test completed at any [facility] facility or Lab. Please see the order for scheduling instructions. Otherwise can be done at any [facility]. Please call our office at Dept: [phone] for additional information on the outstanding tests or let us know if they have been completed so we may update your chart. If you have any questions or concerns, please don't hesitate to call.     Sincerely,        Florian Osgood, MD

## 2021-01-06 ENCOUNTER — TELEPHONE (OUTPATIENT)
Dept: ONCOLOGY | Age: 66
End: 2021-01-06

## 2021-01-06 DIAGNOSIS — Z87.891 PERSONAL HISTORY OF NICOTINE DEPENDENCE: Primary | ICD-10-CM

## 2021-01-11 ENCOUNTER — TELEPHONE (OUTPATIENT)
Dept: ONCOLOGY | Age: 66
End: 2021-01-11

## 2021-01-11 NOTE — TELEPHONE ENCOUNTER
[facility] [employer], need CT lung screening order signed per Dr Lily Rahman, or will not be able to proceed with test scheduled on 1/20/2021. IM to Janet Stuart regarding the above.

## 2021-01-13 ENCOUNTER — TELEPHONE (OUTPATIENT)
Dept: ONCOLOGY | Age: 66
End: 2021-01-13

## 2021-01-13 NOTE — LETTER
1/13/2021        Antonia White  [address] [facility] [place] [address]    Dear Antonia White: Your healthcare provider has ordered a low dose CT scan of the chest for lung cancer screening. You will find enclosed, information about CT lung screening. Please review the statement of understanding, you will be asked to sign a copy of this at the time of your CT scan    If you have not already been contacted to make the appointment for your scan, please call our scheduling department at [phone]    Keep in mind that CT lung screening does not take the place of smoking cessation. If you are a current smoker, you will find enclosed smoking cessation resources. Please do not hesitate to contact me if you have any questions or concerns.     [address] [facility],      [facility] Lung Screening Program  [phone]

## 2021-01-15 ENCOUNTER — OFFICE VISIT (OUTPATIENT)
Dept: PODIATRY | Age: 66
End: 2021-01-15
Payer: COMMERCIAL

## 2021-01-15 VITALS
HEART RATE: 74 BPM | DIASTOLIC BLOOD PRESSURE: 81 MMHG | HEIGHT: 67 IN | WEIGHT: 293 LBS | SYSTOLIC BLOOD PRESSURE: 147 MMHG | BODY MASS INDEX: 45.99 KG/M2

## 2021-01-15 DIAGNOSIS — I73.9 PVD (PERIPHERAL VASCULAR DISEASE) (HCC): ICD-10-CM

## 2021-01-15 DIAGNOSIS — L60.3 DYSTROPHIC NAIL: ICD-10-CM

## 2021-01-15 DIAGNOSIS — R60.0 EDEMA OF LOWER EXTREMITY: ICD-10-CM

## 2021-01-15 DIAGNOSIS — E66.01 CLASS 3 SEVERE OBESITY WITH BODY MASS INDEX (BMI) OF 50.0 TO 59.9 IN ADULT, UNSPECIFIED OBESITY TYPE, UNSPECIFIED WHETHER SERIOUS COMORBIDITY PRESENT (HCC): Primary | ICD-10-CM

## 2021-01-15 DIAGNOSIS — I89.0 LYMPHEDEMA: ICD-10-CM

## 2021-01-15 PROCEDURE — 99213 OFFICE O/P EST LOW 20 MIN: CPT | Performed by: STUDENT IN AN ORGANIZED HEALTH CARE EDUCATION/TRAINING PROGRAM

## 2021-01-15 PROCEDURE — 99212 OFFICE O/P EST SF 10 MIN: CPT | Performed by: STUDENT IN AN ORGANIZED HEALTH CARE EDUCATION/TRAINING PROGRAM

## 2021-01-15 PROCEDURE — G0127 TRIM NAIL(S): HCPCS | Performed by: STUDENT IN AN ORGANIZED HEALTH CARE EDUCATION/TRAINING PROGRAM

## 2021-01-15 NOTE — PROGRESS NOTES
Patient instructed to remove shoes and socks and instructed to sit in exam chair. Current PCP is Rin Miramontes MD and date of last visit was 12-17-20. Do you have a follow up visit scheduled?   No

## 2021-01-15 NOTE — PATIENT INSTRUCTIONS
Please obtain 20-30 mmHg below knee compression stockings and supportive shoes  Elevate legs at rest

## 2021-01-15 NOTE — PROGRESS NOTES
[address]  Tel: [phone]   Fax: [phone]    Subjective     CC: long nails difficult to trim, leg swelling    HPI:  Maninder Carlin is a 77y.o. year old female who presents to clinic today for long nails that are difficult to trim. Presents in non supportive shoes. Obtained PVRs and venous US ordered previous visit. Was not able to get venous reflux. Venous duplex was negative. Notes chronic swelling to ble, with occasional burning and tingling to feet. Continues to not wear compression stockings as recommended as pt relates elevating legs controls swelling. No claudication pain. No new pedal complaints/pain. Denies diabetes. Last a1c  5.4 (6/7/19)    Primary care physician is Florian Osgood, MD.    ROS:    Constitutional: Denies nausea, vomiting, fever, chills. Neurologic: Denies numbness, tingling, and burning in the feet. Vascular: Denies symptoms of lower extremity claudication. Skin: Denies open wounds. Otherwise negative except as noted in the HPI.      PMH:  Past Medical History:   Diagnosis Date    Bleeding     while on aggrenox    Cataracts, bilateral     Cerebrovascular disease 2003,2011    cva    Chronic pain in left foot     CKD (chronic kidney disease) stage 3, GFR 30-59 ml/min     GERD (gastroesophageal reflux disease)     Hx of blood clots     Hyperlipidemia     Hypertension     Iron (Fe) deficiency anemia     Nicotine abuse     Obesity     Osteoarthritis     Peripheral vascular disease ([facility] 3/13/2014    Substance abuse ([facility]     cocaine, canabis    Thalassemia minor     Unspecified cerebral artery occlusion with cerebral infarction     Unspecified sleep apnea        Surgical History:   Past Surgical History:   Procedure Laterality Date    BREAST SURGERY      biopsy    COLONOSCOPY  12/2007    adenomatous polyp    COLONOSCOPY  2013    normal, repeat in 5 years    COLONOSCOPY N/A 8/21/2019 COLONOSCOPY POLYPECTOMY SNARE/COLD BIOPSY performed by Pradip Reynolds MD at [address] [facility] GASTROINTESTINAL ENDOSCOPY  2009    negative    UPPER GASTROINTESTINAL ENDOSCOPY N/A 2019    EGD BIOPSY performed by Pradip Reynolds MD at [facility]       Social History:  Social History     Tobacco Use    Smoking status: Former Smoker     Packs/day: 2.00     Years: 35.00     Pack years: 70.00     Quit date: 10/11/2011     Years since quittin.2    Smokeless tobacco: Never Used   Substance Use Topics    Alcohol use: Yes     Alcohol/week: 6.0 standard drinks     Types: 6 Cans of beer per week     Comment: rare    Drug use: No     Types: Cocaine, Marijuana     Comment: per pt did years ago; cocaine & marij, 17 denies current use       Medications:  Prior to Admission medications    Medication Sig Start Date End Date Taking? Authorizing Provider   losartan (COZAAR) 50 MG tablet TAKE 1 TABLET DAILY 12/15/20  Yes Rin Miramontes MD   hydroCHLOROthiazide (HYDRODIURIL) 25 MG tablet TAKE 1 TABLET DAILY 12/15/20  Yes Rin Miramontes MD   clopidogrel (PLAVIX) 75 MG tablet TAKE 1 TABLET BY MOUTH IN THE MORNING 12/15/20  Yes Rin Miramontes MD   ammonium lactate (LAC-HYDRIN) 12 % lotion Apply topically daily.  10/9/20  Yes Huseyin Yeh DPM   amLODIPine (NORVASC) 10 MG tablet Take 1 tablet by mouth daily 9/3/20  Yes Gabriella Hatch MD   aspirin (ASPIRIN LOW DOSE) 81 MG EC tablet TAKE 1 TABLET DAILY 9/3/20  Yes Gabriella Hatch MD   atorvastatin (LIPITOR) 40 MG tablet Take 1 tablet by mouth daily 9/3/20  Yes Gabriella Hatch MD   folic acid (FOLVITE) 1 MG tablet Take 1 tablet by mouth daily 9/3/20  Yes Gabriella Hatch MD   Vitamin D (CHOLECALCIFEROL) 25 MCG (1000 UT) TABS tablet TAKE 1 TABLET DAILY 9/3/20  Yes Gabriella Hatch MD   ferrous sulfate (FEROSUL) 325 (65 Fe) MG tablet TAKE 1 TABLET BY MOUTH IN THE MORNING findings  []Claudication   []Temperature changes   [x]Edema   [x]Paresthesia   [x]Burning    Q Modifier Met: Q9: One Class B and two Class C findings      Assessment   Jean Paul Garza is a 77 y.o. female with     Diagnosis Orders   1. Class 3 severe obesity with body mass index (BMI) of 50.0 to 59.9 in adult, unspecified obesity type, unspecified whether serious comorbidity present ([facility]     2. Lymphedema     3. PVD (peripheral vascular disease) ([facility]     4. Edema of lower extremity     5. Dystrophic nail          Plan   · Patient examined and evaluated  · Diagnosis and treatment options discussed in detail  · Trimmed dystrophic nails x10 with sterile nail nippers woi  Discussed importance of proper sugar control and daily foot inspections  Educated and instructed patient to obtain supportive shoe gear  · Educated patient on signs of infection and to report to ED if arise  · PVRs reviewed - mild arterial occlusive dz, R 0.85; L 0.8. Pt is asymptomatic, continue to monitor for claudication symptoms and daily foot inspections. · Discussed compression stockings to minimize BLE edema. Pt defers and opted to wrap BLE with ace wraps. Instructed pt to  elevate legs at rest.  · Patient to RTC in 3 month(s) or sooner if problems arise  · Please, call the office with any questions or concerns     No orders of the defined types were placed in this encounter. No orders of the defined types were placed in this encounter.       David Varner DPM  Podiatric Medicine & Surgery   1/20/2021 at 10:12 AM

## 2021-01-20 ENCOUNTER — HOSPITAL ENCOUNTER (OUTPATIENT)
Dept: CT IMAGING | Age: 66
Discharge: HOME | End: 2021-01-22
Payer: COMMERCIAL

## 2021-01-20 DIAGNOSIS — Z87.891 PERSONAL HISTORY OF NICOTINE DEPENDENCE: ICD-10-CM

## 2021-01-20 PROCEDURE — 71271 CT THORAX LUNG CANCER SCR C-: CPT

## 2021-02-02 ENCOUNTER — HOSPITAL ENCOUNTER (OUTPATIENT)
Facility: MEDICAL CENTER | Age: 66
End: 2021-02-02
Payer: COMMERCIAL

## 2021-02-09 ENCOUNTER — HOSPITAL ENCOUNTER (OUTPATIENT)
Facility: MEDICAL CENTER | Age: 66
End: 2021-02-09
Payer: COMMERCIAL

## 2021-02-10 ENCOUNTER — HOSPITAL ENCOUNTER (OUTPATIENT)
Facility: MEDICAL CENTER | Age: 66
Discharge: HOME | End: 2021-02-10
Payer: COMMERCIAL

## 2021-02-10 DIAGNOSIS — D56.3 THALASSEMIA TRAIT, ALPHA: ICD-10-CM

## 2021-02-10 DIAGNOSIS — D50.9 MICROCYTIC ANEMIA: ICD-10-CM

## 2021-02-10 DIAGNOSIS — D50.9 MICROCYTIC ANEMIA: Primary | ICD-10-CM

## 2021-02-10 DIAGNOSIS — D64.9 ANEMIA, UNSPECIFIED TYPE: ICD-10-CM

## 2021-02-10 LAB
ABSOLUTE EOS #: 0.21 K/UL (ref 0–0.44)
ABSOLUTE IMMATURE GRANULOCYTE: 0.02 K/UL (ref 0–0.3)
ABSOLUTE LYMPH #: 1.63 K/UL (ref 1.1–3.7)
ABSOLUTE MONO #: 0.55 K/UL (ref 0.1–1.2)
BASOPHILS # BLD: 1 % (ref 0–2)
BASOPHILS ABSOLUTE: 0.06 K/UL (ref 0–0.2)
DIFFERENTIAL TYPE: ABNORMAL
EOSINOPHILS RELATIVE PERCENT: 4 % (ref 1–4)
FERRITIN: 742 UG/L (ref 13–150)
FOLATE: >20 NG/ML
HCT VFR BLD CALC: 35.1 % (ref 36.3–47.1)
HEMOGLOBIN: 10.3 G/DL (ref 11.9–15.1)
IMMATURE GRANULOCYTES: 0 %
IRON SATURATION: 28 % (ref 20–55)
IRON: 63 UG/DL (ref 37–145)
LYMPHOCYTES # BLD: 29 % (ref 24–43)
MCH RBC QN AUTO: 21.9 PG (ref 25.2–33.5)
MCHC RBC AUTO-ENTMCNC: 29.3 G/DL (ref 28.4–34.8)
MCV RBC AUTO: 74.7 FL (ref 82.6–102.9)
MONOCYTES # BLD: 10 % (ref 3–12)
NRBC AUTOMATED: 0 PER 100 WBC
PDW BLD-RTO: 17.2 % (ref 11.8–14.4)
PLATELET # BLD: 350 K/UL (ref 138–453)
PLATELET ESTIMATE: ABNORMAL
PMV BLD AUTO: 9.2 FL (ref 8.1–13.5)
RBC # BLD: 4.7 M/UL (ref 3.95–5.11)
RBC # BLD: ABNORMAL 10*6/UL
SEG NEUTROPHILS: 56 % (ref 36–65)
SEGMENTED NEUTROPHILS ABSOLUTE COUNT: 3.2 K/UL (ref 1.5–8.1)
TOTAL IRON BINDING CAPACITY: 229 UG/DL (ref 250–450)
UNSATURATED IRON BINDING CAPACITY: 166 UG/DL (ref 112–347)
VITAMIN B-12: 842 PG/ML (ref 232–1245)
WBC # BLD: 5.7 K/UL (ref 3.5–11.3)
WBC # BLD: ABNORMAL 10*3/UL

## 2021-02-10 PROCEDURE — 82728 ASSAY OF FERRITIN: CPT

## 2021-02-10 PROCEDURE — 83540 ASSAY OF IRON: CPT

## 2021-02-10 PROCEDURE — 82746 ASSAY OF FOLIC ACID SERUM: CPT

## 2021-02-10 PROCEDURE — 36415 COLL VENOUS BLD VENIPUNCTURE: CPT

## 2021-02-10 PROCEDURE — 85025 COMPLETE CBC W/AUTO DIFF WBC: CPT

## 2021-02-10 PROCEDURE — 82607 VITAMIN B-12: CPT

## 2021-02-10 PROCEDURE — 83550 IRON BINDING TEST: CPT

## 2021-02-22 ENCOUNTER — TELEPHONE (OUTPATIENT)
Dept: ONCOLOGY | Age: 66
End: 2021-02-22

## 2021-02-22 NOTE — TELEPHONE ENCOUNTER
PATIENT CALLED TO RESCHEDULE MISSED APPOINTMENT ON 2/15/21, VIA VM @ 9:32AM ON 2/22/21. I TRIED TO CALL PATIENT BACK BUT I HAD TO LEAVE A VM @ 10:33AM, AS NO ANSWER.

## 2021-02-23 ENCOUNTER — OFFICE VISIT (OUTPATIENT)
Dept: NEUROLOGY | Age: 66
End: 2021-02-23
Payer: COMMERCIAL

## 2021-02-23 VITALS
DIASTOLIC BLOOD PRESSURE: 76 MMHG | TEMPERATURE: 96.7 F | WEIGHT: 293 LBS | BODY MASS INDEX: 45.99 KG/M2 | SYSTOLIC BLOOD PRESSURE: 133 MMHG | HEIGHT: 67 IN | HEART RATE: 76 BPM

## 2021-02-23 DIAGNOSIS — R63.5 WEIGHT GAIN: ICD-10-CM

## 2021-02-23 DIAGNOSIS — Z86.73 HISTORY OF STROKE: ICD-10-CM

## 2021-02-23 DIAGNOSIS — N88.2 CERVICAL STENOSIS (UTERINE CERVIX): Primary | ICD-10-CM

## 2021-02-23 DIAGNOSIS — G56.03 BILATERAL CARPAL TUNNEL SYNDROME: ICD-10-CM

## 2021-02-23 PROCEDURE — 99214 OFFICE O/P EST MOD 30 MIN: CPT | Performed by: PSYCHIATRY & NEUROLOGY

## 2021-02-23 NOTE — PROGRESS NOTES
[facility]            [facility], [address]; [facility], [address]  [address] [facility], [address], [place], [address]    [address], [address], [place], [address] [facility]            Dept: [phone]          Dept Fax: [phone]             Marce Cowboy, MD Mavis Sage, MD Ahmed B. Wilene Brittle, MD Ladd Rea, MD Blair Landry, MD Luella Flake, CNP               NEUROLOGY FOLLOW UP NOTE                                          PATIENT NAME: Christiano Mclaughlin   PATIENT MRN: [id number]  FOLLOW UP TODAY: 2/23/2021     Dear Dr. Mis Godinez MD,     I had the pleasure of seeing your patient Christiano Mclaughlin, who comes for follow up. CHIEF COMPLAINT: Cerebrovascular Accident     INITIAL & INTERVAL HISTORY:     Iglesia Swan is a 77year old  AAF, I saw her on 7/14/2020, pt returns for follow up regarding her stroke and bilateral carpal tunnel.      Pt came to clinic alone today. Since last visit, she has had no new strokes, still suffers from tinging/numbness on both arms, also left leg below knee. She has EMG/NCV in 8/2018, at that time, reported evidence of bilateral carpal tunnel syndrome, also C5-6 radiculopathy. MRI cervical in 6/2019, showed moderate spinal canal stenosis at C5-6 and mild to moderate at C4-5, and C6-7. In the past, she was seen by orthopedics who suggested C5 corpectomy, fusion but pt has been firm that she does not want surgery for her neck and for her CTS because her granddaughter did not have good outcome from her CTS surgery. Bowel movement and urination fine. She has been on ASA and plavix. Blood pressure has been well controlled. She does not have DM. Mood is good. Sleep is fine with CPAP for her TIBURCIO. She has not got information regarding her bariatric surgery due to covid pandemic.  She has gained 22 lbs since last visit. She has not done much exercise lately. Initial clinic visit on 4/12/2018  Stroke x2  First one 2011  Features: speech problem, both hand numbness, hospitalized at [facility] for 1 week. Risk factors: TIBURCIO, HTN, HLD. No family history of stroke  Social: lives alone, weekend drinking beer, no smoking, sleep with [facility]  Medications: on aspirin and Plavix      NEUROLOGICAL TESTS  MRI brain 5/22/2018  Possible isolated punctate infarct left frontal cortex near the vertex. Severe cystic encephalomalacia, left greater than right, vertex.            MRA head, neck 5/22/2018  SEVERELY MOTION LIMITED EXAM. RECOMMEND FOLLOW-UP CTA. Narrowed left common carotid artery and occluded or nearly occluded left internal carotid artery. Decreased flow proximal right vertebral artery.     MRI cervical 6/21/2019  1. Moderate spinal canal stenosis at C5-C6 with mild-to-moderate spinal canal   stenosis at C4-C5 and C6-C7.  Minimal spinal canal stenosis at C3-C4. 2. Multilevel neural foraminal narrowing of the cervical spine as described   above. 3. No evidence of spondylolisthesis.         CTA head, neck 6/7/2018  Complete or near complete occlusion of the left internal carotid artery. Relatively normal left common carotid artery. Mild stenosis proximal right internal carotid artery. Moderate stenosis proximal right vertebral artery. Proximal left vertebral artery not well visualized.     EMG/NCV 10/11/2018  CTS L>R     2/2021  B12: 842  LDL 85  Ferritin 742  Hb 10.3    9/11/2020   B1: 63     6/18/2019  CRP 29.7  ESR 37     PMH/PSH/SH/FMH: Remain unchanged since last visit except those listed in the interval history.        Diagnosis Date    Bleeding     while on aggrenox    Cataracts, bilateral     Cerebrovascular disease 2003,2011    cva    Chronic pain in left foot     CKD (chronic kidney disease) stage 3, GFR 30-59 ml/min     GERD (gastroesophageal reflux disease)     Hx of blood clots     Hyperlipidemia     Hypertension     Iron (Fe) deficiency anemia     Nicotine abuse     Obesity     Osteoarthritis     Peripheral vascular disease ([facility] [facility] 3/13/2014    Substance abuse (HCC)     cocaine, canabis    Thalassemia minor     Unspecified cerebral artery occlusion with cerebral infarction     Unspecified sleep apnea         ALLERGIES:   Allergies   Allergen Reactions    Duricef [Cefadroxil Monohydrate]     Fish-Derived Products     Pcn [Penicillins]     Tomato        MEDICATIONS:   Current Outpatient Medications   Medication Sig Dispense Refill    losartan (COZAAR) 50 MG tablet TAKE 1 TABLET DAILY 30 tablet 3    hydroCHLOROthiazide (HYDRODIURIL) 25 MG tablet TAKE 1 TABLET DAILY 30 tablet 3    clopidogrel (PLAVIX) 75 MG tablet TAKE 1 TABLET BY MOUTH IN THE MORNING 30 tablet 3    ammonium lactate (LAC-HYDRIN) 12 % lotion Apply topically daily. 1 Bottle 2    amLODIPine (NORVASC) 10 MG tablet Take 1 tablet by mouth daily 60 tablet 3    aspirin (ASPIRIN LOW DOSE) 81 MG EC tablet TAKE 1 TABLET DAILY 60 tablet 3    atorvastatin (LIPITOR) 40 MG tablet Take 1 tablet by mouth daily 60 tablet 3    folic acid (FOLVITE) 1 MG tablet Take 1 tablet by mouth daily 60 tablet 3    Vitamin D (CHOLECALCIFEROL) 25 MCG (1000 UT) TABS tablet TAKE 1 TABLET DAILY 60 tablet 3    ferrous sulfate (FEROSUL) 325 (65 Fe) MG tablet TAKE 1 TABLET BY MOUTH IN THE MORNING 120 tablet 2    diclofenac sodium (VOLTAREN) 1 % GEL Apply 4 g topically 4 times daily 4 Tube 1    acetaminophen (SM PAIN RELIEVER) 325 MG tablet TAKE 2 TABLETS BY MOUTH EVERY 4 HOURS AS NEEDED FOR PAIN 60 tablet 3     No current facility-administered medications for this visit. LABS & TESTS:      Lab Results   Component Value Date    WBC 5.7 02/10/2021    HGB 10.3 (L) 02/10/2021    HCT 35.1 (L) 02/10/2021    MCV 74.7 (L) 02/10/2021     02/10/2021       REVIEW OF SYSTEMS:       All items selected indicate a positive finding. Those items not selected are negative.   Constitutional [x] Weight loss/gain   [] Fatigue  [] Fever/Chills   HEENT [] Hearing Loss  [] Visual Disturbance  [] Tinnitus  [] Eye pain   Respiratory [] Shortness of Breath  [] Cough  [] Snoring   Cardiovascular [] Chest Pain  [] Palpitations  [] Lightheaded   GI [] Constipation  [] Diarrhea  [] Swallowing change  [] Nausea/vomiting    [] Urinary Frequency  [] Urinary Urgency   Musculoskeletal [] Neck pain  [] Back pain  [] Muscle pain  [x] Restless legs   Dermatologic [] Skin changes   Neurologic [] Memory loss/confusion  [] Seizures  [x] Trouble walking or imbalance  [] Dizziness  [] Sleep disturbance  [x] Weakness  [x] Numbness  [] Tremors  [] Speech Difficulty  [] Headaches  [] Light Sensitivity  [] Sound Sensitivity   Endocrinology []Excessive thirst  []Excessive hunger   Psychiatric [] Anxiety/Depression  [] Hallucination   Allergy/immunology []Hives/environmental allergies   Hematologic/lymph [] Abnormal bleeding  [] Abnormal bruising     VITALS  /76 (Site: Left Lower Arm, Position: Sitting, Cuff Size: Medium Adult)   Pulse 76   Temp 96.7 °F (35.9 °C)   Ht 5' 7\" (1.702 m)   Wt (!) 360 lb (163.3 kg)   BMI 56.38 kg/m²       PHYSICAL EXAMINATION:       General Appearance:  Alert, cooperative, no signs of distress, appears stated age, obese   Skin:  No rash or lesions   HEENT:  Normocephalic, conjunctiva/corneas clear; eyelids intact   Ears:  Normal external ear and canals   Nose: Nares normal, mucosa normal, no drainage    Throat: Lips and tongue normal; teeth normal;  gums normal   Neck: Supple, intact flexion, extension and rotation;   trachea midline;  no adenopathy;   thyroid: not enlarged;   no carotid pulse abnormality   Lungs:   Respirations unlabored   Heart: Regular rate and rhythm   Abdominal: BS present, soft, NT, ND   Extremities: No cyanosis, no edema   Psych Normal affect        NEUROLOGICAL EXAMINATION:     Mental status    Awake, alert and oriented; no aphasia, no dysarthria      Cranial nerves    II - visual fields intact to confrontation                                                III, IV, VI - PERRLA, EOMI, no pupillary defect; no MICHAEL, no nystagmus, no ptosis   V - normal facial sensation                                                               VII - normal facial symmetry                                                             VIII - intact hearing                                                                             IX, X - symmetrical palate                                                                  XI - symmetrical shoulder shrug                                                       XII - midline tongue without atrophy or fasciculation      Motor function  No abnormal movements; tone and bulk okay  Muscle strength:   RUE: delta 4/5, biceps 4/5, triceps 4/5,  4+/5  LUE: delta 4-/5, biceps 4-/5, triceps 4/5,  4+/5  RLE: hf 4/5, ke 4/5, kf 4/5, df 4/5, pf 4/5  LLE: hf 4/5, ke 4/5, kf 4/5, df 4/5, pf 4/5     Coordination FNF no dysmetria, heel to galo deferred, Romberg deferred      Reflex function  2+ in UEs, could not induce in LEs, Negative Babinski      Gait                  Walks with a cane, slow, cautious, Tandem defered      Sensory function Stock distribution reduction to temp/pp below knees, no extinction     ASSESSMENT:    1. Cervical stenosis, pt has no intention for surgery   2. Bilateral CTS: pt does not want surgery  3. History of stroke  4. Weight gain   5. Weakness, likely due to cervical pathology    PLANS:    1. Continue stroke secondary prevention with ASA, plavix, good BP, BG, LDL control    2. Exercise as tolerated  3. NSAIDs for CTS, splint at night  4. Encouraged pt to lose weight   5. Discussed with pt about surgical intervention for her CTS and cervical stenosis, pt once again declined.    6. Fall precaution       >50% of 30 minute face to face time spent counseling patient, multiple issues discussed, all questions answered.      RTC in 6-8 months      Ross Kline MD, MS

## 2021-02-23 NOTE — PATIENT INSTRUCTIONS
1. Continue aspirin and plavix  2. Continue good blood pressure control   3.  Exercise, healthy diet    Return in 6-8 months    Luis Rios MD, MS

## 2021-03-03 ENCOUNTER — HOSPITAL ENCOUNTER (OUTPATIENT)
Facility: MEDICAL CENTER | Age: 66
End: 2021-03-03
Payer: COMMERCIAL

## 2021-03-08 ENCOUNTER — OFFICE VISIT (OUTPATIENT)
Dept: ONCOLOGY | Age: 66
End: 2021-03-08
Payer: COMMERCIAL

## 2021-03-08 ENCOUNTER — TELEPHONE (OUTPATIENT)
Dept: ONCOLOGY | Age: 66
End: 2021-03-08

## 2021-03-08 VITALS
DIASTOLIC BLOOD PRESSURE: 78 MMHG | BODY MASS INDEX: 57.84 KG/M2 | SYSTOLIC BLOOD PRESSURE: 143 MMHG | WEIGHT: 293 LBS | RESPIRATION RATE: 20 BRPM | HEART RATE: 74 BPM | TEMPERATURE: 97 F

## 2021-03-08 DIAGNOSIS — R79.89 ELEVATED FERRITIN: ICD-10-CM

## 2021-03-08 DIAGNOSIS — D50.9 MICROCYTIC ANEMIA: Primary | ICD-10-CM

## 2021-03-08 DIAGNOSIS — E66.01 MORBID OBESITY WITH BMI OF 50.0-59.9, ADULT (HCC): ICD-10-CM

## 2021-03-08 DIAGNOSIS — D56.3 THALASSEMIA TRAIT, ALPHA: ICD-10-CM

## 2021-03-08 DIAGNOSIS — Z87.891 PERSONAL HISTORY OF NICOTINE DEPENDENCE: ICD-10-CM

## 2021-03-08 PROCEDURE — 99211 OFF/OP EST MAY X REQ PHY/QHP: CPT | Performed by: INTERNAL MEDICINE

## 2021-03-08 PROCEDURE — 99213 OFFICE O/P EST LOW 20 MIN: CPT | Performed by: INTERNAL MEDICINE

## 2021-03-08 NOTE — TELEPHONE ENCOUNTER
[place] ARRIVES AMBULATORY FOR MD VISIT   DR CAMPA IN TO SEE PATIENT  ORDERS RECEIVED  RV 6 MONTHS W/LABS BEFORE RV  LABS CDP FE TIBC FERRITIN VITAMIN B12 & FOLATE 9/7/21  MD VISIT 9/13/21 @2PM  AVS PRINTED AND GIVEN TO PATIENT WITH INSTRUCTIONS  PATIENT DISCHARGED AMBULATORY

## 2021-03-08 NOTE — PROGRESS NOTES
Today's Date: 3/8/2021  Patient Name: Alexa Sutherland  Patient's age: 77 y.o., 1955      Reason for Consult: management recommendations    CHIEF COMPLAINT:  Microcytic anemia    History Obtained From:  patient, electronic medical record    Interval history:    Patient presents to the clinic for a follow-up visit and to discuss results of lab work-up and other relevant clinical data. Hemoglobin remained stable. Iron saturation is within range. Denies any bleeding. During this visit patient's allergy, social, medical, surgical history and medications were reviewed and updated. HISTORY OF PRESENT ILLNESS:      The patient is a 77 y.o.  female who presents to the office to establish care and for further treatment evaluation and recommendations. Patient gives a long-standing history of microcytic anemia. According to the patient she was first diagnosed with anemia when she was pregnant and has been anemic since. Patient has had GI workup done in the past including colonoscopy which only revealed a polyp without any malignancy. Patient's blood workup in the past has shown iron deficiency but her latest iron studies showed sufficient iron stores, F05 and folic acid levels. Patient complains of fatigue at times. Denies noticing any gross bleeding. Patient is on iron supplement with one tablet a day. Patient peripheral blood smear showed microcytic anemia with reticulocytosis. Patient also had hemoglobin electrophoresis which came back within normal range. Patient denies any history of gastric bypass surgery.     Past Medical History:   has a past medical history of Bleeding, Cataracts, bilateral, Cerebrovascular disease, Chronic pain in left foot, CKD (chronic kidney disease) stage 3, GFR 30-59 ml/min, GERD (gastroesophageal reflux disease), Hx of blood clots, Hyperlipidemia, Hypertension, Iron (Fe) deficiency anemia, Nicotine abuse, Obesity, Osteoarthritis, Peripheral vascular disease ([facility] Substance abuse ([facility] Thalassemia minor, Unspecified cerebral artery occlusion with cerebral infarction, and Unspecified sleep apnea. Past Surgical History:   has a past surgical history that includes Tonsillectomy and adenoidectomy; Tubal ligation; Breast surgery; Endometrial biopsy (1998); Upper gastrointestinal endoscopy (2009); Colonoscopy (12/2007); Colonoscopy (2013); Upper gastrointestinal endoscopy (N/A, 8/21/2019); and Colonoscopy (N/A, 8/21/2019). Medications:    Prior to Admission medications    Medication Sig Start Date End Date Taking? Authorizing Provider   Thiamine HCl (VITAMIN B-1 PO) Take by mouth daily   Yes Historical Provider, MD   losartan (COZAAR) 50 MG tablet TAKE 1 TABLET DAILY 12/15/20  Yes Sri Lewis MD   hydroCHLOROthiazide (HYDRODIURIL) 25 MG tablet TAKE 1 TABLET DAILY 12/15/20  Yes Sri Lewis MD   clopidogrel (PLAVIX) 75 MG tablet TAKE 1 TABLET BY MOUTH IN THE MORNING 12/15/20  Yes Sri Lewis MD   amLODIPine (NORVASC) 10 MG tablet Take 1 tablet by mouth daily 9/3/20  Yes Sofia Aguila MD   aspirin (ASPIRIN LOW DOSE) 81 MG EC tablet TAKE 1 TABLET DAILY 9/3/20  Yes Sofia Aguila MD   atorvastatin (LIPITOR) 40 MG tablet Take 1 tablet by mouth daily 9/3/20  Yes Sofia Aguila MD   folic acid (FOLVITE) 1 MG tablet Take 1 tablet by mouth daily 9/3/20  Yes Sofia Aguila MD   Vitamin D (CHOLECALCIFEROL) 25 MCG (1000 UT) TABS tablet TAKE 1 TABLET DAILY 9/3/20  Yes Sofia Aguila MD   ferrous sulfate (FEROSUL) 325 (65 Fe) MG tablet TAKE 1 TABLET BY MOUTH IN THE MORNING 6/30/20  Yes Sri Lewis MD   diclofenac sodium (VOLTAREN) 1 % GEL Apply 4 g topically 4 times daily 6/28/20  Yes Wilber John DO   acetaminophen (SM PAIN RELIEVER) 325 MG tablet TAKE 2 TABLETS BY MOUTH EVERY 4 HOURS AS NEEDED FOR PAIN 11/27/19  Yes Sri Lewis MD   ammonium lactate (LAC-HYDRIN) 12 % lotion Apply topically daily.   Patient not taking: Reported on 3/8/2021 10/9/20   Freedom Waggoner DPM     Current Outpatient Medications   Medication Sig Dispense Refill    Thiamine HCl (VITAMIN B-1 PO) Take by mouth daily      losartan (COZAAR) 50 MG tablet TAKE 1 TABLET DAILY 30 tablet 3    hydroCHLOROthiazide (HYDRODIURIL) 25 MG tablet TAKE 1 TABLET DAILY 30 tablet 3    clopidogrel (PLAVIX) 75 MG tablet TAKE 1 TABLET BY MOUTH IN THE MORNING 30 tablet 3    amLODIPine (NORVASC) 10 MG tablet Take 1 tablet by mouth daily 60 tablet 3    aspirin (ASPIRIN LOW DOSE) 81 MG EC tablet TAKE 1 TABLET DAILY 60 tablet 3    atorvastatin (LIPITOR) 40 MG tablet Take 1 tablet by mouth daily 60 tablet 3    folic acid (FOLVITE) 1 MG tablet Take 1 tablet by mouth daily 60 tablet 3    Vitamin D (CHOLECALCIFEROL) 25 MCG (1000 UT) TABS tablet TAKE 1 TABLET DAILY 60 tablet 3    ferrous sulfate (FEROSUL) 325 (65 Fe) MG tablet TAKE 1 TABLET BY MOUTH IN THE MORNING 120 tablet 2    diclofenac sodium (VOLTAREN) 1 % GEL Apply 4 g topically 4 times daily 4 Tube 1    acetaminophen (SM PAIN RELIEVER) 325 MG tablet TAKE 2 TABLETS BY MOUTH EVERY 4 HOURS AS NEEDED FOR PAIN 60 tablet 3    ammonium lactate (LAC-HYDRIN) 12 % lotion Apply topically daily. (Patient not taking: Reported on 3/8/2021) 1 Bottle 2     No current facility-administered medications for this visit. Allergies:  Duricef [cefadroxil monohydrate], Fish-derived products, Pcn [penicillins], and Tomato    Social History:   reports that she quit smoking about 9 years ago. She has a 70.00 pack-year smoking history. She has never used smokeless tobacco. She reports current alcohol use of about 6.0 standard drinks of alcohol per week. She reports that she does not use drugs. Family History: family history includes Asthma in her mother; Diabetes in her brother; Heart Disease in her brother, father, maternal grandfather, and mother; High Blood Pressure in her brother, father, maternal grandfather, maternal grandmother, and mother.     REVIEW OF SYSTEMS:      Constitutional: No fever or chills. No night sweats, no weight loss   Eyes: No eye discharge, double vision, or eye pain   HEENT: negative for sore mouth, sore throat, hoarseness and voice change   Respiratory: negative for cough , sputum, dyspnea, wheezing, hemoptysis, chest pain   Cardiovascular: negative for chest pain, dyspnea, palpitations, orthopnea, PND   Gastrointestinal: negative for nausea, vomiting, diarrhea, constipation, abdominal pain, Dysphagia, hematemesis and hematochezia   Genitourinary: negative for frequency, dysuria, nocturia, urinary incontinence, and hematuria   Integument: negative for rash, skin lesions, bruises.    Hematologic/Lymphatic: negative for easy bruising, bleeding, lymphadenopathy, or petechiae   Endocrine: negative for heat or cold intolerance,weight changes, change in bowel habits and hair loss   Musculoskeletal: negative for myalgias, arthralgias, pain, joint swelling,and bone pain   Neurological: negative for headaches, dizziness, seizures, weakness, numbness    PHYSICAL EXAM:        BP (!) 143/78   Pulse 74   Temp 97 °F (36.1 °C) (Temporal)   Resp 20   Wt (!) 369 lb 4.8 oz (167.5 kg)   BMI 57.84 kg/m²    General appearance - well appearing, no in pain or distress   Mental status - alert and cooperative   Eyes - pupils equal and reactive, extraocular eye movements intact   Ears - bilateral TM's and external ear canals normal   Mouth - mucous membranes moist, pharynx normal without lesions   Neck - supple, no significant adenopathy   Lymphatics - no palpable lymphadenopathy, no hepatosplenomegaly   Chest - clear to auscultation, no wheezes, rales or rhonchi, symmetric air entry   Heart - normal rate, regular rhythm, normal S1, S2, no murmurs  Abdomen - soft, nontender, nondistended, no masses or organomegaly   Neurological - alert, oriented, normal speech, no focal findings or movement disorder noted   Musculoskeletal - no joint tenderness, deformity or swelling   Extremities - peripheral pulses normal, no pedal edema, no clubbing or cyanosis   Skin - normal coloration and turgor, no rashes, no suspicious skin lesions noted ,      DATA:      Labs:     Results for orders placed or performed during the hospital encounter of 02/10/21   Vitamin B12 & Folate   Result Value Ref Range    Vitamin B-12 842 232 - 1245 pg/mL    Folate >20.0 >4.8 ng/mL   Iron and TIBC   Result Value Ref Range    Iron 63 37 - 145 ug/dL    TIBC 229 (L) 250 - 450 ug/dL    Iron Saturation 28 20 - 55 %    UIBC 166 112 - 347 ug/dL   Ferritin   Result Value Ref Range    Ferritin 742 (H) 13 - 150 ug/L   CBC Auto Differential   Result Value Ref Range    WBC 5.7 3.5 - 11.3 k/uL    RBC 4.70 3.95 - 5.11 m/uL    Hemoglobin 10.3 (L) 11.9 - 15.1 g/dL    Hematocrit 35.1 (L) 36.3 - 47.1 %    MCV 74.7 (L) 82.6 - 102.9 fL    MCH 21.9 (L) 25.2 - 33.5 pg    MCHC 29.3 28.4 - 34.8 g/dL    RDW 17.2 (H) 11.8 - 14.4 %    Platelets 712 084 - 629 k/uL    MPV 9.2 8.1 - 13.5 fL    NRBC Automated 0.0 0.0 per 100 WBC    Differential Type NOT REPORTED     Seg Neutrophils 56 36 - 65 %    Lymphocytes 29 24 - 43 %    Monocytes 10 3 - 12 %    Eosinophils % 4 1 - 4 %    Basophils 1 0 - 2 %    Immature Granulocytes 0 0 %    Segs Absolute 3.20 1.50 - 8.10 k/uL    Absolute Lymph # 1.63 1.10 - 3.70 k/uL    Absolute Mono # 0.55 0.10 - 1.20 k/uL    Absolute Eos # 0.21 0.00 - 0.44 k/uL    Basophils Absolute 0.06 0.00 - 0.20 k/uL    Absolute Immature Granulocyte 0.02 0.00 - 0.30 k/uL    WBC Morphology NOT REPORTED     RBC Morphology ANISOCYTOSIS PRESENT     Platelet Estimate NOT REPORTED      EGD and colonoscopy:    PREOPERATIVE DIAGNOSIS: iron deficiency anemia. PROCEDURES:   Transanal Colonoscopy with polypectomy (cold snare), polypectomy (cold biopsy). POSTPROCEDURE DIAGNOSIS:  Two polyps  Sub-optimal prep  Mild left sided diverticulosis      PREOPERATIVE DIAGNOSIS: Fe def anemia.       PROCEDURES:   1) Transoral Upper Endoscopy, biopsy. POSTOPERATIVE DIAGNOSIS:  Erosive gastritis          Hgb Electrophoresis Interp 03/01/2019 10:37  [address] NORMAL Hb ELECTROPHORETIC PATTERN. THE % HbA2 IS NORMAL. DECREASED MCV MAY BE OBSERVED WITH Fe DEFICIENCY AND/OR ALPHA THALASSEMIA TRAIT. SUGGEST Fe STUDIES. IMPRESSION:  NORMAL Hb ELECTROPHORETIC PATTERN WITH MICROCYTOSIS. Comment: HBA = 97.9%. HBA2 = 2.1%. MCV = 73.4 (82.6 .9). Date Complete:     3/1/2019     By:  Harish Murphy M.D. Date Reported:     3/4/2019       INTERPRETATION     Peripheral Blood:          Microcytic hypochromic anemia with reticulocytosis consistent   with response to iron therapy for hypoproliferative anemia. Clinical   correlation is necessary. IMAGING DATA:    Xr Chest Standard (2 Vw)    Result Date: 3/1/2019  EXAMINATION: TWO VIEWS OF THE CHEST 3/1/2019 10:12 am COMPARISON: January 5, 2019, October 1, 2011 HISTORY: ORDERING SYSTEM PROVIDED HISTORY: Pneumonia of both lower lobes due to infectious organism [facility] TECHNOLOGIST PROVIDED HISTORY: follow up on pneumonia resolution FINDINGS: The cardiomediastinal silhouette is unchanged in appearance. Mild cardiac silhouette enlargement. There is no consolidation, pneumothorax, or evidence of edema. No effusion is appreciated. The osseous structures are unchanged in appearance. Degenerative change in the midthoracic spine and chronic appearing mild vertebral body height loss is again noted. No acute airspace disease identified. Impression No mammographic findings of malignancy. BIRADS: BIRADS - CATEGORY 1   Negative, no evidence of malignancy. Normal interval follow-up is recommended in 12 months. OVERALL ASSESSMENT - NEGATIVE   A letter of notification will be sent to the patient regarding the results. The [employer] recommends annual mammograms for women 40 years and older.      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 We will continue to monitor. DC iron supplementation  Continue surveillance of hemoglobin iron stores. Return to clinic in 6 months or sooner if clinically indicated. Thank you for asking us to see this patient. Arleth Irizarry MD            This note is created with the assistance of a speech recognition program.  While intending to generate a document that actually reflects the content of the visit, the document can still have some errors including those of syntax and sound a like substitutions which may escape proof reading. It such instances, actual meaning can be extrapolated by contextual diversion.

## 2021-03-30 DIAGNOSIS — I10 ESSENTIAL HYPERTENSION: ICD-10-CM

## 2021-03-30 NOTE — TELEPHONE ENCOUNTER
Request for hydrochlorothiazide and losartan. Next Visit Date:  Future Appointments   Date Time Provider Mary Marinelli   4/15/2021  1:45 PM Annelise Lowe MD [facility] IM [facility]   4/16/2021  1:45 PM Shukri Suarez DPM [facility] Podiatry [facility]   9/7/2021 10:00 AM SCHEDULE, [facility]   9/13/2021  2:00 PM [facility]   9/23/2021  1:00 PM LOY Watts - CNP Neuro Spec [address] Maintenance   Topic Date Due    COVID-19 Vaccine (1) Never done    Flu vaccine (1) 12/17/2021 (Originally 9/1/2020)    Potassium monitoring  07/16/2021    Creatinine monitoring  07/16/2021    Annual Wellness Visit (AWV)  09/11/2021    Lipid screen  09/29/2021    Pneumococcal 65+ years Vaccine (2 of 2 - PPSV23) 12/20/2021    Low dose CT lung screening  01/20/2022    Colon cancer screen colonoscopy  08/21/2022    Breast cancer screen  11/04/2022    DTaP/Tdap/Td vaccine (2 - Td) 03/08/2028    DEXA (modify frequency per FRAX score)  Completed    Shingles Vaccine  Completed    Hepatitis C screen  Completed    Hepatitis A vaccine  Aged Out    Hepatitis B vaccine  Aged Out    Hib vaccine  Aged Out    Meningococcal (ACWY) vaccine  Aged Out       Hemoglobin A1C (%)   Date Value   06/07/2019 5.4   04/19/2018 5.7   06/08/2016 5.4             ( goal A1C is < 7)   Microalb/Crt.  Ratio (mcg/mg creat)   Date Value   05/09/2017 6     LDL Cholesterol (mg/dL)   Date Value   09/29/2020 85       (goal LDL is <100)   AST (U/L)   Date Value   02/24/2020 15     ALT (U/L)   Date Value   02/24/2020 9     BUN (mg/dL)   Date Value   07/16/2020 27 (H)     BP Readings from Last 3 Encounters:   03/08/21 (!) 143/78   02/23/21 133/76   01/15/21 (!) 147/81          (goal 120/80)    All Future Testing planned in [facility]  Lab Frequency Next Occurrence   CT lung screen [Initial/Annual] Once 04/04/2021   CT lung screen [Initial/Annual] Once 04/26/2021   Reflux study/Reflux Venous Insufficiency Bilateral Once 10/09/2021   Vitamin D 25 Hydroxy Once 03/28/2021   CBC Auto Differential Once 03/08/2021   Ferritin Once 03/08/2021   Iron and TIBC Once 03/08/2021   Vitamin B12 & Folate Once 03/08/2021   Ferritin Once 03/08/2021   Iron and TIBC Once 03/08/2021         Patient Active Problem List:     Essential hypertension     Pure hypercholesterolemia     Cerebrovascular accident (CVA), 2011, no residual deficit (HCC)     CKD (chronic kidney disease) stage 3, GFR 30-59 ml/min ([facility])     Peripheral vascular disease (HCC)     TIBURCIO (obstructive sleep apnea)     Morbid obesity with BMI of 50.0-59.9, adult ([facility] [address]     Primary osteoarthritis of left knee     Chronic tension-type headache, not intractable     Vitamin D deficiency     Carpal tunnel syndrome, bilateral-not on medication due to kidney disease     Hypokalemia     Microcytic anemia     Alpha thalassemia trait

## 2021-04-01 RX ORDER — LOSARTAN POTASSIUM 50 MG/1
TABLET ORAL
Qty: 28 TABLET | Refills: 3 | Status: SHIPPED | OUTPATIENT
Start: 2021-04-01 | End: 2021-04-15 | Stop reason: SDUPTHER

## 2021-04-01 RX ORDER — HYDROCHLOROTHIAZIDE 25 MG/1
TABLET ORAL
Qty: 28 TABLET | Refills: 3 | Status: SHIPPED | OUTPATIENT
Start: 2021-04-01 | End: 2021-04-15 | Stop reason: SDUPTHER

## 2021-04-15 ENCOUNTER — OFFICE VISIT (OUTPATIENT)
Dept: INTERNAL MEDICINE | Age: 66
End: 2021-04-15
Payer: COMMERCIAL

## 2021-04-15 VITALS
SYSTOLIC BLOOD PRESSURE: 127 MMHG | HEART RATE: 102 BPM | BODY MASS INDEX: 45.99 KG/M2 | HEIGHT: 67 IN | DIASTOLIC BLOOD PRESSURE: 80 MMHG | WEIGHT: 293 LBS

## 2021-04-15 DIAGNOSIS — M17.12 PRIMARY OSTEOARTHRITIS OF LEFT KNEE: ICD-10-CM

## 2021-04-15 DIAGNOSIS — N18.31 STAGE 3A CHRONIC KIDNEY DISEASE (HCC): ICD-10-CM

## 2021-04-15 DIAGNOSIS — D50.9 MICROCYTIC ANEMIA: ICD-10-CM

## 2021-04-15 DIAGNOSIS — I10 ESSENTIAL HYPERTENSION: Primary | ICD-10-CM

## 2021-04-15 DIAGNOSIS — G47.33 OSA (OBSTRUCTIVE SLEEP APNEA): ICD-10-CM

## 2021-04-15 DIAGNOSIS — M48.02 CERVICAL STENOSIS OF SPINAL CANAL: ICD-10-CM

## 2021-04-15 DIAGNOSIS — E66.01 CLASS 3 SEVERE OBESITY DUE TO EXCESS CALORIES WITH SERIOUS COMORBIDITY AND BODY MASS INDEX (BMI) OF 50.0 TO 59.9 IN ADULT (HCC): ICD-10-CM

## 2021-04-15 DIAGNOSIS — D56.3 ALPHA THALASSEMIA TRAIT: ICD-10-CM

## 2021-04-15 DIAGNOSIS — E78.00 PURE HYPERCHOLESTEROLEMIA: ICD-10-CM

## 2021-04-15 DIAGNOSIS — E55.9 VITAMIN D DEFICIENCY: ICD-10-CM

## 2021-04-15 DIAGNOSIS — I63.00 CEREBROVASCULAR ACCIDENT (CVA) DUE TO THROMBOSIS OF PRECEREBRAL ARTERY (HCC): ICD-10-CM

## 2021-04-15 DIAGNOSIS — M19.90 ARTHRITIS: ICD-10-CM

## 2021-04-15 PROBLEM — E87.6 HYPOKALEMIA: Status: RESOLVED | Noted: 2019-10-04 | Resolved: 2021-04-15

## 2021-04-15 PROCEDURE — 99213 OFFICE O/P EST LOW 20 MIN: CPT | Performed by: STUDENT IN AN ORGANIZED HEALTH CARE EDUCATION/TRAINING PROGRAM

## 2021-04-15 PROCEDURE — 99211 OFF/OP EST MAY X REQ PHY/QHP: CPT | Performed by: INTERNAL MEDICINE

## 2021-04-15 RX ORDER — ASPIRIN 81 MG/1
TABLET ORAL
Qty: 60 TABLET | Refills: 3 | Status: SHIPPED | OUTPATIENT
Start: 2021-04-15 | End: 2021-12-15

## 2021-04-15 RX ORDER — CLOPIDOGREL BISULFATE 75 MG/1
75 TABLET ORAL DAILY
Qty: 30 TABLET | Refills: 3 | Status: SHIPPED | OUTPATIENT
Start: 2021-04-15 | End: 2021-07-31

## 2021-04-15 RX ORDER — FERROUS SULFATE 325(65) MG
TABLET ORAL
Qty: 120 TABLET | Refills: 2 | Status: CANCELLED | OUTPATIENT
Start: 2021-04-15

## 2021-04-15 RX ORDER — FOLIC ACID 1 MG/1
1 TABLET ORAL DAILY
Qty: 60 TABLET | Refills: 3 | Status: SHIPPED | OUTPATIENT
Start: 2021-04-15 | End: 2021-12-15

## 2021-04-15 RX ORDER — AMLODIPINE BESYLATE 10 MG/1
10 TABLET ORAL DAILY
Qty: 60 TABLET | Refills: 3 | Status: SHIPPED | OUTPATIENT
Start: 2021-04-15 | End: 2021-12-15

## 2021-04-15 RX ORDER — LOSARTAN POTASSIUM 50 MG/1
50 TABLET ORAL DAILY
Qty: 60 TABLET | Refills: 3 | Status: SHIPPED | OUTPATIENT
Start: 2021-04-15 | End: 2022-03-02 | Stop reason: SDUPTHER

## 2021-04-15 RX ORDER — VITAMIN B COMPLEX
TABLET ORAL
Qty: 60 TABLET | Refills: 3 | Status: SHIPPED | OUTPATIENT
Start: 2021-04-15 | End: 2021-09-24

## 2021-04-15 RX ORDER — ATORVASTATIN CALCIUM 80 MG/1
80 TABLET, FILM COATED ORAL DAILY
Qty: 60 TABLET | Refills: 3 | Status: SHIPPED | OUTPATIENT
Start: 2021-04-15 | End: 2021-12-15

## 2021-04-15 RX ORDER — HYDROCHLOROTHIAZIDE 25 MG/1
25 TABLET ORAL DAILY
Qty: 60 TABLET | Refills: 3 | Status: SHIPPED | OUTPATIENT
Start: 2021-04-15 | End: 2022-03-02 | Stop reason: SDUPTHER

## 2021-04-15 RX ORDER — THIAMINE MONONITRATE (VIT B1) 100 MG
100 TABLET ORAL DAILY
Qty: 30 TABLET | Refills: 3 | Status: SHIPPED | OUTPATIENT
Start: 2021-04-15

## 2021-04-15 ASSESSMENT — PATIENT HEALTH QUESTIONNAIRE - PHQ9: SUM OF ALL RESPONSES TO PHQ QUESTIONS 1-9: 0

## 2021-04-15 NOTE — PROGRESS NOTES
Attending Physician Statement  I have discussed the care of [place] [id number] including pertinent history and exam findings,  with the resident. I have reviewed the key elements of all parts of the encounter with the resident. I agree with the assessment, plan and orders as documented by the resident.   (GE Modifier)    MD JANIA Mata  Attending Physician, [address] [place] [address], Internal Medicine Residency Program  [address] [place] [address]  4/15/2021, 2:57 PM

## 2021-04-15 NOTE — PROGRESS NOTES
[facility] IM [address] [address]  Dept: [phone]  Dept Fax: [phone]    Office Progress/Follow Up Note  Date ofpatient's visit: 4/15/2021  Patient's Name:  Danika Alvarenga YOB: 1955            Patient Care Team:  Nael Cook MD as PCP - General (Internal Medicine)  Trini Linda RN as Nurse Navigator  Andreina Hess DPM (Podiatry)  ================================================================    REASON FOR VISIT/CHIEF COMPLAINT:  3 Month Follow-Up (HTN)    HISTORY OF PRESENTING ILLNESS:  History was obtained from: patient, electronic medical record. Cheryl Gary a 77 y.o. is here for a follow-up. The patient has a history of hypertension for which she is on Norvasc 10 mg daily, losartan 50 mg and hydrochlorothiazide 25 mg daily. Patient's last echo was in 2018 which was normal.    The patient has a history of stroke in 2011. She follows neurology as outpatient. Her last lipid panel was in September 2020 which showed cholesterol of 165, HDL of 64 and LDL of 85. LDL goal should be less than 70. The patient is on aspirin 81 and Lipitor 40 mg daily and Plavix 75. The patient also has microcytic anemia secondary to iron deficiency anemia and follows hematology oncology as outpatient. The patient also has alpha thalassemia trait. The patient is on thiamine and folic acid. Her last iron profile was normal with elevated ferritin in February 2021. The patient has morbid obesity with a BMI of 57. Patient refuses bariatric surgery today and plans to discuss it in the next visit. She had an EGD in August 2019 which showed erosive gastritis. The patient also has obstructive sleep apnea with sleep study in 2019. She uses CPAP at home. The patient has cervical stenosis on MRI in 2019 as well as cervical radiculopathy. Her cervical MRI showed moderate stenosis.   The patient has refused surgery in the past.    Regarding health maintenance screening, the patient had a negative lung cancer screening in January 2021. The patient had a negative breast cancer screening in November 2020. The patient had a negative Pap smear in 2016. The patient had a negative DEXA scan in 2018. The patient had colon cancer screening in August 2019 which showed suboptimal preparation with 2 polyps which were sessile serrated polyps. She is recommended to have a repeat colonoscopy in 3 years which would be next year. Patient Active Problem List   Diagnosis    Essential hypertension    Pure hypercholesterolemia    Cerebrovascular accident (CVA), 2011, no residual deficit (HCC)    CKD (chronic kidney disease) stage 3, GFR 30-59 ml/min (HCC)    Peripheral vascular disease ([address]    TIBURCIO (obstructive sleep apnea)    Class 3 severe obesity due to excess calories with serious comorbidity and body mass index (BMI) of 50.0 to 59.9 in [facility])    Primary osteoarthritis of left knee    Chronic tension-type headache, not intractable    Carpal tunnel syndrome, bilateral-not on medication due to kidney disease    Microcytic anemia    Alpha thalassemia trait    Cervical stenosis of spinal canal       There are no preventive care reminders to display for this patient. Allergies   Allergen Reactions    Duricef [Cefadroxil Monohydrate]     Fish-Derived Products     Pcn [Penicillins]     Tomato          Current Outpatient Medications   Medication Sig Dispense Refill    losartan (COZAAR) 50 MG tablet TAKE 1 TABLET DAILY 28 tablet 3    hydroCHLOROthiazide (HYDRODIURIL) 25 MG tablet TAKE 1 TABLET DAILY 28 tablet 3    Thiamine HCl (VITAMIN B-1 PO) Take by mouth daily      clopidogrel (PLAVIX) 75 MG tablet TAKE 1 TABLET BY MOUTH IN THE MORNING 30 tablet 3    ammonium lactate (LAC-HYDRIN) 12 % lotion Apply topically daily.  (Patient taking differently: as needed As needed) 1 Bottle 2    amLODIPine (NORVASC) 10 MG tablet Take 1 tablet by mouth daily 60 tablet 3    aspirin (ASPIRIN LOW DOSE) 81 MG EC tablet TAKE 1 TABLET DAILY 60 tablet 3    atorvastatin (LIPITOR) 40 MG tablet Take 1 tablet by mouth daily 60 tablet 3    folic acid (FOLVITE) 1 MG tablet Take 1 tablet by mouth daily 60 tablet 3    Vitamin D (CHOLECALCIFEROL) 25 MCG (1000 UT) TABS tablet TAKE 1 TABLET DAILY 60 tablet 3    diclofenac sodium (VOLTAREN) 1 % GEL Apply 4 g topically 4 times daily 4 Tube 1    acetaminophen (SM PAIN RELIEVER) 325 MG tablet TAKE 2 TABLETS BY MOUTH EVERY 4 HOURS AS NEEDED FOR PAIN (Patient not taking: Reported on 4/15/2021) 60 tablet 3     No current facility-administered medications for this visit. Social History     Tobacco Use    Smoking status: Former Smoker     Packs/day: 2.00     Years: 35.00     Pack years: 70.00     Quit date: 10/11/2011     Years since quittin.5    Smokeless tobacco: Never Used   Substance Use Topics    Alcohol use:  Yes     Alcohol/week: 6.0 standard drinks     Types: 6 Cans of beer per week     Comment: rare    Drug use: No     Types: Cocaine, Marijuana     Comment: per pt did years ago; cocaine & marij, 17 denies current use       Family History   Problem Relation Age of Onset    High Blood Pressure Mother     Asthma Mother     Heart Disease Mother     Heart Disease Father     High Blood Pressure Father     Diabetes Brother     High Blood Pressure Brother     Heart Disease Brother     High Blood Pressure Maternal Grandmother     High Blood Pressure Maternal Grandfather     Heart Disease Maternal Grandfather         REVIEW OF SYSTEMS:  Review of Systems    PHYSICAL EXAM:  Vitals:    04/15/21 1345   BP: 127/80   Site: Left Upper Arm   Position: Sitting   Cuff Size: Medium Adult   Pulse: 102   Weight: (!) 367 lb (166.5 kg)   Height: 5' 7\" (1.702 m)     BP Readings from Last 3 Encounters:   04/15/21 127/80   21 (!) 143/78   21 133/76        Physical Exam      DIAGNOSTIC FINDINGS:  CBC:  Lab Results   Component Value Date    WBC 5.7 02/10/2021    HGB 10.3 02/10/2021     02/10/2021     04/05/2012       BMP:    Lab Results   Component Value Date     07/16/2020    K 3.7 07/16/2020    CL 99 07/16/2020    CO2 27 07/16/2020    BUN 27 07/16/2020    CREATININE 1.38 07/16/2020    GLUCOSE 79 07/16/2020    GLUCOSE 88 04/12/2012       HEMOGLOBIN A1C:   Lab Results   Component Value Date    LABA1C 5.4 06/07/2019       FASTING LIPID PANEL:  Lab Results   Component Value Date    CHOL 138 09/11/2019    HDL 64 09/29/2020    TRIG 96 09/11/2019       ASSESSMENT AND PLAN:  Lexy Webb was seen today for 3 month follow-up. Diagnoses and all orders for this visit:    Essential hypertension  Continue Norvasc, losartan hydrochlorothiazide. Pure hypercholesterolemia  The patient is on Lipitor 40 mg. The patient's goal LDL should be less than 70. Will increase the Lipitor to 80 mg.    Cerebrovascular accident (CVA), 2011, no residual deficit (HCC)  Continue to take aspirin and increased dose of Lipitor 80 mg.    Cervical stenosis of spinal canal  Patient  follows neurology as outpatient. Refuses surgery at this time. Stage 3a chronic kidney disease  Stable. TIBURCIO (obstructive sleep apnea)  Patient uses CPAP at home. Primary osteoarthritis of left knee  Diclofenac gel. Alpha thalassemia trait    Microcytic anemia  Continue supplementation. Stop iron tablets. Continue to follow pathology oncology as outpatient. Vitamin D deficiency  Resolved    Primary osteoarthritis of left knee    Class 3 severe obesity due to excess calories with serious comorbidity and body mass index (BMI) of 50.0 to 59.9 in adult [facility])  Will discuss bariatric surgery referral in the next visit. Patient refuses at this time. FOLLOW UP AND INSTRUCTIONS:  Return in about 3 months (around 7/15/2021).     · Filomena received counseling on the following healthy behaviors: nutrition, exercise and

## 2021-04-16 ENCOUNTER — OFFICE VISIT (OUTPATIENT)
Dept: PODIATRY | Age: 66
End: 2021-04-16
Payer: COMMERCIAL

## 2021-04-16 VITALS
WEIGHT: 293 LBS | HEIGHT: 67 IN | SYSTOLIC BLOOD PRESSURE: 140 MMHG | DIASTOLIC BLOOD PRESSURE: 73 MMHG | BODY MASS INDEX: 45.99 KG/M2

## 2021-04-16 DIAGNOSIS — E66.01 CLASS 3 SEVERE OBESITY WITH BODY MASS INDEX (BMI) OF 50.0 TO 59.9 IN ADULT, UNSPECIFIED OBESITY TYPE, UNSPECIFIED WHETHER SERIOUS COMORBIDITY PRESENT (HCC): ICD-10-CM

## 2021-04-16 DIAGNOSIS — R60.0 EDEMA OF LOWER EXTREMITY: ICD-10-CM

## 2021-04-16 DIAGNOSIS — L60.3 DYSTROPHIC NAIL: Primary | ICD-10-CM

## 2021-04-16 DIAGNOSIS — I73.9 PVD (PERIPHERAL VASCULAR DISEASE) (HCC): ICD-10-CM

## 2021-04-16 PROCEDURE — G0127 TRIM NAIL(S): HCPCS | Performed by: PODIATRIST

## 2021-04-16 PROCEDURE — 99212 OFFICE O/P EST SF 10 MIN: CPT | Performed by: STUDENT IN AN ORGANIZED HEALTH CARE EDUCATION/TRAINING PROGRAM

## 2021-04-16 PROCEDURE — 99213 OFFICE O/P EST LOW 20 MIN: CPT | Performed by: STUDENT IN AN ORGANIZED HEALTH CARE EDUCATION/TRAINING PROGRAM

## 2021-04-16 PROCEDURE — G0127 TRIM NAIL(S): HCPCS | Performed by: STUDENT IN AN ORGANIZED HEALTH CARE EDUCATION/TRAINING PROGRAM

## 2021-04-16 NOTE — PROGRESS NOTES
[address] [address] [address] [place]  Tel: [phone]   Fax: [phone]    Subjective     CC: long nails difficult to trim, leg swelling    HPI:  Brodie Martinez is a 77y.o. year old female who presents to clinic today for follow-up of elongated, painful nails that she states are difficult to trim. Presents in non supportive shoes. Admits to chronic swelling to both legs, with occasional burning and tingling to feet. Continues to not wear compression stockings as recommended as pt relates elevating legs controls swelling. No claudication pain. No new pedal complaints/pain. Denies diabetes. Last a1c  5.4 (6/7/19)    Primary care physician is Shayla Bosworth, MD.    ROS:    Constitutional: Denies nausea, vomiting, fever, chills. Neurologic: Denies numbness, tingling, and burning in the feet. Vascular: Denies symptoms of lower extremity claudication. Skin: Denies open wounds. Otherwise negative except as noted in the HPI.      PMH:  Past Medical History:   Diagnosis Date    Bleeding     while on aggrenox    Cataracts, bilateral     Cerebrovascular disease 2003,2011    cva    Chronic pain in left foot     CKD (chronic kidney disease) stage 3, GFR 30-59 ml/min     GERD (gastroesophageal reflux disease)     Hx of blood clots     Hyperlipidemia     Hypertension     Iron (Fe) deficiency anemia     Nicotine abuse     Obesity     Osteoarthritis     Peripheral vascular disease ([address] 3/13/2014    Substance abuse ([facility] [address]     cocaine, canabis    Thalassemia minor     Unspecified cerebral artery occlusion with cerebral infarction     Unspecified sleep apnea        Surgical History:   Past Surgical History:   Procedure Laterality Date    BREAST SURGERY      biopsy    COLONOSCOPY  12/2007    adenomatous polyp    COLONOSCOPY  2013    normal, repeat in 5 years    COLONOSCOPY N/A 8/21/2019    COLONOSCOPY POLYPECTOMY SNARE/COLD BIOPSY performed by Shaunna Pardo, MD at [address] [place] [address]     TONSILLECTOMY AND ADENOIDECTOMY      TUBAL LIGATION      UPPER GASTROINTESTINAL ENDOSCOPY  2009    negative    UPPER GASTROINTESTINAL ENDOSCOPY N/A 2019    EGD BIOPSY performed by Rocky Marroquin MD at [facility]       Social History:  Social History     Tobacco Use    Smoking status: Former Smoker     Packs/day: 2.00     Years: 35.00     Pack years: 70.00     Quit date: 10/11/2011     Years since quittin.5    Smokeless tobacco: Never Used   Substance Use Topics    Alcohol use: Yes     Alcohol/week: 6.0 standard drinks     Types: 6 Cans of beer per week     Comment: rare    Drug use: No     Types: Cocaine, Marijuana     Comment: per pt did years ago; cocaine & marij, 17 denies current use       Medications:  Prior to Admission medications    Medication Sig Start Date End Date Taking?  Authorizing Provider   clopidogrel (PLAVIX) 75 MG tablet Take 1 tablet by mouth daily 4/15/21  Yes Isai June MD   amLODIPine (NORVASC) 10 MG tablet Take 1 tablet by mouth daily 4/15/21  Yes Isai June MD   hydroCHLOROthiazide (HYDRODIURIL) 25 MG tablet Take 1 tablet by mouth daily 4/15/21  Yes Isai June MD   losartan (COZAAR) 50 MG tablet Take 1 tablet by mouth daily 4/15/21  Yes Isai June MD   aspirin (ASPIRIN LOW DOSE) 81 MG EC tablet TAKE 1 TABLET DAILY 4/15/21  Yes Isai June MD   atorvastatin (LIPITOR) 80 MG tablet Take 1 tablet by mouth daily 4/15/21  Yes Isai June MD   folic acid (FOLVITE) 1 MG tablet Take 1 tablet by mouth daily 4/15/21  Yes Isai June MD   Vitamin D (CHOLECALCIFEROL) 25 MCG (1000 UT) TABS tablet TAKE 1 TABLET DAILY 4/15/21  Yes Isai June MD   vitamin B-1 (THIAMINE) 100 MG tablet Take 1 tablet by mouth daily 4/15/21  Yes Isai June MD   Thiamine HCl (VITAMIN B-1 PO) Take by mouth daily  21 Yes Historical Provider, MD   ammonium lactate (LAC-HYDRIN) 12 % lotion Apply topically daily. Patient taking differently: as needed As needed 10/9/20  Yes Dyllan Garcia DPM   diclofenac sodium (VOLTAREN) 1 % GEL Apply 4 g topically 4 times daily 6/28/20  Yes Wilber John DO   acetaminophen (SM PAIN RELIEVER) 325 MG tablet TAKE 2 TABLETS BY MOUTH EVERY 4 HOURS AS NEEDED FOR PAIN 11/27/19  Yes Indy Guerin MD       Objective     Vitals:    04/16/21 1359   BP: (!) 140/73       Lab Results   Component Value Date    LABA1C 5.4 06/07/2019       Physical Exam:  General:  Alert and oriented x3. In no acute distress. Lower Extremity Physical Exam:    Vascular: DP/PT pulses non palpable, but biphasic on doppler. Bilateral. CFT <5 seconds to all digits, Bilateral.  nonpitting edema, Bilateral LE. Hair growth is absent to the level of the digits, Bilateral.     Neuro: Saph/sural/SP/DP/plantar sensation intact to light touch. Protective sensation is intact to 10/10 sites as tested with a 5.07g SWMF, Bilateral.     Musculoskeletal: EHL/FHL/GS/TA gross motor intact. Tenderness to palpation absent. Decreased medial arch noted, b/l. Decreased ROM b/l kojo joint. Contracted digits 2-5 b/l. Abducted hallux noted b/l with medial eminence with right worse than left. Neg pain calf squeeze ble. All LE m. Compartments soft and compressible. Dermatologic: No open lesions, Bilateral.  Interdigital maceration absent, Bilateral.  Nails 1-5are elongated, discolored, dystrophic b/l Skin is dry with no scaling or peeling.        Class A Findings (Q7) - One Class A finding  [] Non traumatic amputation of foot or integral skeletal portion thereof  Class B Findings (Q8) - Two Class B findings  [x]Absent posterior tibial pulse   [x]Absent dorsalis pedis pulse   []Advanced trophic changes; three of the following are required:   []hair growth (decrease or absence)   []nail changes (thickening)   []pigmentary changes (discoloration)   []skin texture (thin, shiny)   []skin color (rubor or redness)  Class C Findings (Q9) -

## 2021-04-16 NOTE — PROGRESS NOTES
Patient instructed to remove shoes and socks and instructed to sit in exam chair. Current PCP is Lefty Santacruz MD and date of last visit was 04/15/2021. Do you have a follow up visit scheduled?   No  If yes, the date is

## 2021-07-14 ENCOUNTER — TELEPHONE (OUTPATIENT)
Dept: INTERNAL MEDICINE | Age: 66
End: 2021-07-14

## 2021-07-14 NOTE — LETTER
[address]  [address] [address]  Phone: [phone]  Fax: [phone]    Aniceto Yeh MD        July 14, 2021    [address]  [address] [place] [address]      Dear Sindhu Padilla: This letter is a reminder that you may have diagnostic testing that has not been completed. It is important to your well-being that these test(s) are performed. Please find the outstanding test(s) attached. If you could please have these completed before your next appointment. You can have the test completed at any [facility] facility or Lab. Please see the order for scheduling instructions. Any testing that needs completed other than blood work or xray's please call [phone] to schedule an appointment. Otherwise can be done at any [facility]. Please call our office at Dept: [phone] for additional information on the outstanding tests or let us know if they have been completed so we may update your chart. If you have any questions or concerns, please don't hesitate to call.     Sincerely,        Aniceto Yeh MD

## 2021-07-16 ENCOUNTER — OFFICE VISIT (OUTPATIENT)
Dept: PODIATRY | Age: 66
End: 2021-07-16
Payer: COMMERCIAL

## 2021-07-16 VITALS
HEIGHT: 67 IN | DIASTOLIC BLOOD PRESSURE: 84 MMHG | SYSTOLIC BLOOD PRESSURE: 163 MMHG | WEIGHT: 293 LBS | BODY MASS INDEX: 45.99 KG/M2

## 2021-07-16 DIAGNOSIS — E66.01 CLASS 3 SEVERE OBESITY WITH BODY MASS INDEX (BMI) OF 50.0 TO 59.9 IN ADULT, UNSPECIFIED OBESITY TYPE, UNSPECIFIED WHETHER SERIOUS COMORBIDITY PRESENT (HCC): ICD-10-CM

## 2021-07-16 DIAGNOSIS — I73.9 PVD (PERIPHERAL VASCULAR DISEASE) (HCC): Primary | ICD-10-CM

## 2021-07-16 DIAGNOSIS — R60.0 EDEMA OF LOWER EXTREMITY: ICD-10-CM

## 2021-07-16 DIAGNOSIS — G60.9 IDIOPATHIC NEUROPATHY: ICD-10-CM

## 2021-07-16 PROCEDURE — 99212 OFFICE O/P EST SF 10 MIN: CPT | Performed by: STUDENT IN AN ORGANIZED HEALTH CARE EDUCATION/TRAINING PROGRAM

## 2021-07-16 PROCEDURE — G0127 TRIM NAIL(S): HCPCS | Performed by: PODIATRIST

## 2021-07-16 PROCEDURE — 11055 PARING/CUTG B9 HYPRKER LES 1: CPT | Performed by: STUDENT IN AN ORGANIZED HEALTH CARE EDUCATION/TRAINING PROGRAM

## 2021-07-16 NOTE — PROGRESS NOTES
[address] [address] [facility],  [address]  Tel: [phone]   Fax: [phone]    Subjective     CC: nail care      HPI:  Patricio Kim is a 74y.o. year old female who presents to clinic today for follow-up of elongated, painful nails that she states are difficult to trim. Presents in non supportive shoes. Admits to chronic swelling to both legs, with occasional burning and tingling to feet and \"pins and needles\" sensation to the feet and hands. Continues to not wear compression stockings as recommended as pt relates elevating legs controls swelling. No claudication pain. No new pedal complaints/pain. Denies diabetes. Last a1c  5.4 (6/7/19)    Primary care physician is Yuli Pérez MD.    ROS:    Constitutional: Denies nausea, vomiting, fever, chills. Neurologic: Denies numbness, tingling, and burning in the feet. Vascular: Denies symptoms of lower extremity claudication. Skin: Denies open wounds. Otherwise negative except as noted in the HPI.      PMH:  Past Medical History:   Diagnosis Date    Bleeding     while on aggrenox    Cataracts, bilateral     Cerebrovascular disease 2003,2011    cva    Chronic pain in left foot     CKD (chronic kidney disease) stage 3, GFR 30-59 ml/min ([facility])     GERD (gastroesophageal reflux disease)     Hx of blood clots     Hyperlipidemia     Hypertension     Iron (Fe) deficiency anemia     Nicotine abuse     Obesity     Osteoarthritis     Peripheral vascular disease ([facility] [address] 3/13/2014    Substance abuse ([facility] [address]     cocaine, canabis    Thalassemia minor     Unspecified cerebral artery occlusion with cerebral infarction     Unspecified sleep apnea        Surgical History:   Past Surgical History:   Procedure Laterality Date    BREAST SURGERY      biopsy    COLONOSCOPY  12/2007    adenomatous polyp    COLONOSCOPY  2013    normal, repeat in 5 years    COLONOSCOPY N/A 8/21/2019    COLONOSCOPY POLYPECTOMY SNARE/COLD BIOPSY performed by Pablo Major MD at [address] [facility] GASTROINTESTINAL ENDOSCOPY  2009    negative    UPPER GASTROINTESTINAL ENDOSCOPY N/A 2019    EGD BIOPSY performed by Pablo Major MD at [facility]       Social History:  Social History     Tobacco Use    Smoking status: Former Smoker     Packs/day: 2.00     Years: 35.00     Pack years: 70.00     Quit date: 10/11/2011     Years since quittin.7    Smokeless tobacco: Never Used   Vaping Use    Vaping Use: Never used   Substance Use Topics    Alcohol use: Yes     Alcohol/week: 6.0 standard drinks     Types: 6 Cans of beer per week     Comment: rare    Drug use: No     Types: Cocaine, Marijuana     Comment: per pt did years ago; cocaine & marij, 17 denies current use       Medications:  Prior to Admission medications    Medication Sig Start Date End Date Taking? Authorizing Provider   clopidogrel (PLAVIX) 75 MG tablet Take 1 tablet by mouth daily 4/15/21  Yes Senora Later, MD   amLODIPine (NORVASC) 10 MG tablet Take 1 tablet by mouth daily 4/15/21  Yes Senora Later, MD   hydroCHLOROthiazide (HYDRODIURIL) 25 MG tablet Take 1 tablet by mouth daily 4/15/21  Yes Senora Later, MD   losartan (COZAAR) 50 MG tablet Take 1 tablet by mouth daily 4/15/21  Yes Senora Later, MD   aspirin (ASPIRIN LOW DOSE) 81 MG EC tablet TAKE 1 TABLET DAILY 4/15/21  Yes Senora Later, MD   atorvastatin (LIPITOR) 80 MG tablet Take 1 tablet by mouth daily 4/15/21  Yes Senora Later, MD   folic acid (FOLVITE) 1 MG tablet Take 1 tablet by mouth daily 4/15/21  Yes Senora Later, MD   Vitamin D (CHOLECALCIFEROL) 25 MCG (1000 UT) TABS tablet TAKE 1 TABLET DAILY 4/15/21  Yes Senora Later, MD   vitamin B-1 (THIAMINE) 100 MG tablet Take 1 tablet by mouth daily 4/15/21  Yes Senora Later, MD   ammonium lactate (LAC-HYDRIN) 12 % lotion Apply topically daily.   Patient taking differently: as needed As needed 10/9/20  Yes Noah Bynum DPM   diclofenac sodium (VOLTAREN) 1 % GEL Apply 4 g topically 4 times daily 6/28/20  Yes Wilber John DO   acetaminophen (SM PAIN RELIEVER) 325 MG tablet TAKE 2 TABLETS BY MOUTH EVERY 4 HOURS AS NEEDED FOR PAIN 11/27/19  Yes Yuli Pérez MD   Thiamine HCl (VITAMIN B-1 PO) Take by mouth daily  4/16/21  Historical Provider, MD       Objective     Vitals:    07/16/21 1332   BP: (!) 163/84       Lab Results   Component Value Date    LABA1C 5.4 06/07/2019       Physical Exam:  General:  Alert and oriented x3. In no acute distress. Lower Extremity Physical Exam:    Vascular: DP/PT pulses non palpable, but biphasic on doppler. Bilateral. CFT <5 seconds to all digits, Bilateral.  Significant nonpitting edema, Bilateral .  Hair growth is absent to the level of the digits, Bilateral.     Neuro: Saph/sural/SP/DP/plantar sensation diminished to light touch. Protective sensation is intact to 2/10 sites as tested with a 5.07g SWMF, Bilateral.     Musculoskeletal: EHL/FHL/GS/TA gross motor intact. Decreased medial arch noted, b/l. Decreased ROM b/l kojo joint. Contracted digits 2-5 b/l. Abducted hallux noted b/l with medial eminence with right worse than left. Neg pain calf squeeze ble. All LE m. Compartments soft and compressible. Dermatologic: No open lesions, Bilateral.  Interdigital maceration absent, Bilateral.  Nails 1-5 are elongated. Hair growth is absent with atrophic skin extending distally past the ankle. Hyperkeratotic lesion noted to medial eminence of left foot.     Class A Findings (Q7) - One Class A finding  [] Non traumatic amputation of foot or integral skeletal portion thereof  Class B Findings (Q8) - Two Class B findings  [x]Absent posterior tibial pulse   [x]Absent dorsalis pedis pulse   [x]Advanced trophic changes; three of the following are required:   [x]hair growth (decrease or absence)   []nail changes (thickening)   [x]pigmentary changes (discoloration)   [x]skin texture (thin, shiny)   []skin color (rubor or redness)  Class C Findings (Q9) - One Class B and two Class C findings  []Claudication   []Temperature changes   [x]Edema   [x]Paresthesia   [x]Burning    Q Modifier Met: Q8: One Class B and two Class C findings      Assessment   Olinda Jeter is a 74 y.o. female with     Diagnosis Orders   1. PVD (peripheral vascular disease) ([facility])     2. Class 3 severe obesity with body mass index (BMI) of 50.0 to 59.9 in adult, unspecified obesity type, unspecified whether serious comorbidity present ([facility])     3. Edema of lower extremity     4. Idiopathic neuropathy          Plan   · Patient examined and evaluated for routine nail care. · Diagnosis and treatment options discussed in detail  · Trimmed dystrophic nails 1-5 b/l with sterile nail nippers  Debrided hyperkeratotic tissue as noted above without incident. Educated patient to monitor for signs of claudication including rest pain or cramping of the calf muscles  · Educated patient on signs of infection and to report to ED if arise  · Patient to RTC in 3 month(s) or sooner if problems arise  · Please, call the office with any questions or concerns     No orders of the defined types were placed in this encounter. No orders of the defined types were placed in this encounter.

## 2021-07-16 NOTE — PROGRESS NOTES
Patient instructed to remove shoes and socks and instructed to sit in exam chair. Current PCP is Rosalio Moore MD and date of last visit was 4/15/21. Do you have a follow up visit scheduled?   No  If yes, the date is n/a

## 2021-07-22 ENCOUNTER — HOSPITAL ENCOUNTER (OUTPATIENT)
Age: 66
Setting detail: SPECIMEN
Discharge: HOME | End: 2021-07-22
Payer: COMMERCIAL

## 2021-07-22 DIAGNOSIS — E55.9 VITAMIN D DEFICIENCY: ICD-10-CM

## 2021-07-22 LAB — VITAMIN D 25-HYDROXY: 38.6 NG/ML (ref 30–100)

## 2021-07-22 PROCEDURE — 36415 COLL VENOUS BLD VENIPUNCTURE: CPT

## 2021-07-22 PROCEDURE — 82306 VITAMIN D 25 HYDROXY: CPT

## 2021-07-23 DIAGNOSIS — I63.00 CEREBROVASCULAR ACCIDENT (CVA) DUE TO THROMBOSIS OF PRECEREBRAL ARTERY (HCC): ICD-10-CM

## 2021-07-31 RX ORDER — CLOPIDOGREL BISULFATE 75 MG/1
75 TABLET ORAL DAILY
Qty: 28 TABLET | Refills: 3 | Status: SHIPPED | OUTPATIENT
Start: 2021-07-31 | End: 2021-11-12

## 2021-09-02 ENCOUNTER — HOSPITAL ENCOUNTER (OUTPATIENT)
Facility: MEDICAL CENTER | Age: 66
End: 2021-09-02
Payer: COMMERCIAL

## 2021-09-07 ENCOUNTER — HOSPITAL ENCOUNTER (OUTPATIENT)
Facility: MEDICAL CENTER | Age: 66
Discharge: HOME | End: 2021-09-07
Payer: COMMERCIAL

## 2021-09-07 DIAGNOSIS — E61.1 IRON DEFICIENCY: ICD-10-CM

## 2021-09-07 DIAGNOSIS — R79.89 ELEVATED FERRITIN: ICD-10-CM

## 2021-09-07 DIAGNOSIS — D56.3 THALASSEMIA TRAIT, ALPHA: ICD-10-CM

## 2021-09-07 DIAGNOSIS — D50.9 MICROCYTIC ANEMIA: Primary | ICD-10-CM

## 2021-09-07 DIAGNOSIS — D50.9 MICROCYTIC ANEMIA: ICD-10-CM

## 2021-09-07 DIAGNOSIS — Z87.891 PERSONAL HISTORY OF NICOTINE DEPENDENCE: ICD-10-CM

## 2021-09-07 DIAGNOSIS — E66.01 MORBID OBESITY WITH BMI OF 50.0-59.9, ADULT (HCC): ICD-10-CM

## 2021-09-07 LAB
ABSOLUTE EOS #: 0.27 K/UL (ref 0–0.44)
ABSOLUTE IMMATURE GRANULOCYTE: 0.04 K/UL (ref 0–0.3)
ABSOLUTE LYMPH #: 1.59 K/UL (ref 1.1–3.7)
ABSOLUTE MONO #: 0.49 K/UL (ref 0.1–1.2)
BASOPHILS # BLD: 1 % (ref 0–2)
BASOPHILS ABSOLUTE: 0.05 K/UL (ref 0–0.2)
DIFFERENTIAL TYPE: ABNORMAL
EOSINOPHILS RELATIVE PERCENT: 5 % (ref 1–4)
FERRITIN: 684 UG/L (ref 13–150)
FOLATE: >20 NG/ML
HCT VFR BLD CALC: 34 % (ref 36.3–47.1)
HEMOGLOBIN: 10.1 G/DL (ref 11.9–15.1)
IMMATURE GRANULOCYTES: 1 %
IRON SATURATION: 27 % (ref 20–55)
IRON: 57 UG/DL (ref 37–145)
LYMPHOCYTES # BLD: 27 % (ref 24–43)
MCH RBC QN AUTO: 21.7 PG (ref 25.2–33.5)
MCHC RBC AUTO-ENTMCNC: 29.7 G/DL (ref 28.4–34.8)
MCV RBC AUTO: 73.1 FL (ref 82.6–102.9)
MONOCYTES # BLD: 8 % (ref 3–12)
NRBC AUTOMATED: 0 PER 100 WBC
PDW BLD-RTO: 17.6 % (ref 11.8–14.4)
PLATELET # BLD: 324 K/UL (ref 138–453)
PLATELET ESTIMATE: ABNORMAL
PMV BLD AUTO: 9.6 FL (ref 8.1–13.5)
RBC # BLD: 4.65 M/UL (ref 3.95–5.11)
RBC # BLD: ABNORMAL 10*6/UL
SEG NEUTROPHILS: 58 % (ref 36–65)
SEGMENTED NEUTROPHILS ABSOLUTE COUNT: 3.43 K/UL (ref 1.5–8.1)
TOTAL IRON BINDING CAPACITY: 210 UG/DL (ref 250–450)
UNSATURATED IRON BINDING CAPACITY: 153 UG/DL (ref 112–347)
VITAMIN B-12: 770 PG/ML (ref 232–1245)
WBC # BLD: 5.9 K/UL (ref 3.5–11.3)
WBC # BLD: ABNORMAL 10*3/UL

## 2021-09-07 PROCEDURE — 36415 COLL VENOUS BLD VENIPUNCTURE: CPT

## 2021-09-07 PROCEDURE — 83550 IRON BINDING TEST: CPT

## 2021-09-07 PROCEDURE — 85025 COMPLETE CBC W/AUTO DIFF WBC: CPT

## 2021-09-07 PROCEDURE — 83540 ASSAY OF IRON: CPT

## 2021-09-07 PROCEDURE — 82607 VITAMIN B-12: CPT

## 2021-09-07 PROCEDURE — 82746 ASSAY OF FOLIC ACID SERUM: CPT

## 2021-09-07 PROCEDURE — 82728 ASSAY OF FERRITIN: CPT

## 2021-09-07 NOTE — PROGRESS NOTES
WRITER INFORMED PT IN LAB TO HAVE LAB DRAWN PRIOR TO MD FOLLOW UP 09/13/21    REVIEWED MD NOTES TO CONFIRM ORDERS. PEND TO MD FOR CO SIGN.

## 2021-09-13 ENCOUNTER — OFFICE VISIT (OUTPATIENT)
Dept: ONCOLOGY | Age: 66
End: 2021-09-13
Payer: COMMERCIAL

## 2021-09-13 DIAGNOSIS — D50.9 MICROCYTIC ANEMIA: ICD-10-CM

## 2021-09-13 PROCEDURE — 99999 PR OFFICE/OUTPT VISIT,PROCEDURE ONLY: CPT | Performed by: INTERNAL MEDICINE

## 2021-09-14 ENCOUNTER — HOSPITAL ENCOUNTER (OUTPATIENT)
Facility: MEDICAL CENTER | Age: 66
End: 2021-09-14
Payer: COMMERCIAL

## 2021-09-15 NOTE — PATIENT INSTRUCTIONS
Patient Education        Toenail Fungus: Care Instructions  Overview  A nail that is infected by a fungus usually turns white or yellow. As the fungus spreads, the nail turns a darker color and gets thicker. And the nail edges start to turn ragged and crumble. A bad infection can cause pain, and the nail may pull away from the toe or finger. Nails that are exposed to moisture and warmth a lot are more likely to get infected by a fungus. This can happen from wearing sweaty shoes often and from walking barefoot on shower floors. Or it can happen if you share personal things, such as towels and nail clippers. It's hard to treat nail fungus. And the infection can return after it has cleared up. But medicines can sometimes get rid of nail fungus for good. If the infection is very bad, or if it causes a lot of pain, you may need to have the nail removed. Follow-up care is a key part of your treatment and safety. Be sure to make and go to all appointments, and call your doctor if you are having problems. It's also a good idea to know your test results and keep a list of the medicines you take. How can you care for yourself at home? · Take your medicines exactly as prescribed. Call your doctor if you have any problems with your medicine. · If your doctor gave you a cream or liquid to put on your nail, use it exactly as directed. · Wash your hands and feet often, and wash your hands after touching your feet. · Keep your nails clean and dry. Dry your feet completely after you bathe and before you put on shoes and socks. · Keep your nails trimmed. · Change socks often. Wear dry socks that absorb moisture. · Don't go barefoot in public places. · Use a spray or powder that fights fungus on your feet and in your shoes. · Don't pick at the skin around your nails. · Don't use nail polish or fake nails on your nails. · Don't share personal things, such as towels and nail clippers. When should you call for help? Call your doctor now or seek immediate medical care if:    · You have signs of infection, such as:  ? Increased pain, swelling, warmth, or redness. ? Red streaks leading from the site. ? Pus draining from the site. ? A fever.     · You have new or increased toe pain. Watch closely for changes in your health, and be sure to contact your doctor if:    · You do not get better as expected. Where can you learn more? Go to https://[employer]. org and sign in to your [employer] account. Enter D202 in the [employer] box to learn more about \"Toenail Fungus: Care Instructions. \"     If you do not have an account, please click on the \"Sign Up Now\" link. Current as of: March 3, 2021               Content Version: 12.9  © 4059-3448 [employer]wise, Inc[employer]. Care instructions adapted under license by [facility] [address] If you have questions about a medical condition or this instruction, always ask your healthcare professional. [place] [address] any warranty or liability for your use of this information.

## 2021-09-15 NOTE — PROGRESS NOTES
Patient instructed to remove shoes and socks and instructed to sit in exam chair. Current PCP is Elias Knapp MD and date of last visit was 4/15/21. Do you have a follow up visit scheduled?   Yes  If yes, the date is 9/29/21

## 2021-09-20 ENCOUNTER — OFFICE VISIT (OUTPATIENT)
Dept: ONCOLOGY | Age: 66
End: 2021-09-20
Payer: COMMERCIAL

## 2021-09-20 ENCOUNTER — TELEPHONE (OUTPATIENT)
Dept: ONCOLOGY | Age: 66
End: 2021-09-20

## 2021-09-20 VITALS
DIASTOLIC BLOOD PRESSURE: 74 MMHG | HEART RATE: 55 BPM | WEIGHT: 293 LBS | TEMPERATURE: 97.5 F | SYSTOLIC BLOOD PRESSURE: 146 MMHG | RESPIRATION RATE: 20 BRPM | BODY MASS INDEX: 59.69 KG/M2

## 2021-09-20 DIAGNOSIS — E66.01 MORBID OBESITY WITH BMI OF 50.0-59.9, ADULT (HCC): ICD-10-CM

## 2021-09-20 DIAGNOSIS — D56.3 THALASSEMIA TRAIT, ALPHA: ICD-10-CM

## 2021-09-20 DIAGNOSIS — D50.9 MICROCYTIC ANEMIA: Primary | ICD-10-CM

## 2021-09-20 DIAGNOSIS — R79.89 ELEVATED FERRITIN: ICD-10-CM

## 2021-09-20 PROCEDURE — 99211 OFF/OP EST MAY X REQ PHY/QHP: CPT | Performed by: INTERNAL MEDICINE

## 2021-09-20 PROCEDURE — 99214 OFFICE O/P EST MOD 30 MIN: CPT | Performed by: INTERNAL MEDICINE

## 2021-09-20 NOTE — PROGRESS NOTES
Today's Date: 9/20/2021  Patient Name: Ollie Mack  Patient's age: 74 y.o., 1955      Reason for Consult: management recommendations    CHIEF COMPLAINT:  Microcytic anemia    History Obtained From:  patient, electronic medical record    Interval history:    Patient presents to the clinic for a follow-up visit and to review results of lab work-up. Patient denies any hospitalization or ER visits since last office visit. Patient ferritin continues to be high. Denies any bleeding. Patient is off iron supplements now    During this visit patient's allergy, social, medical, surgical history and medications were reviewed and updated. HISTORY OF PRESENT ILLNESS:      The patient is a 74 y.o.  female who presents to the office to establish care and for further treatment evaluation and recommendations. Patient gives a long-standing history of microcytic anemia. According to the patient she was first diagnosed with anemia when she was pregnant and has been anemic since. Patient has had GI workup done in the past including colonoscopy which only revealed a polyp without any malignancy. Patient's blood workup in the past has shown iron deficiency but her latest iron studies showed sufficient iron stores, V46 and folic acid levels. Patient complains of fatigue at times. Denies noticing any gross bleeding. Patient is on iron supplement with one tablet a day. Patient peripheral blood smear showed microcytic anemia with reticulocytosis. Patient also had hemoglobin electrophoresis which came back within normal range. Patient denies any history of gastric bypass surgery.     Past Medical History:   has a past medical history of Bleeding, Cataracts, bilateral, Cerebrovascular disease, Chronic pain in left foot, CKD (chronic kidney disease) stage 3, GFR 30-59 ml/min (formerly [facility]), GERD (gastroesophageal reflux disease), Hx of blood clots, Hyperlipidemia, Hypertension, Iron (Fe) deficiency anemia, Nicotine abuse, Obesity, Osteoarthritis, Peripheral vascular disease ([facility] [address] Substance abuse ([facility] [address] Thalassemia minor, Unspecified cerebral artery occlusion with cerebral infarction, and Unspecified sleep apnea. Past Surgical History:   has a past surgical history that includes Tonsillectomy and adenoidectomy; Tubal ligation; Breast surgery; Endometrial biopsy (1998); Upper gastrointestinal endoscopy (2009); Colonoscopy (12/2007); Colonoscopy (2013); Upper gastrointestinal endoscopy (N/A, 8/21/2019); and Colonoscopy (N/A, 8/21/2019). Medications:    Prior to Admission medications    Medication Sig Start Date End Date Taking?  Authorizing Provider   clopidogrel (PLAVIX) 75 MG tablet TAKE 1 TABLET BY MOUTH DAILY 7/31/21  Yes Vicky Martinez MD   amLODIPine (NORVASC) 10 MG tablet Take 1 tablet by mouth daily 4/15/21  Yes Nata Garcia MD   hydroCHLOROthiazide (HYDRODIURIL) 25 MG tablet Take 1 tablet by mouth daily 4/15/21  Yes Nata Garcia MD   losartan (COZAAR) 50 MG tablet Take 1 tablet by mouth daily 4/15/21  Yes Nata Garcia MD   aspirin (ASPIRIN LOW DOSE) 81 MG EC tablet TAKE 1 TABLET DAILY 4/15/21  Yes Nata Garcia MD   atorvastatin (LIPITOR) 80 MG tablet Take 1 tablet by mouth daily 4/15/21  Yes Nata Garcia MD   folic acid (FOLVITE) 1 MG tablet Take 1 tablet by mouth daily 4/15/21  Yes Nata Garcia MD   Vitamin D (CHOLECALCIFEROL) 25 MCG (1000 UT) TABS tablet TAKE 1 TABLET DAILY 4/15/21  Yes Nata Garcia MD   vitamin B-1 (THIAMINE) 100 MG tablet Take 1 tablet by mouth daily 4/15/21  Yes Nata Garcia MD   diclofenac sodium (VOLTAREN) 1 % GEL Apply 4 g topically 4 times daily 6/28/20  Yes Wilber John DO   acetaminophen (SM PAIN RELIEVER) 325 MG tablet TAKE 2 TABLETS BY MOUTH EVERY 4 HOURS AS NEEDED FOR PAIN 11/27/19  Yes Brian Marin MD   Thiamine HCl (VITAMIN B-1 PO) Take by mouth daily  4/16/21  Historical Provider, MD     Current Outpatient Medications   Medication Sig Dispense Refill    clopidogrel (PLAVIX) 75 MG tablet TAKE 1 TABLET BY MOUTH DAILY 28 tablet 3    amLODIPine (NORVASC) 10 MG tablet Take 1 tablet by mouth daily 60 tablet 3    hydroCHLOROthiazide (HYDRODIURIL) 25 MG tablet Take 1 tablet by mouth daily 60 tablet 3    losartan (COZAAR) 50 MG tablet Take 1 tablet by mouth daily 60 tablet 3    aspirin (ASPIRIN LOW DOSE) 81 MG EC tablet TAKE 1 TABLET DAILY 60 tablet 3    atorvastatin (LIPITOR) 80 MG tablet Take 1 tablet by mouth daily 60 tablet 3    folic acid (FOLVITE) 1 MG tablet Take 1 tablet by mouth daily 60 tablet 3    Vitamin D (CHOLECALCIFEROL) 25 MCG (1000 UT) TABS tablet TAKE 1 TABLET DAILY 60 tablet 3    vitamin B-1 (THIAMINE) 100 MG tablet Take 1 tablet by mouth daily 30 tablet 3    diclofenac sodium (VOLTAREN) 1 % GEL Apply 4 g topically 4 times daily 4 Tube 1    acetaminophen (SM PAIN RELIEVER) 325 MG tablet TAKE 2 TABLETS BY MOUTH EVERY 4 HOURS AS NEEDED FOR PAIN 60 tablet 3     No current facility-administered medications for this visit. Allergies:  Duricef [cefadroxil monohydrate], Fish-derived products, Pcn [penicillins], and Tomato    Social History:   reports that she quit smoking about 9 years ago. She has a 70.00 pack-year smoking history. She has never used smokeless tobacco. She reports current alcohol use of about 6.0 standard drinks of alcohol per week. She reports that she does not use drugs. Family History: family history includes Asthma in her mother; Diabetes in her brother; Heart Disease in her brother, father, maternal grandfather, and mother; High Blood Pressure in her brother, father, maternal grandfather, maternal grandmother, and mother. REVIEW OF SYSTEMS:      Constitutional: No fever or chills.  No night sweats, no weight loss   Eyes: No eye discharge, double vision, or eye pain   HEENT: negative for sore mouth, sore throat, hoarseness and voice change   Respiratory: negative for cough , sputum, dyspnea, wheezing, hemoptysis, chest pain   Cardiovascular: negative for chest pain, dyspnea, palpitations, orthopnea, PND   Gastrointestinal: negative for nausea, vomiting, diarrhea, constipation, abdominal pain, Dysphagia, hematemesis and hematochezia   Genitourinary: negative for frequency, dysuria, nocturia, urinary incontinence, and hematuria   Integument: negative for rash, skin lesions, bruises.    Hematologic/Lymphatic: negative for easy bruising, bleeding, lymphadenopathy, or petechiae   Endocrine: negative for heat or cold intolerance,weight changes, change in bowel habits and hair loss   Musculoskeletal: negative for myalgias, arthralgias, pain, joint swelling,and bone pain   Neurological: negative for headaches, dizziness, seizures, weakness, numbness    PHYSICAL EXAM:        BP (!) 146/74   Pulse 55   Temp 97.5 °F (36.4 °C) (Temporal)   Resp 20   Wt (!) 381 lb 1.6 oz (172.9 kg)   BMI 59.69 kg/m²    General appearance - well appearing, no in pain or distress   Mental status - alert and cooperative   Eyes - pupils equal and reactive, extraocular eye movements intact   Ears - bilateral TM's and external ear canals normal   Mouth - mucous membranes moist, pharynx normal without lesions   Neck - supple, no significant adenopathy   Lymphatics - no palpable lymphadenopathy, no hepatosplenomegaly   Chest - clear to auscultation, no wheezes, rales or rhonchi, symmetric air entry   Heart - normal rate, regular rhythm, normal S1, S2, no murmurs  Abdomen - soft, nontender, nondistended, no masses or organomegaly   Neurological - alert, oriented, normal speech, no focal findings or movement disorder noted   Musculoskeletal - no joint tenderness, deformity or swelling   Extremities - peripheral pulses normal, no pedal edema, no clubbing or cyanosis   Skin - normal coloration and turgor, no rashes, no suspicious skin lesions noted ,      DATA:      Labs:     Results for orders placed or performed during the hospital encounter of 09/07/21   Ferritin   Result Value Ref Range    Ferritin 684 (H) 13 - 150 ug/L   Vitamin B12 & Folate   Result Value Ref Range    Vitamin B-12 770 232 - 1245 pg/mL    Folate >20.0 >4.8 ng/mL   Iron And TIBC   Result Value Ref Range    Iron 57 37 - 145 ug/dL    TIBC 210 (L) 250 - 450 ug/dL    Iron Saturation 27 20 - 55 %    UIBC 153 112 - 347 ug/dL   CBC Auto Differential   Result Value Ref Range    WBC 5.9 3.5 - 11.3 k/uL    RBC 4.65 3.95 - 5.11 m/uL    Hemoglobin 10.1 (L) 11.9 - 15.1 g/dL    Hematocrit 34.0 (L) 36.3 - 47.1 %    MCV 73.1 (L) 82.6 - 102.9 fL    MCH 21.7 (L) 25.2 - 33.5 pg    MCHC 29.7 28.4 - 34.8 g/dL    RDW 17.6 (H) 11.8 - 14.4 %    Platelets 520 482 - 361 k/uL    MPV 9.6 8.1 - 13.5 fL    NRBC Automated 0.0 0.0 per 100 WBC    Differential Type NOT REPORTED     Seg Neutrophils 58 36 - 65 %    Lymphocytes 27 24 - 43 %    Monocytes 8 3 - 12 %    Eosinophils % 5 (H) 1 - 4 %    Basophils 1 0 - 2 %    Immature Granulocytes 1 (H) 0 %    Segs Absolute 3.43 1.50 - 8.10 k/uL    Absolute Lymph # 1.59 1.10 - 3.70 k/uL    Absolute Mono # 0.49 0.10 - 1.20 k/uL    Absolute Eos # 0.27 0.00 - 0.44 k/uL    Basophils Absolute 0.05 0.00 - 0.20 k/uL    Absolute Immature Granulocyte 0.04 0.00 - 0.30 k/uL    WBC Morphology NOT REPORTED     RBC Morphology ANISOCYTOSIS PRESENT     Platelet Estimate NOT REPORTED      EGD and colonoscopy:    PREOPERATIVE DIAGNOSIS: iron deficiency anemia. PROCEDURES:   Transanal Colonoscopy with polypectomy (cold snare), polypectomy (cold biopsy). POSTPROCEDURE DIAGNOSIS:  Two polyps  Sub-optimal prep  Mild left sided diverticulosis      PREOPERATIVE DIAGNOSIS: Fe def anemia. PROCEDURES:   1) Transoral Upper Endoscopy, biopsy. POSTOPERATIVE DIAGNOSIS:  Erosive gastritis          Hgb Electrophoresis Interp 03/01/2019 10:37  [address] NORMAL Hb ELECTROPHORETIC PATTERN. THE % HbA2 IS NORMAL.   DECREASED MCV MAY BE OBSERVED WITH Fe DEFICIENCY AND/OR ALPHA THALASSEMIA TRAIT. SUGGEST Fe STUDIES. IMPRESSION:  NORMAL Hb ELECTROPHORETIC PATTERN WITH MICROCYTOSIS. Comment: HBA = 97.9%. HBA2 = 2.1%. MCV = 73.4 (82.6 .9). Date Complete:     3/1/2019     By:  Amy Decker M.D. Date Reported:     3/4/2019       INTERPRETATION     Peripheral Blood:          Microcytic hypochromic anemia with reticulocytosis consistent   with response to iron therapy for hypoproliferative anemia. Clinical   correlation is necessary. IMAGING DATA:    Xr Chest Standard (2 Vw)    Result Date: 3/1/2019  EXAMINATION: TWO VIEWS OF THE CHEST 3/1/2019 10:12 am COMPARISON: January 5, 2019, October 1, 2011 HISTORY: ORDERING SYSTEM PROVIDED HISTORY: Pneumonia of both lower lobes due to infectious organism [facility]) TECHNOLOGIST PROVIDED HISTORY: follow up on pneumonia resolution FINDINGS: The cardiomediastinal silhouette is unchanged in appearance. Mild cardiac silhouette enlargement. There is no consolidation, pneumothorax, or evidence of edema. No effusion is appreciated. The osseous structures are unchanged in appearance. Degenerative change in the midthoracic spine and chronic appearing mild vertebral body height loss is again noted. No acute airspace disease identified. Impression No mammographic findings of malignancy. BIRADS: BIRADS - CATEGORY 1   Negative, no evidence of malignancy. Normal interval follow-up is recommended in 12 months. OVERALL ASSESSMENT - NEGATIVE   A letter of notification will be sent to the patient regarding the results. The [employer] recommends annual mammograms for women 40 years and older.      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 Ferritin continues to be elevated likely secondary to patient's alpha thalassemia. Clinically no evidence of secondary hemochromatosis. Recommend continued surveillance  Continue surveillance of iron stores and hemoglobin  Patient counseled on active lifestyle and trying to achieve ideal body weight  Return to clinic in 6 months or sooner if clinically indicated. Aramis Irizarry MD            This note is created with the assistance of a speech recognition program.  While intending to generate a document that actually reflects the content of the visit, the document can still have some errors including those of syntax and sound a like substitutions which may escape proof reading. It such instances, actual meaning can be extrapolated by contextual diversion.

## 2021-09-20 NOTE — TELEPHONE ENCOUNTER
Keagan Cordova  MD VISIT  RV IN 6 MTHS W/ LABS 1 WK PRIOR  LAB ORDERS GIVEN TO PT ON EXIT  MD VISIT 3/21/22 @ 1:15PM  AVS PRINTED W/ ISNTRUCTIONS AND GIVEN TO PT ON EXIT

## 2021-09-23 ENCOUNTER — OFFICE VISIT (OUTPATIENT)
Dept: NEUROLOGY | Age: 66
End: 2021-09-23
Payer: COMMERCIAL

## 2021-09-23 VITALS
BODY MASS INDEX: 45.99 KG/M2 | DIASTOLIC BLOOD PRESSURE: 77 MMHG | HEIGHT: 67 IN | WEIGHT: 293 LBS | HEART RATE: 68 BPM | SYSTOLIC BLOOD PRESSURE: 131 MMHG

## 2021-09-23 DIAGNOSIS — I63.00 CEREBROVASCULAR ACCIDENT (CVA) DUE TO THROMBOSIS OF PRECEREBRAL ARTERY (HCC): Primary | ICD-10-CM

## 2021-09-23 PROCEDURE — 99214 OFFICE O/P EST MOD 30 MIN: CPT | Performed by: NURSE PRACTITIONER

## 2021-09-23 NOTE — PROGRESS NOTES
[facility]            Tyler Quiroga [address]          [address], [address] [facility]          Dept: [phone]          Dept Fax: [phone]        MD Jose Ramon Almeida MD Rikki Sessions, MD Victor Mohr, CNP            9/23/2021      HISTORY OF PRESENT ILLNESS:       I had the pleasure of seeing Radha Ortega, who returns for continuing neurologic care. The patient was seen last on February 23, 2021 for treatment of cervical stenosis, bilateral carpal tunnel syndrome and a history of two strokes affecting the bilateral cerebral hemispheres. The patient was previously seen by Dr. Patrice Hankins and all of the records and diagnostic studies were carefully reviewed. For secondary stroke prevention she is prescribed plavix 75 mg daily, aspirin 81 mg daily, folic acid 1 mg daily and lipitor 80 mg daily. The patients most recent lipid panel was completed in September 2020, her LDL at this time was 80. She is here today reporting she has residual hand weakness from her previous strokes. She notes she has not had any new neurologic symptoms since her last visit with Dr. Patrice Hankins. The patient has a history of bilateral carpal tunnel syndrome and cervical stenosis but declines surgical intervention. She notes she has been having increasing weakness of her bilateral hands but notes it is worsened on the left. She continues to decline surgical intervention as she reports she manages it with occasional use of tylenol. Testing reviewed:    MRI brain 5/22/2018  Possible isolated punctate infarct left frontal cortex near the vertex. Severe cystic encephalomalacia, left greater than right, vertex.            MRA head, neck 5/22/2018  SEVERELY MOTION LIMITED EXAM. RECOMMEND FOLLOW-UP CTA. Narrowed left common carotid artery and occluded or nearly occluded left internal carotid artery.  Decreased flow proximal right vertebral artery.     MRI cervical 6/21/2019  1. Moderate spinal canal stenosis at C5-C6 with mild-to-moderate spinal canal   stenosis at C4-C5 and C6-C7.  Minimal spinal canal stenosis at C3-C4. 2. Multilevel neural foraminal narrowing of the cervical spine as described   above. 3. No evidence of spondylolisthesis.         CTA head, neck 6/7/2018  Complete or near complete occlusion of the left internal carotid artery. Relatively normal left common carotid artery. Mild stenosis proximal right internal carotid artery. Moderate stenosis proximal right vertebral artery. Proximal left vertebral artery not well visualized.     EMG/NCV 10/11/2018  CTS L>R     2/2021  B12: 842  LDL 85  Ferritin 742  Hb 10.3     9/11/2020   B1: 63     6/18/2019  CRP 29.7  ESR 37    CT Head WO Contrast 6/2016  Impression   1. No acute intracranial abnormality. 2. There are large areas of encephalomalacia involving bilateral cerebral   hemispheres.  The areas of encephalomalacia in the right cerebral hemisphere   are new since the 10/01/2011 exam.     CT Head WO Contrast 10/2011     IMPRESSION:  OLD ISCHEMIC STROKE WITH RESULTANT ENCEPHALOMALACIA OF THE    LEFT FRONTAL LOBE.  NO ACUTE INTRACRANIAL ABNORMALITIES.          PAST MEDICAL HISTORY:         Diagnosis Date    Bleeding     while on aggrenox    Cataracts, bilateral     Cerebrovascular disease 2003,2011    cva    Chronic pain in left foot     CKD (chronic kidney disease) stage 3, GFR 30-59 ml/min ([facility])     GERD (gastroesophageal reflux disease)     Hx of blood clots     Hyperlipidemia     Hypertension     Iron (Fe) deficiency anemia     Nicotine abuse     Obesity     Osteoarthritis     Peripheral vascular disease ([address] 3/13/2014    Substance abuse ([facility])     cocaine, canabis    Thalassemia minor     Unspecified cerebral artery occlusion with cerebral infarction     Unspecified sleep apnea         PAST SURGICAL HISTORY:         Procedure Laterality Date    BREAST SURGERY      biopsy    COLONOSCOPY  2007    adenomatous polyp    COLONOSCOPY      normal, repeat in 5 years    COLONOSCOPY N/A 2019    COLONOSCOPY POLYPECTOMY SNARE/COLD BIOPSY performed by Kalani Mckeon MD at [address] [facility] GASTROINTESTINAL ENDOSCOPY      negative    UPPER GASTROINTESTINAL ENDOSCOPY N/A 2019    EGD BIOPSY performed by Kalani Mckeon MD at [facility]        SOCIAL HISTORY:     Social History     Socioeconomic History    Marital status:      Spouse name: Not on file    Number of children: Not on file    Years of education: Not on file    Highest education level: Not on file   Occupational History    Not on file   Tobacco Use    Smoking status: Former Smoker     Packs/day: 2.00     Years: 35.00     Pack years: 70.00     Quit date: 10/11/2011     Years since quittin.9    Smokeless tobacco: Never Used   Vaping Use    Vaping Use: Never used   Substance and Sexual Activity    Alcohol use: Yes     Alcohol/week: 6.0 standard drinks     Types: 6 Cans of beer per week     Comment: rare    Drug use: No     Types: Cocaine, Marijuana     Comment: per pt did years ago; cocaine & jasonj, 17 denies current use    Sexual activity: Yes     Partners: Male   Other Topics Concern    Not on file   Social History Narrative    Not on file     Social Determinants of Health     Financial Resource Strain:     Difficulty of Paying Living Expenses:    Food Insecurity:     Worried About Running Out of Food in the Last Year:     Ran Out of Food in the Last Year:    Transportation Needs:     Lack of Transportation (Medical):      Lack of Transportation (Non-Medical):    Physical Activity:     Days of Exercise per Week:     Minutes of Exercise per Session:    Stress:     Feeling of Stress :    Social Connections:     Frequency of Communication with Friends and Family:     Frequency of Social Gatherings with Friends and Family:     Attends Protestant Services:     Active Member of Clubs or Organizations:     Attends Club or Organization Meetings:     Marital Status:    Intimate Partner Violence:     Fear of Current or Ex-Partner:     Emotionally Abused:     Physically Abused:     Sexually Abused:        CURRENT MEDICATIONS:     Current Outpatient Medications   Medication Sig Dispense Refill    clopidogrel (PLAVIX) 75 MG tablet TAKE 1 TABLET BY MOUTH DAILY 28 tablet 3    amLODIPine (NORVASC) 10 MG tablet Take 1 tablet by mouth daily 60 tablet 3    hydroCHLOROthiazide (HYDRODIURIL) 25 MG tablet Take 1 tablet by mouth daily 60 tablet 3    losartan (COZAAR) 50 MG tablet Take 1 tablet by mouth daily 60 tablet 3    aspirin (ASPIRIN LOW DOSE) 81 MG EC tablet TAKE 1 TABLET DAILY 60 tablet 3    atorvastatin (LIPITOR) 80 MG tablet Take 1 tablet by mouth daily 60 tablet 3    folic acid (FOLVITE) 1 MG tablet Take 1 tablet by mouth daily 60 tablet 3    Vitamin D (CHOLECALCIFEROL) 25 MCG (1000 UT) TABS tablet TAKE 1 TABLET DAILY 60 tablet 3    vitamin B-1 (THIAMINE) 100 MG tablet Take 1 tablet by mouth daily 30 tablet 3    diclofenac sodium (VOLTAREN) 1 % GEL Apply 4 g topically 4 times daily 4 Tube 1    acetaminophen (SM PAIN RELIEVER) 325 MG tablet TAKE 2 TABLETS BY MOUTH EVERY 4 HOURS AS NEEDED FOR PAIN 60 tablet 3     No current facility-administered medications for this visit. ALLERGIES:     Allergies   Allergen Reactions    Duricef [Cefadroxil Monohydrate]     Fish-Derived Products     Pcn [Penicillins]     Tomato                                  REVIEW OF SYSTEMS        All items selected indicate a positive finding. Those items not selected are negative.   Constitutional [] Weight loss/gain   [] Fatigue  [] Fever/Chills   HEENT [] Hearing Loss  [] Visual Disturbance  [] Tinnitus  [] Eye pain   Respiratory [] Shortness of Breath  [] Cough  [] Snoring   Cardiovascular [] Chest Pain  [] Palpitations  [] Lightheaded   GI [] Constipation  [] Diarrhea  [] Swallowing change  [] Nausea/vomiting    [] Urinary Frequency  [] Urinary Urgency   Musculoskeletal [] Neck pain  [] Back pain  [] Muscle pain  [] Restless legs   Dermatologic [] Skin changes   Neurologic [] Memory loss/confusion  [] Seizures  [] Trouble walking or imbalance  [] Dizziness  [] Sleep disturbance  [] Weakness  [] Numbness  [] Tremors  [] Speech Difficulty  [] Headaches  [] Light Sensitivity  [] Sound Sensitivity   Endocrinology []Excessive thirst  []Excessive hunger   Psychiatric [] Anxiety/Depression  [] Hallucination   Allergy/immunology []Hives/environmental allergies   Hematologic/lymph [] Abnormal bleeding  [] Abnormal bruising         PHYSICAL EXAMINATION:       Vitals:    09/23/21 1310   BP: 131/77   Pulse: 68                                              .                                                                                                    General Appearance:  Alert, cooperative, no signs of distress, appears stated age   Head:  Normocephalic, no signs of trauma   Eyes:  Conjunctiva/corneas clear;  eyelids intact   Ears:  Normal external ear and canals   Nose: Nares normal, mucosa normal, no drainage    Throat: Lips and tongue normal; teeth normal;  gums normal   Neck: Supple, intact flexion, extension and rotation;   trachea midline;  no adenopathy;   thyroid: not enlarged;   no carotid pulse abnormality   Back:   Symmetric, no curvature, ROM adequate   Lungs:   Respirations unlabored   Heart:  Regular rate and rhythm           Extremities: Extremities normal, no cyanosis, no edema   Pulses: Symmetric over head and neck   Skin: Skin color, texture normal, no rashes, no lesions                                     NEUROLOGIC EXAMINATION    Neurologic Exam  Mental status    Alert and oriented x 3; intact memory with no confusion, speech or language problems; no hallucinations or delusions  Fund of information appropriate for level of education    Cranial nerves    II - visual fields intact to confrontation bilaterally  III, IV, VI - extra-ocular muscles full: no pupillary defect; no MICHAEL, no nystagmus, no ptosis   V - normal facial sensation                                                               VII - normal facial symmetry                                                             VIII - intact hearing                                                                             IX, X - symmetrical palate                                                                  XI - symmetrical shoulder shrug                                                       XII - tongue midline without atrophy or fasciculation      Motor function  Normal muscle bulk and tone; left  strength 4/5, no pronator drift      Sensory function Intact to light touch, pinprick, vibration, proprioception on all 4 extremities      Cerebellar Intact fine motor movement. No involuntary movements or tremors. No ataxia or dysmetria on finger to nose or heel to shin testing      Reflex function DTR 2+ on bilateral UE and LE, symmetric. Down going toes bilaterally      Gait                   normal base and arm swing                  Medical Decision Making: In summary, your patient, Pili De La O exhibits the following, with associated plan:    History of two strokes affecting the bilateral cerebral hemispheres. The patient had a a stroke involving the left cerebral hemisphere in 2011. She then had a repeat CT of her head in 2016 which showed areas of encephalomalacia in the bilateral cerebral hemispheres with the encephalomalacia involving the right hemisphere being new since the last CT in 2011.  Her last lipid panel was completed in September 2020 at which time she had a LDL of 85  Continue plavix 75 mg daily, aspirin 81 mg daily, lipitor 80 mg daily and folic acid 1 mg daily for secondary stroke prevention  Obtain a lipid panel, ALT, AST, and CBC  Continue to follow with hematology  Return for follow up visit on annual basis  Bilateral carpal tunnel syndrome  Continue to monitor  Cervical Stenosis  Patient declines surgical intervention            Signed: Gilbert David CNP      *Please note that portions of this note were completed with a voice recognition program.  Although every effort was made to insure the accuracy of this automated transcription, some errors in transcription may have occurred, occasionally words and are mis-transcribed    Provider Attestation: The documentation recorded by the scribe accurately reflects the service I personally performed and the decisions made by myself. Portions of this note were transcribed by a scribe. I personally performed the history, physical exam, and medical decision-making and confirm the accuracy of the information in the transcribed note. Scribe Attestation:   By signing my name below, Shiva Serrano, attest that this documentation has been prepared under the direction and in the presence of Gilbert David CNP.

## 2021-09-24 ENCOUNTER — OFFICE VISIT (OUTPATIENT)
Dept: PODIATRY | Age: 66
End: 2021-09-24
Payer: COMMERCIAL

## 2021-09-24 ENCOUNTER — HOSPITAL ENCOUNTER (OUTPATIENT)
Age: 66
Discharge: HOME | End: 2021-09-24
Payer: COMMERCIAL

## 2021-09-24 VITALS
HEIGHT: 67 IN | WEIGHT: 293 LBS | DIASTOLIC BLOOD PRESSURE: 77 MMHG | BODY MASS INDEX: 45.99 KG/M2 | SYSTOLIC BLOOD PRESSURE: 149 MMHG | HEART RATE: 81 BPM

## 2021-09-24 DIAGNOSIS — I63.00 CEREBROVASCULAR ACCIDENT (CVA) DUE TO THROMBOSIS OF PRECEREBRAL ARTERY (HCC): ICD-10-CM

## 2021-09-24 DIAGNOSIS — B35.1 ONYCHOMYCOSIS: Primary | ICD-10-CM

## 2021-09-24 DIAGNOSIS — G60.9 IDIOPATHIC NEUROPATHY: ICD-10-CM

## 2021-09-24 DIAGNOSIS — R60.0 EDEMA OF LOWER EXTREMITY: ICD-10-CM

## 2021-09-24 DIAGNOSIS — I73.9 PVD (PERIPHERAL VASCULAR DISEASE) (HCC): ICD-10-CM

## 2021-09-24 LAB
ALT SERPL-CCNC: 14 U/L (ref 5–33)
AST SERPL-CCNC: 18 U/L
CHOLESTEROL/HDL RATIO: 3
CHOLESTEROL: 148 MG/DL
HDLC SERPL-MCNC: 50 MG/DL
LDL CHOLESTEROL: 74 MG/DL (ref 0–130)
TRIGL SERPL-MCNC: 119 MG/DL
VLDLC SERPL CALC-MCNC: NORMAL MG/DL (ref 1–30)

## 2021-09-24 PROCEDURE — 36415 COLL VENOUS BLD VENIPUNCTURE: CPT

## 2021-09-24 PROCEDURE — 84450 TRANSFERASE (AST) (SGOT): CPT

## 2021-09-24 PROCEDURE — 11721 DEBRIDE NAIL 6 OR MORE: CPT | Performed by: PODIATRIST

## 2021-09-24 PROCEDURE — 84460 ALANINE AMINO (ALT) (SGPT): CPT

## 2021-09-24 PROCEDURE — 11056 PARNG/CUTG B9 HYPRKR LES 2-4: CPT | Performed by: PODIATRIST

## 2021-09-24 PROCEDURE — 99212 OFFICE O/P EST SF 10 MIN: CPT

## 2021-09-24 PROCEDURE — 80061 LIPID PANEL: CPT

## 2021-09-24 RX ORDER — VITAMIN B COMPLEX
TABLET ORAL
COMMUNITY
Start: 2021-07-29 | End: 2021-12-15

## 2021-09-24 NOTE — PROGRESS NOTES
[address] [facility],  [address]  Tel: [phone]   Fax: [phone]    Subjective     CC: Routine nail care    HPI:  Mariusz Garcia is a 74y.o. year old female who presents to clinic today for follow-up of thickened and discolored nails and states that she has a difficult time trimming them. Patient presents with nonsupportive shoes and admits to swelling to her bilateral feet with occasional burning and tingling sensations to her feet and hands. She states that she does not like to wear compression stockings at this they are very hard to put them on. She also complains of calluses noted to the medial aspect of the feet bilaterally. Patient at this time denies any other pedal complaints. Patient denies any nausea, vomiting, fever, chills or shortness of breath. Primary care physician is Vikas Garcia MD.    ROS:    Constitutional: Denies nausea, vomiting, fever, chills. Neurologic: Denies numbness, tingling, and burning in the feet. Vascular: Denies symptoms of lower extremity claudication. Skin: Denies open wounds. Otherwise negative except as noted in the HPI.      PMH:  Past Medical History:   Diagnosis Date    Bleeding     while on aggrenox    Cataracts, bilateral     Cerebrovascular disease 2003,2011    cva    Chronic pain in left foot     CKD (chronic kidney disease) stage 3, GFR 30-59 ml/min (HCC)     GERD (gastroesophageal reflux disease)     Hx of blood clots     Hyperlipidemia     Hypertension     Iron (Fe) deficiency anemia     Nicotine abuse     Obesity     Osteoarthritis     Peripheral vascular disease ([facility] [address] 3/13/2014    Substance abuse ([facility])     cocaine, canabis    Thalassemia minor     Unspecified cerebral artery occlusion with cerebral infarction     Unspecified sleep apnea        Surgical History:   Past Surgical History:   Procedure Laterality Date    BREAST SURGERY      biopsy    COLONOSCOPY  12/2007

## 2021-09-29 ENCOUNTER — OFFICE VISIT (OUTPATIENT)
Dept: INTERNAL MEDICINE | Age: 66
End: 2021-09-29
Payer: COMMERCIAL

## 2021-09-29 ENCOUNTER — HOSPITAL ENCOUNTER (OUTPATIENT)
Age: 66
Setting detail: SPECIMEN
Discharge: HOME | End: 2021-09-29
Payer: COMMERCIAL

## 2021-09-29 VITALS
WEIGHT: 293 LBS | SYSTOLIC BLOOD PRESSURE: 136 MMHG | DIASTOLIC BLOOD PRESSURE: 86 MMHG | HEART RATE: 70 BPM | HEIGHT: 67 IN | BODY MASS INDEX: 45.99 KG/M2

## 2021-09-29 DIAGNOSIS — N18.31 STAGE 3A CHRONIC KIDNEY DISEASE (HCC): ICD-10-CM

## 2021-09-29 DIAGNOSIS — E66.01 CLASS 3 SEVERE OBESITY DUE TO EXCESS CALORIES WITH SERIOUS COMORBIDITY AND BODY MASS INDEX (BMI) OF 50.0 TO 59.9 IN ADULT (HCC): ICD-10-CM

## 2021-09-29 DIAGNOSIS — G56.03 CARPAL TUNNEL SYNDROME, BILATERAL: ICD-10-CM

## 2021-09-29 DIAGNOSIS — Z13.1 SCREENING FOR DIABETES MELLITUS: ICD-10-CM

## 2021-09-29 DIAGNOSIS — Z23 FLU VACCINE NEED: ICD-10-CM

## 2021-09-29 DIAGNOSIS — I10 ESSENTIAL HYPERTENSION: ICD-10-CM

## 2021-09-29 DIAGNOSIS — I10 ESSENTIAL HYPERTENSION: Primary | ICD-10-CM

## 2021-09-29 DIAGNOSIS — M48.02 CERVICAL STENOSIS OF SPINAL CANAL: ICD-10-CM

## 2021-09-29 DIAGNOSIS — R79.9 ABNORMAL FINDING OF BLOOD CHEMISTRY, UNSPECIFIED: ICD-10-CM

## 2021-09-29 DIAGNOSIS — G47.33 OSA (OBSTRUCTIVE SLEEP APNEA): ICD-10-CM

## 2021-09-29 LAB
ANION GAP SERPL CALCULATED.3IONS-SCNC: 11 MMOL/L (ref 9–17)
BUN BLDV-MCNC: 19 MG/DL (ref 8–23)
BUN/CREAT BLD: ABNORMAL (ref 9–20)
CALCIUM SERPL-MCNC: 10.1 MG/DL (ref 8.6–10.4)
CHLORIDE BLD-SCNC: 101 MMOL/L (ref 98–107)
CO2: 29 MMOL/L (ref 20–31)
CREAT SERPL-MCNC: 1.03 MG/DL (ref 0.5–0.9)
GFR AFRICAN AMERICAN: >60 ML/MIN
GFR NON-AFRICAN AMERICAN: 53 ML/MIN
GFR SERPL CREATININE-BSD FRML MDRD: ABNORMAL ML/MIN/{1.73_M2}
GFR SERPL CREATININE-BSD FRML MDRD: ABNORMAL ML/MIN/{1.73_M2}
GLUCOSE BLD-MCNC: 85 MG/DL (ref 70–99)
HBA1C MFR BLD: 5.8 %
POTASSIUM SERPL-SCNC: 4 MMOL/L (ref 3.7–5.3)
SODIUM BLD-SCNC: 141 MMOL/L (ref 135–144)

## 2021-09-29 PROCEDURE — 90688 IIV4 VACCINE SPLT 0.5 ML IM: CPT | Performed by: STUDENT IN AN ORGANIZED HEALTH CARE EDUCATION/TRAINING PROGRAM

## 2021-09-29 PROCEDURE — 99213 OFFICE O/P EST LOW 20 MIN: CPT | Performed by: STUDENT IN AN ORGANIZED HEALTH CARE EDUCATION/TRAINING PROGRAM

## 2021-09-29 PROCEDURE — 83036 HEMOGLOBIN GLYCOSYLATED A1C: CPT | Performed by: STUDENT IN AN ORGANIZED HEALTH CARE EDUCATION/TRAINING PROGRAM

## 2021-09-29 RX ORDER — PREDNISONE 20 MG/1
20 TABLET ORAL DAILY
Qty: 14 TABLET | Refills: 0 | Status: SHIPPED | OUTPATIENT
Start: 2021-09-29 | End: 2021-10-07 | Stop reason: ALTCHOICE

## 2021-09-29 SDOH — ECONOMIC STABILITY: FOOD INSECURITY: WITHIN THE PAST 12 MONTHS, THE FOOD YOU BOUGHT JUST DIDN'T LAST AND YOU DIDN'T HAVE MONEY TO GET MORE.: NEVER TRUE

## 2021-09-29 SDOH — ECONOMIC STABILITY: FOOD INSECURITY: WITHIN THE PAST 12 MONTHS, YOU WORRIED THAT YOUR FOOD WOULD RUN OUT BEFORE YOU GOT MONEY TO BUY MORE.: NEVER TRUE

## 2021-09-29 ASSESSMENT — SOCIAL DETERMINANTS OF HEALTH (SDOH): HOW HARD IS IT FOR YOU TO PAY FOR THE VERY BASICS LIKE FOOD, HOUSING, MEDICAL CARE, AND HEATING?: NOT HARD AT ALL

## 2021-09-29 ASSESSMENT — ENCOUNTER SYMPTOMS
GASTROINTESTINAL NEGATIVE: 1
EYES NEGATIVE: 1
RESPIRATORY NEGATIVE: 1
ALLERGIC/IMMUNOLOGIC NEGATIVE: 1

## 2021-09-29 NOTE — PATIENT INSTRUCTIONS
Medications e-scribe to pharmacy of pt's choice. Labs given to patient, they will have them done before their next visit. Referral to Nephrology was placed, summary of care printed and faxed to office, phone numbers given to the patient, they will contact office for an appt    Return To Clinic 11/10/2021. After Visit Summary  given and reviewed. --    It is very important for your care that you keep your appointment. If for some reason you are unable to keep your appointment it is equally important that you call our office at [phone] to cancel your appointment and reschedule. Failure to do so may result in your termination from our practice.

## 2021-09-29 NOTE — PROGRESS NOTES
[facility]  [facility] [address] [address]  Dept: [phone]  Dept Fax: [phone]    Office Progress/Follow Up Note  Date ofpatient's visit: 9/29/2021  Patient's Name:  Coleen Sullivanr YOB: 1955            Patient Care Team:  Chayo Hay MD as PCP - General (Internal Medicine)  Ghassan Wise RN as Nurse [address] [place] [address], DPM as Consulting Physician (Podiatry)  ================================================================    REASON FOR VISIT/CHIEF COMPLAINT:  Establish Care, Hypertension, Health Maintenance (lab pended, flu vaccine given, awv scheduled), and Back Pain    HISTORY OF PRESENTING ILLNESS:  History was obtained from: patient. Monika Phillip a 74 y.o. is here for a follow-up visit. Her blood pressure today is 158/82 with heart rate 77, repeat check 5 minutes later is 136/86 with heart rate 70. And is compliant with all her medications including Norvasc, hydrochlorothiazide, Cozaar. She checks her blood pressure Timentin again and it is usually in the 130s over 80s. She states that she is tries to eat low-salt diet. Today she has no active complaints other than that her carpal tunnel syndrome has been acting up and she has this chronic neck pain from cervical canal stenosis. Patient has been seen by neurosurgery in the past and has refused any surgical intervention. Again had a discussion today with the patient, she still refuses any kind of surgical intervention, also refuses steroid injections for her carpal tunnel. She has used splint and physical therapy in the past without any benefit. She has past history of stroke x 2, takes aspirin, Plavix, Lipitor, folic acid. Lipid panel reviewed, LDL last checked was 74    She is compliant with CPAP for her TIBURCIO. She has refused any kind of bariatric surgery in the past, is willing to work with weight management classes for the same.   Patient follows up with heme-onc for her microcytic anemia and alpha thalassemia trait. Her levels checked for iron, B12, folate were within normal limits. TSH and vitamin D were also within normal limits. Patient occasionally gets tension type headaches, but has had not had any headache in long time    Also has osteoarthritis, does not want any steroid injections in her knees, wants to work on her weight. Patient had her colonoscopy done in 2019 which showed polyps without malignancy, she is due for another colonoscopy next year. Patient had EGD done in 2019 which showed erosive gastritis. She had her lumbar cancer screening in January 2021 which was negative, breast cancer screening was negative in 2020 and DEXA scan in 2018 was unremarkable. Patient does not smoke or do any other drugs, occasionally drinks alcohol. Last HbA1c was checked in 2019, was within normal limits  Patient is agreeable to get flu vaccine today. Patient has been diagnosed with chronic kidney disease, will check repeat BMP today. She is not willing to see any kidney doctor yet.      Patient Active Problem List   Diagnosis    Essential hypertension    Pure hypercholesterolemia    Cerebrovascular accident (CVA), 2011, no residual deficit (HCC)    CKD (chronic kidney disease) stage 3, GFR 30-59 ml/min ([facility])    Peripheral vascular disease ([facility] [place] [address]    TIBURCIO (obstructive sleep apnea)    Class 3 severe obesity due to excess calories with serious comorbidity and body mass index (BMI) of 50.0 to 59.9 in adult [facility])    Primary osteoarthritis of left knee    Chronic tension-type headache, not intractable    Carpal tunnel syndrome, bilateral-not on medication due to kidney disease    Microcytic anemia    Alpha thalassemia trait    Cervical stenosis of spinal canal       Health Maintenance Due   Topic Date Due    Potassium monitoring  07/16/2021    Creatinine monitoring  07/16/2021    Flu vaccine (1) 09/01/2021    Annual Wellness Visit (AWV)  2021       Allergies   Allergen Reactions    Duricef [Cefadroxil Monohydrate]     Fish-Derived Products     Pcn [Penicillins]     Tomato          Current Outpatient Medications   Medication Sig Dispense Refill    predniSONE (DELTASONE) 20 MG tablet Take 1 tablet by mouth daily for 14 days 14 tablet 0    Vitamin D (CHOLECALCIFEROL) 25 MCG (1000 UT) TABS tablet       clopidogrel (PLAVIX) 75 MG tablet TAKE 1 TABLET BY MOUTH DAILY 28 tablet 3    amLODIPine (NORVASC) 10 MG tablet Take 1 tablet by mouth daily 60 tablet 3    hydroCHLOROthiazide (HYDRODIURIL) 25 MG tablet Take 1 tablet by mouth daily 60 tablet 3    losartan (COZAAR) 50 MG tablet Take 1 tablet by mouth daily 60 tablet 3    aspirin (ASPIRIN LOW DOSE) 81 MG EC tablet TAKE 1 TABLET DAILY 60 tablet 3    atorvastatin (LIPITOR) 80 MG tablet Take 1 tablet by mouth daily 60 tablet 3    folic acid (FOLVITE) 1 MG tablet Take 1 tablet by mouth daily 60 tablet 3    vitamin B-1 (THIAMINE) 100 MG tablet Take 1 tablet by mouth daily 30 tablet 3    diclofenac sodium (VOLTAREN) 1 % GEL Apply 4 g topically 4 times daily 4 Tube 1    acetaminophen (SM PAIN RELIEVER) 325 MG tablet TAKE 2 TABLETS BY MOUTH EVERY 4 HOURS AS NEEDED FOR PAIN 60 tablet 3     No current facility-administered medications for this visit. Social History     Tobacco Use    Smoking status: Former Smoker     Packs/day: 2.00     Years: 35.00     Pack years: 70.00     Quit date: 10/11/2011     Years since quittin.9    Smokeless tobacco: Never Used   Vaping Use    Vaping Use: Never used   Substance Use Topics    Alcohol use:  Yes     Alcohol/week: 6.0 standard drinks     Types: 6 Cans of beer per week     Comment: rare    Drug use: No     Types: Cocaine, Marijuana     Comment: per pt did years ago; cocaine & marij, 8--19 denies current use       Family History   Problem Relation Age of Onset    High Blood Pressure Mother     Asthma Mother     Heart Disease Mother     Heart Disease Father     High Blood Pressure Father     Diabetes Brother     High Blood Pressure Brother     Heart Disease Brother     High Blood Pressure Maternal Grandmother     High Blood Pressure Maternal Grandfather     Heart Disease Maternal Grandfather         REVIEW OF SYSTEMS:  Review of Systems   Constitutional: Negative. HENT: Negative. Eyes: Negative. Respiratory: Negative. Gastrointestinal: Negative. Endocrine: Negative. Genitourinary: Negative. Musculoskeletal: Positive for neck pain. Skin: Negative. Allergic/Immunologic: Negative. Neurological: Positive for numbness. In b/l wrists   Hematological: Negative. Psychiatric/Behavioral: Negative. PHYSICAL EXAM:  Vitals:    09/29/21 1253 09/29/21 1257   BP: (!) 158/82 136/86   Site: Left Lower Arm Left Lower Arm   Position: Sitting Sitting   Cuff Size: Medium Adult Medium Adult   Pulse: 77 70   Weight: (!) 378 lb (171.5 kg)    Height: 5' 7\" (1.702 m)      BP Readings from Last 3 Encounters:   09/29/21 136/86   09/24/21 (!) 149/77   09/23/21 131/77        Physical Exam  Constitutional:       Appearance: She is obese. HENT:      Head: Normocephalic. Mouth/Throat:      Mouth: Mucous membranes are moist.   Cardiovascular:      Rate and Rhythm: Normal rate and regular rhythm. Pulses: Normal pulses. Heart sounds: Normal heart sounds. Pulmonary:      Effort: Pulmonary effort is normal.   Abdominal:      Palpations: Abdomen is soft. Musculoskeletal:         General: Normal range of motion. Cervical back: Normal range of motion and neck supple. Skin:     General: Skin is warm. Neurological:      Mental Status: She is alert and oriented to person, place, and time. Mental status is at baseline.    Psychiatric:         Mood and Affect: Mood normal.           DIAGNOSTIC FINDINGS:  CBC:  Lab Results   Component Value Date    WBC 5.9 09/07/2021    HGB 10.1 09/07/2021     09/07/2021     04/05/2012       BMP:    Lab Results   Component Value Date     07/16/2020    K 3.7 07/16/2020    CL 99 07/16/2020    CO2 27 07/16/2020    BUN 27 07/16/2020    CREATININE 1.38 07/16/2020    GLUCOSE 79 07/16/2020    GLUCOSE 88 04/12/2012       HEMOGLOBIN A1C:   Lab Results   Component Value Date    LABA1C 5.4 06/07/2019       FASTING LIPID PANEL:  Lab Results   Component Value Date    CHOL 148 09/24/2021    HDL 50 09/24/2021    TRIG 119 09/24/2021       ASSESSMENT AND PLAN:  Indigo Wood was seen today for establish care, hypertension, health maintenance and back pain. Diagnoses and all orders for this visit:    Essential hypertension  -     Basic Metabolic Panel; Future    TIBURCIO (obstructive sleep apnea)  - weight loss, compliance with CPAP. Screening for diabetes mellitus  -     POCT glycosylated hemoglobin (Hb A1C)    Cervical stenosis of spinal canal  - Reconsider surgery    Carpal tunnel syndrome, bilateral-not on medication due to kidney disease  -     predniSONE (DELTASONE) 20 MG tablet; Take 1 tablet by mouth daily for 14 days    Class 3 severe obesity due to excess calories with serious comorbidity and body mass index (BMI) of 50.0 to 59.9 in adult ([facility] [address]  - weight loss    Stage 3a chronic kidney disease ([facility] [address]  -     [facility]) - Beverly Godinez MD, Nephrology, [facility]    Flu vaccine need  -     INFLUENZA, QUADV, 3 YRS AND OLDER, IM, MDV, 0.5ML (AFLURIA QUADV)  -     AZ IMMUNIZ ADMIN,1 SINGLE/COMB VAC/TOXOID    Abnormal finding of blood chemistry, unspecified   -     POCT glycosylated hemoglobin (Hb A1C)      FOLLOW UP AND INSTRUCTIONS:  Return in about 6 weeks (around 11/10/2021). · Indigo Wood received counseling on the following healthy behaviors: nutrition and exercise    · Discussed use, benefit, and side effects of prescribed medications. Barriers to medication compliance addressed. All patient questions answered. Pt voiced understanding.     · Patient given educational

## 2021-10-07 ENCOUNTER — VIRTUAL VISIT (OUTPATIENT)
Dept: INTERNAL MEDICINE | Age: 66
End: 2021-10-07
Payer: COMMERCIAL

## 2021-10-07 DIAGNOSIS — Z00.00 ROUTINE GENERAL MEDICAL EXAMINATION AT A HEALTH CARE FACILITY: ICD-10-CM

## 2021-10-07 DIAGNOSIS — Z71.89 ACP (ADVANCE CARE PLANNING): ICD-10-CM

## 2021-10-07 PROCEDURE — G0438 PPPS, INITIAL VISIT: HCPCS | Performed by: NURSE PRACTITIONER

## 2021-10-07 ASSESSMENT — LIFESTYLE VARIABLES
HOW OFTEN DURING THE LAST YEAR HAVE YOU NEEDED AN ALCOHOLIC DRINK FIRST THING IN THE MORNING TO GET YOURSELF GOING AFTER A NIGHT OF HEAVY DRINKING: 0
HOW MANY STANDARD DRINKS CONTAINING ALCOHOL DO YOU HAVE ON A TYPICAL DAY: 1
HOW OFTEN DURING THE LAST YEAR HAVE YOU FOUND THAT YOU WERE NOT ABLE TO STOP DRINKING ONCE YOU HAD STARTED: 0
HOW OFTEN DURING THE LAST YEAR HAVE YOU FAILED TO DO WHAT WAS NORMALLY EXPECTED FROM YOU BECAUSE OF DRINKING: 0
HOW OFTEN DO YOU HAVE A DRINK CONTAINING ALCOHOL: 1
HOW OFTEN DURING THE LAST YEAR HAVE YOU HAD A FEELING OF GUILT OR REMORSE AFTER DRINKING: 0
HAS A RELATIVE, FRIEND, DOCTOR, OR ANOTHER HEALTH PROFESSIONAL EXPRESSED CONCERN ABOUT YOUR DRINKING OR SUGGESTED YOU CUT DOWN: 0
HAVE YOU OR SOMEONE ELSE BEEN INJURED AS A RESULT OF YOUR DRINKING: 0
AUDIT TOTAL SCORE: 2
AUDIT-C TOTAL SCORE: 2
HOW OFTEN DO YOU HAVE SIX OR MORE DRINKS ON ONE OCCASION: 0
HOW OFTEN DURING THE LAST YEAR HAVE YOU BEEN UNABLE TO REMEMBER WHAT HAPPENED THE NIGHT BEFORE BECAUSE YOU HAD BEEN DRINKING: 0

## 2021-10-07 ASSESSMENT — PATIENT HEALTH QUESTIONNAIRE - PHQ9
SUM OF ALL RESPONSES TO PHQ QUESTIONS 1-9: 0
SUM OF ALL RESPONSES TO PHQ9 QUESTIONS 1 & 2: 0
1. LITTLE INTEREST OR PLEASURE IN DOING THINGS: 0
2. FEELING DOWN, DEPRESSED OR HOPELESS: 0

## 2021-10-07 NOTE — PROGRESS NOTES
Cataracts, bilateral     Cerebrovascular disease [id number]    cva    Chronic pain in left foot     CKD (chronic kidney disease) stage 3, GFR 30-59 ml/min ([facility]     GERD (gastroesophageal reflux disease)     Hx of blood clots     Hyperlipidemia     Hypertension     Iron (Fe) deficiency anemia     Nicotine abuse     Obesity     Osteoarthritis     Peripheral vascular disease ([facility] [address] 3/13/2014    Substance abuse ([facility])     cocaine, canabis    Thalassemia minor     Unspecified cerebral artery occlusion with cerebral infarction     Unspecified sleep apnea        Past Surgical History:   Procedure Laterality Date    BREAST SURGERY      biopsy    COLONOSCOPY  12/2007    adenomatous polyp    COLONOSCOPY  2013    normal, repeat in 5 years    COLONOSCOPY N/A 8/21/2019    COLONOSCOPY POLYPECTOMY SNARE/COLD BIOPSY performed by Esvin Cantu MD at [facility] UPPER GASTROINTESTINAL ENDOSCOPY  2009    negative    UPPER GASTROINTESTINAL ENDOSCOPY N/A 8/21/2019    EGD BIOPSY performed by Esvin Cantu MD at [facility]         Family History   Problem Relation Age of Onset    High Blood Pressure Mother     Asthma Mother     Heart Disease Mother     Heart Disease Father     High Blood Pressure Father     Diabetes Brother     High Blood Pressure Brother     Heart Disease Brother     High Blood Pressure Maternal Grandmother     High Blood Pressure Maternal Grandfather     Heart Disease Maternal Grandfather        CareTeam (Including outside providers/suppliers regularly involved in providing care):   Patient Care Team:  Filomena Hassan MD as PCP - General (Internal Medicine)  Evita Zee RN as Nurse [address] [place] [address], M as Consulting Physician (Podiatry)    Wt Readings from Last 3 Encounters:   09/29/21 (!) 378 lb (171.5 kg)   09/24/21 (!) 350 lb (158.8 kg)   09/23/21 (!) 381 lb (172.8 dentist within the past year?: N/A - wear dentures     Health Habits/Nutrition Interventions:  · Inadequate physical activity:  patient agrees to exercise for at least 150 minutes/week     Safety:  Safety  Do you have working smoke detectors?: Yes  Have all throw rugs been removed or fastened?: Yes  Do you have non-slip mats or surfaces in all bathtubs/showers?: (!) No  Do all of your stairways have a railing or banister?: Yes  Are your doorways, halls and stairs free of clutter?: Yes  Do you always fasten your seatbelt when you are in a car?: Yes  Safety Interventions:  · Home safety tips provided    ADL:  ADLs  In the past 7 days, did you need help from others to perform any of the following everyday activities? Eating, dressing, grooming, bathing, toileting, or walking/balance?: (!) Bathing  In the past 7 days, did you need help from others to take care of any of the following?  Laundry, housekeeping, banking/finances, shopping, telephone use, food preparation, transportation, or taking medications?: (!) Housekeeping (HHA 3 times a week)  ADL Interventions:  · Patient declines any further evaluation/treatment for this issue    Personalized Preventive Plan   Current Health Maintenance Status  Immunization History   Administered Date(s) Administered    COVID-19, Pfizer, PF, 30mcg/0.3mL 03/24/2021, 04/14/2021    Influenza Virus Vaccine 09/25/2019    Influenza, Nirali Cali, IM, (6 mo and older Fluzone, Flulaval, Fluarix and 3 yrs and older Afluria) 09/25/2019, 09/29/2021    Influenza, Quadv, IM, PF (6 mo and older Fluzone, Flulaval, Fluarix, and 3 yrs and older Afluria) 10/19/2018    Pneumococcal Conjugate 13-valent ([id number]) 10/19/2018    Pneumococcal Polysaccharide ([id number]) 12/20/2016    Tdap (Boostrix, Adacel) 03/08/2018    Zoster Live (Zostavax) 08/03/2016, 02/14/2017    Zoster Recombinant (Shingrix) 04/20/2018, 06/26/2018        Health Maintenance   Topic Date Due    Annual Wellness Visit (AWV) medical care. The patient (and/or legal guardian) has also been advised to contact this office for worsening conditions or problems, and seek emergency medical treatment and/or call 911 if deemed necessary. Patient identification was verified at the start of the visit: Yes    Services were provided through phone to substitute for in-person clinic visit. Patient and provider were located at their individual homes. --LOY Puentes - CNP on 10/7/2021 at 11:00 AM    An electronic signature was used to authenticate this note.

## 2021-10-07 NOTE — PATIENT INSTRUCTIONS
Advance Directives: Care Instructions  Overview  An advance directive is a legal way to state your wishes at the end of your life. It tells your family and your doctor what to do if you can't say what you want. There are two main types of advance directives. You can change them any time your wishes change. Living will. This form tells your family and your doctor your wishes about life support and other treatment. The form is also called a declaration. Medical power of . This form lets you name a person to make treatment decisions for you when you can't speak for yourself. This person is called a health care agent (health care proxy, health care surrogate). The form is also called a durable power of  for health care. If you do not have an advance directive, decisions about your medical care may be made by a family member, or by a doctor or a  who doesn't know you. It may help to think of an advance directive as a gift to the people who care for you. If you have one, they won't have to make tough decisions by themselves. Follow-up care is a key part of your treatment and safety. Be sure to make and go to all appointments, and call your doctor if you are having problems. It's also a good idea to know your test results and keep a list of the medicines you take. What should you include in an advance directive? Many states have a unique advance directive form. (It may ask you to address specific issues.) Or you might use a universal form that's approved by many states. If your form doesn't tell you what to address, it may be hard to know what to include in your advance directive. Use the questions below to help you get started. · Who do you want to make decisions about your medical care if you are not able to? · What life-support measures do you want if you have a serious illness that gets worse over time or can't be cured? · What are you most afraid of that might happen? (Maybe you're afraid of having pain, losing your independence, or being kept alive by machines.)  · Where would you prefer to die? (Your home? A hospital? A nursing home?)  · Do you want to donate your organs when you die? · Do you want certain Adventist practices performed before you die? When should you call for help? Be sure to contact your doctor if you have any questions. Where can you learn more? Go to [email or web address] and sign in to your Amu[employer] account. Enter R264 in the Moblication box to learn more about \"Advance Directives: Care Instructions. \"     If you do not have an account, please click on the \"Sign Up Now\" link. Current as of: March 17, 2021               Content Version: 13.0  © 2006-2021 Healthwise, [employer]. Care instructions adapted under license by [facility] ([facility]). If you have questions about a medical condition or this instruction, always ask your healthcare professional. [place] [address] any warranty or liability for your use of this information. Learning About Medical Power of   What is a medical power of ? A medical power of , also called a durable power of  for health care, is one type of the legal forms called advance directives. It lets you name the person you want to make treatment decisions for you if you can't speak or decide for yourself. The person you choose is called your health care agent. This person is also called a health care proxy or health care surrogate. A medical power of  may be called something else in your state. How do you choose a health care agent? Choose your health care agent carefully. This person may or may not be a family member. Talk to the person before you make your final decision. Make sure he or she is comfortable with this responsibility. It's a good idea to choose someone who:  · Is at least 25years old.   · Knows you well and understands what makes life meaningful for you. · Understands your Restorationist and moral values. · Will do what you want, not what he or she wants. · Will be able to make difficult choices at a stressful time. · Will be able to refuse or stop treatment, if that is what you would want, even if you could die. · Will be firm and confident with health professionals if needed. · Will ask questions to get needed information. · Lives near you or agrees to travel to you if needed. Your family may help you make medical decisions while you can still be part of that process. But it's important to choose one person to be your health care agent in case you aren't able to make decisions for yourself. If you don't fill out the legal form and name a health care agent, the decisions your family can make may be limited. A health care agent may be called something else in your state. Who will make decisions for you if you don't have a health care agent? If you don't have a health care agent or a living will, you may not get the care you want. Decisions may be made by family members who disagree about your medical care. Or decisions may be made by a medical professional who doesn't know you well. In some cases, a  makes the decisions. When you name a health care agent, it is very clear who has the power to make health decisions for you. How do you name a health care agent? You name your health care agent on a legal form. This form is usually called a medical power of . Ask your hospital, state bar association, or office on aging where to find these forms. You must sign the form to make it legal. Some states require you to get the form notarized. This means that a person called a  watches you sign the form and then he or she signs the form. Some states also require that two or more witnesses sign the form. Be sure to tell your family members and doctors who your health care agent is.   Where can you learn more? Go to https://chpepiceweb.[email or web address] org and sign in to your [employer] account. Enter [id number] in the [facility] box to learn more about \"Learning About [address]. \"     If you do not have an account, please click on the \"Sign Up Now\" link. Current as of: March 17, 2021               Content Version: 13.0  © 2006-2021 Healthwise, [employer]. Care instructions adapted under license by [facility] ([facility]). If you have questions about a medical condition or this instruction, always ask your healthcare professional. [address] any warranty or liability for your use of this information. Learning About Living [place]  What is a living will? A living will, also called a declaration, is a legal form. It tells your family and your doctor your wishes when you can't speak for yourself. It's used by the health professionals who will treat you as you near the end of your life or if you get seriously hurt or ill. If you put your wishes in writing, your loved ones and others will know what kind of care you want. They won't need to guess. This can ease your mind and be helpful to others. And you can change or cancel your living will at any time. A living will is not the same as an estate or property will. An estate will explains what you want to happen with your money and property after you die. How do you use it? A living will is used to describe the kinds of treatment or life support you want as you near the end of your life or if you get seriously hurt or ill. Keep these facts in mind about living davidson. · Your living will is used only if you can't speak or make decisions for yourself. Most often, one or more doctors must certify that you can't speak or decide for yourself before your living will takes effect. · If you get better and can speak for yourself again, you can accept or refuse any treatment.  It doesn't matter what you said in your living will. · Some states may limit your right to refuse treatment in certain cases. For example, you may need to clearly state in your living will that you don't want artificial hydration and nutrition, such as being fed through a tube. Is a living will a legal document? A living will is a legal document. Each state has its own laws about living davidson. And a living will may be called something else in your state. Here are some things to know about living davisdon. · You don't need an  to complete a living will. But legal advice can be helpful if your state's laws are unclear. It can also help if your health history is complicated or your family can't agree on what should be in your living will. · You can change your living will at any time. Some people find that their wishes about end-of-life care change as their health changes. If you make big changes to your living will, complete a new form. · If you move to another state, make sure that your living will is legal in the state where you now live. In most cases, doctors will respect your wishes even if you have a form from a different state. · You might use a universal form that has been approved by many states. This kind of form can sometimes be filled out and stored online. Your digital copy will then be available wherever you have a connection to the internet. The doctors and nurses who need to treat you can find it right away. · Your state may offer an online registry. This is another place where you can store your living will online. · It's a good idea to get your living will notarized. This means using a person called a  to watch two people sign, or witness, your living will. What should you know when you create a living will? Here are some questions to ask yourself as you make your living will:  · Do you know enough about life support methods that might be used?  If not, talk to your doctor so you know what might be done if you Eating Healthy Foods: Care Instructions  Your Care Instructions     Eating healthy foods can help lower your risk for disease. Healthy food gives you energy and keeps your heart strong, your brain active, your muscles working, and your bones strong. A healthy diet includes a variety of foods from the basic food groups: grains, vegetables, fruits, milk and milk products, and meat and beans. Some people may eat more of their favorite foods from only one food group and, as a result, miss getting the nutrients they need. So, it is important to pay attention not only to what you eat but also to what you are missing from your diet. You can eat a healthy, balanced diet by making a few small changes. Follow-up care is a key part of your treatment and safety. Be sure to make and go to all appointments, and call your doctor if you are having problems. It's also a good idea to know your test results and keep a list of the medicines you take. How can you care for yourself at home? Look at what you eat  · Keep a food diary for a week or two and record everything you eat or drink. Track the number of servings you eat from each food group. · For a balanced diet every day, eat a variety of:  ? 6 or more ounce-equivalents of grains, such as cereals, breads, crackers, rice, or pasta, every day. An ounce-equivalent is 1 slice of bread, 1 cup of ready-to-eat cereal, or ½ cup of cooked rice, cooked pasta, or cooked cereal.  ? 2½ cups of vegetables, especially:  § Dark-green vegetables such as broccoli and spinach. § Orange vegetables such as carrots and sweet potatoes. § Dry beans (such as doshi and kidney beans) and peas (such as lentils). ? 2 cups of fresh, frozen, or canned fruit. A small apple or 1 banana or orange equals 1 cup. ? 3 cups of nonfat or low-fat milk, yogurt, or other milk products. ? 5½ ounces of meat and beans, such as chicken, fish, lean meat, beans, nuts, and seeds.  One egg, 1 tablespoon of peanut butter, ½ ounce nuts or seeds, or ¼ cup of cooked beans equals 1 ounce of meat. · Learn how to read food labels for serving sizes and ingredients. Fast-food and convenience-food meals often contain few or no fruits or vegetables. Make sure you eat some fruits and vegetables to make the meal more nutritious. · Look at your food diary. For each food group, add up what you have eaten and then divide the total by the number of days. This will give you an idea of how much you are eating from each food group. See if you can find some ways to change your diet to make it more healthy. Start small  · Do not try to make dramatic changes to your diet all at once. You might feel that you are missing out on your favorite foods and then be more likely to fail. · Start slowly, and gradually change your habits. Try some of the following:  ? Use whole wheat bread instead of white bread. ? Use nonfat or low-fat milk instead of whole milk. ? Eat brown rice instead of white rice, and eat whole wheat pasta instead of white-flour pasta. ? Try low-fat cheeses and low-fat yogurt. ? Add more fruits and vegetables to meals and have them for snacks. ? Add lettuce, tomato, cucumber, and onion to sandwiches. ? Add fruit to yogurt and cereal.  Enjoy food  · You can still eat your favorite foods. You just may need to eat less of them. If your favorite foods are high in fat, salt, and sugar, limit how often you eat them, but do not cut them out entirely. · Eat a wide variety of foods. Make healthy choices when eating out  · The type of restaurant you choose can help you make healthy choices. Even fast-food chains are now offering more low-fat or healthier choices on the menu. · Choose smaller portions, or take half of your meal home. · When eating out, try:  ? A veggie pizza with a whole wheat crust or grilled chicken (instead of sausage or pepperoni).   ? Pasta with roasted vegetables, grilled chicken, or marinara sauce instead of cream sauce. ? A vegetable wrap or grilled chicken wrap. ? Broiled or poached food instead of fried or breaded items. Make healthy choices easy  · Buy packaged, prewashed, ready-to-eat fresh vegetables and fruits, such as baby carrots, salad mixes, and chopped or shredded broccoli and cauliflower. · Buy packaged, presliced fruits, such as melon or pineapple. · Choose 100% fruit or vegetable juice instead of soda. Limit juice intake to 4 to 6 oz (½ to ¾ cup) a day. · Blend low-fat yogurt, fruit juice, and canned or frozen fruit to make a smoothie for breakfast or a snack. Where can you learn more? Go to https://Yahoo!.[employer]enapto. org and sign in to your [email or web address] account. Enter Z739 in the [employer] box to learn more about \"Eating Healthy Foods: Care Instructions. \"     If you do not have an account, please click on the \"Sign Up Now\" link. Current as of: December 17, 2020               Content Version: 13.0  © 9720-4298 [employer]. Care instructions adapted under license by [facility] ([facility]). If you have questions about a medical condition or this instruction, always ask your healthcare professional. [address] any warranty or liability for your use of this information. Personalized Preventive Plan for Marysol Bridges - 10/7/2021  Medicare offers a range of preventive health benefits. Some of the tests and screenings are paid in full while other may be subject to a deductible, co-insurance, and/or copay. Some of these benefits include a comprehensive review of your medical history including lifestyle, illnesses that may run in your family, and various assessments and screenings as appropriate. After reviewing your medical record and screening and assessments performed today your provider may have ordered immunizations, labs, imaging, and/or referrals for you.   A list of these orders (if applicable) as well as your Preventive Care list are included within your After Visit Summary for your review. Other Preventive Recommendations:    · A preventive eye exam performed by an eye specialist is recommended every 1-2 years to screen for glaucoma; cataracts, macular degeneration, and other eye disorders. · A preventive dental visit is recommended every 6 months. · Try to get at least 150 minutes of exercise per week or 10,000 steps per day on a pedometer . · Order or download the FREE \"Exercise & Physical Activity: Your Everyday Guide\" from [employer]. Call 9-962.669.3695 or search [employer] online. · You need 6070-0112 mg of calcium and 0682-1793 IU of vitamin D per day. It is possible to meet your calcium requirement with diet alone, but a vitamin D supplement is usually necessary to meet this goal.  · When exposed to the sun, use a sunscreen that protects against both UVA and UVB radiation with an SPF of 30 or greater. Reapply every 2 to 3 hours or after sweating, drying off with a towel, or swimming. · Always wear a seat belt when traveling in a car. Always wear a helmet when riding a bicycle or motorcycle. Patient information: Weight loss treatments    INTRODUCTION  Obesity is a major international problem, and Americans are among the heaviest people in the world. The percentage of obese people in the [facility] has risen steadily. Many people find that although they initially lose weight by dieting, they quickly regain the weight after the diet ends. Because it so hard to keep weight off over time, it is important to have as much information and support as possible before starting a diet. You are most likely to be successful in losing weight and keeping it off when you believe that your body weight can be controlled.   STARTING A WEIGHT LOSS PROGRAM  Some people like to talk to their doctor or nurse to get help choosing the best plan, monitoring progress, and getting advice and support along the way.  To know what treatment (or combination of treatments) will work best, determine your body mass index (BMI) and waist circumference (measurement). The BMI is calculated from your height and weight. A person with a BMI between 25 and 29.9 is considered overweight   A person with a BMI of 30 or greater is considered to be obese  A waist circumference greater than 35 inches (88 cm) in women and 40 inches (102 cm) in men increases the risk of obesity-related complications, such as heart disease and diabetes. People who are obese and who have a larger waist size may need more aggressive weight loss treatment than others. Talk to your doctor or nurse for advice. Types of treatment  Based on your measurements and your medical history, your doctor or nurse can determine what combination of weight loss treatments would work best for you. Treatments may include changes in lifestyle, exercise, dieting, and, in some cases, weight loss medicines or weight loss surgery. Weight loss surgery, also called bariatric surgery, is reserved for people with severe obesity who have not responded to other weight loss treatments. SETTING A WEIGHT LOSS GOAL  It is important to set a realistic weight loss goal. Your first goal should be to avoid gaining more weight and staying at your current weight (or within 5 percent). Many people have a \"dream\" weight that is difficult or impossible to achieve. People at high risk of developing diabetes who are able to lose 5 percent of their body weight and maintain this weight will reduce their risk of developing diabetes by about 50 percent and reduce their blood pressure. This is a success. Losing more than 15 percent of your body weight and staying at this weight is an extremely good result, even if you never reach your \"dream\" or \"ideal\" weight. LIFESTYLE CHANGES  Programs that help you to change your lifestyle are usually run by psychologists or other professionals.  The goals of lifestyle changes are to help you change your eating habits, become more active, and be more aware of how much you eat and exercise, helping you to make healthier choices. This type of treatment can be broken down into three steps: The triggers that make you want to eat   Eating   What happens after you eat  Triggers to eat  Determining what triggers you to eat involves figuring out what foods you eat and where and when you eat. To figure out what triggers you to eat, keep a record for a few days of everything you eat, the places where you eat, how often you eat, and the emotions you were feeling when you ate. For some people, the trigger is related to a certain time of day or night. For others, the trigger is related to a certain place, like sitting at a desk working. Eating  You can change your eating habits by breaking the chain of events between the trigger for eating and eating itself. There are many ways to do this. For instance, you can:  Limit where you eat to a few places (eg, dining room)   Restrict the number of utensils (eg, only a fork) used for eating   Drink a sip of water between each bite   Chew your food a certain number of times   Get up and stop eating every few minutes  What happens after you eat  Rewarding yourself for good eating behaviors can help you to develop better habits. This is not a reward for weight loss; instead, it is a reward for changing unhealthy behaviors. Do not use food as a reward. Some people find money, clothing, or personal care (eg, a hair cut, manicure, or massage) to be effective rewards. Treat yourself immediately after making better eating choices to reinforce the value of the good behavior. You need to have clear behavior goals, and you must have a time frame for reaching your goals.  Reward small changes along the way to your final goal.  Other factors that contribute to successful weight loss  Changing your behavior involves more than just changing unhealthy eating habits; it also involves finding people around you to support your weight loss, reducing stress, and learning to be strong when tempted by food. Establish a \"kelsy\" system  Having a friend or family member available to provide support and reinforce good behavior is very helpful. The support person needs to understand your goals. Learn to be strong  Learning to be strong when tempted by food is an important part of losing weight. As an example, you will need to learn how to say \"no\" and continue to say no when urged to eat at parties and social gatherings. Develop strategies for events before you go, such as eating before you go or taking low-calorie snacks and drinks with you. Develop a support system  Having a support system is helpful when losing weight. This is why many [employer] groups are successful. Family support is also essential; if your family does not support your efforts to lose weight, this can slow your progress or even keep you from losing weight. Positive thinking  People often have conversations with themselves in their head; these conversations can be positive or negative. If you eat a piece of cake that was not planned, you may respond by thinking, \"Oh, you stupid idiot, you've blown your diet! \" and as a result, you may eat more cake. A positive thought for the same event could be, \"Well, I ate cake when it was not on my plan. Now I should do something to get back on track. \" A positive approach is much more likely to be successful than a negative one. Reduce stress  Although stress is a part of everyday life, it can trigger uncontrolled eating in some people. It is important to find a way to get through these difficult times without eating or by eating low-calorie food, like raw vegetables. It may be helpful to imagine a relaxing place that allows you to temporarily escape from stress.  With deep breaths and closed eyes, you can imagine this relaxing place for a few minutes. Self-help programs  Self-help programs like [employer] Watchers®, Overeaters Anonymous®, and Take Off [place] (TOPS)© work for some people. As with all weight loss programs, you are most likely to be successful with these plans if you make long-term changes in how you eat. CHOOSING A DIET  A calorie is a unit of energy found in food. Your body needs calories to function. The goal of any diet is to burn up more calories than you eat. How quickly you lose weight depends upon several factors, such as your age, gender, and starting weight. Older people have a slower metabolism than young people, so they lose weight more slowly. Men lose more weight than women of similar height and weight when dieting because they use more energy. People who are extremely overweight lose weight more quickly than those who are only mildly overweight. Try not to drink alcohol or drinks with added sugar, and most sweets (candy, cakes, cookies), since they rarely contain important nutrients. Portion-controlled diets  One simple way to diet is to buy packaged foods, like frozen low-calorie meals or meal-replacement canned drinks. A typical meal plan for one day may include:  A meal-replacement drink or breakfast bar for breakfast   A meal-replacement drink or a frozen low-calorie (250 to 350 calories) meal for lunch   A frozen low-calorie meal or other prepackaged, calorie-controlled meal, along with extra vegetables for dinner  This would give you 1000 to 1500 calories per day. Low-fat diet  To reduce the amount of fat in your diet, you can:  Eat low-fat foods. Low-fat foods are those that contain less than 30 percent of calories from fat. Fat is listed on the food facts label (figure 1). Count fat grams. For a 1500 calorie diet, this would mean about 45 g or fewer of fat per day.   Low-carbohydrate diet  Low- and very-low-carbohydrate diets (eg, Atkins diet, [employer]) have become popular ways to lose that includes foods you like. Fad diets  Fad diets often promise quick weight loss (more than 1 to 2 pounds per week) and may claim that you do not need to exercise or give up favorite foods. Some fad diets cost a lot of money, because you have to pay for seminars or pills. Fad diets generally lack any scientific evidence that they are safe and effective, but instead rely on \"before\" and \"after\" photos or testimonials. Diets that sound too good to be true usually are. These plans are a waste of time and money and are not recommended. A doctor, nurse, or nutritionist can help you find a safe and effective way to lose weight and keep it off. WEIGHT LOSS [place] a weight loss medicine may be helpful when used in combination with diet, exercise, and lifestyle changes. However, it is important to understand the risks and benefits of these medicines. It is also important to be realistic about your goal weight using a weight loss medicine; you may not reach your \"dream\" weight, but you may be able to reduce your risk of diabetes or heart disease. Weight loss medicines may be recommended for people who have not been able to lose weight with diet and exercise who have a:  BMI of 30 or more    BMI between 27 and 29.9 and have other medical problems, such as diabetes, high cholesterol, or high blood pressure  Two weight loss medicines are approved in the [place] for long-term use. These are sibutramine and orlistat. Other weight loss medicines (phentermine, diethylpropion) are available but are only approved for short-term use (up to 12 weeks). Sibutramine  Sibutramine (Meridia®, Reductil®) is a medicine that reduces your appetite. In people who take the medicine for one year, the average weight loss is 10 percent of the initial body weight (5 percent more than those who took a placebo treatment). Side effects of sibutramine include insomnia, dry mouth, and constipation.  Increases in blood pressure can occur. Therefore, blood pressure is usually monitored during treatment. There is no evidence that sibutramine causes heart or lung problems (like dexfenfluramine and fenfluramine (Phen/Fen)). However, experts agree that sibutramine should not used by people with coronary heart disease, heart failure, uncontrolled hypertension, stroke, irregular heart rhythms, or peripheral vascular disease (poor circulation in the legs). Orlistat  Orlistat (Xenical® 120 mg capsules) is a medicine that reduces the amount of fat your body absorbs from the foods you eat. A lower-dose version is now available without a prescription (Ac® 60 mg capsules) in many countries, including the [place]. The medicine is recommended three times per day, taken with a meal; you can skip a dose if you skip a meal or if the meal contains no fat. After one year of treatment with orlistat, the average weight loss is approximately 8 to 10 percent of initial body weight (4 percent more than in those who took a placebo). Cholesterol levels often improve, and blood pressure sometimes falls. In people with diabetes, orlistat may help control blood sugar levels. Side effects occur in 15 to 10 percent of people and may include stomach cramps, gas, diarrhea, leakage of stool, or oily stools. These problems are more likely when you take orlistat with a high-fat meal (if more than 30 percent of calories in the meal are from fat). Side effects usually improve as you learn to avoid high-fat foods. Severe liver injury has been reported rarely in patients taking orlistat, but it is not known if orlistat caused the liver problems. Diet supplements  Diet supplements are widely used by people who are trying to lose weight, although the safety and efficacy of these supplements are often unproven.  A few of the more common diet supplements are discussed below; none of these are recommended because they have not been studied carefully, and there is no proof they are safe or effective. Chitosan and wheat dextrin are ineffective for weight loss, and their use is not recommended. Ephedra, a compound related to ephedrine, is no longer available in the [facility] due to safety concerns. Many nonprescription diet pills previously contained ephedra. Although some studies have shown that ephedra helps with weight loss, there can be serious side effects (psychiatric symptoms, palpitations, and stomach upset), including death. There are not enough data about the safety and efficacy of chromium, ginseng, glucomannan, green tea, hydroxycitric acid, L carnitine, psyllium, pyruvate supplements, Trilby wort, and conjugated linoleic acid. Two supplements from [facility], [address] [place] (also known as the Osei Alvarez 15 pill) and Herbathin dietary supplement, have been shown to contain prescription drugs. Hoodia gordonii is a dietary supplement derived from a plant in [place]. It is not recommended because there is no proof that it is safe or effective. Bitter orange (Citrus aurantium) can increase your heart rate and blood pressure and is not recommended. SHOULD I HAVE SURGERY TO LOSE WEIGHT?  Weight loss surgery is recommended ONLY for people with one of the following:  Severe obesity (body mass index above 40) (calculator 1 and calculator 2) who have not responded to diet, exercise, or weight loss medicines   Body mass index between 35 and 40, along with a serious medical problem (including diabetes, severe joint pain, or sleep apnea) that would improve with weight loss  You should be sure that you understand the potential risks and benefits of weight loss surgery. You must be motivated and willing to make lifelong changes in how you eat to reach and maintain a healthier weight after surgery. You must also be realistic about weight loss after surgery (see 'Effectiveness of weight loss surgery' below).   PREPARING FOR WEIGHT LOSS SURGERY  Most people who have weight loss surgery will meet with several specialists before surgery is scheduled. This often includes a dietitian, mental health counselor, a doctor who specializes in care of obese people, and a surgeon who performs weight loss surgery (bariatric surgeon). You may need to work with these providers for several weeks or months before surgery. The nutritionist will explain what and how much you will be able to eat after surgery. You may also need to lose a small amount of weight before surgery. The mental health specialist will help you to cope with stress and other factors that can make it harder to lose weight or trigger you to eat   The medical doctor will determine whether you need other tests, counseling, or treatment before surgery. He or she might also help you begin a medical weight loss program so that you can lose some weight before surgery. The bariatric surgeon will meet with you to discuss the surgeries available to treat obesity. He or she will also make sure you are a good candidate for surgery. TYPES OF WEIGHT LOSS SURGERY  There are several types of weight loss surgeries, the most common being lap banding, gastric bypass, and gastric sleeve. Lap banding  Laparoscopic adjustable gastric banding (LAGB), or lap banding, is a surgery that uses an adjustable band around the opening to the stomach (figure 1). This reduces the amount of food that you can eat at one time. Lap banding is done through small incisions, with a laparoscope. The band can be adjusted after surgery, allowing you to eat more or less food. Adjustments to the size and tightness of the band are made by using a needle to add or remove fluid from a port (a small container under the skin that is connected to the band). Adding fluid to the band makes it tighter which restricts the amount of food you can eat and may help you to lose more weight.   Lap banding is a popular choice because it is relatively simple to perform, can be adjusted or removed, and has a low risk of serious complications immediately after surgery. However, weight loss with the lap band depends on your ability to follow the program closely. You will need to prepare nutritious meals that [facility] with\" the band, not against it. For example, the lap band will not work well if you eat or drink a large amount of liquid calories (like ice cream). The band will not help you to feel full when you eat/drink liquid calories. Weight loss ranges from 45 to 75 percent after two years. As an example, a person who is 120 pounds overweight could expect to lose approximately 54 to 90 pounds in the two years after lap banding. Gastric bypass  Sunday-en-Y gastric bypass, also called gastric bypass, helps you to lose weight by reducing the amount of food you can eat and reducing the number of calories and nutrients you absorb from the food you eat. To perform gastric bypass, a surgeon creates a small stomach pouch by dividing the stomach and attaching it to the small intestine. This helps you to lose weight in two ways: The smaller stomach can hold less food than before surgery. This causes you to feel full after eating a very small amount of food or liquid. Over time, the pouch might stretch, allowing you to eat more food. The body absorbs fewer calories, since food bypasses most of the stomach as well as the upper small intestine. This new arrangement seems to decrease your appetite and change how you break down foods by changing the release of various hormones. Gastric bypass can be performed as open surgery (through an incision on the abdomen) or laparoscopically, which uses smaller incisions and smaller instruments. Both the laparoscopic and open techniques have risks and benefits. You and your surgeon should work together to decide which surgery, if any, is right for you.   Gastric bypass has a high success rate, and people lose an average of 62 to 68 percent of their excess body weight in the first year. Weight loss typically levels off after one to two years, with an overall excess weight loss between 50 and 75 percent. For a person who is 120 pounds overweight, an average of 60 to 90 pounds of weight loss would be expected. Gastric sleeve  Gastric sleeve, also known as sleeve gastrectomy, is a surgery that reduces the size of the stomach and makes it into a narrow tube (figure 3). The new stomach is much smaller and produces less of the hormone (ghrelin) that causes hunger, helping you feel satisfied with less food. Sleeve gastrectomy is safer than gastric bypass because the intestines are not rearranged, and there is less chance of malnutrition. It also appears to control hunger better than lap banding. It might be safer than the lap banding because no foreign materials are used. The gastric sleeve has a good success rate, and people lose an average of 33 percent of their excess body weight in the first year. For a person who is 120 pounds overweight, this would mean losing about 40 pounds in the first year. WEIGHT LOSS SURGERY COMPLICATIONS  A variety of complications can occur with weight loss surgery. The risks of surgery depend upon which surgery you have and any medical problems you had before surgery. Some of the more common early surgical complications (one to six weeks after surgery) include:  Bleeding   Infection   Blockage or tear in the bowels   Need for further surgery  Important medical complications after surgery can include blood clots in the legs or lungs, heart attack, pneumonia, and urinary tract infection. Complications are less likely when surgery is performed in centers that are experienced in weight loss surgery.  In general, centers with experience in weight loss surgery have:  Board-certified doctors and surgeons   A team of support staff (dietitians, counselors, nurses)   Long-term follow-up after surgery   Hospital staff experienced with the care of weight loss patients. This includes nurses who are trained in the care of patients immediately after surgery and anesthesiologists who are experienced in caring for the morbidly obese. EFFECTIVENESS OF WEIGHT LOSS SURGERY  The goal of weight loss surgery is to reduce the risk of illness or death associated with obesity. Weight loss surgery can also help you to feel and look better, reduce the amount of money you spend on medicines, and cut down on sick days. As an example, weight loss surgery can improve health problems related to obesity (diabetes, high blood pressure, high cholesterol, sleep apnea) to the point that you need less or no medicine. Finally, weight loss surgery might reduce your risk of developing heart disease, cancer, and certain infections. AFTER WEIGHT LOSS SURGERY  You will need to stay in the hospital until your team feels that it is safe for you to leave (on average, one to three days). Do not drive if you are taking prescription pain medicine. Begin exercising as soon as possible once you have healed; most weight loss centers will design an exercise program for you. Once you are home, it is important to eat and drink exactly what your doctor and dietitian recommend. You will see your doctor, nurse, and dietitian on a regular basis after surgery to monitor your health, diet, and weight loss. You will be able to slowly increase how much you eat over time, although it will always be important to:  Eat small, frequent meals and not skip meals   Chew your food slowly and completely   Avoid eating while \"distracted\" (such as eating while watching TV)   Stop eating when you feel full   Drink liquids at least 30 minutes before or after eating   Avoid foods high in fat or sugar   Take vitamin supplements, as recommended  It can take several months to learn to listen to your body so that you know when you are hungry and when you are full.  You may dislike foods you previously loved, and you may begin to prefer new foods. This can be a frustrating process for some people, so talk to your dietitian if you are having trouble. It usually takes between one and two years to lose weight after surgery. After reaching their goal weight, some people have plastic surgery (called \"body contouring\") to remove excess skin from the body, particularly in the abdominal area. Before you decide to have weight loss surgery, you must commit to staying healthy for life. This includes following up with your healthcare team, exercising most days of the week, and eating a sensible diet every day. It can be difficult to develop new eating and exercise habits after weight loss surgery, and you will have to work hard to stick to your goals. Recovering from surgery and losing weight can be stressful and emotional, and it is important to have the support of family and friends. Working with a , therapist, or support group can help you through the ups and downs. WHERE TO GET MORE INFORMATION  Your healthcare provider is the best source of information for questions and concerns related to your medical problem.   This article will be updated as needed every four months on our Web site ([email or web address])      After Visit Summary mailed to the patient along with appointment card and all other paperwork/orders.    -TERI Farah

## 2021-11-10 ENCOUNTER — OFFICE VISIT (OUTPATIENT)
Dept: INTERNAL MEDICINE | Age: 66
End: 2021-11-10
Payer: COMMERCIAL

## 2021-11-10 VITALS
BODY MASS INDEX: 45.99 KG/M2 | WEIGHT: 293 LBS | OXYGEN SATURATION: 98 % | HEART RATE: 78 BPM | SYSTOLIC BLOOD PRESSURE: 130 MMHG | HEIGHT: 67 IN | DIASTOLIC BLOOD PRESSURE: 75 MMHG

## 2021-11-10 DIAGNOSIS — R05.8 DRY COUGH: Primary | ICD-10-CM

## 2021-11-10 DIAGNOSIS — R09.81 SINUS CONGESTION: ICD-10-CM

## 2021-11-10 DIAGNOSIS — N18.31 STAGE 3A CHRONIC KIDNEY DISEASE (HCC): ICD-10-CM

## 2021-11-10 PROCEDURE — 99213 OFFICE O/P EST LOW 20 MIN: CPT | Performed by: STUDENT IN AN ORGANIZED HEALTH CARE EDUCATION/TRAINING PROGRAM

## 2021-11-10 RX ORDER — SODIUM CHLORIDE 0.65 %
1 AEROSOL, SPRAY (ML) NASAL 3 TIMES DAILY
Qty: 1 EACH | Refills: 3 | Status: SHIPPED | OUTPATIENT
Start: 2021-11-10 | End: 2022-04-04

## 2021-11-10 RX ORDER — FLUTICASONE PROPIONATE 50 MCG
2 SPRAY, SUSPENSION (ML) NASAL DAILY
Qty: 16 G | Refills: 2 | Status: SHIPPED | OUTPATIENT
Start: 2021-11-10 | End: 2022-02-09

## 2021-11-10 RX ORDER — GUAIFENESIN 100 MG/5ML
10 SYRUP ORAL 3 TIMES DAILY PRN
Qty: 1 EACH | Refills: 0 | Status: SHIPPED | OUTPATIENT
Start: 2021-11-10

## 2021-11-10 RX ORDER — OMEPRAZOLE 20 MG/1
20 CAPSULE, DELAYED RELEASE ORAL
Qty: 180 CAPSULE | Refills: 1 | Status: SHIPPED | OUTPATIENT
Start: 2021-11-10 | End: 2022-05-03

## 2021-11-10 ASSESSMENT — ENCOUNTER SYMPTOMS
ALLERGIC/IMMUNOLOGIC NEGATIVE: 1
SORE THROAT: 1
SHORTNESS OF BREATH: 1
EYES NEGATIVE: 1
COUGH: 1
GASTROINTESTINAL NEGATIVE: 1

## 2021-11-10 NOTE — PROGRESS NOTES
hypercholesterolemia    Cerebrovascular accident (CVA), 2011, no residual deficit (HCC)    CKD (chronic kidney disease) stage 3, GFR 30-59 ml/min (HCC)    Peripheral vascular disease (HCC)    TIBURCIO (obstructive sleep apnea)    Class 3 severe obesity due to excess calories with serious comorbidity and body mass index (BMI) of 50.0 to 59.9 in adult [facility])    Primary osteoarthritis of left knee    Chronic tension-type headache, not intractable    Carpal tunnel syndrome, bilateral-not on medication due to kidney disease    Microcytic anemia    Alpha thalassemia trait    Cervical stenosis of spinal canal       Health Maintenance Due   Topic Date Due    COVID-19 Vaccine (3 - Booster for Pfizer series) 10/14/2021       Allergies   Allergen Reactions    Duricef [Cefadroxil Monohydrate]     Fish-Derived Products     Pcn [Penicillins]     Tomato          Current Outpatient Medications   Medication Sig Dispense Refill    Vitamin D (CHOLECALCIFEROL) 25 MCG (1000 UT) TABS tablet       clopidogrel (PLAVIX) 75 MG tablet TAKE 1 TABLET BY MOUTH DAILY 28 tablet 3    amLODIPine (NORVASC) 10 MG tablet Take 1 tablet by mouth daily 60 tablet 3    hydroCHLOROthiazide (HYDRODIURIL) 25 MG tablet Take 1 tablet by mouth daily 60 tablet 3    losartan (COZAAR) 50 MG tablet Take 1 tablet by mouth daily 60 tablet 3    aspirin (ASPIRIN LOW DOSE) 81 MG EC tablet TAKE 1 TABLET DAILY 60 tablet 3    atorvastatin (LIPITOR) 80 MG tablet Take 1 tablet by mouth daily 60 tablet 3    folic acid (FOLVITE) 1 MG tablet Take 1 tablet by mouth daily 60 tablet 3    vitamin B-1 (THIAMINE) 100 MG tablet Take 1 tablet by mouth daily 30 tablet 3    diclofenac sodium (VOLTAREN) 1 % GEL Apply 4 g topically 4 times daily 4 Tube 1    acetaminophen (SM PAIN RELIEVER) 325 MG tablet TAKE 2 TABLETS BY MOUTH EVERY 4 HOURS AS NEEDED FOR PAIN 60 tablet 3     No current facility-administered medications for this visit.        Social History     Tobacco Use  Smoking status: Former Smoker     Packs/day: 2.00     Years: 35.00     Pack years: 70.00     Quit date: 10/11/2011     Years since quitting: 10.0    Smokeless tobacco: Never Used   Vaping Use    Vaping Use: Never used   Substance Use Topics    Alcohol use: Yes     Alcohol/week: 6.0 standard drinks     Types: 6 Cans of beer per week     Comment: rare    Drug use: No     Types: Cocaine, Marijuana (Weed)     Comment: per pt did years ago; cocaine & marij, 8-23-19 denies current use       Family History   Problem Relation Age of Onset    High Blood Pressure Mother     Asthma Mother     Heart Disease Mother     Heart Disease Father     High Blood Pressure Father     Diabetes Brother     High Blood Pressure Brother     Heart Disease Brother     High Blood Pressure Maternal Grandmother     High Blood Pressure Maternal Grandfather     Heart Disease Maternal Grandfather         REVIEW OF SYSTEMS:  Review of Systems   Constitutional: Positive for chills and fatigue. HENT: Positive for sore throat. Eyes: Negative. Respiratory: Positive for cough and shortness of breath. Cardiovascular: Negative. Gastrointestinal: Negative. Endocrine: Negative. Genitourinary: Negative. Musculoskeletal: Negative. Skin: Negative. Allergic/Immunologic: Negative. Neurological: Positive for headaches. Hematological: Negative. Psychiatric/Behavioral: Negative. PHYSICAL EXAM:  Vitals:    11/10/21 1433   BP: (!) 142/75   Pulse: 85   Weight: (!) 378 lb 6.4 oz (171.6 kg)   Height: 5' 7\" (1.702 m)     BP Readings from Last 3 Encounters:   11/10/21 (!) 142/75   09/29/21 136/86   09/24/21 (!) 149/77        Physical Exam  Constitutional:       Appearance: Normal appearance. HENT:      Head: Normocephalic. Cardiovascular:      Rate and Rhythm: Normal rate and regular rhythm. Pulses: Normal pulses. Heart sounds: Normal heart sounds.    Pulmonary:      Effort: Pulmonary effort is normal. No respiratory distress. Breath sounds: Normal breath sounds. No stridor. No wheezing, rhonchi or rales. Chest:      Chest wall: No tenderness. Abdominal:      General: Abdomen is flat. Palpations: Abdomen is soft. Musculoskeletal:         General: Normal range of motion. Skin:     General: Skin is warm. Neurological:      General: No focal deficit present. Mental Status: She is alert and oriented to person, place, and time. Psychiatric:         Mood and Affect: Mood normal.           DIAGNOSTIC FINDINGS:  CBC:  Lab Results   Component Value Date    WBC 5.9 09/07/2021    HGB 10.1 09/07/2021     09/07/2021     04/05/2012       BMP:    Lab Results   Component Value Date     09/29/2021    K 4.0 09/29/2021     09/29/2021    CO2 29 09/29/2021    BUN 19 09/29/2021    CREATININE 1.03 09/29/2021    GLUCOSE 85 09/29/2021    GLUCOSE 88 04/12/2012       HEMOGLOBIN A1C:   Lab Results   Component Value Date    LABA1C 5.8 09/29/2021       FASTING LIPID PANEL:  Lab Results   Component Value Date    CHOL 148 09/24/2021    HDL 50 09/24/2021    TRIG 119 09/24/2021       ASSESSMENT AND PLAN:  There are no diagnoses linked to this encounter. FOLLOW UP AND INSTRUCTIONS:  · No follow-ups on file. · Marie Jurado received counseling on the following healthy behaviors: medication adherence    · Discussed use, benefit, and side effects of prescribed medications. Barriers to medication compliance addressed. All patient questions answered. Pt voiced understanding.     · Patient given educational materials - see patient instructions    Tommy Henry MD  PGY-3 Internal Medicine Resident  [place], [place]  11/10/2021 2:56 PM      This note is created with the assistance of a speech-recognition program. While intending to generate a document that actually reflects the content of thevisit, the document can still have some mistakes which may not have been identified and corrected by editing.

## 2021-11-10 NOTE — PROGRESS NOTES
Attending Physician Statement  I have discussed the care of [place] [id number] including pertinent history and exam findings,  with the resident. I have reviewed the key elements of all parts of the encounter with the resident. I agree with the assessment, plan and orders as documented by the resident.   (GE Modifier)    MD JANIA Baker  Attending Physician, [facility]   Faculty, Internal Medicine Residency Program  [address] [place] [address]  11/10/2021, 4:41 PM

## 2021-11-10 NOTE — PATIENT INSTRUCTIONS
Medications e-scribe to pharmacy of pt's choice. Script for lab given to pt, no fasting required. Pt will get labs done as soon as possible. Patient to return to clinic 3 months (2/9/22). AVS reviewed and given to pt. It is very important for your care that you keep your appointment. If for some reason you are unable to keep your appointment it is equally important that you call our office at [phone] to cancel your appointment and reschedule. Failure to do so may result in your termination from our practice.   MB

## 2021-11-12 DIAGNOSIS — I63.00 CEREBROVASCULAR ACCIDENT (CVA) DUE TO THROMBOSIS OF PRECEREBRAL ARTERY (HCC): ICD-10-CM

## 2021-11-12 RX ORDER — CLOPIDOGREL BISULFATE 75 MG/1
75 TABLET ORAL DAILY
Qty: 28 TABLET | Refills: 3 | Status: SHIPPED | OUTPATIENT
Start: 2021-11-12 | End: 2022-03-02 | Stop reason: SDUPTHER

## 2021-11-12 NOTE — TELEPHONE ENCOUNTER
Request for Plavix. Next Visit Date:  Future Appointments   Date Time Provider Mary Bakeri   12/17/2021  1:45 PM Beck Roman DPM [facility]   2/9/2022  2:00 PM Aniyah Salazar MD [facility] IM [facility]   3/21/2022  1:15 PM Santos Alacla  [facility]   9/26/2022  1:00 PM LOY Roland - CNP Neuro [facility]   Topic Date Due    COVID-19 Vaccine (3 - Booster for Leal Peter series) 10/14/2021    Pneumococcal 65+ years Vaccine (2 of 2 - PPSV23) 12/20/2021    Low dose CT lung screening  01/20/2022    Colon cancer screen colonoscopy  08/21/2022    Lipid screen  09/24/2022    A1C test (Diabetic or Prediabetic)  09/29/2022    Potassium monitoring  09/29/2022    Creatinine monitoring  09/29/2022    Annual Wellness Visit (AWV)  10/08/2022    Breast cancer screen  11/04/2022    DTaP/Tdap/Td vaccine (2 - Td or Tdap) 03/08/2028    DEXA (modify frequency per FRAX score)  Completed    Flu vaccine  Completed    Shingles Vaccine  Completed    Hepatitis C screen  Completed    Hepatitis A vaccine  Aged Out    Hepatitis B vaccine  Aged Out    Hib vaccine  Aged Out    Meningococcal (ACWY) vaccine  Aged Out       Hemoglobin A1C (%)   Date Value   09/29/2021 5.8   06/07/2019 5.4   04/19/2018 5.7             ( goal A1C is < 7)   Microalb/Crt.  Ratio (mcg/mg creat)   Date Value   05/09/2017 6     LDL Cholesterol (mg/dL)   Date Value   09/24/2021 74       (goal LDL is <100)   AST (U/L)   Date Value   09/24/2021 18     ALT (U/L)   Date Value   09/24/2021 14     BUN (mg/dL)   Date Value   09/29/2021 19     BP Readings from Last 3 Encounters:   11/10/21 130/75   09/29/21 136/86   09/24/21 (!) 149/77          (goal 120/80)    All Future Testing planned in [facility]  Lab Frequency Next Occurrence   Ferritin Once 09/20/2021   COVID-19 Once 11/10/2021   Protein / Creatinine Ratio, Urine Once 11/10/2021   Iron And TIBC     Vitamin B12 & Folate     Ferritin     CBC Auto Differential           Patient Active Problem List:     Essential hypertension     Pure hypercholesterolemia     Cerebrovascular accident (CVA), 2011, no residual deficit (HCC)     CKD (chronic kidney disease) stage 3, GFR 30-59 ml/min (HCC)     Peripheral vascular disease (HCC)     TIBURCIO (obstructive sleep apnea)     Class 3 severe obesity due to excess calories with serious comorbidity and body mass index (BMI) of 50.0 to 59.9 in adult [facility])     Primary osteoarthritis of left knee     Chronic tension-type headache, not intractable     Carpal tunnel syndrome, bilateral-not on medication due to kidney disease     Microcytic anemia     Alpha thalassemia trait     Cervical stenosis of spinal canal

## 2021-11-14 ENCOUNTER — APPOINTMENT (OUTPATIENT)
Dept: GENERAL RADIOLOGY | Age: 66
DRG: 177 | End: 2021-11-14
Payer: COMMERCIAL

## 2021-11-14 ENCOUNTER — HOSPITAL ENCOUNTER (INPATIENT)
Age: 66
LOS: 3 days | Discharge: HOME HEALTH | DRG: 177 | End: 2021-11-17
Attending: EMERGENCY MEDICINE | Admitting: INTERNAL MEDICINE
Payer: COMMERCIAL

## 2021-11-14 ENCOUNTER — APPOINTMENT (OUTPATIENT)
Dept: CT IMAGING | Age: 66
DRG: 177 | End: 2021-11-14
Payer: COMMERCIAL

## 2021-11-14 DIAGNOSIS — U07.1 COVID: Primary | ICD-10-CM

## 2021-11-14 LAB
ABSOLUTE EOS #: 0.08 K/UL (ref 0–0.44)
ABSOLUTE IMMATURE GRANULOCYTE: <0.03 K/UL (ref 0–0.3)
ABSOLUTE LYMPH #: 0.83 K/UL (ref 1.1–3.7)
ABSOLUTE MONO #: 0.37 K/UL (ref 0.1–1.2)
ALBUMIN SERPL-MCNC: 3.4 G/DL (ref 3.5–5.2)
ALBUMIN/GLOBULIN RATIO: 0.9 (ref 1–2.5)
ALP BLD-CCNC: 100 U/L (ref 35–104)
ALT SERPL-CCNC: 26 U/L (ref 5–33)
ANION GAP SERPL CALCULATED.3IONS-SCNC: 13 MMOL/L (ref 9–17)
AST SERPL-CCNC: 53 U/L
BASOPHILS # BLD: 1 % (ref 0–2)
BASOPHILS ABSOLUTE: <0.03 K/UL (ref 0–0.2)
BILIRUB SERPL-MCNC: 0.23 MG/DL (ref 0.3–1.2)
BNP INTERPRETATION: NORMAL
BUN BLDV-MCNC: 14 MG/DL (ref 8–23)
BUN/CREAT BLD: ABNORMAL (ref 9–20)
C-REACTIVE PROTEIN: 180.3 MG/L (ref 0–5)
CALCIUM SERPL-MCNC: 8.7 MG/DL (ref 8.6–10.4)
CHLORIDE BLD-SCNC: 94 MMOL/L (ref 98–107)
CO2: 25 MMOL/L (ref 20–31)
CREAT SERPL-MCNC: 1.08 MG/DL (ref 0.5–0.9)
D-DIMER QUANTITATIVE: 1.28 MG/L FEU
DIFFERENTIAL TYPE: ABNORMAL
EOSINOPHILS RELATIVE PERCENT: 2 % (ref 1–4)
FERRITIN: 1404 UG/L (ref 13–150)
FIBRINOGEN: 581 MG/DL (ref 140–420)
GFR AFRICAN AMERICAN: >60 ML/MIN
GFR NON-AFRICAN AMERICAN: 50 ML/MIN
GFR SERPL CREATININE-BSD FRML MDRD: ABNORMAL ML/MIN/{1.73_M2}
GFR SERPL CREATININE-BSD FRML MDRD: ABNORMAL ML/MIN/{1.73_M2}
GLUCOSE BLD-MCNC: 99 MG/DL (ref 70–99)
HCT VFR BLD CALC: 34.7 % (ref 36.3–47.1)
HEMOGLOBIN: 10.6 G/DL (ref 11.9–15.1)
IMMATURE GRANULOCYTES: 1 %
LIPASE: 30 U/L (ref 13–60)
LYMPHOCYTES # BLD: 22 % (ref 24–43)
MAGNESIUM: 2.1 MG/DL (ref 1.6–2.6)
MCH RBC QN AUTO: 21.9 PG (ref 25.2–33.5)
MCHC RBC AUTO-ENTMCNC: 30.5 G/DL (ref 28.4–34.8)
MCV RBC AUTO: 71.8 FL (ref 82.6–102.9)
MONOCYTES # BLD: 10 % (ref 3–12)
NRBC AUTOMATED: 0 PER 100 WBC
PDW BLD-RTO: 17.2 % (ref 11.8–14.4)
PLATELET # BLD: 306 K/UL (ref 138–453)
PLATELET ESTIMATE: ABNORMAL
PMV BLD AUTO: 9.9 FL (ref 8.1–13.5)
POTASSIUM SERPL-SCNC: 3.3 MMOL/L (ref 3.7–5.3)
PRO-BNP: 260 PG/ML
PROCALCITONIN: 0.21 NG/ML
RBC # BLD: 4.83 M/UL (ref 3.95–5.11)
RBC # BLD: ABNORMAL 10*6/UL
SARS-COV-2, RAPID: DETECTED
SEG NEUTROPHILS: 64 % (ref 36–65)
SEGMENTED NEUTROPHILS ABSOLUTE COUNT: 2.46 K/UL (ref 1.5–8.1)
SODIUM BLD-SCNC: 132 MMOL/L (ref 135–144)
SPECIMEN DESCRIPTION: ABNORMAL
TOTAL PROTEIN: 7.2 G/DL (ref 6.4–8.3)
TROPONIN INTERP: ABNORMAL
TROPONIN INTERP: ABNORMAL
TROPONIN T: ABNORMAL NG/ML
TROPONIN T: ABNORMAL NG/ML
TROPONIN, HIGH SENSITIVITY: 36 NG/L (ref 0–14)
TROPONIN, HIGH SENSITIVITY: 38 NG/L (ref 0–14)
WBC # BLD: 3.8 K/UL (ref 3.5–11.3)
WBC # BLD: ABNORMAL 10*3/UL

## 2021-11-14 PROCEDURE — 6360000004 HC RX CONTRAST MEDICATION: Performed by: STUDENT IN AN ORGANIZED HEALTH CARE EDUCATION/TRAINING PROGRAM

## 2021-11-14 PROCEDURE — 6370000000 HC RX 637 (ALT 250 FOR IP): Performed by: STUDENT IN AN ORGANIZED HEALTH CARE EDUCATION/TRAINING PROGRAM

## 2021-11-14 PROCEDURE — 94761 N-INVAS EAR/PLS OXIMETRY MLT: CPT

## 2021-11-14 PROCEDURE — 83690 ASSAY OF LIPASE: CPT

## 2021-11-14 PROCEDURE — 84484 ASSAY OF TROPONIN QUANT: CPT

## 2021-11-14 PROCEDURE — 99284 EMERGENCY DEPT VISIT MOD MDM: CPT

## 2021-11-14 PROCEDURE — 2700000000 HC OXYGEN THERAPY PER DAY

## 2021-11-14 PROCEDURE — 2060000000 HC ICU INTERMEDIATE R&B

## 2021-11-14 PROCEDURE — 6360000002 HC RX W HCPCS: Performed by: STUDENT IN AN ORGANIZED HEALTH CARE EDUCATION/TRAINING PROGRAM

## 2021-11-14 PROCEDURE — 83880 ASSAY OF NATRIURETIC PEPTIDE: CPT

## 2021-11-14 PROCEDURE — 6360000002 HC RX W HCPCS

## 2021-11-14 PROCEDURE — 2580000003 HC RX 258: Performed by: STUDENT IN AN ORGANIZED HEALTH CARE EDUCATION/TRAINING PROGRAM

## 2021-11-14 PROCEDURE — 6370000000 HC RX 637 (ALT 250 FOR IP)

## 2021-11-14 PROCEDURE — 85379 FIBRIN DEGRADATION QUANT: CPT

## 2021-11-14 PROCEDURE — 80053 COMPREHEN METABOLIC PANEL: CPT

## 2021-11-14 PROCEDURE — 85025 COMPLETE CBC W/AUTO DIFF WBC: CPT

## 2021-11-14 PROCEDURE — 85384 FIBRINOGEN ACTIVITY: CPT

## 2021-11-14 PROCEDURE — 99223 1ST HOSP IP/OBS HIGH 75: CPT | Performed by: INTERNAL MEDICINE

## 2021-11-14 PROCEDURE — 84145 PROCALCITONIN (PCT): CPT

## 2021-11-14 PROCEDURE — 93005 ELECTROCARDIOGRAM TRACING: CPT | Performed by: STUDENT IN AN ORGANIZED HEALTH CARE EDUCATION/TRAINING PROGRAM

## 2021-11-14 PROCEDURE — 82728 ASSAY OF FERRITIN: CPT

## 2021-11-14 PROCEDURE — 86140 C-REACTIVE PROTEIN: CPT

## 2021-11-14 PROCEDURE — 87635 SARS-COV-2 COVID-19 AMP PRB: CPT

## 2021-11-14 PROCEDURE — 71045 X-RAY EXAM CHEST 1 VIEW: CPT

## 2021-11-14 PROCEDURE — 71260 CT THORAX DX C+: CPT

## 2021-11-14 PROCEDURE — 83735 ASSAY OF MAGNESIUM: CPT

## 2021-11-14 RX ORDER — KETOROLAC TROMETHAMINE 15 MG/ML
15 INJECTION, SOLUTION INTRAMUSCULAR; INTRAVENOUS ONCE
Status: DISCONTINUED | OUTPATIENT
Start: 2021-11-14 | End: 2021-11-14

## 2021-11-14 RX ORDER — SODIUM CHLORIDE 9 MG/ML
INJECTION, SOLUTION INTRAVENOUS CONTINUOUS
Status: DISCONTINUED | OUTPATIENT
Start: 2021-11-14 | End: 2021-11-17 | Stop reason: HOSPADM

## 2021-11-14 RX ORDER — SODIUM CHLORIDE 0.9 % (FLUSH) 0.9 %
5-40 SYRINGE (ML) INJECTION PRN
Status: DISCONTINUED | OUTPATIENT
Start: 2021-11-14 | End: 2021-11-17 | Stop reason: HOSPADM

## 2021-11-14 RX ORDER — ACETAMINOPHEN 650 MG/1
650 SUPPOSITORY RECTAL EVERY 6 HOURS PRN
Status: DISCONTINUED | OUTPATIENT
Start: 2021-11-14 | End: 2021-11-17 | Stop reason: HOSPADM

## 2021-11-14 RX ORDER — ONDANSETRON 2 MG/ML
4 INJECTION INTRAMUSCULAR; INTRAVENOUS EVERY 4 HOURS PRN
Status: DISCONTINUED | OUTPATIENT
Start: 2021-11-14 | End: 2021-11-17 | Stop reason: HOSPADM

## 2021-11-14 RX ORDER — ATORVASTATIN CALCIUM 80 MG/1
80 TABLET, FILM COATED ORAL DAILY
Status: DISCONTINUED | OUTPATIENT
Start: 2021-11-14 | End: 2021-11-17 | Stop reason: HOSPADM

## 2021-11-14 RX ORDER — SODIUM CHLORIDE 0.9 % (FLUSH) 0.9 %
5-40 SYRINGE (ML) INJECTION EVERY 12 HOURS SCHEDULED
Status: DISCONTINUED | OUTPATIENT
Start: 2021-11-14 | End: 2021-11-17 | Stop reason: HOSPADM

## 2021-11-14 RX ORDER — HEPARIN SODIUM 5000 [USP'U]/ML
5000 INJECTION, SOLUTION INTRAVENOUS; SUBCUTANEOUS EVERY 8 HOURS SCHEDULED
Status: DISCONTINUED | OUTPATIENT
Start: 2021-11-14 | End: 2021-11-17 | Stop reason: HOSPADM

## 2021-11-14 RX ORDER — POLYETHYLENE GLYCOL 3350 17 G/17G
17 POWDER, FOR SOLUTION ORAL DAILY PRN
Status: DISCONTINUED | OUTPATIENT
Start: 2021-11-14 | End: 2021-11-17 | Stop reason: HOSPADM

## 2021-11-14 RX ORDER — KETOROLAC TROMETHAMINE 30 MG/ML
30 INJECTION, SOLUTION INTRAMUSCULAR; INTRAVENOUS ONCE
Status: COMPLETED | OUTPATIENT
Start: 2021-11-14 | End: 2021-11-14

## 2021-11-14 RX ORDER — POTASSIUM CHLORIDE 20 MEQ/1
40 TABLET, EXTENDED RELEASE ORAL PRN
Status: DISCONTINUED | OUTPATIENT
Start: 2021-11-14 | End: 2021-11-17 | Stop reason: HOSPADM

## 2021-11-14 RX ORDER — DEXAMETHASONE SODIUM PHOSPHATE 10 MG/ML
10 INJECTION INTRAMUSCULAR; INTRAVENOUS ONCE
Status: COMPLETED | OUTPATIENT
Start: 2021-11-14 | End: 2021-11-14

## 2021-11-14 RX ORDER — 0.9 % SODIUM CHLORIDE 0.9 %
1000 INTRAVENOUS SOLUTION INTRAVENOUS ONCE
Status: COMPLETED | OUTPATIENT
Start: 2021-11-14 | End: 2021-11-14

## 2021-11-14 RX ORDER — SODIUM CHLORIDE 9 MG/ML
25 INJECTION, SOLUTION INTRAVENOUS PRN
Status: DISCONTINUED | OUTPATIENT
Start: 2021-11-14 | End: 2021-11-17 | Stop reason: HOSPADM

## 2021-11-14 RX ORDER — CLOPIDOGREL BISULFATE 75 MG/1
75 TABLET ORAL DAILY
Status: DISCONTINUED | OUTPATIENT
Start: 2021-11-14 | End: 2021-11-17 | Stop reason: HOSPADM

## 2021-11-14 RX ORDER — ACETAMINOPHEN 500 MG
1000 TABLET ORAL ONCE
Status: COMPLETED | OUTPATIENT
Start: 2021-11-14 | End: 2021-11-14

## 2021-11-14 RX ORDER — ACETAMINOPHEN 325 MG/1
650 TABLET ORAL EVERY 6 HOURS PRN
Status: DISCONTINUED | OUTPATIENT
Start: 2021-11-14 | End: 2021-11-17 | Stop reason: HOSPADM

## 2021-11-14 RX ORDER — ASPIRIN 81 MG/1
81 TABLET ORAL DAILY
Status: DISCONTINUED | OUTPATIENT
Start: 2021-11-14 | End: 2021-11-17 | Stop reason: HOSPADM

## 2021-11-14 RX ORDER — POTASSIUM CHLORIDE 7.45 MG/ML
10 INJECTION INTRAVENOUS PRN
Status: DISCONTINUED | OUTPATIENT
Start: 2021-11-14 | End: 2021-11-17 | Stop reason: HOSPADM

## 2021-11-14 RX ADMIN — SODIUM CHLORIDE: 9 INJECTION, SOLUTION INTRAVENOUS at 14:53

## 2021-11-14 RX ADMIN — Medication 81 MG: at 22:45

## 2021-11-14 RX ADMIN — ENOXAPARIN SODIUM 40 MG: 100 INJECTION SUBCUTANEOUS at 14:46

## 2021-11-14 RX ADMIN — SODIUM CHLORIDE, PRESERVATIVE FREE 10 ML: 5 INJECTION INTRAVENOUS at 21:12

## 2021-11-14 RX ADMIN — KETOROLAC TROMETHAMINE 30 MG: 30 INJECTION, SOLUTION INTRAMUSCULAR at 11:21

## 2021-11-14 RX ADMIN — ATORVASTATIN CALCIUM 80 MG: 80 TABLET, FILM COATED ORAL at 22:44

## 2021-11-14 RX ADMIN — CLOPIDOGREL 75 MG: 75 TABLET, FILM COATED ORAL at 22:45

## 2021-11-14 RX ADMIN — HEPARIN SODIUM 5000 UNITS: 5000 INJECTION INTRAVENOUS; SUBCUTANEOUS at 18:32

## 2021-11-14 RX ADMIN — IOPAMIDOL 85 ML: 755 INJECTION, SOLUTION INTRAVENOUS at 10:43

## 2021-11-14 RX ADMIN — SODIUM CHLORIDE 1000 ML: 9 INJECTION, SOLUTION INTRAVENOUS at 09:34

## 2021-11-14 RX ADMIN — ACETAMINOPHEN 1000 MG: 500 TABLET ORAL at 11:21

## 2021-11-14 RX ADMIN — HEPARIN SODIUM 5000 UNITS: 5000 INJECTION INTRAVENOUS; SUBCUTANEOUS at 22:44

## 2021-11-14 RX ADMIN — DEXAMETHASONE SODIUM PHOSPHATE 10 MG: 10 INJECTION INTRAMUSCULAR; INTRAVENOUS at 11:26

## 2021-11-14 RX ADMIN — POTASSIUM BICARBONATE 40 MEQ: 782 TABLET, EFFERVESCENT ORAL at 11:21

## 2021-11-14 ASSESSMENT — PAIN DESCRIPTION - DESCRIPTORS: DESCRIPTORS: CRAMPING

## 2021-11-14 ASSESSMENT — PAIN DESCRIPTION - PAIN TYPE: TYPE: ACUTE PAIN

## 2021-11-14 ASSESSMENT — PAIN SCALES - GENERAL
PAINLEVEL_OUTOF10: 8
PAINLEVEL_OUTOF10: 0
PAINLEVEL_OUTOF10: 3
PAINLEVEL_OUTOF10: 0
PAINLEVEL_OUTOF10: 0

## 2021-11-14 ASSESSMENT — PAIN DESCRIPTION - LOCATION: LOCATION: ABDOMEN

## 2021-11-14 NOTE — PROGRESS NOTES
During admission patient was asked if she had dentures or glasses. Patient replied that she does have upper and lower dentures, and glasses both of which were left at home before coming to the ED.

## 2021-11-14 NOTE — ED PROVIDER NOTES
[facility] [address]. [address]  Emergency Department Encounter  EmergencyMedicine Resident     Pt Name:Filomena Lopez  MRN: [id number]  [place] 1955  Date of evaluation: 11/14/21  PCP:  Russ Farrell MD    [address] [place] [address]       Chief Complaint   Patient presents with    Shortness of Breath    Nausea & Vomiting    Diarrhea       HISTORY OF PRESENT ILLNESS  (Location/Symptom, Timing/Onset, Context/Setting, Quality, Duration, Modifying Factors, Severity.)      Moises Irving is a 74 y.o. female who presents with approximately 7 days shortness of breath nausea vomiting diarrhea fevers myalgias nonbloody emesis. Patient states she got both coronavirus vaccines, other family members in the household have same symptoms for same duration. Shortness of breath with exertion, does improve somewhat with rest.  Wears CPAP at night, does not wear home O2 during the day. History of CVA years ago without residual deficit, history of CAD hypertension. On aspirin Plavix and antihypertensives. PAST MEDICAL / SURGICAL / SOCIAL / FAMILY HISTORY      has a past medical history of Bleeding, Cataracts, bilateral, Cerebrovascular disease, Chronic pain in left foot, CKD (chronic kidney disease) stage 3, GFR 30-59 ml/min ([facility]), GERD (gastroesophageal reflux disease), Hx of blood clots, Hyperlipidemia, Hypertension, Iron (Fe) deficiency anemia, Nicotine abuse, Obesity, Osteoarthritis, Peripheral vascular disease ([facility] [address] Substance abuse ([facility] [address] Thalassemia minor, Unspecified cerebral artery occlusion with cerebral infarction, and Unspecified sleep apnea. has a past surgical history that includes Tonsillectomy and adenoidectomy; Tubal ligation; Breast surgery; Endometrial biopsy (1998); Upper gastrointestinal endoscopy (2009); Colonoscopy (12/2007); Colonoscopy (2013); Upper gastrointestinal endoscopy (N/A, 8/21/2019); and Colonoscopy (N/A, 8/21/2019).     Social History     Socioeconomic History    Marital status:  Spouse name: Not on file    Number of children: Not on file    Years of education: Not on file    Highest education level: Not on file   Occupational History    Not on file   Tobacco Use    Smoking status: Former Smoker     Packs/day: 2.00     Years: 35.00     Pack years: 70.00     Quit date: 10/11/2011     Years since quitting: 10.1    Smokeless tobacco: Never Used   Vaping Use    Vaping Use: Never used   Substance and Sexual Activity    Alcohol use: Yes     Alcohol/week: 6.0 standard drinks     Types: 6 Cans of beer per week     Comment: rare    Drug use: No     Types: Cocaine, Marijuana (Weed)     Comment: per pt did years ago; cocaine & marij, 8-23-19 denies current use    Sexual activity: Yes     Partners: Male   Other Topics Concern    Not on file   Social History Narrative    Not on file     Social Determinants of Health     Financial Resource Strain: Low Risk     Difficulty of Paying Living Expenses: Not hard at all   Food Insecurity: No Food Insecurity    Worried About Running Out of Food in the Last Year: Never true    Doug of Food in the Last Year: Never true   Transportation Needs:     Lack of Transportation (Medical): Not on file    Lack of Transportation (Non-Medical):  Not on file   Physical Activity:     Days of Exercise per Week: Not on file    Minutes of Exercise per Session: Not on file   Stress:     Feeling of Stress : Not on file   Social Connections:     Frequency of Communication with Friends and Family: Not on file    Frequency of Social Gatherings with Friends and Family: Not on file    Attends Alevism Services: Not on file    Active Member of Clubs or Organizations: Not on file    Attends Club or Organization Meetings: Not on file    Marital Status: Not on file   Intimate Partner Violence:     Fear of Current or Ex-Partner: Not on file    Emotionally Abused: Not on file    Physically Abused: Not on file    Sexually Abused: Not on file   Housing Stability:  Unable to Pay for Housing in the Last Year: Not on file    Number of Places Lived in the Last Year: Not on file    Unstable Housing in the Last Year: Not on file       Family History   Problem Relation Age of Onset    High Blood Pressure Mother     Asthma Mother     Heart Disease Mother     Heart Disease Father     High Blood Pressure Father     Diabetes Brother     High Blood Pressure Brother     Heart Disease Brother     High Blood Pressure Maternal Grandmother     High Blood Pressure Maternal Grandfather     Heart Disease Maternal Grandfather        Allergies:  Duricef [cefadroxil monohydrate], Fish-derived products, Pcn [penicillins], and Tomato    Home Medications:  Prior to Admission medications    Medication Sig Start Date End Date Taking?  Authorizing Provider   clopidogrel (PLAVIX) 75 MG tablet TAKE 1 TABLET BY MOUTH DAILY 11/12/21  Yes Myles Urbano MD   fluticasone (FLONASE) 50 MCG/ACT nasal spray 2 sprays by Each Nostril route daily 11/10/21  Yes Rigo Mann MD   omeprazole (PRILOSEC) 20 MG delayed release capsule Take 1 capsule by mouth 2 times daily (before meals) 11/10/21  Yes Rigo Mann MD   Vitamin D (CHOLECALCIFEROL) 25 MCG (1000 UT) TABS tablet  7/29/21  Yes Historical Provider, MD   amLODIPine (NORVASC) 10 MG tablet Take 1 tablet by mouth daily 4/15/21  Yes Rigo Mann MD   hydroCHLOROthiazide (HYDRODIURIL) 25 MG tablet Take 1 tablet by mouth daily 4/15/21  Yes Rigo Mann MD   losartan (COZAAR) 50 MG tablet Take 1 tablet by mouth daily 4/15/21  Yes Rigo Mann MD   aspirin (ASPIRIN LOW DOSE) 81 MG EC tablet TAKE 1 TABLET DAILY 4/15/21  Yes Rigo Mann MD   atorvastatin (LIPITOR) 80 MG tablet Take 1 tablet by mouth daily 4/15/21  Yes Rigo Mann MD   folic acid (FOLVITE) 1 MG tablet Take 1 tablet by mouth daily 4/15/21  Yes Rigo Mann MD   vitamin B-1 (THIAMINE) 100 MG tablet Take 1 tablet by mouth daily 4/15/21  Yes Rigo Mann MD diclofenac sodium (VOLTAREN) 1 % GEL Apply 4 g topically 4 times daily 6/28/20  Yes Wilber Sanchez DO   guaiFENesin (ALTARUSSIN) 100 MG/5ML syrup Take 10 mLs by mouth 3 times daily as needed for Cough 11/10/21   Collette Hahn, MD   Dextromethorphan-Benzocaine (SORE THROAT & COUGH LOZENGES) 5-7.5 MG LOZG Take 1 Dose by mouth 4 times daily as needed (sore throat) 11/10/21 11/20/21  Collette Hahn, MD   Nasal Moisturizer Combination (OCEAN COMPLETE SINUS RINSE) AERS 1 Can by Nasal route 3 times daily 11/10/21   Collette Hahn, MD   Thiamine HCl (VITAMIN B-1 PO) Take by mouth daily  4/16/21  Historical Provider, MD   acetaminophen ( PAIN RELIEVER) 325 MG tablet TAKE 2 TABLETS BY MOUTH EVERY 4 HOURS AS NEEDED FOR PAIN 11/27/19   Jadon Salazar MD       REVIEW OF SYSTEMS    (2-9 systems for level 4, 10 or more for level 5)      Review of Systems   Constitutional: Positive for fever. HENT: Positive for congestion. Eyes: Negative for photophobia. Respiratory: Positive for shortness of breath. Cardiovascular: Negative for chest pain. Gastrointestinal: Negative for abdominal pain and vomiting. Endocrine: Negative for polyuria. Genitourinary: Negative for dysuria. Musculoskeletal: Negative for arthralgias. Skin: Negative for color change. Allergic/Immunologic: Negative for immunocompromised state. Neurological: Negative for dizziness. Hematological: Does not bruise/bleed easily. Psychiatric/Behavioral: Negative for agitation. PHYSICAL EXAM   (up to 7 for level 4, 8 or more for level 5)      INITIAL VITALS:   BP (!) 142/76   Pulse 63   Temp 100 °F (37.8 °C) (Oral)   Resp 20   Ht 5' 7\" (1.702 m)   Wt (!) 370 lb 13 oz (168.2 kg)   SpO2 96%   BMI 58.08 kg/m²     Physical Exam  Constitutional:       General: Not in acute distress. Appearance: Normal appearance. Super morbidly obese weight. Not toxic-appearing. HENT:      Head: Normocephalic and atraumatic.       Nose: Nose normal.      Mouth/Throat: Mucous membranes are moist.  Uvula midline no oropharyngeal edema. Pharynx: Oropharynx is clear. Eyes:      Extraocular Movements: Extraocular movements intact. Conjunctiva/sclera: Conjunctivae normal.      Pupils: Pupils are equal, round, and reactive to light. Neck:      Musculoskeletal: Normal range of motion and neck supple. No neck rigidity. Cardiovascular:      Rate and Rhythm: Normal rate and regular rhythm. Pulses: Normal pulses. Heart sounds: Normal heart sounds. No murmur. Pulmonary:      Effort: Pulmonary effort is increased. Breath sounds: Limited due to obesity, remote breath sounds    Abdominal:      General: Abdomen is flat. Bowel sounds are normal.      Tenderness: There is no abdominal tenderness. Musculoskeletal:     Normal range of motion. General: No swelling or tenderness. Trace bilateral lower extremity pitting edema    Skin:     General: Skin is warm. Capillary Refill: Capillary refill takes less than 2 seconds. Coloration: Skin is not jaundiced. Neurological:      General: No focal deficit present. Mental Status: Alert and oriented to person, place, and time. Mental status is at baseline. Motor: No weakness. DIFFERENTIAL  DIAGNOSIS     PLAN (LABS / IMAGING / EKG):  Orders Placed This Encounter   Procedures    COVID-19, Rapid    XR CHEST PORTABLE    CT CHEST PULMONARY EMBOLISM W CONTRAST    CBC Auto Differential    Comprehensive Metabolic Panel w/ Reflex to MG    Lipase    Brain Natriuretic Peptide    Urinalysis Reflex to Culture    Troponin    D-Dimer, Quantitative    Troponin    C-Reactive Protein    Ferritin    Fibrinogen    Procalcitonin    Magnesium    Comprehensive Metabolic Panel w/ Reflex to MG    CBC    Diet NPO    ADULT DIET;  Regular    Vital signs per unit routine    Telemetry monitoring - 72 hour duration    Notify physician    Up as tolerated    Place intermittent pneumatic compression device    Nursing swallow assessment    Turn or assist with turn approximately every 2 hours if patient is unable to turn self. Remind patient to turn if necessary.     Assess skin per unit guidelines    Pad/offload medical devices    Maintain HOB at the lowest elevation consistent with medical plan of care    Use lift equipment for lifting patient    Maintain heels off of bed at all times    Full Code    Inpatient consult to Internal Medicine    Inpatient consult to Infectious Diseases    Droplet isolation - (e.g., confirmed or rule out - Influenza, Mumps, Meningitis, Mycoplasma, Meningococcal Disease)    OT eval and treat    PT evaluation and treat    Initiate Oxygen Therapy Protocol    EKG 12 Lead    Insert peripheral IV    PATIENT STATUS (FROM ED OR OR/PROCEDURAL) Inpatient       MEDICATIONS ORDERED:  Orders Placed This Encounter   Medications    0.9 % sodium chloride bolus    iopamidol (ISOVUE-370) 76 % injection 85 mL    0.9 % sodium chloride infusion    sodium chloride flush 0.9 % injection 5-40 mL    sodium chloride flush 0.9 % injection 5-40 mL    0.9 % sodium chloride infusion    OR Linked Order Group     potassium chloride (KLOR-CON M) extended release tablet 40 mEq     potassium bicarb-citric acid (EFFER-K) effervescent tablet 40 mEq     potassium chloride 10 mEq/100 mL IVPB (Peripheral Line)    DISCONTD: enoxaparin (LOVENOX) injection 40 mg    ondansetron (ZOFRAN) injection 4 mg    polyethylene glycol (GLYCOLAX) packet 17 g    OR Linked Order Group     acetaminophen (TYLENOL) tablet 650 mg     acetaminophen (TYLENOL) suppository 650 mg    acetaminophen (TYLENOL) tablet 1,000 mg    potassium bicarb-citric acid (EFFER-K) effervescent tablet 40 mEq    DISCONTD: ketorolac (TORADOL) injection 15 mg    ketorolac (TORADOL) injection 30 mg    dexamethasone (DECADRON) injection 10 mg    heparin (porcine) injection 5,000 Units DIAGNOSTIC RESULTS / EMERGENCY DEPARTMENT COURSE / MDM     LABS:  Results for orders placed or performed during the hospital encounter of 11/14/21   COVID-19, Rapid    Specimen: Nasopharyngeal Swab   Result Value Ref Range    Specimen Description . NASOPHARYNGEAL SWAB     SARS-CoV-2, Rapid DETECTED (A) Not Detected   CBC Auto Differential   Result Value Ref Range    WBC 3.8 3.5 - 11.3 k/uL    RBC 4.83 3.95 - 5.11 m/uL    Hemoglobin 10.6 (L) 11.9 - 15.1 g/dL    Hematocrit 34.7 (L) 36.3 - 47.1 %    MCV 71.8 (L) 82.6 - 102.9 fL    MCH 21.9 (L) 25.2 - 33.5 pg    MCHC 30.5 28.4 - 34.8 g/dL    RDW 17.2 (H) 11.8 - 14.4 %    Platelets 446 801 - 827 k/uL    MPV 9.9 8.1 - 13.5 fL    NRBC Automated 0.0 0.0 per 100 WBC    Differential Type NOT REPORTED     Seg Neutrophils 64 36 - 65 %    Lymphocytes 22 (L) 24 - 43 %    Monocytes 10 3 - 12 %    Eosinophils % 2 1 - 4 %    Basophils 1 0 - 2 %    Immature Granulocytes 1 (H) 0 %    Segs Absolute 2.46 1.50 - 8.10 k/uL    Absolute Lymph # 0.83 (L) 1.10 - 3.70 k/uL    Absolute Mono # 0.37 0.10 - 1.20 k/uL    Absolute Eos # 0.08 0.00 - 0.44 k/uL    Basophils Absolute <0.03 0.00 - 0.20 k/uL    Absolute Immature Granulocyte <0.03 0.00 - 0.30 k/uL    WBC Morphology NOT REPORTED     RBC Morphology ANISOCYTOSIS PRESENT     Platelet Estimate NOT REPORTED    Comprehensive Metabolic Panel w/ Reflex to MG   Result Value Ref Range    Glucose 99 70 - 99 mg/dL    BUN 14 8 - 23 mg/dL    CREATININE 1.08 (H) 0.50 - 0.90 mg/dL    Bun/Cre Ratio NOT REPORTED 9 - 20    Calcium 8.7 8.6 - 10.4 mg/dL    Sodium 132 (L) 135 - 144 mmol/L    Potassium 3.3 (L) 3.7 - 5.3 mmol/L    Chloride 94 (L) 98 - 107 mmol/L    CO2 25 20 - 31 mmol/L    Anion Gap 13 9 - 17 mmol/L    Alkaline Phosphatase 100 35 - 104 U/L    ALT 26 5 - 33 U/L    AST 53 (H) <32 U/L    Total Bilirubin 0.23 (L) 0.3 - 1.2 mg/dL    Total Protein 7.2 6.4 - 8.3 g/dL    Albumin 3.4 (L) 3.5 - 5.2 g/dL    Albumin/Globulin Ratio 0.9 (L) 1.0 - 2.5 GFR Non- 50 (L) >60 mL/min    GFR African American >60 >60 mL/min    GFR Comment          GFR Staging NOT REPORTED    Lipase   Result Value Ref Range    Lipase 30 13 - 60 U/L   Brain Natriuretic Peptide   Result Value Ref Range    Pro- <300 pg/mL    BNP Interpretation NOT REPORTED    Troponin   Result Value Ref Range    Troponin, High Sensitivity 38 (H) 0 - 14 ng/L    Troponin T NOT REPORTED <0.03 ng/mL    Troponin Interp NOT REPORTED    D-Dimer, Quantitative   Result Value Ref Range    D-Dimer, Quant 1.28 mg/L FEU   Troponin   Result Value Ref Range    Troponin, High Sensitivity 36 (H) 0 - 14 ng/L    Troponin T NOT REPORTED <0.03 ng/mL    Troponin Interp NOT REPORTED    C-Reactive Protein   Result Value Ref Range    .3 (H) 0.0 - 5.0 mg/L   Ferritin   Result Value Ref Range    Ferritin 1,404 (H) 13 - 150 ug/L   Fibrinogen   Result Value Ref Range    Fibrinogen 581 (H) 140 - 420 mg/dL   Procalcitonin   Result Value Ref Range    Procalcitonin 0.21 (H) <0.09 ng/mL   Magnesium   Result Value Ref Range    Magnesium 2.1 1.6 - 2.6 mg/dL         RADIOLOGY:  XR CHEST PORTABLE    Result Date: 11/14/2021  EXAMINATION: ONE XRAY VIEW OF THE CHEST 11/14/2021 9:54 am COMPARISON: 01/05/2019 HISTORY: ORDERING SYSTEM PROVIDED HISTORY: sob TECHNOLOGIST PROVIDED HISTORY: sob Reason for Exam: portable upright FINDINGS: Cardiomediastinal silhouette stable. Left upper lobe irregular opacity. No pneumothorax. No pleural effusion. Irregular left upper lobe opacity. This could represent a focal infectious process but a neoplastic process should be considered. However, no pulmonary nodule seen in this region on a chest CT done 01/20/2021. Follow-up suggested to ensure resolution.      CT CHEST PULMONARY EMBOLISM W CONTRAST    Result Date: 11/14/2021  EXAMINATION: CTA OF THE CHEST 11/14/2021 10:23 am TECHNIQUE: CTA of the chest was performed after the administration of intravenous contrast.

## 2021-11-14 NOTE — PLAN OF CARE
Problem: Airway Clearance - Ineffective  Goal: Achieve or maintain patent airway  Outcome: Met This Shift     Problem: Gas Exchange - Impaired  Goal: Absence of hypoxia  Outcome: Met This Shift  Goal: Promote optimal lung function  Outcome: Met This Shift     Problem: Breathing Pattern - Ineffective  Goal: Ability to achieve and maintain a regular respiratory rate  Outcome: Met This Shift     Problem:  Body Temperature -  Risk of, Imbalanced  Goal: Ability to maintain a body temperature within defined limits  Outcome: Met This Shift  Goal: Will regain or maintain usual level of consciousness  Outcome: Met This Shift  Goal: Complications related to the disease process, condition or treatment will be avoided or minimized  Outcome: Met This Shift     Problem: Isolation Precautions - Risk of Spread of Infection  Goal: Prevent transmission of infection  Outcome: Met This Shift     Problem: Nutrition Deficits  Goal: Optimize nutritional status  Outcome: Met This Shift     Problem: Risk for Fluid Volume Deficit  Goal: Maintain normal heart rhythm  Outcome: Met This Shift  Goal: Maintain absence of muscle cramping  Outcome: Met This Shift  Goal: Maintain normal serum potassium, sodium, calcium, phosphorus, and pH  Outcome: Met This Shift     Problem: Loneliness or Risk for Loneliness  Goal: Demonstrate positive use of time alone when socialization is not possible  Outcome: Met This Shift     Problem: Fatigue  Goal: Verbalize increase energy and improved vitality  Outcome: Met This Shift     Problem: Patient Education: Go to Patient Education Activity  Goal: Patient/Family Education  Outcome: Met This Shift     Problem: Falls - Risk of:  Goal: Will remain free from falls  Description: Will remain free from falls  Outcome: Met This Shift  Goal: Absence of physical injury  Description: Absence of physical injury  Outcome: Met This Shift     Problem: Skin Integrity:  Goal: Will show no infection signs and

## 2021-11-14 NOTE — ED TRIAGE NOTES
Pt c/o SOB, cough, N/V, diarhea. Pt states seeh as had these s/sx for two days. Pt unable to keep any food or liquids down. Pt /has increased urination and abd pain . Pt coughrd up blood x 3 times.

## 2021-11-14 NOTE — H&P
[address]     Department of Internal Medicine - Staff Internal Medicine Teaching Service          ADMISSION NOTE/HISTORY AND PHYSICAL EXAMINATION   Date: 11/14/2021  Patient Name: Marysol Bridges  Date of admission: 11/14/2021  9:06 AM  YOB: 1955  PCP: Diogo Urias MD  History Obtained From:  patient, EMR    CHIEF COMPLAINT     Chief complaint: Shortness of breath    HISTORY OF PRESENTING ILLNESS     A 74 y.o. -American female presented with chief complaint of  shortness of breath. She has PMH significant of HTN, HLD, CVA, microcytic anemia (alpha thalassemia trait), CKD stage III. She also has nausea, vomiting, and diarrhea, all symptoms ongoing for last 7 to 10 days. She also has vague abdominal discomfort present. Shortness of breath is gradually progressing, worsening on exertion and decreases somewhat on rest.  She denies orthopnea or PND episodes. She has intermittent cough and is bringing up whitish-yellow phlegm. She also has myalgias and arthralgias. She complains of fatigue and decreased appetite. She denies eating any outside food or travel history. Her other family members have similar symptoms. She is vaccinated with moderna vaccine. She recently took her flu shot after which her symptoms developed. Her  is also admitted today with a similar complaints. She has no urinary complaints. She has morbid obesity and associated TIBURCIO, she uses CPAP at night. In the ER, she desaturated on room air and was placed on 4 L NC via O2, saturations improved. Also the work of breathing decreased. Work-up in the ER,  She was found to be COVID-19 positive. EKG showed no ST-T wave abnormality, troponin 38, 36; proBNP 260  Elevated D-dimer 1.28, underwent CT PE, which ruled out embolism and confirmed COVID-19 pneumonia.   Hemoglobin 10.6  .3  Pro-Adolph 0.01  Ferritin 1404  Fibrinogen 581  Lipase 30    Review of Systems Constitutional: Positive for activity change, appetite change, chills and fatigue. Negative for diaphoresis, fever and unexpected weight change. HENT: Positive for congestion and postnasal drip. Negative for facial swelling, hearing loss, sinus pain, tinnitus and voice change. Eyes: Negative. Negative for photophobia, pain, discharge, redness, itching and visual disturbance. Respiratory: Positive for cough, chest tightness and shortness of breath. Negative for apnea, choking, wheezing and stridor. Cardiovascular: Negative. Negative for chest pain, palpitations and leg swelling. Gastrointestinal: Positive for diarrhea, nausea and vomiting. Negative for abdominal distention, abdominal pain and constipation. Endocrine: Negative. Negative for cold intolerance, heat intolerance and polyuria. Genitourinary: Negative. Negative for difficulty urinating, dysuria, frequency and urgency. Musculoskeletal: Positive for arthralgias and myalgias. Negative for back pain and gait problem. Skin: Negative. Negative for color change, pallor, rash and wound. Allergic/Immunologic: Negative. Neurological: Negative. Negative for dizziness, tremors, seizures, syncope, facial asymmetry, speech difficulty, weakness, light-headedness, numbness and headaches. Hematological: Negative. Negative for adenopathy. Does not bruise/bleed easily. Psychiatric/Behavioral: Negative. Negative for agitation, behavioral problems, confusion, decreased concentration, dysphoric mood, hallucinations, self-injury, sleep disturbance and suicidal ideas. The patient is not nervous/anxious and is not hyperactive.           PAST MEDICAL HISTORY     Past Medical History:   Diagnosis Date    Bleeding     while on aggrenox    Cataracts, bilateral     Cerebrovascular disease 2003,2011    cva    Chronic pain in left foot     CKD (chronic kidney disease) stage 3, GFR 30-59 ml/min ([facility])     GERD (gastroesophageal reflux disease)  Hx of blood clots     Hyperlipidemia     Hypertension     Iron (Fe) deficiency anemia     Nicotine abuse     Obesity     Osteoarthritis     Peripheral vascular disease ([facility] [address] 3/13/2014    Substance abuse (HCC)     cocaine, canabis    Thalassemia minor     Unspecified cerebral artery occlusion with cerebral infarction     Unspecified sleep apnea        PAST SURGICAL HISTORY     Past Surgical History:   Procedure Laterality Date    BREAST SURGERY      biopsy    COLONOSCOPY  12/2007    adenomatous polyp    COLONOSCOPY  2013    normal, repeat in 5 years    COLONOSCOPY N/A 8/21/2019    COLONOSCOPY POLYPECTOMY SNARE/COLD BIOPSY performed by Pascale Billings MD at [address] [facility] GASTROINTESTINAL ENDOSCOPY  2009    negative    UPPER GASTROINTESTINAL ENDOSCOPY N/A 8/21/2019    EGD BIOPSY performed by Pascale Billings MD at [address] [cefadroxil monohydrate], Fish-derived products, Pcn [penicillins], and Tomato    MEDICATIONS PRIOR TO ADMISSION     Prior to Admission medications    Medication Sig Start Date End Date Taking?  Authorizing Provider   clopidogrel (PLAVIX) 75 MG tablet TAKE 1 TABLET BY MOUTH DAILY 11/12/21  Yes Pat Cardona MD   fluticasone (FLONASE) 50 MCG/ACT nasal spray 2 sprays by Each Nostril route daily 11/10/21  Yes Patrick Dobson MD   omeprazole (PRILOSEC) 20 MG delayed release capsule Take 1 capsule by mouth 2 times daily (before meals) 11/10/21  Yes Patrick Dobson MD   Vitamin D (CHOLECALCIFEROL) 25 MCG (1000 UT) TABS tablet  7/29/21  Yes Historical Provider, MD   amLODIPine (NORVASC) 10 MG tablet Take 1 tablet by mouth daily 4/15/21  Yes Patrick Dobson MD   hydroCHLOROthiazide (HYDRODIURIL) 25 MG tablet Take 1 tablet by mouth daily 4/15/21  Yes Patrick Dobson MD   losartan (COZAAR) 50 MG tablet Take 1 tablet by mouth daily 4/15/21  Yes Evita Duckworth MD [place]   aspirin (ASPIRIN LOW DOSE) 81 MG EC tablet TAKE 1 TABLET DAILY 4/15/21  Yes Jahaira Pérez MD   atorvastatin (LIPITOR) 80 MG tablet Take 1 tablet by mouth daily 4/15/21  Yes Jahaira Pérez MD   folic acid (FOLVITE) 1 MG tablet Take 1 tablet by mouth daily 4/15/21  Yes Jahaira Pérez MD   vitamin B-1 (THIAMINE) 100 MG tablet Take 1 tablet by mouth daily 4/15/21  Yes Jahaira Pérez MD   diclofenac sodium (VOLTAREN) 1 % GEL Apply 4 g topically 4 times daily 6/28/20  Yes Wilber John DO   guaiFENesin (ALTARUSSIN) 100 MG/5ML syrup Take 10 mLs by mouth 3 times daily as needed for Cough 11/10/21   Jahaira Pérez MD   Dextromethorphan-Benzocaine (SORE THROAT & COUGH LOZENGES) 5-7.5 MG LOZG Take 1 Dose by mouth 4 times daily as needed (sore throat) 11/10/21 11/20/21  Jahaira Pérez MD   Nasal Moisturizer Combination (OCEAN COMPLETE SINUS RINSE) AERS 1 Can by Nasal route 3 times daily 11/10/21   Jahaira Pérez MD   Thiamine HCl (VITAMIN B-1 PO) Take by mouth daily  4/16/21  Historical Provider, MD   acetaminophen (SM PAIN RELIEVER) 325 MG tablet TAKE 2 TABLETS BY MOUTH EVERY 4 HOURS AS NEEDED FOR PAIN 11/27/19   Rolando Gilliland MD       SOCIAL HISTORY     Tobacco: Former smoker, smoked 2 PPD for more than 35 years. Alcohol: He is around 3-4 drinks every day. Illicits: Yes, marijuana.       FAMILY HISTORY     Family History   Problem Relation Age of Onset    High Blood Pressure Mother     Asthma Mother     Heart Disease Mother     Heart Disease Father     High Blood Pressure Father     Diabetes Brother     High Blood Pressure Brother     Heart Disease Brother     High Blood Pressure Maternal Grandmother     High Blood Pressure Maternal Grandfather     Heart Disease Maternal Grandfather        PHYSICAL EXAM     Vitals: BP (!) 142/76   Pulse 63   Temp 100 °F (37.8 °C) (Oral)   Resp 24   Ht 5' 7\" (1.702 m)   Wt (!) 370 lb 13 oz (168.2 kg)   SpO2 96%   BMI 58.08 kg/m²   Tmax: Temp (24hrs), Av °F (37.8 °C), Min:99.9 °F (37.7 °C), Max:100 °F (37.8 °C)    Last Body weight:   Wt Readings from Last 3 Encounters:   21 (!) 370 lb 13 oz (168.2 kg)   11/10/21 (!) 378 lb 6.4 oz (171.6 kg)   21 (!) 378 lb (171.5 kg)     Body Mass Index : Body mass index is 58.08 kg/m². PHYSICAL EXAMINATION:  Constitutional: This is a well developed, well nourished, Greater than 36 - Morbid Obesity / Extreme Obesity / Grade III 74y.o. year old female who is alert, oriented, cooperative and in no apparent distress. Head:normocephalic and atraumatic. EENT:  PERRLA. No conjunctival injections. Septum was midline, mucosa was without erythema, exudates or cobblestoning. No thrush was noted. Neck: Supple without thyromegaly. No elevated JVP. Trachea was midline. Respiratory: Chest was symmetrical without dullness to percussion. Breath sounds bilaterally were clear to auscultation. There were no wheezes, rhonchi or rales. There is no intercostal retraction or use of accessory muscles. No egophony noted. Cardiovascular: Regular without murmur, clicks, gallops or rubs. Abdomen: Slightly rounded and soft without organomegaly. No rebound, rigidity or guarding was appreciated. Lymphatic: No lymphadenopathy. Musculoskeletal: Normal curvature of the spine. No gross muscle weakness. Extremities:  No lower extremity edema, ulcerations, tenderness, varicosities or erythema. Muscle size, tone and strength are normal.  No involuntary movements are noted. Skin:  Warm and dry. Good color, turgor and pigmentation. No lesions or scars.   No cyanosis or clubbing  Neurological/Psychiatric: The patient's general behavior, level of consciousness, thought content and emotional status is normal.          INVESTIGATIONS     Laboratory Testing:     Recent Results (from the past 24 hour(s))   CBC Auto Differential    Collection Time: 21  9:37 AM   Result Value Ref Range    WBC 3.8 3.5 - 11.3 k/uL RBC 4.83 3.95 - 5.11 m/uL    Hemoglobin 10.6 (L) 11.9 - 15.1 g/dL    Hematocrit 34.7 (L) 36.3 - 47.1 %    MCV 71.8 (L) 82.6 - 102.9 fL    MCH 21.9 (L) 25.2 - 33.5 pg    MCHC 30.5 28.4 - 34.8 g/dL    RDW 17.2 (H) 11.8 - 14.4 %    Platelets 457 087 - 747 k/uL    MPV 9.9 8.1 - 13.5 fL    NRBC Automated 0.0 0.0 per 100 WBC    Differential Type NOT REPORTED     Seg Neutrophils 64 36 - 65 %    Lymphocytes 22 (L) 24 - 43 %    Monocytes 10 3 - 12 %    Eosinophils % 2 1 - 4 %    Basophils 1 0 - 2 %    Immature Granulocytes 1 (H) 0 %    Segs Absolute 2.46 1.50 - 8.10 k/uL    Absolute Lymph # 0.83 (L) 1.10 - 3.70 k/uL    Absolute Mono # 0.37 0.10 - 1.20 k/uL    Absolute Eos # 0.08 0.00 - 0.44 k/uL    Basophils Absolute <0.03 0.00 - 0.20 k/uL    Absolute Immature Granulocyte <0.03 0.00 - 0.30 k/uL    WBC Morphology NOT REPORTED     RBC Morphology ANISOCYTOSIS PRESENT     Platelet Estimate NOT REPORTED    Comprehensive Metabolic Panel w/ Reflex to MG    Collection Time: 11/14/21  9:37 AM   Result Value Ref Range    Glucose 99 70 - 99 mg/dL    BUN 14 8 - 23 mg/dL    CREATININE 1.08 (H) 0.50 - 0.90 mg/dL    Bun/Cre Ratio NOT REPORTED 9 - 20    Calcium 8.7 8.6 - 10.4 mg/dL    Sodium 132 (L) 135 - 144 mmol/L    Potassium 3.3 (L) 3.7 - 5.3 mmol/L    Chloride 94 (L) 98 - 107 mmol/L    CO2 25 20 - 31 mmol/L    Anion Gap 13 9 - 17 mmol/L    Alkaline Phosphatase 100 35 - 104 U/L    ALT 26 5 - 33 U/L    AST 53 (H) <32 U/L    Total Bilirubin 0.23 (L) 0.3 - 1.2 mg/dL    Total Protein 7.2 6.4 - 8.3 g/dL    Albumin 3.4 (L) 3.5 - 5.2 g/dL    Albumin/Globulin Ratio 0.9 (L) 1.0 - 2.5    GFR Non- 50 (L) >60 mL/min    GFR African American >60 >60 mL/min    GFR Comment          GFR Staging NOT REPORTED    Lipase    Collection Time: 11/14/21  9:37 AM   Result Value Ref Range    Lipase 30 13 - 60 U/L   Brain Natriuretic Peptide    Collection Time: 11/14/21  9:37 AM   Result Value Ref Range    Pro- <300 pg/mL    BNP Interpretation NOT REPORTED    Troponin    Collection Time: 11/14/21  9:37 AM   Result Value Ref Range    Troponin, High Sensitivity 38 (H) 0 - 14 ng/L    Troponin T NOT REPORTED <0.03 ng/mL    Troponin Interp NOT REPORTED    D-Dimer, Quantitative    Collection Time: 11/14/21  9:37 AM   Result Value Ref Range    D-Dimer, Quant 1.28 mg/L FEU   C-Reactive Protein    Collection Time: 11/14/21  9:37 AM   Result Value Ref Range    .3 (H) 0.0 - 5.0 mg/L   Ferritin    Collection Time: 11/14/21  9:37 AM   Result Value Ref Range    Ferritin 1,404 (H) 13 - 150 ug/L   Fibrinogen    Collection Time: 11/14/21  9:37 AM   Result Value Ref Range    Fibrinogen 581 (H) 140 - 420 mg/dL   Procalcitonin    Collection Time: 11/14/21  9:37 AM   Result Value Ref Range    Procalcitonin 0.21 (H) <0.09 ng/mL   Magnesium    Collection Time: 11/14/21  9:37 AM   Result Value Ref Range    Magnesium 2.1 1.6 - 2.6 mg/dL   COVID-19, Rapid    Collection Time: 11/14/21  9:39 AM    Specimen: Nasopharyngeal Swab   Result Value Ref Range    Specimen Description . NASOPHARYNGEAL SWAB     SARS-CoV-2, Rapid DETECTED (A) Not Detected   Troponin    Collection Time: 11/14/21 12:27 PM   Result Value Ref Range    Troponin, High Sensitivity 36 (H) 0 - 14 ng/L    Troponin T NOT REPORTED <0.03 ng/mL    Troponin Interp NOT REPORTED        Imaging:   XR CHEST PORTABLE    Result Date: 11/14/2021  Irregular left upper lobe opacity. This could represent a focal infectious process but a neoplastic process should be considered. However, no pulmonary nodule seen in this region on a chest CT done 01/20/2021. Follow-up suggested to ensure resolution. CT CHEST PULMONARY EMBOLISM W CONTRAST    Result Date: 11/14/2021  1. No acute pulmonary embolism or acute thoracic aortic abnormality. 2. Multifocal infiltrates more prominent in the left upper lobe and left lower lobe. 3. Mild cardiomegaly.        ASSESSMENT & PLAN     ASSESSMENT / PLAN:     IMPRESSION  This is a 74 y.o. female who presented with shortness of breath, nausea vomiting and diarrhea and found to have acute hypoxic respiratory failure secondary to COVID-19 infection. Patient admitted to inpatient status to evaluate and manage the same. COVID-19 infection  Received 1 dose of Decadron 10 mg IV  We will likely start on Decadron and Actemra. ID is on board, appreciate recommendations  Will monitor inflammatory markers and WBCs  On 4 L O2 via NC. Acute hypoxic respiratory failure  Secondary to COVID-19  On 4 L O2 via NC. Will monitor and wean off as appropriate. Hypertension  On HCTZ 25 mg  On losartan 50 mg  We will hold off home medications for now and resume as appropriate. Hyperlipidemia  On Lipitor 80 mg    Chronic kidney disease  Creatinine 1.08 today  We will monitor     Morbid obesity  TIBURCIO  On CPAP during sleep and daytime naps. Peripheral vascular disease    Previous cerebrovascular accident, with no residual defects  On aspirin and Plavix    Microcytic anemia  Alpha thalassemia trait  We will monitor    DVT ppx  On heparin    GI ppx  Not indicated    PT/OT/SW  Consulted    Discharge Planning   to assist in discharge planning. Willis Jhaveri MD  Internal Medicine Resident, PGY-1   Arabella Lei; [facility], [address] [place] [address] Drive  11/14/2021, 3:37 PM    Attending Physician Statement  I have discussed the care of Cathy Done with the resident team. I have examined the patient myself and taken ros and hpi , including pertinent history and exam findings,  with the resident. I have reviewed the key elements of all parts of the encounter with the resident. I agree with the assessment, plan and orders as documented by the resident. Active Problems:    COVID  Resolved Problems:    * No resolved hospital problems.  *    58-year-old female admitted with COVID-19 pneumonia with subsequent acute hypoxic respiratory failure, improving with oxygen support noted to be on room air during morning rounds.   ID consulted to consider Actemra may not qualify since she is on room air now, however  her inflammatory markers are elevated  Potential discharge later today/tomorrow      Electronically signed by Nely Allison MD

## 2021-11-15 PROBLEM — J12.82 PNEUMONIA DUE TO COVID-19 VIRUS: Status: ACTIVE | Noted: 2021-11-14

## 2021-11-15 LAB
ALBUMIN SERPL-MCNC: 2.9 G/DL (ref 3.5–5.2)
ALBUMIN/GLOBULIN RATIO: 0.7 (ref 1–2.5)
ALP BLD-CCNC: 90 U/L (ref 35–104)
ALT SERPL-CCNC: 27 U/L (ref 5–33)
ANION GAP SERPL CALCULATED.3IONS-SCNC: 12 MMOL/L (ref 9–17)
AST SERPL-CCNC: 46 U/L
BILIRUB SERPL-MCNC: 0.17 MG/DL (ref 0.3–1.2)
BUN BLDV-MCNC: 20 MG/DL (ref 8–23)
BUN/CREAT BLD: ABNORMAL (ref 9–20)
C-REACTIVE PROTEIN: 163.1 MG/L (ref 0–5)
CALCIUM SERPL-MCNC: 8.8 MG/DL (ref 8.6–10.4)
CHLORIDE BLD-SCNC: 101 MMOL/L (ref 98–107)
CO2: 24 MMOL/L (ref 20–31)
CREAT SERPL-MCNC: 1.03 MG/DL (ref 0.5–0.9)
EKG ATRIAL RATE: 80 BPM
EKG P AXIS: 74 DEGREES
EKG P-R INTERVAL: 124 MS
EKG Q-T INTERVAL: 378 MS
EKG QRS DURATION: 104 MS
EKG QTC CALCULATION (BAZETT): 435 MS
EKG R AXIS: 31 DEGREES
EKG T AXIS: 54 DEGREES
EKG VENTRICULAR RATE: 80 BPM
GFR AFRICAN AMERICAN: >60 ML/MIN
GFR NON-AFRICAN AMERICAN: 53 ML/MIN
GFR SERPL CREATININE-BSD FRML MDRD: ABNORMAL ML/MIN/{1.73_M2}
GFR SERPL CREATININE-BSD FRML MDRD: ABNORMAL ML/MIN/{1.73_M2}
GLUCOSE BLD-MCNC: 140 MG/DL (ref 70–99)
HCT VFR BLD CALC: 36.5 % (ref 36.3–47.1)
HEMOGLOBIN: 10.8 G/DL (ref 11.9–15.1)
LACTATE DEHYDROGENASE: 380 U/L (ref 135–214)
MCH RBC QN AUTO: 22.2 PG (ref 25.2–33.5)
MCHC RBC AUTO-ENTMCNC: 29.6 G/DL (ref 28.4–34.8)
MCV RBC AUTO: 74.9 FL (ref 82.6–102.9)
NRBC AUTOMATED: 0 PER 100 WBC
PDW BLD-RTO: 17.6 % (ref 11.8–14.4)
PLATELET # BLD: 291 K/UL (ref 138–453)
PMV BLD AUTO: 10.6 FL (ref 8.1–13.5)
POTASSIUM SERPL-SCNC: 4.4 MMOL/L (ref 3.7–5.3)
RBC # BLD: 4.87 M/UL (ref 3.95–5.11)
SODIUM BLD-SCNC: 137 MMOL/L (ref 135–144)
TOTAL PROTEIN: 6.8 G/DL (ref 6.4–8.3)
WBC # BLD: 3.9 K/UL (ref 3.5–11.3)

## 2021-11-15 PROCEDURE — 83615 LACTATE (LD) (LDH) ENZYME: CPT

## 2021-11-15 PROCEDURE — 97530 THERAPEUTIC ACTIVITIES: CPT

## 2021-11-15 PROCEDURE — 6360000002 HC RX W HCPCS

## 2021-11-15 PROCEDURE — 2580000003 HC RX 258: Performed by: INTERNAL MEDICINE

## 2021-11-15 PROCEDURE — 99222 1ST HOSP IP/OBS MODERATE 55: CPT | Performed by: INTERNAL MEDICINE

## 2021-11-15 PROCEDURE — 93010 ELECTROCARDIOGRAM REPORT: CPT | Performed by: INTERNAL MEDICINE

## 2021-11-15 PROCEDURE — 80053 COMPREHEN METABOLIC PANEL: CPT

## 2021-11-15 PROCEDURE — 2580000003 HC RX 258: Performed by: STUDENT IN AN ORGANIZED HEALTH CARE EDUCATION/TRAINING PROGRAM

## 2021-11-15 PROCEDURE — APPSS30 APP SPLIT SHARED TIME 16-30 MINUTES: Performed by: NURSE PRACTITIONER

## 2021-11-15 PROCEDURE — 85027 COMPLETE CBC AUTOMATED: CPT

## 2021-11-15 PROCEDURE — 36415 COLL VENOUS BLD VENIPUNCTURE: CPT

## 2021-11-15 PROCEDURE — 2060000000 HC ICU INTERMEDIATE R&B

## 2021-11-15 PROCEDURE — 6370000000 HC RX 637 (ALT 250 FOR IP)

## 2021-11-15 PROCEDURE — 97162 PT EVAL MOD COMPLEX 30 MIN: CPT

## 2021-11-15 PROCEDURE — 86140 C-REACTIVE PROTEIN: CPT

## 2021-11-15 PROCEDURE — 6370000000 HC RX 637 (ALT 250 FOR IP): Performed by: STUDENT IN AN ORGANIZED HEALTH CARE EDUCATION/TRAINING PROGRAM

## 2021-11-15 PROCEDURE — 6360000002 HC RX W HCPCS: Performed by: INTERNAL MEDICINE

## 2021-11-15 PROCEDURE — 94761 N-INVAS EAR/PLS OXIMETRY MLT: CPT

## 2021-11-15 RX ORDER — AMLODIPINE BESYLATE 10 MG/1
10 TABLET ORAL DAILY
Status: DISCONTINUED | OUTPATIENT
Start: 2021-11-15 | End: 2021-11-17 | Stop reason: HOSPADM

## 2021-11-15 RX ORDER — LOSARTAN POTASSIUM 50 MG/1
50 TABLET ORAL DAILY
Status: DISCONTINUED | OUTPATIENT
Start: 2021-11-15 | End: 2021-11-17 | Stop reason: HOSPADM

## 2021-11-15 RX ORDER — GUAIFENESIN DEXTROMETHORPHAN HYDROBROMIDE ORAL SOLUTION 10; 100 MG/5ML; MG/5ML
10 SOLUTION ORAL EVERY 4 HOURS PRN
Status: DISCONTINUED | OUTPATIENT
Start: 2021-11-15 | End: 2021-11-17 | Stop reason: HOSPADM

## 2021-11-15 RX ORDER — GUAIFENESIN DEXTROMETHORPHAN HYDROBROMIDE ORAL SOLUTION 10; 100 MG/5ML; MG/5ML
SOLUTION ORAL
Status: DISPENSED
Start: 2021-11-15 | End: 2021-11-16

## 2021-11-15 RX ADMIN — LOSARTAN POTASSIUM 50 MG: 50 TABLET, FILM COATED ORAL at 20:50

## 2021-11-15 RX ADMIN — AMLODIPINE BESYLATE 10 MG: 10 TABLET ORAL at 20:50

## 2021-11-15 RX ADMIN — GUAIFENESIN DEXTROMETHORPHAN HYDROBROMIDE ORAL SOLUTION 10 ML: 200; 20 SOLUTION ORAL at 19:45

## 2021-11-15 RX ADMIN — CLOPIDOGREL 75 MG: 75 TABLET, FILM COATED ORAL at 08:16

## 2021-11-15 RX ADMIN — ATORVASTATIN CALCIUM 80 MG: 80 TABLET, FILM COATED ORAL at 08:16

## 2021-11-15 RX ADMIN — SODIUM CHLORIDE: 9 INJECTION, SOLUTION INTRAVENOUS at 08:56

## 2021-11-15 RX ADMIN — CASIRIVIMAB AND IMDEVIMAB: 600; 600 INJECTION, SOLUTION, CONCENTRATE INTRAVENOUS at 16:48

## 2021-11-15 RX ADMIN — HEPARIN SODIUM 5000 UNITS: 5000 INJECTION INTRAVENOUS; SUBCUTANEOUS at 06:30

## 2021-11-15 RX ADMIN — Medication 81 MG: at 08:16

## 2021-11-15 RX ADMIN — HEPARIN SODIUM 5000 UNITS: 5000 INJECTION INTRAVENOUS; SUBCUTANEOUS at 13:32

## 2021-11-15 RX ADMIN — HEPARIN SODIUM 5000 UNITS: 5000 INJECTION INTRAVENOUS; SUBCUTANEOUS at 20:58

## 2021-11-15 ASSESSMENT — ENCOUNTER SYMPTOMS
ABDOMINAL PAIN: 0
APNEA: 0
EYES NEGATIVE: 1
EYE ITCHING: 0
EYE REDNESS: 0
NAUSEA: 1
COLOR CHANGE: 0
COUGH: 1
WHEEZING: 0
SHORTNESS OF BREATH: 1
ABDOMINAL DISTENTION: 0
SINUS PAIN: 0
ALLERGIC/IMMUNOLOGIC NEGATIVE: 1
BACK PAIN: 0
PHOTOPHOBIA: 0
EYE PAIN: 0
TACHYPNEA: 1
STRIDOR: 0
CONSTIPATION: 0
DIARRHEA: 1
CHOKING: 0
EYE DISCHARGE: 0
CHEST TIGHTNESS: 1
VOMITING: 1
VOICE CHANGE: 0
FACIAL SWELLING: 0

## 2021-11-15 ASSESSMENT — PAIN SCALES - GENERAL
PAINLEVEL_OUTOF10: 0
PAINLEVEL_OUTOF10: 0

## 2021-11-15 NOTE — CONSULTS
[facility] - Initial Consult Note COVID 19 Patient  Today's Date and Time: 11/15/2021, 12:03 PM    Impression :     · COVID 19 Confirmed Infection  · Covid tests:  · 11/14/21 Positive  · CKD III  · CAD  · TIBURCIO on CPAP  · Morbid obesity  · Microcytic anemia with Thalassemia trait  · Received Covid vaccine X 2     Recommendations:   · Antibiotic treatment:  · Monitor off antibiotics  · Covid Rx:  · Remdesivir. Not indicated  · Decadron. Not indicated  · Actemra not indicated   · Monoclonal antibodies requested 11/15/2021      Medical Decision Making/Summary/Discussion:11/15/2021     · Patient admitted with suspected COVID 19 infection  · Covid test confirmed positive. Infection Control Recommendations   · Universal Precautions  · Airborne isolation  · Droplet Isolation    Antimicrobial Stewardship Recommendations       · Discontinuation of therapy  Coordination of Outpatient Care:   · Estimated Length of IV antimicrobials:TBD  · Patient will need Midline Catheter Insertion: TBD  · Patient will need PICC line Insertion: No  · Patient will need: Home IV , [place],  SNF,  LTAC:TBD  · Patient will need outpatient wound care:No    Chief complaint/reason for consultation:   · Concern for COVID infection      History of Present Illness:   Lord Landau is a 74y.o.-year-old female who was initially admitted on 11/14/2021. Patient seen at the request of Dr. Surekha Duckworth:     Patient presented through ER with complaints of fatigue, diarrhea, vomiting, and shortness of breath X 7 days. She is also coughing up light yellow phlegm. She is fully vaccinated for Covid and received the influenza vaccine one week ago. The patient was noted to have an SpO2 of 89% on room air. Covid swab was positive. Imaging revealed multifocal infiltrates more prominent in the left upper lobe and left lower lobe. CRP was 180.3.     The patient was administered one dose of Decadron above: High risk group  Parameters 3 2 1 0 1 2 3   Age    < 72   ? 65   RR ? 8  9-11 12-20  21-24 ? 25   O2 Sats ? 91 92-93 94-95 ? 96      Suppl O2  Yes  No      SBP ? 90  101-110 111-219   ? 220   HR ? 40  41-50 51-90  111-130 ? 131   Consciousness    Alert   Drowsiness, lethargy, or confusion   Temperature ? 35.0 C (95.0 F)  35.1-36.0 C 95.1-96.9 F 36.1-38.0 C 97.0-100.4 F 38.1-39.0 C 100.5-102.3 F ? 39.1 C ? 102.4 F      NEWS Score:   11/15/21: 3 Low risk    Overall Daily Picture:    Improving      Presence of secondary bacterial Infection:    No   Additional antibiotics: No    Labs, X rays reviewed: 11/15/2021    BUN:20  Cr:1.03    WBC:3.9  Hb:10.8  Plat: 291    Absolute Neutrophils:2.46  Absolute Lymphocytes:0.83  Neutrophil/Lymphocyte Ratio: 2.9 low risk    CRP:180.3  Ferritin:1404  LDH:     Pro Calcitonin:      Cultures:  Urine:  ·   Blood:  ·   Sputum :  ·   Wound:       CXR:   11/14/21    CAT:  11/14/21      Discussed with patient, RN, CC, IM. I have personally reviewed the past medical history, past surgical history, medications, social history, and family history, and I have updated the database accordingly.   Past Medical History:     Past Medical History:   Diagnosis Date    Bleeding     while on aggrenox    Cataracts, bilateral     Cerebrovascular disease 2003,2011    cva    Chronic pain in left foot     CKD (chronic kidney disease) stage 3, GFR 30-59 ml/min (HCC)     GERD (gastroesophageal reflux disease)     Hx of blood clots     Hyperlipidemia     Hypertension     Iron (Fe) deficiency anemia     Nicotine abuse     Obesity     Osteoarthritis     Peripheral vascular disease ([facility] [address] 3/13/2014    Substance abuse (formerly [facility])     cocaine, canabis    Thalassemia minor     Unspecified cerebral artery occlusion with cerebral infarction     Unspecified sleep apnea        Past Surgical  History:     Past Surgical History:   Procedure Laterality Date    BREAST SURGERY      biopsy  COLONOSCOPY  12/2007    adenomatous polyp    COLONOSCOPY  2013    normal, repeat in 5 years    COLONOSCOPY N/A 8/21/2019    COLONOSCOPY POLYPECTOMY SNARE/COLD BIOPSY performed by Lolly Bennett MD at [facility]. [address]      UPPER GASTROINTESTINAL ENDOSCOPY  2009    negative    UPPER GASTROINTESTINAL ENDOSCOPY N/A 8/21/2019    EGD BIOPSY performed by [facility] MD at [facility]       Medications:      sodium chloride flush  5-40 mL IntraVENous 2 times per day    potassium bicarb-citric acid  40 mEq Oral Daily    heparin (porcine)  5,000 Units SubCUTAneous 3 times per day    aspirin  81 mg Oral Daily    atorvastatin  80 mg Oral Daily    clopidogrel  75 mg Oral Daily       Social History:     Social History     Socioeconomic History    Marital status:      Spouse name: Not on file    Number of children: Not on file    Years of education: Not on file    Highest education level: Not on file   Occupational History    Not on file   Tobacco Use    Smoking status: Former Smoker     Packs/day: 2.00     Years: 35.00     Pack years: 70.00     Quit date: 10/11/2011     Years since quitting: 10.1    Smokeless tobacco: Never Used   Vaping Use    Vaping Use: Never used   Substance and Sexual Activity    Alcohol use:  Yes     Alcohol/week: 6.0 standard drinks     Types: 6 Cans of beer per week     Comment: rare    Drug use: No     Types: Cocaine, Marijuana (Weed)     Comment: per pt did years ago; cocaine & marij, 8-23-19 denies current use    Sexual activity: Yes     Partners: Male   Other Topics Concern    Not on file   Social History Narrative    Not on file     Social Determinants of Health     Financial Resource Strain: Low Risk     Difficulty of Paying Living Expenses: Not hard at all   Food Insecurity: No Food Insecurity    Worried About [address] in the Last Year: Never true    Doug of Food in the Last Year: Never true   Transportation Needs:     Lack of Transportation (Medical): Not on file    Lack of Transportation (Non-Medical): Not on file   Physical Activity:     Days of Exercise per Week: Not on file    Minutes of Exercise per Session: Not on file   Stress:     Feeling of Stress : Not on file   Social Connections:     Frequency of Communication with Friends and Family: Not on file    Frequency of Social Gatherings with Friends and Family: Not on file    Attends [employer] Services: Not on file    Active Member of [address] [place] [address] [employer] or Organizations: Not on file    Attends Club or Organization Meetings: Not on file    Marital Status: Not on file   Intimate Partner Violence:     Fear of Current or Ex-Partner: Not on file    Emotionally Abused: Not on file    Physically Abused: Not on file    Sexually Abused: Not on file   Housing Stability:     Unable to Pay for Housing in the Last Year: Not on file    Number of [place] in the Last Year: Not on file    Unstable Housing in the Last Year: Not on file       Family History:     Family History   Problem Relation Age of Onset    High Blood Pressure Mother     Asthma Mother     Heart Disease Mother     Heart Disease Father     High Blood Pressure Father     Diabetes Brother     High Blood Pressure Brother     Heart Disease Brother     High Blood Pressure Maternal Grandmother     High Blood Pressure Maternal Grandfather     Heart Disease Maternal Grandfather         Allergies:   Duricef [cefadroxil monohydrate], Fish-derived products, Pcn [penicillins], and Tomato     Review of Systems:       Constitutional: No fevers or chills. No systemic complaints  Head: No headaches  Eyes: No double vision or blurry vision. No conjunctival inflammation. ENT: No sore throat or runny nose. . No hearing loss, tinnitus or vertigo. Cardiovascular: No chest pain or palpitations. Shortness of breath. ALCALA  Lung: Shortness of breath or cough.  No sputum production  Abdomen: Nausea, vomiting, diarrhea. No abdominal pain. Christiane Fanning No cramps. Genitourinary: No increased urinary frequency, or dysuria. No hematuria. No suprapubic or CVA pain  Musculoskeletal: No muscle aches or pains. No joint effusions, swelling or deformities  Hematologic: No bleeding or bruising. Neurologic: No headache, weakness, numbness, or tingling. Integument: No rash, no ulcers. Psychiatric: No depression. Endocrine: No polyuria, no polydipsia, no polyphagia. Physical Examination :     Patient Vitals for the past 8 hrs:   BP Temp Temp src Pulse Resp SpO2 Weight   11/15/21 1034 (!) 144/85 98.2 °F (36.8 °C) Oral 73 15 97 % --   11/15/21 0815 126/69 98.2 °F (36.8 °C) Oral 55 25 94 % --   11/15/21 0700 -- -- -- 56 22 95 % --   11/15/21 0600 -- -- -- 58 21 95 % (!) 369 lb 7.9 oz (167.6 kg)   11/15/21 0500 -- -- -- 57 18 94 % --     General Appearance: Awake, alert, and in no apparent distress  Head:  Normocephalic, no trauma  Eyes: Pupils equal, round, reactive to light; sclera anicteric; conjunctivae pink. No embolic phenomena. ENT: Oropharynx clear, without erythema, exudate, or thrush. No tenderness of sinuses. Mouth/throat: mucosa pink and moist. No lesions. Dentition in good repair. Neck:Supple, without lymphadenopathy. Thyroid normal, No bruits. Pulmonary/Chest: Clear to auscultation, without wheezes, rales, or rhonchi. No dullness to percussion. Cardiovascular: Regular rate and rhythm without murmurs, rubs, or gallops. Abdomen: Soft, non tender. Bowel sounds normal. No organomegaly  All four Extremities: No cyanosis, clubbing, edema, or effusions. Neurologic: No gross sensory or motor deficits. Skin: Warm and dry with good turgor. No signs of peripheral arterial or venous insufficiency. No ulcerations. No open wounds.     Medical Decision Making -Laboratory:   I have independently reviewed/ordered the following labs:    CBC with Differential:   Recent Labs     11/14/21  4917 11/15/21  0521   WBC 3.8 3.9   HGB 10.6* 10.8*   HCT 34.7* 36.5    291   LYMPHOPCT 22*  --    MONOPCT 10  --      BMP:   Recent Labs     11/14/21  0937 11/15/21  0521   * 137   K 3.3* 4.4   CL 94* 101   CO2 25 24   BUN 14 20   CREATININE 1.08* 1.03*   MG 2.1  --      Hepatic Function Panel:   Recent Labs     11/14/21  0937 11/15/21  0521   PROT 7.2 6.8   LABALBU 3.4* 2.9*   BILITOT 0.23* 0.17*   ALKPHOS 100 90   ALT 26 27   AST 53* 46*     No results for input(s): RPR in the last 72 hours. No results for input(s): HIV in the last 72 hours. No results for input(s): BC in the last 72 hours. Lab Results   Component Value Date    MUCUS 1+ 08/19/2015    RBC 4.87 11/15/2021    RBC 4.79 04/05/2012    TRICHOMONAS NOT REPORTED 08/19/2015    WBC 3.9 11/15/2021    YEAST NOT REPORTED 08/19/2015    TURBIDITY CLEAR 08/19/2015     Lab Results   Component Value Date    CREATININE 1.03 11/15/2021    GLUCOSE 140 11/15/2021    GLUCOSE 88 04/12/2012       Medical Decision Making-Imaging:     Narrative   EXAMINATION:   CTA OF THE CHEST 11/14/2021 10:23 am       TECHNIQUE:   CTA of the chest was performed after the administration of intravenous   contrast.  Multiplanar reformatted images are provided for review.  MIP   images are provided for review.  Dose modulation, iterative reconstruction,   and/or weight based adjustment of the mA/kV was utilized to reduce the   radiation dose to as low as reasonably achievable.       COMPARISON:   January 2021       HISTORY:   ORDERING SYSTEM PROVIDED HISTORY: covid + , sob, elevated d dimer   TECHNOLOGIST PROVIDED HISTORY:   covid + , sob, elevated d dimer   Decision Support Exception - unselect if not a suspected or confirmed   emergency medical condition->Emergency Medical Condition (MA)   Reason for Exam:  covid + , sob, elevated d dimer       FINDINGS:   Pulmonary Arteries: Pulmonary arteries are adequately opacified for   evaluation.  No evidence of intraluminal filling defect to suggest pulmonary   embolism.  Main pulmonary artery is normal in caliber.       Mediastinum: Mild cardiomegaly.  No pericardial effusion.  1 cm size lymph   nodes identified in the mediastinum, not considered significantly enlarged. Normal size thoracic aorta.       Lungs/pleura: Moderate consolidations identified in the left upper lobe left   lower lobe and small opacity in the right upper lung compatible with   multifocal pneumonia.  No pleural effusions.       Upper Abdomen: Mild-to-moderate diffuse hepatic steatosis noted.  No acute   finding noted in the upper abdomen.       Soft Tissues/Bones: Moderate osteopenic changes and degenerative changes   noted in the bony structures. .  No acute fractures in the thoracic spine.           Impression   1. No acute pulmonary embolism or acute thoracic aortic abnormality. 2. Multifocal infiltrates more prominent in the left upper lobe and left   lower lobe.    3. Mild cardiomegaly.         Narrative   EXAMINATION:   ONE XRAY VIEW OF THE CHEST       11/14/2021 9:54 am       COMPARISON:   01/05/2019       HISTORY:   ORDERING SYSTEM PROVIDED HISTORY: sob   TECHNOLOGIST PROVIDED HISTORY:   sob   Reason for Exam: portable upright       FINDINGS:   Cardiomediastinal silhouette stable.  Left upper lobe irregular opacity.  No   pneumothorax.  No pleural effusion.           Impression   Irregular left upper lobe opacity.  This could represent a focal infectious   process but a neoplastic process should be considered.  However, no pulmonary   nodule seen in this region on a chest CT done 01/20/2021.  Follow-up   suggested to ensure resolution.                 Medical Decision [id number]:       Medical Decision Making-Other:     Note:  · Labs, medications, radiologic studies were reviewed with personal review of films  · Large amounts of data were reviewed  · Discussed with nursing Staff, Discharge planner  · Infection Control and Prevention measures reviewed  · All prior entries were reviewed  · Administer medications as ordered  · Prognosis: Guarded  · Discharge planning reviewed      Thank you for allowing us to participate in the care of this patient. Please call with questions.     LOY Steele - JOSE ROBERTO  Pager: [phone] - Office: [phone]

## 2021-11-15 NOTE — PLAN OF CARE
Serenity Finn CNP at bedside to consent pt for monoclonal antibodies. Per NP, medication explained to pt and pt agreeable with administration.

## 2021-11-15 NOTE — PLAN OF CARE
Problem: Airway Clearance - Ineffective  Goal: Achieve or maintain patent airway  11/14/2021 2012 by Leonid Campbell RN  Outcome: Ongoing  11/14/2021 1829 by Andres Wang RN  Outcome: Met This Shift     Problem: Gas Exchange - Impaired  Goal: Absence of hypoxia  11/14/2021 2012 by Leonid Campbell RN  Outcome: Ongoing  11/14/2021 1829 by Andres Wang RN  Outcome: Met This Shift  Goal: Promote optimal lung function  11/14/2021 2012 by Leonid Campbell RN  Outcome: Ongoing  11/14/2021 1829 by Andres Wang RN  Outcome: Met This Shift     Problem: Breathing Pattern - Ineffective  Goal: Ability to achieve and maintain a regular respiratory rate  11/14/2021 2012 by Leonid Campbell RN  Outcome: Ongoing  11/14/2021 1829 by Andres Wang RN  Outcome: Met This Shift     Problem: Body Temperature -  Risk of, Imbalanced  Goal: Ability to maintain a body temperature within defined limits  11/14/2021 2012 by Leonid Campbell RN  Outcome: Ongoing  11/14/2021 1829 by Andres Wang RN  Outcome: Met This Shift     Problem: Body Temperature -  Risk of, Imbalanced  Goal: Will regain or maintain usual level of consciousness  11/14/2021 2012 by Leonid Campbell RN  Outcome: Ongoing  11/14/2021 1829 by Andres Wang RN  Outcome: Met This Shift     Problem:  Body Temperature -  Risk of, Imbalanced  Goal: Complications related to the disease process, condition or treatment will be avoided or minimized  11/14/2021 2012 by Leonid Campbell RN  Outcome: Ongoing  11/14/2021 1829 by Andres Wang RN  Outcome: Met This Shift     Problem: Isolation Precautions - Risk of Spread of Infection  Goal: Prevent transmission of infection  11/14/2021 2012 by Leonid Campbell RN  Outcome: Ongoing  11/14/2021 1829 by Andres Wang RN  Outcome: Met This Shift     Problem: Nutrition Deficits  Goal: Optimize nutritional status  11/14/2021 2012 by Leonid Campbell RN  Outcome: Ongoing  11/14/2021 1829 by Andres Wang RN  Outcome: Met This Shift     Problem: Risk for Fluid Volume Deficit  Goal: Maintain normal heart rhythm  11/14/2021 2012 by Alexis Webb RN  Outcome: Ongoing  11/14/2021 1829 by Joselyn Blevins RN  Outcome: Met This Shift  Goal: Maintain absence of muscle cramping  11/14/2021 2012 by Alexis Webb RN  Outcome: Ongoing  11/14/2021 1829 by Joselyn Blevins RN  Outcome: Met This Shift  Goal: Maintain normal serum potassium, sodium, calcium, phosphorus, and pH  11/14/2021 2012 by Alexis Webb RN  Outcome: Ongoing  11/14/2021 1829 by Joselyn Blevins RN  Outcome: Met This Shift     Problem: Loneliness or Risk for Loneliness  Goal: Demonstrate positive use of time alone when socialization is not possible  11/14/2021 2012 by Alexis Webb RN  Outcome: Ongoing  11/14/2021 1829 by Joselyn Blevins RN  Outcome: Met This Shift     Problem: Fatigue  Goal: Verbalize increase energy and improved vitality  11/14/2021 2012 by Alexis Webb RN  Outcome: Ongoing  11/14/2021 1829 by Joselyn Blevins RN  Outcome: Met This Shift     Problem: Patient Education: Go to Patient Education Activity  Goal: Patient/Family Education  11/14/2021 2012 by Alexis Webb RN  Outcome: Ongoing  11/14/2021 1829 by Joselyn Blevins RN  Outcome: Met This Shift     Problem: Falls - Risk of:  Goal: Will remain free from falls  Description: Will remain free from falls  11/14/2021 2012 by Alexis Webb RN  Outcome: Ongoing  11/14/2021 1829 by Joselyn Blevins RN  Outcome: Met This Shift  Goal: Absence of physical injury  Description: Absence of physical injury  11/14/2021 2012 by Alexis Webb RN  Outcome: Ongoing  11/14/2021 1829 by Joselyn Blevins RN  Outcome: Met This Shift     Problem: Skin Integrity:  Goal: Will show no infection signs and symptoms  Description: Will show no infection signs and symptoms  11/14/2021 2012 by Alexis Webb RN  Outcome: Ongoing  11/14/2021 1829 by Joselyn Blevins RN  Outcome:  Met

## 2021-11-15 NOTE — PROGRESS NOTES
Physical Therapy    Facility/Department: [facility] 3  Initial Assessment    NAME: Darcy Burrows  : 1955  MRN: [id number]    Chief Complaint   Patient presents with    Shortness of Breath    Nausea & Vomiting    Diarrhea       Date of Service: 11/15/2021    Discharge Recommendations:    Further therapy recommended at discharge. PT Equipment Recommendations  Equipment Needed: No  Other: Pt has cane and rollator    Assessment   Body structures, Functions, Activity limitations: Decreased functional mobility ; Decreased strength; Decreased endurance; Decreased balance  Assessment: Pt grossly CGA, raised surface from transfers, amb 20' RW CGA with increasd time and pt fatigues. Pt would benefit from continued acute PT to address deficits. Prognosis: Good  Decision Making: Medium Complexity  PT Education: Plan of Care; PT Role; General Safety; Transfer Training; Functional Mobility Training  REQUIRES PT FOLLOW UP: Yes  Activity Tolerance  Activity Tolerance: Patient Tolerated treatment well; Patient limited by fatigue; Patient limited by endurance       Patient Diagnosis(es): The encounter diagnosis was COVID.     has a past medical history of Bleeding, Cataracts, bilateral, Cerebrovascular disease, Chronic pain in left foot, CKD (chronic kidney disease) stage 3, GFR 30-59 ml/min ([facility]), GERD (gastroesophageal reflux disease), Hx of blood clots, Hyperlipidemia, Hypertension, Iron (Fe) deficiency anemia, Nicotine abuse, Obesity, Osteoarthritis, Peripheral vascular disease ([address] Substance abuse ([address] Thalassemia minor, Unspecified cerebral artery occlusion with cerebral infarction, and Unspecified sleep apnea. has a past surgical history that includes Tonsillectomy and adenoidectomy; Tubal ligation; Breast surgery; Endometrial biopsy (); Upper gastrointestinal endoscopy (); Colonoscopy (2007); Colonoscopy ();  Upper gastrointestinal endoscopy (N/A, 2019); and Colonoscopy (N/A, 8/21/2019). Restrictions  Restrictions/Precautions  Restrictions/Precautions: General Precautions, Fall Risk, Isolation, Up as Tolerated (Droplet Plus)  Required Braces or Orthoses?: No  Vision/Hearing  Vision: Impaired  Vision Exceptions: Wears glasses for reading  Hearing: Within functional limits     Subjective  General  Chart Reviewed: Yes  Patient assessed for rehabilitation services?: Yes  Response To Previous Treatment: Not applicable  Family / Caregiver Present: No  Follows Commands: Within Functional Limits  Subjective  Subjective: RN and pt agreeable to PT. Pt alert walking back to bed from commode upon arrival.  Pain Screening  Patient Currently in Pain: Denies  Vital Signs  Patient Currently in Pain: Denies  Pre Treatment Pain Screening  Intervention List: Patient able to continue with treatment    Orientation  Orientation  Overall Orientation Status: Within Functional Limits  Social/Functional History  Social/Functional History  Lives With: Spouse  Type of Home: Apartment  Home Layout: One level (1st floor. takes laundry to [employer] on rollator)  Home Access: Level entry  Bathroom Shower/Tub: Tub/Shower unit  Bathroom Toilet: Handicap height  Bathroom Equipment: Grab bars in shower, Grab bars around toilet, Shower chair  Bathroom Accessibility: Accessible  Home Equipment: Cane (rollator)  Receives Help From: [address]: [address]:  (aide helps with cleaning: sweep, dust, mop)  Homemaking Responsibilities: Yes (pt does own laundry, own cooking, aide helps clean.)  Ambulation Assistance: Independent ([address] in home, rollator)  Transfer Assistance: Independent  Active : Yes  Mode of Transportation:  (crossover)  Occupation: On disability  Leisure & Hobbies: play cards, gospel on TV. Additional Comments: aide 3x/wk, 2hr each. they help bathe, pt dresses self, assist with bra in back.  aide for  7x/wk as well, he moves with no AD,  Cognition WFL    Objective          AROM RLE (degrees)  RLE AROM: WFL  AROM LLE (degrees)  LLE AROM : WFL  Strength RLE  Comment: 4-  Strength LLE  Comment: 4-  Strength RUE  Comment: minimum antigravity  Strength LUE  Comment: minimum antigravity     Sensation  Overall Sensation Status: Impaired (c/o numbness/ tingling in lateral proxime shins, Bilaterally.)  Bed mobility  Supine to Sit:  (pt walking back to bed from commode upon arrival.)  Sit to Supine: Minimal assistance (LEs)  Transfers  Sit to Stand: Contact guard assistance (from higher surface, unable to stand Antonella to modA from lowered bed (hips/ knees 90-90.))  Stand to sit: Contact guard assistance  Ambulation  Ambulation?: Yes  More Ambulation?: No  Ambulation 1  Surface: level tile  Device: Rollator  Assistance: Contact guard assistance  Quality of Gait: slowed, small steps, increased sway initially but improved, no LOB  Distance: 3', seated break, 20'     Balance  Posture: Fair  Sitting - Static: Good  Sitting - Dynamic: Good  Standing - Static: Good; -  Standing - Dynamic: Fair; +  Comments: Bariatric RW used while assessing standing balance        Plan   Plan  Times per week: 5x/wk  Current Treatment Recommendations: Strengthening, Balance Training, Endurance Training, Functional Mobility Training, Transfer Training, Gait Training, Home Exercise Program, Safety Education & Training, Patient/Caregiver Education & Training, Equipment Evaluation, Education, & procurement  Safety Devices  Type of devices: Call light within reach, Nurse notified, Gait belt, Patient at risk for falls, Left in bed, All fall risk precautions in place  Restraints  Initially in place: No    AM-PAC Score  AM-PAC Inpatient Mobility Raw Score : 14 (11/15/21 1632)  AM-PAC Inpatient T-Scale Score : 38.1 (11/15/21 1632)  Mobility Inpatient CMS 0-100% Score: 61.29 (11/15/21 1632)  Mobility Inpatient CMS G-Code Modifier : CL (11/15/21 1632)          Goals  Short term goals  Time Frame for Short term goals: 14 visits  Short term goal 1: Pt will be Vadim bed mobility  Short term goal 2: Pt will be Vadim transfers  Short term goal 3: Pt will be Vadim amb 150' RW or least retrictive AD       Therapy Time   Individual Concurrent Group Co-treatment   Time In 1355         Time Out 1424         Minutes 29         Timed Code Treatment Minutes: 8 Minutes       Cait River, PT

## 2021-11-15 NOTE — PROGRESS NOTES
members have similar symptoms. She is vaccinated with moderna vaccine. She recently took her flu shot after which her symptoms developed. Her  is also admitted today with a similar complaints. She has no urinary complaints. She has morbid obesity and associated TIBURCIO, she uses CPAP at night. In the ER, she desaturated on room air and was placed on 4 L NC via O2, saturations improved. Also the work of breathing decreased.     Work-up in the ER,  She was found to be COVID-19 positive. EKG showed no ST-T wave abnormality, troponin 38, 36; proBNP 260  Elevated D-dimer 1.28, underwent CT PE, which ruled out embolism and confirmed COVID-19 pneumonia. Hemoglobin 10.6  .3  Pro-Adolph 0.01  Ferritin 1404  Fibrinogen 581  Lipase 30    OBJECTIVE     Vital Signs:  BP (!) 144/85   Pulse 73   Temp 98.2 °F (36.8 °C) (Oral)   Resp 15   Ht 5' 7\" (1.702 m)   Wt (!) 369 lb 7.9 oz (167.6 kg)   SpO2 97%   BMI 57.87 kg/m²     Temp (24hrs), Av.4 °F (36.3 °C), Min:96.5 °F (35.8 °C), Max:98.2 °F (36.8 °C)    In: 975   Out: 1180 [Urine:1180]    Physical Exam:  Constitutional: This is a well developed, well nourished, Greater than 40 - Morbid Obesity / Extreme Obesity / Grade III 74y.o. year old female who is alert, oriented, cooperative and in no apparent distress. Head:normocephalic and atraumatic. EENT:  PERRLA. No conjunctival injections. Septum was midline, mucosa was without erythema, exudates or cobblestoning. No thrush was noted. Neck: Supple without thyromegaly. No elevated JVP. Trachea was midline. Respiratory: Chest was symmetrical without dullness to percussion. Breath sounds bilaterally were clear to auscultation. There were no wheezes, rhonchi or rales. There is no intercostal retraction or use of accessory muscles. No egophony noted. Cardiovascular: Regular without murmur, clicks, gallops or rubs. Abdomen: Slightly rounded and soft without organomegaly.  No rebound, rigidity or guarding was appreciated. Lymphatic: No lymphadenopathy. Musculoskeletal: Normal curvature of the spine. No gross muscle weakness. Extremities:  No lower extremity edema, ulcerations, tenderness, varicosities or erythema. Muscle size, tone and strength are normal.  No involuntary movements are noted. Skin:  Warm and dry. Good color, turgor and pigmentation. No lesions or scars. No cyanosis or clubbing  Neurological/Psychiatric: The patient's general behavior, level of consciousness, thought content and emotional status is normal.        Medications:  Scheduled Medications:    sodium chloride flush  5-40 mL IntraVENous 2 times per day    potassium bicarb-citric acid  40 mEq Oral Daily    heparin (porcine)  5,000 Units SubCUTAneous 3 times per day    aspirin  81 mg Oral Daily    atorvastatin  80 mg Oral Daily    clopidogrel  75 mg Oral Daily     Continuous Infusions:    sodium chloride 50 mL/hr at 11/15/21 0856    sodium chloride       PRN Medicationssodium chloride flush, 5-40 mL, PRN  sodium chloride, 25 mL, PRN  potassium chloride, 40 mEq, PRN   Or  potassium alternative oral replacement, 40 mEq, PRN   Or  potassium chloride, 10 mEq, PRN  ondansetron, 4 mg, Q4H PRN  polyethylene glycol, 17 g, Daily PRN  acetaminophen, 650 mg, Q6H PRN   Or  acetaminophen, 650 mg, Q6H PRN        Diagnostic Labs:  CBC:   Recent Labs     11/14/21  0937 11/15/21  0521   WBC 3.8 3.9   RBC 4.83 4.87   HGB 10.6* 10.8*   HCT 34.7* 36.5   MCV 71.8* 74.9*   RDW 17.2* 17.6*    291     BMP:   Recent Labs     11/14/21  0937 11/15/21  0521   * 137   K 3.3* 4.4   CL 94* 101   CO2 25 24   BUN 14 20   CREATININE 1.08* 1.03*     BNP: No results for input(s): BNP in the last 72 hours. PT/INR: No results for input(s): PROTIME, INR in the last 72 hours. APTT: No results for input(s): APTT in the last 72 hours. CARDIAC ENZYMES: No results for input(s): CKMB, CKMBINDEX, TROPONINI in the last 72 hours.     Invalid input(s): CKTOTAL;3  FASTING LIPID PANEL:  Lab Results   Component Value Date    CHOL 148 09/24/2021    HDL 50 09/24/2021    TRIG 119 09/24/2021     LIVER PROFILE:   Recent Labs     11/14/21  0937 11/15/21  0521   AST 53* 46*   ALT 26 27   BILITOT 0.23* 0.17*   ALKPHOS 100 90      MICROBIOLOGY:   Lab Results   Component Value Date/Time    CULTURE NO GROWTH 6 DAYS 01/05/2019 08:58 PM       Imaging:    XR CHEST PORTABLE    Result Date: 11/14/2021  Irregular left upper lobe opacity. This could represent a focal infectious process but a neoplastic process should be considered. However, no pulmonary nodule seen in this region on a chest CT done 01/20/2021. Follow-up suggested to ensure resolution. CT CHEST PULMONARY EMBOLISM W CONTRAST    Result Date: 11/14/2021  1. No acute pulmonary embolism or acute thoracic aortic abnormality. 2. Multifocal infiltrates more prominent in the left upper lobe and left lower lobe. 3. Mild cardiomegaly.        ASSESSMENT & PLAN     ASSESSMENT / PLAN:       COVID-19 infection with pneumonia  Received 1 dose of Decadron 10 mg IV  To start monoclonal antibody therapy per ID  Stable for dc pending ID clearance  ID is on board, appreciate recommendations  Will monitor inflammatory markers and WBCs  On room air     Acute hypoxic respiratory failure  Secondary to COVID-19  On room air  Will monitor and wean off as appropriate.     Hypertension  On HCTZ 25 mg  On losartan 50 mg  We will hold off home medications for now and resume as appropriate.     Hyperlipidemia  On Lipitor 80 mg     Chronic kidney disease  Creatinine 1.08 today  We will monitor      Morbid obesity  TIBURCIO  On CPAP during sleep and daytime naps.     Peripheral vascular disease     Previous cerebrovascular accident, with no residual defects  On aspirin and Plavix     Microcytic anemia  Alpha thalassemia trait  We will monitor     DVT ppx  On heparin     GI ppx  Not indicated     PT/OT/SW  Consulted     Discharge

## 2021-11-16 LAB
C-REACTIVE PROTEIN: 152.7 MG/L (ref 0–5)
D-DIMER QUANTITATIVE: 0.86 MG/L FEU

## 2021-11-16 PROCEDURE — 94618 PULMONARY STRESS TESTING: CPT

## 2021-11-16 PROCEDURE — 85379 FIBRIN DEGRADATION QUANT: CPT

## 2021-11-16 PROCEDURE — 86140 C-REACTIVE PROTEIN: CPT

## 2021-11-16 PROCEDURE — 99232 SBSQ HOSP IP/OBS MODERATE 35: CPT | Performed by: INTERNAL MEDICINE

## 2021-11-16 PROCEDURE — 6370000000 HC RX 637 (ALT 250 FOR IP)

## 2021-11-16 PROCEDURE — 2060000000 HC ICU INTERMEDIATE R&B

## 2021-11-16 PROCEDURE — 2580000003 HC RX 258: Performed by: STUDENT IN AN ORGANIZED HEALTH CARE EDUCATION/TRAINING PROGRAM

## 2021-11-16 PROCEDURE — 97166 OT EVAL MOD COMPLEX 45 MIN: CPT

## 2021-11-16 PROCEDURE — 36415 COLL VENOUS BLD VENIPUNCTURE: CPT

## 2021-11-16 PROCEDURE — APPSS30 APP SPLIT SHARED TIME 16-30 MINUTES: Performed by: NURSE PRACTITIONER

## 2021-11-16 PROCEDURE — 6370000000 HC RX 637 (ALT 250 FOR IP): Performed by: STUDENT IN AN ORGANIZED HEALTH CARE EDUCATION/TRAINING PROGRAM

## 2021-11-16 PROCEDURE — 97530 THERAPEUTIC ACTIVITIES: CPT

## 2021-11-16 PROCEDURE — 99233 SBSQ HOSP IP/OBS HIGH 50: CPT | Performed by: INTERNAL MEDICINE

## 2021-11-16 PROCEDURE — 94664 DEMO&/EVAL PT USE INHALER: CPT

## 2021-11-16 PROCEDURE — 6360000002 HC RX W HCPCS

## 2021-11-16 PROCEDURE — 94761 N-INVAS EAR/PLS OXIMETRY MLT: CPT

## 2021-11-16 RX ORDER — PANTOPRAZOLE SODIUM 40 MG/1
40 TABLET, DELAYED RELEASE ORAL
Status: DISCONTINUED | OUTPATIENT
Start: 2021-11-16 | End: 2021-11-17 | Stop reason: HOSPADM

## 2021-11-16 RX ORDER — THIAMINE MONONITRATE (VIT B1) 100 MG
100 TABLET ORAL DAILY
Status: DISCONTINUED | OUTPATIENT
Start: 2021-11-16 | End: 2021-11-17 | Stop reason: HOSPADM

## 2021-11-16 RX ORDER — HYDROCHLOROTHIAZIDE 25 MG/1
25 TABLET ORAL DAILY
Status: DISCONTINUED | OUTPATIENT
Start: 2021-11-16 | End: 2021-11-17 | Stop reason: HOSPADM

## 2021-11-16 RX ADMIN — HEPARIN SODIUM 5000 UNITS: 5000 INJECTION INTRAVENOUS; SUBCUTANEOUS at 13:40

## 2021-11-16 RX ADMIN — HYDROCHLOROTHIAZIDE 25 MG: 25 TABLET ORAL at 08:10

## 2021-11-16 RX ADMIN — PANTOPRAZOLE SODIUM 40 MG: 40 TABLET, DELAYED RELEASE ORAL at 08:10

## 2021-11-16 RX ADMIN — HEPARIN SODIUM 5000 UNITS: 5000 INJECTION INTRAVENOUS; SUBCUTANEOUS at 22:09

## 2021-11-16 RX ADMIN — HEPARIN SODIUM 5000 UNITS: 5000 INJECTION INTRAVENOUS; SUBCUTANEOUS at 05:04

## 2021-11-16 RX ADMIN — Medication 81 MG: at 08:10

## 2021-11-16 RX ADMIN — SODIUM CHLORIDE, PRESERVATIVE FREE 10 ML: 5 INJECTION INTRAVENOUS at 08:13

## 2021-11-16 RX ADMIN — ATORVASTATIN CALCIUM 80 MG: 80 TABLET, FILM COATED ORAL at 08:10

## 2021-11-16 RX ADMIN — SODIUM CHLORIDE, PRESERVATIVE FREE 10 ML: 5 INJECTION INTRAVENOUS at 22:09

## 2021-11-16 RX ADMIN — Medication 100 MG: at 08:14

## 2021-11-16 RX ADMIN — POTASSIUM BICARBONATE 40 MEQ: 782 TABLET, EFFERVESCENT ORAL at 08:10

## 2021-11-16 RX ADMIN — LOSARTAN POTASSIUM 50 MG: 50 TABLET, FILM COATED ORAL at 08:10

## 2021-11-16 RX ADMIN — AMLODIPINE BESYLATE 10 MG: 10 TABLET ORAL at 08:10

## 2021-11-16 RX ADMIN — CLOPIDOGREL 75 MG: 75 TABLET, FILM COATED ORAL at 08:10

## 2021-11-16 ASSESSMENT — PAIN SCALES - GENERAL
PAINLEVEL_OUTOF10: 0

## 2021-11-16 NOTE — PROGRESS NOTES
[facility] [address] [facility] Patient  Today's Date and Time: 11/16/2021, 8:07 AM    Impression :     · COVID 19 Confirmed Infection  · Covid tests:  · 11/14/21 Positive  · CKD III  · CAD  · TIBURCIO on CPAP  · Morbid obesity  · Microcytic anemia with Thalassemia trait  · Received Covid vaccine X 2     Recommendations:   · Antibiotic treatment:  · Monitor off antibiotics  · Covid Rx:  · Remdesivir. Not indicated  · Decadron. Not indicated  · Actemra not indicated   · Monoclonal antibodies requested 11/15/2021      Medical Decision Making/Summary/Discussion:11/16/2021     · Patient admitted with suspected COVID 19 infection  · Covid test confirmed positive. Infection Control Recommendations   · Universal Precautions  · Airborne isolation  · Droplet Isolation    Antimicrobial Stewardship Recommendations       · Discontinuation of therapy  Coordination of Outpatient Care:   · Estimated Length of IV antimicrobials:TBD  · Patient will need Midline Catheter Insertion: TBD  · Patient will need PICC line Insertion: No  · Patient will need: Home IV , [place],  SNF,  LTAC:TBD  · Patient will need outpatient wound care:No    Chief complaint/reason for consultation:   · Concern for COVID infection      History of Present Illness:   Lord Landau is a 74y.o.-year-old female who was initially admitted on 11/14/2021. Patient seen at the request of Dr. Surekha Duckworth:     Patient presented through ER with complaints of fatigue, diarrhea, vomiting, and shortness of breath X 7 days. She is also coughing up light yellow phlegm. She is fully vaccinated for Covid and received the influenza vaccine one week ago. The patient was noted to have an SpO2 of 89% on room air. Covid swab was positive. Imaging revealed multifocal infiltrates more prominent in the left upper lobe and left lower lobe. CRP was 180.3.     The patient was administered one dose of Decadron in the ED. RADIOLOGY:    XR CHEST: 11/14/2021FINDINGS:   Cardiomediastinal silhouette stable. Left upper lobe irregular opacity. No pneumothorax. No pleural effusion.    Irregular left upper lobe opacity. This could represent a focal infectious process but a neoplastic process should be considered. However, no pulmonary nodule seen in this region on a chest CT done 01/20/2021. Follow-up suggested to ensure resolution.      CT CHEST: 11/14/2021 FINDINGS:   Pulmonary Arteries: Pulmonary arteries are adequately opacified for evaluation. No evidence of intraluminal filling defect to suggest pulmonary embolism. Main pulmonary artery is normal in caliber. Mediastinum: Mild cardiomegaly. No pericardial effusion. 1 cm size lymph nodes identified in the mediastinum, not considered significantly enlarged. Normal size thoracic aorta. Lungs/pleura: Moderate consolidations identified in the left upper lobe left lower lobe and small opacity in the right upper lung compatible with multifocal pneumonia. No pleural effusions. Upper Abdomen: Mild-to-moderate diffuse hepatic steatosis noted. No acute finding noted in the upper abdomen. Soft Tissues/Bones: Moderate osteopenic changes and degenerative changes noted in the bony structures. .  No acute fractures in the thoracic spine.      1. No acute pulmonary embolism or acute thoracic aortic abnormality. 2. Multifocal infiltrates more prominent in the left upper lobe and left lower lobe. 3. Mild cardiomegaly. Patient admitted because of concerns with COVID 19.    CURRENT EVALUATION : 11/16/2021    Afebrile  VS stable    The patient is resting on 2 L NC  She received Regeneron monoclonal antibodies 11/15/21    CRP remains elevated but is decreasing    Patient exhibiting respiratory distress.  No  Respiratory secretions: light yellow    Patient receiving supplemental oxygen: No  RR: 25-->19  02 sat: 97-->97      % FIO2:   PEEP:      QTc:       NEWS Score: 0-4 Low risk group; 5-6: Medium risk group; 7 or above: High risk group  Parameters 3 2 1 0 1 2 3   Age    < 72   ? 65   RR ? 8  9-11 12-20  21-24 ? 25   O2 Sats ? 91 92-93 94-95 ? 96      Suppl O2  Yes  No      SBP ? 90  101-110 111-219   ? 220   HR ? 40  41-50 51-90  111-130 ? 131   Consciousness    Alert   Drowsiness, lethargy, or confusion   Temperature ? 35.0 C (95.0 F)  35.1-36.0 C 95.1-96.9 F 36.1-38.0 C 97.0-100.4 F 38.1-39.0 C 100.5-102.3 F ? 39.1 C ? 102.4 F      NEWS Score:   11/15/21: 3 Low risk    Overall Daily Picture:    Improving      Presence of secondary bacterial Infection:    No   Additional antibiotics: No    Labs, X rays reviewed: 11/16/2021    BUN:20  Cr:1.03    WBC:3.9  Hb:10.8  Plat: 291    Absolute Neutrophils:2.46  Absolute Lymphocytes:0.83  Neutrophil/Lymphocyte Ratio: 2.9 low risk    CRP:180.3-->152.7  Ferritin:1404  LDH:     Pro Calcitonin:      Cultures:  Urine:  ·   Blood:  ·   Sputum :  ·   Wound:       CXR:   11/14/21    CAT:  11/14/21      Discussed with patient, RN, CC, IM. I have personally reviewed the past medical history, past surgical history, medications, social history, and family history, and I have updated the database accordingly.   Past Medical History:     Past Medical History:   Diagnosis Date    Bleeding     while on aggrenox    Cataracts, bilateral     Cerebrovascular disease 2003,2011    cva    Chronic pain in left foot     CKD (chronic kidney disease) stage 3, GFR 30-59 ml/min (HCC)     GERD (gastroesophageal reflux disease)     Hx of blood clots     Hyperlipidemia     Hypertension     Iron (Fe) deficiency anemia     Nicotine abuse     Obesity     Osteoarthritis     Peripheral vascular disease ([facility] [address] 3/13/2014    Substance abuse ([facility])     cocaine, canabis    Thalassemia minor     Unspecified cerebral artery occlusion with cerebral infarction     Unspecified sleep apnea        Past Surgical  History:     Past Surgical History:   Procedure Health     Financial Resource Strain: Low Risk     Difficulty of Paying Living Expenses: Not hard at all   Food Insecurity: No Food Insecurity    Worried About Running Out of Food in the Last Year: Never true    Doug of Food in the Last Year: Never true   Transportation Needs:     Lack of Transportation (Medical): Not on file    Lack of Transportation (Non-Medical): Not on file   Physical Activity:     Days of Exercise per Week: Not on file    Minutes of Exercise per Session: Not on file   Stress:     Feeling of Stress : Not on file   Social Connections:     Frequency of Communication with Friends and Family: Not on file    Frequency of Social Gatherings with Friends and Family: Not on file    Attends Rastafarian Services: Not on file    Active Member of [address] [place] [address] [employer] or Organizations: Not on file    Attends Club or Organization Meetings: Not on file    Marital Status: Not on file   Intimate Partner Violence:     Fear of Current or Ex-Partner: Not on file    Emotionally Abused: Not on file    Physically Abused: Not on file    Sexually Abused: Not on file   Housing Stability:     Unable to Pay for Housing in the Last Year: Not on file    Number of [place] in the Last Year: Not on file    Unstable Housing in the Last Year: Not on file       Family History:     Family History   Problem Relation Age of Onset    High Blood Pressure Mother     Asthma Mother     Heart Disease Mother     Heart Disease Father     High Blood Pressure Father     Diabetes Brother     High Blood Pressure Brother     Heart Disease Brother     High Blood Pressure Maternal Grandmother     High Blood Pressure Maternal Grandfather     Heart Disease Maternal Grandfather         Allergies:   Duricef [cefadroxil monohydrate], Fish-derived products, Pcn [penicillins], and Tomato     Review of Systems:       Constitutional: No fevers or chills.  No systemic complaints  Head: No headaches  Eyes: No double vision or blurry vision. No conjunctival inflammation. ENT: No sore throat or runny nose. . No hearing loss, tinnitus or vertigo. Cardiovascular: No chest pain or palpitations. Shortness of breath. ALCALA  Lung: Shortness of breath or cough. No sputum production  Abdomen: Nausea, vomiting, diarrhea. No abdominal pain. Margareth Crape No cramps. Genitourinary: No increased urinary frequency, or dysuria. No hematuria. No suprapubic or CVA pain  Musculoskeletal: No muscle aches or pains. No joint effusions, swelling or deformities  Hematologic: No bleeding or bruising. Neurologic: No headache, weakness, numbness, or tingling. Integument: No rash, no ulcers. Psychiatric: No depression. Endocrine: No polyuria, no polydipsia, no polyphagia. Physical Examination :     Patient Vitals for the past 8 hrs:   BP Temp Temp src Pulse Resp SpO2   11/16/21 0459 (!) 144/66 98.4 °F (36.9 °C) Oral 80 23 93 %     General Appearance: Awake, alert, and in no apparent distress  Head:  Normocephalic, no trauma  Eyes: Pupils equal, round, reactive to light; sclera anicteric; conjunctivae pink. No embolic phenomena. ENT: Oropharynx clear, without erythema, exudate, or thrush. No tenderness of sinuses. Mouth/throat: mucosa pink and moist. No lesions. Dentition in good repair. Neck:Supple, without lymphadenopathy. Thyroid normal, No bruits. Pulmonary/Chest: Clear to auscultation, without wheezes, rales, or rhonchi. No dullness to percussion. Cardiovascular: Regular rate and rhythm without murmurs, rubs, or gallops. Abdomen: Soft, non tender. Bowel sounds normal. No organomegaly  All four Extremities: No cyanosis, clubbing, edema, or effusions. Neurologic: No gross sensory or motor deficits. Skin: Warm and dry with good turgor. No signs of peripheral arterial or venous insufficiency. No ulcerations. No open wounds.     Medical Decision Making -Laboratory:   I have independently reviewed/ordered the following labs:    CBC with Differential:   Recent Labs 11/14/21  0937 11/15/21  0521   WBC 3.8 3.9   HGB 10.6* 10.8*   HCT 34.7* 36.5    291   LYMPHOPCT 22*  --    MONOPCT 10  --      BMP:   Recent Labs     11/14/21  0937 11/15/21  0521   * 137   K 3.3* 4.4   CL 94* 101   CO2 25 24   BUN 14 20   CREATININE 1.08* 1.03*   MG 2.1  --      Hepatic Function Panel:   Recent Labs     11/14/21  0937 11/15/21  0521   PROT 7.2 6.8   LABALBU 3.4* 2.9*   BILITOT 0.23* 0.17*   ALKPHOS 100 90   ALT 26 27   AST 53* 46*     No results for input(s): RPR in the last 72 hours. No results for input(s): HIV in the last 72 hours. No results for input(s): BC in the last 72 hours. Lab Results   Component Value Date    MUCUS 1+ 08/19/2015    RBC 4.87 11/15/2021    RBC 4.79 04/05/2012    TRICHOMONAS NOT REPORTED 08/19/2015    WBC 3.9 11/15/2021    YEAST NOT REPORTED 08/19/2015    TURBIDITY CLEAR 08/19/2015     Lab Results   Component Value Date    CREATININE 1.03 11/15/2021    GLUCOSE 140 11/15/2021    GLUCOSE 88 04/12/2012       Medical Decision Making-Imaging:     Narrative   EXAMINATION:   CTA OF THE CHEST 11/14/2021 10:23 am       TECHNIQUE:   CTA of the chest was performed after the administration of intravenous   contrast.  Multiplanar reformatted images are provided for review.  MIP   images are provided for review.  Dose modulation, iterative reconstruction,   and/or weight based adjustment of the mA/kV was utilized to reduce the   radiation dose to as low as reasonably achievable.       COMPARISON:   January 2021       HISTORY:   ORDERING SYSTEM PROVIDED HISTORY: covid + , sob, elevated d dimer   TECHNOLOGIST PROVIDED HISTORY:   covid + , sob, elevated d dimer   Decision Support Exception - unselect if not a suspected or confirmed   emergency medical condition->Emergency Medical Condition (MA)   Reason for Exam:  covid + , sob, elevated d dimer       FINDINGS:   Pulmonary Arteries: Pulmonary arteries are adequately opacified for   evaluation.  No evidence of intraluminal filling defect to suggest pulmonary   embolism.  Main pulmonary artery is normal in caliber.       Mediastinum: Mild cardiomegaly.  No pericardial effusion.  1 cm size lymph   nodes identified in the mediastinum, not considered significantly enlarged. Normal size thoracic aorta.       Lungs/pleura: Moderate consolidations identified in the left upper lobe left   lower lobe and small opacity in the right upper lung compatible with   multifocal pneumonia.  No pleural effusions.       Upper Abdomen: Mild-to-moderate diffuse hepatic steatosis noted.  No acute   finding noted in the upper abdomen.       Soft Tissues/Bones: Moderate osteopenic changes and degenerative changes   noted in the bony structures. .  No acute fractures in the thoracic spine.           Impression   1. No acute pulmonary embolism or acute thoracic aortic abnormality. 2. Multifocal infiltrates more prominent in the left upper lobe and left   lower lobe.    3. Mild cardiomegaly.         Narrative   EXAMINATION:   ONE XRAY VIEW OF THE CHEST       11/14/2021 9:54 am       COMPARISON:   01/05/2019       HISTORY:   ORDERING SYSTEM PROVIDED HISTORY: sob   TECHNOLOGIST PROVIDED HISTORY:   sob   Reason for Exam: portable upright       FINDINGS:   Cardiomediastinal silhouette stable.  Left upper lobe irregular opacity.  No   pneumothorax.  No pleural effusion.           Impression   Irregular left upper lobe opacity.  This could represent a focal infectious   process but a neoplastic process should be considered.  However, no pulmonary   nodule seen in this region on a chest CT done 01/20/2021.  Follow-up   suggested to ensure resolution.                 Medical Decision [id number]:       Medical Decision Making-Other:     Note:  · Labs, medications, radiologic studies were reviewed with personal review of films  · Large amounts of data were reviewed  · Discussed with nursing Staff, Discharge planner  · Infection Control and Prevention measures reviewed  · All prior entries were reviewed  · Administer medications as ordered  · Prognosis: Guarded  · Discharge planning reviewed      Thank you for allowing us to participate in the care of this patient. Please call with questions. LOY Abreu - CNP     ATTESTATION:    I have discussed the case, including pertinent history and exam findings with the APRN. I have evaluated the  History, physical findings and pictures of the patient and the key elements of the encounter have been performed by me. I have reviewed the laboratory data, other diagnostic studies and discussed them with the APRN. I have updated the medical record where necessary. I agree with the assessment, plan and orders as documented by the APRN.     Ant Harden MD.      Pager: [phone] - Office: [phone]

## 2021-11-16 NOTE — PROGRESS NOTES
Restrictions  Restrictions/Precautions  Restrictions/Precautions: General Precautions, Fall Risk, Isolation, Up as Tolerated  Required Braces or Orthoses?: No  Position Activity Restriction  Other position/activity restrictions: Droplet+/covid+, Neg for PE, 4L O2 entire session    Subjective   General  Patient assessed for rehabilitation services?: Yes  Family / Caregiver Present: No  Diagnosis: Covid+, SOB, nausea, resp failure  Patient Currently in Pain: Denies  Pain Assessment  Pain Assessment: 0-10  Pain Level: 0  Patient's Stated Pain Goal: No pain  Vital Signs  Temp: 97.9 °F (36.6 °C)  Temp Source: Oral  Pulse: 60  Heart Rate Source: Monitor  Resp: 18  BP: 126/76  BP Location: Right lower arm  MAP (mmHg): 92  Patient Position: Semi fowlers  Level of Consciousness: Alert (0)  MEWS Score: 1  Patient Currently in Pain: Denies  Oxygen Therapy  SpO2: 95 %  Pulse Oximeter Device Mode: Continuous  Pulse Oximeter Device Location: Finger  O2 Device: Nasal cannula  O2 Flow Rate (L/min): 2 L/min  Social/Functional History  Social/Functional History  Lives With: Spouse  Type of Home: Apartment  Home Layout: One level (1st floor. takes laundry to [employer] on rollator)  Home Access: Level entry  Bathroom Shower/Tub: Tub/Shower unit  Bathroom Toilet: Handicap height  Bathroom Equipment: Grab bars in shower, Grab bars around toilet, Shower chair  Bathroom Accessibility: Accessible  Home Equipment: Cane (rollator)  Receives Help From: [address]: Needs assistance (Aides assist with some bathing/dressing per pt)  Homemaking Assistance: Needs assistance  Homemaking Responsibilities: Yes (pt does own laundry, own cooking, aide helps clean.)  Ambulation Assistance: Independent ([address] in home, rollator)  Transfer Assistance: Independent  Active : Yes  Mode of Transportation:  (crossover)  Occupation: On disability  Leisure & Hobbies: play cards, gospel on TV. Additional Comments: aide 3x/wk, 2hr each. standing effectively from low surfaces, when sitting pt plopped down hard and in unsafe manner despite cues to reach back to armrests     Cognition  Overall Cognitive Status: Exceptions  Safety Judgement: Decreased awareness of need for assistance  Insights: Decreased awareness of deficits        Sensation  Overall Sensation Status: Impaired (Chronic NT in BLE's from knees distally)      LUE AROM (degrees)  LUE AROM : WFL  RUE AROM (degrees)  RUE AROM : WFL  LUE Strength  Gross LUE Strength: WFL  L Shoulder Flex: 4+/5  L Elbow Flex: 4+/5  L Elbow Ext: 4+/5  L Hand General: 4+/5  RUE Strength  Gross RUE Strength: WFL  R Shoulder Flex: 4+/5  R Elbow Flex: 4+/5  R Elbow Ext: 4+/5  R Hand General: 4+/5      Plan   Plan  Times per week: 3-5x    AM-PAC Score        AM-[facility] Inpatient Daily Activity Raw Score: 16 (11/16/21 1633)  AM-PAC Inpatient ADL T-Scale Score : 35.96 (11/16/21 1633)  ADL Inpatient CMS 0-100% Score: 53.32 (11/16/21 1633)  ADL Inpatient CMS G-Code Modifier : CK (11/16/21 1633)    Goals  Short term goals  Time Frame for Short term goals: Pt will by discharge  Short term goal 1: demo good safety awareness during func mob around room using LR and SBA  Short term goal 2: demo ADL UB bathing/dressing activity with increased time and SBA  Short term goal 3: demo ADL LB bathing/dressing activity with increased time, sock-aid/reacher PRN, and min A  Short term goal 4: demo min Ax1 for all bed mob and func transfers using LRD, including toilet transfer  Short term goal 5: demo activity tolerance for 30 min+     Therapy Time   Individual Concurrent Group Co-treatment   Time In 1114         Time Out 1130         Minutes 16         Timed Code Treatment Minutes: 10 Minutes       Viola Maher OTR/L

## 2021-11-16 NOTE — PROGRESS NOTES
[facility]  Internal Medicine Teaching Residency Program  Inpatient Daily Progress Note  ______________________________________________________________________________    Patient: Yuni Dominique  YOB: 1955   IRD:[id number]    Acct: [de-identified]     Room: [address] [place] [address]  Admit date: 11/14/2021  Today's date: 11/16/21  Number of days in the hospital: 2    SUBJECTIVE   Admitting Diagnosis: Pneumonia due to COVID-19 virus  CC: shortness of breath    Pt examined at bedside. Chart & results reviewed. She is hemodynamically stable, saturating well on 2L O2 via NC. No fever spikes. CRP down trending, 180.3->152.7  She received monoclonal antibodies yesterday. She has couple of loose stools yesterday, will monitor. ROS:  Constitutional:  negative for chills, fevers, sweats  Respiratory:  negative for cough, dyspnea on exertion, hemoptysis, shortness of breath, wheezing  Cardiovascular:  negative for chest pain, chest pressure/discomfort, lower extremity edema, palpitations  Gastrointestinal:  negative for abdominal pain, constipation, nausea, vomiting  Neurological:  negative for dizziness, headache  BRIEF HISTORY     A 74 y.o. -American female presented with chief complaint of  shortness of breath.      She has PMH significant of HTN, HLD, CVA, microcytic anemia (alpha thalassemia trait), CKD stage III.      She also has nausea, vomiting, and diarrhea, all symptoms ongoing for last 7 to 10 days. She also has vague abdominal discomfort present. Shortness of breath is gradually progressing, worsening on exertion and decreases somewhat on rest.  She denies orthopnea or PND episodes. She has intermittent cough and is bringing up whitish-yellow phlegm. She also has myalgias and arthralgias. She complains of fatigue and decreased appetite. She denies eating any outside food or travel history. Her other family members have similar symptoms.     She is vaccinated with moderna vaccine. She recently took her flu shot after which her symptoms developed. Her  is also admitted today with a similar complaints. She has no urinary complaints. She has morbid obesity and associated TIBURCIO, she uses CPAP at night. In the ER, she desaturated on room air and was placed on 4 L NC via O2, saturations improved. Also the work of breathing decreased.     Work-up in the ER,  She was found to be COVID-19 positive. EKG showed no ST-T wave abnormality, troponin 38, 36; proBNP 260  Elevated D-dimer 1.28, underwent CT PE, which ruled out embolism and confirmed COVID-19 pneumonia. Hemoglobin 10.6  .3  Pro-Adolph 0.01  Ferritin 1404  Fibrinogen 581  Lipase 30    OBJECTIVE     Vital Signs:  /73   Pulse 79   Temp 98.7 °F (37.1 °C) (Oral)   Resp 22   Ht 5' 7\" (1.702 m)   Wt (!) 369 lb 7.9 oz (167.6 kg)   SpO2 94%   BMI 57.87 kg/m²     Temp (24hrs), Av.6 °F (37 °C), Min:98 °F (36.7 °C), Max:99 °F (37.2 °C)    In: 2460.5   Out: 1470 [Urine:1470]    Physical Exam:  Constitutional: This is a well developed, well nourished, Greater than 40 - Morbid Obesity / Extreme Obesity / Grade III 74y.o. year old female who is alert, oriented, cooperative and in no apparent distress. Head:normocephalic and atraumatic. EENT:  PERRLA. No conjunctival injections. Septum was midline, mucosa was without erythema, exudates or cobblestoning. No thrush was noted. Neck: Supple without thyromegaly. No elevated JVP. Trachea was midline. Respiratory: Chest was symmetrical without dullness to percussion. Breath sounds bilaterally were clear to auscultation. There were no wheezes, rhonchi or rales. There is no intercostal retraction or use of accessory muscles. No egophony noted. Cardiovascular: Regular without murmur, clicks, gallops or rubs. Abdomen: Slightly rounded and soft without organomegaly. No rebound, rigidity or guarding was appreciated.     Lymphatic: No lymphadenopathy. Musculoskeletal: Normal curvature of the spine. No gross muscle weakness. Extremities:  No lower extremity edema, ulcerations, tenderness, varicosities or erythema. Muscle size, tone and strength are normal.  No involuntary movements are noted. Skin:  Warm and dry. Good color, turgor and pigmentation. No lesions or scars. No cyanosis or clubbing  Neurological/Psychiatric: The patient's general behavior, level of consciousness, thought content and emotional status is normal.        Medications:  Scheduled Medications:    hydroCHLOROthiazide  25 mg Oral Daily    vitamin B-1  100 mg Oral Daily    pantoprazole  40 mg Oral QAM AC    amLODIPine  10 mg Oral Daily    losartan  50 mg Oral Daily    sodium chloride flush  5-40 mL IntraVENous 2 times per day    potassium bicarb-citric acid  40 mEq Oral Daily    heparin (porcine)  5,000 Units SubCUTAneous 3 times per day    aspirin  81 mg Oral Daily    atorvastatin  80 mg Oral Daily    clopidogrel  75 mg Oral Daily     Continuous Infusions:    sodium chloride Stopped (11/16/21 0921)    sodium chloride       PRN Medicationsdextromethorphan-guaiFENesin, 10 mL, Q4H PRN  sodium chloride flush, 5-40 mL, PRN  sodium chloride, 25 mL, PRN  potassium chloride, 40 mEq, PRN   Or  potassium alternative oral replacement, 40 mEq, PRN   Or  potassium chloride, 10 mEq, PRN  ondansetron, 4 mg, Q4H PRN  polyethylene glycol, 17 g, Daily PRN  acetaminophen, 650 mg, Q6H PRN   Or  acetaminophen, 650 mg, Q6H PRN        Diagnostic Labs:  CBC:   Recent Labs     11/14/21  0937 11/15/21  0521   WBC 3.8 3.9   RBC 4.83 4.87   HGB 10.6* 10.8*   HCT 34.7* 36.5   MCV 71.8* 74.9*   RDW 17.2* 17.6*    291     BMP:   Recent Labs     11/14/21 0937 11/15/21  0521   * 137   K 3.3* 4.4   CL 94* 101   CO2 25 24   BUN 14 20   CREATININE 1.08* 1.03*     BNP: No results for input(s): BNP in the last 72 hours.   PT/INR: No results for input(s): PROTIME, INR in the last 72 hours. APTT: No results for input(s): APTT in the last 72 hours. CARDIAC ENZYMES: No results for input(s): CKMB, CKMBINDEX, TROPONINI in the last 72 hours. Invalid input(s): CKTOTAL;3  FASTING LIPID PANEL:  Lab Results   Component Value Date    CHOL 148 09/24/2021    HDL 50 09/24/2021    TRIG 119 09/24/2021     LIVER PROFILE:   Recent Labs     11/14/21  0937 11/15/21  0521   AST 53* 46*   ALT 26 27   BILITOT 0.23* 0.17*   ALKPHOS 100 90      MICROBIOLOGY:   Lab Results   Component Value Date/Time    CULTURE NO GROWTH 6 DAYS 01/05/2019 08:58 PM       Imaging:    XR CHEST PORTABLE    Result Date: 11/14/2021  Irregular left upper lobe opacity. This could represent a focal infectious process but a neoplastic process should be considered. However, no pulmonary nodule seen in this region on a chest CT done 01/20/2021. Follow-up suggested to ensure resolution. CT CHEST PULMONARY EMBOLISM W CONTRAST    Result Date: 11/14/2021  1. No acute pulmonary embolism or acute thoracic aortic abnormality. 2. Multifocal infiltrates more prominent in the left upper lobe and left lower lobe. 3. Mild cardiomegaly. ASSESSMENT & PLAN     A 58 y/o AA female presented with shortness of breath, nausea, vomiting,and diarrhea and found to have COVID-19 infection.      COVID-19 infection with pneumonia  Received 1 dose of Decadron 10 mg IV  Received monoclonal antibody therapy per ID  Stable for dc pending ID clearance  ID is on board, appreciate recommendations  CRP down trending.      Acute hypoxic respiratory failure  Secondary to COVID-19  On 2L O2 via NC   Will monitor and wean off as appropriate.     Hypertension  On HCTZ 25 mg  On losartan 50 mg     Hyperlipidemia  On Lipitor 80 mg     Chronic kidney disease  We will monitor      Morbid obesity  TIBURCIO  On CPAP during sleep and daytime naps.     Peripheral vascular disease     Previous cerebrovascular accident, with no residual defects  On aspirin and Plavix     Microcytic anemia  Alpha thalassemia trait  We will monitor     DVT ppx  On heparin     GI ppx  Not indicated     PT/OT/SW  Consulted     Discharge Planning   to assist in discharge planning. Will be discharged to home. Sita Chew MD  Internal Medicine Resident, PGY-1  [address], [place]  11/16/2021, 2:43 PM    Attending Physician Statement  I have discussed the care of Chas Mckeon with the resident team. I have examined the patient myself and taken ros and hpi , including pertinent history and exam findings,  with the resident. I have reviewed the key elements of all parts of the encounter with the resident. I agree with the assessment, plan and orders as documented by the resident. Principal Problem:    Pneumonia due to COVID-19 virus  Active Problems:    Essential hypertension    CKD (chronic kidney disease) stage 3, GFR 30-59 ml/min ([facility])    Microcytic anemia    Alpha thalassemia trait  Resolved Problems:    * No resolved hospital problems. *    Fluctuating between room air and 2 L nasal cannula. Inflammatory markers downtrending.   Home O2 eval today and discharge to home      Electronically signed by Sowmya Fall MD

## 2021-11-16 NOTE — PROGRESS NOTES
Home Oxygen Evaluation completed. Resting SpO2 on room air = 94%    SpO2 on room air with exercise = 92%    Nocturnal Oximetry with patient on room air is recommended is SpO2 is between 89% and 95% (requires additional order).     Reagan Ortega RCP  6:28 PM

## 2021-11-16 NOTE — PLAN OF CARE
Problem: Airway Clearance - Ineffective  Goal: Achieve or maintain patent airway  11/16/2021 1618 by Francisca Lanier RN  Outcome: Ongoing  11/16/2021 0907 by Francisca Lanier RN  Outcome: Ongoing     Problem: Gas Exchange - Impaired  Goal: Absence of hypoxia  11/16/2021 1618 by Francisca Lanier RN  Outcome: Ongoing  11/16/2021 0907 by Francisca Lanier RN  Outcome: Ongoing  Goal: Promote optimal lung function  11/16/2021 1618 by Francisca Lanier RN  Outcome: Ongoing  11/16/2021 0907 by Francisca Lanier RN  Outcome: Ongoing     Problem: Breathing Pattern - Ineffective  Goal: Ability to achieve and maintain a regular respiratory rate  11/16/2021 1618 by Francisca Lanier RN  Outcome: Ongoing  11/16/2021 0907 by Francisca Lanier RN  Outcome: Ongoing     Problem:  Body Temperature -  Risk of, Imbalanced  Goal: Ability to maintain a body temperature within defined limits  11/16/2021 1618 by Francisca Lanier RN  Outcome: Ongoing  11/16/2021 0907 by Francisca Lanier RN  Outcome: Ongoing  Goal: Will regain or maintain usual level of consciousness  11/16/2021 1618 by Francisca Lanier RN  Outcome: Ongoing  11/16/2021 0907 by Francisca Lanier RN  Outcome: Ongoing  Goal: Complications related to the disease process, condition or treatment will be avoided or minimized  11/16/2021 1618 by Francisca Lanier RN  Outcome: Ongoing  11/16/2021 0907 by Francisca Lanier RN  Outcome: Ongoing     Problem: Isolation Precautions - Risk of Spread of Infection  Goal: Prevent transmission of infection  11/16/2021 1618 by Francisca Lanier RN  Outcome: Ongoing  11/16/2021 0907 by Francisca Lanier RN  Outcome: Ongoing     Problem: Nutrition Deficits  Goal: Optimize nutritional status  11/16/2021 1618 by Francisca Lanier RN  Outcome: Ongoing  11/16/2021 0907 by Francisca Lanier RN  Outcome: Ongoing     Problem: Risk for Fluid Volume Deficit  Goal: Maintain normal heart rhythm  11/16/2021 1618 by Francisca Lanier RN  Outcome: Ongoing  11/16/2021 8145 by Clara Babinski, RN  Outcome: Ongoing  Goal: Maintain absence of muscle cramping  11/16/2021 1618 by Clara Babinski, RN  Outcome: Ongoing  11/16/2021 0907 by Clara Babinski, RN  Outcome: Ongoing  Goal: Maintain normal serum potassium, sodium, calcium, phosphorus, and pH  11/16/2021 1618 by Clara Babinski, RN  Outcome: Ongoing  11/16/2021 0907 by Clara Babinski, RN  Outcome: Ongoing     Problem: Loneliness or Risk for Loneliness  Goal: Demonstrate positive use of time alone when socialization is not possible  11/16/2021 1618 by Clara Babinski, RN  Outcome: Ongoing  11/16/2021 0907 by Clara Babinski, RN  Outcome: Ongoing     Problem: Fatigue  Goal: Verbalize increase energy and improved vitality  11/16/2021 1618 by Clara Babinski, RN  Outcome: Ongoing  11/16/2021 0907 by Clara Babinski, RN  Outcome: Ongoing     Problem: Patient Education: Go to Patient Education Activity  Goal: Patient/Family Education  11/16/2021 1618 by Clara Babinski, RN  Outcome: Ongoing  11/16/2021 0907 by Clara Babinski, RN  Outcome: Ongoing     Problem: Falls - Risk of:  Goal: Will remain free from falls  Description: Will remain free from falls  11/16/2021 1618 by Clara Babinski, RN  Outcome: Ongoing  11/16/2021 0907 by Clara Babinski, RN  Outcome: Ongoing  Goal: Absence of physical injury  Description: Absence of physical injury  11/16/2021 1618 by Clara Babinski, RN  Outcome: Ongoing  11/16/2021 0907 by Clara Babinski, RN  Outcome: Ongoing     Problem: Skin Integrity:  Goal: Will show no infection signs and symptoms  Description: Will show no infection signs and symptoms  11/16/2021 1618 by Clara Babinski, RN  Outcome: Ongoing  11/16/2021 0907 by Clara Babinski, RN  Outcome: Ongoing  Goal: Absence of new skin breakdown  Description: Absence of new skin breakdown  11/16/2021 1618 by Clara Babinski, RN  Outcome: Ongoing  11/16/2021 0907 by Clara Babinski, RN  Outcome: Ongoing     Problem: ABCDS Injury Assessment  Goal: Absence of physical injury  11/16/2021 1618 by Madyson Elder RN  Outcome: Ongoing  11/16/2021 0907 by Madyson Elder RN  Outcome: Ongoing

## 2021-11-16 NOTE — DISCHARGE INSTR - COC
Continuity of Care Form    Patient Name: Chas Mckeon   :  1955  MRN:  [id number]    [facility] date:  2021  Discharge date:  21    Code Status Order: Full Code   Advance Directives:      Admitting Physician:  Sowmya Fall MD  PCP: Sita Chew MD    Discharging Nurse: Antonella Eldridge Unit/Room#: 1617/8637-67  Discharging Unit Phone Number: [phone]    Emergency Contact:   Extended Emergency Contact Information  Primary Emergency Contact: Evelyn Sorto  Address: N/A   [address] Phone: [phone]  Work Phone: [phone]  Mobile Phone: [phone]  Relation: Child  Secondary Emergency Contact: Crow Monroy  Mobile Phone: [phone]  Relation: Child    Past Surgical History:  Past Surgical History:   Procedure Laterality Date    BREAST SURGERY      biopsy    COLONOSCOPY  2007    adenomatous polyp    COLONOSCOPY      normal, repeat in 5 years    COLONOSCOPY N/A 2019    COLONOSCOPY POLYPECTOMY SNARE/COLD BIOPSY performed by Dory Boyd MD at [facility] ENDOSCOPY      negative    UPPER GASTROINTESTINAL ENDOSCOPY N/A 2019    EGD BIOPSY performed by Dory Boyd MD at \[facility]"" Endoscopy       Immunization History:   Immunization History   Administered Date(s) Administered    COVID-19, Pfizer, PF, 30mcg/0.3mL 2021, 2021    Influenza Virus Vaccine 2019    Influenza, Purvi Pleva, IM, (6 mo and older Fluzone, Flulaval, Fluarix and 3 yrs and older Afluria) 2019, 2021    Influenza, Quadv, IM, PF (6 mo and older Fluzone, Flulaval, Fluarix, and 3 yrs and older Afluria) 10/19/2018    Pneumococcal Conjugate 13-valent ([id number]) 10/19/2018    Pneumococcal Polysaccharide ([id number]) 2016    Tdap (Boostrix, Adacel) 2018    Zoster Live (Zostavax) 2016, 2017    Zoster Recombinant (Shingrix) 04/20/2018, 06/26/2018       Active Problems:  Patient Active Problem List   Diagnosis Code    Essential hypertension I10    Pure hypercholesterolemia E78.00    Cerebrovascular accident (CVA), 2011, no residual deficit (HCC) I63.00    CKD (chronic kidney disease) stage 3, GFR 30-59 ml/min (HCC) N18.30    Peripheral vascular disease (HCC) I73.9    TIBURCIO (obstructive sleep apnea) G47.33    Class 3 severe obesity due to excess calories with serious comorbidity and body mass index (BMI) of 50.0 to 59.9 in adult ([facility] E66.01, Z68.43    Primary osteoarthritis of left knee M19.90    Chronic tension-type headache, not intractable G44.229    Carpal tunnel syndrome, bilateral-not on medication due to kidney disease G56.03    Microcytic anemia D50.9    Alpha thalassemia trait D56.3    Cervical stenosis of spinal canal M48.02    Pneumonia due to COVID-19 virus U07.1, J12.82       Isolation/Infection:   Isolation            Droplet Plus          Patient Infection Status       Infection Onset Added Last Indicated Last Indicated By Review Planned Expiration Resolved Resolved By    COVID-19 11/14/21 11/14/21 11/14/21 COVID-19, Rapid 11/21/21 11/28/21      Resolved    COVID-19 Rule Out 11/14/21 11/14/21 11/14/21 COVID-19, Rapid (Ordered)   11/14/21 Rule-Out Test Resulted            Nurse Assessment:  Last Vital Signs: /73   Pulse 79   Temp 98.7 °F (37.1 °C) (Oral)   Resp 22   Ht 5' 7\" (1.702 m)   Wt (!) 369 lb 7.9 oz (167.6 kg)   SpO2 94%   BMI 57.87 kg/m²     Last documented pain score (0-10 scale): Pain Level: 0  Last Weight:   Wt Readings from Last 1 Encounters:   11/15/21 (!) 369 lb 7.9 oz (167.6 kg)     Mental Status:  oriented, alert, and coherent    IV Access:  - None    Nursing Mobility/ADLs:  Walking   Assisted  Transfer  Assisted  Bathing  Assisted  Dressing  Assisted  Toileting  Assisted  Feeding  Independent  Med Admin  Independent  Med Delivery   whole    Wound Care Documentation and Therapy:        Elimination:  Continence: Bowel: Yes  Bladder: Yes  Urinary Catheter: None   Colostomy/Ileostomy/Ileal Conduit: No       Date of Last BM: 11/16/21    Intake/Output Summary (Last 24 hours) at 11/16/2021 1540  Last data filed at 11/16/2021 1300  Gross per 24 hour   Intake 2460.5 ml   Output 1170 ml   Net 1290.5 ml     I/O last 3 completed shifts: In: 2460.5 [P.O.:480; I.V.:1910.8; IV Piggyback:69.7]  Out: 8652 [Urine:1470]    Safety Concerns: At Risk for Falls    Impairments/Disabilities:      None    Nutrition Therapy:  Current Nutrition Therapy:   - Oral Diet:  General    Routes of Feeding: Oral  Liquids: Thin Liquids  Daily Fluid Restriction: no  Last Modified Barium Swallow with Video (Video Swallowing Test): not done    Treatments at the Time of Hospital Discharge:   Respiratory Treatments: N/A  Oxygen Therapy:  is not on home oxygen therapy.   Ventilator:    - No ventilator support    Rehab Therapies: Physical Therapy and Occupational Therapy  Weight Bearing Status/Restrictions: No weight bearing restirctions  Other Medical Equipment (for information only, NOT a DME order):  cane, walker, and bedside commode  Other Treatments: skilled nursing    Patient's personal belongings (please select all that are sent with patient):  Pt only had coat and footwear belongings    RN SIGNATURE:  Electronically signed by Clara Babinski, RN on 11/17/21 at 2:48 PM EST    CASE MANAGEMENT/SOCIAL WORK SECTION    Inpatient Status Date: ***    Readmission Risk Assessment Score:  Readmission Risk              Risk of Unplanned Readmission:  14           Discharging to Facility/ Agency   Name: [facility]  Address:   [address] [address]         Phone: [phone]          / signature: Electronically signed by Mark Doran RN on 11/17/21 at 4:28 PM EST    PHYSICIAN SECTION    Prognosis: Good    Condition at Discharge: Stable    Rehab Potential (if transferring to Rehab): N/A    Recommended Labs or Other Treatments After Discharge: N/A    Physician Certification: I certify the above information and transfer of Yuni Dominique  is necessary for the continuing treatment of the diagnosis listed and that she requires Home Care for greater 30 days.      Update Admission H&P: No change in H&P    PHYSICIAN SIGNATURE:  Electronically signed by Annie White MD on 11/17/21 at 5:31 PM EST0

## 2021-11-17 ENCOUNTER — APPOINTMENT (OUTPATIENT)
Dept: GENERAL RADIOLOGY | Age: 66
DRG: 177 | End: 2021-11-17
Payer: COMMERCIAL

## 2021-11-17 VITALS
OXYGEN SATURATION: 94 % | RESPIRATION RATE: 19 BRPM | TEMPERATURE: 98.4 F | SYSTOLIC BLOOD PRESSURE: 133 MMHG | HEIGHT: 67 IN | DIASTOLIC BLOOD PRESSURE: 74 MMHG | BODY MASS INDEX: 45.99 KG/M2 | WEIGHT: 293 LBS | HEART RATE: 68 BPM

## 2021-11-17 LAB
C-REACTIVE PROTEIN: 175.5 MG/L (ref 0–5)
D-DIMER QUANTITATIVE: 1.01 MG/L FEU

## 2021-11-17 PROCEDURE — 71045 X-RAY EXAM CHEST 1 VIEW: CPT

## 2021-11-17 PROCEDURE — 36415 COLL VENOUS BLD VENIPUNCTURE: CPT

## 2021-11-17 PROCEDURE — 86140 C-REACTIVE PROTEIN: CPT

## 2021-11-17 PROCEDURE — 2580000003 HC RX 258: Performed by: STUDENT IN AN ORGANIZED HEALTH CARE EDUCATION/TRAINING PROGRAM

## 2021-11-17 PROCEDURE — 94761 N-INVAS EAR/PLS OXIMETRY MLT: CPT

## 2021-11-17 PROCEDURE — 6370000000 HC RX 637 (ALT 250 FOR IP)

## 2021-11-17 PROCEDURE — 85379 FIBRIN DEGRADATION QUANT: CPT

## 2021-11-17 PROCEDURE — 97110 THERAPEUTIC EXERCISES: CPT

## 2021-11-17 PROCEDURE — 97116 GAIT TRAINING THERAPY: CPT

## 2021-11-17 PROCEDURE — 6370000000 HC RX 637 (ALT 250 FOR IP): Performed by: STUDENT IN AN ORGANIZED HEALTH CARE EDUCATION/TRAINING PROGRAM

## 2021-11-17 PROCEDURE — APPSS30 APP SPLIT SHARED TIME 16-30 MINUTES: Performed by: NURSE PRACTITIONER

## 2021-11-17 PROCEDURE — 6360000002 HC RX W HCPCS

## 2021-11-17 PROCEDURE — 99239 HOSP IP/OBS DSCHRG MGMT >30: CPT | Performed by: INTERNAL MEDICINE

## 2021-11-17 RX ADMIN — PANTOPRAZOLE SODIUM 40 MG: 40 TABLET, DELAYED RELEASE ORAL at 08:05

## 2021-11-17 RX ADMIN — Medication 81 MG: at 08:05

## 2021-11-17 RX ADMIN — CLOPIDOGREL 75 MG: 75 TABLET, FILM COATED ORAL at 08:05

## 2021-11-17 RX ADMIN — HYDROCHLOROTHIAZIDE 25 MG: 25 TABLET ORAL at 08:05

## 2021-11-17 RX ADMIN — Medication 100 MG: at 08:08

## 2021-11-17 RX ADMIN — POTASSIUM BICARBONATE 40 MEQ: 782 TABLET, EFFERVESCENT ORAL at 08:06

## 2021-11-17 RX ADMIN — HEPARIN SODIUM 5000 UNITS: 5000 INJECTION INTRAVENOUS; SUBCUTANEOUS at 14:28

## 2021-11-17 RX ADMIN — HEPARIN SODIUM 5000 UNITS: 5000 INJECTION INTRAVENOUS; SUBCUTANEOUS at 05:09

## 2021-11-17 RX ADMIN — SODIUM CHLORIDE, PRESERVATIVE FREE 10 ML: 5 INJECTION INTRAVENOUS at 08:08

## 2021-11-17 RX ADMIN — LOSARTAN POTASSIUM 50 MG: 50 TABLET, FILM COATED ORAL at 08:05

## 2021-11-17 RX ADMIN — ATORVASTATIN CALCIUM 80 MG: 80 TABLET, FILM COATED ORAL at 08:05

## 2021-11-17 RX ADMIN — AMLODIPINE BESYLATE 10 MG: 10 TABLET ORAL at 08:05

## 2021-11-17 ASSESSMENT — PAIN SCALES - GENERAL
PAINLEVEL_OUTOF10: 0
PAINLEVEL_OUTOF10: 0

## 2021-11-17 NOTE — PLAN OF CARE
Problem: Airway Clearance - Ineffective  Goal: Achieve or maintain patent airway  11/17/2021 0108 by Yolanda Fleming RN  Outcome: Ongoing  11/16/2021 1618 by Dorma Buerger, RN  Outcome: Ongoing     Problem: Gas Exchange - Impaired  Goal: Absence of hypoxia  11/17/2021 0108 by Yolanda Fleming RN  Outcome: Ongoing  11/16/2021 1618 by Dorma Buerger, RN  Outcome: Ongoing  Goal: Promote optimal lung function  11/17/2021 0108 by Yolanda Fleming RN  Outcome: Ongoing  11/16/2021 1618 by Dorma Buerger, RN  Outcome: Ongoing     Problem: Breathing Pattern - Ineffective  Goal: Ability to achieve and maintain a regular respiratory rate  11/17/2021 0108 by Yolanda Fleming RN  Outcome: Ongoing  11/16/2021 1618 by Dorma Buerger, RN  Outcome: Ongoing     Problem:  Body Temperature -  Risk of, Imbalanced  Goal: Ability to maintain a body temperature within defined limits  11/17/2021 0108 by Yolanda Fleming RN  Outcome: Ongoing  11/16/2021 1618 by Dorma Buerger, RN  Outcome: Ongoing  Goal: Will regain or maintain usual level of consciousness  11/17/2021 0108 by Yolanda Fleming RN  Outcome: Ongoing  11/16/2021 1618 by Dorma Buerger, RN  Outcome: Ongoing  Goal: Complications related to the disease process, condition or treatment will be avoided or minimized  11/17/2021 0108 by Yolanda Fleming RN  Outcome: Ongoing  11/16/2021 1618 by Dorma Buerger, RN  Outcome: Ongoing     Problem: Isolation Precautions - Risk of Spread of Infection  Goal: Prevent transmission of infection  11/17/2021 0108 by Yolanda Fleming RN  Outcome: Ongoing  11/16/2021 1618 by Dorma Buerger, RN  Outcome: Ongoing     Problem: Nutrition Deficits  Goal: Optimize nutritional status  11/17/2021 0108 by Yolanda Fleming RN  Outcome: Ongoing  11/16/2021 1618 by Dorma Buerger, RN  Outcome: Ongoing     Problem: Risk for Fluid Volume Deficit  Goal: Maintain normal heart rhythm  11/17/2021 0108 by Yolanda Fleming RN  Outcome: Ongoing  11/16/2021 1618 by Mary Josue injury  11/17/2021 0108 by Yoel Sual RN  Outcome: Ongoing  11/16/2021 1618 by Summer Valencia RN  Outcome: Ongoing

## 2021-11-17 NOTE — PROGRESS NOTES
Writer spoke with [employer] from [email or web address]. Writer was informed that agency would not be able to get anyone out to see patient until 11/26/21 die to the COVID positive test. Currently pt and writer in constant communication with case management and internal medicine residents. Pt would like to send a referral to a different home health agency as she would still like to discharge home as opposed to anywhere else.

## 2021-11-17 NOTE — PROGRESS NOTES
Renetta Brody was evaluated today and a DME order was entered for a wheeled walker because she requires this to successfully complete daily living tasks of eating, bathing, toileting and personal cares. A wheeled walker is necessary due to the patient's unsteady gait, upper body weakness, and inability to  an ambulation device; and she can ambulate only by pushing a walker instead of a lesser assistive device such as a cane, crutch, or standard walker. The need for this equipment was discussed with the patient and she understands and is in agreement. Leopoldo Chiang MD  PGY-2, Internal Medicine Resident  [facility]  11/17/2021 3:49 PM

## 2021-11-17 NOTE — DISCHARGE SUMMARY
[facility]     Department of Internal Medicine - Staff Internal Medicine Teaching Service    INPATIENT DISCHARGE SUMMARY      Patient Identification:  Chas Mckeon is a 74 y.o. female. :  1955  MRN: [id number]     Acct: [de-identified]   PCP: Sita Chew MD  Admit Date:  2021  Discharge date and time: No discharge date for patient encounter. Attending Provider: Sowmya Fall MD                                     [facility] Problem Lists:  Principal Problem:    Pneumonia due to COVID-19 virus  Active Problems:    Essential hypertension    CKD (chronic kidney disease) stage 3, GFR 30-59 ml/min ([facility])    Microcytic anemia    Alpha thalassemia trait  Resolved Problems:    * No resolved hospital problems. *      HOSPITAL STAY     Brief Inpatient course:   Chas Mckeon is a 74 y.o. female who was admitted for the management of Pneumonia due to COVID-19 virus, presented to the emergency department with nausea, vomiting, diarrhea and shortness of breath. She also had myalgias and arthralgias. All her symptoms developed after she took flu shot. She was found to be Covid positive. Though she was vaccinated for Covid with moderna. Her  had similar presentation and was also admitted. Infectious diseases was on board for management of Covid infection. . Monoclonal antibodies were given. Inflammatory markers bear monitored. Initially she was on 6 L of oxygen and she was eventually weaned off. . She intermittently required supplemental oxygen 3-4 L via NC. She did not qualify for home oxygen. She worked with PT and OT during her hospital stay. She was given option of SNF after discharge. She denied SNF as she has home aide and daughter available at home to help her. CODE STATUS was discussed with her. She preferred DNR CCA without intubation. She remained hemodynamically stable during her hospital stay. All her medications are optimized. She is discharged and she is medically cleared.     Procedures/ Significant Interventions:    None     Consults:     Consults:     Final Specialist Recommendations/Findings:   IP CONSULT TO INTERNAL MEDICINE  IP CONSULT TO INFECTIOUS DISEASES      Any Hospital Acquired Infections: none    Discharge Functional Status:  stable    DISCHARGE PLAN     Disposition: home    Patient Instructions:   Current Discharge Medication List      CONTINUE these medications which have NOT CHANGED    Details   clopidogrel (PLAVIX) 75 MG tablet TAKE 1 TABLET BY MOUTH DAILY  Qty: 28 tablet, Refills: 3    Associated Diagnoses: Cerebrovascular accident (CVA) due to thrombosis of precerebral artery (HCC)      fluticasone (FLONASE) 50 MCG/ACT nasal spray 2 sprays by Each Nostril route daily  Qty: 16 g, Refills: 2    Associated Diagnoses: Sinus congestion      omeprazole (PRILOSEC) 20 MG delayed release capsule Take 1 capsule by mouth 2 times daily (before meals)  Qty: 180 capsule, Refills: 1    Associated Diagnoses: Sinus congestion      Vitamin D (CHOLECALCIFEROL) 25 MCG (1000 UT) TABS tablet       amLODIPine (NORVASC) 10 MG tablet Take 1 tablet by mouth daily  Qty: 60 tablet, Refills: 3    Associated Diagnoses: Essential hypertension      hydroCHLOROthiazide (HYDRODIURIL) 25 MG tablet Take 1 tablet by mouth daily  Qty: 60 tablet, Refills: 3    Associated Diagnoses: Essential hypertension      losartan (COZAAR) 50 MG tablet Take 1 tablet by mouth daily  Qty: 60 tablet, Refills: 3    Associated Diagnoses: Essential hypertension      aspirin (ASPIRIN LOW DOSE) 81 MG EC tablet TAKE 1 TABLET DAILY  Qty: 60 tablet, Refills: 3    Associated Diagnoses: Cerebrovascular accident (CVA) due to thrombosis of precerebral artery (HCC)      atorvastatin (LIPITOR) 80 MG tablet Take 1 tablet by mouth daily  Qty: 60 tablet, Refills: 3    Associated Diagnoses: Pure hypercholesterolemia; Cerebrovascular accident (CVA) due to thrombosis of precerebral artery (HCC)      folic acid (FOLVITE) 1 MG tablet Take 1 tablet by mouth daily  Qty: 60 tablet, Refills: 3    Associated Diagnoses: Essential hypertension; Vitamin D deficiency      vitamin B-1 (THIAMINE) 100 MG tablet Take 1 tablet by mouth daily  Qty: 30 tablet, Refills: 3    Associated Diagnoses: Microcytic anemia; Vitamin D deficiency; Class 3 severe obesity due to excess calories with serious comorbidity and body mass index (BMI) of 50.0 to 59.9 in adult (HCC)      diclofenac sodium (VOLTAREN) 1 % GEL Apply 4 g topically 4 times daily  Qty: 4 Tube, Refills: 1      guaiFENesin (ALTARUSSIN) 100 MG/5ML syrup Take 10 mLs by mouth 3 times daily as needed for Cough  Qty: 1 each, Refills: 0    Associated Diagnoses: Dry cough      Dextromethorphan-Benzocaine (SORE THROAT & COUGH LOZENGES) 5-7.5 MG LOZG Take 1 Dose by mouth 4 times daily as needed (sore throat)  Qty: 10 lozenge, Refills: 0    Associated Diagnoses: Dry cough      Nasal Moisturizer Combination (OCEAN COMPLETE SINUS RINSE) AERS 1 Can by Nasal route 3 times daily  Qty: 1 each, Refills: 3    Associated Diagnoses: Sinus congestion      acetaminophen (SM PAIN RELIEVER) 325 MG tablet TAKE 2 TABLETS BY MOUTH EVERY 4 HOURS AS NEEDED FOR PAIN  Qty: 60 tablet, Refills: 3    Associated Diagnoses: Primary osteoarthritis of left knee             Activity: activity as tolerated    Diet: renal diet    Follow-up:    Christina Franz MD  [address] [place] [address] [address] [phone]    Schedule an appointment as soon as possible for a visit in 1 week  If symptoms worsen      Patient Instructions:   Please follow-up with your PCP in 1 week. Please continue taking all your medications as prescribed. Please continue to monitor your oxygen saturation at home. Please come back to ER if your symptoms worsen. Follow up labs: none  Follow up imaging: none      Diogo Urias MD,  Internal Medicine Resident, PGY-1   [facility];  [place], [place]  11/17/2021, 1:42 PM    Attending Physician Statement  I have discussed the care of [place] [address] and I have examined the patient myselft and taken ros and hpi , including pertinent history and exam findings,  with the resident. I have reviewed the key elements of all parts of the encounter with the resident. I agree with the assessment, plan and orders as documented by the resident. I spent approx 35 mins in direct patient care as above and discussing discharge with patient, reviewing medications and counseling for discharge .     Electronically signed by Lubna Hicks MD

## 2021-11-17 NOTE — PROGRESS NOTES
Cardiomediastinal silhouette stable. Left upper lobe irregular opacity. No pneumothorax. No pleural effusion. Irregular left upper lobe opacity. This could represent a focal infectious process but a neoplastic process should be considered. However, no pulmonary nodule seen in this region on a chest CT done 01/20/2021. Follow-up suggested to ensure resolution. CT CHEST: 11/14/2021 FINDINGS:   Pulmonary Arteries: Pulmonary arteries are adequately opacified for evaluation. No evidence of intraluminal filling defect to suggest pulmonary embolism. Main pulmonary artery is normal in caliber. Mediastinum: Mild cardiomegaly. No pericardial effusion. 1 cm size lymph nodes identified in the mediastinum, not considered significantly enlarged. Normal size thoracic aorta. Lungs/pleura: Moderate consolidations identified in the left upper lobe left lower lobe and small opacity in the right upper lung compatible with multifocal pneumonia. No pleural effusions. Upper Abdomen: Mild-to-moderate diffuse hepatic steatosis noted. No acute finding noted in the upper abdomen. Soft Tissues/Bones: Moderate osteopenic changes and degenerative changes noted in the bony structures. .  No acute fractures in the thoracic spine. 1. No acute pulmonary embolism or acute thoracic aortic abnormality. 2. Multifocal infiltrates more prominent in the left upper lobe and left lower lobe. 3. Mild cardiomegaly. Patient admitted because of concerns with COVID 19.    CURRENT EVALUATION : 11/17/2021    Afebrile  VS stable    The patient is doing well on room air. She received Regeneron monoclonal antibodies 11/15/21    CRP remains elevated at 175.5  Repeat CXR ordered     Patient exhibiting respiratory distress.  No  Respiratory secretions: light yellow    Patient receiving supplemental oxygen: No  RR: 25-->19-->18  02 sat: 97-->97-->94      % FIO2:   PEEP:      QTc:       NEWS Score: 0-4 Low risk group; 5-6: Medium risk group; 7 or above: High risk group  Parameters 3 2 1 0 1 2 3   Age    < 72   ? 65   RR ? 8  9-11 12-20  21-24 ? 25   O2 Sats ? 91 92-93 94-95 ? 96      Suppl O2  Yes  No      SBP ? 90  101-110 111-219   ? 220   HR ? 40  41-50 51-90  111-130 ? 131   Consciousness    Alert   Drowsiness, lethargy, or confusion   Temperature ? 35.0 C (95.0 F)  35.1-36.0 C 95.1-96.9 F 36.1-38.0 C 97.0-100.4 F 38.1-39.0 C 100.5-102.3 F ? 39.1 C ? 102.4 F      NEWS Score:  11/15/21: 3 Low risk    Overall Daily Picture:   Improving      Presence of secondary bacterial Infection:    No   Additional antibiotics: No    Labs, X rays reviewed: 11/17/2021    BUN:20  Cr:1.03    WBC:3.9  Hb:10.8  Plat: 291    Absolute Neutrophils:2.46  Absolute Lymphocytes:0.83  Neutrophil/Lymphocyte Ratio: 2.9 low risk    CRP:180.3-->152.7-->175.5  Ferritin:1404  LDH:     Pro Calcitonin:      Cultures:  Urine:    Blood:    Sputum :    Wound:      CXR:   11/14/21    CAT:  11/14/21      Discussed with patient, RN, CC, IM. I have personally reviewed the past medical history, past surgical history, medications, social history, and family history, and I have updated the database accordingly.   Past Medical History:     Past Medical History:   Diagnosis Date    Bleeding     while on aggrenox    Cataracts, bilateral     Cerebrovascular disease 2003,2011    cva    Chronic pain in left foot     CKD (chronic kidney disease) stage 3, GFR 30-59 ml/min (HCC)     GERD (gastroesophageal reflux disease)     Hx of blood clots     Hyperlipidemia     Hypertension     Iron (Fe) deficiency anemia     Nicotine abuse     Obesity     Osteoarthritis     Peripheral vascular disease ([facility] [address] 3/13/2014    Substance abuse ([facility])     cocaine, canabis    Thalassemia minor     Unspecified cerebral artery occlusion with cerebral infarction     Unspecified sleep apnea        Past Surgical  History:     Past Surgical History:   Procedure Laterality Date    BREAST SURGERY      biopsy COLONOSCOPY  12/2007    adenomatous polyp    COLONOSCOPY  2013    normal, repeat in 5 years    COLONOSCOPY N/A 8/21/2019    COLONOSCOPY POLYPECTOMY SNARE/COLD BIOPSY performed by Julieta Mcgrath MD at [facility] ENDOSCOPY  2009    negative    UPPER GASTROINTESTINAL ENDOSCOPY N/A 8/21/2019    EGD BIOPSY performed by Julieta Mcgrath MD at [facility] Endoscopy       Medications:      hydroCHLOROthiazide  25 mg Oral Daily    vitamin B-1  100 mg Oral Daily    pantoprazole  40 mg Oral QAM AC    amLODIPine  10 mg Oral Daily    losartan  50 mg Oral Daily    sodium chloride flush  5-40 mL IntraVENous 2 times per day    potassium bicarb-citric acid  40 mEq Oral Daily    heparin (porcine)  5,000 Units SubCUTAneous 3 times per day    aspirin  81 mg Oral Daily    atorvastatin  80 mg Oral Daily    clopidogrel  75 mg Oral Daily       Social History:     Social History     Socioeconomic History    Marital status:      Spouse name: Not on file    Number of children: Not on file    Years of education: Not on file    Highest education level: Not on file   Occupational History    Not on file   Tobacco Use    Smoking status: Former Smoker     Packs/day: 2.00     Years: 35.00     Pack years: 70.00     Quit date: 10/11/2011     Years since quitting: 10.1    Smokeless tobacco: Never Used   Vaping Use    Vaping Use: Never used   Substance and Sexual Activity    Alcohol use:  Yes     Alcohol/week: 6.0 standard drinks     Types: 6 Cans of beer per week     Comment: rare    Drug use: No     Types: Cocaine, Marijuana (Weed)     Comment: per pt did years ago; cocaine & marij, 8-23-19 denies current use    Sexual activity: Yes     Partners: Male   Other Topics Concern    Not on file   Social History Narrative    Not on file     Social Determinants of Health     Financial Resource Strain: Low Risk     Difficulty of Paying Living Expenses: Not hard at all   Food Insecurity: No Food Insecurity    Worried About [facility] in the Last Year: Never true    Ran Out of Food in the Last Year: Never true   Transportation Needs:     Lack of Transportation (Medical): Not on file    Lack of Transportation (Non-Medical): Not on file   Physical Activity:     Days of Exercise per Week: Not on file    Minutes of Exercise per Session: Not on file   Stress:     Feeling of Stress : Not on file   Social Connections:     Frequency of Communication with Friends and Family: Not on file    Frequency of Social Gatherings with Friends and Family: Not on file    Attends Alevism Services: Not on file    Active Member of Clubs or Organizations: Not on file    Attends Club or Organization Meetings: Not on file    Marital Status: Not on file   Intimate Partner Violence:     Fear of Current or Ex-Partner: Not on file    Emotionally Abused: Not on file    Physically Abused: Not on file    Sexually Abused: Not on file   Housing Stability:     Unable to Pay for Housing in the Last Year: Not on file    Number of [place] in the Last Year: Not on file    Unstable Housing in the Last Year: Not on file       Family History:     Family History   Problem Relation Age of Onset    High Blood Pressure Mother     Asthma Mother     Heart Disease Mother     Heart Disease Father     High Blood Pressure Father     Diabetes Brother     High Blood Pressure Brother     Heart Disease Brother     High Blood Pressure Maternal Grandmother     High Blood Pressure Maternal Grandfather     Heart Disease Maternal Grandfather         Allergies:   Duricef [cefadroxil monohydrate], Fish-derived products, Pcn [penicillins], and Tomato     Review of Systems:       Constitutional: No fevers or chills. No systemic complaints  Head: No headaches  Eyes: No double vision or blurry vision. No conjunctival inflammation. ENT: No sore throat or runny nose. . No hearing loss, tinnitus or 11/15/21  0521   WBC 3.8 3.9   HGB 10.6* 10.8*   HCT 34.7* 36.5    291   LYMPHOPCT 22*  --    MONOPCT 10  --      BMP:   Recent Labs     11/14/21  0937 11/15/21  0521   * 137   K 3.3* 4.4   CL 94* 101   CO2 25 24   BUN 14 20   CREATININE 1.08* 1.03*   MG 2.1  --      Hepatic Function Panel:   Recent Labs     11/14/21  0937 11/15/21  0521   PROT 7.2 6.8   LABALBU 3.4* 2.9*   BILITOT 0.23* 0.17*   ALKPHOS 100 90   ALT 26 27   AST 53* 46*     No results for input(s): RPR in the last 72 hours. No results for input(s): HIV in the last 72 hours. No results for input(s): BC in the last 72 hours. Lab Results   Component Value Date    MUCUS 1+ 08/19/2015    RBC 4.87 11/15/2021    RBC 4.79 04/05/2012    TRICHOMONAS NOT REPORTED 08/19/2015    WBC 3.9 11/15/2021    YEAST NOT REPORTED 08/19/2015    TURBIDITY CLEAR 08/19/2015     Lab Results   Component Value Date    CREATININE 1.03 11/15/2021    GLUCOSE 140 11/15/2021    GLUCOSE 88 04/12/2012       Medical Decision Making-Imaging:     Narrative   EXAMINATION:   CTA OF THE CHEST 11/14/2021 10:23 am       TECHNIQUE:   CTA of the chest was performed after the administration of intravenous   contrast.  Multiplanar reformatted images are provided for review. MIP   images are provided for review. Dose modulation, iterative reconstruction,   and/or weight based adjustment of the mA/kV was utilized to reduce the   radiation dose to as low as reasonably achievable. COMPARISON:   January 2021       HISTORY:   ORDERING SYSTEM PROVIDED HISTORY: covid + , sob, elevated d dimer   TECHNOLOGIST PROVIDED HISTORY:   covid + , sob, elevated d dimer   Decision Support Exception - unselect if not a suspected or confirmed   emergency medical condition->Emergency Medical Condition (MA)   Reason for Exam:  covid + , sob, elevated d dimer       FINDINGS:   Pulmonary Arteries: Pulmonary arteries are adequately opacified for   evaluation.   No evidence of intraluminal filling defect to suggest pulmonary   embolism. Main pulmonary artery is normal in caliber. Mediastinum: Mild cardiomegaly. No pericardial effusion. 1 cm size lymph   nodes identified in the mediastinum, not considered significantly enlarged. Normal size thoracic aorta. Lungs/pleura: Moderate consolidations identified in the left upper lobe left   lower lobe and small opacity in the right upper lung compatible with   multifocal pneumonia. No pleural effusions. Upper Abdomen: Mild-to-moderate diffuse hepatic steatosis noted. No acute   finding noted in the upper abdomen. Soft Tissues/Bones: Moderate osteopenic changes and degenerative changes   noted in the bony structures. .  No acute fractures in the thoracic spine. Impression   1. No acute pulmonary embolism or acute thoracic aortic abnormality. 2. Multifocal infiltrates more prominent in the left upper lobe and left   lower lobe. 3. Mild cardiomegaly. Narrative   EXAMINATION:   ONE XRAY VIEW OF THE CHEST       11/14/2021 9:54 am       COMPARISON:   01/05/2019       HISTORY:   ORDERING SYSTEM PROVIDED HISTORY: sob   TECHNOLOGIST PROVIDED HISTORY:   sob   Reason for Exam: portable upright       FINDINGS:   Cardiomediastinal silhouette stable. Left upper lobe irregular opacity. No   pneumothorax. No pleural effusion. Impression   Irregular left upper lobe opacity. This could represent a focal infectious   process but a neoplastic process should be considered. However, no pulmonary   nodule seen in this region on a chest CT done 01/20/2021. Follow-up   suggested to ensure resolution.                  Medical Decision Hphoem-[id number]:       Medical Decision Making-Other:     Note:  Labs, medications, radiologic studies were reviewed with personal review of films  Large amounts of data were reviewed  Discussed with nursing Staff, Discharge planner  Infection Control and Prevention measures reviewed  All prior entries were reviewed  Administer medications as ordered  Prognosis: Guarded  Discharge planning reviewed      Thank you for allowing us to participate in the care of this patient. Please call with questions. Lashae Lopez, APRN - CNP       ATTESTATION:    I have discussed the case, including pertinent history and exam findings with the APRN. I have evaluated the  History, physical findings and pictures of the patient and the key elements of the encounter have been performed by me. I have reviewed the laboratory data, other diagnostic studies and discussed them with the APRN. I have updated the medical record where necessary. I agree with the assessment, plan and orders as documented by the APRN.     Chante Navarrete MD.    Pager: [phone] - Office: [phone]

## 2021-11-17 NOTE — PROGRESS NOTES
Physical Therapy  Facility/Department: [facility] 3  Daily Treatment Note  NAME: Jocelyne Cameron  : 1955  MRN: [id number]    Date of Service: 2021    Discharge Recommendations:  Patient would benefit from continued therapy after discharge   PT Equipment Recommendations  Equipment Needed: No  Other: Pt has cane and rollator    Assessment   Assessment: Pt performs sit to stand transfer with CGA, requires multiple attempts and increased time to complete. Pt ambulated 10 ft. forward/retro with CGA using a RW and demos a slow lady and decreased step length. Pts SpO2 decreased to 88% with activity but quickly returned to >90% with seated rest break. Pt is limited by decreased endurance, balance and mobility and would benefit from continued PT following discharge to address functional deficits. Prognosis: Good  Decision Making: Medium Complexity  PT Education: Plan of Care; PT Role; General Safety; Transfer Training; Functional Mobility Training  REQUIRES PT FOLLOW UP: Yes  Activity Tolerance  Activity Tolerance: Patient Tolerated treatment well; Patient limited by fatigue; Patient limited by endurance     Patient Diagnosis(es): The encounter diagnosis was COVID.     has a past medical history of Bleeding, Cataracts, bilateral, Cerebrovascular disease, Chronic pain in left foot, CKD (chronic kidney disease) stage 3, GFR 30-59 ml/min ([facility]), GERD (gastroesophageal reflux disease), Hx of blood clots, Hyperlipidemia, Hypertension, Iron (Fe) deficiency anemia, Nicotine abuse, Obesity, Osteoarthritis, Peripheral vascular disease ([address] Substance abuse ([facility] [address] Thalassemia minor, Unspecified cerebral artery occlusion with cerebral infarction, and Unspecified sleep apnea. has a past surgical history that includes Tonsillectomy and adenoidectomy; Tubal ligation; Breast surgery; Endometrial biopsy (); Upper gastrointestinal endoscopy (); Colonoscopy (2007); Colonoscopy ();  Upper gastrointestinal endoscopy (N/A, 8/21/2019); and Colonoscopy (N/A, 8/21/2019). Restrictions  Restrictions/Precautions  Restrictions/Precautions: General Precautions, Fall Risk, Isolation, Up as Tolerated  Required Braces or Orthoses?: No  Position Activity Restriction  Other position/activity restrictions: Droplet+/covid+, Neg for PE, 4L O2 entire session  Subjective   General  Chart Reviewed: Yes  Response To Previous Treatment: Patient with no complaints from previous session. Family / Caregiver Present: No  Subjective  Subjective: RN and pt agreeable to PT. Pain Screening  Patient Currently in Pain: Denies  Vital Signs  Patient Currently in Pain: Denies       Orientation  Orientation  Overall Orientation Status: Within Functional Limits  Cognition   Cognition  Overall Cognitive Status: Exceptions  Safety Judgement: Decreased awareness of need for assistance  Insights: Decreased awareness of deficits  Objective      Transfers  Sit to Stand: Contact guard assistance  Stand to sit: Contact guard assistance  Comment: Pt requires increased time to complete sit to stand transfer but can do so with CGA. Ambulation  Ambulation?: Yes  More Ambulation?: No  Ambulation 1  Surface: level tile  Device: Rollator  Assistance: Contact guard assistance  Quality of Gait: slowed, small steps, increased sway initially but improved, no LOB  Distance: 10 ft.  FWD/Retro     Balance  Posture: Fair  Sitting - Static: Good  Sitting - Dynamic: Good  Standing - Static: Good; -  Standing - Dynamic: Fair; +  Comments: Bariatric RW used while assessing standing balance  Exercises  Straight Leg Raise: x15 BLE  Hip Flexion: x15 BLE  Hip Abduction: x15 BLE  Knee Long Arc Quad: x15 BLE  Ankle Pumps: x15 BLE                                           AM-PAC Score  AM-PAC Inpatient Mobility Raw Score : 14 (11/17/21 1430)  AM-PAC Inpatient T-Scale Score : 38.1 (11/17/21 1430)  Mobility Inpatient CMS 0-100% Score: 61.29 (11/17/21 1430)  Mobility Inpatient CMS G-Code Modifier : CL (11/17/21 3357)          Goals  Short term goals  Time Frame for Short term goals: 14 visits  Short term goal 1: Pt will be Vadim bed mobility  Short term goal 2: Pt will be Vadim transfers  Short term goal 3: Pt will be Vaidm amb 150' RW or least retrictive AD    Plan    Plan  Times per week: 5x/wk  Current Treatment Recommendations: Strengthening, Balance Training, Endurance Training, Functional Mobility Training, Transfer Training, Gait Training, Home Exercise Program, Safety Education & Training, Patient/Caregiver Education & Training, Equipment Evaluation, Education, & procurement  Safety Devices  Type of devices: Call light within reach, Nurse notified, Gait belt, Patient at risk for falls, Left in bed, All fall risk precautions in place  Restraints  Initially in place: No     Therapy Time   Individual Concurrent Group Co-treatment   Time In 1112         Time Out 1135         Minutes 23         Timed Code Treatment Minutes: Harpreet 17, PTA

## 2021-11-17 NOTE — PLAN OF CARE
Problem: Airway Clearance - Ineffective  Goal: Achieve or maintain patent airway  11/17/2021 1501 by Madyson Elder RN  Outcome: Completed  11/17/2021 0108 by Sanjana Torrez RN  Outcome: Ongoing     Problem: Gas Exchange - Impaired  Goal: Absence of hypoxia  11/17/2021 1501 by Madyson Elder RN  Outcome: Completed  11/17/2021 0108 by Sanjana Torrez RN  Outcome: Ongoing  Goal: Promote optimal lung function  11/17/2021 1501 by Madyson Elder RN  Outcome: Completed  11/17/2021 0108 by Sanjana Torrez RN  Outcome: Ongoing     Problem: Breathing Pattern - Ineffective  Goal: Ability to achieve and maintain a regular respiratory rate  11/17/2021 1501 by Madyson Eldre RN  Outcome: Completed  11/17/2021 0108 by Sanjana Torrez RN  Outcome: Ongoing     Problem:  Body Temperature -  Risk of, Imbalanced  Goal: Ability to maintain a body temperature within defined limits  11/17/2021 1501 by Madyson Elder RN  Outcome: Completed  11/17/2021 0108 by Sanjana Torrez RN  Outcome: Ongoing  Goal: Will regain or maintain usual level of consciousness  11/17/2021 1501 by Madyson Elder RN  Outcome: Completed  11/17/2021 0108 by Sanjana Torrez RN  Outcome: Ongoing  Goal: Complications related to the disease process, condition or treatment will be avoided or minimized  11/17/2021 1501 by Madyson Elder RN  Outcome: Completed  11/17/2021 0108 by Sanjana Torrez RN  Outcome: Ongoing     Problem: Isolation Precautions - Risk of Spread of Infection  Goal: Prevent transmission of infection  11/17/2021 1501 by Madyson Elder RN  Outcome: Completed  11/17/2021 0108 by Sanjana Torrez RN  Outcome: Ongoing     Problem: Nutrition Deficits  Goal: Optimize nutritional status  11/17/2021 1501 by Madyson Elder RN  Outcome: Completed  11/17/2021 0108 by Sanjana Torrez RN  Outcome: Ongoing     Problem: Risk for Fluid Volume Deficit  Goal: Maintain normal heart rhythm  11/17/2021 1501 by Madyson Elder RN  Outcome: Completed  11/17/2021 7878 by Gonzalez Garcia RN  Outcome: Ongoing  Goal: Maintain absence of muscle cramping  11/17/2021 1501 by Chikis Infante RN  Outcome: Completed  11/17/2021 0108 by Gonzalez Garcia RN  Outcome: Ongoing  Goal: Maintain normal serum potassium, sodium, calcium, phosphorus, and pH  11/17/2021 1501 by Chikis Infante RN  Outcome: Completed  11/17/2021 0108 by Gonzalez Garcia RN  Outcome: Ongoing     Problem: Loneliness or Risk for Loneliness  Goal: Demonstrate positive use of time alone when socialization is not possible  11/17/2021 1501 by Chikis Infante RN  Outcome: Completed  11/17/2021 0108 by Gonzalez Garcia RN  Outcome: Ongoing     Problem: Fatigue  Goal: Verbalize increase energy and improved vitality  11/17/2021 1501 by Chikis Infante RN  Outcome: Completed  11/17/2021 0108 by Gonzalez Garcia RN  Outcome: Ongoing     Problem: Patient Education: Go to Patient Education Activity  Goal: Patient/Family Education  11/17/2021 1501 by Chikis Infante RN  Outcome: Completed  11/17/2021 0108 by Gonzalez Garcia RN  Outcome: Ongoing

## 2021-11-17 NOTE — CARE COORDINATION
Received call from [facility] at [employer]. [facility] is not able to resume services until pt is out of isolation. Spoke to pt via telephone to discuss transitional planning. Updated her on [facility]. She still wants to return home. She would like [facility] for home care. Referral sent. Mallory Hicks notified. Pt requesting 2 wheeled walker. 1626 walker faxed to [employer]. Pt would like it delivered to home. Notified Mallorie Robertson.  [facility] is able to accept    Discharge Report    [facility] Case Management Department  Written by: Talia Grover RN    Patient Name: Axel Casey  Attending Provider: Martha Crawford MD  Admit Date: 2021  9:06 AM  MRN: [id number]  Account: [de-identified]                     : 1955  Discharge Date: 2021      Disposition: home    Talia Grover RN
freedom of choice and are agreeable with plan      Discharge Plan: Home with home care. Pt already established with [facility] LISSY Aguilaralexi Gilson and spoke with Georgina Ayon. Aware pt is here. Pt's  in room 3003. She is his main care taker as he has dementia.           Electronically signed by Romina Christianson RN on 11/15/21 at 2:18 PM EST

## 2021-11-18 ENCOUNTER — CARE COORDINATION (OUTPATIENT)
Dept: CASE MANAGEMENT | Age: 66
End: 2021-11-18

## 2021-11-18 ENCOUNTER — TELEPHONE (OUTPATIENT)
Dept: INTERNAL MEDICINE | Age: 66
End: 2021-11-18

## 2021-11-18 NOTE — TELEPHONE ENCOUNTER
----- Message from [address] [place] [address] sent at 11/18/2021 12:45 PM EST -----  Subject: Message to Provider    QUESTIONS  Information for Provider? Crystal from [facility], Patient was   requested to have home health after recent stay at the hospital but needs   confirmation order will be approved by PCP. Requested a call back at   [phone] message okay  ---------------------------------------------------------------------------  --------------  CALL BACK INFO  What is the best way for the office to contact you? OK to leave message on   voicemail  Preferred Call Back Phone Number? [phone]  ---------------------------------------------------------------------------  --------------  SCRIPT ANSWERS  Relationship to Patient?  Self

## 2021-11-18 NOTE — CARE COORDINATION
[facility] Transitions Initial Follow Up Call- COVID risk follow up call       Call within 2 business days of discharge: Yes    Patient: Darcy Burrows Patient : 1955   MRN: [id number]  Reason for Admission: COVID-19  Discharge Date: 21 RARS: Readmission Risk Score: 14.2 ( )      Last Discharge [facility]       Complaint Diagnosis Description Type Department Provider    21 Shortness of Breath; Nausea & Vomiting; Diarrhea COVID ED to Hosp-Admission (Discharged) (ADMITTED) [facility] 3 Nely Allison MD; Yuliana Carlos. .. Spoke with: Ann-Marie Estrada    Call to pt who states she is doing okay  Denies SOB, fever/ chills, nausea  States she is coughing up phlegm and will use the cough medicine if it becomes a problem. States she has a good appetite  Confirms isolation through       Writer received confirmation from [address] [place] [address] that pt will be seen today     Transitions of Care Initial Call    Was this an external facility discharge? No Discharge Facility[facility]  to be reviewed by the provider   Additional needs identified to be addressed with provider: No  none             Method of communication with provider : none      Advance Care Planning:   Does patient have an Advance Directive: decision maker updated. Was this a readmission? No  Patient stated reason for admission: covid   Patients top risk factors for readmission: medical condition-covid     Care Transition Nurse (CTN) contacted the patient by telephone to perform post hospital discharge assessment. Verified name and  with patient as identifiers. Provided introduction to self, and explanation of the CTN role. CTN reviewed discharge instructions, medical action plan and red flags with patient who verbalized understanding. Patient given an opportunity to ask questions and does not have any further questions or concerns at this time. Were discharge instructions available to patient?  Yes. Reviewed appropriate site of care based on symptoms and resources available to patient including: PCP, Specialist, Home health, When to call 911 and ctn. The patient agrees to contact the PCP office for questions related to their healthcare. Medication reconciliation was performed with patient, who verbalizes understanding of administration of home medications. Advised obtaining a 90-day supply of all daily and as-needed medications. Covid Risk Education     Educated patient about risk for severe COVID-19 due to risk factors according to CDC guidelines. CTN reviewed discharge instructions, medical action plan and red flag symptoms with the patient who verbalized understanding. Discussed COVID vaccination status: Yes and has been vaccinated. Education provided on COVID-19 vaccination as appropriate. Discussed exposure protocols and quarantine with CDC Guidelines. Patient was given an opportunity to verbalize any questions and concerns and agrees to contact CTN or health care provider for questions related to their healthcare. Reviewed and educated patient on any new and changed medications related to discharge diagnosis. Was patient discharged with a pulse oximeter? No Discussed and confirmed pulse oximeter discharge instructions and when to notify provider or seek emergency care. CTN provided contact information. Plan for follow-up call in 5-7 days based on severity of symptoms and risk factors.   Plan for next call: symptom management-routine follow up  and follow up appointment-PCP and ID appointments              Non-face-to-face services provided:  Scheduled appointment with PCP-pt agreeable to help from [address]  to help with scheduling   Scheduled appointment with Specialist-pt agreeable to help from [address]  to help with scheduling  Obtained and reviewed discharge summary and/or continuity of care documents  Communication with home health agencies or other community services the patient is currently using-writer called to confirm [facility] today   Assessment and support for treatment adherence and medication management-confirms no med changes.  Pt stateas list is correct on AVS     Care Transitions 24 Hour Call    Do you have any ongoing symptoms?: No  Do you have a copy of your discharge instructions?: Yes  Do you have all of your prescriptions and are they filled?: Yes  Have you been contacted by a [facility] Pharmacist?: No  Have you scheduled your follow up appointment?: No  Were you discharged with any Home Care or Post Acute Services: Yes  Post Acute Services: Home Health (Comment: Stephy Deluca)  Do you feel like you have everything you need to keep you well at home?: Yes  Care Transitions Interventions         Follow Up  Future Appointments   Date Time Provider Mary Marinelli   12/17/2021  1:45 PM Mariya Castro DPM [facility]   2/9/2022  2:00 PM Tevin Nettles MD [facility]   3/21/2022  1:15 PM Marielena Blanco MD [address] [place] [address]   9/26/2022  1:00 PM LOY Hassan - CNP Neuro Spec Enedelia Epstein RN

## 2021-11-18 NOTE — CARE COORDINATION
Request from [address] Marie Riggins RN.,  Please schedule patient for PCP and ID appts. Patient covid +    Patient scheduled with [facility] for VV tomorrow at 3pm    Patient scheduled with ID for in person appt on Tues Dec 7th @ 1:30    Patient aware of time and date.     Alison Jack, [address] [facility] Coordination Transition

## 2021-11-18 NOTE — TELEPHONE ENCOUNTER
CHELLY Cintron from [facility], calling to make sure PCP will follow with home care orders pt has ED follow up 11/19 please advise to note below.

## 2021-11-19 ENCOUNTER — VIRTUAL VISIT (OUTPATIENT)
Dept: INTERNAL MEDICINE | Age: 66
End: 2021-11-19
Payer: COMMERCIAL

## 2021-11-19 DIAGNOSIS — I10 ESSENTIAL HYPERTENSION: ICD-10-CM

## 2021-11-19 DIAGNOSIS — D50.9 MICROCYTIC ANEMIA: ICD-10-CM

## 2021-11-19 DIAGNOSIS — N18.31 STAGE 3A CHRONIC KIDNEY DISEASE (HCC): ICD-10-CM

## 2021-11-19 DIAGNOSIS — U09.9 POST-ACUTE COVID-19 SYNDROME: Primary | ICD-10-CM

## 2021-11-19 PROCEDURE — 99442 PR PHYS/QHP TELEPHONE EVALUATION 11-20 MIN: CPT | Performed by: INTERNAL MEDICINE

## 2021-11-19 RX ORDER — SODIUM CHLORIDE 0.65 %
AEROSOL, SPRAY (ML) NASAL
COMMUNITY
Start: 2021-11-11 | End: 2022-04-04

## 2021-11-19 RX ORDER — BLOOD PRESSURE TEST KIT
1 KIT MISCELLANEOUS 2 TIMES DAILY
Qty: 1 KIT | Refills: 0 | Status: SHIPPED | OUTPATIENT
Start: 2021-11-19 | End: 2022-03-02

## 2021-11-19 NOTE — PROGRESS NOTES
Attending Physician Statement  I have discussed the care of Stuart Samaniego, including pertinent history and exam findings,  with the resident. I have reviewed the key elements of all parts of the encounter with the resident. I agree with the assessment, plan and orders as documented by the resident.   (GE Modifier)
appointment for the relevant concern      Itz Miller MD

## 2021-11-24 ENCOUNTER — CARE COORDINATION (OUTPATIENT)
Dept: CASE MANAGEMENT | Age: 66
End: 2021-11-24

## 2021-11-24 NOTE — CARE COORDINATION
[facility] Transitions Follow Up Call    2021    Patient: Jo Salcido  Patient : 1955   MRN: [id number]  Reason for Admission: COVID-19   Discharge Date: 21 RARS: Readmission Risk Score: 14.2 ( )         Spoke with: Sandra Bloom    Call to pt who states she is doing okay   Denies SOB, fever  Confirms isolation through   States she has not received her walker (ordered at discharge- pt did not call [facility] with contact  had provided on our last call)- writer call to [employer] [place] who informs, with the holiday, it will be delivered on Monday   Call back to pt to inform of delivery date. Pt states she has not fallen without it   States her PCP ordered a BP cuff- she has not heard about this- writer encourages her to call [facility] (where Rx for this was sent) to see if covered by insurance and be delivered- v/u   States she has only seen home care nurse x 1- writer messaged [place] with Lisa Linder to inquire about her next visit   Denies needs  Encouraged to call writer/ CTC or providers if questions or concerns- v/u     Care Transitions Follow Up Call    Needs to be reviewed by the provider   Additional needs identified to be addressed with provider: No  none             Method of communication with provider : none      Care Transition Nurse (CTN) contacted the patient by telephone to follow up after admission on 2021. Verified name and  with patient as identifiers. Addressed changes since last contact: DME-walker and BP cuff   Discussed follow-up appointments. If no appointment was previously scheduled, appointment scheduling offered: Yes. Is follow up appointment scheduled within 7 days of discharge? Yes. CTN reviewed discharge instructions, medical action plan and red flags with patient and discussed any barriers to care and/or understanding of plan of care after discharge.  Discussed appropriate site of care based on symptoms and resources available to patient including: PCP, Specialist, Home health, When to call 911 and ctn. The patient agrees to contact the PCP office for questions related to their healthcare. Patients top risk factors for readmission: medical condition-s/p covid   Interventions to address risk factors: Communication with home health agencies or other community services the patient is currently using-message to [facility] liaison about next nurse visit       [id number] Vicky Cline follow up appointment(s): n/a    CTN provided contact information for future needs. Plan for follow-up call in 10-14 days based on severity of symptoms and risk factors. Plan for next call: symptom management-routine follwo up             Care Transitions Subsequent and Final Call    Subsequent and Final Calls  Do you have any ongoing symptoms?: No  Have your medications changed?: No  Do you have any questions related to your medications?: No  Do you currently have any active services?: Yes  Are you currently active with any services?: Home Health  Do you have any needs or concerns that I can assist you with?: No  Identified Barriers: Lack of Education  Care Transitions Interventions  Other Interventions:            Follow Up  Future Appointments   Date Time Provider Mary Marinelli   12/7/2021  1:30 PM Estephania Rodriguez MD INFT DISEASE [facility]   12/17/2021  1:45 PM Dc Weaver DPM [facility] Podiatry [facility]   2/9/2022  2:00 PM Bonnie Nieves MD [facility] IM [facility]   3/21/2022  1:15 PM Marcelina Anaya MD [address] [place] [address]   9/26/2022  1:00 PM LOY Brown - CNP Neuro Spec Adolph Varela RN

## 2021-11-29 ENCOUNTER — HOSPITAL ENCOUNTER (OUTPATIENT)
Age: 66
Setting detail: SPECIMEN
Discharge: HOME | End: 2021-11-29

## 2021-11-29 DIAGNOSIS — D50.9 MICROCYTIC ANEMIA: ICD-10-CM

## 2021-11-29 DIAGNOSIS — N18.31 STAGE 3A CHRONIC KIDNEY DISEASE (HCC): ICD-10-CM

## 2021-11-29 LAB
ABSOLUTE EOS #: 0.44 K/UL (ref 0–0.44)
ABSOLUTE IMMATURE GRANULOCYTE: 0.05 K/UL (ref 0–0.3)
ABSOLUTE LYMPH #: 2.23 K/UL (ref 1.1–3.7)
ABSOLUTE MONO #: 0.74 K/UL (ref 0.1–1.2)
ALBUMIN SERPL-MCNC: 3.8 G/DL (ref 3.5–5.2)
ALBUMIN/GLOBULIN RATIO: 1 (ref 1–2.5)
ALP BLD-CCNC: 121 U/L (ref 35–104)
ALT SERPL-CCNC: 15 U/L (ref 5–33)
ANION GAP SERPL CALCULATED.3IONS-SCNC: 16 MMOL/L (ref 9–17)
AST SERPL-CCNC: 16 U/L
BASOPHILS # BLD: 1 % (ref 0–2)
BASOPHILS ABSOLUTE: 0.11 K/UL (ref 0–0.2)
BILIRUB SERPL-MCNC: 0.36 MG/DL (ref 0.3–1.2)
BUN BLDV-MCNC: 24 MG/DL (ref 8–23)
BUN/CREAT BLD: ABNORMAL (ref 9–20)
CALCIUM SERPL-MCNC: 9.6 MG/DL (ref 8.6–10.4)
CHLORIDE BLD-SCNC: 102 MMOL/L (ref 98–107)
CO2: 21 MMOL/L (ref 20–31)
CREAT SERPL-MCNC: 1.15 MG/DL (ref 0.5–0.9)
DIFFERENTIAL TYPE: ABNORMAL
EOSINOPHILS RELATIVE PERCENT: 5 % (ref 1–4)
GFR AFRICAN AMERICAN: 57 ML/MIN
GFR NON-AFRICAN AMERICAN: 47 ML/MIN
GFR SERPL CREATININE-BSD FRML MDRD: ABNORMAL ML/MIN/{1.73_M2}
GFR SERPL CREATININE-BSD FRML MDRD: ABNORMAL ML/MIN/{1.73_M2}
GLUCOSE BLD-MCNC: 99 MG/DL (ref 70–99)
HCT VFR BLD CALC: 35.2 % (ref 36.3–47.1)
HEMOGLOBIN: 10.6 G/DL (ref 11.9–15.1)
IMMATURE GRANULOCYTES: 1 %
LYMPHOCYTES # BLD: 23 % (ref 24–43)
MCH RBC QN AUTO: 21.9 PG (ref 25.2–33.5)
MCHC RBC AUTO-ENTMCNC: 30.1 G/DL (ref 28.4–34.8)
MCV RBC AUTO: 72.6 FL (ref 82.6–102.9)
MONOCYTES # BLD: 8 % (ref 3–12)
NRBC AUTOMATED: 0 PER 100 WBC
PDW BLD-RTO: 18.4 % (ref 11.8–14.4)
PLATELET # BLD: 667 K/UL (ref 138–453)
PLATELET ESTIMATE: ABNORMAL
PMV BLD AUTO: 9.5 FL (ref 8.1–13.5)
POTASSIUM SERPL-SCNC: 4.1 MMOL/L (ref 3.7–5.3)
RBC # BLD: 4.85 M/UL (ref 3.95–5.11)
RBC # BLD: ABNORMAL 10*6/UL
SEG NEUTROPHILS: 62 % (ref 36–65)
SEGMENTED NEUTROPHILS ABSOLUTE COUNT: 6.1 K/UL (ref 1.5–8.1)
SODIUM BLD-SCNC: 139 MMOL/L (ref 135–144)
TOTAL PROTEIN: 7.5 G/DL (ref 6.4–8.3)
WBC # BLD: 9.7 K/UL (ref 3.5–11.3)
WBC # BLD: ABNORMAL 10*3/UL

## 2021-12-01 ENCOUNTER — TELEPHONE (OUTPATIENT)
Dept: INTERNAL MEDICINE | Age: 66
End: 2021-12-01

## 2021-12-01 NOTE — TELEPHONE ENCOUNTER
CHELLY Aguero from [facility], needs an verbal order to follow for Occupational therapy 1 time a week for 4 weeks.

## 2021-12-03 DIAGNOSIS — M17.12 PRIMARY OSTEOARTHRITIS OF LEFT KNEE: ICD-10-CM

## 2021-12-03 RX ORDER — ACETAMINOPHEN 325 MG/1
TABLET ORAL
Qty: 60 TABLET | Refills: 3 | Status: SHIPPED | OUTPATIENT
Start: 2021-12-03 | End: 2022-01-18

## 2021-12-03 NOTE — TELEPHONE ENCOUNTER
Request for medication refill, acetaminophen. Next Visit Date:2/9/22  Future Appointments   Date Time Provider Mary Marinelli   12/7/2021  1:30 PM Rox Perry MD INFT DISEASE [facility]   12/8/2021 11:30 AM STA SCR MAMMO RM 2 STAZ MAMMO STA Radiolog   12/17/2021  1:45 PM Destin Rivera DPM [facility] Podiatry [facility]   2/9/2022  2:00 PM Don Owens MD [facility] IM [facility]   3/21/2022  1:15 PM Windy Gibbons  [address]   9/26/2022  1:00 PM LOY Rabago - CNP Neuro [facility]   Topic Date Due    COVID-19 Vaccine (3 - Booster for Leal James series) 10/14/2021    Pneumococcal 65+ years Vaccine (2 of 2 - PPSV23) 12/20/2021    Low dose CT lung screening  01/20/2022    Colon cancer screen colonoscopy  08/21/2022    Lipid screen  09/24/2022    A1C test (Diabetic or Prediabetic)  09/29/2022    Annual Wellness Visit (AWV)  10/08/2022    Breast cancer screen  11/04/2022    Potassium monitoring  11/29/2022    Creatinine monitoring  11/29/2022    DTaP/Tdap/Td vaccine (2 - Td or Tdap) 03/08/2028    DEXA (modify frequency per FRAX score)  Completed    Flu vaccine  Completed    Shingles Vaccine  Completed    Hepatitis C screen  Completed    Hepatitis A vaccine  Aged Out    Hepatitis B vaccine  Aged Out    Hib vaccine  Aged Out    Meningococcal (ACWY) vaccine  Aged Out       Hemoglobin A1C (%)   Date Value   09/29/2021 5.8   06/07/2019 5.4   04/19/2018 5.7             ( goal A1C is < 7)   Microalb/Crt.  Ratio (mcg/mg creat)   Date Value   05/09/2017 6     LDL Cholesterol (mg/dL)   Date Value   09/24/2021 74       (goal LDL is <100)   AST (U/L)   Date Value   11/29/2021 16     ALT (U/L)   Date Value   11/29/2021 15     BUN (mg/dL)   Date Value   11/29/2021 24 (H)     BP Readings from Last 3 Encounters:   11/17/21 133/74   11/10/21 130/75   09/29/21 136/86          (goal 120/80)    All Future Testing planned in [facility]  Lab Frequency Next Occurrence   Ferritin Once 09/20/2021   COVID-19 Once 02/18/2022   Protein / Creatinine Ratio, Urine Once 02/18/2022   Iron And TIBC     Vitamin B12 & Folate     Ferritin     CBC Auto Differential           Patient Active Problem List:     Essential hypertension     Pure hypercholesterolemia     Cerebrovascular accident (CVA), 2011, no residual deficit (HCC)     CKD (chronic kidney disease) stage 3, GFR 30-59 ml/min ([facility])     Peripheral vascular disease ([facility])     TIBURCIO (obstructive sleep apnea)     Class 3 severe obesity due to excess calories with serious comorbidity and body mass index (BMI) of 50.0 to 59.9 in adult [facility])     Primary osteoarthritis of left knee     Chronic tension-type headache, not intractable     Carpal tunnel syndrome, bilateral-not on medication due to kidney disease     Microcytic anemia     Alpha thalassemia trait     Cervical stenosis of spinal canal     Pneumonia due to COVID-19 virus

## 2021-12-07 ENCOUNTER — OFFICE VISIT (OUTPATIENT)
Dept: INFECTIOUS DISEASES | Age: 66
End: 2021-12-07
Payer: COMMERCIAL

## 2021-12-07 VITALS
TEMPERATURE: 97.2 F | DIASTOLIC BLOOD PRESSURE: 74 MMHG | HEIGHT: 67 IN | RESPIRATION RATE: 16 BRPM | SYSTOLIC BLOOD PRESSURE: 126 MMHG | OXYGEN SATURATION: 98 % | WEIGHT: 293 LBS | BODY MASS INDEX: 45.99 KG/M2 | HEART RATE: 74 BPM

## 2021-12-07 DIAGNOSIS — I10 ESSENTIAL HYPERTENSION: ICD-10-CM

## 2021-12-07 DIAGNOSIS — U07.1 PNEUMONIA DUE TO COVID-19 VIRUS: Primary | ICD-10-CM

## 2021-12-07 DIAGNOSIS — J12.82 PNEUMONIA DUE TO COVID-19 VIRUS: Primary | ICD-10-CM

## 2021-12-07 DIAGNOSIS — D56.3 ALPHA THALASSEMIA TRAIT: ICD-10-CM

## 2021-12-07 PROCEDURE — 99214 OFFICE O/P EST MOD 30 MIN: CPT | Performed by: INTERNAL MEDICINE

## 2021-12-07 NOTE — PROGRESS NOTES
[facility] - Initial Office Consult Note  Today's Date and Time: 12/07/2021, 7:34 AM    Diagnostic Impression :     1. Pneumonia due to COVID-19 virus    2. Essential hypertension    3. Alpha thalassemia trait        Recommendations     · Patient has successfully recovered from Covid 19 pneumonia  · Patient needs to receive Covid vaccine booster by Feb 2022  · Patient needs CXR follow up by Feb 2022 because of 11/14/2021 CXR: Vineet Areola left upper lobe opacity. This could represent a focal infectious process but a neoplastic process should be considered.    ·   Chief complaint/reason for consultation:     Chief Complaint   Patient presents with    Frequent Infections     hospital f/u COVID       History of Present Illness:   Parag Humphries is a 74y.o.-year-old  female who was initially evaluated on 12/7/2021. Patient seen as follow up from [address] [place] [address] at the request of Dr Elias Lyman. INITIAL HISTORY:     Patient presented to the ER on 11/14/2021 with complaints of fatigue, diarrhea, vomiting, and shortness of breath X 7 days. She was also coughing up light yellow phlegm. She is fully vaccinated for Covid and received the influenza vaccine one week before.      The patient was noted to have an SpO2 of 89% on room air. Covid swab was positive. Imaging revealed multifocal infiltrates more prominent in the left upper lobe and left lower lobe. CRP was 180. 3.     CXR:   · 11/14/2021: Vineet Areola left upper lobe opacity.  This could represent a focal infectious process but a neoplastic process should be considered.  However, no pulmonary nodule seen in this region on a chest CT done 01/20/2021.  Follow-up suggested to ensure resolution.      CT CHEST:   · 11/14/2021:  No evidence of intraluminal filling defect to suggest pulmonary embolism.  Mediastinum: Mild cardiomegaly.  No pericardial effusion.  1 cm size lymph nodes identified in the mediastinum, not considered significantly enlarged. Lungs/pleura: Moderate consolidations identified in the left upper lobe left lower lobe and small opacity in the right upper lung compatible with multifocal pneumonia.  No pleural effusions. Upper Abdomen: Mild-to-moderate diffuse hepatic steatosis noted.  No acute finding noted in the upper abdomen. Soft Tissues/Bones: Moderate osteopenic changes and degenerative changes noted in the bony structures. Stephany Pereira acute fractures in the thoracic spine.       Patient was admitted and treated because of concerns with COVID 19. The patient was administered one dose of Decadron in the ED. She received Regeneron monoclonal antibodies 11/15/21. The patient was discharged from [facility] on 11/17/2021. OFFICE VISIT: 12/19/2021     The patient was seen in the office on 12-7-21 for follow up following a hospitalization at [facility] for Covid-19 pneumonia. She reports that she has been doing well since her hospitalization and has no new complaints. She says that she has mild cough and shortness of breath that is not above her baseline. She uses a CPAP at night for sleep apnea. She reports no fever, chills, nausea, or vomiting. The patient was counseled in the office today in regards to receiving her Covid-19 booster shot sometime between now and February 2022. She will also need a follow up exam of the chest X ray. I have personally reviewed the past medical history, past surgical history, medications, social history, and family history, and I have updated the database accordingly.   Past Medical History:     Past Medical History:   Diagnosis Date    Bleeding     while on aggrenox    Cataracts, bilateral     Cerebrovascular disease 2003,2011    cva    Chronic pain in left foot     CKD (chronic kidney disease) stage 3, GFR 30-59 ml/min ([facility])     GERD (gastroesophageal reflux disease)     Hx of blood clots     Hyperlipidemia     Hypertension     Iron (Fe) deficiency anemia     Nicotine abuse     Obesity     Osteoarthritis     Peripheral vascular disease ([facility] [address] 3/13/2014    Substance abuse (HCC)     cocaine, canabis    Thalassemia minor     Unspecified cerebral artery occlusion with cerebral infarction     Unspecified sleep apnea        Past Surgical  History:     Past Surgical History:   Procedure Laterality Date    BREAST SURGERY      biopsy    COLONOSCOPY  12/2007    adenomatous polyp    COLONOSCOPY  2013    normal, repeat in 5 years    COLONOSCOPY N/A 8/21/2019    COLONOSCOPY POLYPECTOMY SNARE/COLD BIOPSY performed by Jacquelin Barrios MD at [place] [address] ENDOSCOPY  2009    negative    UPPER GASTROINTESTINAL ENDOSCOPY N/A 8/21/2019    EGD BIOPSY performed by Jacquelin [facility]rios MD at [facility]       Medications:     Current Outpatient Medications:     acetaminophen (SM PAIN RELIEVER) 325 MG tablet, TAKE 2 TABLETS BY MOUTH EVERY 4 HOURS AS NEEDED FOR PAIN, Disp: 60 tablet, Rfl: 3    amLODIPine (NORVASC) 10 MG tablet, TAKE 1 TABLET BY MOUTH DAILY, Disp: 28 tablet, Rfl: 3    aspirin 81 MG EC tablet, TAKE 1 TABLET DAILY, Disp: 28 tablet, Rfl: 3    atorvastatin (LIPITOR) 80 MG tablet, TAKE 1 TABLET BY MOUTH DAILY, Disp: 28 tablet, Rfl: 3    folic acid (FOLVITE) 1 MG tablet, TAKE 1 TABLET BY MOUTH DAILY, Disp: 28 tablet, Rfl: 3    Vitamin D (CHOLECALCIFEROL) 25 MCG (1000 UT) TABS tablet, TAKE 1 TABLET DAILY, Disp: 28 tablet, Rfl: 3    HM SALINE NASAL SPRAY 0.65 % nasal spray, , Disp: , Rfl:     Blood Pressure KIT, 1 kit by Does not apply route 2 times daily, Disp: 1 kit, Rfl: 0    clopidogrel (PLAVIX) 75 MG tablet, TAKE 1 TABLET BY MOUTH DAILY, Disp: 28 tablet, Rfl: 3    guaiFENesin (ALTARUSSIN) 100 MG/5ML syrup, Take 10 mLs by mouth 3 times daily as needed for Cough, Disp: 1 each, Rfl: 0    Nasal Moisturizer Combination (OCEAN COMPLETE SINUS RINSE) AERS, 1 Can by Nasal route 3 times daily, Disp: 1 each, Rfl: 3    fluticasone (FLONASE) 50 MCG/ACT nasal spray, 2 sprays by Each Nostril route daily, Disp: 16 g, Rfl: 2    omeprazole (PRILOSEC) 20 MG delayed release capsule, Take 1 capsule by mouth 2 times daily (before meals) (Patient not taking: Reported on 11/19/2021), Disp: 180 capsule, Rfl: 1    hydroCHLOROthiazide (HYDRODIURIL) 25 MG tablet, Take 1 tablet by mouth daily, Disp: 60 tablet, Rfl: 3    losartan (COZAAR) 50 MG tablet, Take 1 tablet by mouth daily, Disp: 60 tablet, Rfl: 3    vitamin B-1 (THIAMINE) 100 MG tablet, Take 1 tablet by mouth daily, Disp: 30 tablet, Rfl: 3    diclofenac sodium (VOLTAREN) 1 % GEL, Apply 4 g topically 4 times daily, Disp: 4 Tube, Rfl: 1     Social History:     Social History     Socioeconomic History    Marital status:      Spouse name: Not on file    Number of children: Not on file    Years of education: Not on file    Highest education level: Not on file   Occupational History    Not on file   Tobacco Use    Smoking status: Former Smoker     Packs/day: 2.00     Years: 35.00     Pack years: 70.00     Quit date: 10/11/2011     Years since quitting: 10.1    Smokeless tobacco: Never Used   Vaping Use    Vaping Use: Never used   Substance and Sexual Activity    Alcohol use:  Yes     Alcohol/week: 6.0 standard drinks     Types: 6 Cans of beer per week     Comment: rare    Drug use: No     Types: Cocaine, Marijuana (Weed)     Comment: per pt did years ago; cocaine & marij, 8-23-19 denies current use    Sexual activity: Yes     Partners: Male   Other Topics Concern    Not on file   Social History Narrative    Not on file     Social Determinants of Health     Financial Resource Strain: Low Risk     Difficulty of Paying Living Expenses: Not hard at all   Food Insecurity: No Food Insecurity    Worried About Running Out of Food in the Last Year: Never true    [address] in the Last Year: Never true   Transportation Needs:     Lack of Transportation (Medical): Not on file    Lack of Transportation (Non-Medical): Not on file   Physical Activity:     Days of Exercise per Week: Not on file    Minutes of Exercise per Session: Not on file   Stress:     Feeling of Stress : Not on file   Social Connections:     Frequency of Communication with Friends and Family: Not on file    Frequency of Social Gatherings with Friends and Family: Not on file    Attends [employer] Services: Not on file    Active Member of [address] [place] [address] [employer] or Organizations: Not on file    Attends Club or Organization Meetings: Not on file    Marital Status: Not on file   Intimate Partner Violence:     Fear of Current or Ex-Partner: Not on file    Emotionally Abused: Not on file    Physically Abused: Not on file    Sexually Abused: Not on file   Housing Stability:     Unable to Pay for Housing in the Last Year: Not on file    Number of [place] in the Last Year: Not on file    Unstable Housing in the Last Year: Not on file       Family History:     Family History   Problem Relation Age of Onset    High Blood Pressure Mother     Asthma Mother     Heart Disease Mother     Heart Disease Father     High Blood Pressure Father     Diabetes Brother     High Blood Pressure Brother     Heart Disease Brother     High Blood Pressure Maternal Grandmother     High Blood Pressure Maternal Grandfather     Heart Disease Maternal Grandfather         Allergies:   Duricef [cefadroxil monohydrate], Fish-derived products, Pcn [penicillins], and Tomato     Review of Systems:   Constitutional: No fevers or chills. No systemic complaints  Head: No headaches  Eyes: No double vision or blurry vision. ENT: No sore throat or runny nose. . No hearing loss, tinnitus or vertigo. Cardiovascular: No chest pain or palpitations. No shortness of breath. No ALCALA  Lung: No shortness of breath or cough.  No sputum production  Abdomen: No nausea, vomiting, BMP:   Lab Results   Component Value Date     11/29/2021     11/15/2021    K 4.1 11/29/2021    K 4.4 11/15/2021     11/29/2021     11/15/2021    CO2 21 11/29/2021    CO2 24 11/15/2021    BUN 24 11/29/2021    BUN 20 11/15/2021    CREATININE 1.15 11/29/2021    CREATININE 1.03 11/15/2021    MG 2.1 11/14/2021    MG 2.3 09/11/2019     Hepatic Function Panel:  Lab Results   Component Value Date    PROT 7.5 11/29/2021    PROT 6.8 11/15/2021    PROT 6.3 10/02/2011    LABALBU 3.8 11/29/2021    LABALBU 2.9 11/15/2021    LABALBU 3.5 10/03/2011    BILIDIR <0.08 01/05/2019    BILIDIR 0.10 05/05/2014    IBILI CANNOT BE CALCULATED 01/05/2019    IBILI NOT REPORTED 05/05/2014    BILITOT 0.36 11/29/2021    BILITOT 0.17 11/15/2021    ALKPHOS 121 11/29/2021    ALKPHOS 90 11/15/2021    ALT 15 11/29/2021    ALT 27 11/15/2021    AST 16 11/29/2021    AST 46 11/15/2021     No results found for: RPR  No results found for: HIV  No results found for: [facility]  Lab Results   Component Value Date    MUCUS 1+ 08/19/2015    RBC 4.85 11/29/2021    RBC 4.79 04/05/2012    TRICHOMONAS NOT REPORTED 08/19/2015    WBC 9.7 11/29/2021    YEAST NOT REPORTED 08/19/2015    TURBIDITY CLEAR 08/19/2015     Lab Results   Component Value Date    CREATININE 1.15 11/29/2021    GLUCOSE 99 11/29/2021    GLUCOSE 88 04/12/2012       Thank you for allowing us to participate in the care of this patient. Please call with questions. Genesis Flynn MD participated in the evaluation of this patient.   Pager: [phone] - Office: [phone]

## 2021-12-08 ENCOUNTER — CARE COORDINATION (OUTPATIENT)
Dept: CASE MANAGEMENT | Age: 66
End: 2021-12-08

## 2021-12-08 ENCOUNTER — HOSPITAL ENCOUNTER (OUTPATIENT)
Dept: MAMMOGRAPHY | Age: 66
Discharge: HOME | End: 2021-12-10
Payer: COMMERCIAL

## 2021-12-08 DIAGNOSIS — Z12.31 VISIT FOR SCREENING MAMMOGRAM: ICD-10-CM

## 2021-12-08 PROCEDURE — 77063 BREAST TOMOSYNTHESIS BI: CPT

## 2021-12-08 NOTE — CARE COORDINATION
[facility] Transitions Follow Up Call- final call     2021    Patient: Sakshi Panda  Patient : 1955   MRN: [id number]  Reason for Admission: COVID-19   Discharge Date: 21 RARS: Readmission Risk Score: 14.2 ( )         Spoke with: Yolette Norris     Call to pt who states she is doing good   States she saw Dr Fuentes Aguiar (ID) yesterday and she is happy with her progress  Denies SOB, states using walker from time to time outside the house for stability  States appetite is good   Denies needs  Writer informs this is final (CTC) phone call- v/u. Encouraged call writer/ CTC or providers if questions or concerns- v/u. Episode closed      You Patient resolved from the Care Transitions episode on 2021  Discussed COVID-19 related testing which was available at this time. Test results were positive. Patient informed of results, if available? Yes    Patient/family has been provided the following resources and education related to COVID-19:                         Signs, symptoms and red flags related to COVID-19            CDC exposure and quarantine guidelines            Conduit exposure contact - [phone]            Contact for their local Department of Health                 Patient currently reports that the following symptoms have improved:  fatigue, cough, shortness of breath and no new/worsening symptoms     No further outreach scheduled with this CTN/ACM. Episode of Care resolved. Patient has this CTN/ACM contact information if future needs arise.         Care Transitions Subsequent and Final Call    Subsequent and Final Calls  Do you have any ongoing symptoms?: No  Have your medications changed?: No  Do you have any questions related to your medications?: No  Do you currently have any active services?: Yes  Are you currently active with any services?: Home Health  Do you have any needs or concerns that I can assist you with?: No  Identified Barriers: Lack of Education  Care Transitions Interventions  Other Interventions:            Follow Up  Future Appointments   Date Time Provider Mary Bakeri   12/17/2021  1:45 PM Jaye Rich DPM [facility] Podiatry [facility]   2/9/2022  2:00 PM Manuel Kelley MD [facility] IM [facility]   3/21/2022  1:15 PM Stephon Davila MD [address] [place] [address]   9/26/2022  1:00 PM LOY Espinal - CNP Neuro Spec Dony Pennington RN

## 2021-12-11 DIAGNOSIS — I63.00 CEREBROVASCULAR ACCIDENT (CVA) DUE TO THROMBOSIS OF PRECEREBRAL ARTERY (HCC): ICD-10-CM

## 2021-12-11 DIAGNOSIS — E78.00 PURE HYPERCHOLESTEROLEMIA: ICD-10-CM

## 2021-12-11 DIAGNOSIS — I10 ESSENTIAL HYPERTENSION: ICD-10-CM

## 2021-12-11 DIAGNOSIS — E55.9 VITAMIN D DEFICIENCY: ICD-10-CM

## 2021-12-13 NOTE — TELEPHONE ENCOUNTER
Request for Multiple meds please refill if appropriate. Next Visit Date:  Future Appointments   Date Time Provider Mary Marinelli   12/17/2021  1:45 PM Lexi Goel DPM [facility] Podiatry [facility]   2/9/2022  2:00 PM Earline Lombard, MD [facility] IM [facility]   3/21/2022  1:15 PM America Barker MD [address] [place] [address]   9/26/2022  1:00 PM LOY Aguilar - CNP Neuro Spec [address] Maintenance   Topic Date Due    COVID-19 Vaccine (3 - Booster for Leal Peter series) 10/14/2021    Pneumococcal 65+ years Vaccine (2 of 2 - PPSV23) 12/20/2021    Low dose CT lung screening  01/20/2022    Colon cancer screen colonoscopy  08/21/2022    Lipid screen  09/24/2022    A1C test (Diabetic or Prediabetic)  09/29/2022    Annual Wellness Visit (AWV)  10/08/2022    Potassium monitoring  11/29/2022    Creatinine monitoring  11/29/2022    Breast cancer screen  12/08/2023    DTaP/Tdap/Td vaccine (2 - Td or Tdap) 03/08/2028    DEXA (modify frequency per FRAX score)  Completed    Flu vaccine  Completed    Shingles Vaccine  Completed    Hepatitis C screen  Completed    Hepatitis A vaccine  Aged Out    Hepatitis B vaccine  Aged Out    Hib vaccine  Aged Out    Meningococcal (ACWY) vaccine  Aged Out       Hemoglobin A1C (%)   Date Value   09/29/2021 5.8   06/07/2019 5.4   04/19/2018 5.7             ( goal A1C is < 7)   Microalb/Crt.  Ratio (mcg/mg creat)   Date Value   05/09/2017 6     LDL Cholesterol (mg/dL)   Date Value   09/24/2021 74       (goal LDL is <100)   AST (U/L)   Date Value   11/29/2021 16     ALT (U/L)   Date Value   11/29/2021 15     BUN (mg/dL)   Date Value   11/29/2021 24 (H)     BP Readings from Last 3 Encounters:   12/07/21 126/74   11/17/21 133/74   11/10/21 130/75          (goal 120/80)    All Future Testing planned in [facility]  Lab Frequency Next Occurrence   Ferritin Once 09/20/2021   COVID-19 Once 02/18/2022   Protein / Creatinine Ratio, Urine Once 02/18/2022   Iron And TIBC     Vitamin B12 & Folate     Ferritin     CBC Auto Differential           Patient Active Problem List:     Essential hypertension     Pure hypercholesterolemia     Cerebrovascular accident (CVA), 2011, no residual deficit (HCC)     CKD (chronic kidney disease) stage 3, GFR 30-59 ml/min (HCC)     Peripheral vascular disease (HCC)     TIBURCIO (obstructive sleep apnea)     Class 3 severe obesity due to excess calories with serious comorbidity and body mass index (BMI) of 50.0 to 59.9 in adult [facility])     Primary osteoarthritis of left knee     Chronic tension-type headache, not intractable     Carpal tunnel syndrome, bilateral-not on medication due to kidney disease     Microcytic anemia     Alpha thalassemia trait     Cervical stenosis of spinal canal     Pneumonia due to COVID-19 virus

## 2021-12-15 RX ORDER — FOLIC ACID 1 MG/1
1 TABLET ORAL DAILY
Qty: 28 TABLET | Refills: 3 | Status: SHIPPED | OUTPATIENT
Start: 2021-12-15 | End: 2022-03-02 | Stop reason: SDUPTHER

## 2021-12-15 RX ORDER — AMLODIPINE BESYLATE 10 MG/1
10 TABLET ORAL DAILY
Qty: 28 TABLET | Refills: 3 | Status: SHIPPED | OUTPATIENT
Start: 2021-12-15 | End: 2022-04-04

## 2021-12-15 RX ORDER — ASPIRIN 81 MG/1
TABLET ORAL
Qty: 28 TABLET | Refills: 3 | Status: SHIPPED | OUTPATIENT
Start: 2021-12-15 | End: 2022-04-04

## 2021-12-15 RX ORDER — VITAMIN B COMPLEX
TABLET ORAL
Qty: 28 TABLET | Refills: 3 | Status: SHIPPED | OUTPATIENT
Start: 2021-12-15 | End: 2022-04-04

## 2021-12-15 RX ORDER — ATORVASTATIN CALCIUM 80 MG/1
80 TABLET, FILM COATED ORAL DAILY
Qty: 28 TABLET | Refills: 3 | Status: SHIPPED | OUTPATIENT
Start: 2021-12-15 | End: 2022-03-02 | Stop reason: SDUPTHER

## 2021-12-22 NOTE — PROGRESS NOTES
Patient instructed to remove shoes and socks and instructed to sit in exam chair. Current PCP is Louis Ricci MD and date of last visit was 11/19/21. Do you have a follow up visit scheduled?   Yes  If yes, the date is 2/9/22

## 2021-12-27 ENCOUNTER — OFFICE VISIT (OUTPATIENT)
Dept: PODIATRY | Age: 66
End: 2021-12-27
Payer: COMMERCIAL

## 2021-12-27 VITALS
SYSTOLIC BLOOD PRESSURE: 152 MMHG | DIASTOLIC BLOOD PRESSURE: 88 MMHG | HEIGHT: 67 IN | WEIGHT: 293 LBS | BODY MASS INDEX: 45.99 KG/M2 | HEART RATE: 90 BPM

## 2021-12-27 DIAGNOSIS — B35.1 ONYCHOMYCOSIS: Primary | ICD-10-CM

## 2021-12-27 DIAGNOSIS — R60.0 EDEMA OF LOWER EXTREMITY: ICD-10-CM

## 2021-12-27 DIAGNOSIS — I73.9 PVD (PERIPHERAL VASCULAR DISEASE) (HCC): ICD-10-CM

## 2021-12-27 DIAGNOSIS — M79.674 PAIN IN TOES OF BOTH FEET: ICD-10-CM

## 2021-12-27 DIAGNOSIS — M79.675 PAIN IN TOES OF BOTH FEET: ICD-10-CM

## 2021-12-27 PROCEDURE — 11721 DEBRIDE NAIL 6 OR MORE: CPT | Performed by: STUDENT IN AN ORGANIZED HEALTH CARE EDUCATION/TRAINING PROGRAM

## 2021-12-27 PROCEDURE — 11057 PARNG/CUTG B9 HYPRKR LES >4: CPT | Performed by: STUDENT IN AN ORGANIZED HEALTH CARE EDUCATION/TRAINING PROGRAM

## 2021-12-27 PROCEDURE — 99212 OFFICE O/P EST SF 10 MIN: CPT

## 2021-12-27 PROCEDURE — 11721 DEBRIDE NAIL 6 OR MORE: CPT | Performed by: PODIATRIST

## 2021-12-27 PROCEDURE — 99999 PR OFFICE/OUTPT VISIT,PROCEDURE ONLY: CPT | Performed by: STUDENT IN AN ORGANIZED HEALTH CARE EDUCATION/TRAINING PROGRAM

## 2021-12-27 NOTE — PROGRESS NOTES
 COLONOSCOPY N/A 8/21/2019    COLONOSCOPY POLYPECTOMY SNARE/COLD BIOPSY performed by Karena Rankin MD at [facility] GASTROINTESTINAL ENDOSCOPY  2009    negative    UPPER GASTROINTESTINAL ENDOSCOPY N/A 8/21/2019    EGD BIOPSY performed by Karena Rankin MD at [facility] Endoscopy       Social History:  Social History     Tobacco Use    Smoking status: Former Smoker     Packs/day: 2.00     Years: 35.00     Pack years: 70.00     Quit date: 10/11/2011     Years since quitting: 10.2    Smokeless tobacco: Never Used   Vaping Use    Vaping Use: Never used   Substance Use Topics    Alcohol use: Yes     Alcohol/week: 6.0 standard drinks     Types: 6 Cans of beer per week     Comment: rare    Drug use: No     Types: Cocaine, Marijuana (Weed)     Comment: per pt did years ago; cocaine & marij, 8-23-19 denies current use       Medications:  Prior to Admission medications    Medication Sig Start Date End Date Taking?  Authorizing Provider   amLODIPine (NORVASC) 10 MG tablet TAKE 1 TABLET BY MOUTH DAILY 12/15/21  Yes Marj Orantes MD   aspirin 81 MG EC tablet TAKE 1 TABLET DAILY 12/15/21  Yes Marj Orantes MD   atorvastatin (LIPITOR) 80 MG tablet TAKE 1 TABLET BY MOUTH DAILY 12/15/21  Yes Marj Orantes MD   folic acid (FOLVITE) 1 MG tablet TAKE 1 TABLET BY MOUTH DAILY 12/15/21  Yes Marj Orantes MD   Vitamin D (CHOLECALCIFEROL) 25 MCG (1000 UT) TABS tablet TAKE 1 TABLET DAILY 12/15/21  Yes Marj Orantes MD   acetaminophen (SM PAIN RELIEVER) 325 MG tablet TAKE 2 TABLETS BY MOUTH EVERY 4 HOURS AS NEEDED FOR PAIN 12/3/21  Yes Marj Orantes MD   HM SALINE NASAL SPRAY 0.65 % nasal spray  11/11/21  Yes Historical Provider, MD   Blood Pressure KIT 1 kit by Does not apply route 2 times daily 11/19/21  Yes Hortensia Mo MD   clopidogrel (PLAVIX) 75 MG tablet TAKE 1 TABLET BY MOUTH DAILY 11/12/21  Yes Lindsay Casas Judit Terrell MD   guaiFENesin (ALTARUSSIN) 100 MG/5ML syrup Take 10 mLs by mouth 3 times daily as needed for Cough 11/10/21  Yes Travon Coe MD   Nasal Moisturizer Combination (OCEAN COMPLETE SINUS RINSE) AERS 1 Can by Nasal route 3 times daily 11/10/21  Yes Travon Coe MD   fluticasone (FLONASE) 50 MCG/ACT nasal spray 2 sprays by Each Nostril route daily 11/10/21  Yes Travon Coe MD   omeprazole (PRILOSEC) 20 MG delayed release capsule Take 1 capsule by mouth 2 times daily (before meals) 11/10/21  Yes Travon Coe MD   hydroCHLOROthiazide (HYDRODIURIL) 25 MG tablet Take 1 tablet by mouth daily 4/15/21  Yes Travon Coe MD   losartan (COZAAR) 50 MG tablet Take 1 tablet by mouth daily 4/15/21  Yes Travon Coe MD   vitamin B-1 (THIAMINE) 100 MG tablet Take 1 tablet by mouth daily 4/15/21  Yes Travon Coe MD   diclofenac sodium (VOLTAREN) 1 % GEL Apply 4 g topically 4 times daily 6/28/20  Yes Wilber John,    Thiamine HCl (VITAMIN B-1 PO) Take by mouth daily  4/16/21  Historical Provider, MD       Objective     Vitals:    12/27/21 1438   BP: (!) 152/88   Pulse: 90       Lab Results   Component Value Date    LABA1C 5.8 09/29/2021       Physical Exam:  General:  Alert and oriented x3. In no acute distress. Lower Extremity Physical Exam:    Vascular: DP/PT pulses non palpable, but biphasic on doppler. Bilateral. CFT <5 seconds to all digits, Bilateral.  Significant nonpitting edema, Bilateral .  Hair growth is absent to the level of the digits, Bilateral.     Neuro: Saph/sural/SP/DP/plantar sensation diminished to light touch. Protective sensation is intact to 2/10 sites as tested with a 5.07g SWMF, Bilateral.     Musculoskeletal: EHL/FHL/GS/TA gross motor intact. Decreased medial arch noted, b/l. Decreased ROM b/l kojo joint. Contracted digits 2-5 b/l. Abducted hallux noted b/l with medial eminence with right worse than left. Neg pain calf squeeze ble. All LE m.  Compartments soft and compressible. Dermatologic: No open lesions, Bilateral.  Interdigital maceration absent, Bilateral.  Nails 1-5 are elongated, thickened with subungual debris. Hair growth is absent with atrophic skin extending distally past the ankle. Hyperkeratotic lesion noted to medial aspect of hallux IPJ, bilateral.    Assessment   Soledad Huang is a 74 y.o. female with     Diagnosis Orders   1. Onychomycosis     2. PVD (peripheral vascular disease) (HCC)     3. Edema of lower extremity          Plan   · Patient examined and evaluated for routine nail care. · Diagnosis and treatment options discussed in detail  · Trimmed dystrophic nails 1-5 b/l with sterile nail nippers without incident. · Calluses x2 sharply debrided with #15 blade without incident.   Educated patient to monitor for signs of claudication including rest pain or cramping of the calf muscles  · Educated patient on signs of infection and to report to ED if arise  · Patient to RTC in 3 month(s) or sooner if problems arise  · Please, call the office with any questions or concerns    Electronically signed by Rock Dukes DPM on 12/27/2021 at 4:36 PM

## 2022-01-17 DIAGNOSIS — M17.12 PRIMARY OSTEOARTHRITIS OF LEFT KNEE: ICD-10-CM

## 2022-01-17 NOTE — TELEPHONE ENCOUNTER
Refill request for acetaminophen (TYLENOL) 325 MG tablet. If appropriate please send medication(s) to patients pharmacy. Next appt: 2/9/2022      Health Maintenance   Topic Date Due    COVID-19 Vaccine (3 - Booster for Pfizer series) 10/14/2021    Pneumococcal 65+ years Vaccine (2 of 2 - PPSV23) 12/20/2021    Low dose CT lung screening  01/20/2022    Colon cancer screen colonoscopy  08/21/2022    Lipid screen  09/24/2022    A1C test (Diabetic or Prediabetic)  09/29/2022    Depression Screen  10/07/2022    Annual Wellness Visit (AWV)  10/08/2022    Potassium monitoring  11/29/2022    Creatinine monitoring  11/29/2022    Breast cancer screen  12/08/2023    DTaP/Tdap/Td vaccine (2 - Td or Tdap) 03/08/2028    DEXA (modify frequency per FRAX score)  Completed    Flu vaccine  Completed    Shingles Vaccine  Completed    Hepatitis C screen  Completed    Hepatitis A vaccine  Aged Out    Hepatitis B vaccine  Aged Out    Hib vaccine  Aged Out    Meningococcal (ACWY) vaccine  Aged Out       Hemoglobin A1C (%)   Date Value   09/29/2021 5.8   06/07/2019 5.4   04/19/2018 5.7             ( goal A1C is < 7)   Microalb/Crt.  Ratio (mcg/mg creat)   Date Value   05/09/2017 6     LDL Cholesterol (mg/dL)   Date Value   09/24/2021 74       (goal LDL is <100)   AST (U/L)   Date Value   11/29/2021 16     ALT (U/L)   Date Value   11/29/2021 15     BUN (mg/dL)   Date Value   11/29/2021 24 (H)     BP Readings from Last 3 Encounters:   12/27/21 (!) 152/88   12/07/21 126/74   11/17/21 133/74          (goal 120/80)          Patient Active Problem List:     Essential hypertension     Pure hypercholesterolemia     Cerebrovascular accident (CVA), 2011, no residual deficit (HCC)     CKD (chronic kidney disease) stage 3, GFR 30-59 ml/min (HCC)     Peripheral vascular disease (HCC)     TIBURCIO (obstructive sleep apnea)     Class 3 severe obesity due to excess calories with serious comorbidity and body mass index (BMI) of 50.0 to 59.9 in adult [facility])     Primary osteoarthritis of left knee     Chronic tension-type headache, not intractable     Carpal tunnel syndrome, bilateral-not on medication due to kidney disease     Microcytic anemia     Alpha thalassemia trait     Cervical stenosis of spinal canal     Pneumonia due to COVID-19 virus

## 2022-01-18 RX ORDER — ACETAMINOPHEN 325 MG/1
TABLET ORAL
Qty: 60 TABLET | Refills: 3 | Status: SHIPPED | OUTPATIENT
Start: 2022-01-18 | End: 2022-09-01

## 2022-02-08 DIAGNOSIS — R09.81 SINUS CONGESTION: ICD-10-CM

## 2022-02-09 RX ORDER — FLUTICASONE PROPIONATE 50 MCG
SPRAY, SUSPENSION (ML) NASAL
Qty: 16 G | Refills: 2 | Status: SHIPPED | OUTPATIENT
Start: 2022-02-09 | End: 2022-05-03

## 2022-02-09 NOTE — TELEPHONE ENCOUNTER
Request for Nasal Spray. Next Visit Date:  Future Appointments   Date Time Provider Mary Bakeri   2/9/2022  2:00 PM Keesha Tanner MD [address] IM [address]   3/21/2022  1:15 PM Pastor Eric  [address]   3/28/2022  2:45 PM Danielle Bagley [address] [id number]   9/26/2022  1:00 PM Allayne Severance, APRN - CNP Neuro [address] [facility] Maintenance   Topic Date Due    COVID-19 Vaccine (3 - Booster for Leal Peter series) 09/14/2021    Pneumococcal 65+ years Vaccine (2 of 2 - PPSV23) 12/20/2021    Low dose CT lung screening  01/20/2022    Colon cancer screen colonoscopy  08/21/2022    Lipid screen  09/24/2022    A1C test (Diabetic or Prediabetic)  09/29/2022    Depression Screen  10/07/2022    Annual Wellness Visit (AWV)  10/08/2022    Potassium monitoring  11/29/2022    Creatinine monitoring  11/29/2022    Breast cancer screen  12/08/2023    DTaP/Tdap/Td vaccine (2 - Td or Tdap) 03/08/2028    DEXA (modify frequency per FRAX score)  Completed    Flu vaccine  Completed    Shingles Vaccine  Completed    Hepatitis C screen  Completed    Hepatitis A vaccine  Aged Out    Hepatitis B vaccine  Aged Out    Hib vaccine  Aged Out    Meningococcal (ACWY) vaccine  Aged Out       Hemoglobin A1C (%)   Date Value   09/29/2021 5.8   06/07/2019 5.4   04/19/2018 5.7             ( goal A1C is < 7)   Microalb/Crt.  Ratio (mcg/mg creat)   Date Value   05/09/2017 6     LDL Cholesterol (mg/dL)   Date Value   09/24/2021 74       (goal LDL is <100)   AST (U/L)   Date Value   11/29/2021 16     ALT (U/L)   Date Value   11/29/2021 15     BUN (mg/dL)   Date Value   11/29/2021 24 (H)     BP Readings from Last 3 Encounters:   12/27/21 (!) 152/88   12/07/21 126/74   11/17/21 133/74          (goal 120/80)    All Future Testing planned in [facility]  Lab Frequency Next Occurrence   COVID-19 Once 02/18/2022   Protein / Creatinine Ratio, Urine Once 02/18/2022   XR CHEST (2 VW) Once 02/16/2022   Iron And TIBC     Vitamin B12 & Folate     Ferritin     CBC Auto Differential           Patient Active Problem List:     Essential hypertension     Pure hypercholesterolemia     Cerebrovascular accident (CVA), 2011, no residual deficit (HCC)     CKD (chronic kidney disease) stage 3, GFR 30-59 ml/min (HCC)     Peripheral vascular disease (HCC)     TIBURCIO (obstructive sleep apnea)     Class 3 severe obesity due to excess calories with serious comorbidity and body mass index (BMI) of 50.0 to 59.9 in adult [facility])     Primary osteoarthritis of left knee     Chronic tension-type headache, not intractable     Carpal tunnel syndrome, bilateral-not on medication due to kidney disease     Microcytic anemia     Alpha thalassemia trait     Cervical stenosis of spinal canal     Pneumonia due to COVID-19 virus

## 2022-03-02 ENCOUNTER — HOSPITAL ENCOUNTER (OUTPATIENT)
Age: 67
Setting detail: SPECIMEN
Discharge: HOME | End: 2022-03-02
Payer: COMMERCIAL

## 2022-03-02 ENCOUNTER — OFFICE VISIT (OUTPATIENT)
Dept: INTERNAL MEDICINE | Age: 67
End: 2022-03-02
Payer: COMMERCIAL

## 2022-03-02 VITALS — DIASTOLIC BLOOD PRESSURE: 78 MMHG | HEART RATE: 70 BPM | SYSTOLIC BLOOD PRESSURE: 134 MMHG

## 2022-03-02 DIAGNOSIS — Z12.11 ENCOUNTER FOR SCREENING COLONOSCOPY: ICD-10-CM

## 2022-03-02 DIAGNOSIS — M17.12 PRIMARY OSTEOARTHRITIS OF LEFT KNEE: ICD-10-CM

## 2022-03-02 DIAGNOSIS — I10 ESSENTIAL HYPERTENSION: ICD-10-CM

## 2022-03-02 DIAGNOSIS — E55.9 VITAMIN D DEFICIENCY: ICD-10-CM

## 2022-03-02 DIAGNOSIS — E78.00 PURE HYPERCHOLESTEROLEMIA: ICD-10-CM

## 2022-03-02 DIAGNOSIS — N18.31 STAGE 3A CHRONIC KIDNEY DISEASE (HCC): ICD-10-CM

## 2022-03-02 DIAGNOSIS — I63.00 CEREBROVASCULAR ACCIDENT (CVA) DUE TO THROMBOSIS OF PRECEREBRAL ARTERY (HCC): ICD-10-CM

## 2022-03-02 DIAGNOSIS — G56.03 CARPAL TUNNEL SYNDROME, BILATERAL: Primary | ICD-10-CM

## 2022-03-02 PROCEDURE — 99211 OFF/OP EST MAY X REQ PHY/QHP: CPT | Performed by: INTERNAL MEDICINE

## 2022-03-02 PROCEDURE — 82570 ASSAY OF URINE CREATININE: CPT

## 2022-03-02 PROCEDURE — 99214 OFFICE O/P EST MOD 30 MIN: CPT | Performed by: STUDENT IN AN ORGANIZED HEALTH CARE EDUCATION/TRAINING PROGRAM

## 2022-03-02 PROCEDURE — 84156 ASSAY OF PROTEIN URINE: CPT

## 2022-03-02 RX ORDER — PREDNISONE 20 MG/1
20 TABLET ORAL DAILY
Qty: 10 TABLET | Refills: 0 | Status: SHIPPED | OUTPATIENT
Start: 2022-03-02 | End: 2022-03-12

## 2022-03-02 RX ORDER — FOLIC ACID 1 MG/1
1 TABLET ORAL DAILY
Qty: 28 TABLET | Refills: 3 | Status: SHIPPED | OUTPATIENT
Start: 2022-03-02 | End: 2022-07-26

## 2022-03-02 RX ORDER — CLOPIDOGREL BISULFATE 75 MG/1
75 TABLET ORAL DAILY
Qty: 28 TABLET | Refills: 3 | Status: SHIPPED | OUTPATIENT
Start: 2022-03-02 | End: 2022-06-08 | Stop reason: SDUPTHER

## 2022-03-02 RX ORDER — OMEPRAZOLE 20 MG/1
20 CAPSULE, DELAYED RELEASE ORAL
Qty: 180 CAPSULE | Refills: 1 | Status: CANCELLED | OUTPATIENT
Start: 2022-03-02

## 2022-03-02 RX ORDER — FLUTICASONE PROPIONATE 50 MCG
SPRAY, SUSPENSION (ML) NASAL
Qty: 16 G | Refills: 2 | Status: CANCELLED | OUTPATIENT
Start: 2022-03-02

## 2022-03-02 RX ORDER — ACETAMINOPHEN 325 MG/1
TABLET ORAL
Qty: 60 TABLET | Refills: 3 | Status: CANCELLED | OUTPATIENT
Start: 2022-03-02

## 2022-03-02 RX ORDER — LOSARTAN POTASSIUM 50 MG/1
50 TABLET ORAL DAILY
Qty: 60 TABLET | Refills: 3 | Status: SHIPPED | OUTPATIENT
Start: 2022-03-02 | End: 2022-10-19

## 2022-03-02 RX ORDER — ATORVASTATIN CALCIUM 80 MG/1
80 TABLET, FILM COATED ORAL DAILY
Qty: 28 TABLET | Refills: 3 | Status: SHIPPED | OUTPATIENT
Start: 2022-03-02 | End: 2022-06-08 | Stop reason: SDUPTHER

## 2022-03-02 RX ORDER — VITAMIN B COMPLEX
TABLET ORAL
Qty: 28 TABLET | Refills: 3 | Status: CANCELLED | OUTPATIENT
Start: 2022-03-02

## 2022-03-02 RX ORDER — SODIUM CHLORIDE 0.65 %
AEROSOL, SPRAY (ML) NASAL
Qty: 1 EACH | Status: CANCELLED | OUTPATIENT
Start: 2022-03-02

## 2022-03-02 RX ORDER — HYDROCHLOROTHIAZIDE 25 MG/1
25 TABLET ORAL DAILY
Qty: 60 TABLET | Refills: 3 | Status: SHIPPED | OUTPATIENT
Start: 2022-03-02 | End: 2022-10-19

## 2022-03-02 ASSESSMENT — ENCOUNTER SYMPTOMS
GASTROINTESTINAL NEGATIVE: 1
ALLERGIC/IMMUNOLOGIC NEGATIVE: 1
EYES NEGATIVE: 1
RESPIRATORY NEGATIVE: 1

## 2022-03-02 NOTE — PROGRESS NOTES
Attending Physician Statement  I have discussed the care of [place] [id number] including pertinent history and exam findings,  with the resident. I have reviewed the key elements of all parts of the encounter with the resident. I agree with the assessment, plan and orders as documented by the resident.   (GE Modifier)    MD JANIA Stauffer  Attending Physician, [address] [place] [address], Internal Medicine Residency Program  [address] [place] [address]  3/2/2022, 2:51 PM

## 2022-03-02 NOTE — PROGRESS NOTES
[facility] [address] [address]  Dept: [phone]  Dept Fax: [phone]    Office Progress/Follow Up Note  Date ofpatient's visit: 3/2/2022  Patient's Name:  Eliane Hallman YOB: 1955            Patient Care Team:  Nabila Boswell MD as PCP - General (Internal Medicine)  Gloria Patterson RN as Nurse [address] [place] [address], DPM as Consulting Physician (Podiatry)  ================================================================    REASON FOR VISIT/CHIEF COMPLAINT:  Hypertension (pt took medication today), Hand Pain (left hand x 1 month), and Health Maintenance (pt refused pneum23 vaccine)    HISTORY OF PRESENTING ILLNESS:  History was obtained from: patient. Valeria Leon a 74 y.o. is here for follow-up visit. Blood pressure today is 134/78, patient does take all her medications regularly and checks her blood pressure at home. She tries to follow healthy diet. Patient quit smoking long back in 2011, CT lung cancer screen in June 2020 was negative. Patient does have history of carpal tunnel syndrome, states that her left hand is bothering her again due to same. She is having discomfort on flexing at wrist , including paresthesias and tingling. She has refused surgery and steroid injections in the past, states that splint/physical therapy does not help. She takes Tylenol as needed for pain. She does have history of kidney disease, does not follow with any kidney doctor was given referral for Dr. Jaycob Maza he did not follow-up    Patient does have history of microcytic anemia, alpha thalassemia trait, follows with Dr. Samara Strauss, latest iron panel, folate, B12 was within normal limits, supposed to get repeat labs next month per patient.   Patient does have history of stroke X 2, takes aspirin and Plavix  Last colonoscopy done in 2019 showed polyps without malignancy, she is due for colonoscopy this year, will give follow-up  EGD in 2019 showed erosive esophagitis, patient takes Pepcid  Breast cancer screening was negative in December 2021  DEXA scan in 2018 normal, latest vitamin D levels normal  HbA1c in September 2021 5.8  Lipid panel September 2021 normal  Patient has been COVID vaccinated, does not want pneumococcal vaccine  Patient does not drink or do drugs, quit smoking in 2011      Patient Active Problem List   Diagnosis    Essential hypertension    Pure hypercholesterolemia    Cerebrovascular accident (CVA), 2011, no residual deficit ([facility] [address]    CKD (chronic kidney disease) stage 3, GFR 30-59 ml/min ([facility])    Peripheral vascular disease ([facility] [address]    TIBURCIO (obstructive sleep apnea)    Class 3 severe obesity due to excess calories with serious comorbidity and body mass index (BMI) of 50.0 to 59.9 in adult [facility])    Primary osteoarthritis of left knee    Chronic tension-type headache, not intractable    Carpal tunnel syndrome, bilateral-not on medication due to kidney disease    Microcytic anemia    Alpha thalassemia trait    Cervical stenosis of spinal canal    Pneumonia due to COVID-19 virus       Health Maintenance Due   Topic Date Due    Pneumococcal 65+ years Vaccine (2 of 2 - PPSV23) 12/20/2021    Low dose CT lung screening  01/20/2022       Allergies   Allergen Reactions    Duricef [Cefadroxil Monohydrate]     Fish-Derived Products     Pcn [Penicillins]     Tomato          Current Outpatient Medications   Medication Sig Dispense Refill    hydroCHLOROthiazide (HYDRODIURIL) 25 MG tablet Take 1 tablet by mouth daily 60 tablet 3    losartan (COZAAR) 50 MG tablet Take 1 tablet by mouth daily 60 tablet 3    clopidogrel (PLAVIX) 75 MG tablet Take 1 tablet by mouth daily 28 tablet 3    folic acid (FOLVITE) 1 MG tablet Take 1 tablet by mouth daily 28 tablet 3    atorvastatin (LIPITOR) 80 MG tablet Take 1 tablet by mouth daily 28 tablet 3    predniSONE (DELTASONE) 20 MG tablet Take 1 tablet by mouth daily for 10 days 10 tablet 0    fluticasone (FLONASE) 50 MCG/ACT nasal spray INSTILL 2 SPRAYS BY EACH NOSTRIL ROUTE DAILY 16 g 2    acetaminophen (TYLENOL) 325 MG tablet TAKE 2 TABLETS BY MOUTH EVERY 4 HOURS AS NEEDED FOR PAIN 60 tablet 3    amLODIPine (NORVASC) 10 MG tablet TAKE 1 TABLET BY MOUTH DAILY 28 tablet 3    aspirin 81 MG EC tablet TAKE 1 TABLET DAILY 28 tablet 3    Vitamin D (CHOLECALCIFEROL) 25 MCG (1000 UT) TABS tablet TAKE 1 TABLET DAILY 28 tablet 3    HM SALINE NASAL SPRAY 0.65 % nasal spray       guaiFENesin (ALTARUSSIN) 100 MG/5ML syrup Take 10 mLs by mouth 3 times daily as needed for Cough 1 each 0    Nasal Moisturizer Combination (OCEAN COMPLETE SINUS RINSE) AERS 1 Can by Nasal route 3 times daily 1 each 3    omeprazole (PRILOSEC) 20 MG delayed release capsule Take 1 capsule by mouth 2 times daily (before meals) 180 capsule 1    vitamin B-1 (THIAMINE) 100 MG tablet Take 1 tablet by mouth daily 30 tablet 3    diclofenac sodium (VOLTAREN) 1 % GEL Apply 4 g topically 4 times daily 4 Tube 1     No current facility-administered medications for this visit. Social History     Tobacco Use    Smoking status: Former Smoker     Packs/day: 2.00     Years: 35.00     Pack years: 70.00     Quit date: 10/11/2011     Years since quitting: 10.3    Smokeless tobacco: Never Used   Vaping Use    Vaping Use: Never used   Substance Use Topics    Alcohol use:  Yes     Alcohol/week: 6.0 standard drinks     Types: 6 Cans of beer per week     Comment: rare    Drug use: No     Types: Cocaine, Marijuana (Weed)     Comment: per pt did years ago; cocaine & marij, 8-23-19 denies current use       Family History   Problem Relation Age of Onset    High Blood Pressure Mother     Asthma Mother     Heart Disease Mother     Heart Disease Father     High Blood Pressure Father     Diabetes Brother     High Blood Pressure Brother     Heart Disease Brother     High Blood Pressure Maternal Grandmother     High Blood Pressure Maternal Grandfather     Heart Disease Maternal Grandfather         REVIEW OF SYSTEMS:  Review of Systems   Constitutional: Negative. HENT: Negative. Eyes: Negative. Respiratory: Negative. Cardiovascular: Negative. Gastrointestinal: Negative. Endocrine: Negative. Genitourinary: Negative. Musculoskeletal: Positive for gait problem. Walks with cane due to residual left side weakness   Skin: Negative. Allergic/Immunologic: Negative. Neurological: Positive for weakness. Hematological: Negative. Psychiatric/Behavioral: Negative. PHYSICAL EXAM:  Vitals:    03/02/22 1421   BP: 134/78   Site: Left Lower Arm   Position: Sitting   Cuff Size: Medium Adult   Pulse: 70     BP Readings from Last 3 Encounters:   03/02/22 134/78   12/27/21 (!) 152/88   12/07/21 126/74        Physical Exam  Constitutional:       Appearance: Normal appearance. HENT:      Mouth/Throat:      Mouth: Mucous membranes are moist.   Eyes:      Pupils: Pupils are equal, round, and reactive to light. Cardiovascular:      Rate and Rhythm: Normal rate and regular rhythm. Pulses: Normal pulses. Heart sounds: Normal heart sounds. Pulmonary:      Effort: Pulmonary effort is normal.   Abdominal:      General: Abdomen is flat. Musculoskeletal:      Cervical back: Normal range of motion. Comments: Tests positive for carpel tunnel at left wrist   Skin:     General: Skin is warm. Neurological:      Mental Status: She is alert and oriented to person, place, and time. Motor: Weakness present.       Comments: Residual left side weakness   Psychiatric:         Mood and Affect: Mood normal.         Behavior: Behavior normal.           DIAGNOSTIC FINDINGS:  CBC:  Lab Results   Component Value Date    WBC 9.7 11/29/2021    HGB 10.6 11/29/2021     11/29/2021     04/05/2012       BMP:    Lab Results   Component Value Date     11/29/2021    K 4.1 11/29/2021     11/29/2021    CO2 21 11/29/2021    BUN 24 11/29/2021    CREATININE 1.15 11/29/2021    GLUCOSE 99 11/29/2021    GLUCOSE 88 04/12/2012       HEMOGLOBIN A1C:   Lab Results   Component Value Date    LABA1C 5.8 09/29/2021       FASTING LIPID PANEL:  Lab Results   Component Value Date    CHOL 148 09/24/2021    HDL 50 09/24/2021    TRIG 119 09/24/2021       ASSESSMENT AND PLAN:  Nga Infante was seen today for hypertension, hand pain and health maintenance. Diagnoses and all orders for this visit:    Carpal tunnel syndrome, bilateral-not on medication due to kidney disease  -     predniSONE (DELTASONE) 20 MG tablet; Take 1 tablet by mouth daily for 10 days    Essential hypertension  -     hydroCHLOROthiazide (HYDRODIURIL) 25 MG tablet; Take 1 tablet by mouth daily  -     losartan (COZAAR) 50 MG tablet; Take 1 tablet by mouth daily  -     folic acid (FOLVITE) 1 MG tablet; Take 1 tablet by mouth daily    Cerebrovascular accident (CVA), 2011, no residual deficit (HCC)  -     clopidogrel (PLAVIX) 75 MG tablet; Take 1 tablet by mouth daily  -     atorvastatin (LIPITOR) 80 MG tablet; Take 1 tablet by mouth daily    Vitamin D deficiency  -     folic acid (FOLVITE) 1 MG tablet; Take 1 tablet by mouth daily    Pure hypercholesterolemia  -     atorvastatin (LIPITOR) 80 MG tablet; Take 1 tablet by mouth daily    Primary osteoarthritis of left knee    Encounter for screening colonoscopy  -     Arabella Woods MD, Gastroenterology, [place]    Stage 3a chronic kidney disease [facility])  -     Protein / Creatinine Ratio, Urine; Future      FOLLOW UP AND INSTRUCTIONS:  Return in about 6 months (around 9/2/2022). · [facility] received counseling on the following healthy behaviors: medication adherence    · Discussed use, benefit, and side effects of prescribed medications. Barriers to medication compliance addressed. All patient questions answered. Pt voiced understanding.     · Patient given educational materials - see patient instructions    Sandra Bills MD  Internal Medicine Resident, PGY-2  [place]  2/5/8120,9:16 PM       This note is created with the assistance of a speech-recognition program. While intending to generate a document that actually reflects the content of thevisit, the document can still have some mistakes which may not have been identified and corrected by editing.

## 2022-03-02 NOTE — PATIENT INSTRUCTIONS
Follow-up appointment scheduled for pt on wait list call office in August for September Appointment, AVS given to patient.     Medications e-scribe to pharmacy of pt's choice    Referral to Gastroenterology was placed, summary of care printed and faxed to office, phone numbers given to pt, they will contact office for an appt      jw

## 2022-03-03 ENCOUNTER — TELEPHONE (OUTPATIENT)
Dept: GASTROENTEROLOGY | Age: 67
End: 2022-03-03

## 2022-03-03 LAB
CREATININE URINE: 202.3 MG/DL (ref 28–217)
TOTAL PROTEIN, URINE: 21 MG/DL
URINE TOTAL PROTEIN CREATININE RATIO: 0.1 (ref 0–0.2)

## 2022-03-03 NOTE — TELEPHONE ENCOUNTER
Pt lvm to schedule appt, referral in Epic for Z12.11 (ICD-10-CM) - Encounter for screening colonoscopy

## 2022-03-08 NOTE — TELEPHONE ENCOUNTER
Called and spoke with Pete Moore, she thought she was calling a kidney doctor. Advised pt that we do Colonoscopy's and that she was referred to schedule with us. Pt declined, states she had a colonoscopy 4-5 years ago and nothing was found. Pt advised she wouldn't be due for her next one then for 6-5 years. Pt states she will get her records and call and schedule when she is ready to have another Colon.

## 2022-03-14 ENCOUNTER — HOSPITAL ENCOUNTER (OUTPATIENT)
Facility: MEDICAL CENTER | Age: 67
End: 2022-03-14

## 2022-03-17 ENCOUNTER — HOSPITAL ENCOUNTER (OUTPATIENT)
Age: 67
Discharge: HOME | End: 2022-03-17
Payer: COMMERCIAL

## 2022-03-17 DIAGNOSIS — E66.01 MORBID OBESITY WITH BMI OF 50.0-59.9, ADULT (HCC): ICD-10-CM

## 2022-03-17 DIAGNOSIS — R79.89 ELEVATED FERRITIN: ICD-10-CM

## 2022-03-17 DIAGNOSIS — D56.3 THALASSEMIA TRAIT, ALPHA: ICD-10-CM

## 2022-03-17 DIAGNOSIS — D50.9 MICROCYTIC ANEMIA: ICD-10-CM

## 2022-03-17 DIAGNOSIS — E61.1 IRON DEFICIENCY: ICD-10-CM

## 2022-03-17 LAB
ABSOLUTE EOS #: 0.25 K/UL (ref 0–0.44)
ABSOLUTE IMMATURE GRANULOCYTE: 0.06 K/UL (ref 0–0.3)
ABSOLUTE LYMPH #: 1.76 K/UL (ref 1.1–3.7)
ABSOLUTE MONO #: 0.53 K/UL (ref 0.1–1.2)
BASOPHILS # BLD: 1 % (ref 0–2)
BASOPHILS ABSOLUTE: 0.07 K/UL (ref 0–0.2)
EOSINOPHILS RELATIVE PERCENT: 3 % (ref 1–4)
FERRITIN: 540 UG/L (ref 13–150)
HCT VFR BLD CALC: 34.4 % (ref 36.3–47.1)
HEMOGLOBIN: 10.4 G/DL (ref 11.9–15.1)
IMMATURE GRANULOCYTES: 1 %
IRON SATURATION: 29 % (ref 20–55)
IRON: 64 UG/DL (ref 37–145)
LYMPHOCYTES # BLD: 20 % (ref 24–43)
MCH RBC QN AUTO: 21.9 PG (ref 25.2–33.5)
MCHC RBC AUTO-ENTMCNC: 30.2 G/DL (ref 28.4–34.8)
MCV RBC AUTO: 72.4 FL (ref 82.6–102.9)
MONOCYTES # BLD: 6 % (ref 3–12)
NRBC AUTOMATED: 0 PER 100 WBC
PDW BLD-RTO: 17 % (ref 11.8–14.4)
PLATELET # BLD: 341 K/UL (ref 138–453)
PMV BLD AUTO: 9.2 FL (ref 8.1–13.5)
RBC # BLD: 4.75 M/UL (ref 3.95–5.11)
RBC # BLD: ABNORMAL 10*6/UL
SEG NEUTROPHILS: 69 % (ref 36–65)
SEGMENTED NEUTROPHILS ABSOLUTE COUNT: 6.23 K/UL (ref 1.5–8.1)
TOTAL IRON BINDING CAPACITY: 223 UG/DL (ref 250–450)
UNSATURATED IRON BINDING CAPACITY: 159 UG/DL (ref 112–347)
WBC # BLD: 8.9 K/UL (ref 3.5–11.3)

## 2022-03-17 PROCEDURE — 83550 IRON BINDING TEST: CPT

## 2022-03-17 PROCEDURE — 85025 COMPLETE CBC W/AUTO DIFF WBC: CPT

## 2022-03-17 PROCEDURE — 83540 ASSAY OF IRON: CPT

## 2022-03-17 PROCEDURE — 36415 COLL VENOUS BLD VENIPUNCTURE: CPT

## 2022-03-17 PROCEDURE — 82728 ASSAY OF FERRITIN: CPT

## 2022-03-23 NOTE — PROGRESS NOTES
Patient instructed to remove shoes and socks and instructed to sit in exam chair. Current PCP is Dexter Beauchamp MD and date of last visit was 3/2/22. Do you have a follow up visit scheduled?   No  If yes, the date is

## 2022-03-25 ENCOUNTER — HOSPITAL ENCOUNTER (OUTPATIENT)
Facility: MEDICAL CENTER | Age: 67
End: 2022-03-25

## 2022-03-28 ENCOUNTER — OFFICE VISIT (OUTPATIENT)
Dept: PODIATRY | Age: 67
End: 2022-03-28
Payer: COMMERCIAL

## 2022-03-28 VITALS
DIASTOLIC BLOOD PRESSURE: 86 MMHG | WEIGHT: 293 LBS | HEART RATE: 84 BPM | HEIGHT: 67 IN | BODY MASS INDEX: 45.99 KG/M2 | SYSTOLIC BLOOD PRESSURE: 157 MMHG

## 2022-03-28 DIAGNOSIS — B35.1 ONYCHOMYCOSIS: Primary | ICD-10-CM

## 2022-03-28 DIAGNOSIS — I73.9 PVD (PERIPHERAL VASCULAR DISEASE) (HCC): ICD-10-CM

## 2022-03-28 DIAGNOSIS — R60.0 EDEMA OF LOWER EXTREMITY: ICD-10-CM

## 2022-03-28 PROCEDURE — 99999 PR OFFICE/OUTPT VISIT,PROCEDURE ONLY: CPT | Performed by: PODIATRIST

## 2022-03-28 PROCEDURE — 11721 DEBRIDE NAIL 6 OR MORE: CPT | Performed by: PODIATRIST

## 2022-03-28 NOTE — PROGRESS NOTES
[address]  [facility] 48 200 [address] [facility],  NAHUM Colbert  Tel: [phone]   Fax: [phone]    Subjective     CC: Routine nail care    HPI:  Lisa Hernandez is a 74y.o. year old female who presents clinic today for follow-up of thickened and discolored nails. She states that she has been doing well and denies any new complaints. She walks around with a cane and has troubling cutting her own nails. She does not wear any compression stockings as they are hard to put on. Patient denies any nausea, vomiting, fever, chills or shortness of breath. Primary care physician is Melissa Ugalde MD.    ROS:    Constitutional: Denies nausea, vomiting, fever, chills. Neurologic: Denies numbness, tingling, and burning in the feet. Vascular: Denies symptoms of lower extremity claudication. Skin: Denies open wounds. Otherwise negative except as noted in the HPI.      PMH:  Past Medical History:   Diagnosis Date    Bleeding     while on aggrenox    Cataracts, bilateral     Cerebrovascular disease 2003,2011    cva    Chronic pain in left foot     CKD (chronic kidney disease) stage 3, GFR 30-59 ml/min (HCC)     GERD (gastroesophageal reflux disease)     Hx of blood clots     Hyperlipidemia     Hypertension     Iron (Fe) deficiency anemia     Nicotine abuse     Obesity     Osteoarthritis     Peripheral vascular disease ([facility] [address] 3/13/2014    Substance abuse (HCC)     cocaine, canabis    Thalassemia minor     Unspecified cerebral artery occlusion with cerebral infarction     Unspecified sleep apnea        Surgical History:   Past Surgical History:   Procedure Laterality Date    BREAST SURGERY      biopsy    COLONOSCOPY  12/2007    adenomatous polyp    COLONOSCOPY  2013    normal, repeat in 5 years    COLONOSCOPY N/A 8/21/2019    COLONOSCOPY POLYPECTOMY SNARE/COLD BIOPSY performed by Scherrie Nyhan, MD at . [facility] [address] ADENOIDECTOMY      TUBAL LIGATION      UPPER GASTROINTESTINAL ENDOSCOPY  2009    negative    UPPER GASTROINTESTINAL ENDOSCOPY N/A 8/21/2019    EGD BIOPSY performed by Dewayne Wong MD at [facility]       Social History:  Social History     Tobacco Use    Smoking status: Former Smoker     Packs/day: 2.00     Years: 35.00     Pack years: 70.00     Quit date: 10/11/2011     Years since quitting: 10.4    Smokeless tobacco: Never Used   Vaping Use    Vaping Use: Never used   Substance Use Topics    Alcohol use: Yes     Alcohol/week: 6.0 standard drinks     Types: 6 Cans of beer per week     Comment: rare    Drug use: No     Types: Cocaine, Marijuana (Weed)     Comment: per pt did years ago; cocaine & marij, 8-23-19 denies current use       Medications:  Prior to Admission medications    Medication Sig Start Date End Date Taking?  Authorizing Provider   hydroCHLOROthiazide (HYDRODIURIL) 25 MG tablet Take 1 tablet by mouth daily 3/2/22  Yes Mala White MD   losartan (COZAAR) 50 MG tablet Take 1 tablet by mouth daily 3/2/22  Yes Mala White MD   clopidogrel (PLAVIX) 75 MG tablet Take 1 tablet by mouth daily 3/2/22  Yes Mala White MD   folic acid (FOLVITE) 1 MG tablet Take 1 tablet by mouth daily 3/2/22  Yes Mala White MD   atorvastatin (LIPITOR) 80 MG tablet Take 1 tablet by mouth daily 3/2/22  Yes Mala White MD   fluticasone (FLONASE) 50 MCG/ACT nasal spray INSTILL 2 SPRAYS BY EACH NOSTRIL ROUTE DAILY 2/9/22  Yes Sandra Bills MD   acetaminophen (TYLENOL) 325 MG tablet TAKE 2 TABLETS BY MOUTH EVERY 4 HOURS AS NEEDED FOR PAIN 1/18/22  Yes Sandra Bills MD   amLODIPine (NORVASC) 10 MG tablet TAKE 1 TABLET BY MOUTH DAILY 12/15/21  Yes Edson Bradley MD   aspirin 81 MG EC tablet TAKE 1 TABLET DAILY 12/15/21  Yes Edson Bradley MD   Vitamin D (CHOLECALCIFEROL) 25 MCG (1000 UT) TABS tablet TAKE 1 TABLET DAILY 12/15/21  Yes Edson Bradley MD   HM SALINE NASAL SPRAY 0.65 % nasal spray  11/11/21  Yes Historical Provider, MD   guaiFENesin (ALTARUSSIN) 100 MG/5ML syrup Take 10 mLs by mouth 3 times daily as needed for Cough 11/10/21  Yes Galilea Flores MD   Nasal Moisturizer Combination (OCEAN COMPLETE SINUS RINSE) AERS 1 Can by Nasal route 3 times daily 11/10/21  Yes Galilea Flores MD   omeprazole (PRILOSEC) 20 MG delayed release capsule Take 1 capsule by mouth 2 times daily (before meals) 11/10/21  Yes Galilea Flores MD   vitamin B-1 (THIAMINE) 100 MG tablet Take 1 tablet by mouth daily 4/15/21  Yes Galilea Flores MD   diclofenac sodium (VOLTAREN) 1 % GEL Apply 4 g topically 4 times daily 6/28/20  Yes Wilber John, DO   Thiamine HCl (VITAMIN B-1 PO) Take by mouth daily  4/16/21  Historical Provider, MD       Objective     Vitals:    03/28/22 1459   BP: (!) 157/86   Pulse: 84       Lab Results   Component Value Date    LABA1C 5.8 09/29/2021       Physical Exam:  General:  Alert and oriented x3. In no acute distress. Lower Extremity Physical Exam:    Vascular: DP/PT pulses non palpable, but biphasic on doppler. Bilateral. CFT <5 seconds to all digits, Bilateral.  Significant nonpitting edema, Bilateral .  Hair growth is absent to the level of the digits, Bilateral.     Neuro: Saph/sural/SP/DP/plantar sensation diminished to light touch. Protective sensation is intact to 2/10 sites as tested with a 5.07g SWMF, Bilateral.     Musculoskeletal: EHL/FHL/GS/TA gross motor intact. Decreased medial arch noted, b/l. Decreased ROM b/l kojo joint. Contracted digits 2-5 b/l. Abducted hallux noted b/l with medial eminence with right worse than left. Neg pain calf squeeze ble. All LE m. Compartments soft and compressible. Dermatologic: No open lesions, Bilateral.  Interdigital maceration absent, Bilateral.  Nails 1-5 are elongated, thickened with subungual debris. Hair growth is absent with atrophic skin extending distally past the ankle.      Class A Findings (Q7) - One Class A finding  [] Non traumatic amputation of foot or integral skeletal portion thereof  Class B Findings (Q8) - Two Class B findings  [x]Absent posterior tibial pulse   [x]Absent dorsalis pedis pulse   []Advanced trophic changes; three of the following are required:   []hair growth (decrease or absence)   []nail changes (thickening)   []pigmentary changes (discoloration)   []skin texture (thin, shiny)   []skin color (rubor or redness)  Class C Findings (Q9) - One Class B and two Class C findings  []Claudication   []Temperature changes   []Edema   []Paresthesia   []Burning    Q Modifier Met: Q8: Two Class B findings    Assessment   Latha Rivero is a 74 y.o. female with     Diagnosis Orders   1. Onychomycosis     2. PVD (peripheral vascular disease) (HCC)     3. Edema of lower extremity          Plan   · Patient examined and evaluated for routine nail care. · Diagnosis and treatment options discussed in detail  · Trimmed dystrophic nails 1-5 b/l with sterile nail nippers without incident. Educated patient to monitor for signs of claudication including rest pain or cramping of the calf muscles  · Educated patient on signs of infection and to report to ED if arise  · Patient to RTC in 3 month(s) or sooner if problems arise  · Please, call the office with any questions or concerns    Electronically signed by Randy Zimmerman DPM on 3/29/2022 at 7:45 AM     I performed a history and physical examination of the patient and discussed management with the resident. I reviewed the residents note and agree with the documented findings and plan of care. Any areas of disagreement are noted on the chart. I was personally present for the key portions of any procedures. I have documented in the chart those procedures where I was not present during the key portions. I have reviewed the Podiatry Resident progress note.  I agree with the chief complaint, past medical history, past surgical history, allergies, medications, social and family history as documented unless otherwise noted below. Documentation of the HPI, Physical Exam and Medical Decision Making performed by medical students or scribes is based on my personal performance of the HPI, PE and MDM. I have personally evaluated this patient and have completed at least one if not all key elements of the E/M (history, physical exam, and MDM). Additional findings are as noted.      Reginald Brandt DPM on 3/29/2022 at 0:37 AM  Board Certified, American Board of Podiatric Surgery  Fellow, [employer] [facility] and [facility] [facility]

## 2022-04-04 ENCOUNTER — TELEPHONE (OUTPATIENT)
Dept: ONCOLOGY | Age: 67
End: 2022-04-04

## 2022-04-04 ENCOUNTER — OFFICE VISIT (OUTPATIENT)
Dept: ONCOLOGY | Age: 67
End: 2022-04-04
Payer: COMMERCIAL

## 2022-04-04 VITALS
WEIGHT: 293 LBS | TEMPERATURE: 97.5 F | SYSTOLIC BLOOD PRESSURE: 137 MMHG | DIASTOLIC BLOOD PRESSURE: 77 MMHG | HEART RATE: 65 BPM | BODY MASS INDEX: 59.7 KG/M2

## 2022-04-04 DIAGNOSIS — E66.01 MORBID OBESITY WITH BMI OF 50.0-59.9, ADULT (HCC): ICD-10-CM

## 2022-04-04 DIAGNOSIS — D56.3 THALASSEMIA TRAIT, ALPHA: Primary | ICD-10-CM

## 2022-04-04 DIAGNOSIS — D50.9 MICROCYTIC ANEMIA: ICD-10-CM

## 2022-04-04 DIAGNOSIS — I63.00 CEREBROVASCULAR ACCIDENT (CVA) DUE TO THROMBOSIS OF PRECEREBRAL ARTERY (HCC): ICD-10-CM

## 2022-04-04 DIAGNOSIS — I10 ESSENTIAL HYPERTENSION: ICD-10-CM

## 2022-04-04 DIAGNOSIS — R79.89 ELEVATED FERRITIN: ICD-10-CM

## 2022-04-04 PROCEDURE — 99211 OFF/OP EST MAY X REQ PHY/QHP: CPT | Performed by: INTERNAL MEDICINE

## 2022-04-04 PROCEDURE — 99214 OFFICE O/P EST MOD 30 MIN: CPT | Performed by: INTERNAL MEDICINE

## 2022-04-04 RX ORDER — ASPIRIN 81 MG/1
TABLET ORAL
Qty: 28 TABLET | Refills: 3 | Status: SHIPPED | OUTPATIENT
Start: 2022-04-04 | End: 2022-07-26

## 2022-04-04 RX ORDER — MELATONIN
Qty: 28 TABLET | Refills: 3 | Status: SHIPPED | OUTPATIENT
Start: 2022-04-04 | End: 2022-07-26

## 2022-04-04 RX ORDER — AMLODIPINE BESYLATE 10 MG/1
10 TABLET ORAL DAILY
Qty: 28 TABLET | Refills: 3 | Status: SHIPPED | OUTPATIENT
Start: 2022-04-04 | End: 2022-07-26

## 2022-04-04 NOTE — TELEPHONE ENCOUNTER
Request for Multiple meds. Next Visit Date:  Future Appointments   Date Time Provider Mary Isis   4/4/2022  3:30 PM Vladimir Abdul  [facility]   6/27/2022  2:45 PM Santos Vaz DPM [facility]   9/26/2022  1:00 PM LOY Shin - CNP [facility] [address] [facility] Maintenance   Topic Date Due    Pneumococcal 65+ years Vaccine (2 of 2 - PPSV23) 12/20/2021    Low dose CT lung screening  01/20/2022    Colorectal Cancer Screen  08/21/2022    Lipid screen  09/24/2022    A1C test (Diabetic or Prediabetic)  09/29/2022    Depression Screen  10/07/2022    Annual Wellness Visit (AWV)  10/08/2022    Potassium monitoring  11/29/2022    Creatinine monitoring  11/29/2022    Breast cancer screen  12/08/2023    DTaP/Tdap/Td vaccine (2 - Td or Tdap) 03/08/2028    DEXA (modify frequency per FRAX score)  Completed    Flu vaccine  Completed    Shingles Vaccine  Completed    COVID-19 Vaccine  Completed    Hepatitis C screen  Completed    Hepatitis A vaccine  Aged Out    Hepatitis B vaccine  Aged Out    Hib vaccine  Aged Out    Meningococcal (ACWY) vaccine  Aged Out       Hemoglobin A1C (%)   Date Value   09/29/2021 5.8   06/07/2019 5.4   04/19/2018 5.7             ( goal A1C is < 7)   Microalb/Crt.  Ratio (mcg/mg creat)   Date Value   05/09/2017 6     LDL Cholesterol (mg/dL)   Date Value   09/24/2021 74       (goal LDL is <100)   AST (U/L)   Date Value   11/29/2021 16     ALT (U/L)   Date Value   11/29/2021 15     BUN (mg/dL)   Date Value   11/29/2021 24 (H)     BP Readings from Last 3 Encounters:   03/28/22 (!) 157/86   03/02/22 134/78   12/27/21 (!) 152/88          (goal 120/80)    All Future Testing planned in [facility]  Lab Frequency Next Occurrence   COVID-19 Once 11/10/2022   Protein / Creatinine Ratio, Urine Once 05/28/2022   XR CHEST (2 VW) Once 04/03/2022   Protein / Creatinine Ratio, Urine Once 06/10/2022   Iron And TIBC     Vitamin B12 & Folate     Ferritin CBC Auto Differential           Patient Active Problem List:     Essential hypertension     Pure hypercholesterolemia     Cerebrovascular accident (CVA), 2011, no residual deficit (HCC)     CKD (chronic kidney disease) stage 3, GFR 30-59 ml/min ([facility])     Peripheral vascular disease (HCC)     TIBURCIO (obstructive sleep apnea)     Class 3 severe obesity due to excess calories with serious comorbidity and body mass index (BMI) of 50.0 to 59.9 in adult [facility])     Primary osteoarthritis of left knee     Chronic tension-type headache, not intractable     Carpal tunnel syndrome, bilateral-not on medication due to kidney disease     Microcytic anemia     Alpha thalassemia trait     Cervical stenosis of spinal canal     Pneumonia due to COVID-19 virus

## 2022-04-04 NOTE — PROGRESS NOTES
Today's Date: 4/4/2022  Patient Name: Kye Nettles  Patient's age: 74 y.o., 1955      Reason for Consult: management recommendations    CHIEF COMPLAINT:  Microcytic anemia    History Obtained From:  patient, electronic medical record    Interval history:    Patient presents to the clinic for a follow-up visit and to review results of lab work-up for microcytic anemia. She was in house 11/2021 with COVID, she was fully vaccinated and recovered well. She is feeling well overall with no new complaints or concerns. During this visit patient's allergy, social, medical, surgical history and medications were reviewed and updated. HISTORY OF PRESENT ILLNESS:      The patient is a 74 y.o.  female who presents to the office to establish care and for further treatment evaluation and recommendations. Patient gives a long-standing history of microcytic anemia. According to the patient she was first diagnosed with anemia when she was pregnant and has been anemic since. Patient has had GI workup done in the past including colonoscopy which only revealed a polyp without any malignancy. Patient's blood workup in the past has shown iron deficiency but her latest iron studies showed sufficient iron stores, F54 and folic acid levels. Patient complains of fatigue at times. Denies noticing any gross bleeding. Patient is on iron supplement with one tablet a day. Patient peripheral blood smear showed microcytic anemia with reticulocytosis. Patient also had hemoglobin electrophoresis which came back within normal range. Patient denies any history of gastric bypass surgery.     Past Medical History:   has a past medical history of Bleeding, Cataracts, bilateral, Cerebrovascular disease, Chronic pain in left foot, CKD (chronic kidney disease) stage 3, GFR 30-59 ml/min ([facility]), GERD (gastroesophageal reflux disease), Hx of blood clots, Hyperlipidemia, Hypertension, Iron (Fe) deficiency anemia, Nicotine abuse, Obesity, Osteoarthritis, Peripheral vascular disease ([facility] [address] Substance abuse ([facility] [address] Thalassemia minor, Unspecified cerebral artery occlusion with cerebral infarction, and Unspecified sleep apnea. Past Surgical History:   has a past surgical history that includes Tonsillectomy and adenoidectomy; Tubal ligation; Breast surgery; Endometrial biopsy (1998); Upper gastrointestinal endoscopy (2009); Colonoscopy (12/2007); Colonoscopy (2013); Upper gastrointestinal endoscopy (N/A, 8/21/2019); and Colonoscopy (N/A, 8/21/2019). Medications:    Prior to Admission medications    Medication Sig Start Date End Date Taking?  Authorizing Provider   amLODIPine (NORVASC) 10 MG tablet TAKE 1 TABLET BY MOUTH DAILY 4/4/22  Yes Rosalia Kaufman MD   aspirin (ASPIRIN LOW DOSE) 81 MG EC tablet TAKE 1 TABLET BY MOUTH DAILY 4/4/22  Yes Rosalia Kaufman MD   vitamin D3 (CHOLECALCIFEROL) 25 MCG (1000 UT) TABS tablet TAKE 1 TABLET DAILY 4/4/22  Yes Rosalia Kaufman MD   hydroCHLOROthiazide (HYDRODIURIL) 25 MG tablet Take 1 tablet by mouth daily 3/2/22  Yes Kya Coughlin MD   losartan (COZAAR) 50 MG tablet Take 1 tablet by mouth daily 3/2/22  Yes Kya Coughlin MD   clopidogrel (PLAVIX) 75 MG tablet Take 1 tablet by mouth daily 3/2/22  Yes Kya Coughlin MD   folic acid (FOLVITE) 1 MG tablet Take 1 tablet by mouth daily 3/2/22  Yes Kya Coughlin MD   atorvastatin (LIPITOR) 80 MG tablet Take 1 tablet by mouth daily 3/2/22  Yes Kya Coughlin MD   acetaminophen (TYLENOL) 325 MG tablet TAKE 2 TABLETS BY MOUTH EVERY 4 HOURS AS NEEDED FOR PAIN 1/18/22  Yes Rosalia Kaufman MD   guaiFENesin (ALTARUSSIN) 100 MG/5ML syrup Take 10 mLs by mouth 3 times daily as needed for Cough 11/10/21  Yes Kya Coughlin MD   vitamin B-1 (THIAMINE) 100 MG tablet Take 1 tablet by mouth daily 4/15/21  Yes Kya Coughlin MD   diclofenac sodium (VOLTAREN) 1 % GEL Apply 4 g topically 4 times daily 6/28/20  Yes Waldemar Raphael DO fluticasone (FLONASE) 50 MCG/ACT nasal spray INSTILL 2 SPRAYS BY EACH NOSTRIL ROUTE DAILY  Patient not taking: Reported on 4/4/2022 2/9/22   John Mahan MD   omeprazole (PRILOSEC) 20 MG delayed release capsule Take 1 capsule by mouth 2 times daily (before meals) 11/10/21   Boris Winslow MD   Thiamine HCl (VITAMIN B-1 PO) Take by mouth daily  4/16/21  Historical Provider, MD     Current Outpatient Medications   Medication Sig Dispense Refill    amLODIPine (NORVASC) 10 MG tablet TAKE 1 TABLET BY MOUTH DAILY 28 tablet 3    aspirin (ASPIRIN LOW DOSE) 81 MG EC tablet TAKE 1 TABLET BY MOUTH DAILY 28 tablet 3    vitamin D3 (CHOLECALCIFEROL) 25 MCG (1000 UT) TABS tablet TAKE 1 TABLET DAILY 28 tablet 3    hydroCHLOROthiazide (HYDRODIURIL) 25 MG tablet Take 1 tablet by mouth daily 60 tablet 3    losartan (COZAAR) 50 MG tablet Take 1 tablet by mouth daily 60 tablet 3    clopidogrel (PLAVIX) 75 MG tablet Take 1 tablet by mouth daily 28 tablet 3    folic acid (FOLVITE) 1 MG tablet Take 1 tablet by mouth daily 28 tablet 3    atorvastatin (LIPITOR) 80 MG tablet Take 1 tablet by mouth daily 28 tablet 3    acetaminophen (TYLENOL) 325 MG tablet TAKE 2 TABLETS BY MOUTH EVERY 4 HOURS AS NEEDED FOR PAIN 60 tablet 3    guaiFENesin (ALTARUSSIN) 100 MG/5ML syrup Take 10 mLs by mouth 3 times daily as needed for Cough 1 each 0    vitamin B-1 (THIAMINE) 100 MG tablet Take 1 tablet by mouth daily 30 tablet 3    diclofenac sodium (VOLTAREN) 1 % GEL Apply 4 g topically 4 times daily 4 Tube 1    fluticasone (FLONASE) 50 MCG/ACT nasal spray INSTILL 2 SPRAYS BY EACH NOSTRIL ROUTE DAILY (Patient not taking: Reported on 4/4/2022) 16 g 2    omeprazole (PRILOSEC) 20 MG delayed release capsule Take 1 capsule by mouth 2 times daily (before meals) 180 capsule 1     No current facility-administered medications for this visit.        Allergies:  Duricef [cefadroxil monohydrate], Fish-derived products, Pcn [penicillins], and Tomato    Social History:   reports that she quit smoking about 10 years ago. She has a 70.00 pack-year smoking history. She has never used smokeless tobacco. She reports current alcohol use of about 6.0 standard drinks of alcohol per week. She reports that she does not use drugs. Family History: family history includes Asthma in her mother; Diabetes in her brother; Heart Disease in her brother, father, maternal grandfather, and mother; High Blood Pressure in her brother, father, maternal grandfather, maternal grandmother, and mother. REVIEW OF SYSTEMS:      Constitutional: No fever or chills. No night sweats, no weight loss   Eyes: No eye discharge, double vision, or eye pain   HEENT: negative for sore mouth, sore throat, hoarseness and voice change   Respiratory: negative for cough , sputum, dyspnea, wheezing, hemoptysis, chest pain   Cardiovascular: negative for chest pain, dyspnea, palpitations, orthopnea, PND   Gastrointestinal: negative for nausea, vomiting, diarrhea, constipation, abdominal pain, Dysphagia, hematemesis and hematochezia   Genitourinary: negative for frequency, dysuria, nocturia, urinary incontinence, and hematuria   Integument: negative for rash, skin lesions, bruises.    Hematologic/Lymphatic: negative for easy bruising, bleeding, lymphadenopathy, or petechiae   Endocrine: negative for heat or cold intolerance,weight changes, change in bowel habits and hair loss   Musculoskeletal: negative for myalgias, arthralgias, pain, joint swelling,and bone pain   Neurological: negative for headaches, dizziness, seizures, weakness, numbness    PHYSICAL EXAM:        /77   Pulse 65   Temp 97.5 °F (36.4 °C) (Temporal)   Wt (!) 381 lb 3.2 oz (172.9 kg)   BMI 59.70 kg/m²    General appearance - well appearing, no in pain or distress   Mental status - alert and cooperative   Eyes - pupils equal and reactive, extraocular eye movements intact   Ears - bilateral TM's and external ear canals normal Mouth - mucous membranes moist, pharynx normal without lesions   Neck - supple, no significant adenopathy   Lymphatics - no palpable lymphadenopathy, no hepatosplenomegaly   Chest - clear to auscultation, no wheezes, rales or rhonchi, symmetric air entry   Heart - normal rate, regular rhythm, normal S1, S2, no murmurs  Abdomen - soft, nontender, nondistended, no masses or organomegaly   Neurological - alert, oriented, normal speech, no focal findings or movement disorder noted   Musculoskeletal - no joint tenderness, deformity or swelling   Extremities - peripheral pulses normal, no pedal edema, no clubbing or cyanosis   Skin - normal coloration and turgor, no rashes, no suspicious skin lesions noted ,      DATA:      Labs:     Results for orders placed or performed during the hospital encounter of 03/17/22   CBC Auto Differential   Result Value Ref Range    WBC 8.9 3.5 - 11.3 k/uL    RBC 4.75 3.95 - 5.11 m/uL    Hemoglobin 10.4 (L) 11.9 - 15.1 g/dL    Hematocrit 34.4 (L) 36.3 - 47.1 %    MCV 72.4 (L) 82.6 - 102.9 fL    MCH 21.9 (L) 25.2 - 33.5 pg    MCHC 30.2 28.4 - 34.8 g/dL    RDW 17.0 (H) 11.8 - 14.4 %    Platelets 634 312 - 611 k/uL    MPV 9.2 8.1 - 13.5 fL    NRBC Automated 0.0 0.0 per 100 WBC    Seg Neutrophils 69 (H) 36 - 65 %    Lymphocytes 20 (L) 24 - 43 %    Monocytes 6 3 - 12 %    Eosinophils % 3 1 - 4 %    Basophils 1 0 - 2 %    Immature Granulocytes 1 (H) 0 %    Segs Absolute 6.23 1.50 - 8.10 k/uL    Absolute Lymph # 1.76 1.10 - 3.70 k/uL    Absolute Mono # 0.53 0.10 - 1.20 k/uL    Absolute Eos # 0.25 0.00 - 0.44 k/uL    Basophils Absolute 0.07 0.00 - 0.20 k/uL    Absolute Immature Granulocyte 0.06 0.00 - 0.30 k/uL    RBC Morphology ANISOCYTOSIS PRESENT    Ferritin   Result Value Ref Range    Ferritin 540 (H) 13 - 150 ug/L   Iron And TIBC   Result Value Ref Range    Iron 64 37 - 145 ug/dL    TIBC 223 (L) 250 - 450 ug/dL    Iron Saturation 29 20 - 55 %    UIBC 159 112 - 347 ug/dL           IMAGING DATA:      IMPRESSION:   Microcytic anemia, 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 Patient continues to have microcytic anemia which I believe is secondary to alpha thalassemia. Patient has adequate iron stores. Elevated ferritin is also secondary to hemolysis from alpha thalassemia. Patient iron saturation is within range therefore I do not suspect patient has iron overload. No indication for phlebotomy or iron sedating agent at this point. She is doing well with no symptoms. We will continue with surveillance. Patient counseled on active lifestyle and trying to achieve ideal body weight. Continue age-appropriate skin studies. Return in 6 months with repeat lab work. Scribe Attestation   This note was created by Liana Kendrick acting as scribe for the physician signing this note  Electronically Signed  Liana Kendrick, 4/4/2022  Scribe, [employer]. Attending Attestation   Note was reviewed and edited. I am in agreement with the note as entered      Alvarado Jennings MD                    This note is created with the assistance of a speech recognition program.  While intending to generate a document that actually reflects the content of the visit, the document can still have some errors including those of syntax and sound a like substitutions which may escape proof reading. It such instances, actual meaning can be extrapolated by contextual diversion.

## 2022-04-04 NOTE — TELEPHONE ENCOUNTER
Wade Mcfarland MD VISIT  DR CAMPA IN TO SEE PATIENT  ORDERS RECEIVED  RV 6 MONTHS WITH LABS BEFORE  LABS CDP  CMP FE TIBC FERRITIN LD 09/26/22, ORDERS GIVEN TO PT  MD VISIT 10/3/22 @3:45PM  AVS PRINTED AND GIVEN TO PATIENT WITH INSTRUCTIONS  PATIENT DISCHARGED AMBULATORY

## 2022-05-02 DIAGNOSIS — R09.81 SINUS CONGESTION: ICD-10-CM

## 2022-05-02 NOTE — TELEPHONE ENCOUNTER
Request for Nasal Spray. Next Visit Date:  Future Appointments   Date Time Provider Mary Marinelli   6/27/2022  2:45 PM [facility]   9/26/2022  1:00 PM LOY Hodges - CNP Neuro Spec [facility]   10/3/2022  3:[place] [id number] [facility] Maintenance   Topic Date Due    Pneumococcal 65+ years Vaccine (2 - PPSV23 or PCV20) 12/20/2021    Low dose CT lung screening  01/20/2022    Colorectal Cancer Screen  08/21/2022    Lipids  09/24/2022    A1C test (Diabetic or Prediabetic)  09/29/2022    Depression Screen  10/07/2022    Annual Wellness Visit (AWV)  10/08/2022    Potassium  11/29/2022    Creatinine  11/29/2022    Breast cancer screen  12/08/2023    DTaP/Tdap/Td vaccine (2 - Td or Tdap) 03/08/2028    DEXA (modify frequency per FRAX score)  Completed    Flu vaccine  Completed    Shingles vaccine  Completed    COVID-19 Vaccine  Completed    Hepatitis C screen  Completed    Hepatitis A vaccine  Aged Out    Hepatitis B vaccine  Aged Out    Hib vaccine  Aged Out    Meningococcal (ACWY) vaccine  Aged Out       Hemoglobin A1C (%)   Date Value   09/29/2021 5.8   06/07/2019 5.4   04/19/2018 5.7             ( goal A1C is < 7)   Microalb/Crt.  Ratio (mcg/mg creat)   Date Value   05/09/2017 6     LDL Cholesterol (mg/dL)   Date Value   09/24/2021 74       (goal LDL is <100)   AST (U/L)   Date Value   11/29/2021 16     ALT (U/L)   Date Value   11/29/2021 15     BUN (mg/dL)   Date Value   11/29/2021 24 (H)     BP Readings from Last 3 Encounters:   04/04/22 137/77   03/28/22 (!) 157/86   03/02/22 134/78          (goal 120/80)    All Future Testing planned in [facility]  Lab Frequency Next Occurrence   COVID-19 Once 11/10/2022   Protein / Creatinine Ratio, Urine Once 05/28/2022   XR CHEST (2 VW) Once 05/18/2022   Protein / Creatinine Ratio, Urine Once 06/10/2022   Ferritin Once 04/04/2022   Lactate Dehydrogenase Once 04/04/2022   Iron And TIBC Vitamin B12 & Folate     Ferritin     CBC Auto Differential     CBC with Auto Differential     Comprehensive Metabolic Panel           Patient Active Problem List:     Essential hypertension     Pure hypercholesterolemia     Cerebrovascular accident (CVA), 2011, no residual deficit (HCC)     CKD (chronic kidney disease) stage 3, GFR 30-59 ml/min (HCC)     Peripheral vascular disease (HCC)     TIBURCIO (obstructive sleep apnea)     Class 3 severe obesity due to excess calories with serious comorbidity and body mass index (BMI) of 50.0 to 59.9 in adult [facility])     Primary osteoarthritis of left knee     Chronic tension-type headache, not intractable     Carpal tunnel syndrome, bilateral-not on medication due to kidney disease     Microcytic anemia     Alpha thalassemia trait     Cervical stenosis of spinal canal     Pneumonia due to COVID-19 virus

## 2022-05-02 NOTE — TELEPHONE ENCOUNTER
Request for Omeprazole. Next Visit Date:  Future Appointments   Date Time Provider Mary Marinelli   6/27/2022  2:45 PM Deja [facility] Podiatry [facility]   9/26/2022  1:00 PM LOY Olivares - CNP Neuro Spec [facility]   10/3/2022  3:45 [place] [id number] [facility] Maintenance   Topic Date Due    Pneumococcal 65+ years Vaccine (2 - PPSV23 or PCV20) 12/20/2021    Low dose CT lung screening  01/20/2022    Colorectal Cancer Screen  08/21/2022    Lipids  09/24/2022    A1C test (Diabetic or Prediabetic)  09/29/2022    Depression Screen  10/07/2022    Annual Wellness Visit (AWV)  10/08/2022    Potassium  11/29/2022    Creatinine  11/29/2022    Breast cancer screen  12/08/2023    DTaP/Tdap/Td vaccine (2 - Td or Tdap) 03/08/2028    DEXA (modify frequency per FRAX score)  Completed    Flu vaccine  Completed    Shingles vaccine  Completed    COVID-19 Vaccine  Completed    Hepatitis C screen  Completed    Hepatitis A vaccine  Aged Out    Hepatitis B vaccine  Aged Out    Hib vaccine  Aged Out    Meningococcal (ACWY) vaccine  Aged Out       Hemoglobin A1C (%)   Date Value   09/29/2021 5.8   06/07/2019 5.4   04/19/2018 5.7             ( goal A1C is < 7)   Microalb/Crt.  Ratio (mcg/mg creat)   Date Value   05/09/2017 6     LDL Cholesterol (mg/dL)   Date Value   09/24/2021 74       (goal LDL is <100)   AST (U/L)   Date Value   11/29/2021 16     ALT (U/L)   Date Value   11/29/2021 15     BUN (mg/dL)   Date Value   11/29/2021 24 (H)     BP Readings from Last 3 Encounters:   04/04/22 137/77   03/28/22 (!) 157/86   03/02/22 134/78          (goal 120/80)    All Future Testing planned in [facility]  Lab Frequency Next Occurrence   COVID-19 Once 11/10/2022   Protein / Creatinine Ratio, Urine Once 05/28/2022   XR CHEST (2 VW) Once 05/18/2022   Protein / Creatinine Ratio, Urine Once 06/10/2022   Ferritin Once 04/04/2022   Lactate Dehydrogenase Once 04/04/2022   Iron And TIBC Vitamin B12 & Folate     Ferritin     CBC Auto Differential     CBC with Auto Differential     Comprehensive Metabolic Panel           Patient Active Problem List:     Essential hypertension     Pure hypercholesterolemia     Cerebrovascular accident (CVA), 2011, no residual deficit (HCC)     CKD (chronic kidney disease) stage 3, GFR 30-59 ml/min (HCC)     Peripheral vascular disease (HCC)     TIBURCIO (obstructive sleep apnea)     Class 3 severe obesity due to excess calories with serious comorbidity and body mass index (BMI) of 50.0 to 59.9 in adult [facility])     Primary osteoarthritis of left knee     Chronic tension-type headache, not intractable     Carpal tunnel syndrome, bilateral-not on medication due to kidney disease     Microcytic anemia     Alpha thalassemia trait     Cervical stenosis of spinal canal     Pneumonia due to COVID-19 virus

## 2022-05-03 RX ORDER — OMEPRAZOLE 20 MG/1
20 CAPSULE, DELAYED RELEASE ORAL
Qty: 56 CAPSULE | Refills: 1 | Status: SHIPPED | OUTPATIENT
Start: 2022-05-03 | End: 2022-06-29

## 2022-05-03 RX ORDER — FLUTICASONE PROPIONATE 50 MCG
SPRAY, SUSPENSION (ML) NASAL
Qty: 16 G | Refills: 2 | Status: SHIPPED | OUTPATIENT
Start: 2022-05-03 | End: 2022-06-08

## 2022-05-11 ENCOUNTER — OFFICE VISIT (OUTPATIENT)
Dept: INTERNAL MEDICINE | Age: 67
End: 2022-05-11
Payer: COMMERCIAL

## 2022-05-11 VITALS
DIASTOLIC BLOOD PRESSURE: 75 MMHG | HEIGHT: 67 IN | TEMPERATURE: 92.1 F | SYSTOLIC BLOOD PRESSURE: 123 MMHG | OXYGEN SATURATION: 96 % | WEIGHT: 293 LBS | BODY MASS INDEX: 45.99 KG/M2 | HEART RATE: 66 BPM

## 2022-05-11 DIAGNOSIS — M16.12 PRIMARY OSTEOARTHRITIS OF LEFT HIP: ICD-10-CM

## 2022-05-11 DIAGNOSIS — M15.9 OSTEOARTHRITIS OF MULTIPLE JOINTS, UNSPECIFIED OSTEOARTHRITIS TYPE: ICD-10-CM

## 2022-05-11 DIAGNOSIS — M48.02 CERVICAL STENOSIS OF SPINAL CANAL: Primary | ICD-10-CM

## 2022-05-11 PROCEDURE — 99211 OFF/OP EST MAY X REQ PHY/QHP: CPT | Performed by: INTERNAL MEDICINE

## 2022-05-11 PROCEDURE — 99213 OFFICE O/P EST LOW 20 MIN: CPT | Performed by: STUDENT IN AN ORGANIZED HEALTH CARE EDUCATION/TRAINING PROGRAM

## 2022-05-11 ASSESSMENT — ENCOUNTER SYMPTOMS
ALLERGIC/IMMUNOLOGIC NEGATIVE: 1
GASTROINTESTINAL NEGATIVE: 1
EYES NEGATIVE: 1
BACK PAIN: 1
RESPIRATORY NEGATIVE: 1

## 2022-05-11 NOTE — PROGRESS NOTES
[facility] [address] [address]  Dept: [phone]  Dept Fax: [phone]    Office Progress/Follow Up Note  Date ofpatient's visit: 5/11/2022  Patient's Name:  Solomon Robles YOB: 1955            Patient Care Team:  Hiral Marino MD as PCP - General (Internal Medicine)  Jaclynn Dubin, RN as Nurse [address] [place] [address], DPM as Consulting Physician (Podiatry)  ================================================================    REASON FOR VISIT/CHIEF COMPLAINT:  Hip Pain (right  side down to her foot )    HISTORY OF PRESENTING ILLNESS:  History was obtained from: patient. Michael Prajapati a 74 y.o. is here for follow-up visit. Today her blood pressure is 123/75, heart rate 66, takes Norvasc hydrochlorothiazide and Cozaar for the same. Her main concern today is back pain and right hip pain going all the way down her right leg. Patient has known history of arthritis, spine imaging reviewed from 3 years ago showed degenerative changes, she was given referral to Dr.B zuñiga orthopedic surgeon who does spine surgery also, but patient states that she does not want anyone to cut her open so she did not follow-up with the doctor. Follow-up with physical therapy at some point which helped her a little bit. This pain had been going on for months now, no history of fall or trauma, she uses a cane to walk, has right-sided residual weakness due to previous history of stroke.,  Her pain is constant, achy ,both on walking and sitting, she takes Tylenol to relieve her pain, nothing makes it better or worse, it radiates down her hip to her right leg with numbness and tingling, no bladder or bowel issues, she has difficulty getting up from seated position. She does her daily living activities on her own. Has known history of carpal tunnel syndrome, has refused any kind of steroid injections or surgery for the same.     Patient does have history of microcytic anemia, alpha thalassemia trait, follows with Dr. Valentin Moyer,   Patient does have history of stroke X 2, takes aspirin and Plavix  Last colonoscopy done in 2019 showed polyps without malignancy, she is due for colonoscopy this year, given follow-up last visit. EGD in 2019 showed erosive esophagitis, patient takes Pepcid  Breast cancer screening was negative in December 2021  DEXA scan in 2018 normal, latest vitamin D levels normal  HbA1c in September 2021 5.8  Lipid panel September 2021 normal  Patient has been COVID vaccinated, does not want pneumococcal vaccine  Patient does not drink or do drugs, quit smoking in 2011  CT lung cancer screen in June 2020 was negative.       Patient Active Problem List   Diagnosis    Essential hypertension    Pure hypercholesterolemia    Cerebrovascular accident (CVA), 2011, no residual deficit (HCC)    CKD (chronic kidney disease) stage 3, GFR 30-59 ml/min (HCC)    Peripheral vascular disease (HCC)    TIBURCIO (obstructive sleep apnea)    Class 3 severe obesity due to excess calories with serious comorbidity and body mass index (BMI) of 50.0 to 59.9 in adult [facility])    Primary osteoarthritis of left knee    Chronic tension-type headache, not intractable    Carpal tunnel syndrome, bilateral-not on medication due to kidney disease    Microcytic anemia    Alpha thalassemia trait    Cervical stenosis of spinal canal    Pneumonia due to COVID-19 virus       Health Maintenance Due   Topic Date Due    Pneumococcal 65+ years Vaccine (2 - PPSV23 or PCV20) 12/20/2021    Low dose CT lung screening  01/20/2022       Allergies   Allergen Reactions    Duricef [Cefadroxil Monohydrate]     Fish-Derived Products     Pcn [Penicillins]     Tomato          Current Outpatient Medications   Medication Sig Dispense Refill    fluticasone (FLONASE) 50 MCG/ACT nasal spray INSTILL 2 SPRAYS BY EACH NOSTRIL ROUTE DAILY 16 g 2    omeprazole (PRILOSEC) 20 MG delayed release capsule TAKE 1 CAPSULE BY MOUTH 2 TIMES DAILY (BEFORE MEALS) 56 capsule 1    amLODIPine (NORVASC) 10 MG tablet TAKE 1 TABLET BY MOUTH DAILY 28 tablet 3    aspirin (ASPIRIN LOW DOSE) 81 MG EC tablet TAKE 1 TABLET BY MOUTH DAILY 28 tablet 3    vitamin D3 (CHOLECALCIFEROL) 25 MCG (1000 UT) TABS tablet TAKE 1 TABLET DAILY 28 tablet 3    hydroCHLOROthiazide (HYDRODIURIL) 25 MG tablet Take 1 tablet by mouth daily 60 tablet 3    losartan (COZAAR) 50 MG tablet Take 1 tablet by mouth daily 60 tablet 3    clopidogrel (PLAVIX) 75 MG tablet Take 1 tablet by mouth daily 28 tablet 3    folic acid (FOLVITE) 1 MG tablet Take 1 tablet by mouth daily 28 tablet 3    atorvastatin (LIPITOR) 80 MG tablet Take 1 tablet by mouth daily 28 tablet 3    acetaminophen (TYLENOL) 325 MG tablet TAKE 2 TABLETS BY MOUTH EVERY 4 HOURS AS NEEDED FOR PAIN 60 tablet 3    guaiFENesin (ALTARUSSIN) 100 MG/5ML syrup Take 10 mLs by mouth 3 times daily as needed for Cough 1 each 0    vitamin B-1 (THIAMINE) 100 MG tablet Take 1 tablet by mouth daily 30 tablet 3    diclofenac sodium (VOLTAREN) 1 % GEL Apply 4 g topically 4 times daily 4 Tube 1     No current facility-administered medications for this visit. Social History     Tobacco Use    Smoking status: Former Smoker     Packs/day: 2.00     Years: 35.00     Pack years: 70.00     Quit date: 10/11/2011     Years since quitting: 10.5    Smokeless tobacco: Never Used   Vaping Use    Vaping Use: Never used   Substance Use Topics    Alcohol use:  Yes     Alcohol/week: 6.0 standard drinks     Types: 6 Cans of beer per week     Comment: rare    Drug use: No     Types: Cocaine, Marijuana (Weed)     Comment: per pt did years ago; cocaine & marij, 8-23-19 denies current use       Family History   Problem Relation Age of Onset    High Blood Pressure Mother     Asthma Mother     Heart Disease Mother     Heart Disease Father     High Blood Pressure Father     Diabetes Brother     High Blood Pressure Brother     Heart Disease Brother     High Blood Pressure Maternal Grandmother     High Blood Pressure Maternal Grandfather     Heart Disease Maternal Grandfather         REVIEW OF SYSTEMS:  Review of Systems   Constitutional: Negative. HENT: Negative. Eyes: Negative. Respiratory: Negative. Cardiovascular: Negative. Gastrointestinal: Negative. Endocrine: Negative. Genitourinary: Negative. Musculoskeletal: Positive for arthralgias and back pain. Skin: Negative. Allergic/Immunologic: Negative. Neurological: Positive for numbness. Hematological: Negative. Psychiatric/Behavioral: Negative. PHYSICAL EXAM:  Vitals:    05/11/22 1520   BP: 123/75   Site: Right Lower Arm   Position: Sitting   Cuff Size: Medium Adult   Pulse: 66   Temp: 92.1 °F (33.4 °C)   SpO2: 96%   Weight: (!) 384 lb (174.2 kg)   Height: 5' 7\" (1.702 m)     BP Readings from Last 3 Encounters:   05/11/22 123/75   04/04/22 137/77   03/28/22 (!) 157/86        Physical Exam  Constitutional:       Appearance: Normal appearance. She is obese. HENT:      Head: Normocephalic. Nose: Nose normal.      Mouth/Throat:      Mouth: Mucous membranes are moist.   Eyes:      Pupils: Pupils are equal, round, and reactive to light. Cardiovascular:      Rate and Rhythm: Normal rate and regular rhythm. Pulses: Normal pulses. Pulmonary:      Effort: Pulmonary effort is normal.      Breath sounds: Normal breath sounds. Abdominal:      General: Abdomen is flat. Palpations: Abdomen is soft. Musculoskeletal:         General: No swelling. Cervical back: Normal range of motion. Comments: Painful active and passive ROM in left leg in all directions   Skin:     General: Skin is warm. Neurological:      Mental Status: She is alert. Mental status is at baseline.    Psychiatric:         Mood and Affect: Mood normal.           DIAGNOSTIC FINDINGS:  CBC:  Lab Results   Component Value Date WBC 8.9 03/17/2022    HGB 10.4 03/17/2022     03/17/2022     04/05/2012       BMP:    Lab Results   Component Value Date     11/29/2021    K 4.1 11/29/2021     11/29/2021    CO2 21 11/29/2021    BUN 24 11/29/2021    CREATININE 1.15 11/29/2021    GLUCOSE 99 11/29/2021    GLUCOSE 88 04/12/2012       HEMOGLOBIN A1C:   Lab Results   Component Value Date    LABA1C 5.8 09/29/2021       FASTING LIPID PANEL:  Lab Results   Component Value Date    CHOL 148 09/24/2021    HDL 50 09/24/2021    TRIG 119 09/24/2021       ASSESSMENT AND PLAN:  Do Hull was seen today for hip pain. Diagnoses and all orders for this visit:    Cervical stenosis of spinal canal  -     Janet Hobbs MD, Orthopedic Surgery, [place]    Primary osteoarthritis of left hip    Osteoarthritis of multiple joints, unspecified osteoarthritis type  -     Janet Hobbs MD, Orthopedic Surgery, [address] [place] [address]      FOLLOW UP AND INSTRUCTIONS:  Return in about 2 months (around 7/11/2022). · Do Hull received counseling on the following healthy behaviors: exercise    · Discussed use, benefit, and side effects of prescribed medications. Barriers to medication compliance addressed. All patient questions answered. Pt voiced understanding. · Patient given educational materials - see patient instructions    Deana Carcamo MD  Internal Medicine Resident, PGY-2  [facility]  7/18/5770,7:56 PM       This note is created with the assistance of a speech-recognition program. While intending to generate a document that actually reflects the content of thevisit, the document can still have some mistakes which may not have been identified and corrected by editing.

## 2022-05-11 NOTE — PROGRESS NOTES
Attending Physician Statement  I have discussed the care of [place] [address] including pertinent history and exam findings,  with the resident. I have reviewed the key elements of all parts of the encounter with the resident. I agree with the assessment, plan and orders as documented by the resident.   (GE Modifier)    MD JANIA Mason  Attending Physician, [address] [place] [address], Internal Medicine Residency Program  [address] [place] [address]  5/11/2022, 4:09 PM

## 2022-05-23 ENCOUNTER — HOSPITAL ENCOUNTER (OUTPATIENT)
Dept: PHYSICAL THERAPY | Age: 67
Setting detail: THERAPIES SERIES
Discharge: HOME | End: 2022-05-23
Payer: COMMERCIAL

## 2022-05-23 PROCEDURE — 97161 PT EVAL LOW COMPLEX 20 MIN: CPT

## 2022-05-23 PROCEDURE — 97110 THERAPEUTIC EXERCISES: CPT

## 2022-05-23 NOTE — CONSULTS
[x] Michael Bailey  Outpatient Physical Therapy  [address]  Phone: [phone]  Fax: [phone] [] [facility] at [address] [place] [address]  Phone: [phone]   Fax: [phone]     Physical Therapy Evaluation    Date:  2022  Patient: Zahraa Dewey  : 1955  MRN: [id number]  Physician: Elvis Guy MD   Insurance: [employer] [facility]   Medical Diagnosis: Osteoarthritis of multiple joints, unspecified osteoarthritis type M15.9     Rehab Codes: M79.604, M25.511, M25.611, M25.532  Onset Date: 22                                 Next 's appt:     Subjective:   CC: Patient reports multiple orthopedic issues including low back, R hip and leg pain, R shoulder pain, and Left carpal tunnel syndrome. Patient has difficulty walking and notes having balance issues. Her R shoulder has limited ROM and she has difficulty lifting and her left hand is weak. Omar Haro Her L Patient has difficulty navigating stairs and completing housework. She has an HHA helping 3x/weeek. She is the primary caregiver to her  but he also has HHS 2x/week due to his dementia. HPI: Patient reports R LE pain for several years. Her last fall was a couple years ago when her R leg went out on her. She walks with a straight cane or 4 wheeled walker.       PMHx: [] Unremarkable [] Diabetes [x] HTN  [] Pacemaker   [x] MI/Heart Problems [] Cancer [x] Arthritis [] Asthma                         [x] refer to full medical chart  In EPIC  [] Other:    Past Medical History:   Diagnosis Date    Bleeding     while on aggrenox    Cataracts, bilateral     Cerebrovascular disease ,    cva    Chronic pain in left foot     CKD (chronic kidney disease) stage 3, GFR 30-59 ml/min ([facility])     GERD (gastroesophageal reflux disease)     Hx of blood clots     Hyperlipidemia     Hypertension     Iron (Fe) deficiency anemia     Nicotine abuse     Obesity     Osteoarthritis  Peripheral vascular disease ([facility] [address] 3/13/2014    Substance abuse (HCC)     cocaine, canabis    Thalassemia minor     Unspecified cerebral artery occlusion with cerebral infarction     Unspecified sleep apnea        Comorbidities:   [x] Obesity [] Dialysis  [] Other:   [] Asthma/COPD [] Dementia [] Other:   [] Stroke [] Sleep apnea [] Other:   [] Vascular disease [] Rheumatic disease [] Other:     Tests: [] X-Ray: [] MRI:  [] Other:    Medications: [x] Refer to full medical record [] None [] Other:  Allergies:      [x] Refer to full medical record  [] None [] Other:    Function:    Patient lives with:     In what type of home [x]  One story   [] Two story   [] Split level   Number of stairs to enter 0,dtrs house has 5   With handrail on the [x]  Right to enter   [] Left to enter   Dank Bethel has a []  Tub only  [x] Tub/shower combo   [] Walk in shower    []  Grab bars   Washing machine is on [x]  Main level   [] Second level   [] Basement     ADL/IADL Previous level of function Current level of function Who currently assists the patient with task   Bathing  [x] Independent  [] Assist [x] Independent  [] Assist    Dress/grooming [x] Independent  [] Assist [x] Independent  [] Assist    Transfer/mobility [x] Independent  [] Assist [x] Independent  [] Assist    Feeding [x] Independent  [] Assist [x] Independent  [] Assist    Toileting [x] Independent  [] Assist [x] Independent  [] Assist    Driving [x] Independent  [] Assist [x] Independent  [] Assist    Housekeeping [] Independent  [x] Assist [] Independent  [x] Assist    Grocery shop/meal prep [x] Independent  [x] Assist [] Independent  [x] Assist Meals on WHeels      Gait Prior level of function Current level of function    [x] Independent  [] Assist [x] Independent  [] Assist   Device: [] Independent [] Independent    [x] Straight Cane [] Quad cane [x] Straight Cane [] Quad cane    [] Standard walker [] Rolling walker   [x] 4 wheeled walker [] Standard walker [] Rolling walker   [] 4 wheeled walker    [] Wheelchair [] Wheelchair     Working:  [] Full-time  [] Part-time  [] Off d/t condition  [x] Retired  [] Disability  [] N/A  Job/Employer:    Pain:  [x] Yes  [] No Location: back, R hip, R shoulder, L hand  Pain Rating: (0-10 scale) 9/10  Pain altered Tx:  [] Yes  [x] No  Action:    Objective: p = pain, L = Lacks      ROM  ° A/P STRENGTH  ROM    Left Right Left Right Cervical    Shoulder Flex 100 80 4/5 4-/5 Flexion    Abduction 60 60 4/5 4-/5 Extension    ER 60 30 4/5 4-/5 Rotation L R    IR 60 60 4/5 4-/5 Side bending L R   Elbow Flex          Ext          Wrist Flex         Ext     Lumbar    Hand    15 lbs 30 lbs Flexion 50%   Hip Flexion      Extension 50%   Ext   4/5 4-/5 Rotation L50% R 50%   Abd   4/5 4-/5 Sidebend L 50% R50%   Add   4/5 4-/5 -------------------- --------- --------   ER     STRENGTH     IR     Abdominals 4-/5    Knee Flex   4/5 4/5 Erector Spinae 4-/5    Ext    4/5 4/5 Scapular L R   Ankle DF     -Scaption     PF      -Retraction     Inversion     -Horz Abd     Eversion     -Extension        Special Tests:   Positive:R drop arm, infraspinatus, R miah        Negative:      Woodson Fall Risk Assessment    Risk Factor Scale  Score   History of Falls [x] Yes  [] No 25  0 25   Secondary Diagnosis [] Yes  [] No 15  0    Ambulatory Aid [] Furniture  [x] Crutches/cane/walker  [] None/bedrest/wheelchair/nurse 30  15  0 15   IV/Heparin Lock [] Yes  [x] No 20  0    Gait/Transferring [] Impaired  [x] Weak  [] Normal/bedrest/immobile 20  10  0 10   Mental Status [] Forgets limitations  [] Oriented to own ability 15  0       Total:50     Based on the Assessment score: check the appropriate box.     []  No intervention needed   Low =   Score of 0-24    []  Use standard prevention interventions Moderate =  Score of 24-44   [] Give patient handout and discuss fall prevention strategies   [] Establish goal of education for patient/family RE: fall prevention strategies    [x]  Use high risk prevention interventions High = Score of 45 and higher   [x] Give patient handout and discuss fall prevention strategies   [x] Establish goal of education for patient/family Re: fall prevention strategies   [x] Discuss lifeline / other resources Patient currently owns. OBSERVATION Comments   Posture Fwd head, rounded shoulders    Joint Alignment R shoulder elevated and protracted. Gait Short shuffling gait, increased trunk flexion, wide ALBAN, cane on L    Palpation Very tender left anterior and lateral shoulder   Edema No Deficit    Neurological Numbness and tingling in left fingers      Assessment: Patient demonstrates multiple joint issues including R shoulder, R hip, and left wrist due to underlying degeneration and general deconditioning. Patient will benefit from physical therapy to improve R shoulder, R hip , and left hand ROM and strength to improve ADL function and gait tolerance. Problems:    [x] ? Pain:   [x] ? ROM:  [x] ? Flexibility:  [x] ? Strength:  [x] ? Function: walking, reaching, lifting   [] Other:    STG: (to be met in 8 treatments)  1. ? Pain: 7/10 general joint pain with ADLs. 2. ? ROM: Patient is able to flex shoulder to 90 degrees to improve reaching. 3. ? Strength: R hip flexion, R shoulder flexion to 4/5 to improve walking and lifting. 4. ? Function:Patient reports improved ability to ambulate household distances. 5. Independent with Home Exercise Programs    LTG: (to be met in 12 treatments)  1. TUG score of 13.5 or less to reduce fall risk. 2. L  strength to 20 lbs or better to improve grasping. 3. Demonstrate knowledge of fall prevention. Patient goals: Improve pain and function.      Rehab Potential:  [x] Good  [] Fair  [] Poor   Suggested Professional Referral:  [x] No  [] Yes:  Barriers to Goal Achievement[de-identified]  [x] No  [] Yes:  Domestic Concerns:  [x] No  [] Yes:    Pt. Education:  [x] Plans/Goals, Risks/Benefits discussed  [x] Home exercise program: See chart below  Method of Education: [x] Verbal  [x] Demo  [x] Written  Comprehension of Education:  [x] Verbalizes understanding. [x] Demonstrates understanding. [x] Needs Review. [] Demonstrates/verbalizes understanding of HEP/Ed previously given. Treatment Plan:  [x] Therapeutic Exercise   46175  [] Vasopneumatic cold with compression  99724    [x] Therapeutic Activity  69214 [x] Cold/hotpack    [x] Gait Training   52724 [] Lumbar/Cervical Traction  [id number]   [x] Neuromuscular Re-education  [id number] [] Electrical Stimulation Unattended  07093   [x] Manual Therapy    74379 [] Electrical Stimulation Attended  [id number]   [] Iontophoresis: 4 mg/mL Dexamethasone Sodium Phosphate  mAmin  17607 []  Medication allergies reviewed for use of   Dexamethasone Sodium Phosphate 4mg/ml with iontophoresis treatments. Pt is not allergic. Frequency:  2x/week for 12 visits    Todays Treatment:  Precautions:  Modalities:   Exercises:   Access Code: [email or web address]  URL: [employer]/  Date: 05/23/2022  Prepared by: Dre Min    Exercises  Seated Heel Raise - 1 x daily - 4 x weekly - 3 sets - 10 reps  Seated Long Arc Quad - 1 x daily - 4 x weekly - 3 sets - 10 reps  Seated March - 1 x daily - 4 x weekly - 3 sets - 10 reps  Seated Chest Press with Bar - 1 x daily - 4 x weekly - 3 sets - 10 reps  Seated Bicep Curls with Bar - 1 x daily - 4 x weekly - 3 sets - 10 reps    Specific Instructions for next treatment: B UE an LE strength and endurance exercise.  R shoulder ROM      Evaluation Complexity:  History (Personal factors, comorbidities) [] 0 [x] 1-2 [] 3+   Exam (limitations, restrictions) [] 1-2 [x] 3 [] 4+   Clinical presentation (progression) [x] Stable [] Evolving  [] Unstable   Decision Making [x] Low [] Moderate [] High    [x] Low Complexity [] Moderate Complexity [] High Complexity       Treatment Charges: Mins Units   [x] Evaluation       [x]  Low       [] Moderate       []  High 30 1   []  Modalities     [x]  Ther Exercise 15 1   []  Manual Therapy     []  Ther Activities     []  Aquatics     []  Vasocompression     []  Other       TOTAL TREATMENT TIME: 45      Time in: 1500    Time out:7495    Electronically signed by: Dori Wolfe PT

## 2022-06-07 ENCOUNTER — OFFICE VISIT (OUTPATIENT)
Dept: ORTHOPEDIC SURGERY | Age: 67
End: 2022-06-07
Payer: COMMERCIAL

## 2022-06-07 ENCOUNTER — TELEPHONE (OUTPATIENT)
Dept: INTERVENTIONAL RADIOLOGY/VASCULAR | Age: 67
End: 2022-06-07

## 2022-06-07 VITALS — HEIGHT: 67 IN | BODY MASS INDEX: 45.99 KG/M2 | RESPIRATION RATE: 14 BRPM | WEIGHT: 293 LBS

## 2022-06-07 DIAGNOSIS — M48.062 LUMBAR STENOSIS WITH NEUROGENIC CLAUDICATION: ICD-10-CM

## 2022-06-07 DIAGNOSIS — M54.16 LUMBAR RADICULAR PAIN: ICD-10-CM

## 2022-06-07 DIAGNOSIS — M54.50 LOW BACK PAIN, UNSPECIFIED BACK PAIN LATERALITY, UNSPECIFIED CHRONICITY, UNSPECIFIED WHETHER SCIATICA PRESENT: Primary | ICD-10-CM

## 2022-06-07 PROCEDURE — 99213 OFFICE O/P EST LOW 20 MIN: CPT | Performed by: ORTHOPAEDIC SURGERY

## 2022-06-07 PROCEDURE — 1123F ACP DISCUSS/DSCN MKR DOCD: CPT | Performed by: ORTHOPAEDIC SURGERY

## 2022-06-07 NOTE — PROGRESS NOTES
Patient ID: Adelaide Long is a 74 y.o. female    Chief Compliant:  Chief Complaint   Patient presents with    Hip Pain     right        Diagnostic imaging:    CT scan lumbar spine 2019 patient with multilevel lumbar spondylosis a grade 1 spondylolisthesis of the at L3-4 that appears to be associated on axial cuts with rather severe lumbar spinal stenosis    AP lateral lumbar spine grade 1 spondylolisthesis L3-4 hips normal appear slightly osteopenic but this is likely due to image degradation due to the patient's size    Assessment and Plan:  1. Low back pain, unspecified back pain laterality, unspecified chronicity, unspecified whether sciatica present    2. Lumbar radicular pain    3. Lumbar stenosis with neurogenic claudication    4. BMI 60.0-69.9, adult ([address]      5 year hx lower back pain with exacerbation about 1 month ago, now radiating from the right buttock and lateral leg    CT myelogram lumbar spine (patient's size will severely limit quality of MRI that we are able to obtain)    Follow up after CT myelogram    HPI:  This is a 74 y.o. female who presents to the clinic today as a new patient for lower back evaluation. Patient with exacerbation of chronic lower back pain about 1 month ago radiating to the right buttock, lateral hip, leg and to her foot. Pain has been ongoing for several years however has worsened recently. She is ambulating via cane    Review of Systems   All other systems reviewed and are negative.       Past History:    Current Outpatient Medications:     fluticasone (FLONASE) 50 MCG/ACT nasal spray, INSTILL 2 SPRAYS BY EACH NOSTRIL ROUTE DAILY, Disp: 16 g, Rfl: 2    omeprazole (PRILOSEC) 20 MG delayed release capsule, TAKE 1 CAPSULE BY MOUTH 2 TIMES DAILY (BEFORE MEALS), Disp: 56 capsule, Rfl: 1    amLODIPine (NORVASC) 10 MG tablet, TAKE 1 TABLET BY MOUTH DAILY, Disp: 28 tablet, Rfl: 3    aspirin (ASPIRIN LOW DOSE) 81 MG EC tablet, TAKE 1 TABLET BY MOUTH DAILY, Disp: 28 tablet, Rfl: 3    vitamin D3 (CHOLECALCIFEROL) 25 MCG (1000 UT) TABS tablet, TAKE 1 TABLET DAILY, Disp: 28 tablet, Rfl: 3    hydroCHLOROthiazide (HYDRODIURIL) 25 MG tablet, Take 1 tablet by mouth daily, Disp: 60 tablet, Rfl: 3    losartan (COZAAR) 50 MG tablet, Take 1 tablet by mouth daily, Disp: 60 tablet, Rfl: 3    clopidogrel (PLAVIX) 75 MG tablet, Take 1 tablet by mouth daily, Disp: 28 tablet, Rfl: 3    folic acid (FOLVITE) 1 MG tablet, Take 1 tablet by mouth daily, Disp: 28 tablet, Rfl: 3    atorvastatin (LIPITOR) 80 MG tablet, Take 1 tablet by mouth daily, Disp: 28 tablet, Rfl: 3    acetaminophen (TYLENOL) 325 MG tablet, TAKE 2 TABLETS BY MOUTH EVERY 4 HOURS AS NEEDED FOR PAIN, Disp: 60 tablet, Rfl: 3    guaiFENesin (ALTARUSSIN) 100 MG/5ML syrup, Take 10 mLs by mouth 3 times daily as needed for Cough, Disp: 1 each, Rfl: 0    vitamin B-1 (THIAMINE) 100 MG tablet, Take 1 tablet by mouth daily, Disp: 30 tablet, Rfl: 3    diclofenac sodium (VOLTAREN) 1 % GEL, Apply 4 g topically 4 times daily, Disp: 4 Tube, Rfl: 1  Allergies   Allergen Reactions    Duricef [Cefadroxil Monohydrate]     Fish-Derived Products     Pcn [Penicillins]     Tomato      Social History     Socioeconomic History    Marital status:      Spouse name: Not on file    Number of children: Not on file    Years of education: Not on file    Highest education level: Not on file   Occupational History    Not on file   Tobacco Use    Smoking status: Former Smoker     Packs/day: 2.00     Years: 35.00     Pack years: 70.00     Quit date: 10/11/2011     Years since quitting: 10.6    Smokeless tobacco: Never Used   Vaping Use    Vaping Use: Never used   Substance and Sexual Activity    Alcohol use:  Yes     Alcohol/week: 6.0 standard drinks     Types: 6 Cans of beer per week     Comment: rare    Drug use: No     Types: Cocaine, Marijuana (Weed)     Comment: per pt did years ago; cocaine & marij, 8-23-19 denies current use    Sexual activity: Yes     Partners: Male   Other Topics Concern    Not on file   Social History Narrative    Not on file     Social Determinants of Health     Financial Resource Strain: Low Risk     Difficulty of Paying Living Expenses: Not hard at all   Food Insecurity: No Food Insecurity    Worried About Running Out of Food in the Last Year: Never true    [address] in the Last Year: Never true   Transportation Needs:     Lack of Transportation (Medical): Not on file    Lack of Transportation (Non-Medical):  Not on file   Physical Activity:     Days of Exercise per Week: Not on file    Minutes of Exercise per Session: Not on file   Stress:     Feeling of Stress : Not on file   Social Connections:     Frequency of Communication with Friends and Family: Not on file    Frequency of Social Gatherings with Friends and Family: Not on file    Attends Sabianism Services: Not on file    Active Member of [address] [place] [address] [employer] or Organizations: Not on file    Attends Club or Organization Meetings: Not on file    Marital Status: Not on file   Intimate Partner Violence:     Fear of Current or Ex-Partner: Not on file    Emotionally Abused: Not on file    Physically Abused: Not on file    Sexually Abused: Not on file   Housing Stability:     Unable to Pay for Housing in the Last Year: Not on file    Number of [place] in the Last Year: Not on file    Unstable Housing in the Last Year: Not on file     Past Medical History:   Diagnosis Date    Bleeding     while on aggrenox    Cataracts, bilateral     Cerebrovascular disease 2003,2011    cva    Chronic pain in left foot     CKD (chronic kidney disease) stage 3, GFR 30-59 ml/min ([facility])     GERD (gastroesophageal reflux disease)     Hx of blood clots     Hyperlipidemia     Hypertension     Iron (Fe) deficiency anemia     Nicotine abuse     Obesity     Osteoarthritis     Peripheral vascular disease ([facility] [address] 3/13/2014    Substance abuse ([facility]     cocaine, canabis    Thalassemia minor     Unspecified cerebral artery occlusion with cerebral infarction     Unspecified sleep apnea      Past Surgical History:   Procedure Laterality Date    BREAST SURGERY      biopsy    COLONOSCOPY  12/2007    adenomatous polyp    COLONOSCOPY  2013    normal, repeat in 5 years    COLONOSCOPY N/A 8/21/2019    COLONOSCOPY POLYPECTOMY SNARE/COLD BIOPSY performed by Eron Porras MD at [address] [facility] GASTROINTESTINAL ENDOSCOPY  2009    negative    UPPER GASTROINTESTINAL ENDOSCOPY N/A 8/21/2019    EGD BIOPSY performed by [facility]ron Porras MD at [facility]     Family History   Problem Relation Age of Onset    High Blood Pressure Mother     Asthma Mother     Heart Disease Mother     Heart Disease Father     High Blood Pressure Father     Diabetes Brother     High Blood Pressure Brother     Heart Disease Brother     High Blood Pressure Maternal Grandmother     High Blood Pressure Maternal Grandfather     Heart Disease Maternal Grandfather         Physical Exam:  Vitals signs and nursing note reviewed. Constitutional:       Appearance: well-developed. HENT:      Head: Normocephalic and atraumatic. Nose: Nose normal.   Eyes:      Conjunctiva/sclera: Conjunctivae normal.   Neck:      Musculoskeletal: Normal range of motion and neck supple. Pulmonary:      Effort: Pulmonary effort is normal. No respiratory distress. Musculoskeletal:      Comments: Normal gait     Skin:     General: Skin is warm and dry. Neurological:      Mental Status: Alert and oriented to person, place, and time. Sensory: No sensory deficit. Psychiatric:         Behavior: Behavior normal.         Thought Content: Thought content normal.  No pain with range of motion right hip      Provider Attestation:  Martha Guerrero, personally performed the services described in this documentation.  All medical record entries made by the scribe were at my direction and in my presence. I have reviewed the chart and discharge instructions and agree that the records reflect my personal performance and is accurate and complete. Job Andrew MD 6/7/22       Scribe Attestation:  By signing my name below, Jack Velásquez, attest that this documentation has been prepared under the direction and in the presence of Dr. Ari Fontaine. Electronically signed: Shoaib Brian, 6/7/22     Please note that this chart was generated using voice recognition Dragon dictation software. Although every effort was made to ensure the accuracy of this automated transcription, some errors in transcription may have occurred.

## 2022-06-08 ENCOUNTER — OFFICE VISIT (OUTPATIENT)
Dept: INTERNAL MEDICINE | Age: 67
End: 2022-06-08
Payer: COMMERCIAL

## 2022-06-08 VITALS
WEIGHT: 293 LBS | SYSTOLIC BLOOD PRESSURE: 120 MMHG | TEMPERATURE: 96.3 F | HEART RATE: 81 BPM | DIASTOLIC BLOOD PRESSURE: 78 MMHG | HEIGHT: 66 IN | BODY MASS INDEX: 47.09 KG/M2

## 2022-06-08 DIAGNOSIS — E78.00 PURE HYPERCHOLESTEROLEMIA: ICD-10-CM

## 2022-06-08 DIAGNOSIS — I63.00 CEREBROVASCULAR ACCIDENT (CVA) DUE TO THROMBOSIS OF PRECEREBRAL ARTERY (HCC): ICD-10-CM

## 2022-06-08 DIAGNOSIS — G56.03 CARPAL TUNNEL SYNDROME, BILATERAL: Primary | ICD-10-CM

## 2022-06-08 PROCEDURE — 1123F ACP DISCUSS/DSCN MKR DOCD: CPT | Performed by: STUDENT IN AN ORGANIZED HEALTH CARE EDUCATION/TRAINING PROGRAM

## 2022-06-08 PROCEDURE — 99213 OFFICE O/P EST LOW 20 MIN: CPT | Performed by: STUDENT IN AN ORGANIZED HEALTH CARE EDUCATION/TRAINING PROGRAM

## 2022-06-08 RX ORDER — CLOPIDOGREL BISULFATE 75 MG/1
75 TABLET ORAL DAILY
Qty: 28 TABLET | Refills: 3 | Status: SHIPPED | OUTPATIENT
Start: 2022-06-08 | End: 2022-10-21

## 2022-06-08 RX ORDER — METHYLPREDNISOLONE 4 MG/1
4 TABLET ORAL DAILY
Qty: 21 TABLET | Refills: 0 | Status: CANCELLED | OUTPATIENT
Start: 2022-06-08 | End: 2022-06-29

## 2022-06-08 RX ORDER — METHYLPREDNISOLONE 4 MG/1
TABLET ORAL
Qty: 1 KIT | Refills: 0 | Status: SHIPPED | OUTPATIENT
Start: 2022-06-08 | End: 2022-06-14

## 2022-06-08 RX ORDER — FOLIC ACID 1 MG/1
1 TABLET ORAL DAILY
Qty: 28 TABLET | Refills: 3 | Status: CANCELLED | OUTPATIENT
Start: 2022-06-08

## 2022-06-08 RX ORDER — ATORVASTATIN CALCIUM 80 MG/1
80 TABLET, FILM COATED ORAL DAILY
Qty: 28 TABLET | Refills: 3 | Status: SHIPPED | OUTPATIENT
Start: 2022-06-08 | End: 2022-10-21

## 2022-06-08 ASSESSMENT — PATIENT HEALTH QUESTIONNAIRE - PHQ9
SUM OF ALL RESPONSES TO PHQ QUESTIONS 1-9: 0
2. FEELING DOWN, DEPRESSED OR HOPELESS: 0
SUM OF ALL RESPONSES TO PHQ QUESTIONS 1-9: 0
SUM OF ALL RESPONSES TO PHQ9 QUESTIONS 1 & 2: 0
SUM OF ALL RESPONSES TO PHQ QUESTIONS 1-9: 0
SUM OF ALL RESPONSES TO PHQ QUESTIONS 1-9: 0
1. LITTLE INTEREST OR PLEASURE IN DOING THINGS: 0

## 2022-06-08 ASSESSMENT — ENCOUNTER SYMPTOMS
GASTROINTESTINAL NEGATIVE: 1
RESPIRATORY NEGATIVE: 1
EYES NEGATIVE: 1

## 2022-06-08 NOTE — PROGRESS NOTES
[facility] [address] [address]  Dept: [phone]  Dept Fax: [phone]    Office Progress/Follow Up Note  Date ofpatient's visit: 6/8/2022  Patient's Name:  Kye Nettles YOB: 1955            Patient Care Team:  Yoly Tadeo MD as PCP - General (Internal Medicine)  Xu Martin RN as Nurse [address] [place] [address], DPM as Consulting Physician (Podiatry)  ================================================================    REASON FOR VISIT/CHIEF COMPLAINT:  Carpal Tunnel (both hands but mainly left hand x2 months ) and Health Maintenance (declines vaccines, pt due for CT per HM )    HISTORY OF PRESENTING ILLNESS:  History was obtained from: patient, electronic medical record. Megha Saeed a 74 y.o. is here for a symptoms of left arm pain which is chronic with acute flare up of pain. She has underlying history of cervical radiculopathy diagnosed on MRI cervical spine with C4-C6 stenosis. 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 Patient denied both.   She was given short course of steroid pills which had minor improvement with her symptoms but ultimately required and recommended to have epidural injection and surgery for improvement (1000 UT) TABS tablet TAKE 1 TABLET DAILY 28 tablet 3    hydroCHLOROthiazide (HYDRODIURIL) 25 MG tablet Take 1 tablet by mouth daily 60 tablet 3    losartan (COZAAR) 50 MG tablet Take 1 tablet by mouth daily 60 tablet 3    folic acid (FOLVITE) 1 MG tablet Take 1 tablet by mouth daily 28 tablet 3    acetaminophen (TYLENOL) 325 MG tablet TAKE 2 TABLETS BY MOUTH EVERY 4 HOURS AS NEEDED FOR PAIN 60 tablet 3    guaiFENesin (ALTARUSSIN) 100 MG/5ML syrup Take 10 mLs by mouth 3 times daily as needed for Cough (Patient taking differently: Take 10 mLs by mouth as needed for Cough ) 1 each 0    vitamin B-1 (THIAMINE) 100 MG tablet Take 1 tablet by mouth daily 30 tablet 3    diclofenac sodium (VOLTAREN) 1 % GEL Apply 4 g topically 4 times daily 4 Tube 1    fluticasone (FLONASE) 50 MCG/ACT nasal spray INSTILL 2 SPRAYS BY EACH NOSTRIL ROUTE DAILY (Patient not taking: Reported on 6/8/2022) 16 g 2     No current facility-administered medications for this visit. Social History     Tobacco Use    Smoking status: Former Smoker     Packs/day: 2.00     Years: 35.00     Pack years: 70.00     Quit date: 10/11/2011     Years since quitting: 10.6    Smokeless tobacco: Never Used   Vaping Use    Vaping Use: Never used   Substance Use Topics    Alcohol use:  Yes     Alcohol/week: 6.0 standard drinks     Types: 6 Cans of beer per week     Comment: rare    Drug use: No     Types: Cocaine, Marijuana (Weed)     Comment: per pt did years ago; cocaine & michell, 8-23-19 denies current use       Family History   Problem Relation Age of Onset    High Blood Pressure Mother     Asthma Mother     Heart Disease Mother     Heart Disease Father     High Blood Pressure Father     Diabetes Brother     High Blood Pressure Brother     Heart Disease Brother     High Blood Pressure Maternal Grandmother     High Blood Pressure Maternal Grandfather     Heart Disease Maternal Grandfather         REVIEW OF SYSTEMS:  Review of Systems Constitutional: Negative. HENT: Negative. Eyes: Negative. Respiratory: Negative. Gastrointestinal: Negative. Musculoskeletal: Positive for arthralgias and myalgias. Hematological: Negative. Psychiatric/Behavioral: Negative. All other systems reviewed and are negative. PHYSICAL EXAM:  Vitals:    06/08/22 1447   BP: 120/78   Site: Left Lower Arm   Position: Sitting   Cuff Size: Medium Adult   Pulse: 81   Temp: (!) 96.3 °F (35.7 °C)   Weight: (!) 378 lb (171.5 kg)   Height: 5' 6\" (1.676 m)     BP Readings from Last 3 Encounters:   06/08/22 120/78   05/11/22 123/75   04/04/22 137/77        Physical Exam  Constitutional:       General: She is awake. She is not in acute distress. Appearance: Normal appearance. She is well-developed. She is obese. HENT:      Head: Normocephalic and atraumatic. Mouth/Throat:      Mouth: Mucous membranes are moist.   Cardiovascular:      Rate and Rhythm: Normal rate. Pulses: Normal pulses. Heart sounds: Normal heart sounds, S1 normal and S2 normal.   Pulmonary:      Effort: Pulmonary effort is normal.      Breath sounds: Normal breath sounds and air entry. Abdominal:      General: Abdomen is flat. Musculoskeletal:         General: Tenderness present. Right hand: Tenderness present. No deformity or lacerations. Decreased range of motion. Decreased sensation of the ulnar distribution and median distribution. Left hand: Tenderness present. No deformity, lacerations or bony tenderness. Decreased range of motion. Decreased sensation of the ulnar distribution and median distribution. Right knee: Normal.      Left knee: Normal.   Skin:     General: Skin is moist.   Neurological:      General: No focal deficit present. Mental Status: She is alert and oriented to person, place, and time. Mental status is at baseline.       Comments: Weakness in bilateral upper extremity more on the right later than left because of residual PCP..    · Justin Mathis received counseling on the following healthy behaviors: medication adherence    · Discussed use, benefit, and side effects of prescribed medications. Barriers to medication compliance addressed. All patient questions answered. Pt voiced understanding. · Patient given educational materials - see patient instructions    Constanza Hoyt MD  Internal Medicine Resident, PGY- [address], [place]  6/8/2022, 4:20 PM      This note is created with the assistance of a speech-recognition program. While intending to generate a document that actually reflects the content of thevisit, the document can still have some mistakes which may not have been identified and corrected by editing.

## 2022-06-08 NOTE — PATIENT INSTRUCTIONS
Patient was put on a wait list and will be contacted to schedule their next follow up appointment once the schedule is available. If the patient is in need of an appointment before their next visit please call the office at [phone]. After Visit Summary  given and reviewed.

## 2022-06-08 NOTE — PROGRESS NOTES
Attending Physician Statement  I have discussed the care of Francisca Zavala, including pertinent history and exam findings,  with the resident. I have reviewed the key elements of all parts of the encounter with the resident. I agree with the assessment, plan and orders as documented by the resident.   (GE Modifier)

## 2022-06-08 NOTE — PRE-CERTIFICATION NOTE
[x] [facility]) [facility] &  Therapy  [address]  [phone]  F: [phone] [] [address] [facility] [address]   [address]  P: [phone]  F: [phone] [] [place]  [address] [facility]  P: [phone]  F: [phone] [] [address]  [facility]   [address]   [place]: [phone]  F: [phone]  [x] [facility]   Outpatient Occupational Therapy  [address] [facility] [facility] [address]: [phone]  F: [phone]          Therapy Pre-certification Note      6/8/2022    Era Revering  1955   [id number]      Insurance approval was received for Physical Therapy from [employer] on 6.8. 22. Approval was received for 12 visits, from 5.23.22 to 8.20.22. Authorization number [id number]. Patient was called on 6.8.22 and message was left to call us back to schedule.       Electronically signed by Johanna Smith PTA on 6/8/2022 at 11:48 AM

## 2022-06-09 NOTE — PRE-CERTIFICATION NOTE
[x] [facility]) [facility] &  Therapy  [address]  [phone]  F: [phone] [] [address] [facility] [address]   [address]  P: [phone]  F: [phone] [] [place]  [address] [facility]  P: [phone]  F: [phone] [] [address]  [facility]   [address]   [place]: [phone]  F: [phone]  [x] [facility]   Outpatient Occupational Therapy  [address] [facility]: [phone]  F: [phone]          Therapy Pre-certification Note      6/9/2022    Jeanie Eth  1955   [id number]      Insurance approval was received for Physical Therapy from [employer] on 06/08/2022. Approval was received for 12 visits, from 05/23/2022 to 08/20/2022. Authorization number [id number]. Patient has not been contacted at this time to be scheduled.       Electronically signed by Pratibha Espinosa on 6/9/2022 at 8:21 AM

## 2022-06-13 ENCOUNTER — HOSPITAL ENCOUNTER (OUTPATIENT)
Dept: INTERVENTIONAL RADIOLOGY/VASCULAR | Age: 67
Discharge: HOME | End: 2022-06-15
Payer: COMMERCIAL

## 2022-06-13 ENCOUNTER — HOSPITAL ENCOUNTER (OUTPATIENT)
Dept: CT IMAGING | Age: 67
Discharge: HOME | End: 2022-06-15
Payer: COMMERCIAL

## 2022-06-13 VITALS
DIASTOLIC BLOOD PRESSURE: 72 MMHG | HEIGHT: 66 IN | HEART RATE: 66 BPM | RESPIRATION RATE: 15 BRPM | TEMPERATURE: 97.3 F | BODY MASS INDEX: 47.09 KG/M2 | WEIGHT: 293 LBS | OXYGEN SATURATION: 100 % | SYSTOLIC BLOOD PRESSURE: 144 MMHG

## 2022-06-13 DIAGNOSIS — M48.062 LUMBAR STENOSIS WITH NEUROGENIC CLAUDICATION: ICD-10-CM

## 2022-06-13 DIAGNOSIS — M54.16 LUMBAR RADICULAR PAIN: ICD-10-CM

## 2022-06-13 DIAGNOSIS — M54.50 LOW BACK PAIN, UNSPECIFIED BACK PAIN LATERALITY, UNSPECIFIED CHRONICITY, UNSPECIFIED WHETHER SCIATICA PRESENT: ICD-10-CM

## 2022-06-13 LAB
INR BLD: 0.9
PARTIAL THROMBOPLASTIN TIME: 22 SEC (ref 20.5–30.5)
PLATELET # BLD: 356 K/UL (ref 138–453)
PROTHROMBIN TIME: 10.2 SEC (ref 9.1–12.3)

## 2022-06-13 PROCEDURE — 2580000003 HC RX 258: Performed by: PHYSICIAN ASSISTANT

## 2022-06-13 PROCEDURE — 85049 AUTOMATED PLATELET COUNT: CPT

## 2022-06-13 PROCEDURE — 6360000004 HC RX CONTRAST MEDICATION: Performed by: ORTHOPAEDIC SURGERY

## 2022-06-13 PROCEDURE — 85610 PROTHROMBIN TIME: CPT

## 2022-06-13 PROCEDURE — 85730 THROMBOPLASTIN TIME PARTIAL: CPT

## 2022-06-13 PROCEDURE — 62304 MYELOGRAPHY LUMBAR INJECTION: CPT

## 2022-06-13 PROCEDURE — 36415 COLL VENOUS BLD VENIPUNCTURE: CPT

## 2022-06-13 PROCEDURE — 72132 CT LUMBAR SPINE W/DYE: CPT

## 2022-06-13 PROCEDURE — 2709999900 HC NON-CHARGEABLE SUPPLY

## 2022-06-13 RX ORDER — SODIUM CHLORIDE 9 MG/ML
INJECTION, SOLUTION INTRAVENOUS CONTINUOUS
Status: DISCONTINUED | OUTPATIENT
Start: 2022-06-13 | End: 2022-06-16 | Stop reason: HOSPADM

## 2022-06-13 RX ADMIN — SODIUM CHLORIDE: 9 INJECTION, SOLUTION INTRAVENOUS at 13:35

## 2022-06-13 RX ADMIN — IOPAMIDOL 12 ML: 612 INJECTION, SOLUTION INTRATHECAL at 14:45

## 2022-06-13 ASSESSMENT — PAIN - FUNCTIONAL ASSESSMENT: PAIN_FUNCTIONAL_ASSESSMENT: 0-10

## 2022-06-13 ASSESSMENT — PAIN DESCRIPTION - DESCRIPTORS: DESCRIPTORS: SHOOTING

## 2022-06-13 NOTE — OR NURSING
Dr Joe Nina present. Area numbed. 12 ml injection completed. Bandaid on. Patient repositioned for contrast. Patient to CT et back to pes.

## 2022-06-13 NOTE — BRIEF OP NOTE
Brief Postoperative Note lumbar Myelogram     Juan Antonio Pérez  YOB: 1955  [id number]    Pre-operative Diagnosis: lumbar pain    Post-operative Diagnosis: Same    Procedure: Fluoro guided Myelogram    Anesthesia: 1% Lidocaine    Surgeons/Assistants: Perri Moore MD    Complications: None    EBL: <1mL      Specimens: were not obtained    Fluoro guided lumbar myelogram with 20 gauge x 6 inch spinal needle performed successfully. 12 ml 300 Isovue-M contrast injected. CT to follow. Dressing applied. Vital signs were reviewed and were stable after the procedure.       Electronically signed by JEFFERSON Pat on 6/13/2022 at 2:47 PM

## 2022-06-13 NOTE — FLOWSHEET NOTE
Received patient for care. Lower lumbar bandaid intact with small amount of shadow drainage. No hematoma. Patinet denies pain/numbness/tingling.

## 2022-06-13 NOTE — H&P
History and Physical    Pt Name: Stevo Ribera  MRN: [id number]  YOB: 1955  Date of evaluation: 6/13/2022  Primary Care Physician: Chloe Hamman, MD    SUBJECTIVE:   History of Chief Complaint:    Stevo Ribera is a 74 y.o. female who is scheduled today for IR lumbosacral myelogram. The patient reports complaint of lower back and right hip/lower extremity pain. She states symptom onset a couple of months. Denies injury or trauma. She rates her current pain level a 6/10. She uses a cane for ambulation. She denies prior orthopedic or spine surgeries. Allergies  is allergic to duricef [cefadroxil monohydrate], fish-derived products, pcn [penicillins], and tomato. Medications  Prior to Admission medications    Medication Sig Start Date End Date Taking? Authorizing Provider   clopidogrel (PLAVIX) 75 MG tablet Take 1 tablet by mouth daily 6/8/22   Alfred Burns MD   atorvastatin (LIPITOR) 80 MG tablet Take 1 tablet by mouth daily 6/8/22   Alfred Burns MD   methylPREDNISolone (MEDROL DOSEPACK) 4 MG tablet Take by mouth.  6/8/22 6/14/22  Alfred Burns MD   omeprazole (PRILOSEC) 20 MG delayed release capsule TAKE 1 CAPSULE BY MOUTH 2 TIMES DAILY (BEFORE MEALS) 5/3/22   Chloe Hamman, MD   amLODIPine (NORVASC) 10 MG tablet TAKE 1 TABLET BY MOUTH DAILY 4/4/22   Chloe Hamman, MD   aspirin (ASPIRIN LOW DOSE) 81 MG EC tablet TAKE 1 TABLET BY MOUTH DAILY 4/4/22   Chloe Hamman, MD   vitamin D3 (CHOLECALCIFEROL) 25 MCG (1000 UT) TABS tablet TAKE 1 TABLET DAILY 4/4/22   Chloe Hamman, MD   hydroCHLOROthiazide (HYDRODIURIL) 25 MG tablet Take 1 tablet by mouth daily 3/2/22   Adonay Khan MD   losartan (COZAAR) 50 MG tablet Take 1 tablet by mouth daily 3/2/22   Adonay Khan MD   folic acid (FOLVITE) 1 MG tablet Take 1 tablet by mouth daily 3/2/22   Adonay Khan MD   acetaminophen (TYLENOL) 325 MG tablet TAKE 2 TABLETS BY MOUTH EVERY 4 HOURS AS NEEDED FOR PAIN 1/18/22 Marlon Hensley MD   guaiFENesin (ALTARUSSIN) 100 MG/5ML syrup Take 10 mLs by mouth 3 times daily as needed for Cough  Patient taking differently: Take 10 mLs by mouth as needed for Cough  11/10/21   Leida Rosenthal MD   vitamin B-1 (THIAMINE) 100 MG tablet Take 1 tablet by mouth daily 4/15/21   Leida Rosenthal MD   diclofenac sodium (VOLTAREN) 1 % GEL Apply 4 g topically 4 times daily 20   Morena Hassan DO     Past Medical History    has a past medical history of Ambulates with cane, Bleeding, Cataracts, bilateral, Cerebrovascular disease, Chronic pain in left foot, CKD (chronic kidney disease) stage 3, GFR 30-59 ml/min ([facility]), COVID-19, GERD (gastroesophageal reflux disease), Hx of blood clots, Hyperlipidemia, Hypertension, Iron (Fe) deficiency anemia, Nicotine abuse, Obesity, TIBURCIO on CPAP, Osteoarthritis, Peripheral vascular disease ([facility] [address] Substance abuse ([facility] [address] Thalassemia minor, Unspecified cerebral artery occlusion with cerebral infarction, and Wears dentures. Past Surgical History   has a past surgical history that includes Tonsillectomy and adenoidectomy; Tubal ligation; Breast surgery; Endometrial biopsy (); Upper gastrointestinal endoscopy (); Colonoscopy (2007); Colonoscopy (); Upper gastrointestinal endoscopy (N/A, 2019); and Colonoscopy (N/A, 2019). Social History   reports that she quit smoking about 10 years ago. She has a 70.00 pack-year smoking history. She has never used smokeless tobacco.   reports current alcohol use of about 6.0 standard drinks of alcohol per week. reports no history of drug use.   Marital Status    [de-identified] 5  Occupation formerly [employer]. work  Family History  Family Status   Relation Name Status    Mother      Father      Brother  Alive    MGM  (Not Specified)    MGF  (Not Specified)     family history includes Asthma in her mother; Diabetes in her brother; Heart Disease in her brother, father, maternal assessed.    IMPRESSIONS:   Back pain  PLANS:    Ir lumbosacral myelogram.     LOY Coon CNP   Electronically signed 6/13/2022 at 12:27 PM

## 2022-06-14 ENCOUNTER — HOSPITAL ENCOUNTER (OUTPATIENT)
Dept: PHYSICAL THERAPY | Age: 67
Setting detail: THERAPIES SERIES
Discharge: HOME | End: 2022-06-14
Payer: COMMERCIAL

## 2022-06-14 NOTE — FLOWSHEET NOTE
[x] [facility]) [facility] &  Therapy  [address]    [phone]  F: [phone]   [] [address]  [facility]   [address]  P: [phone]  F: [phone]  [] [place]  [address]  P: [phone]  F: [phone] [] [address]  P: [phone]  F: [phone]  [] [address]  [address] [facility] [address]   [address]   [place]: [phone]  F: [phone]   [] [facility]  [address] [facility] [address]  [place]: [phone]   F: [phone]     Physical Therapy Cancel/No Show note    Date: 2022  Patient: Kye Nettles  : 1955  MRN: [id number]    Cancels/No Shows to date:     For today's appointment patient:    [x]  Cancelled    [x] Rescheduled appointment    [] No-show     Reason given by patient:    []  Patient ill    []  Conflicting appointment    [] No transportation      [] Conflict with work    [] No reason given    [] Weather related    [] COVID-19    [] Other:       Comments:  Pain       [] Next appointment was confirmed    Electronically signed by: Billy Park, PT

## 2022-06-17 NOTE — PROGRESS NOTES
Patient instructed to remove shoes and socks and instructed to sit in exam chair. Current PCP is Genevieve Reynoso MD and date of last visit was 05/11/2022. Do you have a follow up visit scheduled?   No  If yes, the date is

## 2022-06-20 ENCOUNTER — HOSPITAL ENCOUNTER (OUTPATIENT)
Dept: PHYSICAL THERAPY | Age: 67
Setting detail: THERAPIES SERIES
Discharge: HOME | End: 2022-06-20
Payer: COMMERCIAL

## 2022-06-20 NOTE — FLOWSHEET NOTE
[x] [facility] [address]  [address]    [phone]  F: [phone]     Physical Therapy Cancel/No Show note    Date: 2022  Patient: Donna Lua  : 1955  MRN: [id number]    Cancels/No Shows to date: 0    For today's appointment patient:    [x]  Cancelled    [x] Rescheduled appointment    [] No-show     Reason given by patient:    []  Patient ill    []  Conflicting appointment    [] No transportation      [] Conflict with work    [] No reason given    [] Weather related    [] [id number]    [x] Other:       Comments: 22-Cx patient called to cancel, family conflict, rescheduled to        [x] Next appointment was confirmed    Electronically signed by: Yuliya Green PTA

## 2022-06-22 ENCOUNTER — HOSPITAL ENCOUNTER (OUTPATIENT)
Dept: PHYSICAL THERAPY | Age: 67
Setting detail: THERAPIES SERIES
Discharge: HOME | End: 2022-06-22
Payer: COMMERCIAL

## 2022-06-22 PROCEDURE — 97110 THERAPEUTIC EXERCISES: CPT

## 2022-06-22 NOTE — FLOWSHEET NOTE
[x] [facility] &  Therapy  [address]  [phone]  F: [phone] [] [address]  [place] [address]   [address]  P: [phone]  F: [phone] [] [address]  [address] [facility] [address]  P: [phone]  F: [phone] [] [address]  P: [phone]  F: [phone] [] [address]  [facility]   [address]   [place]: [phone]  F: [phone]      Physical Therapy Daily Treatment Note    Date:  2022  Patient Name:  Patricio Kim    :  1955  MRN: [id number]  Physician: Kya Coughlin MD                    Insurance: [facility]   Medical Diagnosis: Osteoarthritis of multiple joints, unspecified osteoarthritis type M15.9                           Rehab Codes: M79.604, M25.511, M25.611, M25.532  Onset Date: 22                                 Next 's appt:   Visit# / total visits: ; Progress note for STG reassessment due at visit #8    Cancels/No Shows: 0/0    Subjective:    Pain:  [x] Yes  [] No Location: LB, R hip/thigh, R shldr, L hand  Pain Rating: (0-10 scale) 4-5/10 R hip/thigh  Pain altered Tx:  [x] No  [] Yes  Action:  Comments:  Reports she is okay. R lateral hip and thigh pain is continual but the intensity will decrease with laying down. Objective:  Modalities:  As needed  Precautions:  Exercises:  Exercise:  LB,. R hip/thigh, R shdlr, L hand Reps/ Time Weight/ Level Comments   sci fit 5 mins L1 3rd sci fit         Sitting      retro shldr rolls 10x     Scapular retraction  10x     Wand ER 5x15\"     Wand chest press 10x      FW Biceps curls 10x 1 lbs For incr. ROM.     Ankle pumps 10x ea     Heel slides 10x5\"     iso hip  add 10x5\"     LAQ/ ball  10x  R   HS stretch 3x20\" Stool, R instructed to address L at home   R hip flexion 10x A    Piriformis stretch -  Unable due to body habitus, pt attemtped   Lumbar flexion stretch 3x15\"     Left wrist ext 10x     Prayer stretch 3x20\"           Supine      LTR 5x5\"  Very limit ROM due to body habitus   SKTC -  unable due  to body habitus, pt attempted   Piriformis stretch -  Unable due to  due to body habitus, pt attempted. QL stretch -     Hip abd 10x  slider   Heel slides 10x  Slider    Bridging   Add as able. Hip flexor stretch off side of mat 3x30\"  R                     Other: instructed pt to address daily HS, lumbar and R shdlr ER stretching: 3-5 reps 15 sec UE/lumbar spine, 30 seconds HS. Also scapular retraction      Treatment Charges: Mins Units   []  Modalities     [x]  Ther Exercise 45 3   []  Manual Therapy     []  Ther Activities     []  Aquatics     []  Vasocompression     []  Other     Total Treatment time 45 3       Assessment: [x] Progressing toward goals Initiated exercises/stretching as listed in log for multi joint OA. Education on purpose and technique of each. Pt unable to address piriformis/QL stretching due to body habitus    [] No change. [x] Other: Pt required Moderate  assist to get oanh LE onto bed and for torso up off of bed. . Scooting to edge of bed challenging and required a lot of effort for patient due to body habitus and weakness. .     [x] Patient would continue to benefit from skilled physical therapy services in order to: Patient will benefit from physical therapy to improve R shoulder, R hip , and left hand ROM and strength to improve ADL function and gait tolerance. STG/LTG  STG: (to be met in 8 treatments)  1. ? Pain: 7/10 general joint pain with ADLs. 2. ? ROM: Patient is able to flex shoulder to 90 degrees to improve reaching. 3. ? Strength: R hip flexion, R shoulder flexion to 4/5 to improve walking and lifting. 4. ?  Function:Patient reports improved ability to ambulate household distances. 5. Independent with Home Exercise Programs     LTG: (to be met in 12 treatments)  1. TUG score of 13.5 or less to reduce fall risk. 2. L  strength to 20 lbs or better to improve grasping. 3. Demonstrate knowledge of fall prevention.      Patient goals: Improve pain and function. Pt. Education:  [x] Yes  [] No  [x] Reviewed Prior HEP/Ed  Method of Education: [x] Verbal  [x] Demo  [] Written  Comprehension of Education:  [x] Verbalizes understanding. [x] Demonstrates understanding. With direction  [x] Needs review. [] Demonstrates/verbalizes HEP/Ed previously given. Access Code: [email or web address]  URL: [email or web address]  Date: 05/23/2022  Prepared by: [place] January     Exercises  Seated Heel Raise - 1 x daily - 4 x weekly - 3 sets - 10 reps  Seated Long Arc Quad - 1 x daily - 4 x weekly - 3 sets - 10 reps  Seated March - 1 x daily - 4 x weekly - 3 sets - 10 reps  Seated Chest Press with Bar - 1 x daily - 4 x weekly - 3 sets - 10 reps  Seated Bicep Curls with Bar - 1 x daily - 4 x weekly - 3 sets - 10 reps       Plan: [x] Continue current frequency toward long and short term goals. [x] Specific Instructions for subsequent treatments:  Progress R shdlr strength/ROM and function and decrease pain. Decrease LB/R thigh pain and L hand pain. Check compliance with HEP.        Time In: 1603         Time Out: 1658    Electronically signed by:  Lamonte Nieevs PTA

## 2022-06-27 ENCOUNTER — OFFICE VISIT (OUTPATIENT)
Dept: PODIATRY | Age: 67
End: 2022-06-27
Payer: COMMERCIAL

## 2022-06-27 VITALS
DIASTOLIC BLOOD PRESSURE: 78 MMHG | SYSTOLIC BLOOD PRESSURE: 128 MMHG | HEIGHT: 66 IN | BODY MASS INDEX: 47.09 KG/M2 | HEART RATE: 86 BPM | WEIGHT: 293 LBS

## 2022-06-27 DIAGNOSIS — B35.1 ONYCHOMYCOSIS: Primary | ICD-10-CM

## 2022-06-27 DIAGNOSIS — M79.675 PAIN IN TOES OF BOTH FEET: ICD-10-CM

## 2022-06-27 DIAGNOSIS — M79.674 PAIN IN TOES OF BOTH FEET: ICD-10-CM

## 2022-06-27 DIAGNOSIS — L60.3 DYSTROPHIC NAIL: ICD-10-CM

## 2022-06-27 PROCEDURE — 11721 DEBRIDE NAIL 6 OR MORE: CPT | Performed by: PODIATRIST

## 2022-06-27 PROCEDURE — 99999 PR OFFICE/OUTPT VISIT,PROCEDURE ONLY: CPT | Performed by: PODIATRIST

## 2022-06-27 NOTE — PROGRESS NOTES
[address]  [address] [facility] [address] [facility], [address]  Tel: [phone]   Fax: [phone]    Subjective     CC: Routine nail care    Interval History:  Patient presents to the clinic today for follow up of thickened and discolored nails. She ambulates using a cane and explains that she has a hard time cutting her own nails. She does not wear any compression stockings because they to hard for her to put on. She states that she has no new pedal complaints at this time. HPI:  Adalberto Adam is a 74y.o. year old female who presents clinic today for follow-up of thickened and discolored nails. She states that she has been doing well and denies any new complaints. She walks around with a cane and has troubling cutting her own nails. She does not wear any compression stockings as they are hard to put on. Patient denies any nausea, vomiting, fever, chills or shortness of breath. Primary care physician is Savita Hobbs MD.    ROS:    Constitutional: Denies nausea, vomiting, fever, chills. Neurologic: Denies numbness, tingling, and burning in the feet. Vascular: Denies symptoms of lower extremity claudication. Skin: Denies open wounds. Otherwise negative except as noted in the HPI.      PMH:  Past Medical History:   Diagnosis Date    Ambulates with cane     Bleeding     while on aggrenox    Cataracts, bilateral     Cerebrovascular disease 2003,2011    cva    Chronic pain in left foot     CKD (chronic kidney disease) stage 3, GFR 30-59 ml/min ([address]     COVID-19 11/2021    states hpspitalized    GERD (gastroesophageal reflux disease)     Hx of blood clots     Hyperlipidemia     Hypertension     Iron (Fe) deficiency anemia     Nicotine abuse     Obesity     TIBURCIO on CPAP     Osteoarthritis     Peripheral vascular disease ([address] 03/13/2014    Substance abuse (HCC)     cocaine, canabis    Thalassemia minor     Unspecified cerebral artery occlusion with cerebral infarction     Wears dentures     upper and lower       Surgical History:   Past Surgical History:   Procedure Laterality Date    BREAST SURGERY      biopsy    COLONOSCOPY  12/2007    adenomatous polyp    COLONOSCOPY  2013    normal, repeat in 5 years    COLONOSCOPY N/A 8/21/2019    COLONOSCOPY POLYPECTOMY SNARE/COLD BIOPSY performed by Julieta Mcgrath MD at [place] [address] ENDOSCOPY  2009    negative    UPPER GASTROINTESTINAL ENDOSCOPY N/A 8/21/2019    EGD BIOPSY performed by Julieta Mcgrath MD at [facility]       Social History:  Social History     Tobacco Use    Smoking status: Former Smoker     Packs/day: 2.00     Years: 35.00     Pack years: 70.00     Quit date: 10/11/2011     Years since quitting: 10.7    Smokeless tobacco: Never Used   Vaping Use    Vaping Use: Never used   Substance Use Topics    Alcohol use: Yes     Alcohol/week: 6.0 standard drinks     Types: 6 Cans of beer per week     Comment: rare    Drug use: No     Types: Cocaine, Marijuana (Weed)     Comment: per pt did years ago; cocaine & marianjana, 8-23-19 denies current use       Medications:  Prior to Admission medications    Medication Sig Start Date End Date Taking?  Authorizing Provider   clopidogrel (PLAVIX) 75 MG tablet Take 1 tablet by mouth daily 6/8/22   Ирина Aquino MD   atorvastatin (LIPITOR) 80 MG tablet Take 1 tablet by mouth daily 6/8/22   Ирина Aquino MD   omeprazole (PRILOSEC) 20 MG delayed release capsule TAKE 1 CAPSULE BY MOUTH 2 TIMES DAILY (BEFORE MEALS) 5/3/22   Aniyah Salazar MD   amLODIPine (NORVASC) 10 MG tablet TAKE 1 TABLET BY MOUTH DAILY 4/4/22   Aniyah Salazar MD   aspirin (ASPIRIN LOW DOSE) 81 MG EC tablet TAKE 1 TABLET BY MOUTH DAILY 4/4/22   Aniyah Salazar MD   vitamin D3 (CHOLECALCIFEROL) 25 MCG (1000 UT) TABS tablet TAKE 1 TABLET DAILY 4/4/22   Adolph Peña, MD   hydroCHLOROthiazide (HYDRODIURIL) 25 MG tablet Take 1 tablet by mouth daily 3/2/22   Yaritza Garcia MD   losartan (COZAAR) 50 MG tablet Take 1 tablet by mouth daily 3/2/22   Yaritza Garcia MD   folic acid (FOLVITE) 1 MG tablet Take 1 tablet by mouth daily 3/2/22   Yaritza Garcia MD   acetaminophen (TYLENOL) 325 MG tablet TAKE 2 TABLETS BY MOUTH EVERY 4 HOURS AS NEEDED FOR PAIN 1/18/22   Asuncion Suárez MD   guaiFENesin (ALTARUSSIN) 100 MG/5ML syrup Take 10 mLs by mouth 3 times daily as needed for Cough  Patient taking differently: Take 10 mLs by mouth as needed for Cough  11/10/21   Yaritza Garcia MD   vitamin B-1 (THIAMINE) 100 MG tablet Take 1 tablet by mouth daily 4/15/21   Yaritza Garcia MD   diclofenac sodium (VOLTAREN) 1 % GEL Apply 4 g topically 4 times daily 6/28/20   Rachell Villa, DO       Objective     Vitals:    06/27/22 1454   BP: 128/78   Pulse: 86       Lab Results   Component Value Date    LABA1C 5.8 09/29/2021       Physical Exam:  General:  Alert and oriented x3. In no acute distress. Lower Extremity Physical Exam:    Vascular: DP/PT pulses non palpable B/L. Pulses were biphasic on doppler B/L. CFT <5 seconds to all digits B/L. Moderate nonpitting edema noted B/L to lower extremity. Hair growth is absent to the level of the digits B/L. Neuro: Saph/sural/SP/DP/plantar sensation diminished to light touch. Protective sensation is intact to 2/10 sites as tested with a 5.07g SWMF B/L. Musculoskeletal: EHL/FHL/GS/TA gross motor intact. Decreased medial arch noted, B/L. Decreased ROM b/l kojo joint. Contracted digits 2-5 b/l. Abducted hallux noted b/l with medial eminence with right worse than left. Neg pain calf squeeze ble. All LE Compartments soft and compressible. Dermatologic: No open lesions, B/L. Interdigital maceration absent, B/L. Nails 1-5 are elongated, thickened with subungual debris.  Hair growth is absent with atrophic skin extending distally past the ankle.     Class A Findings (Q7) - One Class A finding  [] Non traumatic amputation of foot or integral skeletal portion thereof  Class B Findings (Q8) - Two Class B findings  [x]Absent posterior tibial pulse   [x]Absent dorsalis pedis pulse   []Advanced trophic changes; three of the following are required:   []hair growth (decrease or absence)   []nail changes (thickening)   []pigmentary changes (discoloration)   []skin texture (thin, shiny)   []skin color (rubor or redness)  Class C Findings (Q9) - One Class B and two Class C findings  []Claudication   []Temperature changes   []Edema   []Paresthesia   []Burning    Q Modifier Met: Q8: Two Class B findings    Assessment   Parag Humphries is a 74 y.o. female with     Diagnosis Orders   1. Onychomycosis     2. Dystrophic nail     3. Pain in toes of both feet          Plan   · Patient examined and evaluated for routine nail care. · Diagnosis and treatment options discussed in detail  · Trimmed dystrophic nails 1-5 B/L with sterile nail nippers without incident.   Educated patient to monitor for signs of claudication including rest pain or cramping of the calf muscles  · Educated patient on signs of infection and to report to ED if arise  · Patient to RTC in 3 month(s) or sooner if problems arise  · Please, call the office with any questions or concerns    Electronically signed by June Holder DPM on 6/27/2022 at 4:03 PM

## 2022-06-28 DIAGNOSIS — R09.81 SINUS CONGESTION: ICD-10-CM

## 2022-06-28 NOTE — TELEPHONE ENCOUNTER
E-scribing request for omeprazole . Pt has future appt. Health Maintenance   Topic Date Due    Pneumococcal 65+ years Vaccine (2 - PPSV23 or PCV20) 12/20/2021    Low dose CT lung screening  01/20/2022    Colorectal Cancer Screen  08/21/2022    Lipids  09/24/2022    A1C test (Diabetic or Prediabetic)  09/29/2022    Annual Wellness Visit (AWV)  10/08/2022    Depression Screen  06/08/2023    Breast cancer screen  12/08/2023    DTaP/Tdap/Td vaccine (2 - Td or Tdap) 03/08/2028    DEXA (modify frequency per FRAX score)  Completed    Flu vaccine  Completed    Shingles vaccine  Completed    COVID-19 Vaccine  Completed    Hepatitis C screen  Completed    Hepatitis A vaccine  Aged Out    Hepatitis B vaccine  Aged Out    Hib vaccine  Aged Out    Meningococcal (ACWY) vaccine  Aged Out             (applicable per patient's age: Cancer Screenings, Depression Screening, Fall Risk Screening, Immunizations)    Hemoglobin A1C (%)   Date Value   09/29/2021 5.8   06/07/2019 5.4   04/19/2018 5.7     Microalb/Crt.  Ratio (mcg/mg creat)   Date Value   05/09/2017 6     LDL Cholesterol (mg/dL)   Date Value   09/24/2021 74     AST (U/L)   Date Value   11/29/2021 16     ALT (U/L)   Date Value   11/29/2021 15     BUN (mg/dL)   Date Value   11/29/2021 24 (H)      (goal A1C is < 7)   (goal LDL is <100) need 30-50% reduction from baseline     BP Readings from Last 3 Encounters:   06/27/22 128/78   06/13/22 (!) 144/72   06/08/22 120/78    (goal /80)      All Future Testing planned in [facility]:  Lab Frequency Next Occurrence   COVID-19 Once 11/10/2022   Protein / Creatinine Ratio, Urine Once 05/28/2022   XR CHEST (2 VW) Once 05/18/2022   Protein / Creatinine Ratio, Urine Once 06/10/2022   Ferritin Once 04/04/2022   Lactate Dehydrogenase Once 04/04/2022   Iron And TIBC     Vitamin B12 & Folate     Ferritin     CBC Auto Differential     CBC with Auto Differential     Comprehensive Metabolic Panel         Next Visit Date:  Future Appointments   Date Time Provider Mary Marinelli   6/29/2022  4:00 PM Lee Ngo, PTA [facility]   7/1/2022  4:00 PM Shahzad Lawson, PTA [facility]   7/12/2022  4:10 PM Juan Bright MD [facility]   7/13/2022  3:20 PM Deborah Fishman MD [facility] IM [facility]   9/26/2022  1:00 PM LOY Baig - CNP Neuro Spec [facility]   9/26/2022  2:45 PM Corie Soto DPM [facility] Podiatry [facility]   10/3/2022  3:45 PM Abdiel Atkinson MD [address] [place] [address]            Patient Active Problem List:     Essential hypertension     Pure hypercholesterolemia     Cerebrovascular accident (CVA), 2011, no residual deficit (HCC)     CKD (chronic kidney disease) stage 3, GFR 30-59 ml/min (HCC)     Peripheral vascular disease (HCC)     TIBURCIO (obstructive sleep apnea)     Class 3 severe obesity due to excess calories with serious comorbidity and body mass index (BMI) of 50.0 to 59.9 in adult [facility])     Primary osteoarthritis of left knee     Chronic tension-type headache, not intractable     Carpal tunnel syndrome, bilateral-not on medication due to kidney disease     Microcytic anemia     Alpha thalassemia trait     Cervical stenosis of spinal canal     Pneumonia due to COVID-19 virus     Low back pain     Lumbar radicular pain

## 2022-06-29 RX ORDER — OMEPRAZOLE 20 MG/1
CAPSULE, DELAYED RELEASE ORAL
Qty: 56 CAPSULE | Refills: 1 | Status: SHIPPED | OUTPATIENT
Start: 2022-06-29 | End: 2022-08-23

## 2022-07-01 ENCOUNTER — HOSPITAL ENCOUNTER (OUTPATIENT)
Dept: PHYSICAL THERAPY | Age: 67
Setting detail: THERAPIES SERIES
Discharge: HOME | End: 2022-07-01
Payer: COMMERCIAL

## 2022-07-01 PROCEDURE — 97110 THERAPEUTIC EXERCISES: CPT

## 2022-07-01 NOTE — FLOWSHEET NOTE
[x] [facility]) [facility] &  Therapy  [address]  [phone]  F: [phone] [] [address]  [address]   [address]  P: [phone]  F: [phone] [] [address]  [address]  P: [phone]  F: [phone] [] [address]  P: [phone]  F: [phone] [] [address]  [facility]   [address]   [place]: [phone]  F: [phone]      Physical Therapy Daily Treatment Note    Date:  2022  Patient Name:  Jo Salcido    :  1955  MRN: [id number]  Physician: Susu Myles MD                    Insurance: [employer] [facility]   Medical Diagnosis: Osteoarthritis of multiple joints, unspecified osteoarthritis type M15.9                           Rehab Codes: M79.604, M25.511, M25.611, M25.532  Onset Date: 22                                 Next 's appt:   Visit# / total visits: ; Progress note for STG reassessment due at visit #8    Cancels/No Shows: 0/0    Subjective:    Pain:  [x] Yes  [] No Location: LB, R hip/thigh, R shldr, L hand  Pain Rating: (0-10 scale) 8/10 R hip/thigh, shoulder and hand 4/10  Pain altered Tx:  [x] No  [] Yes  Action:  Comments:  Patient arrives over 20 minutes late. Able to accommodate. Reports her leg hurts most today with increased numerica and she can tell it's going to rain. Shoulder and hand also painful today. Objective:  Modalities:  As needed  Precautions:  Exercises:  Exercise:  LB,.  R hip/thigh, R shdlr, L hand Reps/ Time Weight/ Level Comments    sci fit 5 mins L1 3rd sci fit - Held due to late arrival            Sitting       retro shldr rolls 10x   x   Scapular retraction  10x   x   Wand ER 10x5\"      Wand chest press 2x10 2lb bar Added weight and increased reps 7/1 x    FW Biceps curls 2x10 2lb Added weight and increased reps 7/1  x   Wand flexion 2x10 2lb Difficult, limited ROM - Added 7/1 x   L wrist flex/ext 10x   x   Prayer stretch  3x20\"             Ankle pumps 20x ea  Increased reps 7/1 x   Heel slides 10x5\"  Pillowcase on floor for incr ROM x   iso hip  add 15x5\"  Increased reps 7/1 x   Marches 10x  Added 7/1 x   LAQ/ ball  10x  R x   HS stretch 3x20\"  Stool, R instructed to address L at home x   R hip flexion 10x A     Piriformis stretch -  Unable due to body habitus, pt attemtped    Lumbar flexion stretch 5x15\"   x   Lumbar ext stretch 5x15\"   x          Supine       LTR 5x5\"  Very limit ROM due to body habitus    SKTC -  unable due  to body habitus, pt attempted    Piriformis stretch -  Unable due to  due to body habitus, pt attempted. QL stretch -      Hip abd 10x  slider    Heel slides 10x  Slider     Bridging   Add as able. Hip flexor stretch off side of mat 3x30\"  R                         Other: instructed pt to address daily HS, lumbar and R shdlr ER stretching: 3-5 reps 15 sec UE/lumbar spine, 30 seconds HS. Also scapular retraction      Treatment Charges: Mins Units   []  Modalities     [x]  Ther Exercise 27 2   []  Manual Therapy     []  Ther Activities     []  Aquatics     []  Vasocompression     []  Other     Total Treatment time 27 2       Assessment: [x] Progressing toward goals. Able to increase reps for most seated exercises this date within shortened treatment due to late arrival. Patient tends to rush through reps, verbal cues for slow and controlled motions for proper muscle activation with fair carryover. Patient noted no exacerbation of symptoms following treatment, however with UE/LE fatigue following various exercises. [] No change.   [] Other:     [x] Patient would continue to benefit from skilled physical therapy services in order to: Patient will benefit from physical therapy to improve R shoulder, R hip , and left hand ROM and strength to improve ADL function and gait tolerance. STG/LTG  STG: (to be met in 8 treatments)  1. ? Pain: 7/10 general joint pain with ADLs. 2. ? ROM: Patient is able to flex shoulder to 90 degrees to improve reaching. 3. ? Strength: R hip flexion, R shoulder flexion to 4/5 to improve walking and lifting. 4. ? Function:Patient reports improved ability to ambulate household distances. 5. Independent with Home Exercise Programs     LTG: (to be met in 12 treatments)  1. TUG score of 13.5 or less to reduce fall risk. 2. L  strength to 20 lbs or better to improve grasping. 3. Demonstrate knowledge of fall prevention.      Patient goals: Improve pain and function. Pt. Education:  [x] Yes  [] No  [x] Reviewed Prior HEP/Ed  Method of Education: [x] Verbal  [x] Demo  [] Written  Comprehension of Education:  [x] Verbalizes understanding. [x] Demonstrates understanding. With direction  [x] Needs review. [] Demonstrates/verbalizes HEP/Ed previously given. Access Code: [email or web address]  URL: [email or web address]  Date: 05/23/2022  Prepared by: Al Curtis     Exercises  Seated Heel Raise - 1 x daily - 4 x weekly - 3 sets - 10 reps  Seated Long Arc Quad - 1 x daily - 4 x weekly - 3 sets - 10 reps  Seated March - 1 x daily - 4 x weekly - 3 sets - 10 reps  Seated Chest Press with Bar - 1 x daily - 4 x weekly - 3 sets - 10 reps  Seated Bicep Curls with Bar - 1 x daily - 4 x weekly - 3 sets - 10 reps       Plan: [x] Continue current frequency toward long and short term goals. [x] Specific Instructions for subsequent treatments:   Add standing exercises per tolerance for UE/LE strengthening      Time In: 423 pm         Time Out: 450 pm     Electronically signed by:  Willy Soliman PTA

## 2022-07-06 ENCOUNTER — HOSPITAL ENCOUNTER (OUTPATIENT)
Dept: PHYSICAL THERAPY | Age: 67
Setting detail: THERAPIES SERIES
Discharge: HOME | End: 2022-07-06
Payer: COMMERCIAL

## 2022-07-06 PROCEDURE — 97110 THERAPEUTIC EXERCISES: CPT

## 2022-07-06 NOTE — FLOWSHEET NOTE
[x] [facility]) [facility] &  Therapy  [address]  [phone]  F: [phone] [] [address]  [address]   [address]  P: [phone]  F: [phone] [] [address]  [address]  P: [phone]  F: [phone] [] [address]  P: [phone]  F: [phone] [] [address]  Psychiatric   [address]   [place]: [phone]  F: [phone]      Physical Therapy Daily Treatment Note    Date:  2022  Patient Name:  Florecita Ernst    :  1955  MRN: [id number]  Physician: Jackie Joe MD                    Insurance: [employer] [facility]   Medical Diagnosis: Osteoarthritis of multiple joints, unspecified osteoarthritis type M15.9                           Rehab Codes: M79.604, M25.511, M25.611, M25.532  Onset Date: 22                                 Next 's appt:   Visit# / total visits: ; Progress note for STG reassessment due at visit #8    Cancels/No Shows:     Subjective:    Pain:  [x] Yes  [] No Location: LB, R hip/thigh, R shldr, L hand  Pain Rating: (0-10 scale) 5/10 R hip/thigh, shoulder and hand 4/10  Pain altered Tx:  [x] No  [] Yes  Action:  Comments:  Pt reported that she took 2-3 tylenol before she arrived. Pain was at 10/10 in hip, pain is currently 5/10 in same area. Objective:  Modalities:  As needed  Precautions:  Exercises:  Exercise:  LB,.  R hip/thigh, R shdlr, L hand Reps/ Time Weight/ Level Comments    sci fit 5 mins L1 3rd sci fit - Completed at end of session due to availability x          Sitting       retro shldr rolls 15x   x   Scapular retraction  15x   x   Wand ER 10x5\"      Wand chest press 2x10 2lb bar Added weight and increased reps 7/ x    FW Biceps curls 2x10 2lb Added weight and increased reps 7/1  x   Wand flexion 2x10 2lb Difficult, limited ROM - Added 7/1 x   L wrist flex/ext 20x   x   Prayer stretch  3x20\"             Ankle pumps 20x ea  Increased reps 7/1 x   Heel slides 15x5\"  Pillowcase on floor for incr ROM x   iso hip  add 15x5\"  Increased reps 7/1 x   Marches 15x  Added 7/1 x   LAQ/ ball  15x  R x   HS stretch 3x20\"  Stool, R instructed to address L at home x   R hip flexion 10x A     Piriformis stretch -  Unable due to body habitus, pt attemtped    Lumbar flexion stretch 5x15\"   x   Lumbar ext stretch 5x15\"   x          Supine       LTR 5x5\"  Very limit ROM due to body habitus    SKTC -  unable due  to body habitus, pt attempted    Piriformis stretch -  Unable due to  due to body habitus, pt attempted. QL stretch -      Hip abd 10x  slider    Heel slides 10x  Slider     Bridging   Add as able. Hip flexor stretch off side of mat 3x30\"  R                         Other: instructed pt to address daily HS, lumbar and R shdlr ER stretching: 3-5 reps 15 sec UE/lumbar spine, 30 seconds HS. Also scapular retraction      Treatment Charges: Mins Units   []  Modalities     [x]  Ther Exercise 30 2   []  Manual Therapy     []  Ther Activities     []  Aquatics     []  Vasocompression     []  Other     Total Treatment time 30 2       Assessment: [x] Progressing toward goals. Progressed reps per pt tolerance. Pt in need of 2 rest breaks through out treatment due to fatigue. Pt with overall good tolerance. Continued to hold supine activities due to being unable to perform. Performed all activities in seated this date. Concluded session with [employer] due to not being available at the beginning of the session. [] No change.   [] Other:     [x] Patient would continue to benefit from skilled physical therapy services in order to: Patient will benefit from physical therapy to improve R shoulder, R hip , and left hand ROM and strength to improve ADL function and gait tolerance. STG/LTG  STG: (to be met in 8 treatments)  1. ? Pain: 7/10 general joint pain with ADLs. 2. ? ROM: Patient is able to flex shoulder to 90 degrees to improve reaching. 3. ? Strength: R hip flexion, R shoulder flexion to 4/5 to improve walking and lifting. 4. ? Function:Patient reports improved ability to ambulate household distances. 5. Independent with Home Exercise Programs     LTG: (to be met in 12 treatments)  1. TUG score of 13.5 or less to reduce fall risk. 2. L  strength to 20 lbs or better to improve grasping. 3. Demonstrate knowledge of fall prevention.      Patient goals: Improve pain and function. Pt. Education:  [x] Yes  [] No  [x] Reviewed Prior HEP/Ed  Method of Education: [x] Verbal  [x] Demo  [] Written  Comprehension of Education:  [x] Verbalizes understanding. [x] Demonstrates understanding. With direction  [x] Needs review. [] Demonstrates/verbalizes HEP/Ed previously given. Access Code: [email or web address]  URL: [employer]/  Date: 05/23/2022  Prepared by: Mariann Delacruz     Exercises  Seated Heel Raise - 1 x daily - 4 x weekly - 3 sets - 10 reps  Seated Long Arc Quad - 1 x daily - 4 x weekly - 3 sets - 10 reps  Seated March - 1 x daily - 4 x weekly - 3 sets - 10 reps  Seated Chest Press with Bar - 1 x daily - 4 x weekly - 3 sets - 10 reps  Seated Bicep Curls with Bar - 1 x daily - 4 x weekly - 3 sets - 10 reps       Plan: [x] Continue current frequency toward long and short term goals. [x] Specific Instructions for subsequent treatments:   Add standing exercises per tolerance for UE/LE strengthening      Time In: 1122         Time Out: 3720     Electronically signed by:  Daisy Mott PTA

## 2022-07-12 ENCOUNTER — OFFICE VISIT (OUTPATIENT)
Dept: ORTHOPEDIC SURGERY | Age: 67
End: 2022-07-12
Payer: COMMERCIAL

## 2022-07-12 VITALS — RESPIRATION RATE: 14 BRPM | HEIGHT: 66 IN | WEIGHT: 293 LBS | BODY MASS INDEX: 47.09 KG/M2

## 2022-07-12 DIAGNOSIS — M48.062 LUMBAR STENOSIS WITH NEUROGENIC CLAUDICATION: ICD-10-CM

## 2022-07-12 DIAGNOSIS — M54.16 LUMBAR RADICULAR PAIN: ICD-10-CM

## 2022-07-12 DIAGNOSIS — M54.50 LOW BACK PAIN, UNSPECIFIED BACK PAIN LATERALITY, UNSPECIFIED CHRONICITY, UNSPECIFIED WHETHER SCIATICA PRESENT: Primary | ICD-10-CM

## 2022-07-12 PROCEDURE — 1123F ACP DISCUSS/DSCN MKR DOCD: CPT | Performed by: ORTHOPAEDIC SURGERY

## 2022-07-12 PROCEDURE — 99213 OFFICE O/P EST LOW 20 MIN: CPT | Performed by: ORTHOPAEDIC SURGERY

## 2022-07-12 RX ORDER — FLUTICASONE PROPIONATE 50 MCG
SPRAY, SUSPENSION (ML) NASAL
COMMUNITY
Start: 2022-06-27 | End: 2022-07-13

## 2022-07-12 RX ORDER — GABAPENTIN 300 MG/1
300 CAPSULE ORAL 3 TIMES DAILY
Qty: 90 CAPSULE | Refills: 3 | Status: SHIPPED | OUTPATIENT
Start: 2022-07-12 | End: 2022-09-26 | Stop reason: ALTCHOICE

## 2022-07-12 NOTE — PROGRESS NOTES
Patient ID: Miriam Sutherland is a 74 y.o. female    Chief Compliant:  Chief Complaint   Patient presents with    Back Pain        Diagnostic imaging:  CT myelogram is reviewed patient with a grade 1 spondylolisthesis L3-4 multilevel moderate stenosis multilevel moderately severe foraminal stenosis      Assessment and Plan:  1. Low back pain, unspecified back pain laterality, unspecified chronicity, unspecified whether sciatica present    2. Lumbar radicular pain    3. Lumbar stenosis with neurogenic claudication    4. BMI 60.0-69.9, adult ([facility] [address]      Refer to pain management for lumbar epidural steroid injections    Patient is already in physical therapy    Gabapentin    Follow up 12 weeks    HPI:  This is a 74 y.o. female who presents to the clinic today for lower back pain. Patient with ongoing lower back pain radiating to the right buttock, lateral hip, leg to the foot. She is currently in PT    Review of Systems   All other systems reviewed and are negative.       Past History:    Current Outpatient Medications:     fluticasone (FLONASE) 50 MCG/ACT nasal spray, , Disp: , Rfl:     omeprazole (PRILOSEC) 20 MG delayed release capsule, TAKE 1 CAPSULE BY MOUTH TWICE A DAY WITH MEALS, Disp: 56 capsule, Rfl: 1    clopidogrel (PLAVIX) 75 MG tablet, Take 1 tablet by mouth daily, Disp: 28 tablet, Rfl: 3    atorvastatin (LIPITOR) 80 MG tablet, Take 1 tablet by mouth daily, Disp: 28 tablet, Rfl: 3    amLODIPine (NORVASC) 10 MG tablet, TAKE 1 TABLET BY MOUTH DAILY, Disp: 28 tablet, Rfl: 3    aspirin (ASPIRIN LOW DOSE) 81 MG EC tablet, TAKE 1 TABLET BY MOUTH DAILY, Disp: 28 tablet, Rfl: 3    vitamin D3 (CHOLECALCIFEROL) 25 MCG (1000 UT) TABS tablet, TAKE 1 TABLET DAILY, Disp: 28 tablet, Rfl: 3    hydroCHLOROthiazide (HYDRODIURIL) 25 MG tablet, Take 1 tablet by mouth daily, Disp: 60 tablet, Rfl: 3    losartan (COZAAR) 50 MG tablet, Take 1 tablet by mouth daily, Disp: 60 tablet, Rfl: 3    folic acid (FOLVITE) 1 MG tablet, Take 1 tablet by mouth daily, Disp: 28 tablet, Rfl: 3    acetaminophen (TYLENOL) 325 MG tablet, TAKE 2 TABLETS BY MOUTH EVERY 4 HOURS AS NEEDED FOR PAIN, Disp: 60 tablet, Rfl: 3    guaiFENesin (ALTARUSSIN) 100 MG/5ML syrup, Take 10 mLs by mouth 3 times daily as needed for Cough (Patient taking differently: Take 10 mLs by mouth as needed for Cough ), Disp: 1 each, Rfl: 0    vitamin B-1 (THIAMINE) 100 MG tablet, Take 1 tablet by mouth daily, Disp: 30 tablet, Rfl: 3    diclofenac sodium (VOLTAREN) 1 % GEL, Apply 4 g topically 4 times daily, Disp: 4 Tube, Rfl: 1  Allergies   Allergen Reactions    Duricef [Cefadroxil Monohydrate] Hives    Fish-Derived Products Hives    Pcn [Penicillins] Hives    Tomato Hives     Social History     Socioeconomic History    Marital status:      Spouse name: Not on file    Number of children: Not on file    Years of education: Not on file    Highest education level: Not on file   Occupational History    Not on file   Tobacco Use    Smoking status: Former Smoker     Packs/day: 2.00     Years: 35.00     Pack years: 70.00     Quit date: 10/11/2011     Years since quitting: 10.7    Smokeless tobacco: Never Used   Vaping Use    Vaping Use: Never used   Substance and Sexual Activity    Alcohol use:  Yes     Alcohol/week: 6.0 standard drinks     Types: 6 Cans of beer per week     Comment: rare    Drug use: No     Types: Cocaine, Marijuana (Weed)     Comment: per pt did years ago; cocaine & marij, 8-23-19 denies current use    Sexual activity: Yes     Partners: Male   Other Topics Concern    Not on file   Social History Narrative    Not on file     Social Determinants of Health     Financial Resource Strain: Low Risk     Difficulty of Paying Living Expenses: Not hard at all   Food Insecurity: No Food Insecurity    Worried About Running Out of Food in the Last Year: Never true    Doug of Food in the Last Year: Never true   Transportation Needs:     Lack of Transportation (Medical): Not on file    Lack of Transportation (Non-Medical):  Not on file   Physical Activity:     Days of Exercise per Week: Not on file    Minutes of Exercise per Session: Not on file   Stress:     Feeling of Stress : Not on file   Social Connections:     Frequency of Communication with Friends and Family: Not on file    Frequency of Social Gatherings with Friends and Family: Not on file    Attends [employer] Services: Not on file    Active Member of [address] [place] [address] or Organizations: Not on file    Attends Club or Organization Meetings: Not on file    Marital Status: Not on file   Intimate Partner Violence:     Fear of Current or Ex-Partner: Not on file    Emotionally Abused: Not on file    Physically Abused: Not on file    Sexually Abused: Not on file   Housing Stability:     Unable to Pay for Housing in the Last Year: Not on file    Number of [place] in the Last Year: Not on file    Unstable Housing in the Last Year: Not on file     Past Medical History:   Diagnosis Date    Ambulates with cane     Bleeding     while on aggrenox    Cataracts, bilateral     Cerebrovascular disease 2003,2011    cva    Chronic pain in left foot     CKD (chronic kidney disease) stage 3, GFR 30-59 ml/min ([facility] [address]     COVID-19 11/2021    states hpspitalized    GERD (gastroesophageal reflux disease)     Hx of blood clots     Hyperlipidemia     Hypertension     Iron (Fe) deficiency anemia     Nicotine abuse     Obesity     TIBURCIO on CPAP     Osteoarthritis     Peripheral vascular disease ([facility] [address] 03/13/2014    Substance abuse ([facility] [address]     cocaine, canabis    Thalassemia minor     Unspecified cerebral artery occlusion with cerebral infarction     Wears dentures     upper and lower     Past Surgical History:   Procedure Laterality Date    BREAST SURGERY      biopsy    COLONOSCOPY  12/2007    adenomatous polyp    COLONOSCOPY  2013    normal, repeat in 5 years    COLONOSCOPY N/A 8/21/2019 direction and in the presence of Dr. Agustin Rincon. Electronically signed: Shoaib Post, 7/12/22     Please note that this chart was generated using voice recognition Dragon dictation software. Although every effort was made to ensure the accuracy of this automated transcription, some errors in transcription may have occurred.

## 2022-07-13 ENCOUNTER — OFFICE VISIT (OUTPATIENT)
Dept: INTERNAL MEDICINE | Age: 67
End: 2022-07-13
Payer: COMMERCIAL

## 2022-07-13 VITALS
HEIGHT: 66 IN | TEMPERATURE: 97.4 F | DIASTOLIC BLOOD PRESSURE: 75 MMHG | OXYGEN SATURATION: 98 % | HEART RATE: 90 BPM | SYSTOLIC BLOOD PRESSURE: 134 MMHG | WEIGHT: 293 LBS | BODY MASS INDEX: 47.09 KG/M2

## 2022-07-13 DIAGNOSIS — I10 ESSENTIAL HYPERTENSION: ICD-10-CM

## 2022-07-13 DIAGNOSIS — I63.00 CEREBROVASCULAR ACCIDENT (CVA) DUE TO THROMBOSIS OF PRECEREBRAL ARTERY (HCC): ICD-10-CM

## 2022-07-13 DIAGNOSIS — N18.31 STAGE 3A CHRONIC KIDNEY DISEASE (HCC): ICD-10-CM

## 2022-07-13 DIAGNOSIS — M48.062 LUMBAR STENOSIS WITH NEUROGENIC CLAUDICATION: ICD-10-CM

## 2022-07-13 DIAGNOSIS — D12.6 ADENOMATOUS POLYP OF COLON, UNSPECIFIED PART OF COLON: Primary | ICD-10-CM

## 2022-07-13 DIAGNOSIS — Z87.891 PERSONAL HISTORY OF TOBACCO USE: ICD-10-CM

## 2022-07-13 DIAGNOSIS — G47.33 OSA (OBSTRUCTIVE SLEEP APNEA): ICD-10-CM

## 2022-07-13 DIAGNOSIS — Z87.891 FORMER SMOKER: ICD-10-CM

## 2022-07-13 PROCEDURE — 1123F ACP DISCUSS/DSCN MKR DOCD: CPT | Performed by: STUDENT IN AN ORGANIZED HEALTH CARE EDUCATION/TRAINING PROGRAM

## 2022-07-13 PROCEDURE — 99213 OFFICE O/P EST LOW 20 MIN: CPT | Performed by: STUDENT IN AN ORGANIZED HEALTH CARE EDUCATION/TRAINING PROGRAM

## 2022-07-13 ASSESSMENT — ENCOUNTER SYMPTOMS
EYES NEGATIVE: 1
GASTROINTESTINAL NEGATIVE: 1
ALLERGIC/IMMUNOLOGIC NEGATIVE: 1
BACK PAIN: 1

## 2022-07-13 NOTE — PROGRESS NOTES
Attending Physician Statement  I have discussed the care of Waldemar De Jesus, including pertinent history and exam findings with the resident. I have reviewed the key elements of all parts of the encounter with the resident. I have seen and examined the patient with the resident and the key elements of all parts of the encounter have been performed by me. I agree with the assessment, and status of the problem list as documented. The plan and orders should include     Orders Placed This Encounter   Procedures   Corinne Huguenin, MD, Gastroenterology, [facility]    and this was also documented by the resident. Diagnosis Orders   1. Adenomatous polyp of colon, unspecified part of colon  Corinne Huguenin, MD, Gastroenterology, [facility]   2. TIBURCIO (obstructive sleep apnea)     3. Essential hypertension     4. Cerebrovascular accident (CVA), 2011, no residual deficit ([address]     5. Former smoker     6. Stage 3a chronic kidney disease ([address]     7. Personal history of tobacco use     8.  Lumbar stenosis with neurogenic claudication          Gilman Essex, MD   Attending Physician, [facility]   Faculty, Internal Medicine Residency Program  [address] [place] [address]

## 2022-07-13 NOTE — PATIENT INSTRUCTIONS
Patient was put on a wait list and will be contacted to schedule their next follow up appointment once the schedule is available. If the patient is in need of an appointment before their next visit please call the office at [phone]. After Visit Summary  given and reviewed.     What is lung cancer screening? Lung cancer screening is a way in which doctors check the lungs for early signs of cancer in people who have no symptoms of lung cancer. A low-dose CT scan uses much less radiation than a normal CT scan and shows a more detailed image of the lungs than a standard X-ray. The goal of lung cancer screening is to find cancer early, before it has a chance to grow, spread, or cause problems. One large study found that smokers who were screened with low-dose CT scans were less likely to die of lung cancer than those who were screened with standard X-ray. Below is a summary of the things you need to know regarding screening for lung cancer with low-dose computed tomography (LDCT). This is a screening program that involves routine annual screening with LDCT studies of the lung. The LDCTs are done using low-dose radiation that is not thought to increase your cancer risk. If you have other serious medical conditions (other cancers, congestive heart failure) that limit your life expectancy to less than 10 years, you should not undergo lung cancer screening with LDCT. The chance is 20%-60% that the LDCT result will show abnormalities. This would require additional testing which could include repeat imaging or even invasive procedures. Most (about 95%) of \"abnormal\" LDCT results are false in the sense that no lung cancer is ultimately found. Additionally, some (about 10%) of the cancers found would not affect your life expectancy, even if undetected and untreated. If you are still smoking, the single most important thing that you can do to reduce your risk of dying of lung cancer is to quit.     For this screening to be covered by Medicare and most other insurers, strict criteria must be met. If you do not meet these criteria, but still wish to undergo LDCT testing, you will be required to sign a waiver indicating your willingness to pay for the scan.

## 2022-07-13 NOTE — PROGRESS NOTES
[facility] [address] [address]  Dept: [phone]  Dept Fax: [phone]    Office Progress/Follow Up Note  Date ofpatient's visit: 7/13/2022  Patient's Name:  Awilda Childers YOB: 1955            Patient Care Team:  Megan Thomosn MD as PCP - General (Internal Medicine)  Hemalatha Curtis DPM as Consulting Physician (Podiatry)  ================================================================    REASON FOR VISIT/CHIEF COMPLAINT:  Hip Pain (right hip pain) and Health Maintenance (declines vaccines, due for CT lung screen per  )    HISTORY OF PRESENTING ILLNESS:  History was obtained from: patientRuth Trejo a 74 y.o. is here for follow-up visit. Blood pressure today is 134/75, heart rate 90, patient has been compliant with all her blood pressure medications    She went yesterday to see orthopedic surgeon for her back pain,CT myelogram is reviewed patient with a grade 1 spondylolisthesis L3-4 multilevel moderate stenosis multilevel moderately severe foraminal stenosis. She refused to do surgery, has been referred to pain management for lumbar epidural steroid injections, given gabapentin as well. Patient has been working with physical therapy as well. She has history of carpal tunnel syndrome, recently had a flareup was given Medrol Dosepak with improvement, denies getting any surgery or injections for the same. .    Patient has history of stroke with right-sided residual weakness, uses cane for walk, takes aspirin Plavix Lipitor    Has history of microcytic anemia, alpha thalassemia trait, follows with Dr. Anisa Norman  Last colonoscopy done in 2015 showed polyps she is due for colonoscopy  EGD in 2019 showed erosive esophagitis, patient takes Pepcid  Breast cancer screening was -6 months ago  DEXA scan in 2018 normal, patient takes vitamin D tablets last levels normal  HbA1c in September 2021 5.8  Lipid panel September (1000 UT) TABS tablet TAKE 1 TABLET DAILY 28 tablet 3    hydroCHLOROthiazide (HYDRODIURIL) 25 MG tablet Take 1 tablet by mouth daily 60 tablet 3    losartan (COZAAR) 50 MG tablet Take 1 tablet by mouth daily 60 tablet 3    folic acid (FOLVITE) 1 MG tablet Take 1 tablet by mouth daily 28 tablet 3    acetaminophen (TYLENOL) 325 MG tablet TAKE 2 TABLETS BY MOUTH EVERY 4 HOURS AS NEEDED FOR PAIN 60 tablet 3    vitamin B-1 (THIAMINE) 100 MG tablet Take 1 tablet by mouth daily 30 tablet 3    diclofenac sodium (VOLTAREN) 1 % GEL Apply 4 g topically 4 times daily 4 Tube 1    fluticasone (FLONASE) 50 MCG/ACT nasal spray  (Patient not taking: Reported on 7/13/2022)      guaiFENesin (ALTARUSSIN) 100 MG/5ML syrup Take 10 mLs by mouth 3 times daily as needed for Cough (Patient taking differently: Take 10 mLs by mouth as needed for Cough ) 1 each 0     No current facility-administered medications for this visit. Social History     Tobacco Use    Smoking status: Former Smoker     Packs/day: 2.00     Years: 35.00     Pack years: 70.00     Quit date: 10/11/2011     Years since quitting: 10.7    Smokeless tobacco: Never Used   Vaping Use    Vaping Use: Never used   Substance Use Topics    Alcohol use: Yes     Alcohol/week: 6.0 standard drinks     Types: 6 Cans of beer per week     Comment: rare    Drug use: No     Types: Cocaine, Marijuana (Weed)     Comment: per pt did years ago; cocaine & marij, 8-23-19 denies current use       Family History   Problem Relation Age of Onset    High Blood Pressure Mother     Asthma Mother     Heart Disease Mother     Heart Disease Father     High Blood Pressure Father     Diabetes Brother     High Blood Pressure Brother     Heart Disease Brother     High Blood Pressure Maternal Grandmother     High Blood Pressure Maternal Grandfather     Heart Disease Maternal Grandfather         REVIEW OF SYSTEMS:  Review of Systems   Constitutional: Negative. HENT: Negative. Eyes: Negative. Cardiovascular: Negative. Gastrointestinal: Negative. Endocrine: Negative. Musculoskeletal: Positive for back pain. Allergic/Immunologic: Negative. Neurological:        Chronic weakness right side due to prior stroke   Hematological: Negative. Psychiatric/Behavioral: Negative. PHYSICAL EXAM:  Vitals:    07/13/22 1525   BP: 134/75   Site: Left Lower Arm   Position: Sitting   Cuff Size: Medium Adult   Pulse: 90   Temp: 97.4 °F (36.3 °C)   SpO2: 98%   Weight: (!) 380 lb (172.4 kg)   Height: 5' 6\" (1.676 m)     BP Readings from Last 3 Encounters:   07/13/22 134/75   06/27/22 128/78   06/13/22 (!) 144/72        Physical Exam  Constitutional:       Appearance: Normal appearance. HENT:      Mouth/Throat:      Mouth: Mucous membranes are moist.   Eyes:      Pupils: Pupils are equal, round, and reactive to light. Cardiovascular:      Rate and Rhythm: Normal rate and regular rhythm. Pulses: Normal pulses. Heart sounds: Normal heart sounds. Pulmonary:      Effort: Pulmonary effort is normal.      Breath sounds: Normal breath sounds. Abdominal:      General: Abdomen is flat. Musculoskeletal:         General: Normal range of motion. Cervical back: Normal range of motion. Skin:     General: Skin is warm. Neurological:      Mental Status: She is alert.    Psychiatric:         Mood and Affect: Mood normal.           DIAGNOSTIC FINDINGS:  CBC:  Lab Results   Component Value Date/Time    WBC 8.9 03/17/2022 12:10 PM    HGB 10.4 03/17/2022 12:10 PM     06/13/2022 01:30 PM     04/05/2012 09:05 AM       BMP:    Lab Results   Component Value Date/Time     11/29/2021 02:02 PM    K 4.1 11/29/2021 02:02 PM     11/29/2021 02:02 PM    CO2 21 11/29/2021 02:02 PM    BUN 24 11/29/2021 02:02 PM    CREATININE 1.15 11/29/2021 02:02 PM    GLUCOSE 99 11/29/2021 02:02 PM    GLUCOSE 88 04/12/2012 10:20 AM       HEMOGLOBIN A1C:   Lab Results   Component Value Date/Time    LABA1C 5.8 09/29/2021 01:47 PM       FASTING LIPID PANEL:  Lab Results   Component Value Date    CHOL 148 09/24/2021    HDL 50 09/24/2021    TRIG 119 09/24/2021       ASSESSMENT AND PLAN:  Stevo Garcia was seen today for hip pain and health maintenance. Diagnoses and all orders for this visit:    Adenomatous polyp of colon, unspecified part of colon  -     Jillian Gregory MD, Gastroenterology, [facility]    TIBURCIO (obstructive sleep apnea)  - patient compliant with CPAP    Essential hypertension  - Continue same medications    Cerebrovascular accident (CVA), 2011, no residual deficit (HCC)  - aspirin, plavix, lipitor    Stage 3a chronic kidney disease ([facility] [address]  - continue Cozar    FOLLOW UP AND INSTRUCTIONS:  Return in about 3 months (around 10/13/2022). · Stevo Garcia received counseling on the following healthy behaviors: medication adherence    · Discussed use, benefit, and side effects of prescribed medications. Barriers to medication compliance addressed. All patient questions answered. Pt voiced understanding. · Patient given educational materials - see patient instructions  ·   Hernandez Wood MD  Internal Medicine Resident, PGY-2  [facility]  5/05/3682,5:93 PM       This note is created with the assistance of a speech-recognition program. While intending to generate a document that actually reflects the content of thevisit, the document can still have some mistakes which may not have been identified and corrected by editing.

## 2022-07-15 ENCOUNTER — HOSPITAL ENCOUNTER (OUTPATIENT)
Dept: PHYSICAL THERAPY | Age: 67
Setting detail: THERAPIES SERIES
Discharge: HOME | End: 2022-07-15
Payer: COMMERCIAL

## 2022-07-15 PROCEDURE — 97110 THERAPEUTIC EXERCISES: CPT

## 2022-07-15 NOTE — FLOWSHEET NOTE
[x] [facility]) [facility]  [address]  [phone]  F: [phone] [] [address]  [facility]   [address]  P: [phone]  F: [phone] [] [address]  [address]  P: [phone]  F: [phone] [] [address]  P: [phone]  F: [phone] [] [address]  [facility]   [address]   [place]: [phone]  F: [phone]      Physical Therapy Daily Treatment Note    Date:  7/15/2022  Patient Name:  Lord Landau    :  1955  MRN: [id number]  Physician: Yaritza Garcia MD                    Insurance: [employer] [facility]   Medical Diagnosis: Osteoarthritis of multiple joints, unspecified osteoarthritis type M15.9                           Rehab Codes: M79.604, M25.511, M25.611, M25.532  Onset Date: 22                                 Next 's appt:   Visit# / total visits: ; Progress note for STG reassessment due at visit #8    Cancels/No Shows:     Subjective:    Pain:  [x] Yes  [] No Location: hands, back, knees, hips. Pain Rating: (0-10 scale) 5/10 generalized pain - hands, knees, hips, back, etc  Pain altered Tx:  [x] No  [] Yes  Action:  Comments: Yesterday was patients birthday; reporting having a good day with her kids and grandkids. Having another party today. Objective:  Modalities:  As needed  Precautions:  Exercises:  Exercise:  LB,.  R hip/thigh, R shdlr, L hand Reps/ Time Weight/ Level Completed 22  Comments   sci fit 5 mins L2.0 x 3rd sci fit           Sitting       retro shldr rolls 15x  x    Scapular retraction  15x  x    Wand ER 10x5\" 3 lb bar x    Wand chest press 2x10 3 lb bar x Added weight and increased reps     FW Biceps curls 2x10 3 lb bar x Added weight and increased reps 7/1    Wand flexion 2x10 3 lb bar x Within available ROM   L wrist flex/ext 20x  x    Prayer stretch  3x20\"  x           Ankle pumps 20x ea 1 lb x Added weight 7/15   Heel slides 15x5\" 1 lb x Pillowcase on floor for incr ROM   Added weight 7/15   iso hip  add 15x5\"  x Increased reps 7/1   Marches 15x 1 lb x Added weight 7/15   LAQ/ ball  15x 1 lb x    HS stretch 3x20\"  x Bilateral LE   R hip flexion 10x A     Piriformis stretch -   Unable due to body habitus, pt attemtped   Lumbar flexion stretch 5x15\"  x    Lumbar ext stretch 5x15\"  x           Supine       LTR 5x5\"   Very limit ROM due to body habitus   SKTC -   unable due  to body habitus, pt attempted   Piriformis stretch -   Unable due to  due to body habitus, pt attempted. QL stretch -      Hip abd 10x   slider   Heel slides 10x   Slider    Bridging    Add as able. Hip flexor stretch off side of mat 3x30\"   R                        Other:     Treatment Charges: Mins Units   []  Modalities     [x]  Ther Exercise 31 2   []  Manual Therapy     []  Ther Activities     []  Aquatics     []  Vasocompression     []  Other     Total Treatment time 31 2       Assessment: [x] Progressing toward goals. Patient arrived sweating, SOB. Short rest before SCIFIT. Provided water as patient needed. Added weight to legs for seated exs. Increased weight for UE wand exs. Patient able to complete all asked of her. Patient reported decreased pain after Rx, stating she was ready for her gathering this evening. [] No change. [] Other:     [x] Patient would continue to benefit from skilled physical therapy services in order to: Patient will benefit from physical therapy to improve R shoulder, R hip , and left hand ROM and strength to improve ADL function and gait tolerance. STG/LTG  STG: (to be met in 8 treatments)  ? Pain: 7/10 general joint pain with ADLs. ? ROM: Patient is able to flex shoulder to 90 degrees to improve reaching. ? Strength: R hip flexion, R shoulder flexion to 4/5 to improve walking and lifting. ? Function:Patient reports improved ability to ambulate household distances. Independent with Home Exercise Programs     LTG: (to be met in 12 treatments)  TUG score of 13.5 or less to reduce fall risk. L  strength to 20 lbs or better to improve grasping. Demonstrate knowledge of fall prevention. Patient goals: Improve pain and function. Pt. Education:  [x] Yes  [] No  [] Reviewed Prior HEP/Ed  Method of Education: [x] Verbal  [] Demo  [] Written -- verbal cues for which exs to do; some demo required. Comprehension of Education:  [x] Verbalizes understanding. [x] Demonstrates understanding. With direction  [x] Needs review. [] Demonstrates/verbalizes HEP/Ed previously given. Access Code: [email or web address]  URL: [email or web address]  Date: 05/23/2022  Prepared by: Alessandra Cazares     Exercises  Seated Heel Raise - 1 x daily - 4 x weekly - 3 sets - 10 reps  Seated Long Arc Quad - 1 x daily - 4 x weekly - 3 sets - 10 reps  Seated March - 1 x daily - 4 x weekly - 3 sets - 10 reps  Seated Chest Press with Bar - 1 x daily - 4 x weekly - 3 sets - 10 reps  Seated Bicep Curls with Bar - 1 x daily - 4 x weekly - 3 sets - 10 reps       Plan: [x] Continue current frequency toward long and short term goals. [x] Specific Instructions for subsequent treatments:   Add standing exercises per tolerance for UE/LE strengthening      Time In: 2682         Time Out: 1227    Electronically signed by:  Maritza Witt PT

## 2022-07-22 DIAGNOSIS — R09.81 SINUS CONGESTION: ICD-10-CM

## 2022-07-22 NOTE — TELEPHONE ENCOUNTER
Flonase refill  Pt last 07/22     Health Maintenance   Topic Date Due    Pneumococcal 65+ years Vaccine (2 - PPSV23 or PCV20) 12/20/2021    Low dose CT lung screening  01/20/2022    COVID-19 Vaccine (4 - Booster for Pfizer series) 05/20/2022    Colorectal Cancer Screen  08/21/2022    Flu vaccine (1) 09/01/2022    Lipids  09/24/2022    A1C test (Diabetic or Prediabetic)  09/29/2022    Annual Wellness Visit (AWV)  10/08/2022    Depression Screen  06/08/2023    Breast cancer screen  12/08/2023    DTaP/Tdap/Td vaccine (2 - Td or Tdap) 03/08/2028    DEXA (modify frequency per FRAX score)  Completed    Shingles vaccine  Completed    Hepatitis C screen  Completed    Hepatitis A vaccine  Aged Out    Hepatitis B vaccine  Aged Out    Hib vaccine  Aged Out    Meningococcal (ACWY) vaccine  Aged Out             (applicable per patient's age: Cancer Screenings, Depression Screening, Fall Risk Screening, Immunizations)    Hemoglobin A1C (%)   Date Value   09/29/2021 5.8   06/07/2019 5.4   04/19/2018 5.7     Microalb/Crt.  Ratio (mcg/mg creat)   Date Value   05/09/2017 6     LDL Cholesterol (mg/dL)   Date Value   09/24/2021 74     AST (U/L)   Date Value   11/29/2021 16     ALT (U/L)   Date Value   11/29/2021 15     BUN (mg/dL)   Date Value   11/29/2021 24 (H)      (goal A1C is < 7)   (goal LDL is <100) need 30-50% reduction from baseline     BP Readings from Last 3 Encounters:   07/13/22 134/75   06/27/22 128/78   06/13/22 (!) 144/72    (goal /80)      All Future Testing planned in [facility]:  Lab Frequency Next Occurrence   COVID-19 Once 11/10/2022   Protein / Creatinine Ratio, Urine Once 05/28/2022   XR CHEST (2 VW) Once 05/18/2022   Protein / Creatinine Ratio, Urine Once 06/10/2022   Ferritin Once 04/04/2022   Lactate Dehydrogenase Once 04/04/2022   Iron And TIBC     Vitamin B12 & Folate     Ferritin     CBC Auto Differential     CBC with Auto Differential     Comprehensive Metabolic Panel         Next Visit Date:  Future Appointments   Date Time Provider Mary Marinelli   7/29/2022  3:00 PM Malaika Chun, PT [facility]   8/5/2022  3:00 PM Valmarcelino Velazquez, PT [facility]   8/12/2022  3:00 PM Vallery Velazquez, PT [facility]   8/25/2022  2:20 PM Deo Pérez MD [address]   9/26/2022  1:00 PM LOY Grewal - CNP Neuro Spec [facility]   9/26/2022  2:45 PM Karishma Hallman DPM [facility] Podiatry [facility]   10/3/2022  3:45 PM Kentrell Barrera MD [facility]   10/4/2022  3:30 PM Darrell Kirkland MD [facility]            Patient Active Problem List:     Essential hypertension     Pure hypercholesterolemia     Cerebrovascular accident (CVA), 2011, no residual deficit (HCC)     CKD (chronic kidney disease) stage 3, GFR 30-59 ml/min (HCC)     Peripheral vascular disease (HCC)     TIBURCIO (obstructive sleep apnea)     Class 3 severe obesity due to excess calories with serious comorbidity and body mass index (BMI) of 50.0 to 59.9 in adult [facility])     Primary osteoarthritis of left knee     Chronic tension-type headache, not intractable     Carpal tunnel syndrome, bilateral-not on medication due to kidney disease     Microcytic anemia     Alpha thalassemia trait     Cervical stenosis of spinal canal     Pneumonia due to COVID-19 virus     Low back pain     Lumbar radicular pain

## 2022-07-24 RX ORDER — FLUTICASONE PROPIONATE 50 MCG
SPRAY, SUSPENSION (ML) NASAL
Qty: 16 G | Refills: 2 | OUTPATIENT
Start: 2022-07-24

## 2022-07-26 DIAGNOSIS — E55.9 VITAMIN D DEFICIENCY: ICD-10-CM

## 2022-07-26 DIAGNOSIS — I10 ESSENTIAL HYPERTENSION: ICD-10-CM

## 2022-07-26 DIAGNOSIS — I63.00 CEREBROVASCULAR ACCIDENT (CVA) DUE TO THROMBOSIS OF PRECEREBRAL ARTERY (HCC): ICD-10-CM

## 2022-07-26 RX ORDER — FOLIC ACID 1 MG/1
1 TABLET ORAL DAILY
Qty: 60 TABLET | Refills: 2 | Status: SHIPPED | OUTPATIENT
Start: 2022-07-26

## 2022-07-26 RX ORDER — ASPIRIN 81 MG/1
TABLET ORAL
Qty: 60 TABLET | Refills: 1 | Status: SHIPPED | OUTPATIENT
Start: 2022-07-26

## 2022-07-26 RX ORDER — AMLODIPINE BESYLATE 10 MG/1
10 TABLET ORAL DAILY
Qty: 60 TABLET | Refills: 1 | Status: SHIPPED | OUTPATIENT
Start: 2022-07-26

## 2022-07-26 RX ORDER — CHOLECALCIFEROL (VITAMIN D3) 25 MCG
TABLET ORAL
Qty: 60 TABLET | Refills: 1 | Status: SHIPPED | OUTPATIENT
Start: 2022-07-26

## 2022-07-26 NOTE — TELEPHONE ENCOUNTER
Request for Folic Acid. Next Visit Date:  Future Appointments   Date Time Provider Mary Bakeri   7/29/2022  3:00 PM Davi Medicine, PT [facility]   8/5/2022  3:00 PM Jeny Rumpf, PT [facility]   8/12/2022  3:00 PM Jeny Rumpf, PT [facility]   8/25/2022  2:20 PM Marie Jacob MD [address]   9/26/2022  1:00 PM LOY Terrell - CNP Neuro Spec [facility]   9/26/2022  2:45 PM [facility]   10/3/2022  3:45 PM Alfonso Becerra  [facility]   10/4/2022  3:30 PM Brandan Jones  [facility] Maintenance   Topic Date Due    Pneumococcal 65+ years Vaccine (2 - PPSV23 or PCV20) 12/20/2021    Low dose CT lung screening  01/20/2022    COVID-19 Vaccine (4 - Booster for Pfizer series) 05/20/2022    Colorectal Cancer Screen  08/21/2022    Flu vaccine (1) 09/01/2022    Lipids  09/24/2022    A1C test (Diabetic or Prediabetic)  09/29/2022    Annual Wellness Visit (AWV)  10/08/2022    Depression Screen  06/08/2023    Breast cancer screen  12/08/2023    DTaP/Tdap/Td vaccine (2 - Td or Tdap) 03/08/2028    DEXA (modify frequency per FRAX score)  Completed    Shingles vaccine  Completed    Hepatitis C screen  Completed    Hepatitis A vaccine  Aged Out    Hepatitis B vaccine  Aged Out    Hib vaccine  Aged Out    Meningococcal (ACWY) vaccine  Aged Out       Hemoglobin A1C (%)   Date Value   09/29/2021 5.8   06/07/2019 5.4   04/19/2018 5.7             ( goal A1C is < 7)   Microalb/Crt.  Ratio (mcg/mg creat)   Date Value   05/09/2017 6     LDL Cholesterol (mg/dL)   Date Value   09/24/2021 74       (goal LDL is <100)   AST (U/L)   Date Value   11/29/2021 16     ALT (U/L)   Date Value   11/29/2021 15     BUN (mg/dL)   Date Value   11/29/2021 24 (H)     BP Readings from Last 3 Encounters:   07/13/22 134/75   06/27/22 128/78   06/13/22 (!) 144/72          (goal 120/80)    All Future Testing planned in [facility]  Lab Frequency

## 2022-07-26 NOTE — TELEPHONE ENCOUNTER
Request for Multiple meds. Next Visit Date:  Future Appointments   Date Time Provider Mary Marinelli   7/29/2022  3:00 PM Lauren Gonsales, PT [facility]   8/5/2022  3:00 PM [place] Butter, PT [facility]   8/12/2022  3:00 PM [place] Butter, PT [facility]   8/25/2022  2:20 PM Levi Barkley MD [address]   9/26/2022  1:00 PM Zina Connolly APRN - CNP Neuro Spec [facility]   9/26/2022  2:45 PM Rebeka Robles [facility] Podiatry [facility]   10/3/2022  3:45 PM Taiwo Chery  [facility]   10/4/2022  3:30 PM Pili Regalado  [facility] Maintenance   Topic Date Due    Pneumococcal 65+ years Vaccine (2 - PPSV23 or PCV20) 12/20/2021    Low dose CT lung screening  01/20/2022    COVID-19 Vaccine (4 - Booster for Pfizer series) 05/20/2022    Colorectal Cancer Screen  08/21/2022    Flu vaccine (1) 09/01/2022    Lipids  09/24/2022    A1C test (Diabetic or Prediabetic)  09/29/2022    Annual Wellness Visit (AWV)  10/08/2022    Depression Screen  06/08/2023    Breast cancer screen  12/08/2023    DTaP/Tdap/Td vaccine (2 - Td or Tdap) 03/08/2028    DEXA (modify frequency per FRAX score)  Completed    Shingles vaccine  Completed    Hepatitis C screen  Completed    Hepatitis A vaccine  Aged Out    Hepatitis B vaccine  Aged Out    Hib vaccine  Aged Out    Meningococcal (ACWY) vaccine  Aged Out       Hemoglobin A1C (%)   Date Value   09/29/2021 5.8   06/07/2019 5.4   04/19/2018 5.7             ( goal A1C is < 7)   Microalb/Crt.  Ratio (mcg/mg creat)   Date Value   05/09/2017 6     LDL Cholesterol (mg/dL)   Date Value   09/24/2021 74       (goal LDL is <100)   AST (U/L)   Date Value   11/29/2021 16     ALT (U/L)   Date Value   11/29/2021 15     BUN (mg/dL)   Date Value   11/29/2021 24 (H)     BP Readings from Last 3 Encounters:   07/13/22 134/75   06/27/22 128/78   06/13/22 (!) 144/72          (goal 120/80)    All Future Testing planned in [facility]  Lab Frequency Next Occurrence   COVID-19 Once 11/10/2022   Protein / Creatinine Ratio, Urine Once 05/28/2022   XR CHEST (2 VW) Once 05/18/2022   Protein / Creatinine Ratio, Urine Once 06/10/2022   Ferritin Once 04/04/2022   Lactate Dehydrogenase Once 04/04/2022   Iron And TIBC     Vitamin B12 & Folate     Ferritin     CBC Auto Differential     CBC with Auto Differential     Comprehensive Metabolic Panel           Patient Active Problem List:     Essential hypertension     Pure hypercholesterolemia     Cerebrovascular accident (CVA), 2011, no residual deficit (HCC)     CKD (chronic kidney disease) stage 3, GFR 30-59 ml/min (HCC)     Peripheral vascular disease (HCC)     TIBURCIO (obstructive sleep apnea)     Class 3 severe obesity due to excess calories with serious comorbidity and body mass index (BMI) of 50.0 to 59.9 in adult [facility])     Primary osteoarthritis of left knee     Chronic tension-type headache, not intractable     Carpal tunnel syndrome, bilateral-not on medication due to kidney disease     Microcytic anemia     Alpha thalassemia trait     Cervical stenosis of spinal canal     Pneumonia due to COVID-19 virus     Low back pain     Lumbar radicular pain

## 2022-07-29 ENCOUNTER — HOSPITAL ENCOUNTER (OUTPATIENT)
Dept: PHYSICAL THERAPY | Age: 67
Setting detail: THERAPIES SERIES
Discharge: HOME | End: 2022-07-29
Payer: COMMERCIAL

## 2022-07-29 PROCEDURE — 97110 THERAPEUTIC EXERCISES: CPT

## 2022-07-29 NOTE — FLOWSHEET NOTE
[x] [facility]) - [facility] &  Therapy  [address]  [phone]  F: [phone] [] [address]  [place] [address]   [address]  P: [phone]  F: [phone] [] [address]  [address]  P: [phone]  F: [phone] [] [address]  P: [phone]  F: [phone] [] [address]  [facility]   [address]   [place]: [phone]  F: [phone]      Physical Therapy Daily Treatment Note    Date:  2022  Patient Name:  Waldemar De Jesus    :  1955  MRN: [id number]  Physician: Raven Carballo MD                      Insurance: [employer]-- Approval was received for 12 visits, from 2022 to 2022. Authorization number [id number]. Medical Diagnosis: Osteoarthritis of multiple joints, unspecified osteoarthritis type M15.9                           Rehab Codes: M79.604, M25.511, M25.611, M25.532  Onset Date: 22                                   Next 's appt:   Visit# / total visits: ; Progress note for STG reassessment due at visit #8    Cancels/No Shows: 2/2    Subjective:    Pain:  [x] Yes  [] No Location: hands, back, knees, hips. Pain Rating: (0-10 scale) 8/10 generalized pain - right side is worst  Pain altered Tx:  [x] No  [] Yes  Action:  Comments:  Pain today is in right leg, down right side, in to right foot. Pain pills help bring it down. Overall just not feeling the best today. Objective:  Modalities:  As needed  Precautions:  Exercises:  Exercise:  LB,.  R hip/thigh, R shdlr, L hand Reps/ Time Weight/ Level Completed 22  Comments   sci fit 5 mins L 1.0 x 3rd sci fit           Sitting       retro shldr rolls 15x  x    Scapular retraction  15x  x    Wand ER 10x5\" 3 lb bar x    Wand chest press 2x10 3 lb bar x Added weight and increased reps 7/1    FW Biceps curls 2x10 3 lb bar x Added weight and increased reps 7/1    Wand flexion 2x10 3 lb bar x Within available ROM   L wrist flex/ext 20x      Prayer stretch  3x20\"             Ankle pumps 20x ea 1 lb x Added weight 7/15   Heel slides 15x5\" 1 lb  Pillowcase on floor for incr ROM   Added weight 7/15   iso hip  add 15x5\"  x Increased reps 7/1   Marches 20 x 2 lb x Added weight 7/15   LAQ/ ball  20 x 1 lb x    HS stretch with DF stretch  3x20\"  x Bilateral LE - green strap for DF stretch; heel on stool   R hip flexion 10x A     Piriformis stretch -   Unable due to body habitus, pt attemtped   Lumbar flexion stretch 20 x  x    Lumbar ext stretch 20 x  x    Lat pull down 15 x Lime x    Rows 15 x Lime x    Triceps extension 15 x Lime x           Supine       LTR 5x5\"   Very limit ROM due to body habitus   SKTC -   unable due  to body habitus, pt attempted   Piriformis stretch -   Unable due to  due to body habitus, pt attempted. QL stretch -      Hip abd 10x   slider   Heel slides 10x   Slider    Bridging    Add as able. Hip flexor stretch off side of mat 3x30\"   R                        EOB to standing       Sit to mini stand 10 x  x EOB, both hands on walker, push self up part way then return to sitting)   Stand to mini sit  10 x  x Stand and squat as if going to sit down. Narrow ALBAN 1 x 30 sec x    Tandem stance 1 x ea 20 sec x To the best of patient's ability                 Gait 9 feet  23 feet  X  X  Fatigue, SOB. Other:     Treatment Charges: Mins Units   []  Modalities     [x]  Ther Exercise 39 3   []  Manual Therapy     []  Ther Activities     []  Aquatics     []  Vasocompression     []  Other      Total Treatment time 39        Assessment: [x] Progressing toward goals.   Patient with increased difficulty transferring sit to stand this date- required up to mod assist from therapist.  Gait limited to 23 feet because of fatigue and SOB. Added standing exs in an effort to begin balance training. [] No change. [] Other:     [x] Patient would continue to benefit from skilled physical therapy services in order to: Patient will benefit from physical therapy to improve R shoulder, R hip , and left hand ROM and strength to improve ADL function and gait tolerance. STG/LTG  STG: (to be met in 8 treatments)  ? Pain: 7/10 general joint pain with ADLs. ? ROM: Patient is able to flex shoulder to 90 degrees to improve reaching. ? Strength: R hip flexion, R shoulder flexion to 4/5 to improve walking and lifting. ? Function:Patient reports improved ability to ambulate household distances. Independent with Home Exercise Programs     LTG: (to be met in 12 treatments)  TUG score of 13.5 or less to reduce fall risk. L  strength to 20 lbs or better to improve grasping. Demonstrate knowledge of fall prevention. Patient goals: Improve pain and function. Pt. Education:  [x] Yes  [] No  [] Reviewed Prior HEP/Ed  Method of Education: [x] Verbal  [] Demo  [] Written - demo for new exs; fair/good recall of current exs  Comprehension of Education:  [x] Verbalizes understanding. [x] Demonstrates understanding. With direction  [x] Needs review. [] Demonstrates/verbalizes HEP/Ed previously given. Access Code: [email or web address]  URL: [email or web address]  Date: 05/23/2022  Prepared by: Donalee Lundborg     Exercises  Seated Heel Raise - 1 x daily - 4 x weekly - 3 sets - 10 reps  Seated Long Arc Quad - 1 x daily - 4 x weekly - 3 sets - 10 reps  Seated March - 1 x daily - 4 x weekly - 3 sets - 10 reps  Seated Chest Press with Bar - 1 x daily - 4 x weekly - 3 sets - 10 reps  Seated Bicep Curls with Bar - 1 x daily - 4 x weekly - 3 sets - 10 reps       Plan: [x] Continue current frequency toward long and short term goals. [x] Specific Instructions for subsequent treatments:   Add standing exercises per tolerance for UE/LE strengthening      Time In: 2770      Time Out: 4730    Electronically signed by:  Malaika Chun, PT

## 2022-08-01 ENCOUNTER — TELEPHONE (OUTPATIENT)
Dept: ORTHOPEDIC SURGERY | Age: 67
End: 2022-08-01

## 2022-08-12 ENCOUNTER — HOSPITAL ENCOUNTER (OUTPATIENT)
Dept: PHYSICAL THERAPY | Age: 67
Setting detail: THERAPIES SERIES
Discharge: HOME | End: 2022-08-12
Payer: COMMERCIAL

## 2022-08-12 PROCEDURE — 97110 THERAPEUTIC EXERCISES: CPT

## 2022-08-12 NOTE — FLOWSHEET NOTE
[x] [facility]) [facility] &  Therapy  [address]  [phone]  F: [phone] [] [address]  [address]   [address]  P: [phone]  F: [phone] [] [address]  [address]  P: [phone]  F: [phone] [] [address]  P: [phone]  F: [phone] [] [address]  [facility]   [address]   [place]: [phone]  F: [phone]      Physical Therapy Daily Treatment Note    Date:  2022  Patient Name:  Princess Starr    :  1955  MRN: [id number]  Physician: Patrick Dobson MD                      Insurance: [employer]-- Approval was received for 12 visits, from 2022 to 2022. Authorization number [id number]. Medical Diagnosis: Osteoarthritis of multiple joints, unspecified osteoarthritis type M15.9                           Rehab Codes: M79.604, M25.511, M25.611, M25.532  Onset Date: 22                                   Next 's appt:   Visit# / total visits: ; Progress note for STG reassessment due at visit #8    Cancels/No Shows: 2/2    Subjective:    Pain:  [] Yes  [x] No Location: hands, back, knees, hips. Pain Rating: (0-10 scale) 0/10   Pain altered Tx:  [x] No  [] Yes  Action:  Comments:  States that she had pain earlier in her right thigh but no pain currently. Objective:  Modalities:  As needed  Precautions:  Exercises:  Exercise:  LB,.  R hip/thigh, R shdlr, L hand Reps/ Time Weight/ Level Completed 22  Comments   sci fit 5 mins L 1.0 x 3rd sci fit           Sitting       retro shldr rolls 15x  x    Scapular retraction  15x  x    Wand ER 10x5\" 3 lb bar x    Wand chest press 2x10 3 lb bar x Added weight and increased reps     FW Biceps curls 2x10 3 lb bar x Added weight and increased reps 7/1    Wand flexion 2x10 3 lb bar x Within available ROM   L wrist flex/ext 20x      Prayer stretch  3x20\"             Ankle pumps 20x ea 1 lb x Added weight 7/15   Tband ankle DF 15xea Lime X Added 8/12/22   Heel slides 15x5\" 1 lb  Pillowcase on floor for incr ROM   Added weight 7/15   iso hip  add 15x5\"  x Increased reps 7/1   Marches 20 x 2 lb x Added weight 7/15   LAQ/ ball  20 x 1 lb x    HS stretch with DF stretch  3x20\"  x Bilateral LE - green strap for DF stretch; heel on stool   R hip flexion 10x A     Piriformis stretch -   Unable due to body habitus, pt attemtped   Lumbar flexion stretch 20 x  x    Lumbar ext stretch 20 x  x    Lat pull down 15 x Lime x    Rows 15 x Lime x    Triceps extension 15 x Lime x           Supine       LTR 5x5\"   Very limit ROM due to body habitus   SKTC -   unable due  to body habitus, pt attempted   Piriformis stretch -   Unable due to  due to body habitus, pt attempted. QL stretch -      Hip abd 10x   slider   Heel slides 10x   Slider    Bridging    Add as able. Hip flexor stretch off side of mat 3x30\"   R                        EOB to standing       Sit to mini stand 10 x  x EOB, both hands on walker, push self up part way then return to sitting)   Stand to mini sit  10 x  x Stand and squat as if going to sit down. Narrow ALBAN 1 x 30 sec     Tandem stance 1 x ea 20 sec  To the best of patient's ability   Marching at bars on wall 10xea  x Added 8/12          Gait 9 feet  23 feet   Fatigue, SOB. Other:     Treatment Charges: Mins Units   []  Modalities     [x]  Ther Exercise 40 3   []  Manual Therapy     []  Ther Activities     []  Aquatics     []  Vasocompression     []  Other      Total Treatment time 40        Assessment: [x] Progressing toward goals. Pt has not been here in two weeks therefore kept the majority of the exercises the same as last session. Added theraband resistance ankle DF for strengthening.   Also added standing marching this date, pt fatigued very quickly with this and c/o onset of right thigh pain. Otherwise, pt tolerated all exercises well without increase in pain. [] No change. [] Other:     [x] Patient would continue to benefit from skilled physical therapy services in order to: Patient will benefit from physical therapy to improve R shoulder, R hip , and left hand ROM and strength to improve ADL function and gait tolerance. STG/LTG  STG: (to be met in 8 treatments)  ? Pain: 7/10 general joint pain with ADLs. ? ROM: Patient is able to flex shoulder to 90 degrees to improve reaching. ? Strength: R hip flexion, R shoulder flexion to 4/5 to improve walking and lifting. ? Function:Patient reports improved ability to ambulate household distances. Independent with Home Exercise Programs     LTG: (to be met in 12 treatments)  TUG score of 13.5 or less to reduce fall risk. L  strength to 20 lbs or better to improve grasping. Demonstrate knowledge of fall prevention. Patient goals: Improve pain and function. Pt. Education:  [x] Yes  [] No  [] Reviewed Prior HEP/Ed  Method of Education: [x] Verbal  [] Demo  [] Written - demo for new exs; fair/good recall of current exs  Comprehension of Education:  [x] Verbalizes understanding. [x] Demonstrates understanding. With direction  [x] Needs review. [] Demonstrates/verbalizes HEP/Ed previously given. Access Code: [email or web address]  URL: [email or web address]  Date: 05/23/2022  Prepared by: Martha Gregory  Seated Heel Raise - 1 x daily - 4 x weekly - 3 sets - 10 reps  Seated Long Arc Quad - 1 x daily - 4 x weekly - 3 sets - 10 reps  Seated March - 1 x daily - 4 x weekly - 3 sets - 10 reps  Seated Chest Press with Bar - 1 x daily - 4 x weekly - 3 sets - 10 reps  Seated Bicep Curls with Bar - 1 x daily - 4 x weekly - 3 sets - 10 reps       Plan: [x] Continue current frequency toward long and short term goals.     [x] Specific Instructions for subsequent treatments:   Add standing exercises per tolerance for UE/LE strengthening      Time In: 6057      Time Out: 6357    Electronically signed by:  Deven Lee PT

## 2022-08-19 ENCOUNTER — HOSPITAL ENCOUNTER (OUTPATIENT)
Dept: PHYSICAL THERAPY | Age: 67
Setting detail: THERAPIES SERIES
Discharge: HOME | End: 2022-08-19
Payer: COMMERCIAL

## 2022-08-19 PROCEDURE — 97110 THERAPEUTIC EXERCISES: CPT

## 2022-08-19 NOTE — DISCHARGE SUMMARY
[x] [facility]  [facility]  [address]  [phone]  F: [phone] [] [address]  [facility]   [address]  P: [phone]  F: [phone] [] [address]  [address]  P: [phone]  F: [phone] [] [address]  P: [phone]  F: [phone] [] [address]  [facility]   [address]   [place]: [phone]  F: [phone]      Physical Therapy Discharge Note    Date: 2022      Patient: Axel Casey  : 1955  MRN: [id number]    Physician: Merritt Ervin MD                      Insurance: [employer]-- Approval was received for 12 visits, from 2022 to 2022. Authorization number [id number]. Medical Diagnosis: Osteoarthritis of multiple joints, unspecified osteoarthritis type M15.9                           Rehab Codes: M79.604, M25.511, M25.611, M25.532  Onset Date: 22                                   Next 's appt:  Visit# / total visits: ; Progress note for STG reassessment due at visit #8                    Cancels/No Shows:   Date of initial visit: 22                Date of final visit: 22      Subjective:  Pain:  [] Yes  [x] No   Location: R thigh  Pain Rating: (0-10 scale) 2/10  Pain altered Tx:  [x] No  [] Yes  Action:  Comments:  Took medicine for the pain earlier. Pain level 5/10 on average. Reports compliance with HEP. Objective:  Test Measurements: B shd flex- R 90, L 100; R shd flex MMT 4-/5, R hip flex MMT 4/5  Function: Pt reports significant improvement in ambulation around the house and states that she ambulates short community distances now as well. Assessment:  STG: (to be met in 8 treatments)  ?  Pain: 7/10 general joint pain with ADLs. --MET, 5/10  ? ROM: Patient is able to flex shoulder to 90 degrees to improve reaching. --MET, 90 for R, 100 for L   ? Strength: R hip flexion, R shoulder flexion to 4/5 to improve walking and lifting.--NOT MET for shoulder, MET for hip   ? Function:Patient reports improved ability to ambulate household distances. --MET   Independent with Home Exercise Programs     LTG: (to be met in 12 treatments)  TUG score of 13.5 or less to reduce fall risk. L  strength to 20 lbs or better to improve grasping. Demonstrate knowledge of fall prevention. Treatment to Date:  [x] Therapeutic Exercise    [] Modalities:  [] Therapeutic Activity    [] Ultrasound  [] Electrical Stimulation  [] Gait Training     [] Massage       [] Lumbar/Cervical Traction  [] Neuromuscular Re-education [] Cold/hotpack [] Iontophoresis: 4 mg/mL  [x] Instruction in Home Exercise Program                     Dexamethasone Sodium  [] Manual Therapy             Phosphate 40-80 mAmin  [] Aquatic Therapy                   [] Vasocompression/    [] Other:             Game Ready    Discharge Status:     [] Pt recovered from conditions. Treatment goals were met. [] Pt received maximum benefit. No further therapy indicated at this time. [x] Pt to continue exercise/home instructions independently. [] Therapy interrupted due to:    [] Pt has 2 or more no shows/cancels, is discontinued per our policy. [] Pt has completed prescribed number of treatment sessions. [x] Other: Pt completed 8/12 visits within timeframe authorized by insurance, due to only attending 1x/week and having 5 cancels/no-shows the pt will be discharged at this time. Electronically signed by Sharon Trammell PT on 8/19/2022 at 4:31 PM      If you have any questions or concerns, please don't hesitate to call.   Thank you for your referral.

## 2022-08-19 NOTE — FLOWSHEET NOTE
[x] [address]  - [facility] TWELVESTEP [facility] CENTER &  Therapy  [address]  [phone]  F: [phone] [] [address]  [email or web address] [address]   [address]  P: [phone]  F: [phone] [] [address]  [address]  P: [phone]  F: [phone] [] [address]  P: [phone]  F: [phone] [] [address]  [facility]   [address]   [place]: [phone]  F: [phone]      Physical Therapy Daily Treatment Note    Date:  2022  Patient Name:  Marny Favre    :  1955  MRN: [id number]  Physician: Charis Griffin MD                      Insurance: August Kristi-- Approval was received for 12 visits, from 2022 to 2022. Authorization number [id number]. Medical Diagnosis: Osteoarthritis of multiple joints, unspecified osteoarthritis type M15.9                           Rehab Codes: M79.604, M25.511, M25.611, M25.532  Onset Date: 22                                   Next 's appt:   Visit# / total visits: ; Progress note for STG reassessment due at visit #8    Cancels/No Shows: 2/2    Subjective:    Pain:  [] Yes  [x] No Location: R thigh  Pain Rating: (0-10 scale) 2/10   Pain altered Tx:  [x] No  [] Yes  Action:  Comments:  Took medicine for the pain earlier. Pain level 5/10 on average. Reports compliance with HEP. Objective:  Modalities:  As needed  Precautions:  Exercises:  Exercise:  LB,.  R hip/thigh, R shdlr, L hand Reps/ Time Weight/ Level Completed 22  Comments   sci fit 5 mins L 1.0 x 3rd sci fit           Sitting       retro shldr rolls 15x  x    Scapular retraction  15x  x    Wand ER 10x5\" 3 lb bar x    Wand chest press 2x10 3 lb bar x Added weight and increased reps 7/    FW Biceps curls 2x10 3 lb bar x Added weight and increased reps 7/1    Wand flexion 2x10 3 lb bar x Within available ROM   L wrist flex/ext 20x      Prayer stretch  3x20\"             Ankle pumps 20x ea 1 lb x Added weight 7/15   Tband ankle DF 15xea Lime X Added 8/12/22   Heel slides 15x5\" 1 lb  Pillowcase on floor for incr ROM   Added weight 7/15   iso hip  add 15x5\"  x Increased reps 7/1   Marches 20 x 2 lb x Added weight 7/15   LAQ/ ball  20 x 1 lb x    HS stretch with DF stretch  3x20\"  x Bilateral LE - green strap for DF stretch; heel on stool   R hip flexion 10x A     Piriformis stretch -   Unable due to body habitus, pt attemtped   Lumbar flexion stretch 20 x  x    Lumbar ext stretch 20 x  x    Lat pull down 15 x Lime x    Rows 15 x Lime x    Triceps extension 15 x Lime x           Supine       LTR 5x5\"   Very limit ROM due to body habitus   SKTC -   unable due  to body habitus, pt attempted   Piriformis stretch -   Unable due to  due to body habitus, pt attempted. QL stretch -      Hip abd 10x   slider   Heel slides 10x   Slider    Bridging    Add as able. Hip flexor stretch off side of mat 3x30\"   R                        EOB to standing       Sit to mini stand 10 x  x EOB, both hands on walker, push self up part way then return to sitting)   Stand to mini sit  10 x  x Stand and squat as if going to sit down. Narrow ALBAN 1 x 30 sec     Tandem stance 1 x ea 20 sec  To the best of patient's ability   Marching in walker 10xea  x Added 8/12   Heel raises in walker 10x      Gait    Fatigue, SOB. Other:     Treatment Charges: Mins Units   []  Modalities     [x]  Ther Exercise 40 3   []  Manual Therapy     []  Ther Activities     []  Aquatics     []  Vasocompression     []  Other      Total Treatment time 40        Assessment: [x] Progressing toward goals. See discharge summary. [] No change.   [] Other:     [x] Patient would continue to benefit from skilled physical therapy services in order to: Patient will benefit from physical therapy to improve R shoulder, R hip , and left hand ROM and strength to improve ADL function and gait tolerance. STG/LTG  STG: (to be met in 8 treatments)  ? Pain: 7/10 general joint pain with ADLs. --MET, 5/10  ? ROM: Patient is able to flex shoulder to 90 degrees to improve reaching. --MET, 90 for R, 100 for L   ? Strength: R hip flexion, R shoulder flexion to 4/5 to improve walking and lifting.--NOT MET for shoulder, MET for hip   ? Function:Patient reports improved ability to ambulate household distances. --MET   Independent with Home Exercise Programs     LTG: (to be met in 12 treatments)  TUG score of 13.5 or less to reduce fall risk. L  strength to 20 lbs or better to improve grasping. Demonstrate knowledge of fall prevention. Patient goals: Improve pain and function. Pt. Education:  [x] Yes  [] No  [] Reviewed Prior HEP/Ed  Method of Education: [x] Verbal  [] Demo  [] Written - demo for new exs; fair/good recall of current exs  Comprehension of Education:  [x] Verbalizes understanding. [x] Demonstrates understanding. With direction  [x] Needs review. [] Demonstrates/verbalizes HEP/Ed previously given. Access Code: [email or web address]  URL: [email or web address]  Date: 05/23/2022  Prepared by: Fang Cardenas     Exercises  Seated Heel Raise - 1 x daily - 4 x weekly - 3 sets - 10 reps  Seated Long Arc Quad - 1 x daily - 4 x weekly - 3 sets - 10 reps  Seated March - 1 x daily - 4 x weekly - 3 sets - 10 reps  Seated Chest Press with Bar - 1 x daily - 4 x weekly - 3 sets - 10 reps  Seated Bicep Curls with Bar - 1 x daily - 4 x weekly - 3 sets - 10 reps       Plan: [x] Pt's end date is 8/20, therefore finished treatment this date and pt will be [facility]. Pt was given lime theraband for at home and instructed on proper use. [x] Specific Instructions for subsequent treatments:   Add standing exercises per tolerance for UE/LE strengthening      Time In: 3:25pm Time Out: 4:15pm    Electronically signed by:  Mike Odom PT

## 2022-08-22 DIAGNOSIS — R09.81 SINUS CONGESTION: ICD-10-CM

## 2022-08-23 RX ORDER — OMEPRAZOLE 20 MG/1
CAPSULE, DELAYED RELEASE ORAL
Qty: 56 CAPSULE | Refills: 1 | Status: SHIPPED | OUTPATIENT
Start: 2022-08-23 | End: 2022-10-19

## 2022-08-25 ENCOUNTER — HOSPITAL ENCOUNTER (OUTPATIENT)
Dept: PAIN MANAGEMENT | Age: 67
Discharge: HOME | End: 2022-08-25
Payer: COMMERCIAL

## 2022-08-25 VITALS
SYSTOLIC BLOOD PRESSURE: 118 MMHG | BODY MASS INDEX: 47.09 KG/M2 | TEMPERATURE: 97.6 F | HEIGHT: 66 IN | OXYGEN SATURATION: 94 % | WEIGHT: 293 LBS | DIASTOLIC BLOOD PRESSURE: 73 MMHG | RESPIRATION RATE: 20 BRPM | HEART RATE: 59 BPM

## 2022-08-25 DIAGNOSIS — M54.41 CHRONIC BILATERAL LOW BACK PAIN WITH BILATERAL SCIATICA: Primary | ICD-10-CM

## 2022-08-25 DIAGNOSIS — M54.42 CHRONIC BILATERAL LOW BACK PAIN WITH BILATERAL SCIATICA: Primary | ICD-10-CM

## 2022-08-25 DIAGNOSIS — G89.29 CHRONIC BILATERAL LOW BACK PAIN WITH BILATERAL SCIATICA: Primary | ICD-10-CM

## 2022-08-25 PROCEDURE — 99203 OFFICE O/P NEW LOW 30 MIN: CPT

## 2022-08-25 PROCEDURE — 99203 OFFICE O/P NEW LOW 30 MIN: CPT | Performed by: ANESTHESIOLOGY

## 2022-08-25 ASSESSMENT — ENCOUNTER SYMPTOMS
CONSTIPATION: 0
DIARRHEA: 0
VOMITING: 0
SHORTNESS OF BREATH: 1
NAUSEA: 0

## 2022-08-25 ASSESSMENT — PAIN SCALES - GENERAL: PAINLEVEL_OUTOF10: 0

## 2022-08-25 NOTE — PROGRESS NOTES
The patient is a 69 y. o. Non- / non  female. Chief Complaint   Patient presents with    New Patient    Back Pain        HPI      Pain History  Pain score today  0  1. Location:back  2. Radiation:yes  3. Character:aching  5. Duration:months  6. Onset: months  7. Did an injury cause pain: no  8. Aggravating factors:nothing  9. Alleviating factors:medication  10. Associated symptoms (numbness / tingling / weakness):  yes  -Where at:feet and fingers  -Down into finger tips or toes (specify which finger or toes):yes  -constant or intermitting: intermitting  11. Red Flags: (weight loss / chills / loss of bladder or bowel control):no    Previous management history  1. Previous diagnostic workup: (Imaging/EMG)   CT, MRI, or Xray: yes  What part of the body:back  What facility did they have it at:[facility]  What year or specific date: 2022  EMG:  yes    2. Previous non interventional treatments tried:  chiropractor or physical therapy: no  What part of the body:na  What facility was it done at:   How long ago was it last tried: na  Did it work: na  Did they complete it:na    3. Previous Medications tried  NSAID's: no  Neurontin: yes  Lyrica: no  Trycyclic antidepressant (Ellavil / Pamelor ): no  Cymbalta: no  Opioids (Ultram / Vicodin / Percocet / Morphine / Dilaudid / Oramorph/ Fentanyl etc.):no  Last Pain medication taken (name of med and date): gabapentin 8/25/22 1pm    4. Previous Interventional pain procedures tried:  What kind of injection:no  Who did the injection: no  did the injection help: no  Last time injection was done:N/A    5.  Previous surgeries for pain  What part of the body did they have the surgery:no  What physician did the surgery:na  What Facility did they have the surgery done:na  Date of Surgery:N/A    Social History:  Marital status:  Employment History:2yrs  Working  No  Full time Or Part time: na  Disability  Yes   Legal Issues related to pain complaint: No         Lab Results Component Value Date    LABA1C 5.8 09/29/2021     Lab Results   Component Value Date     04/19/2018         Informant: patient       Past Medical History:   Diagnosis Date    Ambulates with cane     Bleeding     while on aggrenox    Cataracts, bilateral     Cerebrovascular disease 2003,2011    cva    Chronic pain in left foot     CKD (chronic kidney disease) stage 3, GFR 30-59 ml/min ([facility])     COVID-19 11/2021    states hpspitalized    GERD (gastroesophageal reflux disease)     Hx of blood clots     Hyperlipidemia     Hypertension     Iron (Fe) deficiency anemia     Nicotine abuse     Obesity     TIBURCIO on CPAP     Osteoarthritis     Peripheral vascular disease ([facility] [address] 03/13/2014    Substance abuse ([facility])     cocaine, canabis    Thalassemia minor     Unspecified cerebral artery occlusion with cerebral infarction     Wears dentures     upper and lower        Past Surgical History:   Procedure Laterality Date    BREAST SURGERY      biopsy    COLONOSCOPY  12/2007    adenomatous polyp    COLONOSCOPY  2013    normal, repeat in 5 years    COLONOSCOPY N/A 8/21/2019    COLONOSCOPY POLYPECTOMY SNARE/COLD BIOPSY performed by Tiana Paul MD at [facility] ENDOSCOPY  2009    negative    UPPER GASTROINTESTINAL ENDOSCOPY N/A 8/21/2019    EGD BIOPSY performed by Tiana Paul MD at [facility] Endoscopy       Social History     Socioeconomic History    Marital status:      Spouse name: None    Number of children: None    Years of education: None    Highest education level: None   Tobacco Use    Smoking status: Former     Packs/day: 2.00     Years: 35.00     Pack years: 70.00     Types: Cigarettes     Quit date: 10/11/2011     Years since quitting: 10.8    Smokeless tobacco: Never   Vaping Use    Vaping Use: Never used   Substance and Sexual Activity    Alcohol use:  Yes     Alcohol/week: 6.0 standard drinks Types: 6 Cans of beer per week     Comment: rare    Drug use: No     Types: Cocaine, Marijuana (Weed)     Comment: per pt did years ago; cocaine & marij, 8-23-19 denies current use    Sexual activity: Yes     Partners: Male     Social Determinants of Health     Financial Resource Strain: Low Risk     Difficulty of Paying Living Expenses: Not hard at all   Food Insecurity: No Food Insecurity    Worried About Running Out of Food in the Last Year: Never true    Ran Out of Food in the Last Year: Never true       Family History   Problem Relation Age of Onset    High Blood Pressure Mother     Asthma Mother     Heart Disease Mother     Heart Disease Father     High Blood Pressure Father     Diabetes Brother     High Blood Pressure Brother     Heart Disease Brother     High Blood Pressure Maternal Grandmother     High Blood Pressure Maternal Grandfather     Heart Disease Maternal Grandfather        Allergies   Allergen Reactions    Duricef [Cefadroxil Monohydrate] Hives    Fish-Derived Products Hives    Pcn [Penicillins] Hives    Tomato Hives       There were no vitals filed for this visit. Current Outpatient Medications   Medication Sig Dispense Refill    omeprazole (PRILOSEC) 20 MG delayed release capsule TAKE 1 CAPSULE BY MOUTH TWICE A DAY WITH MEALS 56 capsule 1    amLODIPine (NORVASC) 10 MG tablet TAKE 1 TABLET BY MOUTH DAILY 60 tablet 1    aspirin (ASPIRIN LOW DOSE) 81 MG EC tablet TAKE 1 TABLET BY MOUTH DAILY 60 tablet 1    Cholecalciferol (VITAMIN D3) 25 MCG TABS TAKE 1 TABLET BY MOUTH DAILY 60 tablet 1    folic acid (FOLVITE) 1 MG tablet TAKE 1 TABLET BY MOUTH DAILY 60 tablet 2    gabapentin (NEURONTIN) 300 MG capsule Take 1 capsule by mouth 3 times daily for 30 days.  90 capsule 3    clopidogrel (PLAVIX) 75 MG tablet Take 1 tablet by mouth daily 28 tablet 3    atorvastatin (LIPITOR) 80 MG tablet Take 1 tablet by mouth daily 28 tablet 3    hydroCHLOROthiazide (HYDRODIURIL) 25 MG tablet Take 1 tablet by mouth daily 60 tablet 3    losartan (COZAAR) 50 MG tablet Take 1 tablet by mouth daily 60 tablet 3    acetaminophen (TYLENOL) 325 MG tablet TAKE 2 TABLETS BY MOUTH EVERY 4 HOURS AS NEEDED FOR PAIN 60 tablet 3    guaiFENesin (ALTARUSSIN) 100 MG/5ML syrup Take 10 mLs by mouth 3 times daily as needed for Cough (Patient taking differently: Take 10 mLs by mouth as needed for Cough ) 1 each 0    vitamin B-1 (THIAMINE) 100 MG tablet Take 1 tablet by mouth daily 30 tablet 3    diclofenac sodium (VOLTAREN) 1 % GEL Apply 4 g topically 4 times daily 4 Tube 1     No current facility-administered medications for this encounter. Review of Systems   Constitutional:  Positive for fatigue. Negative for chills and fever. Respiratory:  Positive for shortness of breath. Cardiovascular:  Negative for chest pain and palpitations. Gastrointestinal:  Negative for constipation, diarrhea, nausea and vomiting. Neurological:  Positive for dizziness, weakness and numbness. Negative for headaches. Objective:  Vital signs: (most recent): not currently breastfeeding. No fever. Assessment & Plan  No diagnosis found. No orders of the defined types were placed in this encounter. No orders of the defined types were placed in this encounter.            Electronically signed by Lazara Osorio MA on 8/25/2022 at 2:26 PM

## 2022-08-25 NOTE — PROGRESS NOTES
The patient is a 69 y. o. Non- / non  female. Chief Complaint   Patient presents with    New Patient    Back Pain        HPI      Pain History  64-year-old female with history of chronic low back pain onset many years ago located in the lumbar area with radiation down both legs all the way to the feet associated with leg numbness and tingling  No changes in bladder or bowel control  Completed therapy without any improvement in symptoms  A CT myelogram that showed L4-5 level moderate spinal stenosis with foraminal narrowing  Was evaluated by the orthopedic surgeon Dr. Jamaica Barksdale at [facility] who did not advise for any surgery    Describes the pain as aching throbbing burning sensation affecting quality of life  Here to discuss other treatment options  Pain is today 0/10    Pain score today  0  1. Location:back  2. Radiation:yes  3. Character:aching  5. Duration:months  6. Onset: months  7. Did an injury cause pain: no  8. Aggravating factors:nothing  9. Alleviating factors:medication  10. Associated symptoms (numbness / tingling / weakness):  yes  -Where at:feet and fingers  -Down into finger tips or toes (specify which finger or toes):yes  -constant or intermitting: intermitting  11. Red Flags: (weight loss / chills / loss of bladder or bowel control):no    Previous management history  1. Previous diagnostic workup: (Imaging/EMG)   CT, MRI, or Xray: yes  What part of the body:back  What facility did they have it at:[facility]  What year or specific date: 2022  EMG:  yes    2. Previous non interventional treatments tried:  chiropractor or physical therapy: no  What part of the body:na  What facility was it done at: na  How long ago was it last tried: protect  na  Did it work: na  Did they complete it:na    3.  Previous Medications tried  NSAID's: no  Neurontin: yes  Lyrica: no  Trycyclic antidepressant (Ellavil / Pamelor ): no  Cymbalta: no  Opioids (Ultram / Vicodin / [place] / Morphine / Dilaudid / Oramorph/ Fentanyl etc.):no  Last Pain medication taken (name of med and date): gabapentin 8/25/22 1pm    4. Previous Interventional pain procedures tried:  What kind of injection:no  Who did the injection: no  did the injection help: no  Last time injection was done:N/A    5.  Previous surgeries for pain  What part of the body did they have the surgery:no  What physician did the surgery:na  What Facility did they have the surgery done:na  Date of Surgery:N/A    Social History:  Marital status:  Employment History:2yrs  Working  No  Full time Or Part time: na  Disability  Yes   Legal Issues related to pain complaint: No         Lab Results   Component Value Date    LABA1C 5.8 09/29/2021     Lab Results   Component Value Date     04/19/2018         Informant: patient       Past Medical History:   Diagnosis Date    Ambulates with cane     Bleeding     while on aggrenox    Cataracts, bilateral     Cerebrovascular disease 2003,2011    cva    Chronic pain in left foot     CKD (chronic kidney disease) stage 3, GFR 30-59 ml/min ([facility] [address]     COVID-19 11/2021    states hpspitalized    GERD (gastroesophageal reflux disease)     Hx of blood clots     Hyperlipidemia     Hypertension     Iron (Fe) deficiency anemia     Nicotine abuse     Obesity     TIBURCIO on CPAP     Osteoarthritis     Peripheral vascular disease ([address] 03/13/2014    Substance abuse ([facility] [address]     cocaine, canabis    Thalassemia minor     Unspecified cerebral artery occlusion with cerebral infarction     Wears dentures     upper and lower        Past Surgical History:   Procedure Laterality Date    BREAST SURGERY      biopsy    COLONOSCOPY  12/2007    adenomatous polyp    COLONOSCOPY  2013    normal, repeat in 5 years    COLONOSCOPY N/A 8/21/2019    COLONOSCOPY POLYPECTOMY SNARE/COLD BIOPSY performed by Cassidy Frances MD at [facility] ENDOSCOPY  2009    negative    UPPER GASTROINTESTINAL ENDOSCOPY N/A 8/21/2019    EGD BIOPSY performed by Esvin Cantu MD at [facility] Endoscopy       Social History     Socioeconomic History    Marital status:      Spouse name: None    Number of children: None    Years of education: None    Highest education level: None   Tobacco Use    Smoking status: Former     Packs/day: 2.00     Years: 35.00     Pack years: 70.00     Types: Cigarettes     Quit date: 10/11/2011     Years since quitting: 10.8    Smokeless tobacco: Never   Vaping Use    Vaping Use: Never used   Substance and Sexual Activity    Alcohol use:  Yes     Alcohol/week: 6.0 standard drinks     Types: 6 Cans of beer per week     Comment: rare    Drug use: No     Types: Cocaine, Marijuana (Weed)     Comment: per pt did years ago; cocaine & marij, 8-23-19 denies current use    Sexual activity: Yes     Partners: Male     Social Determinants of Health     Financial Resource Strain: Low Risk     Difficulty of Paying Living Expenses: Not hard at all   Food Insecurity: No Food Insecurity    Worried About Running Out of Food in the Last Year: Never true    Ran Out of Food in the Last Year: Never true       Family History   Problem Relation Age of Onset    High Blood Pressure Mother     Asthma Mother     Heart Disease Mother     Heart Disease Father     High Blood Pressure Father     Diabetes Brother     High Blood Pressure Brother     Heart Disease Brother     High Blood Pressure Maternal Grandmother     High Blood Pressure Maternal Grandfather     Heart Disease Maternal Grandfather        Allergies   Allergen Reactions    Duricef [Cefadroxil Monohydrate] Hives    Fish-Derived Products Hives    Pcn [Penicillins] Hives    Tomato Hives       Vitals:    08/25/22 1445   BP: 118/73   Pulse: 59   Resp: 20   Temp: 97.6 °F (36.4 °C)   SpO2: 94%       Current Outpatient Medications   Medication Sig Dispense Refill    omeprazole (PRILOSEC) 20 MG delayed release capsule TAKE 1 CAPSULE BY MOUTH TWICE A DAY WITH MEALS 56 capsule 1    amLODIPine (NORVASC) 10 MG tablet TAKE 1 TABLET BY MOUTH DAILY 60 tablet 1    aspirin (ASPIRIN LOW DOSE) 81 MG EC tablet TAKE 1 TABLET BY MOUTH DAILY 60 tablet 1    Cholecalciferol (VITAMIN D3) 25 MCG TABS TAKE 1 TABLET BY MOUTH DAILY 60 tablet 1    folic acid (FOLVITE) 1 MG tablet TAKE 1 TABLET BY MOUTH DAILY 60 tablet 2    gabapentin (NEURONTIN) 300 MG capsule Take 1 capsule by mouth 3 times daily for 30 days. 90 capsule 3    clopidogrel (PLAVIX) 75 MG tablet Take 1 tablet by mouth daily 28 tablet 3    atorvastatin (LIPITOR) 80 MG tablet Take 1 tablet by mouth daily 28 tablet 3    hydroCHLOROthiazide (HYDRODIURIL) 25 MG tablet Take 1 tablet by mouth daily 60 tablet 3    losartan (COZAAR) 50 MG tablet Take 1 tablet by mouth daily 60 tablet 3    acetaminophen (TYLENOL) 325 MG tablet TAKE 2 TABLETS BY MOUTH EVERY 4 HOURS AS NEEDED FOR PAIN 60 tablet 3    guaiFENesin (ALTARUSSIN) 100 MG/5ML syrup Take 10 mLs by mouth 3 times daily as needed for Cough (Patient taking differently: Take 10 mLs by mouth as needed for Cough ) 1 each 0    vitamin B-1 (THIAMINE) 100 MG tablet Take 1 tablet by mouth daily 30 tablet 3    diclofenac sodium (VOLTAREN) 1 % GEL Apply 4 g topically 4 times daily 4 Tube 1     No current facility-administered medications for this encounter. Review of Systems   Constitutional:  Positive for fatigue. Negative for chills and fever. Respiratory:  Positive for shortness of breath. Cardiovascular:  Negative for chest pain and palpitations. Gastrointestinal:  Negative for constipation, diarrhea, nausea and vomiting. Neurological:  Positive for dizziness, weakness and numbness. Negative for headaches. Objective:  General Appearance:  Well-appearing and in no acute distress. Vital signs: (most recent): Blood pressure 118/73, pulse 59, temperature 97.6 °F (36.4 °C), resp.  rate 20, height 5' 6\" (1.676 m), weight (!) 380 lb (172.4 kg), SpO2 94 %, not currently breastfeeding. Vital signs are normal.  No fever. Output: Producing urine and producing stool. HEENT: Normal HEENT exam.    Lungs:  Normal effort and normal respiratory rate. She is not in respiratory distress. Heart: Normal rate. Extremities: Normal range of motion. There is no deformity. Neurological: Patient is alert and oriented to person, place and time. Patient has normal coordination. Pupils:  Pupils are equal, round, and reactive to light. Pupils are equal.   Skin:  Warm and dry. No rash or cyanosis. Lumbar spine  Range of motion limited  Gait antalgic    Assessment & Plan    78-year-old female with history of chronic low back pain onset many years ago located in the lumbar area with radiation down both legs all the way to the feet associated with leg numbness and tingling  No changes in bladder or bowel control  Completed therapy without any improvement in symptoms  A CT myelogram that showed L4-5 level moderate spinal stenosis with foraminal narrowing  Was evaluated by the orthopedic surgeon Dr. Anabel Bennett at [facility] who did not advise for any surgery    Describes the pain as aching throbbing burning sensation affecting quality of life  Here to discuss other treatment options  Pain is today 0/10  EXAMINATION: CT OF THE LUMBAR SPINE WITH INTRATHECAL CONTRAST 6/13/2022 2:00 pm:      L3-L4: There is a circumferential disc bulge with facet and ligamentous hypertrophy. There is no canal stenosis. There is moderate right and moderate to severe left foraminal narrowing. L4-L5: There is a circumferential disc bulge with facet and ligamentous hypertrophy. There is no canal stenosis. There is severe bilateral foraminal narrowing. L5-S1: There is a circumferential disc bulge with facet hypertrophy. There is no canal stenosis. There is severe bilateral foraminal narrowing.    1. Chronic bilateral low back pain with bilateral sciatica -Chronic low back and bilateral lower extremity pain  Imaging showing lumbar stenosis with 4-5 level with degenerative disc disease    Recent completed physical therapy    NSAIDs high risk because of chronic kidney disease  High risk for opioid because of obesity and obstructive sleep apnea history    Technically difficult for interventional procedure and patient is also not interested in interventional spine procedures    Weight management seems to be the best option for improving long-term back pain    Will refer back to the primary care physician      No orders of the defined types were placed in this encounter. No orders of the defined types were placed in this encounter.            Electronically signed by Darren Ayers MD on 8/25/2022 at 3:13 PM

## 2022-08-26 DIAGNOSIS — M17.12 PRIMARY OSTEOARTHRITIS OF LEFT KNEE: ICD-10-CM

## 2022-08-26 NOTE — TELEPHONE ENCOUNTER
Request for Tylenol. Next Visit Date:  Future Appointments   Date Time Provider Mary Bakeri   9/19/2022  3:00 PM [facility]   9/26/2022  1:00 PM Karina Godwin APRN - C[facility]   9/26/2022  2:45 PM Ryan Stroud DPM [facility]   10/3/2022  3:45 PM Jero Leon  [facility]   10/4/2022  3:30 PM Tasha Green  [facility]   Topic Date Due    Pneumococcal 65+ years Vaccine (2 - PPSV23 or PCV20) 12/20/2021    Low dose CT lung screening  01/20/2022    COVID-19 Vaccine (4 - Booster for Pfizer series) 05/20/2022    Colorectal Cancer Screen  08/21/2022    Lipids  09/24/2022    Flu vaccine (1) 09/01/2022    A1C test (Diabetic or Prediabetic)  09/29/2022    Annual Wellness Visit (AWV)  10/08/2022    Depression Screen  06/08/2023    Breast cancer screen  12/08/2023    DTaP/Tdap/Td vaccine (2 - Td or Tdap) 03/08/2028    DEXA (modify frequency per FRAX score)  Completed    Shingles vaccine  Completed    Hepatitis C screen  Completed    Hepatitis A vaccine  Aged Out    Hepatitis B vaccine  Aged Out    Hib vaccine  Aged Out    Meningococcal (ACWY) vaccine  Aged Out       Hemoglobin A1C (%)   Date Value   09/29/2021 5.8   06/07/2019 5.4   04/19/2018 5.7             ( goal A1C is < 7)   Microalb/Crt.  Ratio (mcg/mg creat)   Date Value   05/09/2017 6     LDL Cholesterol (mg/dL)   Date Value   09/24/2021 74       (goal LDL is <100)   AST (U/L)   Date Value   11/29/2021 16     ALT (U/L)   Date Value   11/29/2021 15     BUN (mg/dL)   Date Value   11/29/2021 24 (H)     BP Readings from Last 3 Encounters:   08/25/22 118/73   07/13/22 134/75   06/27/22 128/78          (goal 120/80)    All Future Testing planned in [facility]  Lab Frequency Next Occurrence   COVID-19 Once 11/10/2022   Protein / Creatinine Ratio, Urine Once 05/28/2022   XR CHEST (2 VW) Once 05/18/2022   Protein / Creatinine Ratio, Urine Once 06/10/2022   Lactate Dehydrogenase Once 04/04/2022   Iron And TIBC     Vitamin B12 & Folate     Ferritin     CBC Auto Differential     CBC with Auto Differential     Comprehensive Metabolic Panel           Patient Active Problem List:     Essential hypertension     Pure hypercholesterolemia     Cerebrovascular accident (CVA), 2011, no residual deficit (HCC)     CKD (chronic kidney disease) stage 3, GFR 30-59 ml/min (HCC)     Peripheral vascular disease (HCC)     TIBURCIO (obstructive sleep apnea)     Class 3 severe obesity due to excess calories with serious comorbidity and body mass index (BMI) of 50.0 to 59.9 in adult [facility])     Primary osteoarthritis of left knee     Chronic tension-type headache, not intractable     Carpal tunnel syndrome, bilateral-not on medication due to kidney disease     Microcytic anemia     Alpha thalassemia trait     Cervical stenosis of spinal canal     Pneumonia due to COVID-19 virus     Chronic bilateral low back pain with bilateral sciatica     Lumbar radicular pain

## 2022-09-01 RX ORDER — ACETAMINOPHEN 325 MG/1
TABLET ORAL
Qty: 60 TABLET | Refills: 3 | Status: SHIPPED | OUTPATIENT
Start: 2022-09-01 | End: 2022-10-30

## 2022-09-19 ENCOUNTER — OFFICE VISIT (OUTPATIENT)
Dept: GASTROENTEROLOGY | Age: 67
End: 2022-09-19
Payer: COMMERCIAL

## 2022-09-19 VITALS
DIASTOLIC BLOOD PRESSURE: 85 MMHG | WEIGHT: 293 LBS | BODY MASS INDEX: 47.09 KG/M2 | SYSTOLIC BLOOD PRESSURE: 131 MMHG | HEIGHT: 66 IN

## 2022-09-19 DIAGNOSIS — Z86.010 HISTORY OF COLON POLYPS: ICD-10-CM

## 2022-09-19 DIAGNOSIS — Z12.11 ENCOUNTER FOR SCREENING COLONOSCOPY: Primary | ICD-10-CM

## 2022-09-19 PROCEDURE — 1123F ACP DISCUSS/DSCN MKR DOCD: CPT | Performed by: INTERNAL MEDICINE

## 2022-09-19 PROCEDURE — 99203 OFFICE O/P NEW LOW 30 MIN: CPT | Performed by: INTERNAL MEDICINE

## 2022-09-19 ASSESSMENT — ENCOUNTER SYMPTOMS
VOMITING: 0
ABDOMINAL PAIN: 0
VOICE CHANGE: 0
TROUBLE SWALLOWING: 0
ANAL BLEEDING: 0
SHORTNESS OF BREATH: 1
NAUSEA: 0
RECTAL PAIN: 0
BLOOD IN STOOL: 0
COUGH: 0
DIARRHEA: 0
ABDOMINAL DISTENTION: 0
CHOKING: 0
CONSTIPATION: 0
WHEEZING: 1

## 2022-09-19 NOTE — PROGRESS NOTES
GI FOLLOW UP    INTERVAL HISTORY:     Prior colonoscopy performed in 2019 where the patient identified to have 2 polyps, suboptimal bowel prep      Loulou Rosas MD  [address]    Chief Complaint   Patient presents with    New Patient     Screening Colonoscopy       1. Encounter for screening colonoscopy    2. History of colon polyps          HISTORY OF PRESENT ILLNESS: Ms.Cheryl Luis Mullen is a 69 y.o. female with a past history remarkable for morbid obesity, CKD, hypertension, hyperlipidemia, TIBURCIO, on CPAP, cannabis use in the past, cocaine in the past, prior history of CVA x2, on aspirin and Plavix, referred for evaluation of screening/surveillance colonoscopy. Patient reports bowel moods every day. Denies any blood in stool. Compliant with medication, receiving dual antiplatelet therapy per neurology. Denies any active GI symptoms. Past Medical,Family, and Social History reviewed and does contribute to the patient presenting condition. Patient's PMH/PSH,SH,PSYCH Hx, MEDs, ALLERGIES, and ROS were all reviewed and updated in the appropriate sections.     PAST MEDICAL HISTORY:  Past Medical History:   Diagnosis Date    Ambulates with cane     Bleeding     while on aggrenox    Cataracts, bilateral     Cerebrovascular disease 2003,2011    cva    Chronic pain in left foot     CKD (chronic kidney disease) stage 3, GFR 30-59 ml/min ([facility] [address]     COVID-19 11/2021    states hpspitalized    GERD (gastroesophageal reflux disease)     Hx of blood clots     Hyperlipidemia     Hypertension     Iron (Fe) deficiency anemia     Nicotine abuse     Obesity     TIBURCIO on CPAP     Osteoarthritis     Peripheral vascular disease ([address] 03/13/2014    Substance abuse (HCC)     cocaine, canabis    Thalassemia minor     Unspecified cerebral artery occlusion with cerebral infarction     Wears dentures     upper and lower       Past Surgical History:   Procedure Laterality Date    BREAST SURGERY      biopsy    COLONOSCOPY  12/2007    adenomatous polyp    COLONOSCOPY  2013    normal, repeat in 5 years    COLONOSCOPY N/A 8/21/2019    COLONOSCOPY POLYPECTOMY SNARE/COLD BIOPSY performed by Libia Yip MD at [facility] ENDOSCOPY  2009    negative    UPPER GASTROINTESTINAL ENDOSCOPY N/A 8/21/2019    EGD BIOPSY performed by Libia Yip MD at [facility] Endoscopy       CURRENT MEDICATIONS:    Current Outpatient Medications:     acetaminophen (TYLENOL) 325 MG tablet, TAKE 2 TABLETS BY MOUTH EVERY 4 HOURS AS NEEDED FOR PAIN, Disp: 60 tablet, Rfl: 3    omeprazole (PRILOSEC) 20 MG delayed release capsule, TAKE 1 CAPSULE BY MOUTH TWICE A DAY WITH MEALS, Disp: 56 capsule, Rfl: 1    amLODIPine (NORVASC) 10 MG tablet, TAKE 1 TABLET BY MOUTH DAILY, Disp: 60 tablet, Rfl: 1    aspirin (ASPIRIN LOW DOSE) 81 MG EC tablet, TAKE 1 TABLET BY MOUTH DAILY, Disp: 60 tablet, Rfl: 1    Cholecalciferol (VITAMIN D3) 25 MCG TABS, TAKE 1 TABLET BY MOUTH DAILY, Disp: 60 tablet, Rfl: 1    folic acid (FOLVITE) 1 MG tablet, TAKE 1 TABLET BY MOUTH DAILY, Disp: 60 tablet, Rfl: 2    clopidogrel (PLAVIX) 75 MG tablet, Take 1 tablet by mouth daily, Disp: 28 tablet, Rfl: 3    atorvastatin (LIPITOR) 80 MG tablet, Take 1 tablet by mouth daily, Disp: 28 tablet, Rfl: 3    hydroCHLOROthiazide (HYDRODIURIL) 25 MG tablet, Take 1 tablet by mouth daily, Disp: 60 tablet, Rfl: 3    losartan (COZAAR) 50 MG tablet, Take 1 tablet by mouth daily, Disp: 60 tablet, Rfl: 3    guaiFENesin (ALTARUSSIN) 100 MG/5ML syrup, Take 10 mLs by mouth 3 times daily as needed for Cough (Patient taking differently: Take 10 mLs by mouth as needed for Cough), Disp: 1 each, Rfl: 0    vitamin B-1 (THIAMINE) 100 MG tablet, Take 1 tablet by mouth daily, Disp: 30 tablet, Rfl: 3    diclofenac sodium (VOLTAREN) 1 % GEL, Apply 4 g topically 4 times daily, Disp: 4 Tube, Rfl: 1    gabapentin (NEURONTIN) 300 MG capsule, Take 1 capsule by mouth 3 times daily for 30 days. , Disp: 90 capsule, Rfl: 3    ALLERGIES:   Allergies   Allergen Reactions    Duricef [Cefadroxil Monohydrate] Hives    Fish-Derived Products Hives    Pcn [Penicillins] Hives    Tomato Hives       FAMILY HISTORY:       Problem Relation Age of Onset    High Blood Pressure Mother     Asthma Mother     Heart Disease Mother     Heart Disease Father     High Blood Pressure Father     Diabetes Brother     High Blood Pressure Brother     Heart Disease Brother     High Blood Pressure Maternal Grandmother     High Blood Pressure Maternal Grandfather     Heart Disease Maternal Grandfather          SOCIAL HISTORY:   Social History     Socioeconomic History    Marital status:      Spouse name: Not on file    Number of children: Not on file    Years of education: Not on file    Highest education level: Not on file   Occupational History    Not on file   Tobacco Use    Smoking status: Former     Packs/day: 2.00     Years: 35.00     Pack years: 70.00     Types: Cigarettes     Quit date: 10/11/2011     Years since quitting: 10.9    Smokeless tobacco: Never   Vaping Use    Vaping Use: Never used   Substance and Sexual Activity    Alcohol use:  Yes     Alcohol/week: 6.0 standard drinks     Types: 6 Cans of beer per week     Comment: rare    Drug use: No     Types: Cocaine, Marijuana (Weed)     Comment: per pt did years ago; cocaine & michell, 8-23-19 denies current use    Sexual activity: Yes     Partners: Male   Other Topics Concern    Not on file   Social History Narrative    Not on file     Social Determinants of Health     Financial Resource Strain: Low Risk     Difficulty of Paying Living Expenses: Not hard at all   Food Insecurity: No Food Insecurity    Worried About [address] [employer] in the Last Year: Never true    Ran Out of 12.8 oz (169.6 kg)   PF 82 L/min   BMI 60.33 kg/m²   Body mass index is 60.33 kg/m². Physical Exam    Physical Exam   Constitutional: Patient is oriented to person, place, and time. Patient appears well-developed and well-nourished. HENT:   Head: Normocephalic and atraumatic. Eyes: Pupils are equal, round, and reactive to light. EOM are normal.   Neck: Normal range of motion. Neck supple. No JVD present. No tracheal deviation present. No thyromegaly present. Cardiovascular: Normal rate, regular rhythm, normal heart sounds and intact distal pulses. Pulmonary/Chest: Effort normal and breath sounds normal. No stridor. No respiratory distress. He has no wheezes. He has no rales. He exhibits no tenderness. Abdominal: Soft. Bowel sounds are normal. He exhibits no distension and no mass. There is no tenderness. There is no rebound and no guarding. No hernia. Musculoskeletal: Normal range of motion. Lymphadenopathy:    Patient has no cervical adenopathy. Neurological: Patient is alert and oriented to person, place, and time. Psychiatric: Patient has a normal mood and affect.  Patient behavior is normal.       LABORATORY DATA: Reviewed  Lab Results   Component Value Date    WBC 8.9 03/17/2022    HGB 10.4 (L) 03/17/2022    HCT 34.4 (L) 03/17/2022    MCV 72.4 (L) 03/17/2022     06/13/2022     11/29/2021    K 4.1 11/29/2021     11/29/2021    CO2 21 11/29/2021    BUN 24 (H) 11/29/2021    CREATININE 1.15 (H) 11/29/2021    LABPROT 5.9 (L) 10/03/2011    LABALBU 3.8 11/29/2021    BILITOT 0.36 11/29/2021    ALKPHOS 121 (H) 11/29/2021    AST 16 11/29/2021    ALT 15 11/29/2021    INR 0.9 06/13/2022         Lab Results   Component Value Date    RBC 4.75 03/17/2022    HGB 10.4 (L) 03/17/2022    MCV 72.4 (L) 03/17/2022    MCH 21.9 (L) 03/17/2022    MCHC 30.2 03/17/2022    RDW 17.0 (H) 03/17/2022    MPV 9.2 03/17/2022    BASOPCT 1 03/17/2022    LYMPHSABS 1.76 03/17/2022    MONOSABS 0.53 03/17/2022 NEUTROABS 6.23 03/17/2022    EOSABS 0.25 03/17/2022    BASOSABS 0.07 03/17/2022         DIAGNOSTIC TESTING:     No results found. IMPRESSION:Ms. Kelvin Huerta is a 69 y.o. female with a past history remarkable for morbid obesity, CKD, hypertension, hyperlipidemia, TIBURCIO, on CPAP, cannabis use in the past, cocaine in the past, prior history of CVA x2, on aspirin and Plavix, referred for evaluation of screening/surveillance colonoscopy. Patient reports bowel moods every day. Denies any blood in stool. Compliant with medication, receiving dual antiplatelet therapy per neurology. Denies any active GI symptoms. Assessment  1. Encounter for screening colonoscopy    2. History of colon polyps        Annamaria Parmar was seen today for new patient. Diagnoses and all orders for this visit:    Encounter for screening colonoscopy-we will need to obtain clearance from neurology to hold Plavix x5 to 7 days prior to scheduled procedure, aspirin 24 hours if able prior to procedure. Risk, benefits, alternative discussed with patient. She agreed to proceed with the procedure. -     COLONOSCOPY (Screening); Future    History of colon polyps  -     COLONOSCOPY (Screening); Future             RTC: 3 months. Additional comments: Thank you for allowing me to participate in the care of Ms. Tyree Hashimoto. For any further questions please do not hesitate to contact me. I have reviewed and agree with the ROS entered by the MA/LPN from today's encounter documented in a separate note. Belkis Pereira MD, MPH   Board Certified in Gastroenterology  Board Certified in [address] [place] [address] #: [phone]    this note is created with the assistance of a speech recognition program.  While intending to generate a document that actually reflects the content of the visit, the document can still have some errors including those of syntax and sound a like substitutions which may escape proof reading.   It such instances, actual meaning can be extrapolated by contextual diversion.

## 2022-09-26 ENCOUNTER — OFFICE VISIT (OUTPATIENT)
Dept: NEUROLOGY | Age: 67
End: 2022-09-26
Payer: COMMERCIAL

## 2022-09-26 ENCOUNTER — TELEPHONE (OUTPATIENT)
Dept: BARIATRICS/WEIGHT MGMT | Age: 67
End: 2022-09-26

## 2022-09-26 VITALS
BODY MASS INDEX: 47.09 KG/M2 | SYSTOLIC BLOOD PRESSURE: 123 MMHG | WEIGHT: 293 LBS | HEART RATE: 73 BPM | HEIGHT: 66 IN | DIASTOLIC BLOOD PRESSURE: 73 MMHG

## 2022-09-26 DIAGNOSIS — E66.01 CLASS 3 SEVERE OBESITY DUE TO EXCESS CALORIES WITHOUT SERIOUS COMORBIDITY WITH BODY MASS INDEX (BMI) OF 60.0 TO 69.9 IN ADULT (HCC): Primary | ICD-10-CM

## 2022-09-26 DIAGNOSIS — G89.29 CHRONIC BILATERAL LOW BACK PAIN WITH BILATERAL SCIATICA: ICD-10-CM

## 2022-09-26 DIAGNOSIS — M54.41 CHRONIC BILATERAL LOW BACK PAIN WITH BILATERAL SCIATICA: ICD-10-CM

## 2022-09-26 DIAGNOSIS — M54.42 CHRONIC BILATERAL LOW BACK PAIN WITH BILATERAL SCIATICA: ICD-10-CM

## 2022-09-26 DIAGNOSIS — M54.16 LUMBAR RADICULAR PAIN: ICD-10-CM

## 2022-09-26 DIAGNOSIS — M48.02 CERVICAL STENOSIS OF SPINAL CANAL: ICD-10-CM

## 2022-09-26 DIAGNOSIS — Z86.73 HISTORY OF STROKE: ICD-10-CM

## 2022-09-26 PROCEDURE — 1123F ACP DISCUSS/DSCN MKR DOCD: CPT | Performed by: NURSE PRACTITIONER

## 2022-09-26 PROCEDURE — 99214 OFFICE O/P EST MOD 30 MIN: CPT | Performed by: NURSE PRACTITIONER

## 2022-09-26 RX ORDER — GABAPENTIN 600 MG/1
600 TABLET ORAL 3 TIMES DAILY
Qty: 90 TABLET | Refills: 5 | Status: SHIPPED | OUTPATIENT
Start: 2022-09-26 | End: 2022-10-26

## 2022-09-26 NOTE — PROGRESS NOTES
[facility]            [address]          [place], [address] [facility]          Dept: [phone]          Dept Fax: [phone]    MD Jose Ramon Carrizales MD Mamie Dauphin, MD Serapio Degree, CNP            9/26/2022      HISTORY OF PRESENT ILLNESS:       I had the pleasure of seeing Florecita Ernst, who returns for continuing neurologic care. The patient was seen last on September 23, 2021 for treatment of a history of two strokes in the bilateral cerebral hemispheres, bilateral carpal tunnel syndrome and cervical stenosis. The patient has a history of two strokes affecting the bilateral cerebral hemispheres and for secondary stroke prevention she was prescribed plavix 75 mg daily, aspirin 81 mg daily, lipitor 80 mg daily and folic acid 1 mg daily. The patient completed laboratory testing in September 2021 with a normal lipid panel and normal liver functions. The patient was complaining of back pain and was evaluated by Dr. Ruben Joyner who ordered a CT myelogram which showed L4-5 moderate spinal stenosis. Dr. Ruben Joyner did not recommend surgical intervention and referred her to Dr. Arelis Heller, pain management. Dr. Arelis Heller offered no interventions for her back pain due to her weight and recommended weight management. The patient has a history of bilateral carpal tunnel syndrome and cervical stenosis but declines surgical intervention.       Testing reviewed:    Labs Completed 9/24/2021  Cholesterol <200 mg/dL 148      HDL >40 mg/dL 50     LDL Cholesterol 0 - 130 mg/dL 74     Triglycerides <150 mg/dL 119      AST <32 U/L 18      ALT 5 - 33 U/L 14          PAST MEDICAL HISTORY:         Diagnosis Date    Ambulates with cane     Bleeding     while on aggrenox    Cataracts, bilateral     Cerebrovascular disease 2003,2011    cva    Chronic pain in left foot     CKD (chronic kidney disease) stage 3, GFR 30-59 ml/min ([address]     COVID-19 11/2021    states hpspitalized    GERD (gastroesophageal reflux disease)     Hx of blood clots     Hyperlipidemia     Hypertension     Iron (Fe) deficiency anemia     Nicotine abuse     Obesity     TIBURCIO on CPAP     Osteoarthritis     Peripheral vascular disease ([facility] [address] 03/13/2014    Substance abuse (HCC)     cocaine, canabis    Thalassemia minor     Unspecified cerebral artery occlusion with cerebral infarction     Wears dentures     upper and lower        PAST SURGICAL HISTORY:         Procedure Laterality Date    BREAST SURGERY      biopsy    COLONOSCOPY  12/2007    adenomatous polyp    COLONOSCOPY  2013    normal, repeat in 5 years    COLONOSCOPY N/A 8/21/2019    COLONOSCOPY POLYPECTOMY SNARE/COLD BIOPSY performed by Elin Knowles MD at [facility] ENDOSCOPY  2009    negative    UPPER GASTROINTESTINAL ENDOSCOPY N/A 8/21/2019    EGD BIOPSY performed by Elin Knowles MD at [facility] Endoscopy        SOCIAL HISTORY:     Social History     Socioeconomic History    Marital status:      Spouse name: Not on file    Number of children: Not on file    Years of education: Not on file    Highest education level: Not on file   Occupational History    Not on file   Tobacco Use    Smoking status: Former     Packs/day: 2.00     Years: 35.00     Pack years: 70.00     Types: Cigarettes     Quit date: 10/11/2011     Years since quitting: 10.9     Passive exposure: Current (Outside of the home)    Smokeless tobacco: Never   Vaping Use    Vaping Use: Never used   Substance and Sexual Activity    Alcohol use:  Yes     Alcohol/week: 6.0 standard drinks     Types: 6 Cans of beer per week     Comment: rare    Drug use: No     Types: Cocaine, Marijuana (Weed)     Comment: per pt did years ago; cocaine & michell, 8-23-19 denies current use    Sexual activity: Yes     Partners: Male   Other Topics Concern    Not on file   Social History Narrative    Not on file     Social Determinants of Health     Financial Resource Strain: Low Risk     Difficulty of Paying Living Expenses: Not hard at all   Food Insecurity: No Food Insecurity    Worried About Running Out of Food in the Last Year: Never true    Ran Out of Food in the Last Year: Never true   Transportation Needs: Not on file   Physical Activity: Not on file   Stress: Not on file   Social Connections: Not on file   Intimate Partner Violence: Not on file   Housing Stability: Not on file       CURRENT MEDICATIONS:     Current Outpatient Medications   Medication Sig Dispense Refill    gabapentin (NEURONTIN) 600 MG tablet Take 1 tablet by mouth 3 times daily for 30 days.  90 tablet 5    acetaminophen (TYLENOL) 325 MG tablet TAKE 2 TABLETS BY MOUTH EVERY 4 HOURS AS NEEDED FOR PAIN 60 tablet 3    omeprazole (PRILOSEC) 20 MG delayed release capsule TAKE 1 CAPSULE BY MOUTH TWICE A DAY WITH MEALS 56 capsule 1    amLODIPine (NORVASC) 10 MG tablet TAKE 1 TABLET BY MOUTH DAILY 60 tablet 1    aspirin (ASPIRIN LOW DOSE) 81 MG EC tablet TAKE 1 TABLET BY MOUTH DAILY 60 tablet 1    Cholecalciferol (VITAMIN D3) 25 MCG TABS TAKE 1 TABLET BY MOUTH DAILY 60 tablet 1    folic acid (FOLVITE) 1 MG tablet TAKE 1 TABLET BY MOUTH DAILY 60 tablet 2    clopidogrel (PLAVIX) 75 MG tablet Take 1 tablet by mouth daily 28 tablet 3    atorvastatin (LIPITOR) 80 MG tablet Take 1 tablet by mouth daily 28 tablet 3    hydroCHLOROthiazide (HYDRODIURIL) 25 MG tablet Take 1 tablet by mouth daily 60 tablet 3    losartan (COZAAR) 50 MG tablet Take 1 tablet by mouth daily 60 tablet 3    guaiFENesin (ALTARUSSIN) 100 MG/5ML syrup Take 10 mLs by mouth 3 times daily as needed for Cough (Patient taking differently: Take 10 mLs by mouth as needed for Cough) 1 each 0    vitamin B-1 (THIAMINE) 100 MG tablet Take 1 tablet by mouth daily 30 tablet 3    diclofenac sodium (VOLTAREN) 1 % GEL Apply 4 g topically 4 times daily 4 Tube 1     No current facility-administered medications for this visit. ALLERGIES:     Allergies   Allergen Reactions    Duricef [Cefadroxil Monohydrate] Hives    Fish-Derived Products Hives    Pcn [Penicillins] Hives    Tomato Hives                                 REVIEW OF SYSTEMS        All items selected indicate a positive finding. Those items not selected are negative.   Constitutional [] Weight loss/gain   [] Fatigue  [] Fever/Chills   HEENT [] Hearing Loss  [] Visual Disturbance  [] Tinnitus  [] Eye pain   Respiratory [] Shortness of Breath  [] Cough  [] Snoring   Cardiovascular [] Chest Pain  [] Palpitations  [] Lightheaded   GI [] Constipation  [] Diarrhea  [] Swallowing change  [] Nausea/vomiting    [] Urinary Frequency  [] Urinary Urgency   Musculoskeletal [x] Neck pain  [x] Back pain  [] Muscle pain  [] Restless legs   Dermatologic [] Skin changes   Neurologic [] Memory loss/confusion  [] Seizures  [x] Trouble walking or imbalance  [] Dizziness  [] Sleep disturbance  [] Weakness  [] Numbness  [] Tremors  [] Speech Difficulty  [] Headaches  [] Light Sensitivity  [] Sound Sensitivity   Endocrinology []Excessive thirst  []Excessive hunger   Psychiatric [] Anxiety/Depression  [] Hallucination   Allergy/immunology []Hives/environmental allergies   Hematologic/lymph [] Abnormal bleeding  [] Abnormal bruising         PHYSICAL EXAMINATION:       Vitals:    09/26/22 1241   BP: 123/73   Pulse: 73                                              .                                                                                                    General Appearance:  Alert, cooperative, no signs of distress, appears stated age   Head:  Normocephalic, no signs of trauma   Eyes:  Conjunctiva/corneas clear;  eyelids intact   Ears:  Normal external ear and canals   Nose: Nares normal, mucosa normal, no drainage    Throat: Lips and tongue normal; teeth normal;  gums normal   Neck: Supple, intact flexion, extension and rotation; trachea midline;  no adenopathy;   thyroid: not enlarged;   no carotid pulse abnormality   Back:   Symmetric, no curvature, ROM adequate   Lungs:   Respirations unlabored   Heart:  Regular rate and rhythm           Extremities: Extremities normal, no cyanosis, no edema   Pulses: Symmetric over head and neck   Skin: Skin color, texture normal, no rashes, no lesions                                     NEUROLOGIC EXAMINATION    Neurologic Exam  Mental status    Alert and oriented x 3; intact memory with no confusion, speech or language problems; no hallucinations or delusions  Fund of information appropriate for level of education    Cranial nerves    II - visual fields intact to confrontation bilaterally  III, IV, VI - extra-ocular muscles full: no pupillary defect; no MICHAEL, no nystagmus, no ptosis   V - normal facial sensation                                                               VII - normal facial symmetry                                                             VIII - intact hearing                                                                             IX, X - symmetrical palate                                                                  XI - symmetrical shoulder shrug                                                       XII - tongue midline without atrophy or fasciculation      Motor function  Normal muscle bulk and tone; strength 5/5 on all 4 extremities, no pronator drift      Sensory function Intact to light touch, pinprick, vibration, proprioception on all 4 extremities      Cerebellar Intact fine motor movement. No involuntary movements or tremors. No ataxia or dysmetria on finger to nose or heel to shin testing      Reflex function DTR 2+ on bilateral UE and LE, symmetric. Down going toes bilaterally      Gait                   normal base and arm swing                  Medical Decision Making:        In summary, your patient, Humza Bateman exhibits the following, with associated plan:    History of two strokes affecting the bilateral cerebral hemispheres. The patient had a a stroke involving the left cerebral hemisphere in 2011. She then had a repeat CT of her head in 2016 which showed areas of encephalomalacia in the bilateral cerebral hemispheres with the encephalomalacia involving the right hemisphere being new since the last CT in 2011. Continue plavix 75 mg daily, aspirin 81 mg daily, lipitor 80 mg daily and folic acid 1 mg daily for secondary stroke prevention  Lipid panel completed in September 2021 with LDL of 74  Continue to follow with hematology  Bilateral carpal tunnel syndrome  Continue to monitor  L4-5 Spinal Stenosis  Continue to follow with Dr. Jessie Madrigal, orthopedics  Increase gabapentin to 600 mg three times daily  A referral to bariatrics was made at today's visit  Cervical Stenosis  Patient declines surgical intervention  Return for follow up visit in 3 months             Signed: Scout Barillas CNP      *Please note that portions of this note were completed with a voice recognition program.  Although every effort was made to insure the accuracy of this automated transcription, some errors in transcription may have occurred, occasionally words and are mis-transcribed    Provider Attestation: The documentation recorded by the scribe accurately reflects the service I personally performed and the decisions made by myself. Portions of this note were transcribed by a scribe. I personally performed the history, physical exam, and medical decision-making and confirm the accuracy of the information in the transcribed note. Scribe Attestation:   By signing my name below, Greer Quinn, attest that this documentation has been prepared under the direction and in the presence of Scout Barillas CNP.

## 2022-09-28 ENCOUNTER — OFFICE VISIT (OUTPATIENT)
Dept: PODIATRY | Age: 67
End: 2022-09-28
Payer: COMMERCIAL

## 2022-09-28 ENCOUNTER — HOSPITAL ENCOUNTER (OUTPATIENT)
Age: 67
Discharge: HOME | End: 2022-09-28
Payer: COMMERCIAL

## 2022-09-28 VITALS
HEART RATE: 68 BPM | HEIGHT: 66 IN | DIASTOLIC BLOOD PRESSURE: 80 MMHG | SYSTOLIC BLOOD PRESSURE: 126 MMHG | BODY MASS INDEX: 47.09 KG/M2 | WEIGHT: 293 LBS

## 2022-09-28 DIAGNOSIS — I73.9 PVD (PERIPHERAL VASCULAR DISEASE) (HCC): ICD-10-CM

## 2022-09-28 DIAGNOSIS — D56.3 THALASSEMIA TRAIT, ALPHA: ICD-10-CM

## 2022-09-28 DIAGNOSIS — R79.89 ELEVATED FERRITIN: ICD-10-CM

## 2022-09-28 DIAGNOSIS — B35.1 ONYCHOMYCOSIS: Primary | ICD-10-CM

## 2022-09-28 DIAGNOSIS — E66.01 CLASS 3 SEVERE OBESITY WITH BODY MASS INDEX (BMI) OF 50.0 TO 59.9 IN ADULT, UNSPECIFIED OBESITY TYPE, UNSPECIFIED WHETHER SERIOUS COMORBIDITY PRESENT (HCC): ICD-10-CM

## 2022-09-28 DIAGNOSIS — D50.9 MICROCYTIC ANEMIA: ICD-10-CM

## 2022-09-28 DIAGNOSIS — E66.01 MORBID OBESITY WITH BMI OF 50.0-59.9, ADULT (HCC): ICD-10-CM

## 2022-09-28 DIAGNOSIS — I73.9 PAD (PERIPHERAL ARTERY DISEASE) (HCC): ICD-10-CM

## 2022-09-28 LAB
ABSOLUTE EOS #: 0.42 K/UL (ref 0–0.44)
ABSOLUTE IMMATURE GRANULOCYTE: 0.03 K/UL (ref 0–0.3)
ABSOLUTE LYMPH #: 1.56 K/UL (ref 1.1–3.7)
ABSOLUTE MONO #: 0.55 K/UL (ref 0.1–1.2)
BASOPHILS # BLD: 1 % (ref 0–2)
BASOPHILS ABSOLUTE: 0.05 K/UL (ref 0–0.2)
EOSINOPHILS RELATIVE PERCENT: 6 % (ref 1–4)
HCT VFR BLD CALC: 35.2 % (ref 36.3–47.1)
HEMOGLOBIN: 10.7 G/DL (ref 11.9–15.1)
IMMATURE GRANULOCYTES: 1 %
LYMPHOCYTES # BLD: 23 % (ref 24–43)
MCH RBC QN AUTO: 22.3 PG (ref 25.2–33.5)
MCHC RBC AUTO-ENTMCNC: 30.4 G/DL (ref 28.4–34.8)
MCV RBC AUTO: 73.3 FL (ref 82.6–102.9)
MONOCYTES # BLD: 8 % (ref 3–12)
NRBC AUTOMATED: 0 PER 100 WBC
PDW BLD-RTO: 17.2 % (ref 11.8–14.4)
PLATELET # BLD: 385 K/UL (ref 138–453)
PMV BLD AUTO: 9.9 FL (ref 8.1–13.5)
RBC # BLD: 4.8 M/UL (ref 3.95–5.11)
RBC # BLD: ABNORMAL 10*6/UL
SEG NEUTROPHILS: 61 % (ref 36–65)
SEGMENTED NEUTROPHILS ABSOLUTE COUNT: 4.05 K/UL (ref 1.5–8.1)
WBC # BLD: 6.7 K/UL (ref 3.5–11.3)

## 2022-09-28 PROCEDURE — 11721 DEBRIDE NAIL 6 OR MORE: CPT

## 2022-09-28 PROCEDURE — 83540 ASSAY OF IRON: CPT

## 2022-09-28 PROCEDURE — 85025 COMPLETE CBC W/AUTO DIFF WBC: CPT

## 2022-09-28 PROCEDURE — 99999 PR OFFICE/OUTPT VISIT,PROCEDURE ONLY: CPT

## 2022-09-28 PROCEDURE — 82728 ASSAY OF FERRITIN: CPT

## 2022-09-28 PROCEDURE — 11721 DEBRIDE NAIL 6 OR MORE: CPT | Performed by: PODIATRIST

## 2022-09-28 PROCEDURE — 83615 LACTATE (LD) (LDH) ENZYME: CPT

## 2022-09-28 PROCEDURE — 36415 COLL VENOUS BLD VENIPUNCTURE: CPT

## 2022-09-28 PROCEDURE — 80053 COMPREHEN METABOLIC PANEL: CPT

## 2022-09-28 PROCEDURE — 83550 IRON BINDING TEST: CPT

## 2022-09-29 LAB
ALBUMIN SERPL-MCNC: 3.7 G/DL (ref 3.5–5.2)
ALBUMIN/GLOBULIN RATIO: 1.2 (ref 1–2.5)
ALP BLD-CCNC: 134 U/L (ref 35–104)
ALT SERPL-CCNC: 9 U/L (ref 5–33)
ANION GAP SERPL CALCULATED.3IONS-SCNC: 12 MMOL/L (ref 9–17)
AST SERPL-CCNC: 20 U/L
BILIRUB SERPL-MCNC: 0.2 MG/DL (ref 0.3–1.2)
BUN BLDV-MCNC: 23 MG/DL (ref 8–23)
CALCIUM SERPL-MCNC: 9.5 MG/DL (ref 8.6–10.4)
CHLORIDE BLD-SCNC: 101 MMOL/L (ref 98–107)
CO2: 26 MMOL/L (ref 20–31)
CREAT SERPL-MCNC: 1.29 MG/DL (ref 0.5–0.9)
FERRITIN: 583 NG/ML (ref 13–150)
GFR AFRICAN AMERICAN: 50 ML/MIN
GFR NON-AFRICAN AMERICAN: 41 ML/MIN
GFR SERPL CREATININE-BSD FRML MDRD: ABNORMAL ML/MIN/{1.73_M2}
GLUCOSE BLD-MCNC: 87 MG/DL (ref 70–99)
IRON SATURATION: 15 % (ref 20–55)
IRON: 34 UG/DL (ref 37–145)
LACTATE DEHYDROGENASE: 238 U/L (ref 135–214)
POTASSIUM SERPL-SCNC: 4.2 MMOL/L (ref 3.7–5.3)
SODIUM BLD-SCNC: 139 MMOL/L (ref 135–144)
TOTAL IRON BINDING CAPACITY: 227 UG/DL (ref 250–450)
TOTAL PROTEIN: 6.9 G/DL (ref 6.4–8.3)
UNSATURATED IRON BINDING CAPACITY: 193 UG/DL (ref 112–347)

## 2022-09-29 NOTE — PROGRESS NOTES
[address]  [address] [place] [address] [address] [facility], [address]  Tel: [phone]   Fax: [phone]    Subjective     CC: Routine nail care    Interval History:  Patient presents to the clinic today for follow up of thickened and discolored nails. She states she ambulates with a Cane. She states she is not able to perform nail care and foot exams due to her weight and cervical spinal stenosis. She does not wear any compression stockings because they to hard for her to put on. She states that she has no new pedal complaints at this time. HPI:  Jn Dale is a 69y.o. year old female who presents clinic today for follow-up of thickened and discolored nails. She states that she has been doing well and denies any new complaints. She walks around with a cane and has troubling cutting her own nails. She does not wear any compression stockings as they are hard to put on. Patient denies any nausea, vomiting, fever, chills or shortness of breath. Primary care physician is Lauren Regalado MD.    ROS:    Constitutional: Denies nausea, vomiting, fever, chills. Neurologic: Denies numbness, tingling, and burning in the feet. Vascular: Denies symptoms of lower extremity claudication. Skin: Denies open wounds. Otherwise negative except as noted in the HPI.      PMH:  Past Medical History:   Diagnosis Date    Ambulates with cane     Bleeding     while on aggrenox    Cataracts, bilateral     Cerebrovascular disease 2003,2011    cva    Chronic pain in left foot     CKD (chronic kidney disease) stage 3, GFR 30-59 ml/min ([facility])     COVID-19 11/2021    states hpspitalized    GERD (gastroesophageal reflux disease)     Hx of blood clots     Hyperlipidemia     Hypertension     Iron (Fe) deficiency anemia     Nicotine abuse     Obesity     TIBURCIO on CPAP     Osteoarthritis     Peripheral vascular disease ([facility] [address] 03/13/2014    Substance abuse ([facility])     cocaine, canabis    Thalassemia minor     Unspecified cerebral artery occlusion with cerebral infarction     Wears dentures     upper and lower       Surgical History:   Past Surgical History:   Procedure Laterality Date    BREAST SURGERY      biopsy    COLONOSCOPY  12/2007    adenomatous polyp    COLONOSCOPY  2013    normal, repeat in 5 years    COLONOSCOPY N/A 8/21/2019    COLONOSCOPY POLYPECTOMY SNARE/COLD BIOPSY performed by Ciaran B[facility]ia MD at [facility] ENDOSCOPY  2009    negative    UPPER GASTROINTESTINAL ENDOSCOPY N/A 8/21/2019    EGD BIOPSY performed by Ciaran Bhatia MD at [facility] Endoscopy       Social History:  Social History     Tobacco Use    Smoking status: Former     Packs/day: 2.00     Years: 35.00     Pack years: 70.00     Types: Cigarettes     Quit date: 10/11/2011     Years since quitting: 10.9     Passive exposure: Current (Outside of the home)    Smokeless tobacco: Never   Vaping Use    Vaping Use: Never used   Substance Use Topics    Alcohol use: Yes     Alcohol/week: 6.0 standard drinks     Types: 6 Cans of beer per week     Comment: rare    Drug use: No     Types: Cocaine, Marijuana (Weed)     Comment: per pt did years ago; cocaine & marij, 8-23-19 denies current use       Medications:  Prior to Admission medications    Medication Sig Start Date End Date Taking? Authorizing Provider   gabapentin (NEURONTIN) 600 MG tablet Take 1 tablet by mouth 3 times daily for 30 days.  9/26/22 10/26/22  LOY Alexander CNP   acetaminophen (TYLENOL) 325 MG tablet TAKE 2 TABLETS BY MOUTH EVERY 4 HOURS AS NEEDED FOR PAIN 9/1/22   Brad Steel MD   omeprazole (PRILOSEC) 20 MG delayed release capsule TAKE 1 CAPSULE BY MOUTH TWICE A DAY WITH MEALS 8/23/22   Brad Steel MD   amLODIPine (NORVASC) 10 MG tablet TAKE 1 TABLET BY MOUTH DAILY 7/26/22   [facility], MD   aspirin (ASPIRIN LOW DOSE) 81 MG EC tablet TAKE 1 TABLET BY MOUTH DAILY 7/26/22   Cori Danielle MD   Cholecalciferol (VITAMIN D3) 25 MCG TABS TAKE 1 TABLET BY MOUTH DAILY 7/26/22   Cori Danielle MD   folic acid (FOLVITE) 1 MG tablet TAKE 1 TABLET BY MOUTH DAILY 7/26/22   Coir Danielle MD   clopidogrel (PLAVIX) 75 MG tablet Take 1 tablet by mouth daily 6/8/22   Karla Lawson MD   atorvastatin (LIPITOR) 80 MG tablet Take 1 tablet by mouth daily 6/8/22   Karla Lawson MD   hydroCHLOROthiazide (HYDRODIURIL) 25 MG tablet Take 1 tablet by mouth daily 3/2/22   Sonja Chan MD   losartan (COZAAR) 50 MG tablet Take 1 tablet by mouth daily 3/2/22   Sonja Chan MD   guaiFENesin (ALTARUSSIN) 100 MG/5ML syrup Take 10 mLs by mouth 3 times daily as needed for Cough  Patient taking differently: Take 10 mLs by mouth as needed for Cough 11/10/21   Sonja Chan MD   vitamin B-1 (THIAMINE) 100 MG tablet Take 1 tablet by mouth daily 4/15/21   Sonja Chan MD   diclofenac sodium (VOLTAREN) 1 % GEL Apply 4 g topically 4 times daily 6/28/20   Orion Campbell DO       Objective     Vitals:    09/28/22 1507   BP: 126/80   Pulse: 68       Lab Results   Component Value Date    LABA1C 5.8 09/29/2021       Physical Exam:  General:  Alert and oriented x3. In no acute distress. Lower Extremity Physical Exam:    Vascular: DP/PT pulses non palpable B/L. Pulses were biphasic on doppler B/L. CFT <5 seconds to all digits B/L. Moderate nonpitting edema noted B/L to lower extremity. Hair growth is absent to the level of the digits B/L. Neuro: Saph/sural/SP/DP/plantar sensation diminished to light touch. Protective sensation is intact to 2/10 sites as tested with a 5.07g SWMF B/L. Musculoskeletal: EHL/FHL/GS/TA gross motor intact. Decreased medial arch noted, B/L. Decreased ROM b/l kojo joint. Contracted digits 2-5 b/l. Abducted hallux noted b/l with medial eminence with right worse than left. Neg pain calf squeeze ble. All LE Compartments soft and compressible.

## 2022-10-03 ENCOUNTER — TELEPHONE (OUTPATIENT)
Dept: ONCOLOGY | Age: 67
End: 2022-10-03

## 2022-10-03 ENCOUNTER — OFFICE VISIT (OUTPATIENT)
Dept: ONCOLOGY | Age: 67
End: 2022-10-03
Payer: COMMERCIAL

## 2022-10-03 VITALS
WEIGHT: 293 LBS | RESPIRATION RATE: 18 BRPM | SYSTOLIC BLOOD PRESSURE: 143 MMHG | TEMPERATURE: 97.3 F | HEART RATE: 76 BPM | DIASTOLIC BLOOD PRESSURE: 85 MMHG | BODY MASS INDEX: 61.77 KG/M2 | OXYGEN SATURATION: 98 %

## 2022-10-03 DIAGNOSIS — E61.1 IRON DEFICIENCY: ICD-10-CM

## 2022-10-03 DIAGNOSIS — D50.9 MICROCYTIC ANEMIA: Primary | ICD-10-CM

## 2022-10-03 DIAGNOSIS — D56.3 THALASSEMIA TRAIT, ALPHA: ICD-10-CM

## 2022-10-03 DIAGNOSIS — R79.89 ELEVATED FERRITIN: ICD-10-CM

## 2022-10-03 PROCEDURE — 3074F SYST BP LT 130 MM HG: CPT | Performed by: INTERNAL MEDICINE

## 2022-10-03 PROCEDURE — 3078F DIAST BP <80 MM HG: CPT | Performed by: INTERNAL MEDICINE

## 2022-10-03 PROCEDURE — 99214 OFFICE O/P EST MOD 30 MIN: CPT | Performed by: INTERNAL MEDICINE

## 2022-10-03 PROCEDURE — 1123F ACP DISCUSS/DSCN MKR DOCD: CPT | Performed by: INTERNAL MEDICINE

## 2022-10-03 PROCEDURE — 99211 OFF/OP EST MAY X REQ PHY/QHP: CPT | Performed by: INTERNAL MEDICINE

## 2022-10-03 RX ORDER — FERROUS SULFATE 325(65) MG
TABLET ORAL
Qty: 90 TABLET | Refills: 1 | Status: SHIPPED | OUTPATIENT
Start: 2022-10-03

## 2022-10-03 RX ORDER — DOCUSATE SODIUM 100 MG/1
100 CAPSULE, LIQUID FILLED ORAL 2 TIMES DAILY PRN
Qty: 60 CAPSULE | Refills: 0 | Status: SHIPPED | OUTPATIENT
Start: 2022-10-03 | End: 2022-11-02

## 2022-10-03 NOTE — TELEPHONE ENCOUNTER
Nicolette Willis MD VISIT  DR CAMPA IN TO SEE PATIENT  ORDERS RECEIVED  RV 6 MONTHS WITH LABS PRIOR  LABS CDP  FE TIBC FERRITIN TRANSFERRIN 03/27/23, ORDERS GIVEN TO PT  MD VISIT 04/03/23 @3:45PM  SCRIPTS SENT TO PATIENT'S PHARMACY  AVS PRINTED AND GIVEN TO PATIENT WITH INSTRUCTIONS  PATIENT DISCHARGED AMBULATORY

## 2022-10-03 NOTE — PROGRESS NOTES
Today's Date: 10/3/2022  Patient Name: Lelia Hancock  Patient's age: 69 y.o., 1955      Reason for Consult: management recommendations    CHIEF COMPLAINT:  Microcytic anemia    Chief Complaint   Patient presents with    Follow-up    Discuss Labs         History Obtained From:  patient, electronic medical record    Interval history:    Patient presents to the clinic for a follow-up visit and to review results of lab work. Patient ferritin continues to be out of however her iron saturation is low. Patient is taking iron once daily. During this visit patient's allergy, social, medical, surgical history and medications were reviewed and updated. HISTORY OF PRESENT ILLNESS:      The patient is a 69 y.o.  female who presents to the office to establish care and for further treatment evaluation and recommendations. Patient gives a long-standing history of microcytic anemia. According to the patient she was first diagnosed with anemia when she was pregnant and has been anemic since. Patient has had GI workup done in the past including colonoscopy which only revealed a polyp without any malignancy. Patient's blood workup in the past has shown iron deficiency but her latest iron studies showed sufficient iron stores, U39 and folic acid levels. Patient complains of fatigue at times. Denies noticing any gross bleeding. Patient is on iron supplement with one tablet a day. Patient peripheral blood smear showed microcytic anemia with reticulocytosis. Patient also had hemoglobin electrophoresis which came back within normal range. Patient denies any history of gastric bypass surgery.     Past Medical History:   has a past medical history of Ambulates with cane, Bleeding, Cataracts, bilateral, Cerebrovascular disease, Chronic pain in left foot, CKD (chronic kidney disease) stage 3, GFR 30-59 ml/min ([facility]), COVID-19, GERD (gastroesophageal reflux disease), Hx of blood clots, Hyperlipidemia, Hypertension, Iron (Fe) deficiency anemia, Nicotine abuse, Obesity, TIBURCIO on CPAP, Osteoarthritis, Peripheral vascular disease ([facility] [address] Substance abuse ([facility] [address] Thalassemia minor, Unspecified cerebral artery occlusion with cerebral infarction, and Wears dentures. Past Surgical History:   has a past surgical history that includes Tonsillectomy and adenoidectomy; Tubal ligation; Breast surgery; Endometrial biopsy (1998); Upper gastrointestinal endoscopy (2009); Colonoscopy (12/2007); Colonoscopy (2013); Upper gastrointestinal endoscopy (N/A, 8/21/2019); and Colonoscopy (N/A, 8/21/2019). Medications:    Prior to Admission medications    Medication Sig Start Date End Date Taking? Authorizing Provider   gabapentin (NEURONTIN) 600 MG tablet Take 1 tablet by mouth 3 times daily for 30 days.  9/26/22 10/26/22 Yes LOY Lopez CNP   acetaminophen (TYLENOL) 325 MG tablet TAKE 2 TABLETS BY MOUTH EVERY 4 HOURS AS NEEDED FOR PAIN 9/1/22  Yes Alfonso Randhawa MD   omeprazole (PRILOSEC) 20 MG delayed release capsule TAKE 1 CAPSULE BY MOUTH TWICE A DAY WITH MEALS 8/23/22  Yes Alfonso Randhawa MD   amLODIPine (NORVASC) 10 MG tablet TAKE 1 TABLET BY MOUTH DAILY 7/26/22  Yes Jose Guadalupe Escobedo MD   aspirin (ASPIRIN LOW DOSE) 81 MG EC tablet TAKE 1 TABLET BY MOUTH DAILY 7/26/22  Yes Jose Guadalupe Escobedo MD   Cholecalciferol (VITAMIN D3) 25 MCG TABS TAKE 1 TABLET BY MOUTH DAILY 7/26/22  Yes Jose Guadalupe Escobedo MD   folic acid (FOLVITE) 1 MG tablet TAKE 1 TABLET BY MOUTH DAILY 7/26/22  Yes Jose Guadalupe Escobedo MD   clopidogrel (PLAVIX) 75 MG tablet Take 1 tablet by mouth daily 6/8/22  Yes Jacques Paige MD   atorvastatin (LIPITOR) 80 MG tablet Take 1 tablet by mouth daily 6/8/22  Yes Jacques Paige MD   hydroCHLOROthiazide (HYDRODIURIL) 25 MG tablet Take 1 tablet by mouth daily 3/2/22  Yes Andrew Hoyt MD   losartan (COZAAR) 50 MG tablet Take 1 tablet by mouth daily 3/2/22  Yes Andrew Hoyt MD guaiFENesin (ALTARUSSIN) 100 MG/5ML syrup Take 10 mLs by mouth 3 times daily as needed for Cough  Patient taking differently: Take 10 mLs by mouth as needed for Cough 11/10/21  Yes Aftab Gonzales MD   vitamin B-1 (THIAMINE) 100 MG tablet Take 1 tablet by mouth daily 4/15/21  Yes Aftab Gonzales MD   diclofenac sodium (VOLTAREN) 1 % GEL Apply 4 g topically 4 times daily 6/28/20  Yes Wilber Aguilar, DO     Current Outpatient Medications   Medication Sig Dispense Refill    gabapentin (NEURONTIN) 600 MG tablet Take 1 tablet by mouth 3 times daily for 30 days. 90 tablet 5    acetaminophen (TYLENOL) 325 MG tablet TAKE 2 TABLETS BY MOUTH EVERY 4 HOURS AS NEEDED FOR PAIN 60 tablet 3    omeprazole (PRILOSEC) 20 MG delayed release capsule TAKE 1 CAPSULE BY MOUTH TWICE A DAY WITH MEALS 56 capsule 1    amLODIPine (NORVASC) 10 MG tablet TAKE 1 TABLET BY MOUTH DAILY 60 tablet 1    aspirin (ASPIRIN LOW DOSE) 81 MG EC tablet TAKE 1 TABLET BY MOUTH DAILY 60 tablet 1    Cholecalciferol (VITAMIN D3) 25 MCG TABS TAKE 1 TABLET BY MOUTH DAILY 60 tablet 1    folic acid (FOLVITE) 1 MG tablet TAKE 1 TABLET BY MOUTH DAILY 60 tablet 2    clopidogrel (PLAVIX) 75 MG tablet Take 1 tablet by mouth daily 28 tablet 3    atorvastatin (LIPITOR) 80 MG tablet Take 1 tablet by mouth daily 28 tablet 3    hydroCHLOROthiazide (HYDRODIURIL) 25 MG tablet Take 1 tablet by mouth daily 60 tablet 3    losartan (COZAAR) 50 MG tablet Take 1 tablet by mouth daily 60 tablet 3    guaiFENesin (ALTARUSSIN) 100 MG/5ML syrup Take 10 mLs by mouth 3 times daily as needed for Cough (Patient taking differently: Take 10 mLs by mouth as needed for Cough) 1 each 0    vitamin B-1 (THIAMINE) 100 MG tablet Take 1 tablet by mouth daily 30 tablet 3    diclofenac sodium (VOLTAREN) 1 % GEL Apply 4 g topically 4 times daily 4 Tube 1     No current facility-administered medications for this visit.        Allergies:  Duricef [cefadroxil monohydrate], Fish-derived products, Pcn [penicillins], and Tomato    Social History:   reports that she quit smoking about 10 years ago. Her smoking use included cigarettes. She has a 70.00 pack-year smoking history. She has been exposed to tobacco smoke. She has never used smokeless tobacco. She reports current alcohol use of about 6.0 standard drinks per week. She reports that she does not use drugs. Family History: family history includes Asthma in her mother; Diabetes in her brother; Heart Disease in her brother, father, maternal grandfather, and mother; High Blood Pressure in her brother, father, maternal grandfather, maternal grandmother, and mother. REVIEW OF SYSTEMS:      Constitutional: No fever or chills. No night sweats, no weight loss   Eyes: No eye discharge, double vision, or eye pain   HEENT: negative for sore mouth, sore throat, hoarseness and voice change   Respiratory: negative for cough , sputum, dyspnea, wheezing, hemoptysis, chest pain   Cardiovascular: negative for chest pain, dyspnea, palpitations, orthopnea, PND   Gastrointestinal: negative for nausea, vomiting, diarrhea, constipation, abdominal pain, Dysphagia, hematemesis and hematochezia   Genitourinary: negative for frequency, dysuria, nocturia, urinary incontinence, and hematuria   Integument: negative for rash, skin lesions, bruises.    Hematologic/Lymphatic: negative for easy bruising, bleeding, lymphadenopathy, or petechiae   Endocrine: negative for heat or cold intolerance,weight changes, change in bowel habits and hair loss   Musculoskeletal: negative for myalgias, arthralgias, pain, joint swelling,and bone pain   Neurological: negative for headaches, dizziness, seizures, weakness, numbness    PHYSICAL EXAM:        BP (!) 143/85   Pulse 76   Temp 97.3 °F (36.3 °C) (Temporal)   Resp 18   Wt (!) 382 lb 11.2 oz (173.6 kg)   SpO2 98%   BMI 61.77 kg/m²    General appearance - well appearing, no in pain or distress   Mental status - alert and cooperative   Eyes - pupils equal and reactive, extraocular eye movements intact   Ears - bilateral TM's and external ear canals normal   Mouth - mucous membranes moist, pharynx normal without lesions   Neck - supple, no significant adenopathy   Lymphatics - no palpable lymphadenopathy, no hepatosplenomegaly   Chest - clear to auscultation, no wheezes, rales or rhonchi, symmetric air entry   Heart - normal rate, regular rhythm, normal S1, S2, no murmurs  Abdomen - soft, nontender, nondistended, no masses or organomegaly   Neurological - alert, oriented, normal speech, no focal findings or movement disorder noted   Musculoskeletal - no joint tenderness, deformity or swelling   Extremities - peripheral pulses normal, no pedal edema, no clubbing or cyanosis   Skin - normal coloration and turgor, no rashes, no suspicious skin lesions noted ,      DATA:      Labs:     Results for orders placed or performed during the hospital encounter of 09/28/22   CBC with Auto Differential   Result Value Ref Range    WBC 6.7 3.5 - 11.3 k/uL    RBC 4.80 3.95 - 5.11 m/uL    Hemoglobin 10.7 (L) 11.9 - 15.1 g/dL    Hematocrit 35.2 (L) 36.3 - 47.1 %    MCV 73.3 (L) 82.6 - 102.9 fL    MCH 22.3 (L) 25.2 - 33.5 pg    MCHC 30.4 28.4 - 34.8 g/dL    RDW 17.2 (H) 11.8 - 14.4 %    Platelets 093 231 - 907 k/uL    MPV 9.9 8.1 - 13.5 fL    NRBC Automated 0.0 0.0 per 100 WBC    Seg Neutrophils 61 36 - 65 %    Lymphocytes 23 (L) 24 - 43 %    Monocytes 8 3 - 12 %    Eosinophils % 6 (H) 1 - 4 %    Basophils 1 0 - 2 %    Immature Granulocytes 1 (H) 0 %    Segs Absolute 4.05 1.50 - 8.10 k/uL    Absolute Lymph # 1.56 1.10 - 3.70 k/uL    Absolute Mono # 0.55 0.10 - 1.20 k/uL    Absolute Eos # 0.42 0.00 - 0.44 k/uL    Basophils Absolute 0.05 0.00 - 0.20 k/uL    Absolute Immature Granulocyte 0.03 0.00 - 0.30 k/uL    RBC Morphology ANISOCYTOSIS PRESENT    Comprehensive Metabolic Panel   Result Value Ref Range    Glucose 87 70 - 99 mg/dL    BUN 23 8 - 23 mg/dL    Creatinine 1.29 (H) 0.50 - 0.90 mg/dL    Calcium 9.5 8.6 - 10.4 mg/dL    Sodium 139 135 - 144 mmol/L    Potassium 4.2 3.7 - 5.3 mmol/L    Chloride 101 98 - 107 mmol/L    CO2 26 20 - 31 mmol/L    Anion Gap 12 9 - 17 mmol/L    Alkaline Phosphatase 134 (H) 35 - 104 U/L    ALT 9 5 - 33 U/L    AST 20 <32 U/L    Total Bilirubin 0.2 (L) 0.3 - 1.2 mg/dL    Total Protein 6.9 6.4 - 8.3 g/dL    Albumin 3.7 3.5 - 5.2 g/dL    Albumin/Globulin Ratio 1.2 1.0 - 2.5    GFR Non- 41 (L) >60 mL/min    GFR African American 50 (L) >60 mL/min    GFR Comment         Lactate Dehydrogenase   Result Value Ref Range     (H) 135 - 214 U/L   Iron and TIBC   Result Value Ref Range    Iron 34 (L) 37 - 145 ug/dL    TIBC 227 (L) 250 - 450 ug/dL    Iron Saturation 15 (L) 20 - 55 %    UIBC 193 112 - 347 ug/dL   Ferritin   Result Value Ref Range    Ferritin 583 (H) 13 - 150 ng/mL           IMAGING DATA:      IMPRESSION:   Microcytic anemia, 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 Patient lab work-up showed functional iron deficiency with low iron saturation. Continue oral iron once daily  Patient continues to have elevated ferritin I believe is partly due to patient's alpha thalassemia as well as likely reactive in nature possibly due to conditions such as obesity and hepatic steatosis. No indication for phlebotomy at this point. I do not believe patient has iron overload since iron saturation is low  Continue age-appropriate screening studies  Return in 6 months with repeat lab work.          Junie Garcia MD                    This note is created with the assistance of a speech recognition program.  While intending to generate a document that actually reflects the content of the visit, the document can still have some errors including those of syntax and sound a like substitutions which may escape proof reading. It such instances, actual meaning can be extrapolated by contextual diversion.

## 2022-10-12 ENCOUNTER — HOSPITAL ENCOUNTER (EMERGENCY)
Age: 67
Discharge: HOME | End: 2022-10-12

## 2022-10-12 VITALS
OXYGEN SATURATION: 99 % | DIASTOLIC BLOOD PRESSURE: 77 MMHG | TEMPERATURE: 98.7 F | SYSTOLIC BLOOD PRESSURE: 136 MMHG | HEART RATE: 68 BPM | RESPIRATION RATE: 16 BRPM

## 2022-10-15 ENCOUNTER — APPOINTMENT (OUTPATIENT)
Dept: CT IMAGING | Age: 67
End: 2022-10-15
Payer: COMMERCIAL

## 2022-10-15 ENCOUNTER — HOSPITAL ENCOUNTER (EMERGENCY)
Age: 67
Discharge: HOME | End: 2022-10-15
Attending: EMERGENCY MEDICINE
Payer: COMMERCIAL

## 2022-10-15 VITALS
RESPIRATION RATE: 18 BRPM | BODY MASS INDEX: 45.99 KG/M2 | HEART RATE: 74 BPM | WEIGHT: 293 LBS | DIASTOLIC BLOOD PRESSURE: 76 MMHG | HEIGHT: 67 IN | SYSTOLIC BLOOD PRESSURE: 176 MMHG | TEMPERATURE: 97.8 F | OXYGEN SATURATION: 99 %

## 2022-10-15 DIAGNOSIS — S09.90XA INJURY OF HEAD, INITIAL ENCOUNTER: ICD-10-CM
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
## 2022-10-15 PROCEDURE — 99284 EMERGENCY DEPT VISIT MOD MDM: CPT

## 2022-10-15 PROCEDURE — 72125 CT NECK SPINE W/O DYE: CPT

## 2022-10-15 PROCEDURE — 70450 CT HEAD/BRAIN W/O DYE: CPT

## 2022-10-15 RX ORDER — KETOROLAC TROMETHAMINE 30 MG/ML
60 INJECTION, SOLUTION INTRAMUSCULAR; INTRAVENOUS ONCE
Status: DISCONTINUED | OUTPATIENT
Start: 2022-10-15 | End: 2022-10-15

## 2022-10-15 ASSESSMENT — ENCOUNTER SYMPTOMS
EYE REDNESS: 0
DIARRHEA: 0
COLOR CHANGE: 0
FACIAL SWELLING: 0
CONSTIPATION: 0
EYE DISCHARGE: 0
SHORTNESS OF BREATH: 0
VOMITING: 0
COUGH: 0
ABDOMINAL PAIN: 0

## 2022-10-15 ASSESSMENT — PAIN SCALES - GENERAL: PAINLEVEL_OUTOF10: 8

## 2022-10-15 ASSESSMENT — PAIN - FUNCTIONAL ASSESSMENT: PAIN_FUNCTIONAL_ASSESSMENT: 0-10

## 2022-10-15 NOTE — ED PROVIDER NOTES
[address] [place] [address] ED  EMERGENCY DEPARTMENT ENCOUNTER      Pt Name: Flavio Cade  MRN: [id number]  [place] 1955  Date of evaluation: 10/15/2022  Provider: Maritza Fuentes MD    CHIEF COMPLAINT       Chief Complaint   Patient presents with    Fall    Headache    Dizziness         HISTORY OF PRESENT ILLNESS  (Location/Symptom, Timing/Onset, Context/Setting, Quality, Duration, Modifying Factors, Severity.)   Flavio Cade is a 69 y.o. female who presents to the emergency department for injury to her head. 3 days ago she fell in the shower and hit the back of her head. Her neck hurts as well. Her buttocks hurt as well but she has been able to get up and walk around and move. She was at another hospital but they were too busy and she left before being seen. Nursing Notes were reviewed. ALLERGIES     Duricef [cefadroxil monohydrate], Fish-derived products, Pcn [penicillins], and Tomato    CURRENT MEDICATIONS       Previous Medications    ACETAMINOPHEN (TYLENOL) 325 MG TABLET    TAKE 2 TABLETS BY MOUTH EVERY 4 HOURS AS NEEDED FOR PAIN    AMLODIPINE (NORVASC) 10 MG TABLET    TAKE 1 TABLET BY MOUTH DAILY    ASPIRIN (ASPIRIN LOW DOSE) 81 MG EC TABLET    TAKE 1 TABLET BY MOUTH DAILY    ATORVASTATIN (LIPITOR) 80 MG TABLET    Take 1 tablet by mouth daily    CHOLECALCIFEROL (VITAMIN D3) 25 MCG TABS    TAKE 1 TABLET BY MOUTH DAILY    CLOPIDOGREL (PLAVIX) 75 MG TABLET    Take 1 tablet by mouth daily    DICLOFENAC SODIUM (VOLTAREN) 1 % GEL    Apply 4 g topically 4 times daily    DOCUSATE SODIUM (COLACE) 100 MG CAPSULE    Take 1 capsule by mouth 2 times daily as needed for Constipation    FERROUS SULFATE (IRON 325) 325 (65 FE) MG TABLET    Take 1 tab three days days a week    FOLIC ACID (FOLVITE) 1 MG TABLET    TAKE 1 TABLET BY MOUTH DAILY    GABAPENTIN (NEURONTIN) 600 MG TABLET    Take 1 tablet by mouth 3 times daily for 30 days.     GUAIFENESIN (ALTARUSSIN) 100 MG/5ML SYRUP    Take 10 mLs by mouth 3 times daily as needed for Cough    HYDROCHLOROTHIAZIDE (HYDRODIURIL) 25 MG TABLET    Take 1 tablet by mouth daily    LOSARTAN (COZAAR) 50 MG TABLET    Take 1 tablet by mouth daily    OMEPRAZOLE (PRILOSEC) 20 MG DELAYED RELEASE CAPSULE    TAKE 1 CAPSULE BY MOUTH TWICE A DAY WITH MEALS    VITAMIN B-1 (THIAMINE) 100 MG TABLET    Take 1 tablet by mouth daily       PAST MEDICAL HISTORY         Diagnosis Date    Ambulates with cane     Bleeding     while on aggrenox    Cataracts, bilateral     Cerebrovascular disease 2003,2011    cva    Chronic pain in left foot     CKD (chronic kidney disease) stage 3, GFR 30-59 ml/min ([facility] [address]     COVID-19 11/2021    states hpspitalized    GERD (gastroesophageal reflux disease)     Hx of blood clots     Hyperlipidemia     Hypertension     Iron (Fe) deficiency anemia     Nicotine abuse     Obesity     TIBURCIO on CPAP     Osteoarthritis     Peripheral vascular disease ([facility] [address] 03/13/2014    Substance abuse ([facility] [address]     cocaine, canabis    Thalassemia minor     Unspecified cerebral artery occlusion with cerebral infarction     Wears dentures     upper and lower       SURGICAL HISTORY           Procedure Laterality Date    BREAST SURGERY      biopsy    COLONOSCOPY  12/2007    adenomatous polyp    COLONOSCOPY  2013    normal, repeat in 5 years    COLONOSCOPY N/A 8/21/2019    COLONOSCOPY POLYPECTOMY SNARE/COLD BIOPSY performed by Russell Mcgowan MD at [facility] ENDOSCOPY  2009    negative    UPPER GASTROINTESTINAL ENDOSCOPY N/A 8/21/2019    EGD BIOPSY performed by Russell Mcgowan MD at [facility] Endoscopy         FAMILY HISTORY           Problem Relation Age of Onset    High Blood Pressure Mother     Asthma Mother     Heart Disease Mother     Heart Disease Father     High Blood Pressure Father     Diabetes Brother     High Blood Pressure Brother     Heart Disease Brother     High Blood Pressure Maternal Grandmother     High Blood Pressure Maternal Grandfather     Heart Disease Maternal Grandfather      Family Status   Relation Name Status    Mother      Father      Brother  Alive    MGM  (Not Specified)    MGF  (Not Specified)        SOCIAL HISTORY      reports that she quit smoking about 11 years ago. Her smoking use included cigarettes. She has a 70.00 pack-year smoking history. She has been exposed to tobacco smoke. She has never used smokeless tobacco. She reports current alcohol use of about 6.0 standard drinks per week. She reports that she does not use drugs. REVIEW OF SYSTEMS    (2-9 systems for level 4, 10 or more for level 5)     Review of Systems   Constitutional:  Negative for chills, fatigue and fever. HENT:  Negative for congestion, ear discharge and facial swelling. Eyes:  Negative for discharge and redness. Respiratory:  Negative for cough and shortness of breath. Cardiovascular:  Negative for chest pain. Gastrointestinal:  Negative for abdominal pain, constipation, diarrhea and vomiting. Genitourinary:  Negative for dysuria and hematuria. Musculoskeletal:  Negative for arthralgias. Skin:  Negative for color change and rash. Neurological:  Negative for syncope, numbness and headaches. Hematological:  Negative for adenopathy. Psychiatric/Behavioral:  Negative for confusion. The patient is not nervous/anxious. Except as noted above the remainder of the review of systems was reviewed and negative. PHYSICAL EXAM    (up to 7 for level 4, 8 or more for level 5)     Vitals:    10/15/22 0936   BP: (!) 176/76   Pulse: 74   Resp: 18   Temp: 97.8 °F (36.6 °C)   TempSrc: Oral   SpO2: 99%   Weight: (!) 382 lb (173.3 kg)   Height: 5' 7\" (1.702 m)       Physical Exam  Vitals reviewed. Constitutional:       General: She is not in acute distress. Appearance: She is well-developed. She is not diaphoretic. HENT:      Head: Normocephalic and atraumatic. Comments: She has some diffuse tenderness in her cervical area posteriorly. No hematoma or abrasion to her scalp. No laceration. Eyes:      General: No scleral icterus. Right eye: No discharge. Left eye: No discharge. Cardiovascular:      Rate and Rhythm: Normal rate and regular rhythm. Pulmonary:      Effort: Pulmonary effort is normal. No respiratory distress. Breath sounds: Normal breath sounds. No stridor. No wheezing or rales. Abdominal:      General: There is no distension. Palpations: Abdomen is soft. Tenderness: no abdominal tenderness   Musculoskeletal:         General: Normal range of motion. Cervical back: Neck supple. Comments: Joints have good range of motion. Lymphadenopathy:      Cervical: No cervical adenopathy. Skin:     General: Skin is warm and dry. Findings: No erythema or rash. Neurological:      Mental Status: She is alert and oriented to person, place, and time. Psychiatric:         Behavior: Behavior normal.           DIAGNOSTIC RESULTS     EKG: All EKG's are interpreted by the Emergency Department Physician who either signs or Co-signs this chart in the absence of a cardiologist.    Not indicated    RADIOLOGY:   Non-plain film images such as CT, Ultrasound and MRI are read by the radiologist. Plain radiographic images are visualized and preliminarily interpreted by the emergency physician with the below findings:    Interpretation per the Radiologist below, if available at the time of this note:    CT HEAD WO CONTRAST    Result Date: 10/15/2022  CT head: 1. Right maxillary sinus mucous retention cyst. 2. Stable noncontrast head CT compared with 04/19/2019. No acute intracranial hemorrhage or midline shift. CT cervical spine: 1. No clear evidence for acute fracture in the cervical spine. 2. Mild multilevel degenerative changes in the cervical spine. 3. Bilateral carotid vascular calcifications.  4. Heterogeneous enlargement of the thyroid gland. Please see recommendations provided below. CT CERVICAL SPINE WO CONTRAST    Result Date: 10/15/2022  CT head: 1. Right maxillary sinus mucous retention cyst. 2. Stable noncontrast head CT compared with 04/19/2019. No acute intracranial hemorrhage or midline shift. CT cervical spine: 1. No clear evidence for acute fracture in the cervical spine. 2. Mild multilevel degenerative changes in the cervical spine. 3. Bilateral carotid vascular calcifications. 4. Heterogeneous enlargement of the thyroid gland. Please see recommendations provided below. LABS:  Labs Reviewed - No data to display    All other labs were within normal range or not returned as of this dictation. EMERGENCY DEPARTMENT COURSE and DIFFERENTIAL DIAGNOSIS/MDM:   Vitals:    Vitals:    10/15/22 0936   BP: (!) 176/76   Pulse: 74   Resp: 18   Temp: 97.8 °F (36.6 °C)   TempSrc: Oral   SpO2: 99%   Weight: (!) 382 lb (173.3 kg)   Height: 5' 7\" (1.702 m)       Orders Placed This Encounter   Medications    DISCONTD: ketorolac (TORADOL) injection 60 mg       HEART SCORE:      Medical Decision Making: CAT scan of brain and C-spine are negative and findings are discussed with the patient. Treatment diagnosis and follow-up were discussed. CONSULTS:  None    PROCEDURES:  None    FINAL IMPRESSION      1. Fall, initial encounter    2. Injury of head, initial encounter          DISPOSITION/PLAN   DISPOSITION Decision To Discharge 10/15/2022 11:16:38 AM      PATIENT REFERRED TO:   [facility] [place] [id number] [phone]      As needed    [facility] ED  [address] [facility]  [phone]    If symptoms worsen    DISCHARGE MEDICATIONS:     New Prescriptions    No medications on file       The care is provided during an unprecedented national emergency due to the novel coronavirus, COVID-19.     (Please note that portions of this note were completed with a voice recognition program.  Efforts were made to edit the dictations but occasionally words are mis-transcribed.)    Kellie Levy MD  Attending Emergency Physician           Kellie Levy MD  10/15/22 0519

## 2022-10-15 NOTE — ED NOTES
Dr. Tracie Alvarado to bedside. Pt reports slip & fall in the shower on Wednesday. States she went to [facility]. [facility], but was not seen because they were too busy. Pt reports she struck the back of her head during fall and landed on her bottom. C/o pain to left wrist, lower back, buttocks and posterior head. Full ROM present to left wrist. Able to lift both legs off of stretcher. No neuro deficits noted. Pt reports she takes ASA and Plavix d/t previous CVAs.       Charlee Andrade, RN  10/15/22 1016

## 2022-10-17 DIAGNOSIS — E78.00 PURE HYPERCHOLESTEROLEMIA: ICD-10-CM

## 2022-10-17 DIAGNOSIS — I63.00 CEREBROVASCULAR ACCIDENT (CVA) DUE TO THROMBOSIS OF PRECEREBRAL ARTERY (HCC): ICD-10-CM

## 2022-10-17 DIAGNOSIS — I10 ESSENTIAL HYPERTENSION: ICD-10-CM

## 2022-10-17 DIAGNOSIS — R09.81 SINUS CONGESTION: ICD-10-CM

## 2022-10-17 NOTE — TELEPHONE ENCOUNTER
E-scribe request from [facility] for Omeprazole 20 mg. Patient has an appt on 10/19/22. Health Maintenance   Topic Date Due    Pneumococcal 65+ years Vaccine (3) 12/20/2021    Low dose CT lung screening  01/20/2022    COVID-19 Vaccine (4 - Booster for Pfizer series) 05/20/2022    Flu vaccine (1) 08/01/2022    Colorectal Cancer Screen  08/21/2022    Lipids  09/24/2022    A1C test (Diabetic or Prediabetic)  09/29/2022    Annual Wellness Visit (AWV)  10/08/2022    Depression Screen  06/08/2023    Breast cancer screen  12/08/2023    DTaP/Tdap/Td vaccine (2 - Td or Tdap) 03/08/2028    DEXA (modify frequency per FRAX score)  Completed    Shingles vaccine  Completed    Hepatitis C screen  Completed    Hepatitis A vaccine  Aged Out    Hib vaccine  Aged Out    Meningococcal (ACWY) vaccine  Aged Out             (applicable per patient's age: Cancer Screenings, Depression Screening, Fall Risk Screening, Immunizations)    Hemoglobin A1C (%)   Date Value   09/29/2021 5.8   06/07/2019 5.4   04/19/2018 5.7     Microalb/Crt.  Ratio (mcg/mg creat)   Date Value   05/09/2017 6     LDL Cholesterol (mg/dL)   Date Value   09/24/2021 74     AST (U/L)   Date Value   09/28/2022 20     ALT (U/L)   Date Value   09/28/2022 9     BUN (mg/dL)   Date Value   09/28/2022 23      (goal A1C is < 7)   (goal LDL is <100) need 30-50% reduction from baseline     BP Readings from Last 3 Encounters:   10/15/22 (!) 176/76   10/03/22 (!) 143/85   09/28/22 126/80    (goal /80)      All Future Testing planned in [facility]:  Lab Frequency Next Occurrence   COVID-19 Once 11/10/2022   Protein / Creatinine Ratio, Urine Once 05/28/2022   XR CHEST (2 VW) Once 05/18/2022   Protein / Creatinine Ratio, Urine Once 06/10/2022   COLONOSCOPY (Screening) Once 10/19/2022   Ferritin Once 10/03/2022   Iron and TIBC Once 10/03/2022   Transferrin Once 10/03/2022   CBC with Auto Differential     Comprehensive Metabolic Panel     CBC with Auto Differential Next Visit Date:  Future Appointments   Date Time Provider Mary Marinelli   10/19/2022  1:00 PM Franc Kemp MD [facility] IM [facility]   10/25/2022  4:10 PM Lesly Dewey MD [facility] Ortho [facility]   12/19/2022  2:45 PM Elder Murphy DPM [facility] Podiatry [facility]   12/20/2022  4:20 PM LOY Montez - CNP Neuro Spec [facility]   4/3/2023  3:45 PM Ollie Brizuela MD [address] [place] [address]            Patient Active Problem List:     Essential hypertension     Pure hypercholesterolemia     Cerebrovascular accident (CVA), 2011, no residual deficit (HCC)     CKD (chronic kidney disease) stage 3, GFR 30-59 ml/min (HCC)     Peripheral vascular disease (HCC)     TIBURCIO (obstructive sleep apnea)     Class 3 severe obesity due to excess calories with serious comorbidity and body mass index (BMI) of 50.0 to 59.9 in adult [facility])     Primary osteoarthritis of left knee     Chronic tension-type headache, not intractable     Carpal tunnel syndrome, bilateral-not on medication due to kidney disease     Microcytic anemia     Alpha thalassemia trait     Cervical stenosis of spinal canal     Pneumonia due to COVID-19 virus     Chronic bilateral low back pain with bilateral sciatica     Lumbar radicular pain     History of stroke

## 2022-10-17 NOTE — TELEPHONE ENCOUNTER
E-scribe request from [facility] for Losartan 50 mg and Hydrochlorothiazide 25 mg. Patient has an appt on 10/19/22. Health Maintenance   Topic Date Due    Pneumococcal 65+ years Vaccine (3) 12/20/2021    Low dose CT lung screening  01/20/2022    COVID-19 Vaccine (4 - Booster for Pfizer series) 05/20/2022    Flu vaccine (1) 08/01/2022    Colorectal Cancer Screen  08/21/2022    Lipids  09/24/2022    A1C test (Diabetic or Prediabetic)  09/29/2022    Annual Wellness Visit (AWV)  10/08/2022    Depression Screen  06/08/2023    Breast cancer screen  12/08/2023    DTaP/Tdap/Td vaccine (2 - Td or Tdap) 03/08/2028    DEXA (modify frequency per FRAX score)  Completed    Shingles vaccine  Completed    Hepatitis C screen  Completed    Hepatitis A vaccine  Aged Out    Hib vaccine  Aged Out    Meningococcal (ACWY) vaccine  Aged Out             (applicable per patient's age: Cancer Screenings, Depression Screening, Fall Risk Screening, Immunizations)    Hemoglobin A1C (%)   Date Value   09/29/2021 5.8   06/07/2019 5.4   04/19/2018 5.7     Microalb/Crt.  Ratio (mcg/mg creat)   Date Value   05/09/2017 6     LDL Cholesterol (mg/dL)   Date Value   09/24/2021 74     AST (U/L)   Date Value   09/28/2022 20     ALT (U/L)   Date Value   09/28/2022 9     BUN (mg/dL)   Date Value   09/28/2022 23      (goal A1C is < 7)   (goal LDL is <100) need 30-50% reduction from baseline     BP Readings from Last 3 Encounters:   10/15/22 (!) 176/76   10/03/22 (!) 143/85   09/28/22 126/80    (goal /80)      All Future Testing planned in [facility]:  Lab Frequency Next Occurrence   COVID-19 Once 11/10/2022   Protein / Creatinine Ratio, Urine Once 05/28/2022   XR CHEST (2 VW) Once 05/18/2022   Protein / Creatinine Ratio, Urine Once 06/10/2022   COLONOSCOPY (Screening) Once 10/19/2022   Ferritin Once 10/03/2022   Iron and TIBC Once 10/03/2022   Transferrin Once 10/03/2022   CBC with Auto Differential     Comprehensive Metabolic Panel     CBC with Auto Differential         Next Visit Date:  Future Appointments   Date Time Provider Mary Marinelli   10/19/2022  1:00 PM Bakari Garcia MD [facility] IM [facility]   10/25/2022  4:10 PM Kim Gonzales MD [facility]   12/19/2022  2:45 PM Bella Pulido DPM [facility] Podiatry [facility]   12/20/2022  4:20 PM LOY Duran - CNP Neuro Spec [facility]   4/3/2023  3:45 PM Bina Banks MD [address] [place] [address]            Patient Active Problem List:     Essential hypertension     Pure hypercholesterolemia     Cerebrovascular accident (CVA), 2011, no residual deficit (HCC)     CKD (chronic kidney disease) stage 3, GFR 30-59 ml/min (HCC)     Peripheral vascular disease (HCC)     TIBURCIO (obstructive sleep apnea)     Class 3 severe obesity due to excess calories with serious comorbidity and body mass index (BMI) of 50.0 to 59.9 in adult [facility])     Primary osteoarthritis of left knee     Chronic tension-type headache, not intractable     Carpal tunnel syndrome, bilateral-not on medication due to kidney disease     Microcytic anemia     Alpha thalassemia trait     Cervical stenosis of spinal canal     Pneumonia due to COVID-19 virus     Chronic bilateral low back pain with bilateral sciatica     Lumbar radicular pain     History of stroke

## 2022-10-18 ENCOUNTER — TELEPHONE (OUTPATIENT)
Dept: ONCOLOGY | Age: 67
End: 2022-10-18

## 2022-10-18 DIAGNOSIS — Z87.891 PERSONAL HISTORY OF NICOTINE DEPENDENCE: Primary | ICD-10-CM

## 2022-10-18 NOTE — TELEPHONE ENCOUNTER
Our records indicate that your patient is coming due for their annual lung cancer screening follow up testing. For your convenience, we have pended the order for the scan for you. If you do not agree with the need for the test, please cancel the order and let us know. Sincerely,    [address] [place] [address] Screening Program    Auto printed reminder letter sent to patient.

## 2022-10-19 ENCOUNTER — HOSPITAL ENCOUNTER (OUTPATIENT)
Age: 67
Setting detail: SPECIMEN
Discharge: HOME | End: 2022-10-19

## 2022-10-19 ENCOUNTER — OFFICE VISIT (OUTPATIENT)
Dept: INTERNAL MEDICINE | Age: 67
End: 2022-10-19
Payer: COMMERCIAL

## 2022-10-19 VITALS
SYSTOLIC BLOOD PRESSURE: 121 MMHG | WEIGHT: 293 LBS | DIASTOLIC BLOOD PRESSURE: 71 MMHG | HEART RATE: 62 BPM | BODY MASS INDEX: 60.3 KG/M2

## 2022-10-19 DIAGNOSIS — G89.29 CHRONIC BILATERAL LOW BACK PAIN WITH BILATERAL SCIATICA: ICD-10-CM

## 2022-10-19 DIAGNOSIS — Z86.73 H/O: STROKE: ICD-10-CM

## 2022-10-19 DIAGNOSIS — R73.9 HYPERGLYCEMIA: ICD-10-CM

## 2022-10-19 DIAGNOSIS — E04.9 ENLARGEMENT OF THYROID: ICD-10-CM

## 2022-10-19 DIAGNOSIS — Z13.1 SCREENING FOR DIABETES MELLITUS: ICD-10-CM

## 2022-10-19 DIAGNOSIS — I63.00 CEREBROVASCULAR ACCIDENT (CVA) DUE TO THROMBOSIS OF PRECEREBRAL ARTERY (HCC): ICD-10-CM

## 2022-10-19 DIAGNOSIS — M54.42 CHRONIC BILATERAL LOW BACK PAIN WITH BILATERAL SCIATICA: ICD-10-CM

## 2022-10-19 DIAGNOSIS — M48.02 CERVICAL STENOSIS OF SPINAL CANAL: Primary | ICD-10-CM

## 2022-10-19 DIAGNOSIS — Z91.81 AT HIGH RISK FOR FALLS: ICD-10-CM

## 2022-10-19 DIAGNOSIS — M54.41 CHRONIC BILATERAL LOW BACK PAIN WITH BILATERAL SCIATICA: ICD-10-CM

## 2022-10-19 DIAGNOSIS — Z87.891 PERSONAL HISTORY OF TOBACCO USE: ICD-10-CM

## 2022-10-19 LAB — TSH SERPL DL<=0.05 MIU/L-ACNC: 2.61 UIU/ML (ref 0.3–5)

## 2022-10-19 PROCEDURE — G0296 VISIT TO DETERM LDCT ELIG: HCPCS | Performed by: STUDENT IN AN ORGANIZED HEALTH CARE EDUCATION/TRAINING PROGRAM

## 2022-10-19 PROCEDURE — 99214 OFFICE O/P EST MOD 30 MIN: CPT | Performed by: STUDENT IN AN ORGANIZED HEALTH CARE EDUCATION/TRAINING PROGRAM

## 2022-10-19 PROCEDURE — 99211 OFF/OP EST MAY X REQ PHY/QHP: CPT | Performed by: STUDENT IN AN ORGANIZED HEALTH CARE EDUCATION/TRAINING PROGRAM

## 2022-10-19 PROCEDURE — 1123F ACP DISCUSS/DSCN MKR DOCD: CPT | Performed by: STUDENT IN AN ORGANIZED HEALTH CARE EDUCATION/TRAINING PROGRAM

## 2022-10-19 RX ORDER — HYDROCHLOROTHIAZIDE 25 MG/1
25 TABLET ORAL DAILY
Qty: 28 TABLET | Refills: 3 | Status: SHIPPED | OUTPATIENT
Start: 2022-10-19

## 2022-10-19 RX ORDER — OMEPRAZOLE 20 MG/1
CAPSULE, DELAYED RELEASE ORAL
Qty: 56 CAPSULE | Refills: 1 | Status: SHIPPED | OUTPATIENT
Start: 2022-10-19

## 2022-10-19 RX ORDER — LOSARTAN POTASSIUM 50 MG/1
50 TABLET ORAL DAILY
Qty: 28 TABLET | Refills: 3 | Status: SHIPPED | OUTPATIENT
Start: 2022-10-19

## 2022-10-19 SDOH — ECONOMIC STABILITY: FOOD INSECURITY: WITHIN THE PAST 12 MONTHS, THE FOOD YOU BOUGHT JUST DIDN'T LAST AND YOU DIDN'T HAVE MONEY TO GET MORE.: NEVER TRUE

## 2022-10-19 SDOH — ECONOMIC STABILITY: FOOD INSECURITY: WITHIN THE PAST 12 MONTHS, YOU WORRIED THAT YOUR FOOD WOULD RUN OUT BEFORE YOU GOT MONEY TO BUY MORE.: NEVER TRUE

## 2022-10-19 ASSESSMENT — ENCOUNTER SYMPTOMS
ALLERGIC/IMMUNOLOGIC NEGATIVE: 1
GASTROINTESTINAL NEGATIVE: 1
EYES NEGATIVE: 1
RESPIRATORY NEGATIVE: 1

## 2022-10-19 ASSESSMENT — SOCIAL DETERMINANTS OF HEALTH (SDOH): HOW HARD IS IT FOR YOU TO PAY FOR THE VERY BASICS LIKE FOOD, HOUSING, MEDICAL CARE, AND HEATING?: NOT HARD AT ALL

## 2022-10-19 NOTE — PROGRESS NOTES
[facility] [address] [address]  Dept: [phone]  Dept Fax: [phone]    Office Progress/Follow Up Note  Date ofpatient's visit: 10/19/2022  Patient's Name:  Tala Dinh YOB: 1955            Patient Care Team:  Aileen Beltran MD as PCP - General (Internal Medicine)  Corrie Lyon DPM as Consulting Physician (Podiatry)  ================================================================    REASON FOR VISIT/CHIEF COMPLAINT:  Hypertension, Health Maintenance (Vaccines declined ), and Letter (Pt is requesting letter for her landlord stating that she needs a handcap parking spot infront of her unit because she has a difficult time ambulating )    HISTORY OF PRESENTING ILLNESS:  History was obtained from: patient. Pradeep pressley 69 y.o. is here for follow-up visit. Her blood pressure today is 152/71, heart rate 71, repeat check 121/71, is compliant with all her medications including hydrochlorothiazide, Cozaar, Norvasc. Patient was recently in hospital after she had a fall in the shower, and CT head and cervical spine done, showed right maxillary sinus mucous retention cyst unchanged from before, and showed multilevel degenerative changes in cervical spine, no acute findings,  Also concerning for heterogeneous enlargement of thyroid gland and bilateral carotid vascular calcifications. Patient saw GI for screening colonoscopy and was advised to follow-up with neurology for clearance to stop antiplatelet medications before colonoscopy, saw neurology, also was given referral appointment for bariatric surgery before undergoing spine surgery for degenerative spine disease    Patient has history of stroke x2, is compliant with antiplatelet medication, is requesting handicap placard with inability to walk due to residual weakness as well as chronic pain from degenerative spine changes.   She uses cane for ambulating    Has history of carpal tunnel syndrome, currently stable    Sees heme-onc for chronic anemia with alpha thalassemia, taking iron tablets as prescribed    Health maintenance:  Due for annual lung cancer screening, quit smoking in 2011  Refused vaccines  Will follow up with GI doctor for colonoscopy after consulting with neurology.     Patient Active Problem List   Diagnosis    Essential hypertension    Pure hypercholesterolemia    Cerebrovascular accident (CVA), 2011, no residual deficit (HCC)    CKD (chronic kidney disease) stage 3, GFR 30-59 ml/min (HCC)    Peripheral vascular disease (HCC)    TIBURCIO (obstructive sleep apnea)    Class 3 severe obesity due to excess calories with serious comorbidity and body mass index (BMI) of 50.0 to 59.9 in adult [facility])    Primary osteoarthritis of left knee    Chronic tension-type headache, not intractable    Carpal tunnel syndrome, bilateral-not on medication due to kidney disease    Microcytic anemia    Alpha thalassemia trait    Cervical stenosis of spinal canal    Pneumonia due to COVID-19 virus    Chronic bilateral low back pain with bilateral sciatica    Lumbar radicular pain    History of stroke       Health Maintenance Due   Topic Date Due    Pneumococcal 65+ years Vaccine (3) 12/20/2021    Low dose CT lung screening  01/20/2022    COVID-19 Vaccine (4 - Booster for Pfizer series) 05/20/2022    Flu vaccine (1) 08/01/2022    Colorectal Cancer Screen  08/21/2022    Lipids  09/24/2022    A1C test (Diabetic or Prediabetic)  09/29/2022    Annual Wellness Visit (AWV)  10/08/2022       Allergies   Allergen Reactions    Duricef [Cefadroxil Monohydrate] Hives    Fish-Derived Products Hives    Pcn [Penicillins] Hives    Tomato Hives         Current Outpatient Medications   Medication Sig Dispense Refill    hydroCHLOROthiazide (HYDRODIURIL) 25 MG tablet TAKE 1 TABLET BY MOUTH DAILY 28 tablet 3    losartan (COZAAR) 50 MG tablet TAKE 1 TABLET BY MOUTH DAILY 28 tablet 3    omeprazole (PRILOSEC) 20 MG delayed release capsule TAKE 1 CAPSULE BY MOUTH TWICE A DAY WITH MEALS 56 capsule 1    ferrous sulfate (IRON 325) 325 (65 Fe) MG tablet Take 1 tab three days days a week 90 tablet 1    docusate sodium (COLACE) 100 MG capsule Take 1 capsule by mouth 2 times daily as needed for Constipation 60 capsule 0    gabapentin (NEURONTIN) 600 MG tablet Take 1 tablet by mouth 3 times daily for 30 days. 90 tablet 5    acetaminophen (TYLENOL) 325 MG tablet TAKE 2 TABLETS BY MOUTH EVERY 4 HOURS AS NEEDED FOR PAIN 60 tablet 3    amLODIPine (NORVASC) 10 MG tablet TAKE 1 TABLET BY MOUTH DAILY 60 tablet 1    aspirin (ASPIRIN LOW DOSE) 81 MG EC tablet TAKE 1 TABLET BY MOUTH DAILY 60 tablet 1    Cholecalciferol (VITAMIN D3) 25 MCG TABS TAKE 1 TABLET BY MOUTH DAILY 60 tablet 1    folic acid (FOLVITE) 1 MG tablet TAKE 1 TABLET BY MOUTH DAILY 60 tablet 2    clopidogrel (PLAVIX) 75 MG tablet Take 1 tablet by mouth daily 28 tablet 3    atorvastatin (LIPITOR) 80 MG tablet Take 1 tablet by mouth daily 28 tablet 3    guaiFENesin (ALTARUSSIN) 100 MG/5ML syrup Take 10 mLs by mouth 3 times daily as needed for Cough (Patient taking differently: Take 10 mLs by mouth as needed for Cough) 1 each 0    vitamin B-1 (THIAMINE) 100 MG tablet Take 1 tablet by mouth daily 30 tablet 3    diclofenac sodium (VOLTAREN) 1 % GEL Apply 4 g topically 4 times daily 4 Tube 1     No current facility-administered medications for this visit. Social History     Tobacco Use    Smoking status: Former     Packs/day: 2.00     Years: 35.00     Pack years: 70.00     Types: Cigarettes     Quit date: 10/11/2011     Years since quittin.0     Passive exposure: Current (Outside of the home)    Smokeless tobacco: Never   Vaping Use    Vaping Use: Never used   Substance Use Topics    Alcohol use:  Yes     Alcohol/week: 6.0 standard drinks     Types: 6 Cans of beer per week     Comment: rare    Drug use: No     Types: Cocaine, Marijuana (Weed)     Comment: per pt did years ago; cocaine & marij, 8-23-19 denies current use       Family History   Problem Relation Age of Onset    High Blood Pressure Mother     Asthma Mother     Heart Disease Mother     Heart Disease Father     High Blood Pressure Father     Diabetes Brother     High Blood Pressure Brother     Heart Disease Brother     High Blood Pressure Maternal Grandmother     High Blood Pressure Maternal Grandfather     Heart Disease Maternal Grandfather         REVIEW OF SYSTEMS:  Review of Systems   Constitutional: Negative. Eyes: Negative. Respiratory: Negative. Cardiovascular: Negative. Gastrointestinal: Negative. Genitourinary: Negative. Musculoskeletal:  Positive for neck pain. Chronic neck pain   Allergic/Immunologic: Negative. Neurological:  Positive for weakness. Chronic residual weakness from stroke   Psychiatric/Behavioral: Negative. PHYSICAL EXAM:  Vitals:    10/19/22 1321 10/19/22 1422   BP: (!) 152/79 121/71   Site: Right Lower Arm    Position: Sitting    Cuff Size: Large Adult    Pulse: 71 62   Weight: (!) 385 lb (174.6 kg)      BP Readings from Last 3 Encounters:   10/19/22 121/71   10/15/22 (!) 176/76   10/03/22 (!) 143/85        Physical Exam  Constitutional:       Appearance: Normal appearance. HENT:      Mouth/Throat:      Mouth: Mucous membranes are moist.   Cardiovascular:      Rate and Rhythm: Normal rate and regular rhythm. Pulses: Normal pulses. Heart sounds: Normal heart sounds. Pulmonary:      Effort: Pulmonary effort is normal.      Breath sounds: Normal breath sounds. Abdominal:      General: Abdomen is flat. Palpations: Abdomen is soft. Musculoskeletal:         General: Normal range of motion. Skin:     General: Skin is warm. Neurological:      Mental Status: She is alert and oriented to person, place, and time. Mental status is at baseline.          DIAGNOSTIC FINDINGS:  CBC:  Lab Results   Component Value Date/Time    WBC 6.7 09/28/2022 04:41 PM    HGB 10.7 09/28/2022 04:41 PM     09/28/2022 04:41 PM     04/05/2012 09:05 AM       BMP:    Lab Results   Component Value Date/Time     09/28/2022 04:41 PM    K 4.2 09/28/2022 04:41 PM     09/28/2022 04:41 PM    CO2 26 09/28/2022 04:41 PM    BUN 23 09/28/2022 04:41 PM    CREATININE 1.29 09/28/2022 04:41 PM    GLUCOSE 87 09/28/2022 04:41 PM    GLUCOSE 88 04/12/2012 10:20 AM       HEMOGLOBIN A1C:   Lab Results   Component Value Date/Time    LABA1C 5.8 09/29/2021 01:47 PM       FASTING LIPID PANEL:  Lab Results   Component Value Date    CHOL 148 09/24/2021    HDL 50 09/24/2021    TRIG 119 09/24/2021       ASSESSMENT AND PLAN:  Russell Brito was seen today for hypertension, health maintenance and letter. Diagnoses and all orders for this visit:    Cervical stenosis of spinal canal  Chronic bilateral low back pain with bilateral sciatica  -Follows up with neurosurgery, Ortho and pain management, has refused surgery in the past, also being given referral for bariatric surgery for weight management  -Continues to be on gabapentin    H/O: stroke x2  -     Handicap placard    At high risk for falls        -Uses cane for ambulating  -     Requesting handicap placard-orders placed    Enlargement of thyroid  -     TSH With Reflex Ft4; Future  -     US THYROID; Future    Personal history of tobacco use  -     CT lung screen [Initial/Annual]; Future  -     IA VISIT TO DISCUSS LUNG CA SCREEN W LDCT      FOLLOW UP AND INSTRUCTIONS:  No follow-ups on file. Filomena received counseling on the following healthy behaviors: nutrition    Discussed use, benefit, and side effects of prescribed medications. Barriers to medication compliance addressed. All patient questions answered. Pt voiced understanding. Patient given educational materials - see patient instructions    Adolph Garcia MD  PGY-3 Internal Medicine Resident  [address]  10/19/2022 5:07 PM      This note is created with the assistance of a speech-recognition program. While intending to generate a document that actually reflects the content of thevisit, the document can still have some mistakes which may not have been identified and corrected by editing.

## 2022-10-19 NOTE — PROGRESS NOTES
Low Dose CT (LDCT) Lung Screening criteria met:     Age 50-77(Medicare) or 50-80 ([facility])   Pack year smoking >20   Still smoking or less than 15 year since quit   No sign or symptoms of lung cancer   > 11 months since last LDCT     Risks and benefits of lung cancer screening with LDCT scans discussed:    Significance of positive screen - False-positive LDCT results often occur. 95% of all positive results do not lead to a diagnosis of cancer. Usually further imaging can resolve most false-positive results; however, some patients may require invasive procedures. Over diagnosis risk - 10% to 12% of screen-detected lung cancer cases are over diagnosed--that is, the cancer would not have been detected in the patient's lifetime without the screening. Need for follow up screens annually to continue lung cancer screening effectiveness     Risks associated with radiation from annual LDCT- Radiation exposure is about the same as for a mammogram, which is about 1/3 of the annual background radiation exposure from everyday life. Starting screening at age 54 is not likely to increase cancer risk from radiation exposure. Patients with comorbidities resulting in life expectancy of < 10 years, or that would preclude treatment of an abnormality identified on CT, should not be screened due to lack of benefit.     To obtain maximal benefit from this screening, smoking cessation and long-term abstinence from smoking is critical

## 2022-10-20 NOTE — TELEPHONE ENCOUNTER
E-scribe request from [address] [place] [address] for Clopidogrel 75 mg and Atorvastatin 80 mg. Patient has an appt on 1/18/23. Health Maintenance   Topic Date Due    Pneumococcal 65+ years Vaccine (3) 12/20/2021    Low dose CT lung screening  01/20/2022    COVID-19 Vaccine (4 - Booster for Pfizer series) 05/20/2022    Flu vaccine (1) 08/01/2022    Colorectal Cancer Screen  08/21/2022    Lipids  09/24/2022    A1C test (Diabetic or Prediabetic)  09/29/2022    Annual Wellness Visit (AWV)  10/08/2022    Depression Screen  06/08/2023    Breast cancer screen  12/08/2023    DTaP/Tdap/Td vaccine (2 - Td or Tdap) 03/08/2028    DEXA (modify frequency per FRAX score)  Completed    Shingles vaccine  Completed    Hepatitis C screen  Completed    Hepatitis A vaccine  Aged Out    Hib vaccine  Aged Out    Meningococcal (ACWY) vaccine  Aged Out             (applicable per patient's age: Cancer Screenings, Depression Screening, Fall Risk Screening, Immunizations)    Hemoglobin A1C (%)   Date Value   09/29/2021 5.8   06/07/2019 5.4   04/19/2018 5.7     Microalb/Crt.  Ratio (mcg/mg creat)   Date Value   05/09/2017 6     LDL Cholesterol (mg/dL)   Date Value   09/24/2021 74     AST (U/L)   Date Value   09/28/2022 20     ALT (U/L)   Date Value   09/28/2022 9     BUN (mg/dL)   Date Value   09/28/2022 23      (goal A1C is < 7)   (goal LDL is <100) need 30-50% reduction from baseline     BP Readings from Last 3 Encounters:   10/19/22 121/71   10/15/22 (!) 176/76   10/03/22 (!) 143/85    (goal /80)      All Future Testing planned in [facility]:  Lab Frequency Next Occurrence   COVID-19 Once 11/10/2022   Protein / Creatinine Ratio, Urine Once 05/28/2022   XR CHEST (2 VW) Once 05/18/2022   Protein / Creatinine Ratio, Urine Once 06/10/2022   COLONOSCOPY (Screening) Once 10/19/2022   Ferritin Once 10/03/2022   Iron and TIBC Once 10/03/2022   Transferrin Once 10/03/2022   CT lung screen [Initial/Annual] Once 10/19/2022   US THYROID Once 11/19/2022 CBC with Auto Differential     Comprehensive Metabolic Panel     CBC with Auto Differential         Next Visit Date:  Future Appointments   Date Time Provider Mary Bakeri   10/25/2022  4:10 PM [facility]   12/19/2022  2:45 PM Aaliyah Gracia, DPM [facility] Podiatry [facility]   12/20/2022  4:20 PM LOY Huang - CNP Neuro Spec [facility]   1/18/2023  1:00 [facility]   4/3/2023  3:45 PM Danie Langford  [facility]            Patient Active Problem List:     Essential hypertension     Pure hypercholesterolemia     Cerebrovascular accident (CVA), 2011, no residual deficit (HCC)     CKD (chronic kidney disease) stage 3, GFR 30-59 ml/min (HCC)     Peripheral vascular disease (HCC)     TIBURCIO (obstructive sleep apnea)     Class 3 severe obesity due to excess calories with serious comorbidity and body mass index (BMI) of 50.0 to 59.9 in adult [facility])     Primary osteoarthritis of left knee     Chronic tension-type headache, not intractable     Carpal tunnel syndrome, bilateral-not on medication due to kidney disease     Microcytic anemia     Alpha thalassemia trait     Cervical stenosis of spinal canal     Pneumonia due to COVID-19 virus     Chronic bilateral low back pain with bilateral sciatica     Lumbar radicular pain     History of stroke

## 2022-10-21 RX ORDER — ATORVASTATIN CALCIUM 80 MG/1
80 TABLET, FILM COATED ORAL DAILY
Qty: 28 TABLET | Refills: 3 | Status: SHIPPED | OUTPATIENT
Start: 2022-10-21

## 2022-10-21 RX ORDER — CLOPIDOGREL BISULFATE 75 MG/1
75 TABLET ORAL DAILY
Qty: 28 TABLET | Refills: 3 | Status: SHIPPED | OUTPATIENT
Start: 2022-10-21

## 2022-10-21 NOTE — PROGRESS NOTES
Attending Physician Statement  I have discussed the care of [place] [address]  including pertinent history and exam findings,  with the resident. I have reviewed the key elements of all parts of the encounter with the resident. I agree with the assessment, plan and orders as documented by the resident.     Melissa Acuna MD  10/21/2022

## 2022-10-25 ENCOUNTER — OFFICE VISIT (OUTPATIENT)
Dept: ORTHOPEDIC SURGERY | Age: 67
End: 2022-10-25
Payer: COMMERCIAL

## 2022-10-25 VITALS — RESPIRATION RATE: 12 BRPM | WEIGHT: 293 LBS | HEIGHT: 67 IN | BODY MASS INDEX: 45.99 KG/M2

## 2022-10-25 DIAGNOSIS — M48.062 LUMBAR STENOSIS WITH NEUROGENIC CLAUDICATION: ICD-10-CM

## 2022-10-25 DIAGNOSIS — M54.16 LUMBAR RADICULAR PAIN: ICD-10-CM

## 2022-10-25 DIAGNOSIS — M54.50 LOW BACK PAIN, UNSPECIFIED BACK PAIN LATERALITY, UNSPECIFIED CHRONICITY, UNSPECIFIED WHETHER SCIATICA PRESENT: Primary | ICD-10-CM

## 2022-10-25 PROCEDURE — 99213 OFFICE O/P EST LOW 20 MIN: CPT | Performed by: ORTHOPAEDIC SURGERY

## 2022-10-25 PROCEDURE — 1123F ACP DISCUSS/DSCN MKR DOCD: CPT | Performed by: ORTHOPAEDIC SURGERY

## 2022-10-25 NOTE — PROGRESS NOTES
Patient ID: Coleen Zambrano is a 69 y.o. female    Chief Compliant:  Chief Complaint   Patient presents with    Back Pain        Diagnostic imaging:  CT myelogram again reviewed patient with moderately severe to severe lumbar spinal stenosis L3-4 L4-5 L3-4 with a grade 1 spondylolisthesis      Assessment and Plan:  1. Low back pain, unspecified back pain laterality, unspecified chronicity, unspecified whether sciatica present    2. Lumbar radicular pain    3. Lumbar stenosis with neurogenic claudication    4. BMI 60.0-69.9, adult [facility])      Patient was seen by pain management but judged to be medically unfit for epidurals    Patient reports that she is returning to weight loss surgery Dr. Jose Love in the next week or so    Low back pain with radicular pain    Patient is not a surgical candidate due to co morbidities    Follow up 3 months    HPI:  This is a 69 y.o. female who presents to the clinic today for low back pain with radicular right buttock, hip, leg, and foot pain. Patient is ambulating with assistance via cane. Patient followed with pain management but did not get epidural steroids due to her weight. Review of Systems   All other systems reviewed and are negative.       Past History:    Current Outpatient Medications:     atorvastatin (LIPITOR) 80 MG tablet, TAKE 1 TABLET BY MOUTH DAILY, Disp: 28 tablet, Rfl: 3    clopidogrel (PLAVIX) 75 MG tablet, TAKE 1 TABLET BY MOUTH DAILY, Disp: 28 tablet, Rfl: 3    hydroCHLOROthiazide (HYDRODIURIL) 25 MG tablet, TAKE 1 TABLET BY MOUTH DAILY, Disp: 28 tablet, Rfl: 3    losartan (COZAAR) 50 MG tablet, TAKE 1 TABLET BY MOUTH DAILY, Disp: 28 tablet, Rfl: 3    omeprazole (PRILOSEC) 20 MG delayed release capsule, TAKE 1 CAPSULE BY MOUTH TWICE A DAY WITH MEALS, Disp: 56 capsule, Rfl: 1    ferrous sulfate (IRON 325) 325 (65 Fe) MG tablet, Take 1 tab three days days a week, Disp: 90 tablet, Rfl: 1    docusate sodium (COLACE) 100 MG capsule, Take 1 capsule by mouth 2 times daily as needed for Constipation, Disp: 60 capsule, Rfl: 0    gabapentin (NEURONTIN) 600 MG tablet, Take 1 tablet by mouth 3 times daily for 30 days. , Disp: 90 tablet, Rfl: 5    acetaminophen (TYLENOL) 325 MG tablet, TAKE 2 TABLETS BY MOUTH EVERY 4 HOURS AS NEEDED FOR PAIN, Disp: 60 tablet, Rfl: 3    amLODIPine (NORVASC) 10 MG tablet, TAKE 1 TABLET BY MOUTH DAILY, Disp: 60 tablet, Rfl: 1    aspirin (ASPIRIN LOW DOSE) 81 MG EC tablet, TAKE 1 TABLET BY MOUTH DAILY, Disp: 60 tablet, Rfl: 1    Cholecalciferol (VITAMIN D3) 25 MCG TABS, TAKE 1 TABLET BY MOUTH DAILY, Disp: 60 tablet, Rfl: 1    folic acid (FOLVITE) 1 MG tablet, TAKE 1 TABLET BY MOUTH DAILY, Disp: 60 tablet, Rfl: 2    guaiFENesin (ALTARUSSIN) 100 MG/5ML syrup, Take 10 mLs by mouth 3 times daily as needed for Cough (Patient taking differently: Take 10 mLs by mouth as needed for Cough), Disp: 1 each, Rfl: 0    vitamin B-1 (THIAMINE) 100 MG tablet, Take 1 tablet by mouth daily, Disp: 30 tablet, Rfl: 3    diclofenac sodium (VOLTAREN) 1 % GEL, Apply 4 g topically 4 times daily, Disp: 4 Tube, Rfl: 1  Allergies   Allergen Reactions    Duricef [Cefadroxil Monohydrate] Hives    Fish-Derived Products Hives    Pcn [Penicillins] Hives    Tomato Hives     Social History     Socioeconomic History    Marital status:      Spouse name: Not on file    Number of children: Not on file    Years of education: Not on file    Highest education level: Not on file   Occupational History    Not on file   Tobacco Use    Smoking status: Former     Packs/day: 2.00     Years: 35.00     Pack years: 70.00     Types: Cigarettes     Quit date: 10/11/2011     Years since quittin.0     Passive exposure: Current (Outside of the home)    Smokeless tobacco: Never   Vaping Use    Vaping Use: Never used   Substance and Sexual Activity    Alcohol use:  Yes     Alcohol/week: 6.0 standard drinks     Types: 6 Cans of beer per week     Comment: rare    Drug use: No     Types: Cocaine, Marijuana ([place])     Comment: per pt did years ago; cocaine & marij, 8-23-19 denies current use    Sexual activity: Yes     Partners: Male   Other Topics Concern    Not on file   Social History Narrative    Not on file     Social Determinants of Health     Financial Resource Strain: Low Risk     Difficulty of Paying Living Expenses: Not hard at all   Food Insecurity: No Food Insecurity    Worried About Running Out of Food in the Last Year: Never true    Ran Out of Food in the Last Year: Never true   Transportation Needs: Not on file   Physical Activity: Not on file   Stress: Not on file   Social Connections: Not on file   Intimate Partner Violence: Not on file   Housing Stability: Not on file     Past Medical History:   Diagnosis Date    Ambulates with cane     Bleeding     while on aggrenox    Cataracts, bilateral     Cerebrovascular disease 2003,2011    cva    Chronic pain in left foot     CKD (chronic kidney disease) stage 3, GFR 30-59 ml/min ([address]     COVID-19 11/2021    states hpspitalized    GERD (gastroesophageal reflux disease)     Hx of blood clots     Hyperlipidemia     Hypertension     Iron (Fe) deficiency anemia     Nicotine abuse     Obesity     TIBURCIO on CPAP     Osteoarthritis     Peripheral vascular disease ([address] 03/13/2014    Substance abuse ([address]     cocaine, canabis    Thalassemia minor     Unspecified cerebral artery occlusion with cerebral infarction     Wears dentures     upper and lower     Past Surgical History:   Procedure Laterality Date    BREAST SURGERY      biopsy    COLONOSCOPY  12/2007    adenomatous polyp    COLONOSCOPY  2013    normal, repeat in 5 years    COLONOSCOPY N/A 8/21/2019    COLONOSCOPY POLYPECTOMY SNARE/COLD BIOPSY performed [facility] Es [facility]uler [facility] at [facility] ENDOSCOPY  2009    negative    UPPER GASTROINTESTINAL ENDOSCOPY N/A 8/21/2019    EGD BIOPSY performed by Oneal Bauer MD at [facility] Endoscopy     Family History   Problem Relation Age of Onset    High Blood Pressure Mother     Asthma Mother     Heart Disease Mother     Heart Disease Father     High Blood Pressure Father     Diabetes Brother     High Blood Pressure Brother     Heart Disease Brother     High Blood Pressure Maternal Grandmother     High Blood Pressure Maternal Grandfather     Heart Disease Maternal Grandfather         Physical Exam:  Vitals signs and nursing note reviewed. Constitutional:       Appearance: well-developed. HENT:      Head: Normocephalic and atraumatic. Nose: Nose normal.   Eyes:      Conjunctiva/sclera: Conjunctivae normal.   Neck:      Musculoskeletal: Normal range of motion and neck supple. Pulmonary:      Effort: Pulmonary effort is normal. No respiratory distress. Musculoskeletal:      Comments: Normal gait     Skin:     General: Skin is warm and dry. Neurological:      Mental Status: Alert and oriented to person, place, and time. Sensory: No sensory deficit. Psychiatric:         Behavior: Behavior normal.         Thought Content: Thought content normal.        Provider Attestation:  Desma Nageotte Beeks, personally performed the services described in this documentation. All medical record entries made by the scribe were at my direction and in my presence. I have reviewed the chart and discharge instructions and agree that the records reflect my personal performance and is accurate and complete. Wally Estes MD 10/25/22       Scribe Attestation:  By signing my name below, Rudy Sage, attest that this documentation has been prepared under the direction and in the presence of Dr. Margaret Willard. Electronically signed: Shoaib Hamilton, 10/25/22     Please note that this chart was generated using voice recognition Dragon dictation software.   Although every effort was made to ensure the accuracy of this automated transcription, some errors in transcription may have occurred.

## 2022-10-28 DIAGNOSIS — M17.12 PRIMARY OSTEOARTHRITIS OF LEFT KNEE: ICD-10-CM

## 2022-10-28 NOTE — TELEPHONE ENCOUNTER
Refill request for acetaminophen (TYLENOL) 325 MG tablet. If appropriate please send medication(s) to patients pharmacy. Next appt: 1/18/2023      Health Maintenance   Topic Date Due    Pneumococcal 65+ years Vaccine (3) 12/20/2021    Low dose CT lung screening  01/20/2022    COVID-19 Vaccine (4 - Booster for Pfizer series) 03/17/2022    Flu vaccine (1) 08/01/2022    Colorectal Cancer Screen  08/21/2022    Lipids  09/24/2022    A1C test (Diabetic or Prediabetic)  09/29/2022    Annual Wellness Visit (AWV)  10/08/2022    Depression Screen  06/08/2023    Breast cancer screen  12/08/2023    DTaP/Tdap/Td vaccine (2 - Td or Tdap) 03/08/2028    DEXA (modify frequency per FRAX score)  Completed    Shingles vaccine  Completed    Hepatitis C screen  Completed    Hepatitis A vaccine  Aged Out    Hib vaccine  Aged Out    Meningococcal (ACWY) vaccine  Aged Out       Hemoglobin A1C (%)   Date Value   09/29/2021 5.8   06/07/2019 5.4   04/19/2018 5.7             ( goal A1C is < 7)   Microalb/Crt.  Ratio (mcg/mg creat)   Date Value   05/09/2017 6     LDL Cholesterol (mg/dL)   Date Value   09/24/2021 74       (goal LDL is <100)   AST (U/L)   Date Value   09/28/2022 20     ALT (U/L)   Date Value   09/28/2022 9     BUN (mg/dL)   Date Value   09/28/2022 23     BP Readings from Last 3 Encounters:   10/19/22 121/71   10/15/22 (!) 176/76   10/03/22 (!) 143/85          (goal 120/80)          Patient Active Problem List:     Essential hypertension     Pure hypercholesterolemia     Cerebrovascular accident (CVA), 2011, no residual deficit (HCC)     CKD (chronic kidney disease) stage 3, GFR 30-59 ml/min (HCC)     Peripheral vascular disease (HCC)     TIBURCIO (obstructive sleep apnea)     Class 3 severe obesity due to excess calories with serious comorbidity and body mass index (BMI) of 50.0 to 59.9 in adult [facility])     Primary osteoarthritis of left knee     Chronic tension-type headache, not intractable     Carpal tunnel syndrome, bilateral-not on medication due to kidney disease     Microcytic anemia     Alpha thalassemia trait     Cervical stenosis of spinal canal     Pneumonia due to COVID-19 virus     Chronic bilateral low back pain with bilateral sciatica     Lumbar radicular pain     History of stroke

## 2022-10-30 RX ORDER — ACETAMINOPHEN 325 MG/1
TABLET ORAL
Qty: 60 TABLET | Refills: 3 | Status: SHIPPED | OUTPATIENT
Start: 2022-10-30

## 2022-11-03 ENCOUNTER — HOSPITAL ENCOUNTER (OUTPATIENT)
Age: 67
Setting detail: SPECIMEN
Discharge: HOME | End: 2022-11-03

## 2022-11-03 DIAGNOSIS — Z86.73 H/O: STROKE: ICD-10-CM

## 2022-11-03 DIAGNOSIS — R73.9 HYPERGLYCEMIA: ICD-10-CM

## 2022-11-03 DIAGNOSIS — I63.00 CEREBROVASCULAR ACCIDENT (CVA) DUE TO THROMBOSIS OF PRECEREBRAL ARTERY (HCC): ICD-10-CM

## 2022-11-03 LAB
CHOLESTEROL/HDL RATIO: 3.3
CHOLESTEROL: 150 MG/DL
ESTIMATED AVERAGE GLUCOSE: 120 MG/DL
HBA1C MFR BLD: 5.8 % (ref 4–6)
HDLC SERPL-MCNC: 45 MG/DL
LDL CHOLESTEROL: 88 MG/DL (ref 0–130)
TRIGL SERPL-MCNC: 84 MG/DL

## 2022-11-09 ENCOUNTER — OFFICE VISIT (OUTPATIENT)
Dept: BARIATRICS/WEIGHT MGMT | Age: 67
End: 2022-11-09
Payer: COMMERCIAL

## 2022-11-09 VITALS
HEIGHT: 67 IN | SYSTOLIC BLOOD PRESSURE: 126 MMHG | DIASTOLIC BLOOD PRESSURE: 82 MMHG | HEART RATE: 76 BPM | WEIGHT: 293 LBS | BODY MASS INDEX: 45.99 KG/M2

## 2022-11-09 DIAGNOSIS — I63.00 CEREBROVASCULAR ACCIDENT (CVA) DUE TO THROMBOSIS OF PRECEREBRAL ARTERY (HCC): ICD-10-CM

## 2022-11-09 DIAGNOSIS — I73.9 PERIPHERAL VASCULAR DISEASE (HCC): ICD-10-CM

## 2022-11-09 DIAGNOSIS — I10 ESSENTIAL HYPERTENSION: Primary | ICD-10-CM

## 2022-11-09 DIAGNOSIS — K21.9 GASTROESOPHAGEAL REFLUX DISEASE WITHOUT ESOPHAGITIS: ICD-10-CM

## 2022-11-09 DIAGNOSIS — E66.01 CLASS 3 SEVERE OBESITY DUE TO EXCESS CALORIES WITH SERIOUS COMORBIDITY AND BODY MASS INDEX (BMI) OF 50.0 TO 59.9 IN ADULT (HCC): ICD-10-CM

## 2022-11-09 PROCEDURE — 1123F ACP DISCUSS/DSCN MKR DOCD: CPT | Performed by: SURGERY

## 2022-11-09 PROCEDURE — 3078F DIAST BP <80 MM HG: CPT | Performed by: SURGERY

## 2022-11-09 PROCEDURE — 99204 OFFICE O/P NEW MOD 45 MIN: CPT | Performed by: SURGERY

## 2022-11-09 PROCEDURE — 3074F SYST BP LT 130 MM HG: CPT | Performed by: SURGERY

## 2022-11-09 RX ORDER — GABAPENTIN 600 MG/1
600 TABLET ORAL 3 TIMES DAILY
COMMUNITY

## 2022-11-09 RX ORDER — VITAMIN B COMPLEX
TABLET ORAL
COMMUNITY
Start: 2022-10-25

## 2022-11-09 NOTE — LETTER
Visit Date: 2022    Patient: Antoine Khalil  YOB: 1955    Dear Dr. Richelle Bean MD,      I had the pleasure of seeing Mason Rios in the office today for a consult for weight loss surgery. Her current Weight: (!) 376 lb (170.6 kg), which gives her a Body mass index is 58.89 kg/m². .  We had a long discussion regarding surgical options for weight loss and improvement in co-morbidities. She is considering a bariatric  procedure. We will start the preoperative workup, which includes bloodwork, psychological evaluation, support group attendance, and preoperative medical clearance from you. We will also initiate pre-certification for surgery, which requires a letter of medical necessity from you and often office notes documenting a weight history and co-morbidities. Mason Rios will require 6 months of medically supervised visits as specified by the patient's insurance. Thank you for allowing me to participate in the care of your patient. If you have any questions or concerns, please do not hesitate to call.       Sincerely,     Grupo Cordova DO  Director of Bariatric and Minimally Invasive Surgery  SURAJ RODRIGUEZ [facility], [address] [facility], [facility]  Eliza Bettina: [phone]  F: [phone]

## 2022-11-14 DIAGNOSIS — I10 ESSENTIAL HYPERTENSION: ICD-10-CM

## 2022-11-14 DIAGNOSIS — I63.00 CEREBROVASCULAR ACCIDENT (CVA) DUE TO THROMBOSIS OF PRECEREBRAL ARTERY (HCC): ICD-10-CM

## 2022-11-14 NOTE — TELEPHONE ENCOUNTER
Request for amlodipine,asa 81, vit d. Next vladimir 11/15/22    Next Visit Date:  Future Appointments   Date Time Provider Mary Marinelli   11/15/2022  3:30 PM STV US GE XD CLEAR STVZ US STV Radiolog   11/15/2022  4:00 PM STV CT ROOM 1 STVZ CT SCAN STV Radiolog   11/16/2022  3:40 PM Yung Rodrigez MD [facility] IM MHTOLPP   11/28/2022 12:00 PM SCHEDULE, [facility] BARIATRIC DIETICIAN bariatric shen TOLPP   12/19/2022  2:45 PM Laurent Kocher, DPM [facility] Podiatry TOLPP   12/20/2022  4:20 PM [facility] -   1/18/2023  1:00 PM Yung Rodrigez MD [facility] IM MHTOLPP   1/31/2023  4:20 PM Martinez Sharma MD [facility] TOLPP   4/3/2023  3:45 PM Dionicio Martel MD [id number] [facility] Maintenance   Topic Date Due    Pneumococcal 65+ years Vaccine (3) 12/20/2021    Low dose CT lung screening  01/20/2022    COVID-19 Vaccine (4 - Booster for Pfizer series) 03/17/2022    Flu vaccine (1) 08/01/2022    Colorectal Cancer Screen  08/21/2022    Annual Wellness Visit (AWV)  10/08/2022    Depression Screen  06/08/2023    A1C test (Diabetic or Prediabetic)  11/03/2023    Lipids  11/03/2023    Breast cancer screen  12/08/2023    DTaP/Tdap/Td vaccine (2 - Td or Tdap) 03/08/2028    DEXA (modify frequency per FRAX score)  Completed    Shingles vaccine  Completed    Hepatitis C screen  Completed    Hepatitis A vaccine  Aged Out    Hib vaccine  Aged Out    Meningococcal (ACWY) vaccine  Aged Out       Hemoglobin A1C (%)   Date Value   11/03/2022 5.8   09/29/2021 5.8   06/07/2019 5.4             ( goal A1C is < 7)   Microalb/Crt.  Ratio (mcg/mg creat)   Date Value   05/09/2017 6     LDL Cholesterol (mg/dL)   Date Value   11/03/2022 88       (goal LDL is <100)   AST (U/L)   Date Value   09/28/2022 20     ALT (U/L)   Date Value   09/28/2022 9     BUN (mg/dL)   Date Value   09/28/2022 23     BP Readings from Last 3 Encounters:   11/09/22 126/82   10/19/22 121/71   10/15/22 (!) 176/76          (goal 120/80)    All Future Testing planned in [facility]  Lab Frequency Next Occurrence   XR CHEST (2 VW) Once 05/18/2022   Protein / Creatinine Ratio, Urine Once 06/10/2022   COLONOSCOPY (Screening) Once 10/19/2022   Ferritin Once 10/03/2022   Iron and TIBC Once 10/03/2022   Transferrin Once 10/03/2022   CT lung screen [Initial/Annual] Once 10/19/2022   US THYROID Once 11/19/2022   CBC with Auto Differential     Comprehensive Metabolic Panel     CBC with Auto Differential           Patient Active Problem List:     Essential hypertension     Pure hypercholesterolemia     Cerebrovascular accident (CVA), 2011, no residual deficit (HCC)     CKD (chronic kidney disease) stage 3, GFR 30-59 ml/min (HCC)     Peripheral vascular disease (HCC)     TIBURCIO (obstructive sleep apnea)     Class 3 severe obesity due to excess calories with serious comorbidity and body mass index (BMI) of 50.0 to 59.9 in adult [facility])     Primary osteoarthritis of left knee     Chronic tension-type headache, not intractable     Carpal tunnel syndrome, bilateral-not on medication due to kidney disease     Microcytic anemia     Alpha thalassemia trait     Cervical stenosis of spinal canal     Pneumonia due to COVID-19 virus     Chronic bilateral low back pain with bilateral sciatica     Lumbar radicular pain     History of stroke

## 2022-11-15 ENCOUNTER — HOSPITAL ENCOUNTER (OUTPATIENT)
Dept: CT IMAGING | Age: 67
Discharge: HOME | End: 2022-11-17
Payer: COMMERCIAL

## 2022-11-15 ENCOUNTER — HOSPITAL ENCOUNTER (OUTPATIENT)
Dept: ULTRASOUND IMAGING | Age: 67
Discharge: HOME | End: 2022-11-17
Payer: COMMERCIAL

## 2022-11-15 DIAGNOSIS — Z87.891 PERSONAL HISTORY OF TOBACCO USE: ICD-10-CM

## 2022-11-15 DIAGNOSIS — E04.9 ENLARGEMENT OF THYROID: ICD-10-CM

## 2022-11-15 PROCEDURE — 76536 US EXAM OF HEAD AND NECK: CPT

## 2022-11-15 PROCEDURE — 71271 CT THORAX LUNG CANCER SCR C-: CPT

## 2022-11-15 RX ORDER — ASPIRIN 81 MG/1
TABLET ORAL
Qty: 28 TABLET | Refills: 1 | Status: SHIPPED | OUTPATIENT
Start: 2022-11-15

## 2022-11-15 RX ORDER — AMLODIPINE BESYLATE 10 MG/1
10 TABLET ORAL DAILY
Qty: 28 TABLET | Refills: 1 | Status: ON HOLD | OUTPATIENT
Start: 2022-11-15 | End: 2022-11-22 | Stop reason: HOSPADM

## 2022-11-15 RX ORDER — CHOLECALCIFEROL (VITAMIN D3) 25 MCG
TABLET ORAL
Qty: 28 TABLET | Refills: 1 | Status: SHIPPED | OUTPATIENT
Start: 2022-11-15

## 2022-11-16 ENCOUNTER — OFFICE VISIT (OUTPATIENT)
Dept: INTERNAL MEDICINE | Age: 67
End: 2022-11-16
Payer: COMMERCIAL

## 2022-11-16 VITALS
HEIGHT: 67 IN | WEIGHT: 293 LBS | DIASTOLIC BLOOD PRESSURE: 84 MMHG | BODY MASS INDEX: 45.99 KG/M2 | SYSTOLIC BLOOD PRESSURE: 139 MMHG | TEMPERATURE: 97 F | HEART RATE: 73 BPM | OXYGEN SATURATION: 96 %

## 2022-11-16 DIAGNOSIS — M48.02 CERVICAL STENOSIS OF SPINAL CANAL: ICD-10-CM

## 2022-11-16 DIAGNOSIS — I63.00 CEREBROVASCULAR ACCIDENT (CVA) DUE TO THROMBOSIS OF PRECEREBRAL ARTERY (HCC): Primary | ICD-10-CM

## 2022-11-16 DIAGNOSIS — M54.16 LUMBAR RADICULAR PAIN: ICD-10-CM

## 2022-11-16 DIAGNOSIS — M19.90 ARTHRITIS: ICD-10-CM

## 2022-11-16 DIAGNOSIS — M54.42 CHRONIC BILATERAL LOW BACK PAIN WITH BILATERAL SCIATICA: ICD-10-CM

## 2022-11-16 DIAGNOSIS — M54.41 CHRONIC BILATERAL LOW BACK PAIN WITH BILATERAL SCIATICA: ICD-10-CM

## 2022-11-16 DIAGNOSIS — G89.29 CHRONIC BILATERAL LOW BACK PAIN WITH BILATERAL SCIATICA: ICD-10-CM

## 2022-11-16 PROCEDURE — 99213 OFFICE O/P EST LOW 20 MIN: CPT | Performed by: STUDENT IN AN ORGANIZED HEALTH CARE EDUCATION/TRAINING PROGRAM

## 2022-11-16 PROCEDURE — 3074F SYST BP LT 130 MM HG: CPT | Performed by: STUDENT IN AN ORGANIZED HEALTH CARE EDUCATION/TRAINING PROGRAM

## 2022-11-16 PROCEDURE — 1123F ACP DISCUSS/DSCN MKR DOCD: CPT | Performed by: STUDENT IN AN ORGANIZED HEALTH CARE EDUCATION/TRAINING PROGRAM

## 2022-11-16 PROCEDURE — 3078F DIAST BP <80 MM HG: CPT | Performed by: STUDENT IN AN ORGANIZED HEALTH CARE EDUCATION/TRAINING PROGRAM

## 2022-11-16 PROCEDURE — 99211 OFF/OP EST MAY X REQ PHY/QHP: CPT | Performed by: STUDENT IN AN ORGANIZED HEALTH CARE EDUCATION/TRAINING PROGRAM

## 2022-11-16 ASSESSMENT — ENCOUNTER SYMPTOMS
RESPIRATORY NEGATIVE: 1
GASTROINTESTINAL NEGATIVE: 1

## 2022-11-16 NOTE — PROGRESS NOTES
Attending Physician Statement  I have discussed the care of Gina Wilcox, including pertinent history and exam findings with the resident. I have reviewed the key elements of all parts of the encounter with the resident. I agree with the assessment, and status of the problem list as documented. The plan and orders should include   Orders Placed This Encounter   Procedures    DME Order for [facility]) as OP    and this was also documented by the resident. The medication list was reviewed with the resident and is up to date. The return visit should be in 3 months . Diagnosis Orders   1. Cerebrovascular accident (CVA) due to thrombosis of precerebral artery ([facility]  DME Order for (Specify) as OP      2. Chronic bilateral low back pain with bilateral sciatica  DME Order for (Specify) as OP      3. Carpal tunnel syndrome, bilateral-not on medication due to kidney disease        4. Primary osteoarthritis of left knee  DME Order for (Specify) as OP      5. Cervical stenosis of spinal canal  DME Order for (Specify) as OP      6.  Lumbar radicular pain  DME Order for (Specify) as OP           Hawa Chatterjee MD   Attending Physician, [facility]   Faculty, Internal Medicine Residency Program  [address] [place] [address]

## 2022-11-16 NOTE — LETTER
[address]  [address] [facility] [address]  Phone: [phone]  Fax: [phone]    Iglesia Prince MD        November 17, 2022     Patient: Fritz Lujan   YOB: 1955   Date of Visit: 11/16/2022       To Whom It May Concern: It is my medical opinion that Sushila Lax {Work release (duty restriction):[id number]}. If you have any questions or concerns, please don't hesitate to call.     Sincerely,        Iglesia Prince MD

## 2022-11-16 NOTE — PROGRESS NOTES
[facility] [address] [address]  Dept: [phone]  Dept Fax: [phone]    Office Progress/Follow Up Note  Date ofpatient's visit: 11/16/2022  Patient's Name:  Tala Dinh YOB: 1955            Patient Care Team:  Aileen Beltran MD as PCP - General (Internal Medicine)  Corrie Lyon DPM as Consulting Physician (Podiatry)  ================================================================    REASON FOR VISIT/CHIEF COMPLAINT:  Orders (Dme order needed for Rolator walker with seat ) and Forms (For for weight loss mgmt )    HISTORY OF PRESENTING ILLNESS:  History was obtained from: patient. Pradeep pressley 69 y.o. is here for follow-up visit    Today her main concern is-she needs a Rollator walker because of her chronic conditions, including residual weakness from stroke, degenerative spine disease and morbid obesity. She also wants letter to be faxed to bariatrics surgery, brought in a sample letter. Otherwise no complaints today, blood pressure stable, she has been compliant with her medications. Tala Dinh was evaluated today and a DME order was entered for a rollator walker because she requires this to successfully complete daily living tasks of toileting, personal cares, and ambulating. And also to be able to sit to rest or take a break. A wheeled walker is necessary due to the patient's unsteady gait, upper body weakness, and inability to  an ambulation device; and she can ambulate only by pushing a walker instead of a lesser assistive device such as a cane, crutch, or standard walker.   The need for this equipment was discussed with the patient and she understands and is in agreement.       -HbA1c, lipid panel, thyroid function test all within normal limits  -Patient states that she will call GI office to schedule colonoscopy  -Lung cancer screening reviewed, 2 mm subpleural nodule, will follow up annually.  -Thyroid scan reviewed 1.1 cm TR 4 nodule in right lobe-we will follow-up in 1 year with ultrasound    Patient Active Problem List   Diagnosis    Essential hypertension    Pure hypercholesterolemia    Cerebrovascular accident (CVA), 2011, no residual deficit (HCC)    CKD (chronic kidney disease) stage 3, GFR 30-59 ml/min (HCC)    Peripheral vascular disease (HCC)    TIBURCIO (obstructive sleep apnea)    Class 3 severe obesity due to excess calories with serious comorbidity and body mass index (BMI) of 50.0 to 59.9 in adult [facility])    Primary osteoarthritis of left knee    Chronic tension-type headache, not intractable    Carpal tunnel syndrome, bilateral-not on medication due to kidney disease    Microcytic anemia    Alpha thalassemia trait    Cervical stenosis of spinal canal    Pneumonia due to COVID-19 virus    Chronic bilateral low back pain with bilateral sciatica    Lumbar radicular pain    History of stroke       Health Maintenance Due   Topic Date Due    Pneumococcal 65+ years Vaccine (3) 12/20/2021    COVID-19 Vaccine (4 - Booster for Pfizer series) 03/17/2022    Flu vaccine (1) 08/01/2022    Colorectal Cancer Screen  08/21/2022    Annual Wellness Visit (AWV)  10/08/2022       Allergies   Allergen Reactions    Duricef [Cefadroxil Monohydrate] Hives    Fish-Derived Products Hives    Pcn [Penicillins] Hives    Tomato Hives         Current Outpatient Medications   Medication Sig Dispense Refill    amLODIPine (NORVASC) 10 MG tablet TAKE 1 TABLET BY MOUTH DAILY 28 tablet 1    aspirin 81 MG EC tablet TAKE 1 TABLET BY MOUTH DAILY 28 tablet 1    Cholecalciferol (VITAMIN D3) 25 MCG TABS TAKE 1 TABLET BY MOUTH DAILY 28 tablet 1    Vitamin D (CHOLECALCIFEROL) 25 MCG (1000 UT) TABS tablet       gabapentin (NEURONTIN) 600 MG tablet Take 600 mg by mouth 3 times daily.       acetaminophen (TYLENOL) 325 MG tablet TAKE 2 TABLETS BY MOUTH EVERY 4 HOURS AS NEEDED FOR PAIN 60 tablet 3    atorvastatin (LIPITOR) 80 MG tablet TAKE 1 TABLET BY MOUTH DAILY 28 tablet 3    hydroCHLOROthiazide (HYDRODIURIL) 25 MG tablet TAKE 1 TABLET BY MOUTH DAILY 28 tablet 3    losartan (COZAAR) 50 MG tablet TAKE 1 TABLET BY MOUTH DAILY 28 tablet 3    omeprazole (PRILOSEC) 20 MG delayed release capsule TAKE 1 CAPSULE BY MOUTH TWICE A DAY WITH MEALS 56 capsule 1    ferrous sulfate (IRON 325) 325 (65 Fe) MG tablet Take 1 tab three days days a week 90 tablet 1    folic acid (FOLVITE) 1 MG tablet TAKE 1 TABLET BY MOUTH DAILY 60 tablet 2    guaiFENesin (ALTARUSSIN) 100 MG/5ML syrup Take 10 mLs by mouth 3 times daily as needed for Cough 1 each 0    vitamin B-1 (THIAMINE) 100 MG tablet Take 1 tablet by mouth daily 30 tablet 3    diclofenac sodium (VOLTAREN) 1 % GEL Apply 4 g topically 4 times daily 4 Tube 1    clopidogrel (PLAVIX) 75 MG tablet TAKE 1 TABLET BY MOUTH DAILY 28 tablet 3     No current facility-administered medications for this visit. Social History     Tobacco Use    Smoking status: Former     Packs/day: 2.00     Years: 35.00     Pack years: 70.00     Types: Cigarettes     Quit date: 10/11/2011     Years since quittin.1     Passive exposure: Current (Outside of the home)    Smokeless tobacco: Never   Vaping Use    Vaping Use: Never used   Substance Use Topics    Alcohol use:  Yes     Alcohol/week: 6.0 standard drinks     Types: 6 Cans of beer per week     Comment: rare    Drug use: No     Types: Cocaine, Marijuana (Weed)     Comment: per pt did years ago; cocaine & michell, - denies current use       Family History   Problem Relation Age of Onset    High Blood Pressure Mother     Asthma Mother     Heart Disease Mother     Heart Disease Father     High Blood Pressure Father     Diabetes Brother     High Blood Pressure Brother     Heart Disease Brother     High Blood Pressure Maternal Grandmother     High Blood Pressure Maternal Grandfather     Heart Disease Maternal Grandfather         REVIEW OF SYSTEMS:  Review of Systems Constitutional: Negative. HENT: Negative. Respiratory: Negative. Cardiovascular: Negative. Gastrointestinal: Negative. Genitourinary: Negative. Psychiatric/Behavioral: Negative. PHYSICAL EXAM:  Vitals:    11/16/22 1544   BP: 139/84   Site: Right Lower Arm   Position: Sitting   Cuff Size: Medium Adult   Pulse: 73   Temp: 97 °F (36.1 °C)   SpO2: 96%   Weight: (!) 383 lb (173.7 kg)   Height: 5' 7\" (1.702 m)     BP Readings from Last 3 Encounters:   11/16/22 139/84   11/09/22 126/82   10/19/22 121/71        Physical Exam  Constitutional:       Appearance: Normal appearance. Cardiovascular:      Rate and Rhythm: Normal rate and regular rhythm. Pulses: Normal pulses. Heart sounds: Normal heart sounds. Pulmonary:      Effort: Pulmonary effort is normal.      Breath sounds: Normal breath sounds. Abdominal:      General: Abdomen is flat. Palpations: Abdomen is soft. Musculoskeletal:         General: Normal range of motion. Skin:     General: Skin is warm. Neurological:      Mental Status: She is alert. Mental status is at baseline.    Psychiatric:         Mood and Affect: Mood normal.         DIAGNOSTIC FINDINGS:  CBC:  Lab Results   Component Value Date/Time    WBC 6.7 09/28/2022 04:41 PM    HGB 10.7 09/28/2022 04:41 PM     09/28/2022 04:41 PM     04/05/2012 09:05 AM       BMP:    Lab Results   Component Value Date/Time     09/28/2022 04:41 PM    K 4.2 09/28/2022 04:41 PM     09/28/2022 04:41 PM    CO2 26 09/28/2022 04:41 PM    BUN 23 09/28/2022 04:41 PM    CREATININE 1.29 09/28/2022 04:41 PM    GLUCOSE 87 09/28/2022 04:41 PM    GLUCOSE 88 04/12/2012 10:20 AM       HEMOGLOBIN A1C:   Lab Results   Component Value Date/Time    LABA1C 5.8 11/03/2022 10:10 AM       FASTING LIPID PANEL:  Lab Results   Component Value Date    CHOL 150 11/03/2022    HDL 45 11/03/2022    TRIG 84 11/03/2022       ASSESSMENT AND PLAN:  Junious Post was seen today for orders and forms.    Diagnoses and all orders for this visit:    Cerebrovascular accident (CVA) due to thrombosis of precerebral artery ([address]  Continue with aspirin, statin and Plavix  -     DME Order for (Specify) as OP    Chronic bilateral low back pain with bilateral sciatica  Continue with gabapentin and Tylenol as needed for pain  -     DME Order for (Specify) as OP    Primary osteoarthritis of left knee  -     DME Order for (Specify) as OP    Cervical stenosis of spinal canal  -     DME Order for (Specify) as OP    Lumbar radicular pain  -     DME Order for (Specify) as OP    Morbid obesity-plan for bariatric surgery  -Faxed the letter to surgeon's office  Plan is to get bariatric surgery      FOLLOW UP AND INSTRUCTIONS:  Return in about 3 months (around 2/16/2023). Filomena received counseling on the following healthy behaviors: nutrition    Discussed use, benefit, and side effects of prescribed medications. Barriers to medication compliance addressed. All patient questions answered. Pt voiced understanding. Patient given educational materials - see patient instructions    Ann Marie Jimenez MD  Internal Medicine Resident, PGY-3  [facility] [address]  52/17/8922,1:72 PM        This note is created with the assistance of a speech-recognition program. While intending to generate a document that actually reflects the content of thevisit, the document can still have some mistakes which may not have been identified and corrected by editing.

## 2022-11-16 NOTE — LETTER
[address]  [address] [address]  Phone: [phone]  Fax: [phone]    Yg Eubanks MD        November 16, 2022    Tim Dooley  [address] [place] [address] [facility]: 7/14/22    To whom it may concern,      The above named patient has been seen by our office for 10 years. She suffers from the following co morbidities: Hypertension, sleep apnea, degenerative spine disease. Her current weight is 383 lbs, and BMI of 59.99 The patient has undergone the following weight loss attempts: supervised diet and exercise program. I feel this patient would benefit from weight loss surgery because she has been unsuccessful losing weight with other diet methods, and her medical conditions will become life-threatening if she does not get help getting her weight under control. I appreciate your consideration for approval. Please feel free to contact me for any further Information. If you have any questions or concerns, please don't hesitate to call.     Sincerely,        Yg Eubanks MD

## 2022-11-18 NOTE — PROGRESS NOTES
[address] [facility] INVASIVE BARIATRIC SURG  [address] [place] [address] [address]vd [facility] [place]  [address]  [address]  Dept: [phone]    SURGICAL WEIGHT MANAGEMENT PROGRAM  PROGRESS NOTE INITIAL EVALUATION     Patient: César Duncan        Service Date: 11/9/2022      HPI:     Chief Complaint   Patient presents with    Bariatric, Initial Visit    Weight Loss       The patient is a pleasant 69y.o. year old female  with morbid obesity, who stands Height: 5' 7\" (170.2 cm) tall with a weight of Weight: (!) 376 lb (170.6 kg) , resulting in a BMI of Body mass index is 58.89 kg/m². . The patient suffers from multiple co-morbidities as a result of morbid obesity, including: Hypertension, Hyperlipidemia, Obstructive Sleep Apnea treated with BiPAP/CPAP, Dyspnea on Exertion, GERD, and Degenerative Joint Disease (DJD). She has suffered from obesity for many years. The patient denies  a history of myocardial infarction, deep vein thrombosis, pulmonary embolism, renal failure, hepatic failure, and stroke. The patient has failed multiple attempts at non-surgical weight loss, and is now seeking surgical intervention to promote permanent and consistent weight loss. She  has chosen Sleeve Gastrectomy. She is well educated regarding it, as she has recently viewed our weight loss surgery informational seminar .      Medical History:  Past Medical History:   Diagnosis Date    Ambulates with cane     Bleeding     while on aggrenox    Cataracts, bilateral     Cerebrovascular disease 2003,2011    cva    Chronic pain in left foot     CKD (chronic kidney disease) stage 3, GFR 30-59 ml/min ([facility])     COVID-19 11/2021    states hpspitalized    GERD (gastroesophageal reflux disease)     Hx of blood clots     Hyperlipidemia     Hypertension     Iron (Fe) deficiency anemia     Nicotine abuse     Obesity     TIBURCIO on CPAP     Osteoarthritis     Peripheral vascular disease ([facility] 03/13/2014    Substance abuse ([facility] cocaine, canabis    Thalassemia minor     Unspecified cerebral artery occlusion with cerebral infarction     Wears dentures     upper and lower       Surgical History:  Past Surgical History:   Procedure Laterality Date    BREAST SURGERY      biopsy    COLONOSCOPY  2007    adenomatous polyp    COLONOSCOPY      normal, repeat in 5 years    COLONOSCOPY N/A 2019    COLONOSCOPY POLYPECTOMY SNARE/COLD BIOPSY performed by Martha Cosme MD at [facility] ENDOSCOPY      negative    UPPER GASTROINTESTINAL ENDOSCOPY N/A 2019    EGD BIOPSY performed by Martha Cosme MD at [facility] Endoscopy       Family History:      Problem Relation Age of Onset    High Blood Pressure Mother     Asthma Mother     Heart Disease Mother     Heart Disease Father     High Blood Pressure Father     Diabetes Brother     High Blood Pressure Brother     Heart Disease Brother     High Blood Pressure Maternal Grandmother     High Blood Pressure Maternal Grandfather     Heart Disease Maternal Grandfather        Social History:   Social History     Tobacco Use    Smoking status: Former     Packs/day: 2.00     Years: 35.00     Pack years: 70.00     Types: Cigarettes     Quit date: 10/11/2011     Years since quittin.1     Passive exposure: Current (Outside of the home)    Smokeless tobacco: Never   Vaping Use    Vaping Use: Never used   Substance Use Topics    Alcohol use: Yes     Alcohol/week: 6.0 standard drinks     Types: 6 Cans of beer per week     Comment: rare    Drug use: No     Types: Cocaine, Marijuana (Weed)     Comment: per pt did years ago; cocaine & michell, 17 denies current use       Current Med List:  Current Outpatient Medications   Medication Sig Dispense Refill    gabapentin (NEURONTIN) 600 MG tablet Take 600 mg by mouth 3 times daily.       acetaminophen (TYLENOL) 325 MG tablet TAKE 2 TABLETS BY MOUTH EVERY 4 HOURS AS NEEDED FOR PAIN 60 tablet 3    atorvastatin (LIPITOR) 80 MG tablet TAKE 1 TABLET BY MOUTH DAILY 28 tablet 3    clopidogrel (PLAVIX) 75 MG tablet TAKE 1 TABLET BY MOUTH DAILY 28 tablet 3    hydroCHLOROthiazide (HYDRODIURIL) 25 MG tablet TAKE 1 TABLET BY MOUTH DAILY 28 tablet 3    losartan (COZAAR) 50 MG tablet TAKE 1 TABLET BY MOUTH DAILY 28 tablet 3    omeprazole (PRILOSEC) 20 MG delayed release capsule TAKE 1 CAPSULE BY MOUTH TWICE A DAY WITH MEALS 56 capsule 1    ferrous sulfate (IRON 325) 325 (65 Fe) MG tablet Take 1 tab three days days a week 90 tablet 1    folic acid (FOLVITE) 1 MG tablet TAKE 1 TABLET BY MOUTH DAILY 60 tablet 2    vitamin B-1 (THIAMINE) 100 MG tablet Take 1 tablet by mouth daily 30 tablet 3    diclofenac sodium (VOLTAREN) 1 % GEL Apply 4 g topically 4 times daily 4 Tube 1    amLODIPine (NORVASC) 10 MG tablet TAKE 1 TABLET BY MOUTH DAILY 28 tablet 1    aspirin 81 MG EC tablet TAKE 1 TABLET BY MOUTH DAILY 28 tablet 1    Cholecalciferol (VITAMIN D3) 25 MCG TABS TAKE 1 TABLET BY MOUTH DAILY 28 tablet 1    Vitamin D (CHOLECALCIFEROL) 25 MCG (1000 UT) TABS tablet       guaiFENesin (ALTARUSSIN) 100 MG/5ML syrup Take 10 mLs by mouth 3 times daily as needed for Cough 1 each 0     No current facility-administered medications for this visit. SOCIAL:      This patient is alone for the evaluation today.       [] HIV Risk Factors (i.e.) intravenous drug abuser; at risk sexual behavior; received blood products    [] TB Risk Factors (i.e.) Medically underserved, institutional care, foreign born, endemic area; exposure to active case    [] Hepatitis B&C Risk Factors (i.e.) Received blood transfusion prior to 1992; recreational drug use; high risk sexual behaviors; tattoos or body piercings; contact with blood or needle sticks in the workplace    Comprehension    Ability to grasp concepts and respond to questions:   [x] High   [] Medium   [] Low    Motivation    [x] Asks Questions; eager to learn   [] Needs education   [] Extreme anxiety    [] uncooperative   [] Denies need for education    English Speaking Ability    [x] Speaks English well   [x] Reads English well   [x] Understands spoken english    [x] Understands written English   [] No need for interpretive support      [] Might benefit from interpretive support   []  required for all services     REVIEW OF SYSTEMS: (Negative unless marked otherwise)     See review of Systems scanned into media    PRESENT ILLNESS:     Weight Parameters  Weight (!) 376 lb (170.6 kg)     Height 5' 7\" (1.702 m)   BMI Body mass index is 58.89 kg/m². IBW     EBW               IMMUNIZATION STATUS  Immunization History   Administered Date(s) Administered    COVID-19, PFIZER PURPLE top, DILUTE for use, (age 15 y+), 30mcg/0.3mL 03/24/2021, 04/14/2021, 01/20/2022    Influenza Virus Vaccine 09/25/2019    Influenza, AFLURIA (age 1 yrs+), FLUZONE, (age 10 mo+), MDV, 0.5mL 09/25/2019, 09/29/2021    Influenza, FLUARIX, FLULAVAL, FLUZONE (age 10 mo+) AND AFLURIA, (age 1 y+), PF, 0.5mL 10/19/2018    Pneumococcal Conjugate 13-valent ([id number]) 10/19/2018    Pneumococcal Polysaccharide ([id number]) 12/20/2016    Tdap (Boostrix, Adacel) 03/08/2018    Zoster Live (Zostavax) 08/03/2016, 02/14/2017    Zoster Recombinant (Shingrix) 04/20/2018, 06/26/2018       FALLS ASSESSMENT    [] LOW RISK FOR FALLS    [] MODERATE RISK FOR FALLS    [] Difficulty walking/selfcare    [] Falls in the past 2 months    [] Suspicion of Clinician    [] Other:      SMOKING CESSATION     [] Not needed     [] Instructed to stop smoking    [] Pamphlet community resources given     VTE SCREEN    [] Family hx DVT/PE  /   [] Personal hx of DVT/PE    [x] Denies any family or personal hx of DVT/PE    Physician Review    [x] Past medical, family, & social history reviewed and discussed with patient.      Review of surgery and post-surgical changes (by surgeon for surgical patients only)    [x] Lifelong diet expectations reviewed with patient    [x] Need for lifelong vitamin supplementation reviewed with patient    PHYSICAL EXAMINATION:      /82 (Site: Right Upper Arm, Position: Sitting, Cuff Size: Large Adult)   Pulse 76   Ht 5' 7\" (1.702 m)   Wt (!) 376 lb (170.6 kg)   BMI 58.89 kg/m²     Constitutional:  Vital signs are normal. The patient appears well-developed   HEENT:      Head: Normocephalic. Atraumatic     Eyes: pupils are equal and reactive. No scleral icterus is present. Neck: No mass and no thyromegaly present. Cardiovascular: Normal rate, regular rhythm, S1 normal and S2 normal.  Bilateral pulses present. Pulmonary/Chest: Effort normal and breath sounds normal. No retractions. Abdominal: Soft. Normal appearance. There is no organomegaly. No tenderness. There is no rigidity, no rebound, no guarding and no Villareal's sign. Musculoskeletal:      Right lower leg: Normal. No tenderness and no edema. Left lower leg: Normal. No tenderness and no edema. Lymphadenopathy:     No cervical adenopathy, No Exrtemity Adenopathy. Neurological: The patient is alert and oriented. Moving all four extremities equally, sensation grossly intact bilateral.  Skin: Skin is warm, dry and intact. Psychiatric: The patient has a normal mood and affect. Speech is normal and behavior is normal. Judgment and thought content normal. Cognition and memory are normal.     RECOMMENDATIONS:     We spent a great deal of time discussing the risks and benefits of Sleeve Gastrectomy, including but not limited to injury to intra-abdominal organs, breakdown of the gastric staple line, the need for re-operative therapy,  prolonged hospitalization,  mechanical ventilation,  and death. We discussed the possibility of bleeding, the need for blood transfusions, blood clots, hospital-acquired and intra-abdominal infection, anastomotic stricture, and worsening GERD.   And we discussed the need for post-operative visit compliance, behavior modifications and diet changes, protein and vitamin supplementation, as well as routine scheduled and dedicated exercise. I instructed the patient to utilize the exercise log that will be given to them at their fist dietician appointment. We discussed the potential weight loss benefit of approximately 60-70% of her excess body weight at 12-18 months post-op, as well as the possibility of insufficient weight loss or weight gain after 2 years post-operative time. Discussed the risk of substance abuse and or nicotine abuse today with patient. They expressed understanding of the risks of abuse of such drugs. PLAN:       Diagnosis Orders   1. Essential hypertension  STEVE Montes MD, Cardiology, [place]      2. Cerebrovascular accident (CVA), 2011, no residual deficit ([facility] [address]  STEVE Montes MD, Cardiology, [place]      3. Peripheral vascular disease ([facility] [facility]  Emilee Correa MD, Cardiology, [place]      4. Gastroesophageal reflux disease without esophagitis        5. Class 3 severe obesity due to excess calories with serious comorbidity and body mass index (BMI) of 50.0 to 59.9 in adult [facility])  STEVE Montes MD, Cardiology, [place]               Initial Testing     Primary Procedure: Sleeve     Other Procedures:None    Labwork: Initial Pre-surgical Lab Tests (CMP, TSH, Fasting Lipid Profile, Mg, Zinc, Vit B1 (whole blood), Vit B12, 25-OH Vit D, Fe,  Ferritin,  Folate) and Negative serum nicotine prior to submission for pre-auth    Imaging: None    Endoscopic Studies: Upper GI Endoscopy for GERD which has been untreated.     Psychological Assessment: Psychological Evaluation and Clearance    Nutrition Assessment: Bariatric Nutrition Assessment and Clearance    Cardiology Risk Stratification:  Cardiology consult for clearance    Pulmonary Evaluation: Pulmonary Clearance, Copy of Previous Sleep Study, and CPAP Settings    Other  Consultations:  Medical clearance given  and age    Physician Supervised Diet and Exercise required by the patients insurance company: 6 months. Surgical Diet requirement:  2 weeks      We discussed her age and co-morbidities. She is aware she is at higher risk for bariatric surgery. She would like to move forward with weight loss surgical options.   She is aware she will need to complete all testing    Final Testing  Screening Chest Xray  and EKG within 6 months of date of surgery    Labwork:  Final Lab Tests  within 3 months of date of surgery (CBC, PT/PTT, BMP)     Electronically signed by Lucinda Figueroa DO on 11/17/2022 at 9:05 PM

## 2022-11-20 ENCOUNTER — APPOINTMENT (OUTPATIENT)
Dept: CT IMAGING | Age: 67
DRG: 068 | End: 2022-11-20
Payer: COMMERCIAL

## 2022-11-20 ENCOUNTER — HOSPITAL ENCOUNTER (INPATIENT)
Age: 67
LOS: 2 days | Discharge: HOME | DRG: 068 | End: 2022-11-22
Attending: EMERGENCY MEDICINE | Admitting: PSYCHIATRY & NEUROLOGY
Payer: COMMERCIAL

## 2022-11-20 ENCOUNTER — APPOINTMENT (OUTPATIENT)
Dept: GENERAL RADIOLOGY | Age: 67
DRG: 068 | End: 2022-11-20
Payer: COMMERCIAL

## 2022-11-20 DIAGNOSIS — R42 VERTIGO: Primary | ICD-10-CM

## 2022-11-20 LAB
ABSOLUTE EOS #: 0.34 K/UL (ref 0–0.44)
ABSOLUTE IMMATURE GRANULOCYTE: 0.03 K/UL (ref 0–0.3)
ABSOLUTE LYMPH #: 1.2 K/UL (ref 1.1–3.7)
ABSOLUTE MONO #: 0.42 K/UL (ref 0.1–1.2)
ALBUMIN SERPL-MCNC: 3.9 G/DL (ref 3.5–5.2)
ALBUMIN/GLOBULIN RATIO: 1.2 (ref 1–2.5)
ALP BLD-CCNC: 156 U/L (ref 35–104)
ALT SERPL-CCNC: 12 U/L (ref 5–33)
ANION GAP SERPL CALCULATED.3IONS-SCNC: 11 MMOL/L (ref 9–17)
AST SERPL-CCNC: 17 U/L
BASOPHILS # BLD: 1 % (ref 0–2)
BASOPHILS ABSOLUTE: 0.06 K/UL (ref 0–0.2)
BILIRUB SERPL-MCNC: 0.4 MG/DL (ref 0.3–1.2)
BUN BLDV-MCNC: 19 MG/DL (ref 8–23)
CALCIUM SERPL-MCNC: 9.7 MG/DL (ref 8.6–10.4)
CHLORIDE BLD-SCNC: 102 MMOL/L (ref 98–107)
CO2: 28 MMOL/L (ref 20–31)
CREAT SERPL-MCNC: 0.94 MG/DL (ref 0.5–0.9)
EOSINOPHILS RELATIVE PERCENT: 6 % (ref 1–4)
GFR SERPL CREATININE-BSD FRML MDRD: >60 ML/MIN/1.73M2
GLUCOSE BLD-MCNC: 94 MG/DL (ref 70–99)
HCT VFR BLD CALC: 37.1 % (ref 36.3–47.1)
HCT VFR BLD CALC: 37.6 % (ref 36.3–47.1)
HEMOGLOBIN: 10.8 G/DL (ref 11.9–15.1)
HEMOGLOBIN: 11 G/DL (ref 11.9–15.1)
IMMATURE GRANULOCYTES: 1 %
LYMPHOCYTES # BLD: 20 % (ref 24–43)
MCH RBC QN AUTO: 21.3 PG (ref 25.2–33.5)
MCH RBC QN AUTO: 21.7 PG (ref 25.2–33.5)
MCHC RBC AUTO-ENTMCNC: 28.7 G/DL (ref 28.4–34.8)
MCHC RBC AUTO-ENTMCNC: 29.6 G/DL (ref 28.4–34.8)
MCV RBC AUTO: 73.2 FL (ref 82.6–102.9)
MCV RBC AUTO: 74.3 FL (ref 82.6–102.9)
MONOCYTES # BLD: 7 % (ref 3–12)
NRBC AUTOMATED: 0 PER 100 WBC
NRBC AUTOMATED: 0 PER 100 WBC
PDW BLD-RTO: 17 % (ref 11.8–14.4)
PDW BLD-RTO: 17 % (ref 11.8–14.4)
PLATELET # BLD: 351 K/UL (ref 138–453)
PLATELET # BLD: 353 K/UL (ref 138–453)
PMV BLD AUTO: 9.3 FL (ref 8.1–13.5)
PMV BLD AUTO: 9.9 FL (ref 8.1–13.5)
POTASSIUM SERPL-SCNC: 3.9 MMOL/L (ref 3.7–5.3)
RBC # BLD: 5.06 M/UL (ref 3.95–5.11)
RBC # BLD: 5.07 M/UL (ref 3.95–5.11)
RBC # BLD: ABNORMAL 10*6/UL
SEG NEUTROPHILS: 65 % (ref 36–65)
SEGMENTED NEUTROPHILS ABSOLUTE COUNT: 3.93 K/UL (ref 1.5–8.1)
SODIUM BLD-SCNC: 141 MMOL/L (ref 135–144)
TOTAL PROTEIN: 7.2 G/DL (ref 6.4–8.3)
TROPONIN, HIGH SENSITIVITY: 25 NG/L (ref 0–14)
TROPONIN, HIGH SENSITIVITY: 26 NG/L (ref 0–14)
TSH SERPL DL<=0.05 MIU/L-ACNC: 1.63 UIU/ML (ref 0.3–5)
WBC # BLD: 6 K/UL (ref 3.5–11.3)
WBC # BLD: 6.4 K/UL (ref 3.5–11.3)

## 2022-11-20 PROCEDURE — 2580000003 HC RX 258: Performed by: STUDENT IN AN ORGANIZED HEALTH CARE EDUCATION/TRAINING PROGRAM

## 2022-11-20 PROCEDURE — G0378 HOSPITAL OBSERVATION PER HR: HCPCS

## 2022-11-20 PROCEDURE — 96365 THER/PROPH/DIAG IV INF INIT: CPT

## 2022-11-20 PROCEDURE — 2060000000 HC ICU INTERMEDIATE R&B

## 2022-11-20 PROCEDURE — 80053 COMPREHEN METABOLIC PANEL: CPT

## 2022-11-20 PROCEDURE — 85730 THROMBOPLASTIN TIME PARTIAL: CPT

## 2022-11-20 PROCEDURE — 84484 ASSAY OF TROPONIN QUANT: CPT

## 2022-11-20 PROCEDURE — 96372 THER/PROPH/DIAG INJ SC/IM: CPT

## 2022-11-20 PROCEDURE — 84443 ASSAY THYROID STIM HORMONE: CPT

## 2022-11-20 PROCEDURE — 6360000002 HC RX W HCPCS: Performed by: STUDENT IN AN ORGANIZED HEALTH CARE EDUCATION/TRAINING PROGRAM

## 2022-11-20 PROCEDURE — 85027 COMPLETE CBC AUTOMATED: CPT

## 2022-11-20 PROCEDURE — 71045 X-RAY EXAM CHEST 1 VIEW: CPT

## 2022-11-20 PROCEDURE — 6370000000 HC RX 637 (ALT 250 FOR IP): Performed by: STUDENT IN AN ORGANIZED HEALTH CARE EDUCATION/TRAINING PROGRAM

## 2022-11-20 PROCEDURE — 99285 EMERGENCY DEPT VISIT HI MDM: CPT

## 2022-11-20 PROCEDURE — 1200000000 HC SEMI PRIVATE

## 2022-11-20 PROCEDURE — 93005 ELECTROCARDIOGRAM TRACING: CPT | Performed by: STUDENT IN AN ORGANIZED HEALTH CARE EDUCATION/TRAINING PROGRAM

## 2022-11-20 PROCEDURE — 6360000004 HC RX CONTRAST MEDICATION: Performed by: STUDENT IN AN ORGANIZED HEALTH CARE EDUCATION/TRAINING PROGRAM

## 2022-11-20 PROCEDURE — 70450 CT HEAD/BRAIN W/O DYE: CPT

## 2022-11-20 PROCEDURE — 36415 COLL VENOUS BLD VENIPUNCTURE: CPT

## 2022-11-20 PROCEDURE — 70496 CT ANGIOGRAPHY HEAD: CPT

## 2022-11-20 PROCEDURE — 85025 COMPLETE CBC W/AUTO DIFF WBC: CPT

## 2022-11-20 RX ORDER — FOLIC ACID 1 MG/1
1000 TABLET ORAL DAILY
Status: DISCONTINUED | OUTPATIENT
Start: 2022-11-21 | End: 2022-11-22 | Stop reason: HOSPADM

## 2022-11-20 RX ORDER — ONDANSETRON 4 MG/1
4 TABLET, ORALLY DISINTEGRATING ORAL EVERY 8 HOURS PRN
Status: DISCONTINUED | OUTPATIENT
Start: 2022-11-20 | End: 2022-11-22 | Stop reason: HOSPADM

## 2022-11-20 RX ORDER — SODIUM CHLORIDE 0.9 % (FLUSH) 0.9 %
5-40 SYRINGE (ML) INJECTION PRN
Status: DISCONTINUED | OUTPATIENT
Start: 2022-11-20 | End: 2022-11-22 | Stop reason: HOSPADM

## 2022-11-20 RX ORDER — HYDROXYZINE HYDROCHLORIDE 50 MG/ML
50 INJECTION, SOLUTION INTRAMUSCULAR ONCE
Status: COMPLETED | OUTPATIENT
Start: 2022-11-20 | End: 2022-11-20

## 2022-11-20 RX ORDER — MECLIZINE HCL 25MG 25 MG/1
25 TABLET, CHEWABLE ORAL 3 TIMES DAILY PRN
Status: DISCONTINUED | OUTPATIENT
Start: 2022-11-20 | End: 2022-11-20

## 2022-11-20 RX ORDER — SODIUM CHLORIDE 0.9 % (FLUSH) 0.9 %
5-40 SYRINGE (ML) INJECTION EVERY 12 HOURS SCHEDULED
Status: DISCONTINUED | OUTPATIENT
Start: 2022-11-20 | End: 2022-11-22 | Stop reason: HOSPADM

## 2022-11-20 RX ORDER — ACETAMINOPHEN 325 MG/1
650 TABLET ORAL EVERY 6 HOURS PRN
Status: DISCONTINUED | OUTPATIENT
Start: 2022-11-20 | End: 2022-11-22 | Stop reason: HOSPADM

## 2022-11-20 RX ORDER — GABAPENTIN 600 MG/1
600 TABLET ORAL 3 TIMES DAILY
Status: DISCONTINUED | OUTPATIENT
Start: 2022-11-20 | End: 2022-11-22 | Stop reason: HOSPADM

## 2022-11-20 RX ORDER — ASPIRIN 81 MG/1
81 TABLET ORAL DAILY
Status: DISCONTINUED | OUTPATIENT
Start: 2022-11-21 | End: 2022-11-22 | Stop reason: HOSPADM

## 2022-11-20 RX ORDER — ROSUVASTATIN CALCIUM 20 MG/1
40 TABLET, COATED ORAL NIGHTLY
Status: DISCONTINUED | OUTPATIENT
Start: 2022-11-20 | End: 2022-11-22 | Stop reason: HOSPADM

## 2022-11-20 RX ORDER — ACETAMINOPHEN 650 MG/1
650 SUPPOSITORY RECTAL EVERY 6 HOURS PRN
Status: DISCONTINUED | OUTPATIENT
Start: 2022-11-20 | End: 2022-11-22 | Stop reason: HOSPADM

## 2022-11-20 RX ORDER — ONDANSETRON 2 MG/ML
4 INJECTION INTRAMUSCULAR; INTRAVENOUS EVERY 6 HOURS PRN
Status: DISCONTINUED | OUTPATIENT
Start: 2022-11-20 | End: 2022-11-22 | Stop reason: HOSPADM

## 2022-11-20 RX ORDER — HEPARIN SODIUM AND DEXTROSE 10000; 5 [USP'U]/100ML; G/100ML
5-30 INJECTION INTRAVENOUS CONTINUOUS
Status: DISCONTINUED | OUTPATIENT
Start: 2022-11-20 | End: 2022-11-22

## 2022-11-20 RX ORDER — MECLIZINE HCL 12.5 MG/1
25 TABLET ORAL 3 TIMES DAILY PRN
Status: DISCONTINUED | OUTPATIENT
Start: 2022-11-20 | End: 2022-11-22 | Stop reason: HOSPADM

## 2022-11-20 RX ORDER — SODIUM CHLORIDE 9 MG/ML
INJECTION, SOLUTION INTRAVENOUS PRN
Status: DISCONTINUED | OUTPATIENT
Start: 2022-11-20 | End: 2022-11-22 | Stop reason: HOSPADM

## 2022-11-20 RX ORDER — POLYETHYLENE GLYCOL 3350 17 G/17G
17 POWDER, FOR SOLUTION ORAL DAILY PRN
Status: DISCONTINUED | OUTPATIENT
Start: 2022-11-20 | End: 2022-11-22 | Stop reason: HOSPADM

## 2022-11-20 RX ORDER — ENOXAPARIN SODIUM 100 MG/ML
40 INJECTION SUBCUTANEOUS DAILY
Status: CANCELLED | OUTPATIENT
Start: 2022-11-21

## 2022-11-20 RX ADMIN — HYDROXYZINE HYDROCHLORIDE 50 MG: 50 INJECTION, SOLUTION INTRAMUSCULAR at 16:57

## 2022-11-20 RX ADMIN — GABAPENTIN 600 MG: 600 TABLET ORAL at 23:46

## 2022-11-20 RX ADMIN — ROSUVASTATIN CALCIUM 40 MG: 20 TABLET, FILM COATED ORAL at 23:45

## 2022-11-20 RX ADMIN — IOPAMIDOL 90 ML: 755 INJECTION, SOLUTION INTRAVENOUS at 17:39

## 2022-11-20 RX ADMIN — Medication 5.75 UNITS/KG/HR: at 23:54

## 2022-11-20 RX ADMIN — TICAGRELOR 180 MG: 90 TABLET ORAL at 23:45

## 2022-11-20 RX ADMIN — SODIUM CHLORIDE, PRESERVATIVE FREE 10 ML: 5 INJECTION INTRAVENOUS at 22:12

## 2022-11-20 ASSESSMENT — ENCOUNTER SYMPTOMS
TROUBLE SWALLOWING: 0
DIARRHEA: 0
SHORTNESS OF BREATH: 0
VOMITING: 0
COLOR CHANGE: 0
RHINORRHEA: 0
ABDOMINAL PAIN: 0
NAUSEA: 0
BACK PAIN: 0

## 2022-11-20 ASSESSMENT — PAIN - FUNCTIONAL ASSESSMENT: PAIN_FUNCTIONAL_ASSESSMENT: 0-10

## 2022-11-20 ASSESSMENT — PAIN SCALES - GENERAL: PAINLEVEL_OUTOF10: 4

## 2022-11-20 ASSESSMENT — PAIN DESCRIPTION - LOCATION: LOCATION: BACK

## 2022-11-20 NOTE — ED NOTES
Pt arrives to ED for dizziness for 7 days. Pt states she feels like she is spinning and it is worse with movement. Pt states she has nausea without vomiting. Pt has hx of stroke, takes ASA and plavix. Pt usually gets around with walker or cane, needed wheelchair today. 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 Pt A&Ox4, RR even and nonlabored, NAD.       Coletta Osler, RN  11/20/22 0935

## 2022-11-20 NOTE — ED NOTES
Pt requesting food, NPO for now per Dr. Quentin Goodrich. Pt updated.      Ab Brizuela RN  11/20/22 3845

## 2022-11-20 NOTE — ED NOTES
Pt A&Ox4, RR even and nonlabored, NAD. Pt resting on cot with full cardiac monitor on. Pt received fresh warm blanket, Pt denies other needs at this time.         Kenia Reyes RN  11/20/22 2785

## 2022-11-20 NOTE — ED PROVIDER NOTES
[facility]  ED  eMERGENCY dEPARTMENT eNCOUnter   Attending Attestation     Pt Name: Domingo Holman  MRN: [id number]  Domingo 1955  Date of evaluation: 11/20/22       Domingo Holman is a 69 y.o. female who presents with Dizziness (\"Feels like my head is spinning I cant hold my head up for long\" x2 days )      History: Pt presents with vertigo sensation and difficulty with balance for the last two days. Pt has hx of stroke with symptoms similar and left sided deficits. Exam: HRRR, Lungs CTABL, abdomen soft and non tender. Pt appears uncomfortable. Fatigueable nystagmus bilaterally. Likely vertigo, however, with persistent symptoms and history will obtain CTA head and neck and will treat symptoms. I performed a history and physical examination of the patient and discussed management with the resident. I reviewed the residents note and agree with the documented findings and plan of care. Any areas of disagreement are noted on the chart. I was personally present for the key portions of any procedures. I have documented in the chart those procedures where I was not present during the key portions. I have personally reviewed all images and agree with the resident's interpretation. I have reviewed the emergency nurses triage note. I agree with the chief complaint, past medical history, past surgical history, allergies, medications, social and family history as documented unless otherwise noted below. Documentation of the HPI, Physical Exam and Medical Decision Making performed by medical students or scribes is based on my personal performance of the HPI, PE and MDM. For Phys Assistant/ Nurse Practitioner cases/documentation I have had a face to face evaluation of this patient and have completed at least one if not all key elements of the E/M (history, physical exam, and MDM). Additional findings are as noted.     For APC cases I have personally evaluated and examined the patient in conjunction with the APC and agree with the treatment plan and disposition of the patient as recorded by the APC.     Marilyn Flanagan MD  Attending Emergency  Physician        Azra Chahal MD  11/20/22 5614

## 2022-11-20 NOTE — ED PROVIDER NOTES
[facility]  ED  Emergency Department Encounter  Emergency Medicine Resident     Pt Name:Filomena Haas  MRN: [id number]  [place] 1955  Date of evaluation: 11/20/22  PCP:  Salvador Awan MD      [address] [place] [address]       Chief Complaint   Patient presents with    Dizziness     \"Feels like my head is spinning I cant hold my head up for long\" x2 days        HISTORY OF PRESENT ILLNESS  (Location/Symptom, Timing/Onset, Context/Setting, Quality, Duration, Modifying Factors, Severity.)      Marcelino Minor is a 69 y.o. female who presents with vertigo-like dizziness that started approximately 2 to 3 days ago. Past medical history significant for obesity, acute CVA, CKD as well as peripheral vascular disease. Patient states that approximately 2 3 days ago she had sudden onset vertigo-like dizziness. States that she can barely walk due to the vertigo. States she has had this before in the past.  Also states that she is had strokes in the past and these were similar. Is on aspirin and Plavix and has been taking her medications. Denies any chest pain or shortness of breath. States she is always slightly weak on the left side. States she has numbness and tingling in her left arm that is normal for her. Denies any difficulty urinating or saddle anesthesia. Denies any recent viral URI illness. PAST MEDICAL / SURGICAL / SOCIAL / FAMILY HISTORY      has a past medical history of Ambulates with cane, Bleeding, Cataracts, bilateral, Cerebrovascular disease, Chronic pain in left foot, CKD (chronic kidney disease) stage 3, GFR 30-59 ml/min ([facility]), COVID-19, GERD (gastroesophageal reflux disease), Hx of blood clots, Hyperlipidemia, Hypertension, Iron (Fe) deficiency anemia, Nicotine abuse, Obesity, TIBURCIO on CPAP, Osteoarthritis, Peripheral vascular disease ([address] Substance abuse ([address] Thalassemia minor, Unspecified cerebral artery occlusion with cerebral infarction, and Wears dentures.        has a past surgical history that includes Tonsillectomy and adenoidectomy; Tubal ligation; Breast surgery; Endometrial biopsy (); Upper gastrointestinal endoscopy (); Colonoscopy (2007); Colonoscopy (); Upper gastrointestinal endoscopy (N/A, 2019); and Colonoscopy (N/A, 2019). Social History     Socioeconomic History    Marital status:      Spouse name: Not on file    Number of children: Not on file    Years of education: Not on file    Highest education level: Not on file   Occupational History    Not on file   Tobacco Use    Smoking status: Former     Packs/day: 2.00     Years: 35.00     Pack years: 70.00     Types: Cigarettes     Quit date: 10/11/2011     Years since quittin.1     Passive exposure: Current (Outside of the home)    Smokeless tobacco: Never   Vaping Use    Vaping Use: Never used   Substance and Sexual Activity    Alcohol use:  Yes     Alcohol/week: 6.0 standard drinks     Types: 6 Cans of beer per week     Comment: rare    Drug use: No     Types: Cocaine, Marijuana (Weed)     Comment: per pt did years ago; cocaine & marij, 17 denies current use    Sexual activity: Yes     Partners: Male   Other Topics Concern    Not on file   Social History Narrative    Not on file     Social Determinants of Health     Financial Resource Strain: Low Risk     Difficulty of Paying Living Expenses: Not hard at all   Food Insecurity: No Food Insecurity    Worried About Running Out of Food in the Last Year: Never true    Ran Out of Food in the Last Year: Never true   Transportation Needs: Not on file   Physical Activity: Not on file   Stress: Not on file   Social Connections: Not on file   Intimate Partner Violence: Not on file   Housing Stability: Not on file       Family History   Problem Relation Age of Onset    High Blood Pressure Mother     Asthma Mother     Heart Disease Mother     Heart Disease Father     High Blood Pressure Father     Diabetes Brother     High Blood Pressure Brother     Heart Disease Brother     High Blood Pressure Maternal Grandmother     High Blood Pressure Maternal Grandfather     Heart Disease Maternal Grandfather        Allergies:  Duricef [cefadroxil monohydrate], Fish-derived products, Pcn [penicillins], and Tomato    Home Medications:  Prior to Admission medications    Medication Sig Start Date End Date Taking? Authorizing Provider   amLODIPine (NORVASC) 10 MG tablet TAKE 1 TABLET BY MOUTH DAILY 11/15/22   Pj Desouza MD   aspirin 81 MG EC tablet TAKE 1 TABLET BY MOUTH DAILY 11/15/22   Pj Desouza MD   Cholecalciferol (VITAMIN D3) 25 MCG TABS TAKE 1 TABLET BY MOUTH DAILY 11/15/22   Pj Desouza MD   Vitamin D (CHOLECALCIFEROL) 25 MCG (1000 UT) TABS tablet  10/25/22   Historical Provider, MD   gabapentin (NEURONTIN) 600 MG tablet Take 600 mg by mouth 3 times daily.     Historical Provider, MD   acetaminophen (TYLENOL) 325 MG tablet TAKE 2 TABLETS BY MOUTH EVERY 4 HOURS AS NEEDED FOR PAIN 10/30/22   Pj Desouza MD   atorvastatin (LIPITOR) 80 MG tablet TAKE 1 TABLET BY MOUTH DAILY 10/21/22   Pj Desouza MD   clopidogrel (PLAVIX) 75 MG tablet TAKE 1 TABLET BY MOUTH DAILY 10/21/22   Pj Desouza MD   hydroCHLOROthiazide (HYDRODIURIL) 25 MG tablet TAKE 1 TABLET BY MOUTH DAILY 10/19/22   Pj Desouza MD   losartan (COZAAR) 50 MG tablet TAKE 1 TABLET BY MOUTH DAILY 10/19/22   Otf Mon Sra, MD   omeprazole (PRILOSEC) 20 MG delayed release capsule TAKE 1 CAPSULE BY MOUTH TWICE A DAY WITH MEALS 10/19/22   Pj Desouza MD   ferrous sulfate (IRON 325) 325 (65 Fe) MG tablet Take 1 tab three days days a week 10/3/22   Ignacio Warner MD   folic acid (FOLVITE) 1 MG tablet TAKE 1 TABLET BY MOUTH DAILY 7/26/22   Lisa Anderson MD   guaiFENesin (ALTARUSSIN) 100 MG/5ML syrup Take 10 mLs by mouth 3 times daily as needed for Cough 11/10/21   Aftab Gonzales MD   vitamin B-1 (THIAMINE) 100 MG tablet Take 1 tablet by mouth daily 4/15/21   Elena Morales MD   diclofenac sodium (VOLTAREN) 1 % GEL Apply 4 g topically 4 times daily 6/28/20   Lurena Dural, DO       REVIEW OF SYSTEMS    (2-9 systems for level 4, 10 or more for level 5)      Review of Systems   Constitutional:  Negative for chills and fever. HENT:  Negative for congestion, rhinorrhea and trouble swallowing. Eyes:  Negative for visual disturbance. Respiratory:  Negative for shortness of breath. Cardiovascular:  Negative for chest pain. Gastrointestinal:  Negative for abdominal pain, diarrhea, nausea and vomiting. Genitourinary:  Negative for dysuria and hematuria. Musculoskeletal:  Negative for back pain, neck pain and neck stiffness. Skin:  Negative for color change, pallor, rash and wound. Neurological:  Positive for dizziness, weakness and numbness. Negative for speech difficulty, light-headedness and headaches. PHYSICAL EXAM   (up to 7 for level 4, 8 or more for level 5)      INITIAL VITALS:   BP (!) 151/77   Pulse 61   Temp 97 °F (36.1 °C) (Oral)   Resp 19   SpO2 97%     Physical Exam  Constitutional:       General: She is not in acute distress. Appearance: She is obese. She is not ill-appearing, toxic-appearing or diaphoretic. HENT:      Head: Normocephalic and atraumatic. Eyes:      Extraocular Movements: Extraocular movements intact. Pupils: Pupils are equal, round, and reactive to light. Cardiovascular:      Rate and Rhythm: Normal rate and regular rhythm. Heart sounds: No murmur heard. No friction rub. No gallop. Pulmonary:      Effort: No respiratory distress. Breath sounds: No stridor. No wheezing, rhonchi or rales. Chest:      Chest wall: No tenderness. Abdominal:      General: There is no distension. Palpations: There is no mass. Tenderness: There is no abdominal tenderness. There is no guarding or rebound. Hernia: No hernia is present.    Musculoskeletal: General: No swelling, tenderness, deformity or signs of injury. Right lower leg: No edema. Left lower leg: No edema. Skin:     Capillary Refill: Capillary refill takes less than 2 seconds. Coloration: Skin is not jaundiced or pale. Findings: No bruising, erythema, lesion or rash. Neurological:      Mental Status: She is alert and oriented to person, place, and time. Cranial Nerves: No cranial nerve deficit. Sensory: No sensory deficit. Motor: Weakness present.       Coordination: Coordination abnormal.      Comments: Left-sided weakness, chronic, fatiguing bilateral nystagmus noted on exam       DIFFERENTIAL  DIAGNOSIS     PLAN (LABS / IMAGING / EKG):  Orders Placed This Encounter   Procedures    CT HEAD WO CONTRAST    CTA HEAD NECK W CONTRAST    XR CHEST PORTABLE    CBC with Auto Differential    Comprehensive Metabolic Panel    TSH with Reflex    Troponin    Inpatient consult to Neurology    EKG 12 Lead       MEDICATIONS ORDERED:  Orders Placed This Encounter   Medications    hydrOXYzine (VISTARIL) injection 50 mg    iopamidol (ISOVUE-370) 76 % injection 90 mL       DDX: Peripheral vertigo, acute CVA, vertebrobasilar insufficiency, arrhythmia, thoracic aortic dissection, hyperthyroidism, electrolyte abnormality    DIAGNOSTIC RESULTS / EMERGENCY DEPARTMENT COURSE / MDM   LAB RESULTS:  Results for orders placed or performed during the hospital encounter of 11/20/22   CBC with Auto Differential   Result Value Ref Range    WBC 6.0 3.5 - 11.3 k/uL    RBC 5.07 3.95 - 5.11 m/uL    Hemoglobin 11.0 (L) 11.9 - 15.1 g/dL    Hematocrit 37.1 36.3 - 47.1 %    MCV 73.2 (L) 82.6 - 102.9 fL    MCH 21.7 (L) 25.2 - 33.5 pg    MCHC 29.6 28.4 - 34.8 g/dL    RDW 17.0 (H) 11.8 - 14.4 %    Platelets 490 490 - 017 k/uL    MPV 9.3 8.1 - 13.5 fL    NRBC Automated 0.0 0.0 per 100 WBC    Seg Neutrophils 65 36 - 65 %    Lymphocytes 20 (L) 24 - 43 %    Monocytes 7 3 - 12 %    Eosinophils % 6 (H) 1 - 4 % Basophils 1 0 - 2 %    Immature Granulocytes 1 (H) 0 %    Segs Absolute 3.93 1.50 - 8.10 k/uL    Absolute Lymph # 1.20 1.10 - 3.70 k/uL    Absolute Mono # 0.42 0.10 - 1.20 k/uL    Absolute Eos # 0.34 0.00 - 0.44 k/uL    Basophils Absolute 0.06 0.00 - 0.20 k/uL    Absolute Immature Granulocyte 0.03 0.00 - 0.30 k/uL    RBC Morphology ANISOCYTOSIS PRESENT    Comprehensive Metabolic Panel   Result Value Ref Range    Glucose 94 70 - 99 mg/dL    BUN 19 8 - 23 mg/dL    Creatinine 0.94 (H) 0.50 - 0.90 mg/dL    Est, Glom Filt Rate >60 >60 mL/min/1.73m2    Calcium 9.7 8.6 - 10.4 mg/dL    Sodium 141 135 - 144 mmol/L    Potassium 3.9 3.7 - 5.3 mmol/L    Chloride 102 98 - 107 mmol/L    CO2 28 20 - 31 mmol/L    Anion Gap 11 9 - 17 mmol/L    Alkaline Phosphatase 156 (H) 35 - 104 U/L    ALT 12 5 - 33 U/L    AST 17 <32 U/L    Total Bilirubin 0.4 0.3 - 1.2 mg/dL    Total Protein 7.2 6.4 - 8.3 g/dL    Albumin 3.9 3.5 - 5.2 g/dL    Albumin/Globulin Ratio 1.2 1.0 - 2.5   TSH with Reflex   Result Value Ref Range    TSH 1.63 0.30 - 5.00 uIU/mL   Troponin   Result Value Ref Range    Troponin, High Sensitivity 25 (H) 0 - 14 ng/L   Troponin   Result Value Ref Range    Troponin, High Sensitivity 26 (H) 0 - 14 ng/L       IMPRESSION: Labs are mostly baseline for patient    RADIOLOGY:  CT HEAD WO CONTRAST   Final Result   No acute intracranial abnormality. 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 There is approximately 50%   stenosis in the origin of right ICA by NASCET criteria. Stable occlusion of the left petrous and cavernous segments of intracranial   ICA with opacification of the supraclinoid segment. Severe narrowing is again   noted in the cavernous segment of right ICA.       Occlusion of right V1 and proximal V2 segments of right vertebral artery, new   compared to the previous exam         XR CHEST PORTABLE   Final Result      Lungs are clear               EKG  Normal sinus rhythm, rate 67, slight left axis deviation, slightly widened QRS, no acute ST elevations noted, no T wave versions, abnormal EKG    All EKG's are interpreted by the Emergency Department Physician who either signs or Co-signs this chart in the absence of a cardiologist.    EMERGENCY DEPARTMENT COURSE:  Patient is a 64-year-old female present presenting with sudden onset vertigo. On exam patient has bilateral nystagmus. History of acute CVA. No other focal neurologic deficits. CTA of the head and neck does show new occlusions in her vertebral arteries. Labs are otherwise unremarkable. No findings of dissection on CTA of the head and neck. Given findings on CT of the head and neck neurology consulted. They will admit patient for further management including MRI. Patient is amenable to this plan. Patient's symptoms have been going on for almost 48 hours there is no indication for tPA. ED Course as of 11/20/22 2111   Zaid Montez Nov 20, 2022   1839 CT HEAD WO CONTRAST [MS]   1839 CTA HEAD NECK W CONTRAST [MS]   1912 Given CT findings will have neurology evaluate. Patient is feeling better after Vistaril. [MS]      ED Course User Index  [MS] Claudine Lowery DO       No notes of [facility] Admission Criteria type on file. PROCEDURES:  N/a    CONSULTS:  IP CONSULT TO NEUROLOGY    CRITICAL CARE:  N/a    FINAL IMPRESSION      1. Vertigo          DISPOSITION / PLAN     DISPOSITION Decision To Admit 11/20/2022 08:03:06 PM      PATIENT REFERRED TO:  No follow-up provider specified.     DISCHARGE MEDICATIONS:  New Prescriptions    No medications on file       Kit Veras DO  Emergency Medicine Resident    (Please note that portions of thisnote were completed with a voice recognition program.  Efforts were made to edit the dictations but occasionally words are mis-transcribed.)        Claudine Lowery DO  Resident  11/20/22 2112       Claudine Lowery DO  Resident  11/20/22 2113

## 2022-11-20 NOTE — ED NOTES
[facility] CT [facility] [place] [address] [facility]  11/20/22 1825

## 2022-11-21 ENCOUNTER — APPOINTMENT (OUTPATIENT)
Dept: MRI IMAGING | Age: 67
DRG: 068 | End: 2022-11-21
Payer: COMMERCIAL

## 2022-11-21 ENCOUNTER — TELEPHONE (OUTPATIENT)
Dept: INTERNAL MEDICINE | Age: 67
End: 2022-11-21

## 2022-11-21 LAB
ANION GAP SERPL CALCULATED.3IONS-SCNC: 12 MMOL/L (ref 9–17)
BUN BLDV-MCNC: 22 MG/DL (ref 8–23)
CALCIUM SERPL-MCNC: 9.4 MG/DL (ref 8.6–10.4)
CHLORIDE BLD-SCNC: 102 MMOL/L (ref 98–107)
CO2: 25 MMOL/L (ref 20–31)
CREAT SERPL-MCNC: 1.02 MG/DL (ref 0.5–0.9)
EKG ATRIAL RATE: 53 BPM
EKG P AXIS: 69 DEGREES
EKG P-R INTERVAL: 204 MS
EKG Q-T INTERVAL: 470 MS
EKG QRS DURATION: 110 MS
EKG QTC CALCULATION (BAZETT): 441 MS
EKG R AXIS: 25 DEGREES
EKG T AXIS: 25 DEGREES
EKG VENTRICULAR RATE: 53 BPM
GFR SERPL CREATININE-BSD FRML MDRD: 60 ML/MIN/1.73M2
GLUCOSE BLD-MCNC: 102 MG/DL (ref 70–99)
MAGNESIUM: 2.1 MG/DL (ref 1.6–2.6)
PARTIAL THROMBOPLASTIN TIME: 16.3 SEC (ref 20.5–30.5)
PARTIAL THROMBOPLASTIN TIME: 23.4 SEC (ref 20.5–30.5)
PARTIAL THROMBOPLASTIN TIME: 36.1 SEC (ref 20.5–30.5)
PARTIAL THROMBOPLASTIN TIME: 44.4 SEC (ref 20.5–30.5)
POTASSIUM SERPL-SCNC: 4.1 MMOL/L (ref 3.7–5.3)
SODIUM BLD-SCNC: 139 MMOL/L (ref 135–144)

## 2022-11-21 PROCEDURE — 97162 PT EVAL MOD COMPLEX 30 MIN: CPT

## 2022-11-21 PROCEDURE — 85730 THROMBOPLASTIN TIME PARTIAL: CPT

## 2022-11-21 PROCEDURE — 6370000000 HC RX 637 (ALT 250 FOR IP): Performed by: STUDENT IN AN ORGANIZED HEALTH CARE EDUCATION/TRAINING PROGRAM

## 2022-11-21 PROCEDURE — 97535 SELF CARE MNGMENT TRAINING: CPT

## 2022-11-21 PROCEDURE — 97116 GAIT TRAINING THERAPY: CPT

## 2022-11-21 PROCEDURE — G0378 HOSPITAL OBSERVATION PER HR: HCPCS

## 2022-11-21 PROCEDURE — 83735 ASSAY OF MAGNESIUM: CPT

## 2022-11-21 PROCEDURE — 97166 OT EVAL MOD COMPLEX 45 MIN: CPT

## 2022-11-21 PROCEDURE — 6360000002 HC RX W HCPCS: Performed by: STUDENT IN AN ORGANIZED HEALTH CARE EDUCATION/TRAINING PROGRAM

## 2022-11-21 PROCEDURE — 93005 ELECTROCARDIOGRAM TRACING: CPT | Performed by: STUDENT IN AN ORGANIZED HEALTH CARE EDUCATION/TRAINING PROGRAM

## 2022-11-21 PROCEDURE — 96366 THER/PROPH/DIAG IV INF ADDON: CPT

## 2022-11-21 PROCEDURE — 99223 1ST HOSP IP/OBS HIGH 75: CPT | Performed by: PSYCHIATRY & NEUROLOGY

## 2022-11-21 PROCEDURE — 2060000000 HC ICU INTERMEDIATE R&B

## 2022-11-21 PROCEDURE — 97530 THERAPEUTIC ACTIVITIES: CPT

## 2022-11-21 PROCEDURE — 36415 COLL VENOUS BLD VENIPUNCTURE: CPT

## 2022-11-21 PROCEDURE — 70551 MRI BRAIN STEM W/O DYE: CPT

## 2022-11-21 PROCEDURE — 94660 CPAP INITIATION&MGMT: CPT

## 2022-11-21 PROCEDURE — 80048 BASIC METABOLIC PNL TOTAL CA: CPT

## 2022-11-21 PROCEDURE — 99222 1ST HOSP IP/OBS MODERATE 55: CPT | Performed by: STUDENT IN AN ORGANIZED HEALTH CARE EDUCATION/TRAINING PROGRAM

## 2022-11-21 RX ADMIN — TICAGRELOR 90 MG: 90 TABLET ORAL at 08:54

## 2022-11-21 RX ADMIN — Medication 11.74 UNITS/KG/HR: at 18:05

## 2022-11-21 RX ADMIN — FOLIC ACID 1000 MCG: 1 TABLET ORAL at 08:55

## 2022-11-21 RX ADMIN — GABAPENTIN 600 MG: 600 TABLET ORAL at 08:55

## 2022-11-21 RX ADMIN — ACETAMINOPHEN 650 MG: 325 TABLET ORAL at 10:31

## 2022-11-21 RX ADMIN — GABAPENTIN 600 MG: 600 TABLET ORAL at 20:01

## 2022-11-21 RX ADMIN — GABAPENTIN 600 MG: 600 TABLET ORAL at 14:36

## 2022-11-21 RX ADMIN — TICAGRELOR 90 MG: 90 TABLET ORAL at 20:01

## 2022-11-21 RX ADMIN — ROSUVASTATIN CALCIUM 40 MG: 20 TABLET, FILM COATED ORAL at 20:01

## 2022-11-21 RX ADMIN — Medication 81 MG: at 08:55

## 2022-11-21 ASSESSMENT — PAIN DESCRIPTION - DESCRIPTORS: DESCRIPTORS: ACHING;THROBBING

## 2022-11-21 ASSESSMENT — PAIN DESCRIPTION - ORIENTATION: ORIENTATION: MID

## 2022-11-21 ASSESSMENT — PAIN - FUNCTIONAL ASSESSMENT: PAIN_FUNCTIONAL_ASSESSMENT: PREVENTS OR INTERFERES SOME ACTIVE ACTIVITIES AND ADLS

## 2022-11-21 ASSESSMENT — PAIN SCALES - GENERAL: PAINLEVEL_OUTOF10: 7

## 2022-11-21 ASSESSMENT — PAIN DESCRIPTION - LOCATION: LOCATION: HEAD

## 2022-11-21 NOTE — ED NOTES
Pt resting comfortably in no acute distress. Respirations even and unlabored. Call light remains within reach. No needs at this time.        Tyrone Tripp RN  11/20/22 1914

## 2022-11-21 NOTE — PROGRESS NOTES
Physical Therapy  [place] [facility] Initial Assessment    Name: Alexander Peter  : 1955  MRN: [id number]  Date of Service: 2022    Chief Complaint   Patient presents with    Dizziness     \"Feels like my head is spinning I cant hold my head up for long\" x2 days       Discharge Recommendations:  Patient would benefit from continued therapy after discharge, Other (comment) (Pt has further diagnostic test pending)   PT Equipment Recommendations  Equipment Needed: No  Other: to be determined based on pt progress and stabiliy , pt is currently unsafe to ambulate unassisted      Patient Diagnosis(es): The encounter diagnosis was Vertigo. Past Medical History:  has a past medical history of Ambulates with cane, Bleeding, Cataracts, bilateral, Cerebrovascular disease, Chronic pain in left foot, CKD (chronic kidney disease) stage 3, GFR 30-59 ml/min ([facility]), COVID-19, GERD (gastroesophageal reflux disease), Hx of blood clots, Hyperlipidemia, Hypertension, Iron (Fe) deficiency anemia, Nicotine abuse, Obesity, TIBURCIO on CPAP, Osteoarthritis, Peripheral vascular disease ([address] Substance abuse ([address] Thalassemia minor, Unspecified cerebral artery occlusion with cerebral infarction, and Wears dentures. Past Surgical History:  has a past surgical history that includes Tonsillectomy and adenoidectomy; Tubal ligation; Breast surgery; Endometrial biopsy (); Upper gastrointestinal endoscopy (); Colonoscopy (2007); Colonoscopy (); Upper gastrointestinal endoscopy (N/A, 2019); and Colonoscopy (N/A, 2019). Assessment   Body Structures, Functions, Activity Limitations Requiring Skilled Therapeutic Intervention: Decreased functional mobility ; Decreased tolerance to work activity; Decreased strength;Decreased endurance  Assessment: Pt presents with general deconditioning, gait, balance and stregth deficits with furhter medical management and diagnostic test pending.  Pt will continue to benefit from PT to progress activity and promote functional independence. Therapy Prognosis: Guarded  Decision Making: Medium Complexity  Clinical Presentation: evolving  Requires PT Follow-Up: Yes  Activity Tolerance  Activity Tolerance: Patient limited by endurance; Patient limited by pain;Treatment limited secondary to medical complications; Other (comment) (Pt with pending diagnostic test)     Plan   Physcial Therapy Plan  General Plan:  (5-6x/wk)  Current Treatment Recommendations: Stair training, Gait training, Balance training, Strengthening, Functional mobility training, Transfer training, Neuromuscular re-education, Therapeutic activities  Additional Comments: PT intervention with focus on progressive mobility and out of bed activity as indicated and tolerated  Safety Devices  Type of Devices: Patient at risk for falls, Bed alarm in place, Left in bed, Nurse notified, Gait belt, Call light within reach  Restraints  Restraints Initially in Place: No     Restrictions  Restrictions/Precautions  Restrictions/Precautions: General Precautions, Fall Risk, Up as Tolerated (Up with assist)  Required Braces or Orthoses?: No  Position Activity Restriction  Other position/activity restrictions: up with Assit, pending test, heperin drip     Subjective   General  Chart Reviewed: Yes  Patient assessed for rehabilitation services?: Yes  Additional Pertinent Hx: vertigo which she did describes as the room spinning for 2 days which occurred suddenly while sitting at home, overnight pt became apnec and bradycardic  Family / Caregiver Present: No  Follows Commands: Within Functional Limits  Other (Comment): Pt awake and alert in agreement with PT intervention, okay per RN to see as pt tolerates,  Subjective  Subjective: Pt with complaint of 7/10 head pain and dizziness with position changes, nursing aware and addressing as indiciated         Social/Functional History  Social/Functional History  Lives With: Spouse  Type of Home: Apartment  Home Layout: One level  Home Access: Stairs to enter with rails  Entrance Stairs - Rails: None  Bathroom Shower/Tub: Tub/Shower unit  Bathroom Toilet: Handicap height  Bathroom Equipment: Grab bars in shower, Grab bars around toilet  Bathroom Accessibility: Accessible  Home Equipment: [place]e [place], 4 wheeled, Cane  Has the patient had two or more falls in the past year or any fall with injury in the past year?: No  Receives Help From: Family, Friend(s), Home health  ADL Assistance: Independent  Homemaking Assistance: Needs assistance  Meal Prep:  (microwave)  Homemaking Responsibilities: Yes  Ambulation Assistance: Independent  Transfer Assistance: Independent  Active : Yes  Mode of Transportation: Car  Occupation: On disability  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, hunts,   Leisure & Hobbies: play cards  Additional Comments: Pt report baseline indepdence at home with HHA to assist with shower  Vision/Hearing  Vision  Vision: Within Functional Limits  Hearing  Hearing: Within functional limits    Cognition   Orientation  Overall Orientation Status: Within Functional Limits  Cognition  Overall Cognitive Status: WFL     Objective   Heart Rate: 54  Heart Rate Source: Monitor  BP: 138/78  BP Location: Right lower arm  BP Method: Automatic  Patient Position: High fowlers  MAP (Calculated): 98  Resp: 17  SpO2: 98 %  O2 Device: None (Room air)     Observation/Palpation  Posture: Good  Observation: Pt able to sit at EOB wiht sym posture, tolerated sitting x 10 minutes  Gross Assessment  AROM: Within functional limits  PROM: Within functional limits  Strength:  Within functional limits  Coordination: Within functional limits  Tone: Normal  Sensation: Intact     AROM RLE (degrees)  RLE AROM: WFL  AROM LLE (degrees)  LLE AROM : WFL  AROM RUE (degrees)  RUE AROM : WFL  AROM LUE (degrees)  LUE AROM : WFL  Strength RLE  Strength RLE: WFL  Strength LLE  Strength LLE: WFL  Strength RUE  Strength RUE: WFL  Strength LUE  Strength LUE: WFL  Strength Other  Other: Pt with general weakness and deconditioning        Bed Mobility Training  Bed Mobility Training: Yes  Supine to Sit: Maximum assistance (Pt required assistance at both trunk and BLE's, c/o dizziness immediately)  Sit to Supine: Moderate assistance  Scooting: Stand-by assistance  Balance  Sitting: Intact (SUP only, sitting unsupported on EOB/BSC this date)  Standing: With support (CGA at times, RW used, stood bedside/at BS for total of ~7 min total, dizziness)  Transfer Training  Transfer Training: Yes  Sit to Stand: Maximum assistance (With bed in lowest position, pt could not fully acheive a stand despite 3 attempts with RW at max A, when bed height raised pt was able to standing with max Ax1 after education on proper hand placement)  Stand to Sit: Minimum assistance  Toilet Transfer: Minimum assistance  Gait  Overall Level of Assistance: Minimum assistance  Speed/Carina: Slow;Shuffled (Kept at bedside d/t dizziness, func mob to/from [facility])  Bed mobility  Rolling to Left: Stand by assistance  Rolling to Right: Stand by assistance  Supine to Sit: Stand by assistance  Sit to Supine: Stand by assistance  Scooting: Contact guard assistance  Transfers  Sit to Stand: Contact guard assistance  Stand to Sit: Contact guard assistance  Bed to Chair: Contact guard assistance  Comment: Pt use left side cane  Ambulation  Surface: Level tile  Device: Single point cane  Assistance: Contact guard assistance  Quality of Gait: Slow guarded gait, easiliy fatigued with limited toleance due to medical status  Gait Deviations: Slow Carina; Increased ALBAN  Distance: 5 ft  Comments: Pt able to initate and complete steps at side of bed with use of cane and forward flexed posture  More Ambulation?: No  Stairs/Curb  Stairs?: No  Wheelchair Activities  Wheelchair Parts Management: No  Propulsion: No     Balance  Posture: Good  Sitting - Static: Good  Sitting - Dynamic: Good  Standing - Static: Fair  Standing - Dynamic: Fair;-  Comments: stand balance assessed with std cane, forward flexed posture pt observed seeking external support  Exercise Treatment: Pt supine <> sit, sit EOB. Pt educted on positioing posture and pacing, Pt educated on use or RW for stabilty and support, pt denies need. AM-PAC Score  AM-PAC Inpatient Mobility Raw Score : 17 (11/21/22 1247)  AM-PAC Inpatient T-Scale Score : 42.13 (11/21/22 1247)  Mobility Inpatient CMS 0-100% Score: 50.57 (11/21/22 1247)  Mobility Inpatient CMS G-Code Modifier : CK (11/21/22 1247)               Goals  Short Term Goals  Time Frame for Short Term Goals: 7 visits  Short Term Goal 1: Pt to ambulate 25 ft with least AD with CGA  Short Term Goal 2: pt to transfer from alternate surface heights with CGA and use of cane  Short Term Goal 3: Pt to tolerate 30 min ther ex and activity to improve strength and activity tolerance. Patient Goals   Patient Goals : to feel better and return home       Education  Patient Education  Education Given To: Patient  Education Provided: Role of Therapy;Plan of Care;Energy Conservation;Transfer Training; Fall Prevention Strategies  Education Provided Comments: Pt edcuated on use of RW for stability, safety due to dizziness and pacing activity due to fatigue, Pt denies need or trial  Education Method: Demonstration;Verbal  Barriers to Learning: None  Education Outcome: Verbalized understanding      Therapy Time   Individual Concurrent Group Co-treatment   Time In 0950         Time Out 1028         Minutes 38         Timed Code Treatment Minutes: Joyce Manuel, PT, DPT

## 2022-11-21 NOTE — PROGRESS NOTES
oOccupational Therapy  Facility/Department: [address] [facility]PD[place]  Occupational Therapy Initial Assessment    Name: Antoine Khalil  : 1955  MRN: [id number]  Date of Service: 2022    Discharge Recommendations:  Patient would benefit from continued therapy after discharge  OT Equipment Recommendations  Equipment Needed: Yes  Mobility Devices: ADL Assistive Devices  ADL Assistive Devices: Reacher;Long-handled Shoe Horn;Long-handled Sponge;Sock-Aid Hard;Dressing Stick       Patient Diagnosis(es): The encounter diagnosis was Vertigo. Past Medical History:  has a past medical history of Ambulates with cane, Bleeding, Cataracts, bilateral, Cerebrovascular disease, Chronic pain in left foot, CKD (chronic kidney disease) stage 3, GFR 30-59 ml/min ([facility]), COVID-19, GERD (gastroesophageal reflux disease), Hx of blood clots, Hyperlipidemia, Hypertension, Iron (Fe) deficiency anemia, Nicotine abuse, Obesity, TIBURCIO on CPAP, Osteoarthritis, Peripheral vascular disease ([address] Substance abuse ([facility] [address] [address] Thalassemia minor, Unspecified cerebral artery occlusion with cerebral infarction, and Wears dentures. Past Surgical History:  has a past surgical history that includes Tonsillectomy and adenoidectomy; Tubal ligation; Breast surgery; Endometrial biopsy (); Upper gastrointestinal endoscopy (); Colonoscopy (2007); Colonoscopy (); Upper gastrointestinal endoscopy (N/A, 2019); and Colonoscopy (N/A, 2019). Assessment   Performance deficits / Impairments: Decreased functional mobility ; Decreased endurance;Decreased ADL status; Decreased balance;Decreased high-level IADLs;Decreased strength  Prognosis: Good  Decision Making: Medium Complexity  REQUIRES OT FOLLOW-UP: Yes  Activity Tolerance  Activity Tolerance: Patient limited by fatigue;Patient Tolerated treatment well  Activity Tolerance Comments: Dizziness        Plan   Occupational Therapy Plan  Times Per Week: 3-5x Restrictions  Restrictions/Precautions  Restrictions/Precautions: General Precautions, Fall Risk  Required Braces or Orthoses?: No  Position Activity Restriction  Other position/activity restrictions: up with A    Subjective   General  Patient assessed for rehabilitation services?: Yes  Family / Caregiver Present: No  Diagnosis: Vertigo, Bradycardia, L cervical ICA occlusion  Subjective  Subjective: 0/10 pain level per pt  General Comment  Comments: RN approved OT winter this am, pt cooperative throughout     Social/Functional History  Social/Functional History  Lives With: Spouse ( with dementia aid , pt have HHA 3x/wk 2hrs /day assist with shower meal prep and house work)  Type of Home: [address] [place] [address] Drive: One level  Home Access: Stairs to enter with rails  Entrance Stairs - Rails: None  Bathroom Shower/Tub: Tub/Shower unit  Bathroom Toilet: Handicap height  Bathroom Equipment: Grab bars in shower, Grab bars around toilet  Bathroom Accessibility: Accessible  Home Equipment: [place], 4 wheeled, Cane  Has the patient had two or more falls in the past year or any fall with injury in the past year?: No  Receives Help From: Family, Friend(s), Home health  ADL Assistance: Needs assistance (Aides assist with some bathing/dressing, pt uses a towel between legs to perform bottom-care)  Homemaking Assistance: Needs assistance (Aide assists with both ADL's and chores, pt makes simple meals and has meals on wheels)  Meal Prep:  (microwave)  Homemaking Responsibilities: Yes  Ambulation Assistance: Independent  Transfer Assistance: Independent  Active : Yes  Mode of Transportation: Car  Occupation: On disability  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, hunts, keyboard opera  Leisure & Hobbies: play cards     Objective   SpO2: 98 %  O2 Device: None (Room air)          Observation/Palpation  Posture: Good  Observation: Pt able to sit at EOB wiht sym posture  Safety Devices  Type of Devices: Patient at risk for falls; Bed alarm in place; Left in bed;Nurse notified;Gait belt;Call light within reach  Restraints  Restraints Initially in Place: No  Bed Mobility Training  Bed Mobility Training: Yes  Supine to Sit: Maximum assistance (Pt required assistance at both trunk and BLE's, c/o dizziness immediately)  Sit to Supine: Moderate assistance  Scooting: Stand-by assistance  Balance  Sitting: Intact (SUP only, sitting unsupported on EOB/BSC this date)  Standing: With support (CGA at times, RW used, stood bedside/at BS for total of ~7 min total, dizziness)  Transfer Training  Transfer Training: Yes  Sit to Stand: Maximum assistance (With bed in lowest position, pt could not fully acheive a stand despite 3 attempts with RW at max A, when bed height raised pt was able to standing with max Ax1 after education on proper hand placement)  Stand to Sit: Minimum assistance  Toilet Transfer: Minimum assistance  Gait  Overall Level of Assistance: Minimum assistance  Speed/Carina: Slow;Shuffled (Kept at bedside d/t dizziness, func mob to/from [facility])     AROM: Generally decreased, functional (Chronic arthritic shoulder limited to 30 degrees, chronic L shoulder from 2011 CVA limited to 80 degrees, WFL at hands/wrists/elbows B/L)  PROM: Within functional limits  Strength: Generally decreased, functional (3- at B/L shoulders-chronic, pt observed to have atrophy in L hand, 4+ at B/L hands/elbows)  Coordination: Generally decreased, functional (Fair finger-to-nose coordination test at BUE when eyes open however poor at RUE when eyes were closed, Poor finger-to-thumb test at R hand compared to L hand)  Tone: Normal  Sensation: Intact  ADL  Feeding: Independent  Grooming: Supervision  UE Bathing: Stand by assistance  LE Bathing: Moderate assistance; Increased time to complete  UE Dressing: Stand by assistance; Increased time to complete  LE Dressing: Moderate assistance; Increased time to complete  Toileting: Minimal assistance; Increased time to complete  Additional Comments: Pt limited by high BMI and edema, pt was able to transfer to [facility] and performed successful harrison-care while standing afterwards, some chronic BUE weakness d/t old CVA and arthritic R shoulder, dizziness limiting stand tolerance and writer did not feel safe having pt demo func mob to bathroom in room        Vision  Vision: Impaired (When head is manually turned L/R pt noted to have nystagmus, god peripheral vision test by MD as writer observed)  Hearing  Hearing: Exceptions to [facility] (Pt reports R ear limited more than L ear this date when tested)  Cognition  Overall Cognitive Status: WFL  Orientation  Overall Orientation Status: Within Functional Limits          Education Given To: Patient  Education Provided: Role of Therapy;Plan of Care;Equipment;Transfer Training  Education Provided Comments: Proper hand placement  Education Method: Verbal  Barriers to Learning: None  Education Outcome: Verbalized understanding            AM-PAC Score        AM-PAC Inpatient Daily Activity Raw Score: 17 (11/21/22 1135)  AM-PAC Inpatient ADL T-Scale Score : 37.26 (11/21/22 1135)  ADL Inpatient CMS 0-100% Score: 50.11 (11/21/22 1135)  ADL Inpatient CMS G-Code Modifier : CK (11/21/22 1135)    Goals  Short Term Goals  Time Frame for Short Term Goals: Pt will by discharge  Short Term Goal 1: demo ADL UB bathing/dressing activity at mod I and increased time  Short Term Goal 2: demo ADL LB bathing/dressing activity at SBA, using AE PRN, and increased time  Short Term Goal 3: demo good safety awareness during func mob around room using RW and SUP  Short Term Goal 4: demo CGA for all bed mobility/func transfers using bed rails/LRD PRN, including a toilet transfer  Short Term Goal 5: demo activity tolerance for 35 min+     Therapy Time   Individual Concurrent Group Co-treatment   Time In 0807         Time Out 0847         Minutes 40         Timed Code Treatment Minutes: [address], OTR/L

## 2022-11-21 NOTE — ACP (ADVANCE CARE PLANNING)
Advance Care Planning     Advance Care Planning Inpatient Note  [facility] Department    Today's Date: 11/21/2022  Unit: [facility]PDOWN    Received request from patient. Upon review of chart and communication with care team, patient's decision making abilities are not in question. . Patient was/were present in the room during visit. Goals of ACP Conversation:  Discuss and complete ACP documents    Health Care Decision Makers:       Primary Decision Maker: Chance Santamaria Child - [phone]    Secondary Decision Maker: Shameka Abad Child - [phone]  Summary:  Completed [facility]    Advance Care Planning Documents (Patient Wishes):  Healthcare Power of /Advance Directive Appointment of Health Care Agent     Assessment:  Patient chose Colleen Waters as primary decision-maker because she has a vehicle if there is a need for her to get to the hospital.    Interventions:  Provided education on documents for clarity and greater understanding  Assisted in the completion of documents according to patient's wishes at this time    Care Preferences Communicated:   No    Outcomes/Plan:  ACP Discussion: Completed  New advance directive completed. Returned original document(s) to patient, as well as copies for distribution to appointed agents  Copy of advance directive given to staff to scan into medical record.     Electronically signed by Truman Styleslain Resident on 11/21/2022 at 10:05 AM

## 2022-11-21 NOTE — CONSULTS
[facility]  Office: [address], DO, Jamel Bridges, DO, Blade Dodson, DO, Pompey Matsdoris Blood, DO, Velton Habermann, MD, Cat Solano MD, Susanne Murphy MD, Harmeet Chahal MD,  Nancy Wagner MD, Ramone Tadeo MD, Elin Koenig, DO, Lashonda Garcia MD,  Patricia Martínez MD, Shahnaz Mohr MD, Jamilah Funk DO, Rick Bourgeois MD, Alda Walker MD, Jessica Fajardo DO, Jacques Paige MD, Andrew Hoyt MD, Albino Aaron MD, Juno Ji MD, Miah Villeda, DO, Sharon Joe MD, Benjamin Padilla MD, Keagan Brasher, CNP,  Radha Lunsford, CNP, Sherrie Hill, CNP, Alyssa Rodgers, CNP,  Wale Holden, DNP, Sonia Son, CNP, Bobby Evans, CNP, Will Harvey, CNP, Jolly Renee, CNP, Lexii Shane, CNP, Tarun Sawyer, PA-C, Shlomo Burrows, CNS, Blanca Knight, DNP, Toni Drew, CNP, Carole Garza, CNP, Mendy Prieto, CNP         [address] / HISTORY AND PHYSICAL EXAMINATION            Date:   11/21/2022  Patient name:  Keila Hayes  Date of admission:  11/20/2022  4:24 PM  MRN:   [id number]  Account:  [de-identified]  YOB: 1955  PCP:    Alfonso Randhawa MD  Room:   0510/0510-01  Code Status:    Full Code    Physician Requesting Consult: Kt Coughlin DO    Reason for Consult:  Medical Management. Chief Complaint:     Chief Complaint   Patient presents with    Dizziness     \"Feels like my head is spinning I cant hold my head up for long\" x2 days      History Obtained From:     patient    History of Present Illness:     Mrs. Lincoln Correa is a 49-year-old female with a significant past medical history of essential hypertension, hyperlipidemia, CKD stage III, history of CVA, PAD, obesity and TIBURCIO/OHS who initially presented vertigo which she did describes as the room spinning for 2 days which occurred suddenly while sitting at home.   Admits to associated nausea and blurred vision. CT head demonstrated no acute intracranial abnormality. 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  Patient was admitted to neurology with endovascular consultation. Overnight patient was apneic and bradycardic. Medicine was consulted for medical management.     Past Medical History:     Past Medical History:   Diagnosis Date    Ambulates with cane     Bleeding     while on aggrenox    Cataracts, bilateral     Cerebrovascular disease 2003,2011    cva    Chronic pain in left foot     CKD (chronic kidney disease) stage 3, GFR 30-59 ml/min ([facility])     COVID-19 11/2021    states hpspitalized    GERD (gastroesophageal reflux disease)     Hx of blood clots     Hyperlipidemia     Hypertension     Iron (Fe) deficiency anemia     Nicotine abuse     Obesity     TIBURCIO on CPAP     Osteoarthritis     Peripheral vascular disease ([facility] [address] 03/13/2014    Substance abuse ([facility] [address]     cocaine, canabis    Thalassemia minor     Unspecified cerebral artery occlusion with cerebral infarction     Wears dentures     upper and lower        Past Surgical History:     Past Surgical History:   Procedure Laterality Date    BREAST SURGERY      biopsy    COLONOSCOPY  12/2007    adenomatous polyp    COLONOSCOPY  2013    normal, repeat in 5 years    COLONOSCOPY N/A 8/21/2019    COLONOSCOPY POLYPECTOMY SNARE/COLD BIOPSY performed by Babar Jeffrey MD at [facility] ENDOSCOPY  2009    negative    UPPER GASTROINTESTINAL ENDOSCOPY N/A 8/21/2019    EGD BIOPSY performed by Babar Jeffrey MD at [facility]        Medications Prior to Admission:     Prior to Admission medications    Medication Sig Start Date End Date Taking? Authorizing Provider   amLODIPine (NORVASC) 10 MG tablet TAKE 1 TABLET BY MOUTH DAILY 11/15/22   Ann Marie Jimenez MD   aspirin 81 MG EC tablet TAKE 1 TABLET BY MOUTH DAILY 11/15/22   Ann Marie Jimenez MD   Cholecalciferol (VITAMIN D3) 25 MCG TABS TAKE 1 TABLET BY MOUTH DAILY 11/15/22   Ann Marie Jimenez MD   Vitamin D (CHOLECALCIFEROL) 25 MCG (1000 UT) TABS tablet  10/25/22   Historical Provider, MD   gabapentin (NEURONTIN) 600 MG tablet Take 600 mg by mouth 3 times daily.     Historical Provider, MD   acetaminophen (TYLENOL) 325 MG tablet TAKE 2 TABLETS BY MOUTH EVERY 4 HOURS AS NEEDED FOR PAIN 10/30/22   Ann Marie Jimenez MD   atorvastatin (LIPITOR) 80 MG tablet TAKE 1 TABLET BY MOUTH DAILY 10/21/22   Ann Marie Jimenez MD   clopidogrel (PLAVIX) 75 MG tablet TAKE 1 TABLET BY MOUTH DAILY 10/21/22   Ann Marie Jimenez MD   hydroCHLOROthiazide (HYDRODIURIL) 25 MG tablet TAKE 1 TABLET BY MOUTH DAILY 10/19/22   Ann Marie Jimenez MD   losartan (COZAAR) 50 MG tablet TAKE 1 TABLET BY MOUTH DAILY 10/19/22   Surjit Obando Sra, MD   omeprazole (PRILOSEC) 20 MG delayed release capsule TAKE 1 CAPSULE BY MOUTH TWICE A DAY WITH MEALS 10/19/22   Ann Marie Jimenez MD   ferrous sulfate (IRON 325) 325 (65 Fe) MG tablet Take 1 tab three days days a week 10/3/22   Tatiana Ibarra MD   folic acid (FOLVITE) 1 MG tablet TAKE 1 TABLET BY MOUTH DAILY 7/26/22   Ashok Gonzalez MD   guaiFENesin (ALTARUSSIN) 100 MG/5ML syrup Take 10 mLs by mouth 3 times daily as needed for Cough 11/10/21   Indigo Riley MD   vitamin B-1 (THIAMINE) 100 MG tablet Take 1 tablet by mouth daily 4/15/21   Indigo Riley MD   diclofenac sodium (VOLTAREN) 1 % GEL Apply 4 g topically 4 times daily 6/28/20   [facility], DO        Allergies:     Duricef [cefadroxil monohydrate], Fish-derived products, Pcn [penicillins], and Tomato    Social History:     Tobacco: reports that she quit smoking about 11 years ago. Her smoking use included cigarettes. She has a 70.00 pack-year smoking history. She has been exposed to tobacco smoke. She has never used smokeless tobacco.  Alcohol:      reports current alcohol use of about 6.0 standard drinks per week. Drug Use:  reports no history of drug use. Family History:     Family History   Problem Relation Age of Onset    High Blood Pressure Mother     Asthma Mother     Heart Disease Mother     Heart Disease Father     High Blood Pressure Father     Diabetes Brother     High Blood Pressure Brother     Heart Disease Brother     High Blood Pressure Maternal Grandmother     High Blood Pressure Maternal Grandfather     Heart Disease Maternal Grandfather        Review of Systems:     Positive and Negative as described in HPI. CONSTITUTIONAL:  negative for fevers, chills, sweats, fatigue, weight loss  HEENT: Positive for vision changes. negative for hearing changes, runny nose, throat pain  RESPIRATORY:  negative for shortness of breath, cough, congestion, wheezing. CARDIOVASCULAR:  negative for chest pain, palpitations. GASTROINTESTINAL: Positive for nausea. Negative for nausea, vomiting, diarrhea, constipation, change in bowel habits, abdominal pain   GENITOURINARY:  negative for difficulty of urination, burning with urination, frequency   INTEGUMENT:  negative for rash, skin lesions, easy bruising   HEMATOLOGIC/LYMPHATIC:  negative for swelling/edema   ALLERGIC/IMMUNOLOGIC:  negative for urticaria , itching  ENDOCRINE:  negative increase in drinking, increase in urination, hot or cold intolerance  MUSCULOSKELETAL:  negative joint pains, muscle aches, swelling of joints  NEUROLOGICAL: Positive for dizziness.  Negative for headaches, lightheadedness, numbness, pain, tingling extremities  BEHAVIOR/PSYCH:  negative for depression, anxiety    Physical Exam:     /63   Pulse 55   Temp 97.5 °F (36.4 °C) (Oral)   Resp 17   Ht 5' 7\" (1.702 m)   Wt (!) 388 lb 3.7 oz (176.1 kg)   SpO2 96%   BMI 60.81 kg/m²   Temp (24hrs), Av.3 °F (36.3 °C), Min:97 °F (36.1 °C), Max:97.5 °F (36.4 °C)    No results for input(s): POCGLU in the last 72 hours. No intake or output data in the 24 hours ending 22 0750    General Appearance:  alert, well appearing, and in no acute distress  Mental status: oriented to person, place, and time with normal affect  Head:  normocephalic, atraumatic. Eye: no icterus, redness, extraocular eye movements intact, conjunctiva clear  Ear: normal external ear, no discharge, hearing intact  Nose:  no drainage noted  Mouth: mucous membranes moist  Neck: supple, no carotid bruits, thyroid not palpable  Lungs: Bilateral equal air entry, clear to ausculation, no wheezing, rales or rhonchi, normal effort  Cardiovascular: normal rate, regular rhythm, no murmur, gallop, rub. Abdomen: Soft, nontender, nondistended, normal bowel sounds, obese.   Neurologic: There are no new focal motor or sensory deficits, normal muscle tone and bulk, no abnormal sensation, normal speech  Skin: No gross lesions, rashes, bruising or bleeding on exposed skin area  Extremities:  peripheral pulses palpable, no pedal edema or calf pain with palpation  Psych: normal affect    Investigations:      Laboratory Testing:  Recent Results (from the past 24 hour(s))   CBC with Auto Differential    Collection Time: 22  4:52 PM   Result Value Ref Range    WBC 6.0 3.5 - 11.3 k/uL    RBC 5.07 3.95 - 5.11 m/uL    Hemoglobin 11.0 (L) 11.9 - 15.1 g/dL    Hematocrit 37.1 36.3 - 47.1 %    MCV 73.2 (L) 82.6 - 102.9 fL    MCH 21.7 (L) 25.2 - 33.5 pg    MCHC 29.6 28.4 - 34.8 g/dL    RDW 17.0 (H) 11.8 - 14.4 %    Platelets 907 874 - 296 k/uL    MPV 9.3 8.1 - 13.5 fL    NRBC Automated 0.0 0.0 per 100 WBC    Seg Neutrophils 65 36 - 65 %    Lymphocytes 20 (L) 24 - 43 %    Monocytes 7 3 - 12 %    Eosinophils % 6 (H) 1 - 4 %    Basophils 1 0 - 2 %    Immature Granulocytes 1 (H) 0 %    Segs Absolute 3.93 1.50 - 8.10 k/uL    Absolute Lymph # 1.20 1.10 - 3.70 k/uL    Absolute Mono # 0.42 0.10 - 1.20 k/uL    Absolute Eos # 0.34 0.00 - 0.44 k/uL    Basophils Absolute 0.06 0.00 - 0.20 k/uL    Absolute Immature Granulocyte 0.03 0.00 - 0.30 k/uL    RBC Morphology ANISOCYTOSIS PRESENT    Comprehensive Metabolic Panel    Collection Time: 11/20/22  4:52 PM   Result Value Ref Range    Glucose 94 70 - 99 mg/dL    BUN 19 8 - 23 mg/dL    Creatinine 0.94 (H) 0.50 - 0.90 mg/dL    Est, Glom Filt Rate >60 >60 mL/min/1.73m2    Calcium 9.7 8.6 - 10.4 mg/dL    Sodium 141 135 - 144 mmol/L    Potassium 3.9 3.7 - 5.3 mmol/L    Chloride 102 98 - 107 mmol/L    CO2 28 20 - 31 mmol/L    Anion Gap 11 9 - 17 mmol/L    Alkaline Phosphatase 156 (H) 35 - 104 U/L    ALT 12 5 - 33 U/L    AST 17 <32 U/L    Total Bilirubin 0.4 0.3 - 1.2 mg/dL    Total Protein 7.2 6.4 - 8.3 g/dL    Albumin 3.9 3.5 - 5.2 g/dL    Albumin/Globulin Ratio 1.2 1.0 - 2.5   TSH with Reflex    Collection Time: 11/20/22  4:52 PM   Result Value Ref Range    TSH 1.63 0.30 - 5.00 uIU/mL   Troponin    Collection Time: 11/20/22  4:52 PM   Result Value Ref Range    Troponin, High Sensitivity 25 (H) 0 - 14 ng/L   Troponin    Collection Time: 11/20/22  6:26 PM   Result Value Ref Range    Troponin, High Sensitivity 26 (H) 0 - 14 ng/L   CBC    Collection Time: 11/20/22 11:34 PM   Result Value Ref Range    WBC 6.4 3.5 - 11.3 k/uL    RBC 5.06 3.95 - 5.11 m/uL    Hemoglobin 10.8 (L) 11.9 - 15.1 g/dL    Hematocrit 37.6 36.3 - 47.1 %    MCV 74.3 (L) 82.6 - 102.9 fL    MCH 21.3 (L) 25.2 - 33.5 pg    MCHC 28.7 28.4 - 34.8 g/dL    RDW 17.0 (H) 11.8 - 14.4 %    Platelets 008 739 - 910 k/uL    MPV 9.9 8.1 - 13.5 fL    NRBC Automated 0.0 0.0 per 100 WBC   APTT    Collection Time: 11/20/22 11:34 PM   Result Value Ref Range    PTT 16.3 (L) 20.5 - 30.5 sec   EKG 12 Lead    Collection Time: 11/21/22  4:45 AM   Result Value Ref Range    Ventricular Rate 53 BPM    Atrial Rate 53 BPM    P-R Interval 204 ms    QRS Duration 110 ms    Q-T Interval 470 ms    QTc Calculation (Bazett) 441 ms    P Axis 69 degrees    R Axis 25 degrees    T Axis 25 degrees   APTT    Collection Time: 11/21/22  5:58 AM   Result Value Ref Range    PTT 23.4 20.5 - 30.5 sec   Magnesium    Collection Time: 11/21/22  5:58 AM   Result Value Ref Range    Magnesium 2.1 1.6 - 2.6 mg/dL       Imaging/Diagonstics:  CT HEAD WO CONTRAST    Result Date: 11/20/2022  No acute intracranial abnormality. US THYROID    Result Date: 11/16/2022  Slightly heterogeneous thyroid gland with mildly enlarged right lobe. Subtle 1.1 cm TR 4 nodule in the right lobe for which 1 year follow-up ultrasound is recommended. A few scattered subcentimeter nodules for which no specific follow-up imaging is recommended. RECOMMENDATIONS: NODULE 1:  ACR TI-RADS TR4: Recommend: Follow-up ultrasound in 1 year. ACR TI-RADS recommendations: TR5 (>= 7 points):  FNA if >= 1 cm; follow-up if 0.5-0.9 cm in 1, 2, 3, 4, and 5 years TR4 (4-6 points):  FNA if >= 1.5 cm; follow-up if 1.0-1.4 cm in 1, 2, 3, and 5 years TR3 (3 points):  FNA if >= 2.5 cm; follow-up if 1.5-2.4 cm in 1, 3, and 5 years TR2 (2 points):  No FNA or follow-up TR1 (0 points):  No FNA or follow-up ACR TI-RADS recommends that no more than two nodules with the highest ACR TI-RADS point total should be biopsied and no more than four nodules should be followed. XR CHEST PORTABLE    Result Date: 11/20/2022  Lungs are clear     CTA HEAD NECK W CONTRAST    Result Date: 11/20/2022  Stable complete occlusion of left cervical ICA. There is approximately 50% stenosis in the origin of right ICA by NASCET criteria. Stable occlusion of the left petrous and cavernous segments of intracranial ICA with opacification of the supraclinoid segment. Severe narrowing is again noted in the cavernous segment of right ICA.  Occlusion of right V1 and proximal V2 segments of right vertebral artery, new compared to the previous exam     CT lung screen [Initial/Annual]    Result Date: 11/17/2022  1. Emphysematous changes. 2.  Nodule subpleural less than 2 mm. Ground-glass area in the azygoesophageal recess unchanged from prior study in 2021. 3.  No mediastinal adenopathy. 4.  Atherosclerotic calcification including coronary artery calcification. LUNG RADS: Per ACR Lung-RADS Version 1.1 Category 2. Benign nodules or behavior. Advise annual low-dose CT scanning of the chest. RECOMMENDATIONS: If you would like to register your patient with the [address] [place] [address] Program, please contact the Nurse Navigator at [phone]). Assessment :      Hospital Problems             Last Modified POA    * (Principal) Vertigo 11/20/2022 Yes    Essential hypertension 11/21/2022 Yes    Pure hypercholesterolemia 11/21/2022 Yes    CKD (chronic kidney disease) stage 3, GFR 30-59 ml/min ([facility] [address] 11/21/2022 Yes    Peripheral vascular disease ([facility] [address] 11/21/2022 Yes    TIBURCIO (obstructive sleep apnea) 11/21/2022 Yes    Class 3 severe obesity due to excess calories with serious comorbidity and body mass index (BMI) of 50.0 to 59.9 in adult [facility]) 11/21/2022 Yes     Plan:     Central versus peripheral vertigo. CTA head and neck demonstrated occlusion of the right P1 proximal V2 of the right vertebral artery which is new compared to previous exam.  Neurology and endovascular following. Plan for repeat CTA today and low-dose heparin GTT. MRI brain. Continue aspirin 81 mg daily, Brilinta 90 mg twice daily, Crestor 40 mg nightly. Essential hypertension. Goal -180 per neuro endovascular neurology. Patient is on Norvasc 10 mg daily, hydrochlorothiazide 25 mg daily, losartan 50 mg daily. Held due to permissive hypertension. Hyperlipidemia. Continue Crestor 40 mg nightly. CKD stage III. Stable    Intermittent bradycardia. EKG reviewed no QT prolongation. Bradycardia resolved on examination.   Patient states was without CPAP machine most of the night. Likely secondary to hypoxemia secondary to TIBURCIO. Continue CPAP nightly and with daytime naps. If bradycardia persists would recommend Holter monitor on discharge and possible cardiology consultation. TIBURCIO/OSH. Continue CPAP nightly for apneic episodes given patient's obesity and known TIBURCIO/OSH. Severe obesity. Encourage lifestyle modification diet and exercise. DVT prophylaxis: Heparin GTT. GI prophylaxis: No indication at this time.     Consultations:   IP CONSULT TO NEUROLOGY  IP CONSULT TO ENDOVASCULAR NEUROSURGERY  IP CONSULT TO SPIRITUAL SERVICES  IP CONSULT TO RESPIRATORY CARE  IP CONSULT TO INTERNAL MEDICINE    Sebastian De La Rosa DO  11/21/2022  7:50 AM    Copy sent to Dr. Lilia Oden MD

## 2022-11-21 NOTE — CONSULTS
Endovascular Neurosurgery Consult    Pt Name: Antoine Khalil  MRN: [id number]  [place]: 1955  Date of evaluation: 2022  Primary Care Physician: Richelle Bean MD  Reason for evaluation: new right vertebral artery occlusion noted     SUBJECTIVE:   History of Chief Complaint:    The patient is a 69 y.o. female  with hx of TIBURCIO, BMI 60, HTN, ex-smoking with 70 pack year hx quit 11 years ago, strokes with ICAD (bilateral cerebral hemispheres on aspirin 81mg and Plavix 75mg, lipitor 80mg with first stroke in  involving left MCA and repeat CT head in 2016 showed bilateral encephalomalacia), chr. Left ICA complete occlusion, right ICA occlusion, bilateral carpal tunnel and degenerative spine disease and C spine stenosis. At baseline she uses a walker for ambulation. She presented to the ED with constant vertigo described as feeling of a room spinning sensation of 2-day duration that occurred suddenly while the patient was sitting at home, associated with nausea and blurry vision intermittently. This vertigo has been constant for the past 2 days. CTA H/N done in ED and showed stable complete occlusion of left cervical ICA with about 50% stenosis in the origin of the right ICA in addition to stable occlusion of the left petrous and cavernous segments of the ICA and severe narrowing again demonstrated in the cavernous segment of right ICA. This time there was occlusion of the right P1 proximal V2 of right vertebral artery which is new compared to the previous exam. CTH was negative otherwise. She had work-up done in 2018 with CTA head and neck showing occlusion or near occlusion of the left petrous segment and cavernous segment ICA in addition to calcified plaque at the origin of the left common carotid artery with mixed plaque in the carotid bulb and complete or near complete occlusion of the entire left ICA.   There was also severe focal stenosis of the cavernous segment right ICA with mild stenosis of the proximal right ICA and moderate stenosis of proximal right vertebral artery with left proximal left vertebral artery not well visualized. This was followed by July 2019 CTA head and neck which showed the previous findings and was stable and the vertebral arteries were patent without focal stenosis at that time    Allergies  is allergic to duricef [cefadroxil monohydrate], fish-derived products, pcn [penicillins], and tomato. Medications  Prior to Admission medications    Medication Sig Start Date End Date Taking? Authorizing Provider   amLODIPine (NORVASC) 10 MG tablet TAKE 1 TABLET BY MOUTH DAILY 11/15/22   Janet Carranza MD   aspirin 81 MG EC tablet TAKE 1 TABLET BY MOUTH DAILY 11/15/22   Janet Carranza MD   Cholecalciferol (VITAMIN D3) 25 MCG TABS TAKE 1 TABLET BY MOUTH DAILY 11/15/22   Janet Carranza MD   Vitamin D (CHOLECALCIFEROL) 25 MCG (1000 UT) TABS tablet  10/25/22   Historical Provider, MD   gabapentin (NEURONTIN) 600 MG tablet Take 600 mg by mouth 3 times daily.     Historical Provider, MD   acetaminophen (TYLENOL) 325 MG tablet TAKE 2 TABLETS BY MOUTH EVERY 4 HOURS AS NEEDED FOR PAIN 10/30/22   Janet Carranza MD   atorvastatin (LIPITOR) 80 MG tablet TAKE 1 TABLET BY MOUTH DAILY 10/21/22   Janet Carranza MD   clopidogrel (PLAVIX) 75 MG tablet TAKE 1 TABLET BY MOUTH DAILY 10/21/22   Janet Carranza MD   hydroCHLOROthiazide (HYDRODIURIL) 25 MG tablet TAKE 1 TABLET BY MOUTH DAILY 10/19/22   Janet Carranza MD   losartan (COZAAR) 50 MG tablet TAKE 1 TABLET BY MOUTH DAILY 10/19/22   Atif Stafford Sra, MD   omeprazole (PRILOSEC) 20 MG delayed release capsule TAKE 1 CAPSULE BY MOUTH TWICE A DAY WITH MEALS 10/19/22   Janet Carranza MD   ferrous sulfate (IRON 325) 325 (65 Fe) MG tablet Take 1 tab three days days a week 10/3/22   Lakeshia Alexis MD   folic acid (FOLVITE) 1 MG tablet TAKE 1 TABLET BY MOUTH DAILY 7/26/22   Danie Mercado MD Elio   guaiFENesin (ALTARUSSIN) 100 MG/5ML syrup Take 10 mLs by mouth 3 times daily as needed for Cough 11/10/21   Lucrecia Bermeo MD   vitamin B-1 (THIAMINE) 100 MG tablet Take 1 tablet by mouth daily 4/15/21   Lucrecia Bermeo MD   diclofenac sodium (VOLTAREN) 1 % GEL Apply 4 g topically 4 times daily 6/28/20   Lee Nevarez DO    Scheduled Meds:   ticagrelor  180 mg Oral Once     Continuous Infusions:   heparin (PORCINE) Infusion       PRN Meds:.  Past Medical History   has a past medical history of Ambulates with cane, Bleeding, Cataracts, bilateral, Cerebrovascular disease, Chronic pain in left foot, CKD (chronic kidney disease) stage 3, GFR 30-59 ml/min ([facility]), COVID-19, GERD (gastroesophageal reflux disease), Hx of blood clots, Hyperlipidemia, Hypertension, Iron (Fe) deficiency anemia, Nicotine abuse, Obesity, TIBURCIO on CPAP, Osteoarthritis, Peripheral vascular disease ([facility] [address] Substance abuse ([facility] [address] Thalassemia minor, Unspecified cerebral artery occlusion with cerebral infarction, and Wears dentures. Past Surgical History   has a past surgical history that includes Tonsillectomy and adenoidectomy; Tubal ligation; Breast surgery; Endometrial biopsy (1998); Upper gastrointestinal endoscopy (2009); Colonoscopy (12/2007); Colonoscopy (2013); Upper gastrointestinal endoscopy (N/A, 8/21/2019); and Colonoscopy (N/A, 8/21/2019). Social History   reports that she quit smoking about 11 years ago. Her smoking use included cigarettes. She has a 70.00 pack-year smoking history. She has been exposed to tobacco smoke. She has never used smokeless tobacco.   reports current alcohol use of about 6.0 standard drinks per week. reports no history of drug use.   Family History  family history includes Asthma in her mother; Diabetes in her brother; Heart Disease in her brother, father, maternal grandfather, and mother; High Blood Pressure in her brother, father, maternal grandfather, maternal grandmother, and mother. Review of Systems:  Constitutional  Negative for fever and chills    HEENT  Negative for ear discharge, ear pain, nosebleed    Eyes  Negative for photophobia, pain and discharge    Respiratory  Negative for hemoptysis and sputum    Cardiovascular  Negative for orthopnea, claudication and PND    Gastrointestinal  Negative for abdominal pain, diarrhea, blood in stool    Musculoskeletal  Negative for joint pain, negative for myalgia    Skin  Negative for rash or itching    Neurological Vertigo, blurry vision, no weakness or numbness. Endo/heme/allergies  Negative for polydipsia, environmental allergy    Psychiatric/behavioral  Negative for suicidal ideation. Patient is not anxious         Review of systems otherwise negative. OBJECTIVE:   Vitals: BP (!) 168/90   Pulse 64   Temp 97 °F (36.1 °C) (Oral)   Resp 16   SpO2 96%           Mental status  Speech    Alert. Oriented to person, place, and time. Speech is fluent  No hallucinations or delusions. No SI/HI. Cranial nerves       II - VFF, visual threat intact  III, IV, VI - extra-ocular muscles full. No nystagmus. Pupils symmetric and responsive.    V - sensation symmetric         VII -  No facial droop or asymmetric NLF  VIII - intact hearing to conversational tone          IX, X - symmetrical palate elevation   XI - 5/5 strength symmetric  XII - tongue midline         Motor function     Strength:  Right Side:                                            Left Side:     5/5 Upper Ext                                       5/5 upper Ext  5/5 Lower Ext                                       5/5  Lower Ext      Bulk: grossly normal no atrophy  Tone: symmetric b/l arms and legs  Abnormal movements: No abnormal movements or tremor      Sensory function Symmetric to touch in all extremities bilaterally   Cerebellar No dysmetria or dysdiadochocinesia    Reflex function    DTR     Right: 2+ b/l symmetric in biceps, brachioradialis, patellar, calcaneal     Left: 2+ b/l symmetric in biceps, brachioradialis, patellar, calcaneal            Babinski b/l plantar downgoing. Negative Hearn sign b/l      Gait                  Not assessed      Severe dizziness/vertigo and nausea when attempting to perform head impulse exam   Test for skew deviation and nystagmus were negative.      LABS:     Recent Labs     11/20/22 1652   WBC 6.0   HGB 11.0*   HCT 37.1         K 3.9      CO2 28   BUN 19   CREATININE 0.94*   CALCIUM 9.7   AST 17   ALT 12   BILITOT 0.4     Recent Labs     11/20/22 1652   ALKPHOS 156*   ALT 12   AST 17   BILITOT 0.4   LABALBU 3.9     CBC:   Lab Results   Component Value Date/Time    WBC 6.0 11/20/2022 04:52 PM    RBC 5.07 11/20/2022 04:52 PM    RBC 4.79 04/05/2012 09:05 AM    HGB 11.0 11/20/2022 04:52 PM    HCT 37.1 11/20/2022 04:52 PM    MCV 73.2 11/20/2022 04:52 PM    MCH 21.7 11/20/2022 04:52 PM    MCHC 29.6 11/20/2022 04:52 PM    RDW 17.0 11/20/2022 04:52 PM     11/20/2022 04:52 PM     04/05/2012 09:05 AM    MPV 9.3 11/20/2022 04:52 PM     CBC with Differential:    Lab Results   Component Value Date/Time    WBC 6.0 11/20/2022 04:52 PM    RBC 5.07 11/20/2022 04:52 PM    RBC 4.79 04/05/2012 09:05 AM    HGB 11.0 11/20/2022 04:52 PM    HCT 37.1 11/20/2022 04:52 PM     11/20/2022 04:52 PM     04/05/2012 09:05 AM    MCV 73.2 11/20/2022 04:52 PM    MCH 21.7 11/20/2022 04:52 PM    MCHC 29.6 11/20/2022 04:52 PM    RDW 17.0 11/20/2022 04:52 PM    LYMPHOPCT 20 11/20/2022 04:52 PM    MONOPCT 7 11/20/2022 04:52 PM    BASOPCT 1 11/20/2022 04:52 PM    MONOSABS 0.42 11/20/2022 04:52 PM    LYMPHSABS 1.20 11/20/2022 04:52 PM    EOSABS 0.34 11/20/2022 04:52 PM    BASOSABS 0.06 11/20/2022 04:52 PM    DIFFTYPE NOT REPORTED 11/29/2021 02:02 PM     Platelets:    Lab Results   Component Value Date/Time     11/20/2022 04:52 PM     04/05/2012 09:05 AM       RADIOLOGY:   Imaging/Diagnostics:  CT HEAD WO CONTRAST    Result Date: 11/20/2022  EXAMINATION: CT OF THE HEAD WITHOUT CONTRAST  11/20/2022 5:39 pm TECHNIQUE: CT of the head was performed without the administration of intravenous contrast. Automated exposure control, iterative reconstruction, and/or weight based adjustment of the mA/kV was utilized to reduce the radiation dose to as low as reasonably achievable. COMPARISON: 10/15/2022 HISTORY: ORDERING SYSTEM PROVIDED HISTORY: constant vertigo, hx of cva TECHNOLOGIST PROVIDED HISTORY: constant vertigo, hx of cva Decision Support Exception - unselect if not a suspected or confirmed emergency medical condition->Emergency Medical Condition (MA) FINDINGS: BRAIN/VENTRICLES: There is no acute intracranial hemorrhage, mass effect or midline shift. No abnormal extra-axial fluid collection. The gray-white differentiation is maintained without evidence of an acute infarct. There is no evidence of hydrocephalus. Ventricles remain mildly prominent without change. Diffuse hypodensity in the periventricular white matter without change. Calcification in the deep left frontal white matter which is unchanged. ORBITS: The visualized portion of the orbits demonstrate no acute abnormality. Lenses are postsurgical. SINUSES: Mucous retention cyst in the right maxillary sinus. SOFT TISSUES/SKULL:  No acute abnormality of the visualized skull or soft tissues. No acute intracranial abnormality. US THYROID    Result Date: 11/16/2022  EXAMINATION: THYROID ULTRASOUND 11/15/2022 COMPARISON: None. HISTORY: ORDERING SYSTEM PROVIDED HISTORY: Enlargement of thyroid TECHNOLOGIST PROVIDED HISTORY: This procedure can be scheduled via mytrax. Access your mytrax account by visiting [employer]. goitre FINDINGS: Right thyroid lobe:  26.2 x 18.4 x 52.4 mm Left thyroid lobe:  18.8 x 17.6 x 50.9 mm Isthmus:  7.5 mm Thyroid Gland:  Thyroid gland is slightly heterogeneous without increased vascularity.   The right lobe is mildly enlarged. Nodules: There is a subcentimeter hypoechoic nodule in the inferior left lobe for which no specific follow-up imaging is recommended. Ill-defined isoechoic to slightly hypoechoic nodule in the right lobe versus gland heterogeneity. There are couple of ill-defined tiny subcentimeter nodules in the right lobe for which no specific follow-up imaging is recommended based on TI rads criteria NODULE: Right 1 Size: 9.7 x 5.2 x 10.9  mm Location: Right mid anterior 1. Composition:  Solid (2) 2. Echogenicity:  Isoechoic to slightly hypoechoic (2) 3. Shape: Wider-than-tall (0) 4. Margins:  Ill-defined (0) 5. Echogenic foci:  None (0) ACR TI-RADS total points:  4 ACR TI-RADS risk category: TR4 Prior biopsy: Unknown. Cervical lymphadenopathy: No abnormal lymph nodes in the imaged portions of the neck. Mild calcific plaque of the right carotid artery. Slightly heterogeneous thyroid gland with mildly enlarged right lobe. Subtle 1.1 cm TR 4 nodule in the right lobe for which 1 year follow-up ultrasound is recommended. A few scattered subcentimeter nodules for which no specific follow-up imaging is recommended. RECOMMENDATIONS: NODULE 1:  ACR TI-RADS TR4: Recommend: Follow-up ultrasound in 1 year. ACR TI-RADS recommendations: TR5 (>= 7 points):  FNA if >= 1 cm; follow-up if 0.5-0.9 cm in 1, 2, 3, 4, and 5 years TR4 (4-6 points):  FNA if >= 1.5 cm; follow-up if 1.0-1.4 cm in 1, 2, 3, and 5 years TR3 (3 points):  FNA if >= 2.5 cm; follow-up if 1.5-2.4 cm in 1, 3, and 5 years TR2 (2 points):  No FNA or follow-up TR1 (0 points):  No FNA or follow-up ACR TI-RADS recommends that no more than two nodules with the highest ACR TI-RADS point total should be biopsied and no more than four nodules should be followed.      XR CHEST PORTABLE    Result Date: 11/20/2022  EXAMINATION: ONE XRAY VIEW OF THE CHEST 11/20/2022 4:52 pm COMPARISON: 11/17/2021 HISTORY: ORDERING SYSTEM PROVIDED HISTORY: vertigo TECHNOLOGIST PROVIDED posterior cerebral arteries. No aneurysm. OTHER: No dural venous sinus thrombosis on this non-dedicated study. BRAIN: No mass effect or midline shift. No extra-axial fluid collection. Extensive old ischemic changes. Stable complete occlusion of left cervical ICA. There is approximately 50% stenosis in the origin of right ICA by NASCET criteria. Stable occlusion of the left petrous and cavernous segments of intracranial ICA with opacification of the supraclinoid segment. Severe narrowing is again noted in the cavernous segment of right ICA. Occlusion of right V1 and proximal V2 segments of right vertebral artery, new compared to the previous exam     CT lung screen [Initial/Annual]    Result Date: 2022  EXAMINATION: LOW DOSE SCREENING CT OF THE CHEST WITHOUT CONTRAST 11/15/2022 3:48 pm TECHNIQUE: Low dose lung cancer screening CT of the chest was performed without the administration of intravenous contrast.  Multiplanar reformatted images are provided for review. Automated exposure control, iterative reconstruction, and/or weight based adjustment of the mA/kV was utilized to reduce the radiation dose to as low as reasonably achievable. COMPARISON: 2021 HISTORY: Screening. Patient Age: 68 y/o Number of pack years of smokin If no longer smoking, number of years since cessation:  11.0 Other symptoms:  None. Additional history: ORDERING SYSTEM PROVIDED HISTORY: Personal history of tobacco use TECHNOLOGIST PROVIDED HISTORY: Age: 69 y.o. Smoking History: Social History Tobacco Use Smoking status: Former Packs/day: 2.00 Years: 35.00 Pack years: 79 Types: Cigarettes Quit date: 10/11/2011 Years since quittin.0 Passive exposure: Current (Outside of the home) Smokeless tobacco: Never Vaping Use Vaping Use: Never used Alcohol use:  Yes Alcohol/week: 6.0 standard drinks Types: 6 Cans of beer per week Comment: rare Drug use: No Types: Cocaine, Marijuana (Weed) Comment: per pt did years ago; cocaine & Carnella Factor, 8-23-19 denies current use Pack years: 79 Last CT lung screen: 1/20/2021 Is there documentation of shared decision making? ->Yes Does the patient show any signs or symptoms of lung cancer? ->No Is this the first (baseline) CT or an annual exam?->Annual Is this a low dose CT or a routine CT?->Low Dose CT Smoking Status? ->Former FINDINGS: Mediastinum:  No acute abnormality in the thyroid bed or thoracic aorta. Moderate calcified plaque in the aortic arch is noted. Moderate coronary artery calcification is noted. Stable cardiac size is noted, borderline. No pericardial effusion or epicardial adenopathy. Lungs/Pleura:  Emphysematous changes are present in the lungs. A 2 mm subpleural nodule is present left upper lobe image 34 series 4.  2 mm subpleural nodule right upper lobe image 36 series 4 is noted. Right middle lobe granuloma is present. A ground-glass area in the medial aspect of the right lower lobe abutting the spine is unchanged from prior study likely related to chronic inflammation. No effusion or extrapleural air is noted. Tracheobronchial tree is patent. Upper Abdomen:  Atherosclerotic disease of the aorta and major branches is noted. Otherwise no acute abnormality in the included upper abdominal structures although evaluation is limited due to body habitus and noisy images. Soft Tissues/Bones: Multilevel degenerative change present in the spine without acute osseous or soft tissue abnormality. 1.  Emphysematous changes. 2.  Nodule subpleural less than 2 mm. Ground-glass area in the azygoesophageal recess unchanged from prior study in 2021. 3.  No mediastinal adenopathy. 4.  Atherosclerotic calcification including coronary artery calcification. LUNG RADS: Per ACR Lung-RADS Version 1.1 Category 2. Benign nodules or behavior.   Advise annual low-dose CT scanning of the chest. RECOMMENDATIONS: If you would like to register your patient with the [address] [place] [address] Program, please contact the Nurse Navigator at [phone]). IMPRESSIONS:     69years old female patient with hx of severe ICAD disease and stroke with underlying stroke RF; HTN and smoking in the past on DAPT and lipitor 80 mg, presented with sudden onset severe constant vertigo of 2 days duration with nausea and blurry vision. CTA H/N showed stable ICAD findings and chr. Complete occlusion of left ICA with new finding of Rt vertebral artery occlusion in V1 and V2 segments. Neurological exam is reassuring with non-focal deficits appreciated on exam      Impression:  - Central vs peripheral vertigo. Given the new finding of R vertebral artery occlusion, the patient needs admission for MRI brain. PLANS:     Low dose heparin gtt with no boluses   Repeat CTA tomorrow   MRI bucky without contrast   Switch Plavix to brilinta 90 mg bid   Continue asa 81 mg   Switch lipitor to crestor 40 mg qhs   Avoid hypotension; Bpgoal 140-180   Possible DSA based on Brain MRI findings    Thank you for the interesting evaluation. Further recommendations to follow.     Jessica Chinchilla MD, MD  Pager [phone]  Stroke, [facility]  [address] [place]  Electronically signed 11/20/2022 at 10:06 PM

## 2022-11-21 NOTE — ED NOTES
Pt resting comfortably in no acute distress. Respirations even and unlabored. Call light remains within reach. No needs at this time.        Vernon Gamino RN  11/20/22 2100

## 2022-11-21 NOTE — TELEPHONE ENCOUNTER
Mykel Dominique calling asking for last office note to be amended because the script for rollar walker must also say she needs to be able to sit to rest or take a break , last note 11/16/22 , please advise

## 2022-11-21 NOTE — PROGRESS NOTES
Updated neurology resident on pts bradycardia with PVC's and apnic episodes with CPAP on. No further orders given.

## 2022-11-21 NOTE — PROGRESS NOTES
Patient became bradycardic in the 30s neuro resident notified, ecg ordered pt vss, and asymptomatic.  Pt without bipap for TIBURCIO

## 2022-11-21 NOTE — PROGRESS NOTES
[facility] CARE DEPARTMENT - [address]     Emergency/Trauma Note    PATIENT NAME: Charles Cordova    Shift date: 11/20/2022  Shift day: Sunday   Shift # 2    Room # SHANICE/SHANICE   Name: Charles Cordova            Age: 69 y.o. Gender: female          Protestant: Taoism   Place of Judaism: Unknown    Trauma/Incident type:   ED Rounding  Admit Date & Time: 11/20/2022  4:24 PM  TRAUMA NAME:     ADVANCE DIRECTIVES IN CHART? Yes    NAME OF DECISION MAKER: Jordana Nina, [phone]    RELATIONSHIP OF DECISION MAKER TO PATIENT: Daughter    PATIENT/EVENT DESCRIPTION:  Charles Cordova is a 69 y.o. female who arrived via personal transport via adult trauma. Patient has been experiencing vertigo the past two days with nausea and vomiting. Pt to be admitted to SHANICE/SHANICE. SPIRITUAL ASSESSMENT-INTERVENTION-OUTCOME:  Patient was laying in the bed with her belongings on the bed as she was anticipating being transferred to a room for the night. Patient is hoping that they find out what is causing her vertigo. She is hoping that it is only one night in the hospital. Patient told  that he could pray with her as that might be the only thing that could help.  prayed with the patient and her daughter. They thanked the  for coming by. PATIENT BELONGINGS:  With patient    ANY BELONGINGS OF SIGNIFICANT VALUE NOTED:   did not go through belongings    REGISTRATION STAFF NOTIFIED? Yes      WHAT IS YOUR SPIRITUAL CARE PLAN FOR THIS PATIENT?:   Follow up as needed. Electronically signed by Mine Johnson, on 11/20/2022 at 9:48 PM.  Aidan Rizo  [phone]     11/20/22 2147   Encounter Summary   Service Provided For: Patient; Family   Referral/Consult From: [address] Family members   Last Encounter  11/20/22   Complexity of Encounter Low   Begin Time 2140   End Time  2145   Total Time Calculated 5 min Assessment/Intervention/Outcome   Assessment Calm;Coping;Concerns with suffering   Intervention Prayer (assurance of)/[place];Sustaining Presence/Ministry of presence; Active listening   Outcome Comfort;Coping;Encouraged;Engaged in conversation;Expressed Gratitude

## 2022-11-21 NOTE — H&P
[facility]   [address] [place] [address]    HISTORY AND PHYSICAL EXAMINATION            Date:   11/20/2022  Patient name:  Christopher Palomares  Date of admission:  11/20/2022  4:24 PM  MRN:   [id number]  Account:  [de-identified]  YOB: 1955  PCP:    Clara Watts MD  Room:   18/18  Code Status:    Prior    Chief Complaint:     Chief Complaint   Patient presents with    Dizziness     \"Feels like my head is spinning I cant hold my head up for long\" x2 days      History Obtained From:     patient, family member- daughter, electronic medical record    History of Present Illness: The patient is a 69 y.o. female  [place] with hx of TIBURCIO, BMI 60, HTN, ex-smoking with 70 pack year hx quit 11 years ago, strokes with ICAD (bilateral cerebral hemispheres on aspirin 81mg and Plavix 75mg, lipitor 80mg with first stroke in 2011 involving left MCA and repeat CT head in 2016 showed bilateral encephalomalacia), chr. Left ICA complete occlusion, right ICA occlusion, bilateral carpal tunnel, degenerative spine disease and C spine stenosis. At baseline she uses a walker for ambulation. She presented to the ED with constant vertigo described as feeling of a room spinning sensation of 2-days duration that occurred suddenly while the patient was sitting at home, associated with nausea and blurry vision intermittently. This vertigo has been constant for the past 2 days and can be worsened with head movement. CTA H/N done in ED and showed stable complete occlusion of left cervical ICA with about 50% stenosis in the origin of the right ICA in addition to stable occlusion of the left petrous and cavernous segments of the ICA and severe narrowing again demonstrated in the cavernous segment of right ICA as compared to previous CTA in 2019.   This time there was occlusion of the right P1 proximal V2 of right vertebral artery which is new compared to the previous exam. CTH was negative otherwise. She had work-up done in 2018 with CTA head and neck showing occlusion or near occlusion of the left petrous segment and cavernous segment ICA in addition to calcified plaque at the origin of the left common carotid artery with mixed plaque in the carotid bulb and complete or near complete occlusion of the entire left ICA. There was also severe focal stenosis of the cavernous segment right ICA with mild stenosis of the proximal right ICA and moderate stenosis of proximal right vertebral artery with left proximal left vertebral artery not well visualized. This was followed by July 2019 CTA head and neck which showed the previous findings and was stable and the vertebral arteries were patent without focal stenosis at that time    Past Medical History:     Past Medical History:   Diagnosis Date    Ambulates with cane     Bleeding     while on aggrenox    Cataracts, bilateral     Cerebrovascular disease 2003,2011    cva    Chronic pain in left foot     CKD (chronic kidney disease) stage 3, GFR 30-59 ml/min ([address]     COVID-19 11/2021    states hpspitalized    GERD (gastroesophageal reflux disease)     Hx of blood clots     Hyperlipidemia     Hypertension     Iron (Fe) deficiency anemia     Nicotine abuse     Obesity     TIBURCIO on CPAP     Osteoarthritis     Peripheral vascular disease ([address] 03/13/2014    Substance abuse ([address]     cocaine, canabis    Thalassemia minor     Unspecified cerebral artery occlusion with cerebral infarction     Wears dentures     upper and lower        Past Surgical History:     Past Surgical History:   Procedure Laterality Date    BREAST SURGERY      biopsy    COLONOSCOPY  12/2007    adenomatous polyp    COLONOSCOPY  2013    normal, repeat in 5 years    COLONOSCOPY N/A 8/21/2019    COLONOSCOPY POLYPECTOMY SNARE/COLD BIOPSY performed by Joe Box MD at [address] GASTROINTESTINAL ENDOSCOPY  2009    negative    UPPER GASTROINTESTINAL ENDOSCOPY N/A 8/21/2019    EGD BIOPSY performed by Ciaran Bhatia MD at [facility]        Medications Prior to Admission:     Prior to Admission medications    Medication Sig Start Date End Date Taking? Authorizing Provider   amLODIPine (NORVASC) 10 MG tablet TAKE 1 TABLET BY MOUTH DAILY 11/15/22   Temi Pasucal MD   aspirin 81 MG EC tablet TAKE 1 TABLET BY MOUTH DAILY 11/15/22   Temi Pascual MD   Cholecalciferol (VITAMIN D3) 25 MCG TABS TAKE 1 TABLET BY MOUTH DAILY 11/15/22   Temi Pascual MD   Vitamin D (CHOLECALCIFEROL) 25 MCG (1000 UT) TABS tablet  10/25/22   Historical Provider, MD   gabapentin (NEURONTIN) 600 MG tablet Take 600 mg by mouth 3 times daily.     Historical Provider, MD   acetaminophen (TYLENOL) 325 MG tablet TAKE 2 TABLETS BY MOUTH EVERY 4 HOURS AS NEEDED FOR PAIN 10/30/22   Temi Pascual MD   atorvastatin (LIPITOR) 80 MG tablet TAKE 1 TABLET BY MOUTH DAILY 10/21/22   Temi Pascual MD   clopidogrel (PLAVIX) 75 MG tablet TAKE 1 TABLET BY MOUTH DAILY 10/21/22   Temi Pascual MD   hydroCHLOROthiazide (HYDRODIURIL) 25 MG tablet TAKE 1 TABLET BY MOUTH DAILY 10/19/22   Temi Pascual MD   losartan (COZAAR) 50 MG tablet TAKE 1 TABLET BY MOUTH DAILY 10/19/22   Lupillo Ackerman Sra, MD   omeprazole (PRILOSEC) 20 MG delayed release capsule TAKE 1 CAPSULE BY MOUTH TWICE A DAY WITH MEALS 10/19/22   Temi Pascual MD   ferrous sulfate (IRON 325) 325 (65 Fe) MG tablet Take 1 tab three days days a week 10/3/22   Hollie Zaragoza MD   folic acid (FOLVITE) 1 MG tablet TAKE 1 TABLET BY MOUTH DAILY 7/26/22   Mary Bennett MD   guaiFENesin (ALTARUSSIN) 100 MG/5ML syrup Take 10 mLs by mouth 3 times daily as needed for Cough 11/10/21   Austyn Gurrola MD   vitamin B-1 (THIAMINE) 100 MG tablet Take 1 tablet by mouth daily 4/15/21   Austyn Gurrola MD   diclofenac sodium (VOLTAREN) 1 % GEL Apply 4 g topically 4 times daily 20   James Syed DO        Allergies:     Duricef [cefadroxil monohydrate], Fish-derived products, Pcn [penicillins], and Tomato    Social History:     Tobacco:    reports that she quit smoking about 11 years ago. Her smoking use included cigarettes. She has a 70.00 pack-year smoking history. She has been exposed to tobacco smoke. She has never used smokeless tobacco.  Alcohol:      reports current alcohol use of about 6.0 standard drinks per week. Drug Use:  reports no history of drug use. Family History:     Family History   Problem Relation Age of Onset    High Blood Pressure Mother     Asthma Mother     Heart Disease Mother     Heart Disease Father     High Blood Pressure Father     Diabetes Brother     High Blood Pressure Brother     Heart Disease Brother     High Blood Pressure Maternal Grandmother     High Blood Pressure Maternal Grandfather     Heart Disease Maternal Grandfather        Review of Systems:     ROS:  Constitutional  Negative for fever and chills    HEENT  Negative for ear discharge, ear pain, nosebleed    Eyes  Negative for photophobia, pain and discharge    Respiratory  Negative for hemoptysis and sputum    Cardiovascular  Negative for orthopnea, claudication and PND    Gastrointestinal  Negative for abdominal pain, diarrhea, blood in stool    Musculoskeletal  Negative for joint pain, negative for myalgia    Skin  Negative for rash or itching    Neurological Vertigo, blurry vision, no weakness or numbness. Endo/heme/allergies  Negative for polydipsia, environmental allergy    Psychiatric/behavioral  Negative for suicidal ideation. Patient is not anxious        Physical Exam:   BP (!) 151/77   Pulse 61   Temp 97 °F (36.1 °C) (Oral)   Resp 19   SpO2 97%   Temp (24hrs), Av °F (36.1 °C), Min:97 °F (36.1 °C), Max:97 °F (36.1 °C)    No results for input(s): POCGLU in the last 72 hours.   No intake or output data in the 24 hours ending 22 2135       General Examination    General Resting comfortably in bed   Head Normocephalic, without obvious abnormality   Neck Supple, symmetrical. Good ROM. No midline or paraspinal tenderness. Lungs Respirations unlabored, no wheezing   Chest Wall No deformity   Heart RRR, no murmur   Abdomen Soft. Non-tender, non-distended   Extremities No cyanosis or edema or warmth. Pulses 2+ and symmetric   Skin: Skin  turgor normal, no rashes or lesions         Mental status  Speech   Alert. Oriented to person, place, and time. Speech is fluent  No hallucinations or delusions. No SI/HI. Cranial nerves     II - VFF, visual threat intact  III, IV, VI - extra-ocular muscles full. No nystagmus. Pupils symmetric and responsive. V - sensation symmetric         VII -  No facial droop or asymmetric NLF  VIII - intact hearing to conversational tone          IX, X - symmetrical palate elevation   XI - 5/5 strength symmetric  XII - tongue midline       Motor function    Strength:  Right Side:                                            Left Side:    5/5 Upper Ext                                       5/5 upper Ext  5/5 Lower Ext                                       5/5  Lower Ext     Bulk: grossly normal no atrophy  Tone: symmetric b/l arms and legs  Abnormal movements: No abnormal movements or tremor     Sensory function Symmetric to touch in all extremities bilaterally   Cerebellar No dysmetria or dysdiadochocinesia    Reflex function   DTR    Right: 2+ b/l symmetric in biceps, brachioradialis, patellar, calcaneal    Left: 2+ b/l symmetric in biceps, brachioradialis, patellar, calcaneal           Babinski b/l plantar downgoing. Negative Hearn sign b/l     Gait                  Not assessed     Severe dizziness/vertigo and nausea when attempting to perform head impulse exam   Test for skew deviation and nystagmus were negative.      Investigations:      Laboratory Testing:  Recent Results (from the past 24 hour(s)) Imaging/Diagnostics:  CT HEAD WO CONTRAST    Result Date: 11/20/2022  EXAMINATION: CT OF THE HEAD WITHOUT CONTRAST  11/20/2022 5:39 pm TECHNIQUE: CT of the head was performed without the administration of intravenous contrast. Automated exposure control, iterative reconstruction, and/or weight based adjustment of the mA/kV was utilized to reduce the radiation dose to as low as reasonably achievable. COMPARISON: 10/15/2022 HISTORY: ORDERING SYSTEM PROVIDED HISTORY: constant vertigo, hx of cva TECHNOLOGIST PROVIDED HISTORY: constant vertigo, hx of cva Decision Support Exception - unselect if not a suspected or confirmed emergency medical condition->Emergency Medical Condition (MA) FINDINGS: BRAIN/VENTRICLES: There is no acute intracranial hemorrhage, mass effect or midline shift. No abnormal extra-axial fluid collection. The gray-white differentiation is maintained without evidence of an acute infarct. There is no evidence of hydrocephalus. Ventricles remain mildly prominent without change. Diffuse hypodensity in the periventricular white matter without change. Calcification in the deep left frontal white matter which is unchanged. ORBITS: The visualized portion of the orbits demonstrate no acute abnormality. Lenses are postsurgical. SINUSES: Mucous retention cyst in the right maxillary sinus. SOFT TISSUES/SKULL:  No acute abnormality of the visualized skull or soft tissues. No acute intracranial abnormality. US THYROID    Result Date: 11/16/2022  EXAMINATION: THYROID ULTRASOUND 11/15/2022 COMPARISON: None. HISTORY: ORDERING SYSTEM PROVIDED HISTORY: Enlargement of thyroid TECHNOLOGIST PROVIDED HISTORY: This procedure can be scheduled via [employer].   Access your [employer] account by visiting [employer]. goitre FINDINGS: Right thyroid lobe:  26.2 x 18.4 x 52.4 mm Left thyroid lobe:  18.8 x 17.6 x 50.9 mm Isthmus:  7.5 mm Thyroid Gland:  Thyroid gland is slightly heterogeneous without increased vascularity. The right lobe is mildly enlarged. Nodules: There is a subcentimeter hypoechoic nodule in the inferior left lobe for which no specific follow-up imaging is recommended. Ill-defined isoechoic to slightly hypoechoic nodule in the right lobe versus gland heterogeneity. There are couple of ill-defined tiny subcentimeter nodules in the right lobe for which no specific follow-up imaging is recommended based on TI rads criteria NODULE: Right 1 Size: 9.7 x 5.2 x 10.9  mm Location: Right mid anterior 1. Composition:  Solid (2) 2. Echogenicity:  Isoechoic to slightly hypoechoic (2) 3. Shape: Wider-than-tall (0) 4. Margins:  Ill-defined (0) 5. Echogenic foci:  None (0) ACR TI-RADS total points:  4 ACR TI-RADS risk category: TR4 Prior biopsy: Unknown. Cervical lymphadenopathy: No abnormal lymph nodes in the imaged portions of the neck. Mild calcific plaque of the right carotid artery. Slightly heterogeneous thyroid gland with mildly enlarged right lobe. Subtle 1.1 cm TR 4 nodule in the right lobe for which 1 year follow-up ultrasound is recommended. A few scattered subcentimeter nodules for which no specific follow-up imaging is recommended. RECOMMENDATIONS: NODULE 1:  ACR TI-RADS TR4: Recommend: Follow-up ultrasound in 1 year. ACR TI-RADS recommendations: TR5 (>= 7 points):  FNA if >= 1 cm; follow-up if 0.5-0.9 cm in 1, 2, 3, 4, and 5 years TR4 (4-6 points):  FNA if >= 1.5 cm; follow-up if 1.0-1.4 cm in 1, 2, 3, and 5 years TR3 (3 points):  FNA if >= 2.5 cm; follow-up if 1.5-2.4 cm in 1, 3, and 5 years TR2 (2 points):  No FNA or follow-up TR1 (0 points):  No FNA or follow-up ACR TI-RADS recommends that no more than two nodules with the highest ACR TI-RADS point total should be biopsied and no more than four nodules should be followed.      XR CHEST PORTABLE    Result Date: 11/20/2022  EXAMINATION: ONE XRAY VIEW OF THE CHEST 11/20/2022 4:52 pm COMPARISON: 11/17/2021 HISTORY: ORDERING SYSTEM PROVIDED HISTORY: vertigo TECHNOLOGIST PROVIDED HISTORY: vertigo FINDINGS: Heart size is globular and enlarged aorta is hyper. Lungs are normally expanded and clear. No pleural effusions. Mild spondylosis     Lungs are clear     CTA HEAD NECK W CONTRAST    Result Date: 11/20/2022  EXAMINATION: CTA OF THE HEAD AND NECK WITH CONTRAST 11/20/2022 5:39 pm: TECHNIQUE: CTA of the head and neck was performed with the administration of intravenous contrast. Multiplanar reformatted images are provided for review. MIP images are provided for review. Stenosis of the internal carotid arteries measured using NASCET criteria. Automated exposure control, iterative reconstruction, and/or weight based adjustment of the mA/kV was utilized to reduce the radiation dose to as low as reasonably achievable. COMPARISON: CTA neck 07/24/2019 HISTORY: ORDERING SYSTEM PROVIDED HISTORY: constant vertigo, hx of CVA FINDINGS: CTA NECK: AORTIC ARCH/ARCH VESSELS: No dissection or arterial injury. No significant stenosis of the brachiocephalic or subclavian arteries. CAROTID ARTERIES: Stable complete occlusion of left cervical ICA. There is approximately 50% stenosis in the origin of right ICA by NASCET criteria. VERTEBRAL ARTERIES: Occlusion of right V1 and proximal V2 segments. The left vertebral artery is patent. SOFT TISSUES: The lung apices are clear. No cervical or superior mediastinal lymphadenopathy. The larynx and pharynx are unremarkable. No acute abnormality of the salivary and thyroid glands. BONES: No acute osseous abnormality. CTA HEAD: ANTERIOR CIRCULATION: Stable occlusion of the left petrous and cavernous segments of intracranial ICA with opacification of the supraclinoid segment. Severe narrowing is noted in the cavernous segment of right ICA. Diminutive appearance of anterior cerebral arteries, which are otherwise patent. No significant stenosis of the middle cerebral arteries. No aneurysm.  POSTERIOR CIRCULATION: No significant stenosis of the vertebral, basilar, or posterior cerebral arteries. No aneurysm. OTHER: No dural venous sinus thrombosis on this non-dedicated study. BRAIN: No mass effect or midline shift. No extra-axial fluid collection. Extensive old ischemic changes. Stable complete occlusion of left cervical ICA. There is approximately 50% stenosis in the origin of right ICA by NASCET criteria. Stable occlusion of the left petrous and cavernous segments of intracranial ICA with opacification of the supraclinoid segment. Severe narrowing is again noted in the cavernous segment of right ICA. Occlusion of right V1 and proximal V2 segments of right vertebral artery, new compared to the previous exam     CT lung screen [Initial/Annual]    Result Date: 2022  EXAMINATION: LOW DOSE SCREENING CT OF THE CHEST WITHOUT CONTRAST 11/15/2022 3:48 pm TECHNIQUE: Low dose lung cancer screening CT of the chest was performed without the administration of intravenous contrast.  Multiplanar reformatted images are provided for review. Automated exposure control, iterative reconstruction, and/or weight based adjustment of the mA/kV was utilized to reduce the radiation dose to as low as reasonably achievable. COMPARISON: 2021 HISTORY: Screening. Patient Age: 68 y/o Number of pack years of smokin If no longer smoking, number of years since cessation:  11.0 Other symptoms:  None. Additional history: ORDERING SYSTEM PROVIDED HISTORY: Personal history of tobacco use TECHNOLOGIST PROVIDED HISTORY: Age: 69 y.o. Smoking History: Social History Tobacco Use Smoking status: Former Packs/day: 2.00 Years: 35.00 Pack years: 79 Types: Cigarettes Quit date: 10/11/2011 Years since quittin.0 Passive exposure: Current (Outside of the home) Smokeless tobacco: Never Vaping Use Vaping Use: Never used Alcohol use:  Yes Alcohol/week: 6.0 standard drinks Types: 6 Cans of beer per week Comment: rare Drug use: No Types: Cocaine, Marijuana (Weed) Comment: per pt did years ago; cocaine & marij, 8-23-19 denies current use Pack years: 79 Last CT lung screen: 1/20/2021 Is there documentation of shared decision making? ->Yes Does the patient show any signs or symptoms of lung cancer? ->No Is this the first (baseline) CT or an annual exam?->Annual Is this a low dose CT or a routine CT?->Low Dose CT Smoking Status? ->Former FINDINGS: Mediastinum:  No acute abnormality in the thyroid bed or thoracic aorta. Moderate calcified plaque in the aortic arch is noted. Moderate coronary artery calcification is noted. Stable cardiac size is noted, borderline. No pericardial effusion or epicardial adenopathy. Lungs/Pleura:  Emphysematous changes are present in the lungs. A 2 mm subpleural nodule is present left upper lobe image 34 series 4.  2 mm subpleural nodule right upper lobe image 36 series 4 is noted. Right middle lobe granuloma is present. A ground-glass area in the medial aspect of the right lower lobe abutting the spine is unchanged from prior study likely related to chronic inflammation. No effusion or extrapleural air is noted. Tracheobronchial tree is patent. Upper Abdomen:  Atherosclerotic disease of the aorta and major branches is noted. Otherwise no acute abnormality in the included upper abdominal structures although evaluation is limited due to body habitus and noisy images. Soft Tissues/Bones: Multilevel degenerative change present in the spine without acute osseous or soft tissue abnormality. 1.  Emphysematous changes. 2.  Nodule subpleural less than 2 mm. Ground-glass area in the azygoesophageal recess unchanged from prior study in 2021. 3.  No mediastinal adenopathy. 4.  Atherosclerotic calcification including coronary artery calcification. LUNG RADS: Per ACR Lung-RADS Version 1.1 Category 2. Benign nodules or behavior.   Advise annual low-dose CT scanning of the chest. RECOMMENDATIONS: If you would like to register your patient with the [facility] Lung Nodule/Lung Cancer Screening Program, please contact the Nurse Navigator at [phone]). Assessment & Differential Dx:      Primary Problem  Vertigo    Active Hospital Problems    Diagnosis Date Noted    Vertigo [R42] 11/20/2022     Priority: Medium       69years old female patient with hx of severe ICAD disease and stroke with underlying stroke RF; HTN and smoking in the past on DAPT and lipitor 80 mg, presented with sudden onset severe constant vertigo of 2 days duration with nausea and blurry vision. CTA H/N showed stable ICAD findings and chr. Complete occlusion of left ICA with new finding of Rt vertebral artery occlusion in V1 and V2 segments. Neurological exam is reassuring with non-focal deficits appreciated on exam     Impression:  - Central vs peripheral vertigo. Given the new finding of R vertebral artery occlusion, the patient needs admission for MRI brain to R/O central cause of vertigo. Case was discussed with endovascular neurology with recommendation to start heprain gtt along with switching plavix to brilinta and lipitor to 40 mg crestor qhs. Patient was loaded with 180 mg brilinta in the ED     Plan:     Continue asa 81 mg   Continue with brilinta 90 mg bid orally   Crestor 40 mg qhs   Start low dose heparin gtt   MRI brain without contrast   Meclizine 25 mg tid prn for vertigo   Repeat CTA H/N tomorrow 24 hours after the initial CTA obtained in the ED. Avoid hypotension given patient's ICAD; BP goal 140-180   Endovascular consulted, possible DSA based on MRI brain findings.   PT/OT    Electronically signed by   Donny Carlin MD  11/20/2022  9:35 PM

## 2022-11-21 NOTE — PROGRESS NOTES
completed ACP dox with patient. Primary decision-maker is daughter, Ricardo Elliott [phone]. Secondary is daughter Ashleigh Ring [phone]. The patient expressed feeling anxious regarding their  who has dementia and is hopeful that they did not have another stroke - priors in 2011 and 2013-14.  The patient appeared calm and coping throughout the visit and appeared to feel supported by family members who are helping to care of her  while they are in the hospital.        11/21/22 1000   Encounter Summary   Service Provided For: Patient   Referral/Consult From: Other    Support System Family members   Last Encounter  11/21/22   Complexity of Encounter Low   Begin Time 0930   End Time  0945   Total Time Calculated 15 min   Advance Care Planning   Type Completed AD/ACP document(s)   Assessment/Intervention/Outcome   Assessment Calm;Coping   Intervention Active listening;Sustaining Presence/Ministry of presence   Outcome Engaged in conversation;Expressed feelings, needs, and concerns;Expressed Gratitude

## 2022-11-22 VITALS
TEMPERATURE: 97.2 F | OXYGEN SATURATION: 96 % | DIASTOLIC BLOOD PRESSURE: 78 MMHG | HEIGHT: 67 IN | HEART RATE: 73 BPM | WEIGHT: 293 LBS | RESPIRATION RATE: 18 BRPM | SYSTOLIC BLOOD PRESSURE: 114 MMHG | BODY MASS INDEX: 45.99 KG/M2

## 2022-11-22 PROBLEM — I65.21 OCCLUSION OF RIGHT INTERNAL CAROTID ARTERY: Status: ACTIVE | Noted: 2022-11-22

## 2022-11-22 PROBLEM — E04.1 THYROID NODULE: Status: ACTIVE | Noted: 2022-11-22

## 2022-11-22 LAB
EKG ATRIAL RATE: 67 BPM
EKG P AXIS: 56 DEGREES
EKG P-R INTERVAL: 140 MS
EKG Q-T INTERVAL: 420 MS
EKG QRS DURATION: 98 MS
EKG QTC CALCULATION (BAZETT): 443 MS
EKG R AXIS: 1 DEGREES
EKG T AXIS: 30 DEGREES
EKG VENTRICULAR RATE: 67 BPM
HCT VFR BLD CALC: 34.8 % (ref 36.3–47.1)
HEMOGLOBIN: 10.2 G/DL (ref 11.9–15.1)
MCH RBC QN AUTO: 21.7 PG (ref 25.2–33.5)
MCHC RBC AUTO-ENTMCNC: 29.3 G/DL (ref 28.4–34.8)
MCV RBC AUTO: 74 FL (ref 82.6–102.9)
NRBC AUTOMATED: 0 PER 100 WBC
PARTIAL THROMBOPLASTIN TIME: 84.3 SEC (ref 20.5–30.5)
PDW BLD-RTO: 17.1 % (ref 11.8–14.4)
PLATELET # BLD: 332 K/UL (ref 138–453)
PMV BLD AUTO: 10 FL (ref 8.1–13.5)
RBC # BLD: 4.7 M/UL (ref 3.95–5.11)
WBC # BLD: 7.5 K/UL (ref 3.5–11.3)

## 2022-11-22 PROCEDURE — 99232 SBSQ HOSP IP/OBS MODERATE 35: CPT | Performed by: STUDENT IN AN ORGANIZED HEALTH CARE EDUCATION/TRAINING PROGRAM

## 2022-11-22 PROCEDURE — 36415 COLL VENOUS BLD VENIPUNCTURE: CPT

## 2022-11-22 PROCEDURE — 2580000003 HC RX 258: Performed by: STUDENT IN AN ORGANIZED HEALTH CARE EDUCATION/TRAINING PROGRAM

## 2022-11-22 PROCEDURE — 99239 HOSP IP/OBS DSCHRG MGMT >30: CPT | Performed by: NURSE PRACTITIONER

## 2022-11-22 PROCEDURE — 92523 SPEECH SOUND LANG COMPREHEN: CPT

## 2022-11-22 PROCEDURE — 85027 COMPLETE CBC AUTOMATED: CPT

## 2022-11-22 PROCEDURE — 97110 THERAPEUTIC EXERCISES: CPT

## 2022-11-22 PROCEDURE — 96366 THER/PROPH/DIAG IV INF ADDON: CPT

## 2022-11-22 PROCEDURE — 97530 THERAPEUTIC ACTIVITIES: CPT

## 2022-11-22 PROCEDURE — 85730 THROMBOPLASTIN TIME PARTIAL: CPT

## 2022-11-22 PROCEDURE — G0378 HOSPITAL OBSERVATION PER HR: HCPCS

## 2022-11-22 PROCEDURE — 6360000002 HC RX W HCPCS: Performed by: STUDENT IN AN ORGANIZED HEALTH CARE EDUCATION/TRAINING PROGRAM

## 2022-11-22 PROCEDURE — 94761 N-INVAS EAR/PLS OXIMETRY MLT: CPT

## 2022-11-22 PROCEDURE — 6370000000 HC RX 637 (ALT 250 FOR IP): Performed by: STUDENT IN AN ORGANIZED HEALTH CARE EDUCATION/TRAINING PROGRAM

## 2022-11-22 RX ORDER — MECLIZINE HYDROCHLORIDE 25 MG/1
25 TABLET ORAL 3 TIMES DAILY PRN
Qty: 30 TABLET | Refills: 0 | Status: SHIPPED | OUTPATIENT
Start: 2022-11-22 | End: 2022-12-02

## 2022-11-22 RX ADMIN — SODIUM CHLORIDE, PRESERVATIVE FREE 10 ML: 5 INJECTION INTRAVENOUS at 09:03

## 2022-11-22 RX ADMIN — GABAPENTIN 600 MG: 600 TABLET ORAL at 09:03

## 2022-11-22 RX ADMIN — FOLIC ACID 1000 MCG: 1 TABLET ORAL at 09:03

## 2022-11-22 RX ADMIN — TICAGRELOR 90 MG: 90 TABLET ORAL at 09:03

## 2022-11-22 RX ADMIN — Medication 11.74 UNITS/KG/HR: at 05:40

## 2022-11-22 RX ADMIN — GABAPENTIN 600 MG: 600 TABLET ORAL at 13:25

## 2022-11-22 RX ADMIN — Medication 81 MG: at 09:03

## 2022-11-22 NOTE — PLAN OF CARE
Problem: Discharge Planning  Goal: Discharge to home or other facility with appropriate resources  11/22/2022 1703 by [address], [facility]  Outcome: Completed  11/22/2022 1202 by [address], RN  Outcome: Progressing  11/22/2022 0423 by Elsie Rodriguez RN  Outcome: Progressing     Problem: ABCDS Injury Assessment  Goal: Absence of physical injury  11/22/2022 1703 by [address], RN  Outcome: Completed  11/22/2022 1202 by [address], RN  Outcome: Progressing  11/22/2022 0423 by Elsie Rodriguez RN  Outcome: Progressing     Problem: Safety - Adult  Goal: Free from fall injury  11/22/2022 1703 by [address], RN  Outcome: Completed  11/22/2022 1202 by [address], RN  Outcome: Progressing  11/22/2022 0423 by Elsie Rodriguez RN  Outcome: Progressing     Problem: Skin/Tissue Integrity  Goal: Absence of new skin breakdown  Description: 1. Monitor for areas of redness and/or skin breakdown  2. Assess vascular access sites hourly  3. Every 4-6 hours minimum:  Change oxygen saturation probe site  4. Every 4-6 hours:  If on nasal continuous positive airway pressure, respiratory therapy assess nares and determine need for appliance change or resting period.   11/22/2022 1703 by [address], RN  Outcome: Completed  11/22/2022 1202 by [address], RN  Outcome: Progressing  11/22/2022 0423 by Elsie Rodriguez RN  Outcome: Progressing     Problem: Chronic Conditions and Co-morbidities  Goal: Patient's chronic conditions and co-morbidity symptoms are monitored and maintained or improved  11/22/2022 1703 by [address], RN  Outcome: Completed  11/22/2022 1202 by [address], RN  Outcome: Progressing

## 2022-11-22 NOTE — DISCHARGE SUMMARY
[facility]  [address] [facility]    Discharge Summary     Patient ID: Tala Dinh  :  1955   MRN: [id number]     ACCOUNT:  [de-identified]   Patient's PCP: Angelica Lee MD  Admit Date: 2022   Discharge Date: 2022     Length of Stay: 2  Code Status:  Full Code  Admitting Physician: Stuart Gusman DO  Discharge Physician: LOY Rene CNP     Active Discharge Diagnoses:     Hospital Problem Lists:  Principal Problem:    Vertigo  Active Problems:    History of stroke    Occlusion of right internal carotid artery    Thyroid nodule    Essential hypertension    Pure hypercholesterolemia    CKD (chronic kidney disease) stage 3, GFR 30-59 ml/min (HCC)    Peripheral vascular disease (HCC)    TIBURCIO (obstructive sleep apnea)    Class 3 severe obesity due to excess calories with serious comorbidity and body mass index (BMI) of 50.0 to 59.9 in [facility])  Resolved Problems:    * No resolved hospital problems. *      Admission Condition:  stable     Discharged Condition: stable    Hospital Stay:     Hospital Course:  Tala Dinh is a 69 y.o. female who was admitted for the management of vertigo. She presents with vertigo and horizontal nystagmus with nausea and vomiting for 2 days. She was found to have a new right vertebral artery V1/2 occlusion, new since prior vessel imaging was done in 2019. She is taking aspirin and Plavix at home and in the ED was initiated on a heparin drip and later switched to Brilinta from Plavix. She was also started on meclizine which helped with her symptoms. MRI brain was done showing no acute intracranial abnormalities with multiple areas of chronic infarcts in the bilateral cerebral hemispheres. Heparin drip was ultimately discontinued as per endovascular team and she was continued on aspirin and Brilinta as well as statin therapy.  Internal medicine was consulted who stopped patient's antihypertensives as she reports running in the 120s at home without medication. She is to monitor her blood pressures and notify her PCP if they become elevated. She is also to follow up with her PCP regarding anemia and a thyroid nodule requiring follow up scan in one year. She will discharge with scripts for outpatient PT/OT. On day of discharge she is alert, oriented, and ambulating in her room. She will follow up with her PCP, neurology, and endovascular.  She was also given a script for vestibular rehab should her symptoms persist.       Significant therapeutic interventions: Meclizine     Significant Diagnostic Studies:   Labs / Micro:  CBC:   Lab Results   Component Value Date/Time    WBC 7.5 11/22/2022 02:40 AM    RBC 4.70 11/22/2022 02:40 AM    RBC 4.79 04/05/2012 09:05 AM    HGB 10.2 11/22/2022 02:40 AM    HCT 34.8 11/22/2022 02:40 AM    MCV 74.0 11/22/2022 02:40 AM    MCH 21.7 11/22/2022 02:40 AM    MCHC 29.3 11/22/2022 02:40 AM    RDW 17.1 11/22/2022 02:40 AM     11/22/2022 02:40 AM     04/05/2012 09:05 AM     BMP:    Lab Results   Component Value Date/Time    GLUCOSE 102 11/21/2022 10:11 AM    GLUCOSE 88 04/12/2012 10:20 AM     11/21/2022 10:11 AM    K 4.1 11/21/2022 10:11 AM     11/21/2022 10:11 AM    CO2 25 11/21/2022 10:11 AM    ANIONGAP 12 11/21/2022 10:11 AM    BUN 22 11/21/2022 10:11 AM    CREATININE 1.02 11/21/2022 10:11 AM    BUNCRER NOT REPORTED 11/29/2021 02:02 PM    CALCIUM 9.4 11/21/2022 10:11 AM    LABGLOM 60 11/21/2022 10:11 AM    GFRAA 50 09/28/2022 04:41 PM    GFR      09/28/2022 04:41 PM         Radiology:    CT HEAD WO CONTRAST    Result Date: 11/20/2022  EXAMINATION: CT OF THE HEAD WITHOUT CONTRAST  11/20/2022 5:39 pm TECHNIQUE: CT of the head was performed without the administration of intravenous contrast. Automated exposure control, iterative reconstruction, and/or weight based adjustment of the mA/kV was utilized to reduce the radiation dose to as low as reasonably achievable. COMPARISON: 10/15/2022 HISTORY: ORDERING SYSTEM PROVIDED HISTORY: constant vertigo, hx of cva TECHNOLOGIST PROVIDED HISTORY: constant vertigo, hx of cva Decision Support Exception - unselect if not a suspected or confirmed emergency medical condition->Emergency Medical Condition (MA) FINDINGS: BRAIN/VENTRICLES: There is no acute intracranial hemorrhage, mass effect or midline shift. No abnormal extra-axial fluid collection. The gray-white differentiation is maintained without evidence of an acute infarct. There is no evidence of hydrocephalus. Ventricles remain mildly prominent without change. Diffuse hypodensity in the periventricular white matter without change. Calcification in the deep left frontal white matter which is unchanged. ORBITS: The visualized portion of the orbits demonstrate no acute abnormality. Lenses are postsurgical. SINUSES: Mucous retention cyst in the right maxillary sinus. SOFT TISSUES/SKULL:  No acute abnormality of the visualized skull or soft tissues. No acute intracranial abnormality. US THYROID    Result Date: 11/16/2022  EXAMINATION: THYROID ULTRASOUND 11/15/2022 COMPARISON: None. HISTORY: ORDERING SYSTEM PROVIDED HISTORY: Enlargement of thyroid TECHNOLOGIST PROVIDED HISTORY: This procedure can be scheduled via [employer]. Access your [employer] account by visiting [employer]re FINDINGS: Right thyroid lobe:  26.2 x 18.4 x 52.4 mm Left thyroid lobe:  18.8 x 17.6 x 50.9 mm Isthmus:  7.5 mm Thyroid Gland:  Thyroid gland is slightly heterogeneous without increased vascularity. The right lobe is mildly enlarged. Nodules: There is a subcentimeter hypoechoic nodule in the inferior left lobe for which no specific follow-up imaging is recommended. Ill-defined isoechoic to slightly hypoechoic nodule in the right lobe versus gland heterogeneity.   There are couple of ill-defined tiny subcentimeter nodules in the right lobe for which no specific follow-up imaging is recommended based on TI rads criteria NODULE: Right 1 Size: 9.7 x 5.2 x 10.9  mm Location: Right mid anterior 1. Composition:  Solid (2) 2. Echogenicity:  Isoechoic to slightly hypoechoic (2) 3. Shape: Wider-than-tall (0) 4. Margins:  Ill-defined (0) 5. Echogenic foci:  None (0) ACR TI-RADS total points:  4 ACR TI-RADS risk category: TR4 Prior biopsy: Unknown. Cervical lymphadenopathy: No abnormal lymph nodes in the imaged portions of the neck. Mild calcific plaque of the right carotid artery. Slightly heterogeneous thyroid gland with mildly enlarged right lobe. Subtle 1.1 cm TR 4 nodule in the right lobe for which 1 year follow-up ultrasound is recommended. A few scattered subcentimeter nodules for which no specific follow-up imaging is recommended. RECOMMENDATIONS: NODULE 1:  ACR TI-RADS TR4: Recommend: Follow-up ultrasound in 1 year. ACR TI-RADS recommendations: TR5 (>= 7 points):  FNA if >= 1 cm; follow-up if 0.5-0.9 cm in 1, 2, 3, 4, and 5 years TR4 (4-6 points):  FNA if >= 1.5 cm; follow-up if 1.0-1.4 cm in 1, 2, 3, and 5 years TR3 (3 points):  FNA if >= 2.5 cm; follow-up if 1.5-2.4 cm in 1, 3, and 5 years TR2 (2 points):  No FNA or follow-up TR1 (0 points):  No FNA or follow-up ACR TI-RADS recommends that no more than two nodules with the highest ACR TI-RADS point total should be biopsied and no more than four nodules should be followed. XR CHEST PORTABLE    Result Date: 11/20/2022  EXAMINATION: ONE XRAY VIEW OF THE CHEST 11/20/2022 4:52 pm COMPARISON: 11/17/2021 HISTORY: ORDERING SYSTEM PROVIDED HISTORY: vertigo TECHNOLOGIST PROVIDED HISTORY: vertigo FINDINGS: Heart size is globular and enlarged aorta is hyper. Lungs are normally expanded and clear. No pleural effusions.  Mild spondylosis     Lungs are clear     CTA HEAD NECK W CONTRAST    Result Date: 11/20/2022  EXAMINATION: CTA OF THE HEAD AND NECK WITH CONTRAST 11/20/2022 5:39 pm: TECHNIQUE: CTA of the head and neck was performed with the administration of intravenous contrast. Multiplanar reformatted images are provided for review. MIP images are provided for review. Stenosis of the internal carotid arteries measured using NASCET criteria. Automated exposure control, iterative reconstruction, and/or weight based adjustment of the mA/kV was utilized to reduce the radiation dose to as low as reasonably achievable. COMPARISON: CTA neck 07/24/2019 HISTORY: ORDERING SYSTEM PROVIDED HISTORY: constant vertigo, hx of CVA FINDINGS: CTA NECK: AORTIC ARCH/ARCH VESSELS: No dissection or arterial injury. No significant stenosis of the brachiocephalic or subclavian arteries. CAROTID ARTERIES: Stable complete occlusion of left cervical ICA. There is approximately 50% stenosis in the origin of right ICA by NASCET criteria. VERTEBRAL ARTERIES: Occlusion of right V1 and proximal V2 segments. The left vertebral artery is patent. SOFT TISSUES: The lung apices are clear. No cervical or superior mediastinal lymphadenopathy. The larynx and pharynx are unremarkable. No acute abnormality of the salivary and thyroid glands. BONES: No acute osseous abnormality. CTA HEAD: ANTERIOR CIRCULATION: Stable occlusion of the left petrous and cavernous segments of intracranial ICA with opacification of the supraclinoid segment. Severe narrowing is noted in the cavernous segment of right ICA. Diminutive appearance of anterior cerebral arteries, which are otherwise patent. No significant stenosis of the middle cerebral arteries. No aneurysm. POSTERIOR CIRCULATION: No significant stenosis of the vertebral, basilar, or posterior cerebral arteries. No aneurysm. OTHER: No dural venous sinus thrombosis on this non-dedicated study. BRAIN: No mass effect or midline shift. No extra-axial fluid collection. Extensive old ischemic changes. Stable complete occlusion of left cervical ICA. There is approximately 50% stenosis in the origin of right ICA by NASCET criteria.  Stable occlusion of the left petrous and cavernous segments of intracranial ICA with opacification of the supraclinoid segment. Severe narrowing is again noted in the cavernous segment of right ICA. Occlusion of right V1 and proximal V2 segments of right vertebral artery, new compared to the previous exam     MRI brain without contrast    Result Date: 11/21/2022  EXAMINATION: MRI OF THE BRAIN WITHOUT CONTRAST  11/21/2022 1:22 pm TECHNIQUE: Multiplanar multisequence MRI of the brain was performed without the administration of intravenous contrast. COMPARISON: 11/20/2022. MRI on 05/22/2018. HISTORY: ORDERING SYSTEM PROVIDED HISTORY: R/O stroke, the patient is having vertigo with new fidning of R vert. artery occlusion noted on CTA TECHNOLOGIST PROVIDED HISTORY: R/O stroke, the patient is having vertigo with new fidning of R vert. artery occlusion noted on CTA What is the sedation requirement?->None Decision Support Exception - unselect if not a suspected or confirmed emergency medical condition->Emergency Medical Condition (MA) Reason for Exam: R/O stroke, The patient is having vertigo with new fidning of R vert. artery occlusion noted on CTA FINDINGS: INTRACRANIAL STRUCTURES/VENTRICLES: No areas of restricted diffusion to suggest an acute infarct. The cerebral and cerebellar parenchyma demonstrate volume loss. Scattered and confluent areas of increased T2 signal are noted supratentorially, compatible with severe chronic microvascular white matter ischemic disease. Chronic infarcts are noted in the cerebral hemispheres bilaterally. There are no abnormal extra-axial fluid collections. The ventricles are proportional to the cerebral sulci. Small chronic infarcts are noted in the left thalamus, right caudate head, and left globus pallidus. There are no areas of blooming artifact noted on the gradient echo sequences to suggest sequela of acute or chronic hemorrhage. ORBITS: Lens implants from prior cataract surgery are noted.   The orbits are otherwise unremarkable. SINUSES: There is scattered paranasal sinus disease. There is a proteinaceous right maxillary mucous retention cysts. The mastoid air cells are clear. BONES/SOFT TISSUES: The bone marrow signal intensity and craniocervical junction are unremarkable. Pituitary gland is normal in appearance. There are no areas of abnormal soft tissue swelling. 1. No evidence of an acute infarct. 2. Severe chronic microvascular white matter ischemic disease with chronic infarcts involving bilateral cerebral hemispheres, the left thalamus, left globus pallidus, and right caudate head. CT lung screen [Initial/Annual]    Result Date: 2022  EXAMINATION: LOW DOSE SCREENING CT OF THE CHEST WITHOUT CONTRAST 11/15/2022 3:48 pm TECHNIQUE: Low dose lung cancer screening CT of the chest was performed without the administration of intravenous contrast.  Multiplanar reformatted images are provided for review. Automated exposure control, iterative reconstruction, and/or weight based adjustment of the mA/kV was utilized to reduce the radiation dose to as low as reasonably achievable. COMPARISON: 2021 HISTORY: Screening. Patient Age: 68 y/o Number of pack years of smokin If no longer smoking, number of years since cessation:  11.0 Other symptoms:  None. Additional history: ORDERING SYSTEM PROVIDED HISTORY: Personal history of tobacco use TECHNOLOGIST PROVIDED HISTORY: Age: 69 y.o. Smoking History: Social History Tobacco Use Smoking status: Former Packs/day: 2.00 Years: 35.00 Pack years: 79 Types: Cigarettes Quit date: 10/11/2011 Years since quittin.0 Passive exposure: Current (Outside of the home) Smokeless tobacco: Never Vaping Use Vaping Use: Never used Alcohol use:  Yes Alcohol/week: 6.0 standard drinks Types: 6 Cans of beer per week Comment: rare Drug use: No Types: Cocaine, Marijuana (Weed) Comment: per pt did years ago; cocaine & marij, 17 denies current use Pack years: 79 Last CT lung screen: 1/20/2021 Is there documentation of shared decision making? ->Yes Does the patient show any signs or symptoms of lung cancer? ->No Is this the first (baseline) CT or an annual exam?->Annual Is this a low dose CT or a routine CT?->Low Dose CT Smoking Status? ->Former FINDINGS: Mediastinum:  No acute abnormality in the thyroid bed or thoracic aorta. Moderate calcified plaque in the aortic arch is noted. Moderate coronary artery calcification is noted. Stable cardiac size is noted, borderline. No pericardial effusion or epicardial adenopathy. Lungs/Pleura:  Emphysematous changes are present in the lungs. A 2 mm subpleural nodule is present left upper lobe image 34 series 4.  2 mm subpleural nodule right upper lobe image 36 series 4 is noted. Right middle lobe granuloma is present. A ground-glass area in the medial aspect of the right lower lobe abutting the spine is unchanged from prior study likely related to chronic inflammation. No effusion or extrapleural air is noted. Tracheobronchial tree is patent. Upper Abdomen:  Atherosclerotic disease of the aorta and major branches is noted. Otherwise no acute abnormality in the included upper abdominal structures although evaluation is limited due to body habitus and noisy images. Soft Tissues/Bones: Multilevel degenerative change present in the spine without acute osseous or soft tissue abnormality. 1.  Emphysematous changes. 2.  Nodule subpleural less than 2 mm. Ground-glass area in the azygoesophageal recess unchanged from prior study in 2021. 3.  No mediastinal adenopathy. 4.  Atherosclerotic calcification including coronary artery calcification. LUNG RADS: Per ACR Lung-RADS Version 1.1 Category 2. Benign nodules or behavior.   Advise annual low-dose CT scanning of the chest. RECOMMENDATIONS: If you would like to register your patient with the [place] [address], please contact the Nurse Navigator at [phone]). Consultations:    Consults:     Final Specialist Recommendations/Findings:   IP CONSULT TO NEUROLOGY  IP CONSULT TO ENDOVASCULAR NEUROSURGERY  IP CONSULT TO SPIRITUAL SERVICES  IP CONSULT TO RESPIRATORY CARE  IP CONSULT TO INTERNAL MEDICINE      The patient was seen and examined on day of discharge and this discharge summary is in conjunction with any daily progress note from day of discharge. Discharge plan:     Disposition: Home    Physician Follow Up:     Ann Marie Jimenez MD  [facility] [place] [address]  [phone]    Schedule an appointment as soon as possible for a visit  Monitor blood pressures and follow up. Also follow up for thyroid nodule and anemia. Carlos Schroeder MD  [facility], [address] [facility]  [phone]    Schedule an appointment as soon as possible for a visit      Denys Restrepo, [address] [facility]  [phone]    Schedule an appointment as soon as possible for a visit       Requiring Further Evaluation/Follow Up POST HOSPITALIZATION/Incidental Findings: Monitor blood pressures and report to PCP. Repeat thyroid imaging in one year. Diet: regular diet    Activity: As tolerated. Outpatient PT/OT/vestibular rehab. Instructions to Patient: Keep follow up appointments as listed above.     Discharge Medications:      Medication List        START taking these medications      meclizine 25 MG tablet  Commonly known as: ANTIVERT  Take 1 tablet by mouth 3 times daily as needed for Dizziness     ticagrelor 90 MG Tabs tablet  Commonly known as: BRILINTA  Take 1 tablet by mouth 2 times daily            CONTINUE taking these medications      acetaminophen 325 MG tablet  Commonly known as: TYLENOL  TAKE 2 TABLETS BY MOUTH EVERY 4 HOURS AS NEEDED FOR PAIN     aspirin 81 MG EC tablet  TAKE 1 TABLET BY MOUTH DAILY     atorvastatin 80 MG tablet  Commonly known as: LIPITOR  TAKE 1 TABLET BY MOUTH DAILY diclofenac sodium 1 % Gel  Commonly known as: VOLTAREN  Apply 4 g topically 4 times daily     ferrous sulfate 325 (65 Fe) MG tablet  Commonly known as: IRON 325  Take 1 tab three days days a week     folic acid 1 MG tablet  Commonly known as: FOLVITE  TAKE 1 TABLET BY MOUTH DAILY     gabapentin 600 MG tablet  Commonly known as: NEURONTIN     guaiFENesin 100 MG/5ML syrup  Commonly known as: Altarussin  Take 10 mLs by mouth 3 times daily as needed for Cough     omeprazole 20 MG delayed release capsule  Commonly known as: PRILOSEC  TAKE 1 CAPSULE BY MOUTH TWICE A DAY WITH MEALS     vitamin B-1 100 MG tablet  Commonly known as: THIAMINE  Take 1 tablet by mouth daily     * Vitamin D 25 MCG (1000 UT) Tabs tablet  Commonly known as: CHOLECALCIFEROL     * Vitamin D3 25 MCG Tabs  TAKE 1 TABLET BY MOUTH DAILY           * This list has 2 medication(s) that are the same as other medications prescribed for you. Read the directions carefully, and ask your doctor or other care provider to review them with you. STOP taking these medications      amLODIPine 10 MG tablet  Commonly known as: NORVASC     clopidogrel 75 MG tablet  Commonly known as: PLAVIX     hydroCHLOROthiazide 25 MG tablet  Commonly known as: HYDRODIURIL     losartan 50 MG tablet  Commonly known as: COZAAR               Where to Get Your Medications        These medications were sent to [facility]  [facility], [address] [place] [address], [address]  [address]      Phone: [phone]   meclizine 25 MG tablet  ticagrelor 90 MG Tabs tablet         Time Spent on discharge is  37 mins in patient examination, evaluation, counseling as well as medication reconciliation, prescriptions for required medications, discharge plan and follow up.     Electronically signed by   LOY Haas CNP  11/22/2022  3:20 PM      Thank you Dr. Johnny Marcus MD for the opportunity to be involved in this patient's care.

## 2022-11-22 NOTE — PROGRESS NOTES
Physical Therapy  Facility/Department: [facility] STEPDOWN  Physical Therapy     Name: Keila Hayes  : 1955  MRN: [id number]  Date of Service: 2022    Discharge Recommendations:  Patient would benefit from continued therapy after discharge, Other (comment) (Pt has further diagnostic test pending)          Patient Diagnosis(es): The encounter diagnosis was Vertigo. Past Medical History:  has a past medical history of Ambulates with cane, Bleeding, Cataracts, bilateral, Cerebrovascular disease, Chronic pain in left foot, CKD (chronic kidney disease) stage 3, GFR 30-59 ml/min (HCC), COVID-19, GERD (gastroesophageal reflux disease), Hx of blood clots, Hyperlipidemia, Hypertension, Iron (Fe) deficiency anemia, Nicotine abuse, Obesity, TIBURCIO on CPAP, Osteoarthritis, Peripheral vascular disease ([facility] [address] Substance abuse ([facility] [address] Thalassemia minor, Unspecified cerebral artery occlusion with cerebral infarction, and Wears dentures. Past Surgical History:  has a past surgical history that includes Tonsillectomy and adenoidectomy; Tubal ligation; Breast surgery; Endometrial biopsy (); Upper gastrointestinal endoscopy (); Colonoscopy (2007); Colonoscopy (); Upper gastrointestinal endoscopy (N/A, 2019); and Colonoscopy (N/A, 2019). Assessment   Requires PT Follow-Up: Yes  Activity Tolerance  Activity Tolerance:Patient tolerated treatment well   Pt could benefit from standing activity to improve her standing balance to promote further gait distance with less of a gait deviation & to improve her endurance.   Plan   Physical Therapy Plan  General Plan:  (5-6x/wk)  Current Treatment Recommendations: Stair training, Gait training, Balance training, Strengthening, Functional mobility training, Transfer training, Neuromuscular re-education, Therapeutic activities  Additional Comments: PT intervention with focus on progressive mobility and out of bed activity as indicated and tolerated  Safety Devices  Type of Devices: Patient at risk for falls, Bed alarm in place, Left in bed, Nurse notified, Gait belt, Call light within reach  Restraints  Restraints Initially in Place: No     Restrictions  Restrictions/Precautions  Restrictions/Precautions: General Precautions, Fall Risk, Up as Tolerated  Required Braces or Orthoses?: No  Position Activity Restriction  Other position/activity restrictions: up with Assit, pending test, heperin drip     Subjective   General  Chart Reviewed: Yes  Family / Caregiver Present: No  Follows Commands: Within Functional Limits  Other (Comment): Upon arrival pt in bed in supine with HOB elevated  Subjective  Subjective: pt agreeable to PT with reports of not feeling dizzy. pt reports that the medication that she is receiving is helping to subside her dizziness. Objective      Bed mobility  Rolling to Left: Stand by assistance  Rolling to Right: Stand by assistance  Supine to Sit: Stand by assistance  Scooting: Stand by assistance  Bed Mobility Comments: HOB flat during bed mobility  Transfers  Sit to Stand: Stand by assistance  Stand to Sit: Stand by assistance  Bed to Chair: Stand by assistance  Comment: Pt use left side with cane  Ambulation  Surface: Level tile  Device: Single point cane  Assistance: Contact guard assistance  Gait Deviations: Slow Carina; Increased ALBAN; Decreased step length;Decreased step height;Decreased head and trunk rotation;Staggers  Distance: 10'  Comments: unsteady however no LOB  More Ambulation?: No  Stairs/Curb  Stairs?: No     Balance  Posture: Good  Sitting - Static: Good  Sitting - Dynamic: Good  Standing - Static: Fair  Standing - Dynamic: Fair;-  Comments: stand balance assessed with std cane, unsteady however no LOB noted  Exercise Treatment: bilat UE AROM 10 reps with abdominal isometrics: shoulder flex, horizontal abd/add, shoulder abd/add, elbow flex/ext.  Bilat LE AROM 10 reps with abdominal isometrics: hip abd/add, heel slide, SLR, PF/DF, quad set, hamstring set, gluteal set, marches, LAQ jacki in supine/seated      AM-PAC Score  AM-PAC Inpatient Mobility Raw Score : 17 (11/22/22 1132)  AM-PAC Inpatient T-Scale Score : 42.13 (11/22/22 1132)  Mobility Inpatient CMS 0-100% Score: 50.57 (11/22/22 1132)  Mobility Inpatient CMS G-Code Modifier : CK (11/22/22 1132)       Goals  Short Term Goals  Time Frame for Short Term Goals: 7 visits  Short Term Goal 1: Pt to ambulate 25 ft with least AD with CGA  Short Term Goal 2: pt to transfer from alternate surface heights with CGA and use of cane  Short Term Goal 3: Pt to tolerate 30 min ther ex and activity to improve strength and activity tolerance. Patient Goals   Patient Goals : to feel better and return home       Education  Patient Education  Education Provided: Role of Therapy;Plan of Care;Energy Conservation;Transfer Training; Fall Prevention Strategies  Education Provided Comments: pt educated on increasing OOB activity & to jacki sitting in recliner for all meals  Education Method: Demonstration  Barriers to Learning: None  Education Outcome: Verbalized understanding      Therapy Time   Individual Concurrent Group Co-treatment   Time In  11:02         Time Out  11:27         Minutes  [address], PTA

## 2022-11-22 NOTE — PROGRESS NOTES
Endovascular Neurosurgery Progress Note    SUBJECTIVE:   No acute events overnight    Review of Systems:  CONSTITUTIONAL:  negative for fevers, chills, fatigue and malaise    EYES:  negative for double vision, blurred vision and photophobia     HEENT:  negative for tinnitus, epistaxis and sore throat    RESPIRATORY:  negative for cough, shortness of breath, wheezing    CARDIOVASCULAR:  negative for chest pain, palpitations, syncope, edema    GASTROINTESTINAL:  negative for nausea, vomiting    GENITOURINARY:  negative for incontinence    MUSCULOSKELETAL:  negative for neck or back pain    NEUROLOGICAL:  Negative for weakness and tingling  negative for headaches and dizziness    PSYCHIATRIC:  negative for anxiety      Review of systems otherwise negative. OBJECTIVE:     Vitals:    22 1158   BP: 114/78   Pulse: 73   Resp:    Temp: 97.2 °F (36.2 °C)   SpO2: 96%        General:  Gen: normal habitus, NAD  HEENT: NCAT, mucosa moist  Cvs: RRR, S1 S2 normal  Resp: symmetric unlabored breathing  Abd: s/nd/nt  Ext: no edema  Skin: no lesions seen, warm and dry    Neuro:  Gen: awake and alert, oriented x3. Lang/speech: no aphasia or dysarthria. Follows commands. CN: PERRL, EOMI, VFF, V1-3 intact, face symmetric, hearing intact, shoulder shrug symmetric, tongue midline  Motor: grossly 5/5 UE and LE b/l  Sense: LT intact in all 4 ext. Coord: FTN and HTS intact b/l  DTR: deferred  Gait: narrow base gait    NIH Stroke Scale:   1a  Level of consciousness: 0 - alert; keenly responsive   1b. LOC questions:  0 - answers both questions correctly   1c. LOC commands: 0 - performs both tasks correctly   2. Best Gaze: 0 - normal   3. Visual: 0 - no visual loss   4. Facial Palsy: 0 - normal symmetric movement   5a. Motor left arm: 0 - no drift, limb holds 90 (or 45) degrees for full 10 seconds   5b. Motor right arm: 0 - no drift, limb holds 90 (or 45) degrees for full 10 seconds   6a.  Motor left le - no drift; leg holds 30 degree position for full 5 seconds   6b  Motor right le - no drift; leg holds 30 degree position for full 5 seconds   7. Limb Ataxia: 0 - absent   8. Sensory: 0 - normal; no sensory loss   9. Best Language:  0 - no aphasia, normal   10. Dysarthria: 0 - normal   11. Extinction and Inattention: 0 - no abnormality         Total:   0     MRS: 0      LABS:   Reviewed. 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 There is approximately 50%   stenosis in the origin of right ICA by NASCET criteria. Stable occlusion of the left petrous and cavernous segments of intracranial   ICA with opacification of the supraclinoid segment. Severe narrowing is again   noted in the cavernous segment of right ICA. Occlusion of right V1 and proximal V2 segments of right vertebral artery, new   compared to the previous exam         MRI brain:   1. No evidence of an acute infarct. 2. Severe chronic microvascular white matter ischemic disease with chronic   infarcts involving bilateral cerebral hemispheres, the left thalamus, left   globus pallidus, and right caudate head. ASSESSMENT:   68 y/o f with pmh significant for Htn, b/l strokes secondary to underlying intracranial atherosclerotic disease, dyslipidemia presented for 2 days duration of constant dizziness, nausea. MRI brain was negative for stroke.  CTA neck showed right vertebral artery V1 and proximal V2 new occlusion, stable occlusion of the left ICA petrous and cavernous segment, severe narrowing of the left ICA petrous and cavernous segment, narrowing right ICA cavernous segment. Patient's neuro exam was stable. Given MRI brain was negative and morbid obesity patient will not be an ideal candidate for DSA. PLAN:   --No acute intervention or DSA recommended at this time   --d/c hep gtt   --c/w ASA and Brilinta   --c/w Lipitor   --aggressive medical management     Case discussed with Dr. Nell Harrell attending.     Diana Frank MD  Stroke, [facility]  [id number] [place]  Electronically signed 11/22/2022 at 2:30 PM

## 2022-11-22 NOTE — PROGRESS NOTES
NEUROLOGY INPATIENT PROGRESS NOTE    11/22/2022         Current Exam:     Chart reviewed. Discussed with RN. Patient reports improvement in her dizziness and lightheadedness. She is ambulating in her room with her cane. Reports symptoms are worse when she turns her head or looks side to side. Brief History:    Mariano Ponce is a  69 y.o. female with H/O CVA, CKD, HLD, HTN, prior substance abuse, who was admitted on 11/20/2022 with vertigo and horizontal nystagmus with nausea and vomiting for 2 days. He was found to have a new right vertebral artery V1/2 occlusion, new since prior vessel imaging was done in 2019. She is taking aspirin and Plavix at home and in the ED was initiated on a heparin drip and later switched to Brilinta from Plavix. She was also started on meclizine which helped with her symptoms. MRI brain was done showing no acute intracranial abnormalities with multiple areas of chronic infarcts in the bilateral cerebral hemispheres. Heparin drip was ultimately discontinued as per endovascular team and she was continued on aspirin and Brilinta. No current facility-administered medications on file prior to encounter. Current Outpatient Medications on File Prior to Encounter   Medication Sig Dispense Refill    amLODIPine (NORVASC) 10 MG tablet TAKE 1 TABLET BY MOUTH DAILY 28 tablet 1    aspirin 81 MG EC tablet TAKE 1 TABLET BY MOUTH DAILY 28 tablet 1    Cholecalciferol (VITAMIN D3) 25 MCG TABS TAKE 1 TABLET BY MOUTH DAILY 28 tablet 1    Vitamin D (CHOLECALCIFEROL) 25 MCG (1000 UT) TABS tablet       gabapentin (NEURONTIN) 600 MG tablet Take 600 mg by mouth 3 times daily.       acetaminophen (TYLENOL) 325 MG tablet TAKE 2 TABLETS BY MOUTH EVERY 4 HOURS AS NEEDED FOR PAIN 60 tablet 3    atorvastatin (LIPITOR) 80 MG tablet TAKE 1 TABLET BY MOUTH DAILY 28 tablet 3    clopidogrel (PLAVIX) 75 MG tablet TAKE 1 TABLET BY MOUTH DAILY 28 tablet 3    hydroCHLOROthiazide (HYDRODIURIL) 25 MG tablet TAKE 1 TABLET BY MOUTH DAILY 28 tablet 3    losartan (COZAAR) 50 MG tablet TAKE 1 TABLET BY MOUTH DAILY 28 tablet 3    omeprazole (PRILOSEC) 20 MG delayed release capsule TAKE 1 CAPSULE BY MOUTH TWICE A DAY WITH MEALS 56 capsule 1    ferrous sulfate (IRON 325) 325 (65 Fe) MG tablet Take 1 tab three days days a week 90 tablet 1    folic acid (FOLVITE) 1 MG tablet TAKE 1 TABLET BY MOUTH DAILY 60 tablet 2    guaiFENesin (ALTARUSSIN) 100 MG/5ML syrup Take 10 mLs by mouth 3 times daily as needed for Cough 1 each 0    vitamin B-1 (THIAMINE) 100 MG tablet Take 1 tablet by mouth daily 30 tablet 3    diclofenac sodium (VOLTAREN) 1 % GEL Apply 4 g topically 4 times daily 4 Tube 1       Allergies: Cr Allison is allergic to duricef [cefadroxil monohydrate], fish-derived products, pcn [penicillins], and tomato.     Past Medical History:   Diagnosis Date    Ambulates with cane     Bleeding     while on aggrenox    Cataracts, bilateral     Cerebrovascular disease 2003,2011    cva    Chronic pain in left foot     CKD (chronic kidney disease) stage 3, GFR 30-59 ml/min ([facility])     COVID-19 11/2021    states hpspitalized    GERD (gastroesophageal reflux disease)     Hx of blood clots     Hyperlipidemia     Hypertension     Iron (Fe) deficiency anemia     Nicotine abuse     Obesity     TIBURCIO on CPAP     Osteoarthritis     Peripheral vascular disease ([facility] [address] 03/13/2014    Substance abuse ([facility] [address]     cocaine, canabis    Thalassemia minor     Unspecified cerebral artery occlusion with cerebral infarction     Wears dentures     upper and lower       Past Surgical History:   Procedure Laterality Date    BREAST SURGERY      biopsy    COLONOSCOPY  12/2007    adenomatous polyp    COLONOSCOPY  2013    normal, repeat in 5 years    COLONOSCOPY N/A 8/21/2019    COLONOSCOPY POLYPECTOMY SNARE/COLD BIOPSY performed by Naima Su MD at [address] GASTROINTESTINAL ENDOSCOPY  2009    negative    UPPER GASTROINTESTINAL ENDOSCOPY N/A 8/21/2019    EGD BIOPSY performed by Alejandrina Gagnon MD at \[facility]\"" Endoscopy       Social History: Antoine Khalil  reports that she quit smoking about 11 years ago. Her smoking use included cigarettes. She has a 70.00 pack-year smoking history. She has been exposed to tobacco smoke. She has never used smokeless tobacco. She reports current alcohol use of about 6.0 standard drinks per week. She reports that she does not use drugs. Family History   Problem Relation Age of Onset    High Blood Pressure Mother     Asthma Mother     Heart Disease Mother     Heart Disease Father     High Blood Pressure Father     Diabetes Brother     High Blood Pressure Brother     Heart Disease Brother     High Blood Pressure Maternal Grandmother     High Blood Pressure Maternal Grandfather     Heart Disease Maternal Grandfather        Objective:   BP (!) 145/66   Pulse 54   Temp 97.3 °F (36.3 °C) (Oral)   Resp 18   Ht 5' 7\" (1.702 m)   Wt (!) 388 lb 3.7 oz (176.1 kg)   SpO2 97%   BMI 60.81 kg/m²     Blood pressure range: Systolic (07DBM), UYN:274 , Min:116 , PPL:710   ; Diastolic (42LIU), RFP:75, Min:59, Max:78      Review of Systems:  Constitutional  Negative for fever and chills    HEENT  Negative for ear discharge, ear pain, nosebleed    Eyes  Negative for photophobia, pain and discharge    Respiratory  Negative for hemoptysis and sputum    Cardiovascular  Negative for orthopnea, claudication and PND    Gastrointestinal  Negative for abdominal pain, diarrhea, blood in stool    Musculoskeletal  Negative for joint pain, negative for myalgia    Skin  Negative for rash or itching    Endo/heme/allergies  Negative for polydipsia, environmental allergy    Psychiatric/behavioral  Negative for suicidal ideation.  Patient is not anxious        NEUROLOGIC EXAMINATION  GENERAL  Appears comfortable and in no distress   HEENT  NC/ AT   NECK  Supple MENTAL STATUS:  Alert, oriented, intact memory, no confusion, normal speech, normal language, no hallucination or delusion   CRANIAL NERVES: II     -      Visual fields intact to confrontation  III,IV,VI -  EOMs full, no afferent defect, no MICHAEL, no ptosis, very slight horizontal nystagmus  V     -     Normal facial sensation  VII    -     Normal facial symmetry  VIII   -     Intact hearing  IX,X -     Symmetrical palate  XI    -     Symmetrical shoulder shrug  XII   -     Midline tongue, no atrophy    MOTOR FUNCTION:  Lifts all limbs easily with normal bulk, normal tone and no involuntary movements, no tremor   SENSORY FUNCTION:  Normal touch, normal pin   CEREBELLAR FUNCTION:  Intact fine motor control over upper limbs   REFLEX FUNCTION:  Symmetric, no perverted reflex, no Babinski sign   STATION and GAIT  Slow but steady gait when ambulating with cane       Data:    Lab Results:   CBC:   Recent Labs     11/20/22  1652 11/20/22  2334 11/22/22  0240   WBC 6.0 6.4 7.5   HGB 11.0* 10.8* 10.2*    351 332     BMP:    Recent Labs     11/20/22  1652 11/21/22  1011    139   K 3.9 4.1    102   CO2 28 25   BUN 19 22   CREATININE 0.94* 1.02*   GLUCOSE 94 102*         Lab Results   Component Value Date    CHOL 150 11/03/2022    LDLCHOLESTEROL 88 11/03/2022    HDL 45 11/03/2022    TRIG 84 11/03/2022    ALT 12 11/20/2022    AST 17 11/20/2022    TSH 1.63 11/20/2022    INR 0.9 06/13/2022    LABA1C 5.8 11/03/2022    LABMICR 6 05/09/2017    UPCORERR47 770 09/07/2021           Diagnostic data reviewed:  CT HEAD (11/20/22) -  No acute intracranial abnormality. CTA HEAD AND NECK (11/20/22) -  Stable complete occlusion of left cervical ICA. There is approximately 50%   stenosis in the origin of right ICA by NASCET criteria. Stable occlusion of the left petrous and cavernous segments of intracranial   ICA with opacification of the supraclinoid segment.  Severe narrowing is again   noted in the cavernous segment of right ICA. Occlusion of right V1 and proximal V2 segments of right vertebral artery, new   compared to the previous exam         BRAIN MRI (11/21/22) -  1. No evidence of an acute infarct. 2. Severe chronic microvascular white matter ischemic disease with chronic   infarcts involving bilateral cerebral hemispheres, the left thalamus, left   globus pallidus, and right caudate head. Impression:  -Vertigo with horizontal nystagmus, nausea and vomiting for 2 days; suspect BPPV  -Right vertebral artery V1/2 occlusion    Plan:  -MRI brain negative for any acute findings  -Patient symptoms improved on meclizine  -Continue aspirin and Brilinta  -Continue therapies; can consider outpatient vestibular rehab if symptoms persist  -Discharge hopefully this afternoon    Please note that this note was generated using a voice recognition dictation software. Although every effort was made to ensure the accuracy of this automated transcription, some errors in transcription may have occurred.

## 2022-11-22 NOTE — PLAN OF CARE
Discussion held with Neuro-Endovascular,  Given negative MRI;  OK to dc previously recommended heparin and continue with ASA and Brilinta.     Electronically signed by Rebeca Bejarano DO on 11/22/2022 at 6:14 AM

## 2022-11-22 NOTE — PLAN OF CARE
Problem: Discharge Planning  Goal: Discharge to home or other facility with appropriate resources  11/22/2022 1202 by [facility]  Outcome: Progressing  11/22/2022 0423 by Louise Guerra RN  Outcome: Progressing     Problem: ABCDS Injury Assessment  Goal: Absence of physical injury  11/22/2022 1202 by Rodrigo [facility], RN  Outcome: Progressing  11/22/2022 0423 by Louise Guerra RN  Outcome: Progressing     Problem: Safety - Adult  Goal: Free from fall injury  11/22/2022 1202 by Rodrigo [facility], RN  Outcome: Progressing  11/22/2022 0423 by Louise Guerra RN  Outcome: Progressing     Problem: Skin/Tissue Integrity  Goal: Absence of new skin breakdown  Description: 1. Monitor for areas of redness and/or skin breakdown  2. Assess vascular access sites hourly  3. Every 4-6 hours minimum:  Change oxygen saturation probe site  4. Every 4-6 hours:  If on nasal continuous positive airway pressure, respiratory therapy assess nares and determine need for appliance change or resting period.   11/22/2022 1202 by Rodrigo [facility], RN  Outcome: Progressing  11/22/2022 0423 by Louise Guerra RN  Outcome: Progressing     Problem: Chronic Conditions and Co-morbidities  Goal: Patient's chronic conditions and co-morbidity symptoms are monitored and maintained or improved  Outcome: Progressing

## 2022-11-22 NOTE — PROGRESS NOTES
Writer confirmed with the neurology resident on-call that there will be no repeat of APTT after the heparin drip was stopped. Writer informed him as well about the episode of bradycardia when the patient was asleep.

## 2022-11-22 NOTE — PLAN OF CARE
Problem: Discharge Planning  Goal: Discharge to home or other facility with appropriate resources  Outcome: Progressing     Problem: ABCDS Injury Assessment  Goal: Absence of physical injury  Outcome: Progressing     Problem: Safety - Adult  Goal: Free from fall injury  Outcome: Progressing     Problem: Skin/Tissue Integrity  Goal: Absence of new skin breakdown  Description: 1. Monitor for areas of redness and/or skin breakdown  2. Assess vascular access sites hourly  3. Every 4-6 hours minimum:  Change oxygen saturation probe site  4. Every 4-6 hours:  If on nasal continuous positive airway pressure, respiratory therapy assess nares and determine need for appliance change or resting period.   Outcome: Progressing       -will continue to monitor the patient

## 2022-11-22 NOTE — PROGRESS NOTES
Speech Language Pathology  Facility/Department: [facility]  Initial Speech/Language/Cognitive Assessment    NAME: Kenji Wood  : 1955   MRN: [id number]  ADMISSION DATE: 2022  ADMITTING DIAGNOSIS: has Essential hypertension; Pure hypercholesterolemia; Cerebrovascular accident (CVA), , no residual deficit (HCC); CKD (chronic kidney disease) stage 3, GFR 30-59 ml/min ([facility] [address] Peripheral vascular disease ([facility] [address] TIBURCIO (obstructive sleep apnea); Class 3 severe obesity due to excess calories with serious comorbidity and body mass index (BMI) of 50.0 to 59.9 in [facility]); Primary osteoarthritis of left knee; Chronic tension-type headache, not intractable; Carpal tunnel syndrome, bilateral-not on medication due to kidney disease; Microcytic anemia; Alpha thalassemia trait; Cervical stenosis of spinal canal; Pneumonia due to COVID-19 virus; Chronic bilateral low back pain with bilateral sciatica; Lumbar radicular pain; History of stroke; and Vertigo on their problem list.      Date of Eval: 2022   Evaluating Therapist: Lenny Lerma    RECENT RESULTS  CT OF HEAD/MRI:   1. No evidence of an acute infarct. 2. Severe chronic microvascular white matter ischemic disease with chronic   infarcts involving bilateral cerebral hemispheres, the left thalamus, left   globus pallidus, and right caudate head. Primary Complaint: The patient is a 69 y.o. female  with hx of TIBURCIO, BMI 60, HTN, ex-smoking with 70 pack year hx quit 11 years ago, strokes with ICAD (bilateral cerebral hemispheres on aspirin 81mg and Plavix 75mg, lipitor 80mg with first stroke in  involving left MCA and repeat CT head in 2016 showed bilateral encephalomalacia), chr. Left ICA complete occlusion, right ICA occlusion, bilateral carpal tunnel, degenerative spine disease and C spine stenosis. At baseline she uses a walker for ambulation.      She presented to the ED with constant vertigo described as feeling of a room spinning sensation of 2-days duration that occurred suddenly while the patient was sitting at home, associated with nausea and blurry vision intermittently. This vertigo has been constant for the past 2 days and can be worsened with head movement. CTA H/N done in ED and showed stable complete occlusion of left cervical ICA with about 50% stenosis in the origin of the right ICA in addition to stable occlusion of the left petrous and cavernous segments of the ICA and severe narrowing again demonstrated in the cavernous segment of right ICA as compared to previous CTA in 2019. This time there was occlusion of the right P1 proximal V2 of right vertebral artery which is new compared to the previous exam. CTH was negative otherwise. She had work-up done in 2018 with CTA head and neck showing occlusion or near occlusion of the left petrous segment and cavernous segment ICA in addition to calcified plaque at the origin of the left common carotid artery with mixed plaque in the carotid bulb and complete or near complete occlusion of the entire left ICA. There was also severe focal stenosis of the cavernous segment right ICA with mild stenosis of the proximal right ICA and moderate stenosis of proximal right vertebral artery with left proximal left vertebral artery not well visualized. This was followed by July 2019 CTA head and neck which showed the previous findings and was stable and the vertebral arteries were patent without focal stenosis at that time      Pain:  Pain Assessment  Pain Assessment: None - Denies Pain  Pain Level: 7  Patient's Stated Pain Goal: 2  Pain Location: Head  Pain Orientation: Mid  Pain Descriptors: Aching, Throbbing  Functional Pain Assessment: Prevents or interferes some active activities and ADLs  Non-Pharmaceutical Pain Intervention(s): None - Patient Satisfied      Vision/ Hearing  Vision  Vision: Within Functional Limits  Hearing  Hearing: Within functional limits      Assessment:  Pt presents with mild-severe cognitive deficits characterized by difficulties with immediate recall, delayed recall, sequencing, thought flexibility, and word deductions. Pt presents with slow processing speed throughout evaluation. Pt. Presents with no dysarthria, no O/M deficits at this time. ST to follow up and provide treatment to address noted deficits. Education provided. Recommendations:  Recommendations  Requires SLP Intervention: Yes  Patient Education: Yes  Patient Education Response: Verbalizes understanding  Frequency: 3-5x/week        Plan:   Speech Therapy Prognosis  Prognosis: Fair  Individuals consulted  Consulted and agree with results and recommendations: Patient      Goals:  Short Term Goals  Goal 1: Pt will recall 3-5 units with and without distractions with 90% accuracy. Goal 2: Pt will utilize memory compensatory strategies to aid in recall. Goal 3: Pt will complete verbal reasoning tasks with 90% accuracy. Goal 4: Pt will complete thought flexibility tasks wtih 90% accuracy. Goal 5: Pt will complete 4-6 step verbal sequencing tasks with 90% accuracy. Patient/family involved in developing goals and treatment plan: Yes      Subjective:  Social/Functional History  Lives With: Spouse  Type of Home: Apartment  Active : Yes  Mode of Transportation: Car  Vision  Vision: Within Functional Limits  Hearing  Hearing: Within functional limits           Objective:       Oral Motor   Labial: No impairment  Lingual: No impairment    Motor Speech  Apraxic Characteristics: None  Intelligibility: No impairment  Overall Impairment Severity: None      Expression  Primary Mode of Expression: Verbal       Cognition:      Orientation  Overall Orientation Status: Within Normal Limits  Memory  Memory: Exceptions to [facility]  Short-term Memory:  Moderate (0/3, 2/3)  Working Memory: Mild (3/3, 4/5, 3/3)  Problem Solving  Problem Solving: Exceptions to [facility]  Verbal Reasoning Skills: Severe (Word Deductions: 0/5)  Sequencing: Mild (+3)  Abstract Reasoning  Abstract Reasoning: Within Functional Limits  Safety/Judgment  Safety/Judgment: Exceptions to [facility]  Insight: WFL  Flexibility of Thought: Mild (2/3)      Prognosis:  Speech Therapy Prognosis  Prognosis: Fair  Individuals consulted  Consulted and agree with results and recommendations: Patient      Education:  Patient Education: Yes  Patient Education Response: Verbalizes understanding          Therapy Time:   Individual Concurrent Group Co-treatment   Time In  9:26         Time Out  9:38         Minutes  12                 Completed by:  Emiliano Augustin  Clinician    Cosigned By: Blanca Barnes S.CCC/SLP

## 2022-11-22 NOTE — PROGRESS NOTES
[facility]  Office: [address], DO, Wilbur Amaro, DO, Emanideysi Gonzales, DO, Job Randall Blood, DO, Maxwell Smallwood MD, Sharron Torres MD, Yvone Phalen, MD, Marito Macedo MD,  Homer Hashimoto, MD, Shalom Cantu MD, Blake Marie, DO, Maggie Chavez MD,  Janet Myles MD, Karen Lopez MD, Rubén Morrison DO, Elizabeth Stafford MD, Nesha Stroud MD, Saranya Branch, DO, Latha Moreno MD, Sherrie Delaney MD, Darvin Johnson MD, Bren Damon MD, Britni Perez DO, Ricky Spear MD, Yoan Caldwell MD, Estrella Ybeoah, CNP,  Shraddha Damon, CNP, Tushar Esteves, CNP, Ulysses Dewey, CNP,  Irvin Herrera, [facility], Rupal Ferrer, CNP, Kevin Jaramillo, CNP, Melinda Story, CNP, Rossana Mtz, CNP, Blanco Josue, CNP, Lolly Wang PASilverioC, Sarah Lerner, CNS, Dominick Singleton, [facility], Dewayne Bermudez, CNP, Phani Chairez, [facility], Ebenezer Cameron, [address] [place] [address]    Progress Note    11/22/2022    1:32 PM    Name:   Charles Cordova  MRN:     [id number]     Acct:      [de-identified]   Room:   [address] [place] [address] Day:  2  Admit Date:  11/20/2022  4:24 PM    PCP:   Janet Carranza MD  Code Status:  Full Code    Subjective:     C/C:   Chief Complaint   Patient presents with    Dizziness     \"Feels like my head is spinning I cant hold my head up for long\" x2 days      Interval History Status: improved. Patient is feeling much better. Her vision has improved. States that the dizziness is also getting better. Patient did wear her BiPAP overnight and feels rested. Blood pressure around 429 systolic. Hemoglobin stable at 10.2  Brief History:     55-year-old female was admitted to the hospital with vertigo with horizontal nystagmus, nausea and vomiting. Patient was seen to have new finding of right vertebral artery V1-2 occlusion. Patient was started on heparin drip by neurology, endovascular surgery was consulted.   Patient was already on aspirin and Plavix, she was switched to Brilinta from Plavix. Patient has known history of sleep apnea, morbid obesity, had episodes of bradycardia overnight and [facility] was consulted. Patient bradycardia improved after her BiPAP was resumed. Review of Systems:     Constitutional:  negative for chills, fevers, sweats  Respiratory:  negative for cough, dyspnea on exertion, shortness of breath, wheezing  Cardiovascular:  negative for chest pain, chest pressure/discomfort, lower extremity edema, palpitations  Gastrointestinal:  negative for abdominal pain, constipation, diarrhea, nausea, vomiting  Neurological: Positive for dizziness, no headache  Medications: Allergies: Allergies   Allergen Reactions    Duricef [Cefadroxil Monohydrate] Hives    Fish-Derived Products Hives    Pcn [Penicillins] Hives    Tomato Hives       Current Meds:   Scheduled Meds:    sodium chloride flush  5-40 mL IntraVENous 2 times per day    ticagrelor  90 mg Oral BID    aspirin  81 mg Oral Daily    folic acid  7,412 mcg Oral Daily    gabapentin  600 mg Oral TID    rosuvastatin  40 mg Oral Nightly     Continuous Infusions:    sodium chloride       PRN Meds: sodium chloride flush, sodium chloride, ondansetron **OR** ondansetron, polyethylene glycol, acetaminophen **OR** acetaminophen, meclizine    Data:     Past Medical History:   has a past medical history of Ambulates with cane, Bleeding, Cataracts, bilateral, Cerebrovascular disease, Chronic pain in left foot, CKD (chronic kidney disease) stage 3, GFR 30-59 ml/min ([facility]), COVID-19, GERD (gastroesophageal reflux disease), Hx of blood clots, Hyperlipidemia, Hypertension, Iron (Fe) deficiency anemia, Nicotine abuse, Obesity, TIBURCIO on CPAP, Osteoarthritis, Peripheral vascular disease ([facility] [address] Substance abuse ([facility] [address] Thalassemia minor, Thyroid nodule, Unspecified cerebral artery occlusion with cerebral infarction, and Wears dentures.     Social History:   reports that she quit smoking about 11 years ago. Her smoking use included cigarettes. She has a 70.00 pack-year smoking history. She has been exposed to tobacco smoke. She has never used smokeless tobacco. She reports current alcohol use of about 6.0 standard drinks per week. She reports that she does not use drugs. Family History:   Family History   Problem Relation Age of Onset    High Blood Pressure Mother     Asthma Mother     Heart Disease Mother     Heart Disease Father     High Blood Pressure Father     Diabetes Brother     High Blood Pressure Brother     Heart Disease Brother     High Blood Pressure Maternal Grandmother     High Blood Pressure Maternal Grandfather     Heart Disease Maternal Grandfather        Vitals:  /78   Pulse 73   Temp 97.2 °F (36.2 °C) (Oral)   Resp 18   Ht 5' 7\" (1.702 m)   Wt (!) 388 lb 3.7 oz (176.1 kg)   SpO2 96%   BMI 60.81 kg/m²   Temp (24hrs), Av.9 °F (36.6 °C), Min:97.2 °F (36.2 °C), Max:98.4 °F (36.9 °C)    No results for input(s): POCGLU in the last 72 hours. I/O (24Hr):     Intake/Output Summary (Last 24 hours) at 2022 1332  Last data filed at 2022 0901  Gross per 24 hour   Intake 530 ml   Output --   Net 530 ml       Labs:  Hematology:  Recent Labs     22  1652 22  2334 22  0240   WBC 6.0 6.4 7.5   RBC 5.07 5.06 4.70   HGB 11.0* 10.8* 10.2*   HCT 37.1 37.6 34.8*   MCV 73.2* 74.3* 74.0*   MCH 21.7* 21.3* 21.7*   MCHC 29.6 28.7 29.3   RDW 17.0* 17.0* 17.1*    351 332   MPV 9.3 9.9 10.0     Chemistry:  Recent Labs     22  1652 22  1826 22  0558 22  1011     --   --  139   K 3.9  --   --  4.1     --   --  102   CO2 28  --   --  25   GLUCOSE 94  --   --  102*   BUN 19  --   --  22   CREATININE 0.94*  --   --  1.02*   MG  --   --  2.1  --    ANIONGAP 11  --   --  12   LABGLOM >60  --   --  60*   CALCIUM 9.7  --   --  9.4   TROPHS 25* 26*  --   --      Recent Labs     22  1652   PROT 7.2   LABALBU 3.9 TSH 1.63   AST 17   ALT 12   ALKPHOS 156*   BILITOT 0.4     ABG:No results found for: POCPH, PHART, PH, POCPCO2, BIZ7NXR, PCO2, POCPO2, PO2ART, PO2, POCHCO3, MFM7IUO, HCO3, NBEA, PBEA, BEART, BE, THGBART, THB, CNJ6TPF, LTFJ6BPA, Z4CPERFV, O2SAT, FIO2  Lab Results   Component Value Date/Time    SPECIAL NOT REPORTED 01/05/2019 10:40 PM     Lab Results   Component Value Date/Time    CULTURE NO GROWTH 6 DAYS 01/05/2019 08:58 PM       Radiology:  CT HEAD WO CONTRAST    Result Date: 11/20/2022  No acute intracranial abnormality. US THYROID    Result Date: 11/16/2022  Slightly heterogeneous thyroid gland with mildly enlarged right lobe. Subtle 1.1 cm TR 4 nodule in the right lobe for which 1 year follow-up ultrasound is recommended. A few scattered subcentimeter nodules for which no specific follow-up imaging is recommended. RECOMMENDATIONS: NODULE 1:  ACR TI-RADS TR4: Recommend: Follow-up ultrasound in 1 year. ACR TI-RADS recommendations: TR5 (>= 7 points):  FNA if >= 1 cm; follow-up if 0.5-0.9 cm in 1, 2, 3, 4, and 5 years TR4 (4-6 points):  FNA if >= 1.5 cm; follow-up if 1.0-1.4 cm in 1, 2, 3, and 5 years TR3 (3 points):  FNA if >= 2.5 cm; follow-up if 1.5-2.4 cm in 1, 3, and 5 years TR2 (2 points):  No FNA or follow-up TR1 (0 points):  No FNA or follow-up ACR TI-RADS recommends that no more than two nodules with the highest ACR TI-RADS point total should be biopsied and no more than four nodules should be followed. XR CHEST PORTABLE    Result Date: 11/20/2022  Lungs are clear     CTA HEAD NECK W CONTRAST    Result Date: 11/20/2022  Stable complete occlusion of left cervical ICA. There is approximately 50% stenosis in the origin of right ICA by NASCET criteria. Stable occlusion of the left petrous and cavernous segments of intracranial ICA with opacification of the supraclinoid segment. Severe narrowing is again noted in the cavernous segment of right ICA.  Occlusion of right V1 and proximal V2 segments of right vertebral artery, new compared to the previous exam     MRI brain without contrast    Result Date: 11/21/2022  1. No evidence of an acute infarct. 2. Severe chronic microvascular white matter ischemic disease with chronic infarcts involving bilateral cerebral hemispheres, the left thalamus, left globus pallidus, and right caudate head. CT lung screen     Result Date: 11/17/2022  1. Emphysematous changes. 2.  Nodule subpleural less than 2 mm. Ground-glass area in the azygoesophageal recess unchanged from prior study in 2021. 3.  No mediastinal adenopathy. 4.  Atherosclerotic calcification including coronary artery calcification. LUNG RADS: Per ACR Lung-RADS Version 1.1 Category 2. Benign nodules or behavior. Advise annual low-dose CT scanning of the chest. RECOMMENDATIONS: If you would like to register your patient with the [address] [place] [address] Program, please contact the Nurse Navigator at [phone]).       Physical Examination:        General appearance:  alert, cooperative and morbidly obese  Mental Status:  oriented to person, place and time and normal affect  Lungs:  clear to auscultation bilaterally, normal effort  Heart:  regular rate and rhythm, no murmur  Abdomen:  soft, nontender, nondistended, normal bowel sounds, no masses, hepatomegaly, splenomegaly  Extremities:  no edema, redness, tenderness in the calves  Skin:  no gross lesions, rashes, induration    Assessment:        Hospital Problems             Last Modified POA    * (Principal) Vertigo 11/20/2022 Yes    History of stroke 11/22/2022 Yes    Occlusion of right internal carotid artery 11/22/2022 Yes    Thyroid nodule 11/22/2022 Yes    Essential hypertension 11/21/2022 Yes    Pure hypercholesterolemia 11/21/2022 Yes    CKD (chronic kidney disease) stage 3, GFR 30-59 ml/min ([facility]) 11/21/2022 Yes    Peripheral vascular disease ([address] 11/21/2022 Yes    TIBURCIO (obstructive sleep apnea) 11/21/2022 Yes    Class 3 severe obesity due to excess calories with serious comorbidity and body mass index (BMI) of 50.0 to 59.9 in adult [facility]) 11/21/2022 Yes       Plan:        Bradycardia  Heart rate stable 50-60  Continue BiPAP    Vertigo with ICA occlusion  Neurology and neuro endovascular following  Continue aspirin and brilinta  Continue statin    Essential hypertension  Long discussion with patient regarding antihypertensive therapy, patient blood pressure has been stable around 120s without antihypertensives. Patient needs permissive hypertension  Advised patient to stop all antihypertensives on discharge and follow up with PCP if blood pressure gets elevated above 617 systolic. Microcytic anemia-continue iron supplementation  Patient will need follow up with pcp for microcytic anemia workup and age appropriate screening    Thyroid nodule-enlarged right lobe noted 10/19 with subtle 1.1 cm nodule, 1 year ultrasound recommended.     Morbid [id number] with bariatric surgery    [facility] will sign off  Please call if any questions    Hugo Cullen MD  11/22/2022  1:32 PM ]

## 2022-11-22 NOTE — CARE COORDINATION
Transitional Planning  Spoke with Maria Eugenia Weber at [facility] care, she is questioning if patient meets homebound status. Patient does not meet home bound status, she frequently leaves the home for non medical reasons and they are not able to provide home care service under medicare benefit. Patient receives home health aide non skilled services thru [facility]. [facility] able to continue to provide that service. 228 pm Patient willing to go to outpatient PT/OT therapy.  Update given to MD. Order for PT/

## 2022-11-22 NOTE — DISCHARGE INSTRUCTIONS
Vertigo with ICA occlusion  Continue aspirin and brilinta  Continue statin    Essential hypertension  Long discussion with patient regarding antihypertensive therapy, patient blood pressure has been stable around 120s without antihypertensives. Patient needs permissive hypertension  Advised patient to stop all antihypertensives on discharge and follow up with PCP if blood pressure gets elevated above 853-435 systolic.     Microcytic anemia-continue iron supplementation  Patient will need follow up with pcp for microcytic anemia workup and age appropriate screening    Thyroid nodule-enlarged right lobe noted 10/19 with subtle 1.1 cm nodule, 1 year ultrasound recommended

## 2022-11-22 NOTE — CARE COORDINATION
Case Management Assessment  Initial Evaluation    Date/Time of Evaluation: 11/22/2022 10:33 AM  Assessment Completed by: Yaneth Ann RN    If patient is discharged prior to next notation, then this note serves as note for discharge by case management. Patient Name: Kim Murrell                   YOB: 1955  Diagnosis: Vertigo [R42]                   Date / Time: 11/20/2022  4:24 PM    Patient Admission Status: Inpatient   Readmission Risk (Low < 19, Mod (19-27), High > 27): Readmission Risk Score: 15.5    Current PCP: Margaret Henry MD  PCP verified by CM? Yes    Chart Reviewed: Yes      History Provided by: Patient  Patient Orientation: Alert and Oriented    Patient Cognition: Alert    Hospitalization in the last 30 days (Readmission):  No    If yes, Readmission Assessment in CM Navigator will be completed. Advance Directives:      Code Status: Full Code   Patient's Primary Decision Maker is:      Primary Decision Maker[facility]dee [facility] - [phone]    Secondary Decision Maker: [facility] - [phone]    Discharge Planning:    Patient lives with: Spouse/Significant Other Type of Home: Apartment  Primary Care Giver: Self  Patient Support Systems include: Children   Current Financial resources:    Current community resources:    Current services prior to admission: Durable Medical Equipment, Home Care ([facility])            Current DME: Nasima Garcia            Type of Home Care services:  Aide Services    ADLS  Prior functional level:    Current functional level:      PT AM-PAC: 17 /24  OT AM-PAC: 17 /24    Family can provide assistance at DC: Would you like Case Management to discuss the discharge plan with any other family members/significant others, and if so, who?     Plans to Return to Present Housing: Unknown at present  Other Identified Issues/Barriers to RETURNING to current housing: caretaker for  with dementia  Potential Assistance needed at discharge: Aidan Headley            Potential DME:    Patient expects to discharge to: [address] [place] [address] for transportation at discharge:      Financial    Payor: Rosette Lawrenceer / Plan: Milvia Ruiz / Product Type: *No Product type* /     Does insurance require precert for SNF: Yes    Potential assistance Purchasing Medications: No  Meds-to-Beds request: Yes      [address] [place] [address] [facility] [phone]  [address] [place] [address]  Phone: [phone] Fax: [phone]      Notes:    Factors facilitating achievement of predicted outcomes:     Barriers to discharge: Additional Case Management Notes: watch PT/OT, wants to return home    The Plan for Transition of Care is related to the following treatment goals of Vertigo [B71]    IF APPLICABLE: The Patient and/or patient representative Pili Conroy and her family were provided with a choice of provider and agrees with the discharge plan. Freedom of choice list with basic dialogue that supports the patient's individualized plan of care/goals and shares the quality data associated with the providers was provided to:     Patient Representative Name:       The Patient and/or Patient Representative Agree with the Discharge Plan?       Luli Law RN  Case Management Department  Ph: [phone] Fax:

## 2022-11-23 ENCOUNTER — TELEPHONE (OUTPATIENT)
Dept: INTERNAL MEDICINE | Age: 67
End: 2022-11-23

## 2022-11-23 NOTE — TELEPHONE ENCOUNTER
PC back to [facility] re: pt's medications, those in bubble packing department are gone for the day, unable to confirm  of amlodipine and losartan to help pt identify. Will search online. Pharmacist's name is Maria Alejandra Regalado, called [facility] pharmacy to request Brilinta script be transferred. PC to pt, she and family member were able to read imprints on pt's pills to determine which ones were the losartan and amlodipine. Pt was already familiar with Plavix and HCTZ. All discontinued medications have been identified and removed from pt's bubble packs. Writer to f/u with [facility] Sy on Friday to ensure receipt of Brilinta script and notify bubble packing dept of change in medications.

## 2022-11-23 NOTE — TELEPHONE ENCOUNTER
[facility] Transitions Initial Follow Up Call    Outreach made within 2 business days of discharge: Yes    Patient: Mariano Ponce   Patient : 1955   MRN: [id number]    Reason for Admission: vertigo  Discharge Date: 2022       Spoke with: Dereck Gonzalez    Discharge [facility] [address] [facility]    TCM Interactive Patient Contact:  Was patient able to fill all prescriptions: Yes  Was patient instructed to bring all medications to the follow-up visit: Yes  Is patient taking all medications as directed in the discharge summary? Yes  Does patient understand their discharge instructions: Yes  Does patient have questions or concerns that need addressed prior to 7-14 day follow up office visit: yes - see note below    Scheduled appointment with PCP within 7-14 days    Meclizine and Brilinta were delivered to pt in hospital room. Pt states she is feeling much better today, states the meclizine is helping with the vertigo. Pt's medications are bubble packed, needs assistance in identifying medications that were discontinued. Pt states she knows which ones the Plavix and HCTZ are. Writer placed PC to [facility] to [place] [place] future [facility], spoke with Christo Green, he needs name of pharmacist at [facility] to be able to put attn: to on transfer. Writer placed PC to [facility] to speak with someone in bubble packing dept for assistance with pill identification and obtain name of pharmacist on duty today, no answer. Left HIPAA compliant message identifying self and nature of call, requested call back to writer, phone number given.       Follow Up  Future Appointments   Date Time Provider Mary Marinelli   2022 12:00 PM SCHEDULE, [facility] BARIATRIC DIETICIAN bariatric shen [facility]   2022  2:45 PM Alessio Rai DPM [facility] Podiatry [facility]   2022  4:20 PM LOY Merrill - CNP Neuro [facility] [address] Neurology -   2023  1:00 PM [facility]   1/31/2023  4:20 PM [facility]   3/1/2023  3:20 PM [facility]   4/3/2023  3:45 PM Benny Galan MD [facility] Anusha Painter RN

## 2022-11-23 NOTE — PROGRESS NOTES
CLINICAL PHARMACY NOTE: MEDS TO BEDS    Total # of Prescriptions Filled: 2   The following medications were delivered to the patient:  Meclizine  brilinta    Additional Documentation:

## 2022-11-28 ENCOUNTER — OFFICE VISIT (OUTPATIENT)
Dept: BARIATRICS/WEIGHT MGMT | Age: 67
End: 2022-11-28
Payer: COMMERCIAL

## 2022-11-28 ENCOUNTER — HOSPITAL ENCOUNTER (OUTPATIENT)
Age: 67
Setting detail: SPECIMEN
Discharge: HOME | End: 2022-11-28

## 2022-11-28 VITALS
WEIGHT: 293 LBS | HEIGHT: 67 IN | DIASTOLIC BLOOD PRESSURE: 85 MMHG | BODY MASS INDEX: 45.99 KG/M2 | SYSTOLIC BLOOD PRESSURE: 138 MMHG | HEART RATE: 64 BPM

## 2022-11-28 DIAGNOSIS — Z86.73 HISTORY OF STROKE: ICD-10-CM

## 2022-11-28 DIAGNOSIS — R73.03 PREDIABETES: ICD-10-CM

## 2022-11-28 DIAGNOSIS — D56.3 ALPHA THALASSEMIA TRAIT: ICD-10-CM

## 2022-11-28 DIAGNOSIS — E78.00 PURE HYPERCHOLESTEROLEMIA: ICD-10-CM

## 2022-11-28 DIAGNOSIS — R42 VERTIGO: ICD-10-CM

## 2022-11-28 DIAGNOSIS — I10 ESSENTIAL HYPERTENSION: Primary | ICD-10-CM

## 2022-11-28 DIAGNOSIS — M54.16 LUMBAR RADICULAR PAIN: ICD-10-CM

## 2022-11-28 DIAGNOSIS — I10 ESSENTIAL HYPERTENSION: ICD-10-CM

## 2022-11-28 DIAGNOSIS — D50.9 MICROCYTIC ANEMIA: ICD-10-CM

## 2022-11-28 DIAGNOSIS — G47.33 OSA (OBSTRUCTIVE SLEEP APNEA): ICD-10-CM

## 2022-11-28 DIAGNOSIS — N18.30 STAGE 3 CHRONIC KIDNEY DISEASE, UNSPECIFIED WHETHER STAGE 3A OR 3B CKD (HCC): ICD-10-CM

## 2022-11-28 DIAGNOSIS — I73.9 PERIPHERAL VASCULAR DISEASE (HCC): ICD-10-CM

## 2022-11-28 DIAGNOSIS — E66.01 MORBID OBESITY WITH BMI OF 60.0-69.9, ADULT (HCC): ICD-10-CM

## 2022-11-28 LAB
ABSOLUTE EOS #: 0.32 K/UL (ref 0–0.44)
ABSOLUTE IMMATURE GRANULOCYTE: 0.03 K/UL (ref 0–0.3)
ABSOLUTE LYMPH #: 1.4 K/UL (ref 1.1–3.7)
ABSOLUTE MONO #: 0.42 K/UL (ref 0.1–1.2)
ALBUMIN SERPL-MCNC: 4.1 G/DL (ref 3.5–5.2)
ALBUMIN/GLOBULIN RATIO: 1.1 (ref 1–2.5)
ALP BLD-CCNC: 167 U/L (ref 35–104)
ALT SERPL-CCNC: 13 U/L (ref 5–33)
ANION GAP SERPL CALCULATED.3IONS-SCNC: 13 MMOL/L (ref 9–17)
AST SERPL-CCNC: 18 U/L
BASOPHILS # BLD: 1 % (ref 0–2)
BASOPHILS ABSOLUTE: 0.06 K/UL (ref 0–0.2)
BILIRUB SERPL-MCNC: 0.6 MG/DL (ref 0.3–1.2)
BUN BLDV-MCNC: 17 MG/DL (ref 8–23)
CALCIUM SERPL-MCNC: 10.2 MG/DL (ref 8.6–10.4)
CHLORIDE BLD-SCNC: 106 MMOL/L (ref 98–107)
CHOLESTEROL/HDL RATIO: 3.5
CHOLESTEROL: 152 MG/DL
CO2: 26 MMOL/L (ref 20–31)
CREAT SERPL-MCNC: 1.13 MG/DL (ref 0.5–0.9)
EOSINOPHILS RELATIVE PERCENT: 5 % (ref 1–4)
ESTIMATED AVERAGE GLUCOSE: 120 MG/DL
FERRITIN: 611 NG/ML (ref 13–150)
FOLATE: >20 NG/ML
GFR SERPL CREATININE-BSD FRML MDRD: 53 ML/MIN/1.73M2
GLUCOSE BLD-MCNC: 93 MG/DL (ref 70–99)
HBA1C MFR BLD: 5.8 % (ref 4–6)
HCT VFR BLD CALC: 36.6 % (ref 36.3–47.1)
HDLC SERPL-MCNC: 43 MG/DL
HEMOGLOBIN: 11.1 G/DL (ref 11.9–15.1)
IMMATURE GRANULOCYTES: 1 %
IRON SATURATION: 20 % (ref 20–55)
IRON: 48 UG/DL (ref 37–145)
LDL CHOLESTEROL: 86 MG/DL (ref 0–130)
LYMPHOCYTES # BLD: 22 % (ref 24–43)
MAGNESIUM: 2.3 MG/DL (ref 1.6–2.6)
MCH RBC QN AUTO: 21.6 PG (ref 25.2–33.5)
MCHC RBC AUTO-ENTMCNC: 30.3 G/DL (ref 28.4–34.8)
MCV RBC AUTO: 71.3 FL (ref 82.6–102.9)
MONOCYTES # BLD: 7 % (ref 3–12)
NRBC AUTOMATED: 0 PER 100 WBC
PDW BLD-RTO: 16.8 % (ref 11.8–14.4)
PLATELET # BLD: 404 K/UL (ref 138–453)
PMV BLD AUTO: 10.2 FL (ref 8.1–13.5)
POTASSIUM SERPL-SCNC: 4.9 MMOL/L (ref 3.7–5.3)
PTH INTACT: 47.6 PG/ML (ref 14–72)
RBC # BLD: 5.13 M/UL (ref 3.95–5.11)
RBC # BLD: ABNORMAL 10*6/UL
SEG NEUTROPHILS: 64 % (ref 36–65)
SEGMENTED NEUTROPHILS ABSOLUTE COUNT: 4.26 K/UL (ref 1.5–8.1)
SODIUM BLD-SCNC: 145 MMOL/L (ref 135–144)
TOTAL IRON BINDING CAPACITY: 239 UG/DL (ref 250–450)
TOTAL PROTEIN: 7.7 G/DL (ref 6.4–8.3)
TRIGL SERPL-MCNC: 115 MG/DL
TSH SERPL DL<=0.05 MIU/L-ACNC: 5.06 UIU/ML (ref 0.3–5)
UNSATURATED IRON BINDING CAPACITY: 191 UG/DL (ref 112–347)
VITAMIN B-12: 778 PG/ML (ref 232–1245)
VITAMIN D 25-HYDROXY: 36.7 NG/ML
WBC # BLD: 6.5 K/UL (ref 3.5–11.3)

## 2022-11-28 PROCEDURE — 99213 OFFICE O/P EST LOW 20 MIN: CPT | Performed by: NURSE PRACTITIONER

## 2022-11-28 PROCEDURE — 1123F ACP DISCUSS/DSCN MKR DOCD: CPT | Performed by: NURSE PRACTITIONER

## 2022-11-28 PROCEDURE — 3078F DIAST BP <80 MM HG: CPT | Performed by: NURSE PRACTITIONER

## 2022-11-28 PROCEDURE — 3074F SYST BP LT 130 MM HG: CPT | Performed by: NURSE PRACTITIONER

## 2022-11-28 NOTE — TELEPHONE ENCOUNTER
PC to [facility] Yossi, unable to reach anyone in bubble packing dept. Left message this time with medications that have been discontinued as well as that Brilinta was started. Let them know request was made of [facility] outpt pharmacy to transfer [address] [facility] [address] script to [facility] for bubble packing. Left phone numbers for writer and [facility] pharmacy if they have questions, but did request they call writer to confirm message was received.

## 2022-11-28 NOTE — PROGRESS NOTES
Medical Weight Management Progress Note    Subjective     Patient being seen for medically supervised weight loss for the chronic conditions of HTN, TIBURCIO, HLD, Chronic Back and Knee Pain, Hx CVA, CKD, PVD, Anemia, Prediabetes, Vertigo. She is working on the behavior changes discussed at the initial appointment. Patient continues on diet plan. Physical activity includes walking as tolerated. Weight today is 373 lbs. Quit smoking in 2010. Using CPAP. Psych eval scheduled 12/6/22. Needs to schedule EGD. Needs cardiac, pulmonary, and neurology clearances. No current issues. Working toward bariatric surgery:    [x] Sleeve Gastrectomy                                                           [] Sunday-en-Y Gastric Bypass    Allergies: Allergies   Allergen Reactions    Duricef [Cefadroxil Monohydrate] Hives    Fish-Derived Products Hives    Pcn [Penicillins] Hives    Tomato Hives       Past Medical History:     Past Medical History:   Diagnosis Date    Ambulates with cane     Bleeding     while on aggrenox    Cataracts, bilateral     Cerebrovascular disease 2003,2011    cva    Chronic pain in left foot     CKD (chronic kidney disease) stage 3, GFR 30-59 ml/min ([facility] [address]     COVID-19 11/2021    states hpspitalized    GERD (gastroesophageal reflux disease)     Hx of blood clots     Hyperlipidemia     Hypertension     Iron (Fe) deficiency anemia     Nicotine abuse     Obesity     TIBURCIO on CPAP     Osteoarthritis     Peripheral vascular disease ([address] 03/13/2014    Substance abuse ([facility] [address]     cocaine, canabis    Thalassemia minor     Thyroid nodule 11/22/2022    Unspecified cerebral artery occlusion with cerebral infarction     Wears dentures     upper and lower   .     Past Surgical History:  Past Surgical History:   Procedure Laterality Date    BREAST SURGERY      biopsy    COLONOSCOPY  12/2007    adenomatous polyp    COLONOSCOPY  2013    normal, repeat in 5 years    COLONOSCOPY N/A 8/21/2019    COLONOSCOPY POLYPECTOMY SNARE/COLD BIOPSY performed by Umesh Garcia MD at [place] [address]      UPPER GASTROINTESTINAL ENDOSCOPY  2009    negative    UPPER GASTROINTESTINAL ENDOSCOPY N/A 2019    EGD BIOPSY performed by Umesh Garcia MD at [facility]       Family History:  Family History   Problem Relation Age of Onset    High Blood Pressure Mother     Asthma Mother     Heart Disease Mother     Heart Disease Father     High Blood Pressure Father     Diabetes Brother     High Blood Pressure Brother     Heart Disease Brother     High Blood Pressure Maternal Grandmother     High Blood Pressure Maternal Grandfather     Heart Disease Maternal Grandfather        Social History:  Social History     Socioeconomic History    Marital status:      Spouse name: Not on file    Number of children: Not on file    Years of education: Not on file    Highest education level: Not on file   Occupational History    Not on file   Tobacco Use    Smoking status: Former     Packs/day: 2.00     Years: 35.00     Pack years: 70.00     Types: Cigarettes     Quit date: 10/11/2011     Years since quittin.1     Passive exposure: Current (Outside of the home)    Smokeless tobacco: Never   Vaping Use    Vaping Use: Never used   Substance and Sexual Activity    Alcohol use:  Yes     Alcohol/week: 6.0 standard drinks     Types: 6 Cans of beer per week     Comment: rare    Drug use: No     Types: Cocaine, Marijuana (Weed)     Comment: per pt did years ago; cocaine & marij, 17 denies current use    Sexual activity: Yes     Partners: Male   Other Topics Concern    Not on file   Social History Narrative    Not on file     Social Determinants of Health     Financial Resource Strain: Low Risk     Difficulty of Paying Living Expenses: Not hard at all   Food Insecurity: No Food Insecurity    Worried About [employer] [employer] in the Last Year: Never true    [address] in the Last Year: Never true   Transportation Needs: Not on file   Physical Activity: Not on file   Stress: Not on file   Social Connections: Not on file   Intimate Partner Violence: Not on file   Housing Stability: Not on file       Current Medications:  Current Outpatient Medications   Medication Sig Dispense Refill    meclizine (ANTIVERT) 25 MG tablet Take 1 tablet by mouth 3 times daily as needed for Dizziness 30 tablet 0    ticagrelor (BRILINTA) 90 MG TABS tablet Take 1 tablet by mouth 2 times daily 60 tablet 2    aspirin 81 MG EC tablet TAKE 1 TABLET BY MOUTH DAILY 28 tablet 1    Cholecalciferol (VITAMIN D3) 25 MCG TABS TAKE 1 TABLET BY MOUTH DAILY 28 tablet 1    Vitamin D (CHOLECALCIFEROL) 25 MCG (1000 UT) TABS tablet       gabapentin (NEURONTIN) 600 MG tablet Take 600 mg by mouth 3 times daily. acetaminophen (TYLENOL) 325 MG tablet TAKE 2 TABLETS BY MOUTH EVERY 4 HOURS AS NEEDED FOR PAIN 60 tablet 3    atorvastatin (LIPITOR) 80 MG tablet TAKE 1 TABLET BY MOUTH DAILY 28 tablet 3    omeprazole (PRILOSEC) 20 MG delayed release capsule TAKE 1 CAPSULE BY MOUTH TWICE A DAY WITH MEALS 56 capsule 1    ferrous sulfate (IRON 325) 325 (65 Fe) MG tablet Take 1 tab three days days a week 90 tablet 1    folic acid (FOLVITE) 1 MG tablet TAKE 1 TABLET BY MOUTH DAILY 60 tablet 2    guaiFENesin (ALTARUSSIN) 100 MG/5ML syrup Take 10 mLs by mouth 3 times daily as needed for Cough 1 each 0    vitamin B-1 (THIAMINE) 100 MG tablet Take 1 tablet by mouth daily 30 tablet 3    diclofenac sodium (VOLTAREN) 1 % GEL Apply 4 g topically 4 times daily 4 Tube 1     No current facility-administered medications for this visit. Vital Signs:  /85 (Site: Right Lower Arm, Position: Sitting, Cuff Size: Large Adult)   Pulse 64   Ht 5' 7\" (1.702 m)   Wt (!) 373 lb (169.2 kg)   BMI 58.42 kg/m²     BMI/Height/Weight:  Body mass index is 58.42 kg/m². Review of Systems - A review of systems was performed.   All was negative unless otherwise documented in HPI. Constitutional: Negative for fever, chills. HENT: Negative for trouble swallowing. Eyes: Negative for visual disturbance. Respiratory: Negative for cough, shortness of breath. Cardiovascular: Negative for chest pain and palpitations. Gastrointestinal: Negative for nausea, vomiting, abdominal pain, diarrhea, constipation, blood in stool and abdominal distention. Endocrine: Negative for polydipsia, polyphagia and polyuria. Genitourinary: Negative for dysuria, frequency, hematuria and difficulty urinating. Musculoskeletal: Negative for myalgias, joint swelling. Skin: Negative for pallor and rash. Neurological: Negative for dizziness, tremors, light-headedness and headaches. Psychiatric/Behavioral: Negative for sleep disturbance and dysphoric mood. Objective:      Physical Exam   Vital signs reviewed. General: Well-developed and well-nourished. No acute distress. Skin: Warm, dry and intact. HEENT: Normocephalic. EOMs intact. Conjunctivae normal. Neck supple. Cardiovascular: Normal rate, regular rhythm. Pulmonary/Chest: Normal effort. Lungs clear to auscultation. No rales, rhonchi or wheezing. Abdominal: Positive bowel sounds. Soft, nontender. Nondistended. Musculoskeletal: Movement x4. No edema. Neurological: Ambulates with a cane. Alert and oriented to person, place, and time. Psychiatric: Normal mood and affect. Speech and behavior normal. Judgment and thought content normal. Cognition and memory intact. Assessment:       Diagnosis Orders   1. Essential hypertension  CBC with Auto Differential    Comprehensive Metabolic Panel    Ferritin    Hemoglobin A1C    Iron and TIBC    Lipid Panel    Magnesium    PTH, Intact    TSH    Vitamin A    Vitamin B1    Vitamin B12 & Folate    Vitamin D 25 Hydroxy    Zinc    Nicotine, Blood    Urine Drug Screen      2.  TIBURCIO (obstructive sleep apnea)  CBC with Auto Differential    Comprehensive Metabolic Panel Ferritin    Hemoglobin A1C    Iron and TIBC    Lipid Panel    Magnesium    PTH, Intact    TSH    Vitamin A    Vitamin B1    Vitamin B12 & Folate    Vitamin D 25 Hydroxy    Zinc    Nicotine, Blood    Urine Drug Screen      3. Pure hypercholesterolemia  CBC with Auto Differential    Comprehensive Metabolic Panel    Ferritin    Hemoglobin A1C    Iron and TIBC    Lipid Panel    Magnesium    PTH, Intact    TSH    Vitamin A    Vitamin B1    Vitamin B12 & Folate    Vitamin D 25 Hydroxy    Zinc    Nicotine, Blood    Urine Drug Screen      4. Lumbar radicular pain  CBC with Auto Differential    Comprehensive Metabolic Panel    Ferritin    Hemoglobin A1C    Iron and TIBC    Lipid Panel    Magnesium    PTH, Intact    TSH    Vitamin A    Vitamin B1    Vitamin B12 & Folate    Vitamin D 25 Hydroxy    Zinc    Nicotine, Blood    Urine Drug Screen      5. History of stroke  CBC with Auto Differential    Comprehensive Metabolic Panel    Ferritin    Hemoglobin A1C    Iron and TIBC    Lipid Panel    Magnesium    PTH, Intact    TSH    Vitamin A    Vitamin B1    Vitamin B12 & Folate    Vitamin D 25 Hydroxy    Zinc    Nicotine, Blood    Urine Drug Screen      6. Stage 3 chronic kidney disease, unspecified whether stage 3a or 3b CKD (HCC)  CBC with Auto Differential    Comprehensive Metabolic Panel    Ferritin    Hemoglobin A1C    Iron and TIBC    Lipid Panel    Magnesium    PTH, Intact    TSH    Vitamin A    Vitamin B1    Vitamin B12 & Folate    Vitamin D 25 Hydroxy    Zinc    Nicotine, Blood    Urine Drug Screen      7. Peripheral vascular disease (HCC)  CBC with Auto Differential    Comprehensive Metabolic Panel    Ferritin    Hemoglobin A1C    Iron and TIBC    Lipid Panel    Magnesium    PTH, Intact    TSH    Vitamin A    Vitamin B1    Vitamin B12 & Folate    Vitamin D 25 Hydroxy    Zinc    Nicotine, Blood    Urine Drug Screen      8.  Morbid obesity with BMI of 60.0-69.9, adult (HCC)  CBC with Auto Differential    Comprehensive Metabolic Panel    Ferritin    Hemoglobin A1C    Iron and TIBC    Lipid Panel    Magnesium    PTH, Intact    TSH    Vitamin A    Vitamin B1    Vitamin B12 & Folate    Vitamin D 25 Hydroxy    Zinc    Nicotine, Blood    Urine Drug Screen      9. Microcytic anemia  CBC with Auto Differential    Comprehensive Metabolic Panel    Ferritin    Hemoglobin A1C    Iron and TIBC    Lipid Panel    Magnesium    PTH, Intact    TSH    Vitamin A    Vitamin B1    Vitamin B12 & Folate    Vitamin D 25 Hydroxy    Zinc    Nicotine, Blood    Urine Drug Screen      10. Alpha thalassemia trait  CBC with Auto Differential    Comprehensive Metabolic Panel    Ferritin    Hemoglobin A1C    Iron and TIBC    Lipid Panel    Magnesium    PTH, Intact    TSH    Vitamin A    Vitamin B1    Vitamin B12 & Folate    Vitamin D 25 Hydroxy    Zinc    Nicotine, Blood    Urine Drug Screen      11. Prediabetes  CBC with Auto Differential    Comprehensive Metabolic Panel    Ferritin    Hemoglobin A1C    Iron and TIBC    Lipid Panel    Magnesium    PTH, Intact    TSH    Vitamin A    Vitamin B1    Vitamin B12 & Folate    Vitamin D 25 Hydroxy    Zinc    Nicotine, Blood    Urine Drug Screen      12. Vertigo            Plan:    Dietitian visit today. Patient was encouraged to journal all food intake. Keep calorie level at approximately 8307-6428. Protein intake is to be a minimum of 60-80 grams per day. Water drinking was encouraged with a goal of 64oz-128oz daily. Beverages to be calorie free except for milk. Every other beverage should be water. Avoid soda. Continue to increase level of physical activity. Encouraged use of exercise log. Patients will undergo thorough nutritional evaluation and counseling with our registered dietician. Our program provides long term nutritional counseling with unlimited consults with the dietician. All patients are nicotine tested and require a negative nicotine level prior to surgery.   Patient has been educated about the risks associated with substance use, as well as the risks of using nicotine and alcohol after surgery. Patient has been educated that theses substances need to be avoided lifelong after surgery to reduce the risk of complications and sub-optimal weight loss. Bariatric pre-op labs ordered. Follow-up  Return in about 1 month (around 12/28/2022). Orders this encounter:  Orders Placed This Encounter   Procedures    CBC with Auto Differential     Standing Status:   Future     Number of Occurrences:   1     Standing Expiration Date:   11/28/2023    Comprehensive Metabolic Panel     Standing Status:   Future     Number of Occurrences:   1     Standing Expiration Date:   11/28/2023    Ferritin     Standing Status:   Future     Number of Occurrences:   1     Standing Expiration Date:   11/28/2023    Hemoglobin A1C     Standing Status:   Future     Number of Occurrences:   1     Standing Expiration Date:   11/28/2023    Iron and TIBC     Standing Status:   Future     Number of Occurrences:   1     Standing Expiration Date:   11/28/2023     Order Specific Question:   Is Patient Fasting? Answer:   no     Order Specific Question:   No of Hours?      Answer:   0    Lipid Panel     Standing Status:   Future     Number of Occurrences:   1     Standing Expiration Date:   11/28/2023     Order Specific Question:   Is Patient Fasting?/# of Hours     Answer:   12    Magnesium     Standing Status:   Future     Number of Occurrences:   1     Standing Expiration Date:   11/28/2023    PTH, Intact     Standing Status:   Future     Number of Occurrences:   1     Standing Expiration Date:   11/28/2023    TSH     Standing Status:   Future     Number of Occurrences:   1     Standing Expiration Date:   11/28/2023    Vitamin A     Standing Status:   Future     Number of Occurrences:   1     Standing Expiration Date:   11/28/2023    Vitamin B1     Standing Status:   Future     Number of Occurrences:   1     Standing Expiration Date: 11/28/2023    Vitamin B12 & Folate     Standing Status:   Future     Number of Occurrences:   1     Standing Expiration Date:   11/28/2023    Vitamin D 25 Hydroxy     Standing Status:   Future     Number of Occurrences:   1     Standing Expiration Date:   11/28/2023    Zinc     Standing Status:   Future     Number of Occurrences:   1     Standing Expiration Date:   11/28/2023    Nicotine, Blood     Standing Status:   Future     Number of Occurrences:   1     Standing Expiration Date:   11/28/2023    Urine Drug Screen     Standing Status:   Future     Number of Occurrences:   1     Standing Expiration Date:   11/28/2023       Prescriptions this encounter:  No orders of the defined types were placed in this encounter.       Electronically signed by:  Keila Hagen CNP

## 2022-11-29 ENCOUNTER — HOSPITAL ENCOUNTER (OUTPATIENT)
Age: 67
Setting detail: SPECIMEN
Discharge: HOME | End: 2022-11-29

## 2022-11-29 LAB
AMPHETAMINE SCREEN URINE: NEGATIVE
BARBITURATE SCREEN URINE: NEGATIVE
BENZODIAZEPINE SCREEN, URINE: NEGATIVE
CANNABINOID SCREEN URINE: NEGATIVE
COCAINE METABOLITE, URINE: NEGATIVE
FENTANYL URINE: NEGATIVE
METHADONE SCREEN, URINE: NEGATIVE
OPIATES, URINE: NEGATIVE
OXYCODONE SCREEN URINE: NEGATIVE
PHENCYCLIDINE, URINE: NEGATIVE
REASON FOR REJECTION: NORMAL
TEST INFORMATION: NORMAL
ZZ NTE CLEAN UP: ORDERED TEST: NORMAL
ZZ NTE WITH NAME CLEAN UP: SPECIMEN SOURCE: NORMAL

## 2022-11-29 NOTE — TELEPHONE ENCOUNTER
PC to [facility], spoke with Carmen Medina, she confirmed with bubble packing dept that message re: pt's medications was received.

## 2022-12-01 LAB
RETINYL PALMITATE: <0.02 MG/L (ref 0–0.1)
VITAMIN A LEVEL: 0.59 MG/L (ref 0.3–1.2)
VITAMIN A, INTERP: NORMAL
ZINC: 87 UG/DL (ref 60–120)

## 2022-12-02 LAB
COTININE: <5 NG/ML
NICOTINE: <5 NG/ML
VITAMIN B1 WHOLE BLOOD: 73 NMOL/L (ref 70–180)

## 2022-12-08 DIAGNOSIS — I63.00 CEREBROVASCULAR ACCIDENT (CVA) DUE TO THROMBOSIS OF PRECEREBRAL ARTERY (HCC): ICD-10-CM

## 2022-12-08 DIAGNOSIS — R09.81 SINUS CONGESTION: ICD-10-CM

## 2022-12-08 NOTE — TELEPHONE ENCOUNTER
Request for multiple medication refill      Next Visit Date:12/14/22  Future Appointments   Date Time Provider Mary Bakeri   12/14/2022  3:40 PM [facility]   12/19/2022  2:45 PM Karla Marie DPM [facility]   12/20/2022  4:20 PM [facility] -   12/27/2022  3:30 PM SCHEDULE, [facility]ATRIC DIETICIAN [facility]   1/18/2023  1:00 PM [facility]   1/31/2023  4:20 PM Singh Singer MD [facility]   3/1/2023  3:20 PM [facility]   4/3/2023  3:45 PM [place] [id number] [facility] Maintenance   Topic Date Due    Pneumococcal 65+ years Vaccine (3) 12/20/2021    COVID-19 Vaccine (4 - Booster for Leal Peter series) 03/17/2022    Flu vaccine (1) 08/01/2022    Colorectal Cancer Screen  08/21/2022    Annual Wellness Visit (AWV)  10/08/2022    Depression Screen  06/08/2023    Low dose CT lung screening  11/15/2023    A1C test (Diabetic or Prediabetic)  11/28/2023    Lipids  11/28/2023    Breast cancer screen  12/08/2023    DTaP/Tdap/Td vaccine (2 - Td or Tdap) 03/08/2028    DEXA (modify frequency per FRAX score)  Completed    Shingles vaccine  Completed    Hepatitis C screen  Completed    Hepatitis A vaccine  Aged Out    Hib vaccine  Aged Out    Meningococcal (ACWY) vaccine  Aged Out       Hemoglobin A1C (%)   Date Value   11/28/2022 5.8   11/03/2022 5.8   09/29/2021 5.8             ( goal A1C is < 7)   Microalb/Crt.  Ratio (mcg/mg creat)   Date Value   05/09/2017 6     LDL Cholesterol (mg/dL)   Date Value   11/28/2022 86       (goal LDL is <100)   AST (U/L)   Date Value   11/28/2022 18     ALT (U/L)   Date Value   11/28/2022 13     BUN (mg/dL)   Date Value   11/28/2022 17     BP Readings from Last 3 Encounters:   11/28/22 138/85   11/22/22 114/78   11/16/22 139/84          (goal 120/80)    All Future Testing planned in [facility]  Lab Frequency Next Occurrence XR CHEST (2 VW) Once 05/18/2022   Protein / Creatinine Ratio, Urine Once 06/10/2022   COLONOSCOPY (Screening) Once 10/19/2022   Ferritin Once 10/03/2022   Iron and TIBC Once 10/03/2022   Transferrin Once 10/03/2022   CBC with Auto Differential     Comprehensive Metabolic Panel     CBC with Auto Differential           Patient Active Problem List:     Essential hypertension     Pure hypercholesterolemia     Cerebrovascular accident (CVA), 2011, no residual deficit (HCC)     CKD (chronic kidney disease) stage 3, GFR 30-59 ml/min (HCC)     Peripheral vascular disease (HCC)     TIBURCIO (obstructive sleep apnea)     Class 3 severe obesity due to excess calories with serious comorbidity and body mass index (BMI) of 50.0 to 59.9 in adult [facility])     Primary osteoarthritis of left knee     Chronic tension-type headache, not intractable     Carpal tunnel syndrome, bilateral-not on medication due to kidney disease     Microcytic anemia     Alpha thalassemia trait     Cervical stenosis of spinal canal     Pneumonia due to COVID-19 virus     Chronic bilateral low back pain with bilateral sciatica     Lumbar radicular pain     History of stroke     Vertigo     Occlusion of right internal carotid artery     Thyroid nodule     Morbid obesity with BMI of 60.0-69.9, adult ([address]     Prediabetes

## 2022-12-09 RX ORDER — OMEPRAZOLE 20 MG/1
CAPSULE, DELAYED RELEASE ORAL
Qty: 56 CAPSULE | Refills: 1 | Status: SHIPPED | OUTPATIENT
Start: 2022-12-09

## 2022-12-09 RX ORDER — ASPIRIN 81 MG/1
TABLET ORAL
Qty: 28 TABLET | Refills: 1 | Status: SHIPPED | OUTPATIENT
Start: 2022-12-09

## 2022-12-09 RX ORDER — CHOLECALCIFEROL (VITAMIN D3) 25 MCG
TABLET ORAL
Qty: 28 TABLET | Refills: 1 | Status: SHIPPED | OUTPATIENT
Start: 2022-12-09

## 2022-12-12 NOTE — PROGRESS NOTES
Patient instructed to remove shoes and socks and instructed to sit in exam chair. Current PCP is Fadi Fajardo MD and date of last visit was 10/19/2022. Do you have a follow up visit scheduled?   Yes  If yes, the date is 12/104/2022

## 2022-12-14 ENCOUNTER — OFFICE VISIT (OUTPATIENT)
Dept: INTERNAL MEDICINE | Age: 67
End: 2022-12-14
Payer: COMMERCIAL

## 2022-12-14 VITALS
WEIGHT: 293 LBS | SYSTOLIC BLOOD PRESSURE: 155 MMHG | HEART RATE: 64 BPM | BODY MASS INDEX: 59.2 KG/M2 | DIASTOLIC BLOOD PRESSURE: 86 MMHG

## 2022-12-14 DIAGNOSIS — N18.31 STAGE 3A CHRONIC KIDNEY DISEASE (HCC): ICD-10-CM

## 2022-12-14 DIAGNOSIS — E04.9 ENLARGEMENT OF THYROID: ICD-10-CM

## 2022-12-14 DIAGNOSIS — I65.09 OCCLUSION OF VERTEBRAL ARTERY, UNSPECIFIED LATERALITY: ICD-10-CM

## 2022-12-14 DIAGNOSIS — E04.1 THYROID NODULE: ICD-10-CM

## 2022-12-14 DIAGNOSIS — R42 VERTIGO: ICD-10-CM

## 2022-12-14 DIAGNOSIS — I10 ESSENTIAL HYPERTENSION: Primary | ICD-10-CM

## 2022-12-14 DIAGNOSIS — Z86.73 H/O: STROKE: ICD-10-CM

## 2022-12-14 PROBLEM — R73.03 PREDIABETES: Status: RESOLVED | Noted: 2022-11-28 | Resolved: 2022-12-14

## 2022-12-14 PROBLEM — J12.82 PNEUMONIA DUE TO COVID-19 VIRUS: Status: RESOLVED | Noted: 2021-11-14 | Resolved: 2022-12-14

## 2022-12-14 PROBLEM — U07.1 PNEUMONIA DUE TO COVID-19 VIRUS: Status: RESOLVED | Noted: 2021-11-14 | Resolved: 2022-12-14

## 2022-12-14 PROCEDURE — 99211 OFF/OP EST MAY X REQ PHY/QHP: CPT | Performed by: STUDENT IN AN ORGANIZED HEALTH CARE EDUCATION/TRAINING PROGRAM

## 2022-12-14 RX ORDER — LISINOPRIL 5 MG/1
5 TABLET ORAL DAILY
Qty: 90 TABLET | Refills: 1 | Status: CANCELLED | OUTPATIENT
Start: 2022-12-14

## 2022-12-14 RX ORDER — LOSARTAN POTASSIUM 25 MG/1
25 TABLET ORAL DAILY
Qty: 90 TABLET | Refills: 1 | Status: SHIPPED | OUTPATIENT
Start: 2022-12-14

## 2022-12-14 RX ORDER — AMLODIPINE BESYLATE 5 MG/1
10 TABLET ORAL DAILY
Qty: 30 TABLET | Refills: 3 | Status: CANCELLED | OUTPATIENT
Start: 2022-12-14

## 2022-12-14 ASSESSMENT — ENCOUNTER SYMPTOMS
RESPIRATORY NEGATIVE: 1
GASTROINTESTINAL NEGATIVE: 1
BACK PAIN: 1

## 2022-12-14 NOTE — PROGRESS NOTES
[facility]  [facility] [address] [id number] [facility] [address]  Dept: [phone]  Dept Fax: [phone]    Office Progress/Follow Up Note  Date ofpatient's visit: 12/14/2022  Patient's Name:  Judie Higuera YOB: 1955            Patient Care Team:  David Stark MD as PCP - General (Internal Medicine)  Ysabel Barnes DPM as Consulting Physician (Podiatry)  ================================================================    REASON FOR VISIT/CHIEF COMPLAINT:  Follow-up [facility] f/u appt, Vertigo, HCTZ/Amlodipin/Losarstan all stop. Plavix switch to Brilinta)    HISTORY OF PRESENTING ILLNESS:  History was obtained from: patient. Iliana Espinoza a 69 y.o. is here for follow-up visit. Her blood pressure today is 171/98, repeat check 155/86, she was on 3 medications for blood pressure, all of them discontinued on last admission in hospital.    Patient was recently in the hospital admitted with vertigo, found to have new right vertebral artery occlusion new since prior imaging done in 2019, was on aspirin Plavix at home for history of stroke, started on heparin drip and later switched to Brilinta from Plavix, also started on meclizine which helped her symptoms. MRI brain done in the hospital showed no acute intracranial abnormalities but did show multiple areas of chronic infarcts in bilateral cerebral hemispheres. She was recommended vestibular rehab and follow-up with neuro and neuro endovascular as outpatient. Antihypertensives were stopped because her blood pressure was controlled without any medication in the hospital.    Patient is following up with bariatric surgeon for weight loss surgery,  Patient is supposed to get a colonoscopy, but needs clearance from neurology regarding antiplatelet medications before doing that.   She also has degenerative spine disease for which she follows up with Ortho Dr. Rosio Vo, but does not want to do any surgery as recommended.   Follows up with heme-onc for alpha thalassemia.  -Lung cancer screening reviewed, 2 mm subpleural nodule, will follow up annually.  -Thyroid scan reviewed 1.1 cm TR 4 nodule in right lobe-we will follow-up in 1 year with ultrasound, thyroid function test done recently was normal, repeat TSH in hospital done in acute setting was slightly abnormal.  Carpal tunnel syndrome stable    Patient Active Problem List   Diagnosis    Essential hypertension    Pure hypercholesterolemia    Cerebrovascular accident (CVA), 2011, no residual deficit (HCC)    CKD (chronic kidney disease) stage 3, GFR 30-59 ml/min (HCC)    Peripheral vascular disease (HCC)    TIBURCIO (obstructive sleep apnea)    Class 3 severe obesity due to excess calories with serious comorbidity and body mass index (BMI) of 50.0 to 59.9 in adult [facility])    Primary osteoarthritis of left knee    Chronic tension-type headache, not intractable    Carpal tunnel syndrome, bilateral-not on medication due to kidney disease    Microcytic anemia    Alpha thalassemia trait    Cervical stenosis of spinal canal    Chronic bilateral low back pain with bilateral sciatica    Lumbar radicular pain    History of stroke    Vertigo    Occlusion of right internal carotid artery    Thyroid nodule    Morbid obesity with BMI of 60.0-69.9, adult ([facility] [address]    Occlusion of vertebral artery       Health Maintenance Due   Topic Date Due    Pneumococcal 65+ years Vaccine (3) 12/20/2021    COVID-19 Vaccine (4 - Booster for Pfizer series) 03/17/2022    Flu vaccine (1) 08/01/2022    Colorectal Cancer Screen  08/21/2022    Annual Wellness Visit (AWV)  10/08/2022       Allergies   Allergen Reactions    Duricef [Cefadroxil Monohydrate] Hives    Fish-Derived Products Hives    Pcn [Penicillins] Hives    Tomato Hives         Current Outpatient Medications   Medication Sig Dispense Refill    losartan (COZAAR) 25 MG tablet Take 1 tablet by mouth daily 90 tablet 1    aspirin 81 MG EC tablet TAKE 1 TABLET BY MOUTH DAILY 28 tablet 1    omeprazole (PRILOSEC) 20 MG delayed release capsule TAKE 1 CAPSULE BY MOUTH TWICE A DAY WITH MEALS 56 capsule 1    Cholecalciferol (VITAMIN D3) 25 MCG TABS TAKE 1 TABLET BY MOUTH DAILY 28 tablet 1    ticagrelor (BRILINTA) 90 MG TABS tablet Take 1 tablet by mouth 2 times daily 60 tablet 2    Vitamin D (CHOLECALCIFEROL) 25 MCG (1000 UT) TABS tablet       gabapentin (NEURONTIN) 600 MG tablet Take 600 mg by mouth 3 times daily. acetaminophen (TYLENOL) 325 MG tablet TAKE 2 TABLETS BY MOUTH EVERY 4 HOURS AS NEEDED FOR PAIN 60 tablet 3    atorvastatin (LIPITOR) 80 MG tablet TAKE 1 TABLET BY MOUTH DAILY 28 tablet 3    ferrous sulfate (IRON 325) 325 (65 Fe) MG tablet Take 1 tab three days days a week 90 tablet 1    folic acid (FOLVITE) 1 MG tablet TAKE 1 TABLET BY MOUTH DAILY 60 tablet 2    guaiFENesin (ALTARUSSIN) 100 MG/5ML syrup Take 10 mLs by mouth 3 times daily as needed for Cough 1 each 0    vitamin B-1 (THIAMINE) 100 MG tablet Take 1 tablet by mouth daily 30 tablet 3    diclofenac sodium (VOLTAREN) 1 % GEL Apply 4 g topically 4 times daily 4 Tube 1     No current facility-administered medications for this visit. Social History     Tobacco Use    Smoking status: Former     Packs/day: 2.00     Years: 35.00     Pack years: 70.00     Types: Cigarettes     Quit date: 10/11/2011     Years since quittin.1     Passive exposure: Current (Outside of the home)    Smokeless tobacco: Never   Vaping Use    Vaping Use: Never used   Substance Use Topics    Alcohol use:  Yes     Alcohol/week: 6.0 standard drinks     Types: 6 Cans of beer per week     Comment: rare    Drug use: No     Types: Cocaine, Marijuana (Weed)     Comment: per pt did years ago; cocaine & marij, 6- denies current use       Family History   Problem Relation Age of Onset    High Blood Pressure Mother     Asthma Mother     Heart Disease Mother     Heart Disease Father     High Blood Pressure Father Diabetes Brother     High Blood Pressure Brother     Heart Disease Brother     High Blood Pressure Maternal Grandmother     High Blood Pressure Maternal Grandfather     Heart Disease Maternal Grandfather         REVIEW OF SYSTEMS:  Review of Systems   Constitutional: Negative. HENT: Negative. Respiratory: Negative. Cardiovascular: Negative. Gastrointestinal: Negative. Genitourinary: Negative. Musculoskeletal:  Positive for back pain. Skin: Negative. Psychiatric/Behavioral: Negative. PHYSICAL EXAM:  Vitals:    12/14/22 1558 12/14/22 1624   BP: (!) 171/98 (!) 155/86   Pulse: 73 64   Weight: (!) 378 lb (171.5 kg)      BP Readings from Last 3 Encounters:   12/14/22 (!) 155/86   11/28/22 138/85   11/22/22 114/78        Physical Exam  Constitutional:       Appearance: Normal appearance. Cardiovascular:      Rate and Rhythm: Normal rate and regular rhythm. Pulses: Normal pulses. Heart sounds: Normal heart sounds. Pulmonary:      Effort: Pulmonary effort is normal.      Breath sounds: Normal breath sounds. Abdominal:      General: Abdomen is flat. Palpations: Abdomen is soft. Skin:     General: Skin is warm. Neurological:      Mental Status: She is alert. Mental status is at baseline.    Psychiatric:         Mood and Affect: Mood normal.         DIAGNOSTIC FINDINGS:  CBC:  Lab Results   Component Value Date/Time    WBC 6.5 11/28/2022 01:35 PM    HGB 11.1 11/28/2022 01:35 PM     11/28/2022 01:35 PM     04/05/2012 09:05 AM       BMP:    Lab Results   Component Value Date/Time     11/28/2022 01:35 PM    K 4.9 11/28/2022 01:35 PM     11/28/2022 01:35 PM    CO2 26 11/28/2022 01:35 PM    BUN 17 11/28/2022 01:35 PM    CREATININE 1.13 11/28/2022 01:35 PM    GLUCOSE 93 11/28/2022 01:35 PM    GLUCOSE 88 04/12/2012 10:20 AM       HEMOGLOBIN A1C:   Lab Results   Component Value Date/Time    LABA1C 5.8 11/28/2022 01:35 PM       FASTING LIPID PANEL:  Lab Results   Component Value Date    CHOL 152 11/28/2022    HDL 43 11/28/2022    TRIG 115 11/28/2022       ASSESSMENT AND PLAN:  Tatiana Morales was seen today for follow-up. Diagnoses and all orders for this visit:    Essential hypertension  -     Basic Metabolic Panel; Future  -     START losartan (COZAAR) 25 MG tablet; Take 1 tablet by mouth daily       -Advised patient to monitor blood pressure and call the office with the readings. -BP today in office elevated off her prior 3 antihypertensives. Will resume losartan at lower dose & f/u to see if additional agents needed. Plan to allow mild permissive hypertension with vertebral artery occlusion    Stage 3a chronic kidney disease (HCC)  -We will continue ARB    Enlargement of thyroid  Thyroid nodule  -Thyroid level done in acute getting slightly abnormal, were normal a week ago, will follow up with scan and thyroid levels in 1 year unless her symptoms change    H/O: stroke with occlusion of bilateral ICA  Vertigo  Occlusion of vertebral artery, unspecified laterality  -Continue aspirin, Brilinta, vestibular rehab, follow-up with neuro/neuro endovascular for the same    FOLLOW UP AND INSTRUCTIONS:  Return for As already scheduled. Filomena received counseling on the following healthy behaviors: nutrition    Discussed use, benefit, and side effects of prescribed medications. Barriers to medication compliance addressed. All patient questions answered. Pt voiced understanding. Patient given educational materials - see patient instructions    Franc Kemp MD  Internal Medicine Resident, PGY-3  [facility]  31/77/3642,0:88 PM         This note is created with the assistance of a speech-recognition program. While intending to generate a document that actually reflects the content of thevisit, the document can still have some mistakes which may not have been identified and corrected by editing.

## 2022-12-19 ENCOUNTER — OFFICE VISIT (OUTPATIENT)
Dept: PODIATRY | Age: 67
End: 2022-12-19
Payer: COMMERCIAL

## 2022-12-19 VITALS
HEART RATE: 80 BPM | SYSTOLIC BLOOD PRESSURE: 140 MMHG | HEIGHT: 67 IN | DIASTOLIC BLOOD PRESSURE: 88 MMHG | BODY MASS INDEX: 45.99 KG/M2 | WEIGHT: 293 LBS

## 2022-12-19 DIAGNOSIS — E66.01 CLASS 3 SEVERE OBESITY WITH BODY MASS INDEX (BMI) OF 50.0 TO 59.9 IN ADULT, UNSPECIFIED OBESITY TYPE, UNSPECIFIED WHETHER SERIOUS COMORBIDITY PRESENT (HCC): ICD-10-CM

## 2022-12-19 DIAGNOSIS — M79.674 PAIN IN TOES OF BOTH FEET: ICD-10-CM

## 2022-12-19 DIAGNOSIS — B35.1 ONYCHOMYCOSIS: Primary | ICD-10-CM

## 2022-12-19 DIAGNOSIS — M79.675 PAIN IN TOES OF BOTH FEET: ICD-10-CM

## 2022-12-19 DIAGNOSIS — I73.9 PAD (PERIPHERAL ARTERY DISEASE) (HCC): ICD-10-CM

## 2022-12-19 DIAGNOSIS — I73.9 PVD (PERIPHERAL VASCULAR DISEASE) (HCC): ICD-10-CM

## 2022-12-19 PROCEDURE — 11721 DEBRIDE NAIL 6 OR MORE: CPT

## 2022-12-19 NOTE — PROGRESS NOTES
[address]  [address] [facility] [address] [facility], [address]  Tel: [phone]   Fax: [phone]    Subjective     CC: Routine nail care    Interval History:  Patient presents to the clinic today for follow up of thickened and discolored nails. She states she ambulates with a Cane. She states she is not able to perform nail care and foot exams due to her weight and cervical spinal stenosis. She does not wear any compression stockings because they to hard for her to put on. She states that she has no new pedal complaints at this time besides nails. Patient is obese and has difficulty walking. HPI:  Nemesio Mcgee is a 69y.o. year old female who presents clinic today for follow-up of thickened and discolored nails. She states that she has been doing well and denies any new complaints. She walks around with a cane and has troubling cutting her own nails. She does not wear any compression stockings as they are hard to put on. Patient denies any nausea, vomiting, fever, chills or shortness of breath. Primary care physician is Zeina Acosta MD.    ROS:    Constitutional: Denies nausea, vomiting, fever, chills. Neurologic: Denies numbness, tingling, and burning in the feet. Vascular: Denies symptoms of lower extremity claudication. Skin: Denies open wounds. Otherwise negative except as noted in the HPI.      PMH:  Past Medical History:   Diagnosis Date    Ambulates with cane     Bleeding     while on aggrenox    Cataracts, bilateral     Cerebrovascular disease 2003,2011    cva    Chronic pain in left foot     CKD (chronic kidney disease) stage 3, GFR 30-59 ml/min ([facility])     COVID-19 11/2021    states hpspitalized    GERD (gastroesophageal reflux disease)     Hx of blood clots     Hyperlipidemia     Hypertension     Iron (Fe) deficiency anemia     Nicotine abuse     Obesity     TIBURCIO on CPAP     Osteoarthritis     Peripheral vascular disease ([facility] [address] 03/13/2014    Substance abuse ([facility]     cocaine, canabis    Thalassemia minor     Thyroid nodule 2022    Unspecified cerebral artery occlusion with cerebral infarction     Wears dentures     upper and lower       Surgical History:   Past Surgical History:   Procedure Laterality Date    BREAST SURGERY      biopsy    COLONOSCOPY  2007    adenomatous polyp    COLONOSCOPY      normal, repeat in 5 years    COLONOSCOPY N/A 2019    COLONOSCOPY POLYPECTOMY SNARE/COLD BIOPSY performed by Elsie Harrell MD at [facility] ENDOSCOPY      negative    UPPER GASTROINTESTINAL ENDOSCOPY N/A 2019    EGD BIOPSY performed by Elsie [facility]rell MD at [facility] Endoscopy       Social History:  Social History     Tobacco Use    Smoking status: Former     Packs/day: 2.00     Years: 35.00     Pack years: 70.00     Types: Cigarettes     Quit date: 10/11/2011     Years since quittin.2     Passive exposure: Current (Outside of the home)    Smokeless tobacco: Never   Vaping Use    Vaping Use: Never used   Substance Use Topics    Alcohol use: Yes     Alcohol/week: 6.0 standard drinks     Types: 6 Cans of beer per week     Comment: rare    Drug use: No     Types: Cocaine, Marijuana (Weed)     Comment: per pt did years ago; cocaine & marij, 17 denies current use       Medications:  Prior to Admission medications    Medication Sig Start Date End Date Taking?  Authorizing Provider   losartan (COZAAR) 25 MG tablet Take 1 tablet by mouth daily 22   Jacquelin Vilchis MD   aspirin 81 MG EC tablet TAKE 1 TABLET BY MOUTH DAILY 22   Margaret Henry MD   omeprazole (PRILOSEC) 20 MG delayed release capsule TAKE 1 CAPSULE BY MOUTH TWICE A DAY WITH MEALS 22   Margaret Henry MD   Cholecalciferol (VITAMIN D3) 25 MCG TABS TAKE 1 TABLET BY MOUTH DAILY 22   Margaret Henry MD   ticagrelor (BRILINTA) 90 MG TABS tablet Take 1 tablet by mouth 2 times daily 11/22/22   Kika Clonts, APRN - CNP   Vitamin D (CHOLECALCIFEROL) 25 MCG (1000 UT) TABS tablet  10/25/22   Historical Provider, MD   gabapentin (NEURONTIN) 600 MG tablet Take 600 mg by mouth 3 times daily. Historical Provider, MD   acetaminophen (TYLENOL) 325 MG tablet TAKE 2 TABLETS BY MOUTH EVERY 4 HOURS AS NEEDED FOR PAIN 10/30/22   Alfonso Randhawa MD   atorvastatin (LIPITOR) 80 MG tablet TAKE 1 TABLET BY MOUTH DAILY 10/21/22   Alfonso Randhawa MD   ferrous sulfate (IRON 325) 325 (65 Fe) MG tablet Take 1 tab three days days a week 10/3/22   [facility], MD   folic acid (FOLVITE) 1 MG tablet TAKE 1 TABLET BY MOUTH DAILY 7/26/22   Jose Guadalupe Escobedo MD   guaiFENesin (ALTARUSSIN) 100 MG/5ML syrup Take 10 mLs by mouth 3 times daily as needed for Cough 11/10/21   Andrew Hoyt MD   vitamin B-1 (THIAMINE) 100 MG tablet Take 1 tablet by mouth daily 4/15/21   Andrew Hoyt MD   diclofenac sodium (VOLTAREN) 1 % GEL Apply 4 g topically 4 times daily 6/28/20   Zettie Lundborg, DO       Objective     Vitals:    12/19/22 1454   BP: (!) 140/88   Pulse: 80       Lab Results   Component Value Date    LABA1C 5.8 11/28/2022       Physical Exam:  General:  Alert and oriented x3. In no acute distress. Lower Extremity Physical Exam:    Vascular: DP/PT pulses non palpable B/L. Pulses were biphasic on doppler B/L. CFT <5 seconds to all digits B/L. Moderate nonpitting edema noted B/L to lower extremity. Hair growth is absent to the level of the digits B/L. Neuro: Saph/sural/SP/DP/plantar sensation diminished to light touch. Protective sensation is intact to 2/10 sites as tested with a 5.07g SWMF B/L. Musculoskeletal: EHL/FHL/GS/TA gross motor intact. Decreased medial arch noted, B/L. Decreased ROM b/l kojo joint. Contracted digits 2-5 b/l. Abducted hallux noted b/l with medial eminence with right worse than left. Neg pain calf squeeze ble.  All LE Compartments soft and compressible. Dermatologic: No open lesions, B/L. Interdigital maceration absent, B/L. Nails 1-5 are elongated, thickened with subungual debris. Hair growth is absent with atrophic skin extending distally past the ankle. Class A Findings (Q7) - One Class A finding  [] Non traumatic amputation of foot or integral skeletal portion thereof  Class B Findings (Q8) - Two Class B findings  [x]Absent posterior tibial pulse   []Absent dorsalis pedis pulse   [x]Advanced trophic changes; three of the following are required:   [x]hair growth (decrease or absence)   [x]nail changes (thickening)   []pigmentary changes (discoloration)   [x]skin texture (thin, shiny)   []skin color (rubor or redness)  Class C Findings (Q9) - One Class B and two Class C findings  []Claudication   []Temperature changes   []Edema   []Paresthesia   []Burning    Q Modifier Met: Q8: Two Class B findings    Assessment   Antoine Khalil is a 69 y.o. female with     Diagnosis Orders   1. Onychomycosis        2. PVD (peripheral vascular disease) (HCC)        3. Class 3 severe obesity with body mass index (BMI) of 50.0 to 59.9 in adult, unspecified obesity type, unspecified whether serious comorbidity present ([facility] [address]        4. Pain in toes of both feet                 Plan   Patient examined and evaluated for routine nail care. Diagnosis and treatment options discussed in detail  debrided dystrophic nails 1-5 B/L with sterile nail nippers without incident. Educated patient to monitor for signs of claudication including rest pain or cramping of the calf muscles. Educated pt to try wearing compressive socks to help with LE edema. Pt states she will try to get new shoes, presented in slip on shoe with no socks. Plan:  With history of PVD and PAD and last ABIs 0.85 right and 0.8  left, plan for ordering noninvasive vascular studies next visit  Educated patient on signs of infection and to report to ED if arise  Patient to RTC in 3 month(s) or sooner if problems arise  Please, call the office with any questions or concerns    Electronically signed by Rosa Flowers DPM on 12/21/2022 at 11:28 AM

## 2022-12-20 ENCOUNTER — OFFICE VISIT (OUTPATIENT)
Dept: NEUROLOGY | Age: 67
End: 2022-12-20
Payer: COMMERCIAL

## 2022-12-20 VITALS
HEIGHT: 67 IN | WEIGHT: 293 LBS | HEART RATE: 81 BPM | SYSTOLIC BLOOD PRESSURE: 123 MMHG | BODY MASS INDEX: 45.99 KG/M2 | DIASTOLIC BLOOD PRESSURE: 72 MMHG

## 2022-12-20 DIAGNOSIS — M54.16 LUMBAR RADICULAR PAIN: ICD-10-CM

## 2022-12-20 DIAGNOSIS — D50.9 MICROCYTIC ANEMIA: ICD-10-CM

## 2022-12-20 DIAGNOSIS — D56.3 THALASSEMIA TRAIT, ALPHA: ICD-10-CM

## 2022-12-20 DIAGNOSIS — Z86.73 HISTORY OF STROKE: ICD-10-CM

## 2022-12-20 DIAGNOSIS — R79.89 ELEVATED FERRITIN: ICD-10-CM

## 2022-12-20 DIAGNOSIS — M48.02 CERVICAL STENOSIS OF SPINAL CANAL: Primary | ICD-10-CM

## 2022-12-20 DIAGNOSIS — I63.00 CEREBROVASCULAR ACCIDENT (CVA) DUE TO THROMBOSIS OF PRECEREBRAL ARTERY (HCC): ICD-10-CM

## 2022-12-20 DIAGNOSIS — E61.1 IRON DEFICIENCY: ICD-10-CM

## 2022-12-20 DIAGNOSIS — R42 VERTIGO: ICD-10-CM

## 2022-12-20 PROCEDURE — 1123F ACP DISCUSS/DSCN MKR DOCD: CPT | Performed by: NURSE PRACTITIONER

## 2022-12-20 PROCEDURE — 99214 OFFICE O/P EST MOD 30 MIN: CPT | Performed by: NURSE PRACTITIONER

## 2022-12-20 PROCEDURE — 3078F DIAST BP <80 MM HG: CPT | Performed by: NURSE PRACTITIONER

## 2022-12-20 PROCEDURE — 3074F SYST BP LT 130 MM HG: CPT | Performed by: NURSE PRACTITIONER

## 2022-12-20 NOTE — PROGRESS NOTES
[facility]            [address]          [facility], [address] [facility]          Dept: [phone]          Dept Fax: [phone]    E. Cloyd Pike, MD Ahmed B. Corine Pouch, MD Majorie Gowers, MD Vicci Ormond, JOSE ROBERTO            12/20/2022      HISTORY OF PRESENT ILLNESS:       I had the pleasure of seeing Jn Dale, who returns for continuing neurologic care. The patient was seen last on September 26, 2022 for treatment of history of two strokes in the bilateral cerebral hemispheres, bilateral carpal tunnel syndrome and cervical stenosis. The patient has a history of two strokes in 2003 and 2011 affecting the bilateral cerebral hemispheres and for secondary stroke prevention she was prescribed plavix 75 mg daily, aspirin 81 mg daily, lipitor 80 mg daily and folic acid 1 mg daily. Laboratory testing was completed in November 2022 with normal lipid panel and normal liver function studies. She recently presented to the hospital with vertigo and was found to have right vertebral artery V1/2 occlusion. Plavix was then stopped and she was discharged on brilinta 90 mg twice daily. She has noticed resolution of the dizziness prior to today's visit with the use of meclizine. The patient was complaining of back pain and was evaluated by Dr. Sofy Taylor who ordered a CT myelogram which showed L4-5 moderate spinal stenosis. Dr. Sofy Taylor did not recommend surgical intervention and referred her to Dr. Sivakumar Moore, pain management. Dr. Sivakumar Moore offered no interventions for her back pain due to her weight and recommended weight management. For management she was prescribed gabapentin 600 mg three times daily and was referred to bariatrics. She is here today reporting that she has noticed improvements in her back pain with the increased dosage of gabapentin.       The patient has a history of bilateral carpal tunnel syndrome and cervical stenosis but declines surgical intervention    Testing reviewed:    CTA head and neck: 11/20/2022  Stable complete occlusion of left cervical ICA. There is approximately 50%   stenosis in the origin of right ICA by NASCET criteria. Stable occlusion of the left petrous and cavernous segments of intracranial   ICA with opacification of the supraclinoid segment. Severe narrowing is again   noted in the cavernous segment of right ICA. Occlusion of right V1 and proximal V2 segments of right vertebral artery, new   compared to the previous exam          MRI brain: 11/21/2022  1. No evidence of an acute infarct. 2. Severe chronic microvascular white matter ischemic disease with chronic   infarcts involving bilateral cerebral hemispheres, the left thalamus, left   globus pallidus, and right caudate head.      Laboratory Testing 11/2022  Cholesterol <200 mg/dL 150      HDL >40 mg/dL 45      LDL Cholesterol 0 - 130 mg/dL 88      Triglycerides <150 mg/dL 84      ALT 5 - 33 U/L 12      AST <32 U/L 17        Labs Completed 9/24/2021  Cholesterol <200 mg/dL 148       HDL >40 mg/dL 50      LDL Cholesterol 0 - 130 mg/dL 74      Triglycerides <150 mg/dL 119       AST <32 U/L 18       ALT 5 - 33 U/L 14          PAST MEDICAL HISTORY:         Diagnosis Date    Ambulates with cane     Bleeding     while on aggrenox    Cataracts, bilateral     Cerebrovascular disease 2003,2011    cva    Chronic pain in left foot     CKD (chronic kidney disease) stage 3, GFR 30-59 ml/min (HCC)     COVID-19 11/2021    states hpspitalized    GERD (gastroesophageal reflux disease)     Hx of blood clots     Hyperlipidemia     Hypertension     Iron (Fe) deficiency anemia     Nicotine abuse     Obesity     TIBURCIO on CPAP     Osteoarthritis     Peripheral vascular disease ([facility] [address] 03/13/2014    Substance abuse ([facility] [address]     cocaine, canabis    Thalassemia minor     Thyroid nodule 11/22/2022    Unspecified cerebral artery occlusion with cerebral infarction     Wears dentures     upper and lower        PAST SURGICAL HISTORY:         Procedure Laterality Date    BREAST SURGERY      biopsy    COLONOSCOPY  2007    adenomatous polyp    COLONOSCOPY      normal, repeat in 5 years    COLONOSCOPY N/A 2019    COLONOSCOPY POLYPECTOMY SNARE/COLD BIOPSY performed by Malika Rojas MD at [facility] ENDOSCOPY  2009    negative    UPPER GASTROINTESTINAL ENDOSCOPY N/A 2019    EGD BIOPSY performed by Malika Rojas MD at [facility] Endoscopy        SOCIAL HISTORY:     Social History     Socioeconomic History    Marital status:      Spouse name: Not on file    Number of children: Not on file    Years of education: Not on file    Highest education level: Not on file   Occupational History    Not on file   Tobacco Use    Smoking status: Former     Packs/day: 2.00     Years: 35.00     Pack years: 70.00     Types: Cigarettes     Quit date: 10/11/2011     Years since quittin.2     Passive exposure: Current (Outside of the home)    Smokeless tobacco: Never   Vaping Use    Vaping Use: Never used   Substance and Sexual Activity    Alcohol use:  Yes     Alcohol/week: 6.0 standard drinks     Types: 6 Cans of beer per week     Comment: rare    Drug use: No     Types: Cocaine, Marijuana (Weed)     Comment: per pt did years ago; cocaine & marij, 17 denies current use    Sexual activity: Yes     Partners: Male   Other Topics Concern    Not on file   Social History Narrative    Not on file     Social Determinants of Health     Financial Resource Strain: Low Risk     Difficulty of Paying Living Expenses: Not hard at all   Food Insecurity: No Food Insecurity    Worried About Running Out of Food in the Last Year: Never true    Ran Out of Food in the Last Year: Never true   Transportation Needs: Not on file   Physical Activity: Not on file   Stress: Not on file   Social Connections: Not on file Intimate Partner Violence: Not on file   Housing Stability: Not on file       CURRENT MEDICATIONS:     Current Outpatient Medications   Medication Sig Dispense Refill    losartan (COZAAR) 25 MG tablet Take 1 tablet by mouth daily 90 tablet 1    aspirin 81 MG EC tablet TAKE 1 TABLET BY MOUTH DAILY 28 tablet 1    omeprazole (PRILOSEC) 20 MG delayed release capsule TAKE 1 CAPSULE BY MOUTH TWICE A DAY WITH MEALS 56 capsule 1    Cholecalciferol (VITAMIN D3) 25 MCG TABS TAKE 1 TABLET BY MOUTH DAILY 28 tablet 1    ticagrelor (BRILINTA) 90 MG TABS tablet Take 1 tablet by mouth 2 times daily 60 tablet 2    Vitamin D (CHOLECALCIFEROL) 25 MCG (1000 UT) TABS tablet       gabapentin (NEURONTIN) 600 MG tablet Take 600 mg by mouth 3 times daily. acetaminophen (TYLENOL) 325 MG tablet TAKE 2 TABLETS BY MOUTH EVERY 4 HOURS AS NEEDED FOR PAIN 60 tablet 3    atorvastatin (LIPITOR) 80 MG tablet TAKE 1 TABLET BY MOUTH DAILY 28 tablet 3    ferrous sulfate (IRON 325) 325 (65 Fe) MG tablet Take 1 tab three days days a week 90 tablet 1    folic acid (FOLVITE) 1 MG tablet TAKE 1 TABLET BY MOUTH DAILY 60 tablet 2    guaiFENesin (ALTARUSSIN) 100 MG/5ML syrup Take 10 mLs by mouth 3 times daily as needed for Cough 1 each 0    vitamin B-1 (THIAMINE) 100 MG tablet Take 1 tablet by mouth daily 30 tablet 3    diclofenac sodium (VOLTAREN) 1 % GEL Apply 4 g topically 4 times daily 4 Tube 1     No current facility-administered medications for this visit. ALLERGIES:     Allergies   Allergen Reactions    Duricef [Cefadroxil Monohydrate] Hives    Fish-Derived Products Hives    Pcn [Penicillins] Hives    Tomato Hives                                 REVIEW OF SYSTEMS        All items selected indicate a positive finding. Those items not selected are negative.   Constitutional [] Weight loss/gain   [] Fatigue  [] Fever/Chills   HEENT [] Hearing Loss  [] Visual Disturbance  [] Tinnitus  [] Eye pain   Respiratory [] Shortness of Breath  [] Cough  [] Snoring   Cardiovascular [] Chest Pain  [] Palpitations  [] Lightheaded   GI [] Constipation  [] Diarrhea  [] Swallowing change  [] Nausea/vomiting    [] Urinary Frequency  [] Urinary Urgency   Musculoskeletal [] Neck pain  [x] Back pain  [] Muscle pain  [] Restless legs   Dermatologic [] Skin changes   Neurologic [] Memory loss/confusion  [] Seizures  [x] Trouble walking or imbalance  [] Dizziness  [] Sleep disturbance  [] Weakness  [] Numbness  [] Tremors  [] Speech Difficulty  [] Headaches  [] Light Sensitivity  [] Sound Sensitivity   Endocrinology []Excessive thirst  []Excessive hunger   Psychiatric [] Anxiety/Depression  [] Hallucination   Allergy/immunology []Hives/environmental allergies   Hematologic/lymph [] Abnormal bleeding  [] Abnormal bruising         PHYSICAL EXAMINATION:       Vitals:    12/20/22 1616   BP: 123/72   Pulse: 81                                              .                                                                                                    General Appearance:  Alert, cooperative, no signs of distress, appears stated age   Head:  Normocephalic, no signs of trauma   Eyes:  Conjunctiva/corneas clear;  eyelids intact   Ears:  Normal external ear and canals   Nose: Nares normal, mucosa normal, no drainage    Throat: Lips and tongue normal; teeth normal;  gums normal   Neck: Supple, intact flexion, extension and rotation;   trachea midline;  no adenopathy;   thyroid: not enlarged;   no carotid pulse abnormality   Back:   Symmetric, no curvature, ROM adequate   Lungs:   Respirations unlabored   Heart:  Regular rate and rhythm           Extremities: Extremities normal, no cyanosis, no edema   Pulses: Symmetric over head and neck   Skin: Skin color, texture normal, no rashes, no lesions                                     NEUROLOGIC EXAMINATION    Neurologic Exam  Mental status    Alert and oriented x 3; intact memory with no confusion, speech or language problems; no hallucinations or delusions  Fund of information appropriate for level of education    Cranial nerves    II - visual fields intact to confrontation bilaterally  III, IV, VI - extra-ocular muscles full: no pupillary defect; no MICHAEL, no nystagmus, no ptosis   V - normal facial sensation                                                               VII - normal facial symmetry                                                             VIII - intact hearing                                                                             IX, X - symmetrical palate                                                                  XI - symmetrical shoulder shrug                                                       XII - tongue midline without atrophy or fasciculation      Motor function  Normal muscle bulk and tone; strength 5/5 on all 4 extremities, no pronator drift      Sensory function Intact to light touch, pinprick, vibration, proprioception on all 4 extremities      Cerebellar Intact fine motor movement. No involuntary movements or tremors. No ataxia or dysmetria on finger to nose or heel to shin testing      Reflex function DTR 2+ on bilateral UE and LE, symmetric. Down going toes bilaterally      Gait                   normal base and arm swing                  Medical Decision Making: In summary, your patient, Keila Hayes exhibits the following, with associated plan:    History of two strokes in 2003 and 2011 affecting the bilateral cerebral hemispheres. She then had a repeat CT of her head in 2016 which showed areas of encephalomalacia in the bilateral cerebral hemispheres with the encephalomalacia involving the right hemisphere being new since the last CT in 2011. She was hospitalized in November 2022 with new onset vertigo and was found to have right vertebral artery occlusion V1/V2 segment.    Continue brilinta 90 mg twice daily, aspirin 81 mg daily, lipitor 80 mg daily and folic acid 1 mg daily for secondary stroke prevention  Lipid panel completed in November 2022 with LDL 88  Continue to follow with hematology  Continue to follow with neuro endovascular  I recommended she hold her colonoscopy at this time as she was recently found to have right V1/V2 vertebral artery occlusion   Bilateral carpal tunnel syndrome  Continue to monitor  L4-5 Spinal Stenosis  Continue to follow with Dr. Manjinder Morgan, orthopedics  Continue gabapentin 600 mg three times daily    Cervical Stenosis  Patient declines surgical intervention  Return for follow up visit in 3 months             Signed: Fabiola Martin CNP      *Please note that portions of this note were completed with a voice recognition program.  Although every effort was made to insure the accuracy of this automated transcription, some errors in transcription may have occurred, occasionally words and are mis-transcribed    Provider Attestation: The documentation recorded by the scribe accurately reflects the service I personally performed and the decisions made by myself. Portions of this note were transcribed by a scribe. I personally performed the history, physical exam, and medical decision-making and confirm the accuracy of the information in the transcribed note. Scribe Attestation:   By signing my name below, Bandar Dickens, attest that this documentation has been prepared under the direction and in the presence of Fabiola Martin CNP.

## 2022-12-21 RX ORDER — DOCUSATE SODIUM 100 MG/1
CAPSULE, LIQUID FILLED ORAL
Qty: 60 CAPSULE | Refills: 0 | Status: SHIPPED | OUTPATIENT
Start: 2022-12-21

## 2022-12-27 ENCOUNTER — HOSPITAL ENCOUNTER (OUTPATIENT)
Age: 67
Setting detail: SPECIMEN
Discharge: HOME | End: 2022-12-27

## 2022-12-27 ENCOUNTER — OFFICE VISIT (OUTPATIENT)
Dept: BARIATRICS/WEIGHT MGMT | Age: 67
End: 2022-12-27
Payer: COMMERCIAL

## 2022-12-27 VITALS
SYSTOLIC BLOOD PRESSURE: 136 MMHG | BODY MASS INDEX: 45.99 KG/M2 | WEIGHT: 293 LBS | HEART RATE: 90 BPM | HEIGHT: 67 IN | DIASTOLIC BLOOD PRESSURE: 84 MMHG | RESPIRATION RATE: 20 BRPM

## 2022-12-27 DIAGNOSIS — G47.33 OSA (OBSTRUCTIVE SLEEP APNEA): ICD-10-CM

## 2022-12-27 DIAGNOSIS — M54.16 LUMBAR RADICULAR PAIN: ICD-10-CM

## 2022-12-27 DIAGNOSIS — E78.00 PURE HYPERCHOLESTEROLEMIA: ICD-10-CM

## 2022-12-27 DIAGNOSIS — I10 ESSENTIAL HYPERTENSION: ICD-10-CM

## 2022-12-27 DIAGNOSIS — I10 ESSENTIAL HYPERTENSION: Primary | ICD-10-CM

## 2022-12-27 DIAGNOSIS — E66.01 CLASS 3 SEVERE OBESITY DUE TO EXCESS CALORIES WITH SERIOUS COMORBIDITY AND BODY MASS INDEX (BMI) OF 50.0 TO 59.9 IN ADULT (HCC): ICD-10-CM

## 2022-12-27 DIAGNOSIS — Z86.73 HISTORY OF STROKE: ICD-10-CM

## 2022-12-27 DIAGNOSIS — N18.30 STAGE 3 CHRONIC KIDNEY DISEASE, UNSPECIFIED WHETHER STAGE 3A OR 3B CKD (HCC): ICD-10-CM

## 2022-12-27 DIAGNOSIS — I73.9 PERIPHERAL VASCULAR DISEASE (HCC): ICD-10-CM

## 2022-12-27 DIAGNOSIS — D50.9 MICROCYTIC ANEMIA: ICD-10-CM

## 2022-12-27 LAB
ANION GAP SERPL CALCULATED.3IONS-SCNC: 10 MMOL/L (ref 9–17)
BUN BLDV-MCNC: 24 MG/DL (ref 8–23)
CALCIUM SERPL-MCNC: 9.4 MG/DL (ref 8.6–10.4)
CHLORIDE BLD-SCNC: 106 MMOL/L (ref 98–107)
CO2: 26 MMOL/L (ref 20–31)
CREAT SERPL-MCNC: 1.11 MG/DL (ref 0.5–0.9)
GFR SERPL CREATININE-BSD FRML MDRD: 54 ML/MIN/1.73M2
GLUCOSE BLD-MCNC: 92 MG/DL (ref 70–99)
POTASSIUM SERPL-SCNC: 3.9 MMOL/L (ref 3.7–5.3)
SODIUM BLD-SCNC: 142 MMOL/L (ref 135–144)

## 2022-12-27 PROCEDURE — 3074F SYST BP LT 130 MM HG: CPT | Performed by: NURSE PRACTITIONER

## 2022-12-27 PROCEDURE — 1123F ACP DISCUSS/DSCN MKR DOCD: CPT | Performed by: NURSE PRACTITIONER

## 2022-12-27 PROCEDURE — 99213 OFFICE O/P EST LOW 20 MIN: CPT | Performed by: NURSE PRACTITIONER

## 2022-12-27 PROCEDURE — 3078F DIAST BP <80 MM HG: CPT | Performed by: NURSE PRACTITIONER

## 2022-12-27 NOTE — PROGRESS NOTES
Medical Nutrition Therapy   Metabolic and Bariatric Surgery         Supervised diet and exercise preparation  Visit 2 out of 6  Pt reports:  She has been drinking about 48 oz of water per day. She drinks 2 sweet teas a day and some OJ. She eats 3 meals per day with a snack that usually consists of fruit. Changes in eating patterns to promote health are noted below on the goals number 19-22    Vitals: Wt Readings from Last 3 Encounters:   12/27/22 (!) 372 lb (168.7 kg)   12/20/22 (!) 378 lb (171.5 kg)   12/19/22 (!) 378 lb (171.5 kg)         Nutrition Assessment:   PES: Knowledge deficit related to healthy behaviors that support weight management post weight loss surgery as evidenced by Body mass index is 58.26 kg/m². Nutrition Assessment of Goal Attainment:  TREATMENT GOALS:    Pt  Completed 4 out of 4 goals. 2. TREATMENT GOALS FOR UPCOMING WEEK: continue all previous goals and add: # 7, 10, 15    All goals were planned with and agreed on by the patient. appt # NA G What is your next step? C 1 2 3 4 5 6 7 8 9     1  1 I will read the education binder provided to me and the   x  100            1  2 I will make my pschological evaluation appoinment. x  100            2  3 I will bring this goal card to every appointment. 100             x 4 I will eliminate all tobacco/nicotine. x 5 I will limit alcoholic beverages to 2-2IZ per week. x 6 I will limit dining out to 3 times per week or less. 2  7 I will eliminate sugary beverages. x 8 I will eliminate carbonated beverages. 9 I will eliminate drinking with a straw. 2  10 I will limit caffeinated beverages to 16oz daily. 2  11 I will limit log my exercise daily. 100              12 I will determine my calcium and mvi plan.                 x 13 I will have 1-2 servings of lean protein present at each meal and minimeal.                x 14 I will eat every 3-5 hours. 2 15 I will drink 64oz of fluid daily. 16 I will eat slowly during meals and snacks. 17 I will limit fluids 4oz before after and during meals. 18 I will eat protein first at all meals followed by vegetables,  Fruit and lastly whole grains. 23 My first weight neutral approach is:                 20 My second weight neutral approach is:                 21  My Thirds weight neutral approach is:                 22                  23                  24                  25                     Questions asked for goal understanding:                                                  Do you understand your goals? Do you have the information you need to achieve your goals? Do you have any questions  right now? [x]  Consistent goal achievement in the program thus far and further success with goals is expected. []  Unable to consistently make progress in goal achievement. At this time patient is not moving forward  in developing the skills needed for success after surgery. Plan:    Continue to follow monthly and review goals.          [x]  Nutrition visits complete    []

## 2022-12-27 NOTE — PROGRESS NOTES
Medical Weight Management Progress Note    Subjective     Patient being seen for medically supervised weight loss for the chronic conditions of HTN, TIBURCIO, HLD, Chronic Back and Knee Pain, Hx CVA, CKD, PVD, Anemia, Prediabetes, Vertigo. She is working on the behavior changes discussed at the initial appointment. Patient continues on diet plan. Physical activity includes walking as tolerated. Weight loss of 6 lbs since last visit. Quit smoking in 2010. Using CPAP. Psych eval completed and clearance received. Needs to reschedule EGD. Needs cardiac, pulmonary, and neurology clearances. No current issues. Working toward bariatric surgery:    [x] Sleeve Gastrectomy                                                           [] Sunday-en-Y Gastric Bypass    Allergies: Allergies   Allergen Reactions    Duricef [Cefadroxil Monohydrate] Hives    Fish-Derived Products Hives    Pcn [Penicillins] Hives    Tomato Hives       Past Medical History:     Past Medical History:   Diagnosis Date    Ambulates with cane     Bleeding     while on aggrenox    Cataracts, bilateral     Cerebrovascular disease 2003,2011    cva    Chronic pain in left foot     CKD (chronic kidney disease) stage 3, GFR 30-59 ml/min ([facility] [address]     COVID-19 11/2021    states hpspitalized    GERD (gastroesophageal reflux disease)     Hx of blood clots     Hyperlipidemia     Hypertension     Iron (Fe) deficiency anemia     Nicotine abuse     Obesity     TIBURCIO on CPAP     Osteoarthritis     Peripheral vascular disease ([address] 03/13/2014    Substance abuse ([facility] [address]     cocaine, canabis    Thalassemia minor     Thyroid nodule 11/22/2022    Unspecified cerebral artery occlusion with cerebral infarction     Wears dentures     upper and lower   .     Past Surgical History:  Past Surgical History:   Procedure Laterality Date    BREAST SURGERY      biopsy    COLONOSCOPY  12/2007    adenomatous polyp    COLONOSCOPY  2013    normal, repeat in 5 years    COLONOSCOPY N/A 2019    COLONOSCOPY POLYPECTOMY SNARE/COLD BIOPSY performed by Glenwood Frankel, MD at [place] [address]      UPPER GASTROINTESTINAL ENDOSCOPY  2009    negative    UPPER GASTROINTESTINAL ENDOSCOPY N/A 2019    EGD BIOPSY performed by Glenwood Frankel, MD at \[facility]"" Endoscopy       Family History:  Family History   Problem Relation Age of Onset    High Blood Pressure Mother     Asthma Mother     Heart Disease Mother     Heart Disease Father     High Blood Pressure Father     Diabetes Brother     High Blood Pressure Brother     Heart Disease Brother     High Blood Pressure Maternal Grandmother     High Blood Pressure Maternal Grandfather     Heart Disease Maternal Grandfather        Social History:  Social History     Socioeconomic History    Marital status:      Spouse name: Not on file    Number of children: Not on file    Years of education: Not on file    Highest education level: Not on file   Occupational History    Not on file   Tobacco Use    Smoking status: Former     Packs/day: 2.00     Years: 35.00     Pack years: 70.00     Types: Cigarettes     Quit date: 10/11/2011     Years since quittin.2     Passive exposure: Current (Outside of the home)    Smokeless tobacco: Never   Vaping Use    Vaping Use: Never used   Substance and Sexual Activity    Alcohol use:  Yes     Alcohol/week: 6.0 standard drinks     Types: 6 Cans of beer per week     Comment: rare    Drug use: No     Types: Cocaine, Marijuana (Weed)     Comment: per pt did years ago; cocaine & marij, 17 denies current use    Sexual activity: Yes     Partners: Male   Other Topics Concern    Not on file   Social History Narrative    Not on file     Social Determinants of Health     Financial Resource Strain: Low Risk     Difficulty of Paying Living Expenses: Not hard at all   Food Insecurity: No Food Insecurity    Worried About [address] in the Last Year: Never true    Ran Out of Food in the Last Year: Never true   Transportation Needs: Not on file   Physical Activity: Not on file   Stress: Not on file   Social Connections: Not on file   Intimate Partner Violence: Not on file   Housing Stability: Not on file       Current Medications:  Current Outpatient Medications   Medication Sig Dispense Refill    docusate sodium (COLACE) 100 MG capsule TAKE 1 CAPSULE BY MOUTH 2 TIMES DAILY AS NEEDED FOR CONSTIPATION (BOTTLE) 60 capsule 0    losartan (COZAAR) 25 MG tablet Take 1 tablet by mouth daily 90 tablet 1    aspirin 81 MG EC tablet TAKE 1 TABLET BY MOUTH DAILY 28 tablet 1    omeprazole (PRILOSEC) 20 MG delayed release capsule TAKE 1 CAPSULE BY MOUTH TWICE A DAY WITH MEALS 56 capsule 1    Cholecalciferol (VITAMIN D3) 25 MCG TABS TAKE 1 TABLET BY MOUTH DAILY 28 tablet 1    ticagrelor (BRILINTA) 90 MG TABS tablet Take 1 tablet by mouth 2 times daily 60 tablet 2    Vitamin D (CHOLECALCIFEROL) 25 MCG (1000 UT) TABS tablet       gabapentin (NEURONTIN) 600 MG tablet Take 600 mg by mouth 3 times daily. acetaminophen (TYLENOL) 325 MG tablet TAKE 2 TABLETS BY MOUTH EVERY 4 HOURS AS NEEDED FOR PAIN 60 tablet 3    atorvastatin (LIPITOR) 80 MG tablet TAKE 1 TABLET BY MOUTH DAILY 28 tablet 3    ferrous sulfate (IRON 325) 325 (65 Fe) MG tablet Take 1 tab three days days a week 90 tablet 1    folic acid (FOLVITE) 1 MG tablet TAKE 1 TABLET BY MOUTH DAILY 60 tablet 2    guaiFENesin (ALTARUSSIN) 100 MG/5ML syrup Take 10 mLs by mouth 3 times daily as needed for Cough 1 each 0    vitamin B-1 (THIAMINE) 100 MG tablet Take 1 tablet by mouth daily 30 tablet 3    diclofenac sodium (VOLTAREN) 1 % GEL Apply 4 g topically 4 times daily 4 Tube 1     No current facility-administered medications for this visit.        Vital Signs:  /84 (Site: Left Upper Arm, Position: Sitting, Cuff Size: Large Adult)   Pulse 90   Resp 20   Ht 5' 7\" (1.702 m)   Wt (!) 372 lb (168.7 kg)   BMI 58.26 kg/m²     BMI/Height/Weight:  Body mass index is 58.26 kg/m². Review of Systems - A review of systems was performed. All was negative unless otherwise documented in HPI. Constitutional: Negative for fever, chills. HENT: Negative for trouble swallowing. Eyes: Negative for visual disturbance. Respiratory: Negative for cough, shortness of breath. Cardiovascular: Negative for chest pain and palpitations. Gastrointestinal: Negative for nausea, vomiting, abdominal pain, diarrhea, constipation, blood in stool and abdominal distention. Endocrine: Negative for polydipsia, polyphagia and polyuria. Genitourinary: Negative for dysuria, frequency, hematuria and difficulty urinating. Musculoskeletal: Negative for myalgias, joint swelling. Skin: Negative for pallor and rash. Neurological: Negative for dizziness, tremors, light-headedness and headaches. Psychiatric/Behavioral: Negative for sleep disturbance and dysphoric mood. Objective:      Physical Exam   Vital signs reviewed. General: Well-developed and well-nourished. No acute distress. Skin: Warm, dry and intact. HEENT: Normocephalic. EOMs intact. Conjunctivae normal. Neck supple. Cardiovascular: Normal rate, regular rhythm. Pulmonary/Chest: Normal effort. Lungs clear to auscultation. No rales, rhonchi or wheezing. Abdominal: Positive bowel sounds. Soft, nontender. Nondistended. Musculoskeletal: Movement x4. No edema. Neurological: Ambulates with a cane. Alert and oriented to person, place, and time. Psychiatric: Normal mood and affect. Speech and behavior normal. Judgment and thought content normal. Cognition and memory intact. Assessment:       Diagnosis Orders   1. Essential hypertension  Radha Mckeon MD, Pulmonology, [place]      2. Pure hypercholesterolemia  Radha Mckeon MD, Pulmonology, [place]      3. TIBURCIO (obstructive sleep apnea)  Radha Mckeon MD, Pulmonology, [place]      4.  Class 3 severe obesity due to excess calories with serious comorbidity and body mass index (BMI) of 50.0 to 59.9 in adult [facility])  Maritza Soto MD, [facility]      5. Lumbar radicular pain  Maritza Soto MD, [facility]      6. History of stroke  Maritza Soto MD, [facility]      7. Stage 3 chronic kidney disease, unspecified whether stage 3a or 3b CKD [facility])  [facility]      8. Peripheral vascular disease [facility])  Maritza Soto MD, [facility]      9. Microcytic anemia  [facility] [address]:    Dietitian visit today. Patient was encouraged to journal all food intake. Keep calorie level at approximately 5032-3724. Protein intake is to be a minimum of 60-80 grams per day. Water drinking was encouraged with a goal of 64oz-128oz daily. Beverages to be calorie free except for milk. Every other beverage should be water. Avoid soda. Continue to increase level of physical activity. Encouraged use of exercise log. Patients will undergo thorough nutritional evaluation and counseling with our registered dietician. Our program provides long term nutritional counseling with unlimited consults with the dietician. All patients are nicotine tested and require a negative nicotine level prior to surgery. Patient has been educated about the risks associated with substance use, as well as the risks of using nicotine and alcohol after surgery. Patient has been educated that theses substances need to be avoided lifelong after surgery to reduce the risk of complications and sub-optimal weight loss. Follow-up  Return in about 1 month (around 1/27/2023).     Orders this encounter:  Orders Placed This Encounter   Procedures    Maritza Soto MD, [facility]     Referral Priority:   Routine     Referral Type:   Eval and Treat     Referral Reason:   Specialty Services Required     Referred to Provider:   Jose Juan Moulton MD     Requested Specialty:   Pulmonology     Number of Visits Requested:   1         Prescriptions this encounter:  No orders of the defined types were placed in this encounter.       Electronically signed by:  Scarlet Dandy, CNP

## 2022-12-29 NOTE — PATIENT INSTRUCTIONS
Script for lab mailed to pt, no fasting required. Pt will get labs done before next appointment. Printed script for Blood Pressure Cuff mailed to pt with signed office notes. Printed script for Pulse Ox mailed to pt with signed office notes. Patient to return to clinic as scheduled(2/9/22). AVS reviewed and given to pt. It is very important for your care that you keep your appointment. If for some reason you are unable to keep your appointment it is equally important that you call our office at [phone] to cancel your appointment and reschedule. Failure to do so may result in your termination from our practice.   MB 30

## 2023-01-11 DIAGNOSIS — I10 ESSENTIAL HYPERTENSION: ICD-10-CM

## 2023-01-11 DIAGNOSIS — E61.1 IRON DEFICIENCY: ICD-10-CM

## 2023-01-11 DIAGNOSIS — R79.89 ELEVATED FERRITIN: ICD-10-CM

## 2023-01-11 DIAGNOSIS — E55.9 VITAMIN D DEFICIENCY: ICD-10-CM

## 2023-01-11 DIAGNOSIS — D56.3 THALASSEMIA TRAIT, ALPHA: ICD-10-CM

## 2023-01-11 DIAGNOSIS — D50.9 MICROCYTIC ANEMIA: ICD-10-CM

## 2023-01-11 RX ORDER — FOLIC ACID 1 MG/1
1 TABLET ORAL DAILY
Qty: 30 TABLET | Refills: 2 | Status: SHIPPED | OUTPATIENT
Start: 2023-01-11

## 2023-01-11 NOTE — TELEPHONE ENCOUNTER
Request for folic acid. Next vladimir on 1/18/23    Next Visit Date:  Future Appointments   Date Time Provider Mary Marinelli   1/18/2023  1:00 PM [facility]   1/24/2023  3:30 PM SCHEDULE, P BARIATRIC DIETICIAN bariatric shen [facility]   1/31/2023  4:20 PM Juliette Aguilar MD [facility]   3/1/2023  3:20 PM [facility]   3/20/2023  3:15 PM Jerri Case DPM [facility]   3/29/2023  4:20 PM LOY Baron - CNP Neuro Spec Neurology -   4/3/2023  3:45 PM Javy Pennington MD [id number] [facility] Maintenance   Topic Date Due    Pneumococcal 65+ years Vaccine (3) 12/20/2021    COVID-19 Vaccine (4 - Booster for Pfizer series) 03/17/2022    Flu vaccine (1) 08/01/2022    Colorectal Cancer Screen  08/21/2022    Annual Wellness Visit (AWV)  10/08/2022    Depression Screen  06/08/2023    Low dose CT lung screening  11/15/2023    A1C test (Diabetic or Prediabetic)  11/28/2023    Lipids  11/28/2023    Breast cancer screen  12/08/2023    GFR test (Diabetes, CKD 3-4, OR last GFR 15-59)  12/27/2023    DTaP/Tdap/Td vaccine (2 - Td or Tdap) 03/08/2028    DEXA (modify frequency per FRAX score)  Completed    Shingles vaccine  Completed    Hepatitis C screen  Completed    Hepatitis A vaccine  Aged Out    Hib vaccine  Aged Out    Meningococcal (ACWY) vaccine  Aged Out       Hemoglobin A1C (%)   Date Value   11/28/2022 5.8   11/03/2022 5.8   09/29/2021 5.8             ( goal A1C is < 7)   Microalb/Crt.  Ratio (mcg/mg creat)   Date Value   05/09/2017 6     LDL Cholesterol (mg/dL)   Date Value   11/28/2022 86       (goal LDL is <100)   AST (U/L)   Date Value   11/28/2022 18     ALT (U/L)   Date Value   11/28/2022 13     BUN (mg/dL)   Date Value   12/27/2022 24 (H)     BP Readings from Last 3 Encounters:   12/27/22 136/84   12/20/22 123/72   12/19/22 (!) 140/88          (goal 120/80)    All Future Testing planned in [facility]  Lab Frequency Next Occurrence Protein / Creatinine Ratio, Urine Once 06/10/2022   COLONOSCOPY (Screening) Once 10/19/2022   Ferritin Once 10/03/2022   Iron and TIBC Once 10/03/2022   Transferrin Once 10/03/2022   CBC with Auto Differential     Comprehensive Metabolic Panel     CBC with Auto Differential           Patient Active Problem List:     Essential hypertension     Pure hypercholesterolemia     Cerebrovascular accident (CVA), 2011, no residual deficit (HCC)     CKD (chronic kidney disease) stage 3, GFR 30-59 ml/min (HCC)     Peripheral vascular disease (HCC)     TIBURCIO (obstructive sleep apnea)     Class 3 severe obesity due to excess calories with serious comorbidity and body mass index (BMI) of 50.0 to 59.9 in adult [facility])     Primary osteoarthritis of left knee     Chronic tension-type headache, not intractable     Carpal tunnel syndrome, bilateral-not on medication due to kidney disease     Microcytic anemia     Alpha thalassemia trait     Cervical stenosis of spinal canal     Chronic bilateral low back pain with bilateral sciatica     Lumbar radicular pain     History of stroke     Vertigo     Occlusion of right internal carotid artery     Thyroid nodule     Occlusion of vertebral artery

## 2023-01-12 RX ORDER — DOCUSATE SODIUM 100 MG/1
CAPSULE, LIQUID FILLED ORAL
Qty: 60 CAPSULE | Refills: 0 | Status: SHIPPED | OUTPATIENT
Start: 2023-01-12

## 2023-01-18 ENCOUNTER — OFFICE VISIT (OUTPATIENT)
Dept: INTERNAL MEDICINE | Age: 68
End: 2023-01-18

## 2023-01-18 VITALS
WEIGHT: 293 LBS | TEMPERATURE: 97 F | HEIGHT: 67 IN | HEART RATE: 66 BPM | BODY MASS INDEX: 45.99 KG/M2 | SYSTOLIC BLOOD PRESSURE: 127 MMHG | OXYGEN SATURATION: 98 % | DIASTOLIC BLOOD PRESSURE: 91 MMHG

## 2023-01-18 DIAGNOSIS — I10 ESSENTIAL HYPERTENSION: Primary | ICD-10-CM

## 2023-01-18 PROBLEM — G44.229 CHRONIC TENSION-TYPE HEADACHE, NOT INTRACTABLE: Status: RESOLVED | Noted: 2018-05-01 | Resolved: 2023-01-18

## 2023-01-18 ASSESSMENT — ENCOUNTER SYMPTOMS
RESPIRATORY NEGATIVE: 1
GASTROINTESTINAL NEGATIVE: 1

## 2023-01-18 ASSESSMENT — PATIENT HEALTH QUESTIONNAIRE - PHQ9
SUM OF ALL RESPONSES TO PHQ9 QUESTIONS 1 & 2: 0
SUM OF ALL RESPONSES TO PHQ QUESTIONS 1-9: 0
2. FEELING DOWN, DEPRESSED OR HOPELESS: 0
1. LITTLE INTEREST OR PLEASURE IN DOING THINGS: 0
SUM OF ALL RESPONSES TO PHQ QUESTIONS 1-9: 0

## 2023-01-18 NOTE — PROGRESS NOTES
[facility] [address] [address]  Dept: [phone]  Dept Fax: [phone]    Office Progress/Follow Up Note  Date ofpatient's visit: 1/22/2023  Patient's Name:  Delfina Delacruz YOB: 1955            Patient Care Team:  Peyton Chan MD as PCP - General (Internal Medicine)  Vipul Lacey DPM as Consulting Physician (Podiatry)  ================================================================    REASON FOR VISIT/CHIEF COMPLAINT:  Hypertension (Pt took medication today) and Health Maintenance (Pt refused flu and pneum)    HISTORY OF PRESENTING ILLNESS:  History was obtained from: patient. Delfina Rodriguez a 69 y.o. is here for follow-up visit for hypertension. She has been taking Cozaar 25 oral daily,   Her blood pressure checked initially was 184/91, repeat check was 127/91  Patient has been compliant with her medication, no issues reported  BMP done on December 27 unchanged from before, has known CKD    Patient has been following with bariatric surgery for possible sleeve gastrectomy in future, has been following with Ortho for degenerative spine disease, following with neurology for vertebral artery occlusion, is on aspirin, Brilinta, Lipitor, folic acid for history of stroke, was given rest tubular rehab appointment for dizziness. Colonoscopy deferred per neurology recommendations for now.     Patient Active Problem List   Diagnosis    Essential hypertension    Pure hypercholesterolemia    Cerebrovascular accident (CVA), 2011, no residual deficit (HCC)    CKD (chronic kidney disease) stage 3, GFR 30-59 ml/min (HCC)    Peripheral vascular disease (HCC)    TIBURCIO (obstructive sleep apnea)    Class 3 severe obesity due to excess calories with serious comorbidity and body mass index (BMI) of 50.0 to 59.9 in [facility])    Primary osteoarthritis of left knee    Carpal tunnel syndrome, bilateral-not on medication due to kidney disease    Microcytic anemia    Alpha thalassemia trait    Cervical stenosis of spinal canal    Chronic bilateral low back pain with bilateral sciatica    Lumbar radicular pain    History of stroke    Vertigo    Occlusion of right internal carotid artery    Thyroid nodule    Occlusion of vertebral artery       Health Maintenance Due   Topic Date Due    Pneumococcal 65+ years Vaccine (3) 12/20/2021    COVID-19 Vaccine (4 - Booster for Pfizer series) 03/17/2022    Flu vaccine (1) 08/01/2022    Colorectal Cancer Screen  08/21/2022    Annual Wellness Visit (AWV)  10/08/2022       Allergies   Allergen Reactions    Duricef [Cefadroxil Monohydrate] Hives    Fish-Derived Products Hives    Pcn [Penicillins] Hives    Tomato Hives         Current Outpatient Medications   Medication Sig Dispense Refill    docusate sodium (COLACE) 100 MG capsule TAKE 1 CAPSULE BY MOUTH 2 TIMES DAILY AS NEEDED FOR CONSTIPATION (BOTTLE) 60 capsule 0    folic acid (FOLVITE) 1 MG tablet TAKE 1 TABLET BY MOUTH DAILY 30 tablet 2    losartan (COZAAR) 25 MG tablet Take 1 tablet by mouth daily 90 tablet 1    aspirin 81 MG EC tablet TAKE 1 TABLET BY MOUTH DAILY 28 tablet 1    omeprazole (PRILOSEC) 20 MG delayed release capsule TAKE 1 CAPSULE BY MOUTH TWICE A DAY WITH MEALS 56 capsule 1    ticagrelor (BRILINTA) 90 MG TABS tablet Take 1 tablet by mouth 2 times daily 60 tablet 2    Vitamin D (CHOLECALCIFEROL) 25 MCG (1000 UT) TABS tablet       gabapentin (NEURONTIN) 600 MG tablet Take 600 mg by mouth 3 times daily.       acetaminophen (TYLENOL) 325 MG tablet TAKE 2 TABLETS BY MOUTH EVERY 4 HOURS AS NEEDED FOR PAIN 60 tablet 3    atorvastatin (LIPITOR) 80 MG tablet TAKE 1 TABLET BY MOUTH DAILY 28 tablet 3    ferrous sulfate (IRON 325) 325 (65 Fe) MG tablet Take 1 tab three days days a week 90 tablet 1    vitamin B-1 (THIAMINE) 100 MG tablet Take 1 tablet by mouth daily 30 tablet 3    diclofenac sodium (VOLTAREN) 1 % GEL Apply 4 g topically 4 times daily 4 Tube 1     No current facility-administered medications for this visit. Social History     Tobacco Use    Smoking status: Former     Packs/day: 2.00     Years: 35.00     Pack years: 70.00     Types: Cigarettes     Quit date: 10/11/2011     Years since quittin.2     Passive exposure: Current (Outside of the home)    Smokeless tobacco: Never   Vaping Use    Vaping Use: Never used   Substance Use Topics    Alcohol use: Yes     Alcohol/week: 6.0 standard drinks     Types: 6 Cans of beer per week     Comment: rare    Drug use: No     Types: Cocaine, Marijuana (Weed)     Comment: per pt did years ago; cocaine & marij, 17 denies current use       Family History   Problem Relation Age of Onset    High Blood Pressure Mother     Asthma Mother     Heart Disease Mother     Heart Disease Father     High Blood Pressure Father     Diabetes Brother     High Blood Pressure Brother     Heart Disease Brother     High Blood Pressure Maternal Grandmother     High Blood Pressure Maternal Grandfather     Heart Disease Maternal Grandfather         REVIEW OF SYSTEMS:  Review of Systems   Constitutional: Negative. Respiratory: Negative. Cardiovascular: Negative. Gastrointestinal: Negative. Endocrine: Negative. Genitourinary: Negative. Psychiatric/Behavioral: Negative. PHYSICAL EXAM:  Vitals:    23 1314 23 1548   BP: (!) 184/91 (!) 127/91   Site: Left Lower Arm    Position: Sitting    Cuff Size: Medium Adult    Pulse: 66 66   Temp: 97 °F (36.1 °C)    SpO2: 98%    Weight: (!) 370 lb 12.8 oz (168.2 kg)    Height: 5' 7\" (1.702 m)      BP Readings from Last 3 Encounters:   23 (!) 127/91   22 136/84   22 123/72        Physical Exam  Constitutional:       Appearance: Normal appearance. Cardiovascular:      Rate and Rhythm: Normal rate and regular rhythm. Pulses: Normal pulses. Heart sounds: Normal heart sounds.    Pulmonary:      Effort: Pulmonary effort is normal. Breath sounds: Normal breath sounds. Abdominal:      General: Abdomen is flat. Skin:     General: Skin is warm. Neurological:      Mental Status: She is alert. Mental status is at baseline. Psychiatric:         Mood and Affect: Mood normal.         DIAGNOSTIC FINDINGS:  CBC:  Lab Results   Component Value Date/Time    WBC 6.5 11/28/2022 01:35 PM    HGB 11.1 11/28/2022 01:35 PM     11/28/2022 01:35 PM     04/05/2012 09:05 AM       BMP:    Lab Results   Component Value Date/Time     12/27/2022 02:50 PM    K 3.9 12/27/2022 02:50 PM     12/27/2022 02:50 PM    CO2 26 12/27/2022 02:50 PM    BUN 24 12/27/2022 02:50 PM    CREATININE 1.11 12/27/2022 02:50 PM    GLUCOSE 92 12/27/2022 02:50 PM    GLUCOSE 88 04/12/2012 10:20 AM       HEMOGLOBIN A1C:   Lab Results   Component Value Date/Time    LABA1C 5.8 11/28/2022 01:35 PM       FASTING LIPID PANEL:  Lab Results   Component Value Date    CHOL 152 11/28/2022    HDL 43 11/28/2022    TRIG 115 11/28/2022       ASSESSMENT AND PLAN:  Ryanne Morris was seen today for hypertension and health maintenance. Diagnoses and all orders for this visit:    Essential hypertension  -Continue to take Cozaar 25 daily, DASH diet. FOLLOW UP AND INSTRUCTIONS:  Return in about 3 months (around 4/18/2023). Filomena received counseling on the following healthy behaviors: exercise    Discussed use, benefit, and side effects of prescribed medications. Barriers to medication compliance addressed. All patient questions answered. Pt voiced understanding.     Patient given educational materials - see patient instructions    Alethea Glynn MD  Internal Medicine Resident, PGY-3  [facility]  4/47/9824,6:94 AM         This note is created with the assistance of a speech-recognition program. While intending to generate a document that actually reflects the content of thevisit, the document can still have some mistakes which may not have been identified and corrected by editing.

## 2023-01-24 ENCOUNTER — OFFICE VISIT (OUTPATIENT)
Dept: BARIATRICS/WEIGHT MGMT | Age: 68
End: 2023-01-24
Payer: COMMERCIAL

## 2023-01-24 VITALS
DIASTOLIC BLOOD PRESSURE: 72 MMHG | SYSTOLIC BLOOD PRESSURE: 139 MMHG | HEART RATE: 78 BPM | HEIGHT: 67 IN | WEIGHT: 293 LBS | BODY MASS INDEX: 45.99 KG/M2

## 2023-01-24 DIAGNOSIS — I10 ESSENTIAL HYPERTENSION: Primary | ICD-10-CM

## 2023-01-24 DIAGNOSIS — D50.9 MICROCYTIC ANEMIA: ICD-10-CM

## 2023-01-24 DIAGNOSIS — N18.30 STAGE 3 CHRONIC KIDNEY DISEASE, UNSPECIFIED WHETHER STAGE 3A OR 3B CKD (HCC): ICD-10-CM

## 2023-01-24 DIAGNOSIS — Z86.73 HISTORY OF STROKE: ICD-10-CM

## 2023-01-24 DIAGNOSIS — E66.01 CLASS 3 SEVERE OBESITY DUE TO EXCESS CALORIES WITH SERIOUS COMORBIDITY AND BODY MASS INDEX (BMI) OF 50.0 TO 59.9 IN ADULT (HCC): ICD-10-CM

## 2023-01-24 DIAGNOSIS — G47.33 OSA (OBSTRUCTIVE SLEEP APNEA): ICD-10-CM

## 2023-01-24 DIAGNOSIS — E78.00 PURE HYPERCHOLESTEROLEMIA: ICD-10-CM

## 2023-01-24 PROCEDURE — 99213 OFFICE O/P EST LOW 20 MIN: CPT | Performed by: NURSE PRACTITIONER

## 2023-01-24 PROCEDURE — 1123F ACP DISCUSS/DSCN MKR DOCD: CPT | Performed by: NURSE PRACTITIONER

## 2023-01-24 PROCEDURE — 3078F DIAST BP <80 MM HG: CPT | Performed by: NURSE PRACTITIONER

## 2023-01-24 PROCEDURE — 3075F SYST BP GE 130 - 139MM HG: CPT | Performed by: NURSE PRACTITIONER

## 2023-01-24 NOTE — PROGRESS NOTES
Medical Weight Management Progress Note    Subjective     Patient being seen for medically supervised weight loss for the chronic conditions of HTN, TIBURCIO, HLD, Chronic Back and Knee Pain, Hx CVA, CKD, PVD, Anemia, Prediabetes, Vertigo. She is working on the behavior changes discussed at the initial appointment. Patient continues on diet plan. Physical activity includes walking as tolerated. Weight loss of 3 lbs since last visit. Quit smoking in 2010. Using CPAP. Psych eval completed and clearance received. Needs to reschedule EGD. Needs cardiac, pulmonary, and neurology clearances. No current issues. Working toward bariatric surgery:    [x] Sleeve Gastrectomy                                                           [] Sunday-en-Y Gastric Bypass    Allergies: Allergies   Allergen Reactions    Duricef [Cefadroxil Monohydrate] Hives    Fish-Derived Products Hives    Pcn [Penicillins] Hives    Tomato Hives       Past Medical History:     Past Medical History:   Diagnosis Date    Ambulates with cane     Bleeding     while on aggrenox    Cataracts, bilateral     Cerebrovascular disease 2003,2011    cva    Chronic pain in left foot     CKD (chronic kidney disease) stage 3, GFR 30-59 ml/min ([facility]     COVID-19 11/2021    states hpspitalized    GERD (gastroesophageal reflux disease)     Hx of blood clots     Hyperlipidemia     Hypertension     Iron (Fe) deficiency anemia     Nicotine abuse     Obesity     TIBURCIO on CPAP     Osteoarthritis     Peripheral vascular disease ([address] 03/13/2014    Substance abuse ([facility] [address]     cocaine, canabis    Thalassemia minor     Thyroid nodule 11/22/2022    Unspecified cerebral artery occlusion with cerebral infarction     Wears dentures     upper and lower   .     Past Surgical History:  Past Surgical History:   Procedure Laterality Date    BREAST SURGERY      biopsy    COLONOSCOPY  12/2007    adenomatous polyp    COLONOSCOPY  2013    normal, repeat in 5 years    COLONOSCOPY N/A 2019    COLONOSCOPY POLYPECTOMY SNARE/COLD BIOPSY performed by Latasha Park MD at [place] [id number]      UPPER GASTROINTESTINAL ENDOSCOPY  2009    negative    UPPER GASTROINTESTINAL ENDOSCOPY N/A 2019    EGD BIOPSY performed by Latasha Park MD at \[facility]"" Endoscopy       Family History:  Family History   Problem Relation Age of Onset    High Blood Pressure Mother     Asthma Mother     Heart Disease Mother     Heart Disease Father     High Blood Pressure Father     Diabetes Brother     High Blood Pressure Brother     Heart Disease Brother     High Blood Pressure Maternal Grandmother     High Blood Pressure Maternal Grandfather     Heart Disease Maternal Grandfather        Social History:  Social History     Socioeconomic History    Marital status:      Spouse name: Not on file    Number of children: Not on file    Years of education: Not on file    Highest education level: Not on file   Occupational History    Not on file   Tobacco Use    Smoking status: Former     Packs/day: 2.00     Years: 35.00     Pack years: 70.00     Types: Cigarettes     Quit date: 10/11/2011     Years since quittin.2     Passive exposure: Current (Outside of the home)    Smokeless tobacco: Never   Vaping Use    Vaping Use: Never used   Substance and Sexual Activity    Alcohol use:  Yes     Alcohol/week: 6.0 standard drinks     Types: 6 Cans of beer per week     Comment: rare    Drug use: No     Types: Cocaine, Marijuana (Weed)     Comment: per pt did years ago; cocaine & marij, 17 denies current use    Sexual activity: Yes     Partners: Male   Other Topics Concern    Not on file   Social History Narrative    Not on file     Social Determinants of Health     Financial Resource Strain: Low Risk     Difficulty of Paying Living Expenses: Not hard at all   Food Insecurity: No Food Insecurity    Worried About [address] in the Last Year: Never true    Ran Out of Food in the Last Year: Never true   Transportation Needs: Not on file   Physical Activity: Not on file   Stress: Not on file   Social Connections: Not on file   Intimate Partner Violence: Not on file   Housing Stability: Not on file       Current Medications:  Current Outpatient Medications   Medication Sig Dispense Refill    docusate sodium (COLACE) 100 MG capsule TAKE 1 CAPSULE BY MOUTH 2 TIMES DAILY AS NEEDED FOR CONSTIPATION (BOTTLE) 60 capsule 0    folic acid (FOLVITE) 1 MG tablet TAKE 1 TABLET BY MOUTH DAILY 30 tablet 2    losartan (COZAAR) 25 MG tablet Take 1 tablet by mouth daily 90 tablet 1    aspirin 81 MG EC tablet TAKE 1 TABLET BY MOUTH DAILY 28 tablet 1    omeprazole (PRILOSEC) 20 MG delayed release capsule TAKE 1 CAPSULE BY MOUTH TWICE A DAY WITH MEALS 56 capsule 1    ticagrelor (BRILINTA) 90 MG TABS tablet Take 1 tablet by mouth 2 times daily 60 tablet 2    Vitamin D (CHOLECALCIFEROL) 25 MCG (1000 UT) TABS tablet       gabapentin (NEURONTIN) 600 MG tablet Take 600 mg by mouth 3 times daily. acetaminophen (TYLENOL) 325 MG tablet TAKE 2 TABLETS BY MOUTH EVERY 4 HOURS AS NEEDED FOR PAIN 60 tablet 3    atorvastatin (LIPITOR) 80 MG tablet TAKE 1 TABLET BY MOUTH DAILY 28 tablet 3    ferrous sulfate (IRON 325) 325 (65 Fe) MG tablet Take 1 tab three days days a week 90 tablet 1    vitamin B-1 (THIAMINE) 100 MG tablet Take 1 tablet by mouth daily 30 tablet 3    diclofenac sodium (VOLTAREN) 1 % GEL Apply 4 g topically 4 times daily 4 Tube 1     No current facility-administered medications for this visit. Vital Signs:  /72 (Site: Right Lower Arm, Position: Sitting, Cuff Size: Large Adult)   Pulse 78   Ht 5' 7\" (1.702 m)   Wt (!) 369 lb (167.4 kg)   BMI 57.79 kg/m²     BMI/Height/Weight:  Body mass index is 57.79 kg/m². Review of Systems - A review of systems was performed. All was negative unless otherwise documented in HPI.     Constitutional: Negative for fever, chills. HENT: Negative for trouble swallowing. Eyes: Negative for visual disturbance. Respiratory: Negative for cough, shortness of breath. Cardiovascular: Negative for chest pain and palpitations. Gastrointestinal: Negative for nausea, vomiting, abdominal pain, diarrhea, constipation, blood in stool and abdominal distention. Endocrine: Negative for polydipsia, polyphagia and polyuria. Genitourinary: Negative for dysuria, frequency, hematuria and difficulty urinating. Musculoskeletal: Negative for myalgias, joint swelling. Skin: Negative for pallor and rash. Neurological: Negative for dizziness, tremors, light-headedness and headaches. Psychiatric/Behavioral: Negative for sleep disturbance and dysphoric mood. Objective:      Physical Exam   Vital signs reviewed. General: Well-developed and well-nourished. No acute distress. Skin: Warm, dry and intact. HEENT: Normocephalic. EOMs intact. Conjunctivae normal. Neck supple. Cardiovascular: Normal rate, regular rhythm. Pulmonary/Chest: Normal effort. Lungs clear to auscultation. No rales, rhonchi or wheezing. Abdominal: Positive bowel sounds. Soft, nontender. Nondistended. Musculoskeletal: Movement x4. No edema. Neurological: Ambulates with a cane. Alert and oriented to person, place, and time. Psychiatric: Normal mood and affect. Speech and behavior normal. Judgment and thought content normal. Cognition and memory intact. Assessment:       Diagnosis Orders   1. Essential hypertension  XR CHEST STANDARD (2 VW)    Full PFT Study With Bronchodilator      2. TIBURCIO (obstructive sleep apnea)  XR CHEST STANDARD (2 VW)    Full PFT Study With Bronchodilator      3. Pure hypercholesterolemia  XR CHEST STANDARD (2 VW)    Full PFT Study With Bronchodilator      4. History of stroke  XR CHEST STANDARD (2 VW)    Full PFT Study With Bronchodilator      5.  Stage 3 chronic kidney disease, unspecified whether stage 3a or 3b CKD ([facility] XR CHEST STANDARD (2 VW)    Full PFT Study With Bronchodilator      6. Microcytic anemia  XR CHEST STANDARD (2 VW)    Full PFT Study With Bronchodilator      7. Class 3 severe obesity due to excess calories with serious comorbidity and body mass index (BMI) of 50.0 to 59.9 in adult [facility])            Plan:    Dietitian visit today. Patient was encouraged to journal all food intake. Keep calorie level at approximately [id number]. Protein intake is to be a minimum of 60-80 grams per day. Water drinking was encouraged with a goal of 64oz-128oz daily. Beverages to be calorie free except for milk. Every other beverage should be water. Avoid soda. Continue to increase level of physical activity. Encouraged use of exercise log. Patients will undergo thorough nutritional evaluation and counseling with our registered dietician. Our program provides long term nutritional counseling with unlimited consults with the dietician. All patients are nicotine tested and require a negative nicotine level prior to surgery. Patient has been educated about the risks associated with substance use, as well as the risks of using nicotine and alcohol after surgery. Patient has been educated that theses substances need to be avoided lifelong after surgery to reduce the risk of complications and sub-optimal weight loss. PFT and CXR ordered per request of pulmonology for bariatric clearance. Follow-up  Return in about 1 month (around 2/24/2023).     Orders this encounter:  Orders Placed This Encounter   Procedures    XR CHEST STANDARD (2 VW)     Standing Status:   Future     Standing Expiration Date:   1/24/2024     Order Specific Question:   Reason for exam:     Answer:   required for pulmonary clearance for bariatric surgery    Full PFT Study With Bronchodilator     If an ABG is needed along with this PFT procedure, please place the appropriate lab order     Standing Status:   Future     Standing Expiration Date:   1/24/2024 Prescriptions this encounter:  No orders of the defined types were placed in this encounter.       Electronically signed by:  Joe Friedman CNP

## 2023-01-24 NOTE — PROGRESS NOTES
Medical Nutrition Therapy   Metabolic and Bariatric Surgery         Supervised diet and exercise preparation  Visit 3 out of 6      Changes in eating patterns to promote health are noted below on the goals number 19-22    Vitals: Wt Readings from Last 3 Encounters:   01/24/23 (!) 369 lb (167.4 kg)   01/18/23 (!) 370 lb 12.8 oz (168.2 kg)   12/27/22 (!) 372 lb (168.7 kg)         Nutrition Assessment:   PES: Knowledge deficit related to healthy behaviors that support weight management post weight loss surgery as evidenced by Body mass index is 57.79 kg/m². Nutrition Assessment of Goal Attainment:  TREATMENT GOALS:    1. Pt  Completed 5 out of 5 goals. 2.TREATMENT GOALS FOR UPCOMING WEEK: continue all previous goals and add: # 9, 11    All goals were planned with and agreed on by the patient. appt # NA G What is your next step? C 1 2 3 4 5 6 7 8 9     1  1 I will read the education binder provided to me and the   x  100            1  2 I will make my pschological evaluation appoinment. x  100            3  3 I will bring this goal card to every appointment. 100 100            x 4 I will eliminate all tobacco/nicotine. x 5 I will limit alcoholic beverages to 6-5PJ per week. x 6 I will limit dining out to 3 times per week or less. 2  7 I will eliminate sugary beverages. x   100            x 8 I will eliminate carbonated beverages. 3  9 I will eliminate drinking with a straw. 2  10 I will limit caffeinated beverages to 16oz daily. x   100           3  11 I will limit log my exercise daily. 100 100           3  12 I will determine my calcium and mvi plan. x 13 I will have 1-2 servings of lean protein present at each meal and minimeal.                x 14 I will eat every 3-5 hours. 2  15 I will drink 64oz of fluid daily. x   100             16 I will eat slowly during meals and snacks. 17 I will limit fluids 4oz before after and during meals. 18 I will eat protein first at all meals followed by vegetables,  Fruit and lastly whole grains. 23 My first weight neutral approach is:                 20 My second weight neutral approach is:                 21  My Thirds weight neutral approach is:                   Questions asked for goal understanding:                                                  Do you understand your goals? Do you have the information you need to achieve your goals? Do you have any questions  right now? [x]  Consistent goal achievement in the program thus far and further success with goals is expected. []  Unable to consistently make progress in goal achievement. At this time patient is not moving forward  in developing the skills needed for success after surgery. Plan:    Continue to follow monthly and review goals.          [x]  Nutrition visits complete    []

## 2023-02-07 DIAGNOSIS — I63.00 CEREBROVASCULAR ACCIDENT (CVA) DUE TO THROMBOSIS OF PRECEREBRAL ARTERY (HCC): ICD-10-CM

## 2023-02-07 DIAGNOSIS — E78.00 PURE HYPERCHOLESTEROLEMIA: ICD-10-CM

## 2023-02-07 DIAGNOSIS — R09.81 SINUS CONGESTION: ICD-10-CM

## 2023-02-07 NOTE — TELEPHONE ENCOUNTER
Pharmacy requesting refill of Brilinta 90 mg.      Medication active on med list yes      Date of last Rx: 11/22/2022 with 2 refills          verified by WINSTON, MARY ALICEA      Date of last appointment 12/20/2022    Next Visit Date:  3/29/2023

## 2023-02-07 NOTE — TELEPHONE ENCOUNTER
Health Maintenance   Topic Date Due    Pneumococcal 65+ years Vaccine (3) 12/20/2021    COVID-19 Vaccine (4 - Booster for Pfizer series) 03/17/2022    Flu vaccine (1) 08/01/2022    Colorectal Cancer Screen  08/21/2022    Annual Wellness Visit (AWV)  10/08/2022    Low dose CT lung screening  11/15/2023    A1C test (Diabetic or Prediabetic)  11/28/2023    Lipids  11/28/2023    Breast cancer screen  12/08/2023    GFR test (Diabetes, CKD 3-4, OR last GFR 15-59)  12/27/2023    Depression Screen  01/18/2024    DTaP/Tdap/Td vaccine (2 - Td or Tdap) 03/08/2028    DEXA (modify frequency per FRAX score)  Completed    Shingles vaccine  Completed    Hepatitis C screen  Completed    Hepatitis A vaccine  Aged Out    Hib vaccine  Aged Out    Meningococcal (ACWY) vaccine  Aged Out             (applicable per patient's age: Cancer Screenings, Depression Screening, Fall Risk Screening, Immunizations)    Hemoglobin A1C (%)   Date Value   11/28/2022 5.8   11/03/2022 5.8   09/29/2021 5.8     Microalb/Crt.  Ratio (mcg/mg creat)   Date Value   05/09/2017 6     LDL Cholesterol (mg/dL)   Date Value   11/28/2022 86     AST (U/L)   Date Value   11/28/2022 18     ALT (U/L)   Date Value   11/28/2022 13     BUN (mg/dL)   Date Value   12/27/2022 24 (H)      (goal A1C is < 7)   (goal LDL is <100) need 30-50% reduction from baseline     BP Readings from Last 3 Encounters:   01/24/23 139/72   01/18/23 (!) 127/91   12/27/22 136/84    (goal /80)      All Future Testing planned in [facility]:  Lab Frequency Next Occurrence   Protein / Creatinine Ratio, Urine Once 06/10/2022   COLONOSCOPY (Screening) Once 10/19/2022   Ferritin Once 10/03/2022   Iron and TIBC Once 10/03/2022   Transferrin Once 10/03/2022   XR CHEST STANDARD (2 VW) Once 01/24/2023   Full PFT Study With Bronchodilator Once 01/24/2023   CBC with Auto Differential     Comprehensive Metabolic Panel     CBC with Auto Differential         Next Visit Date:  Future Appointments   Date Time Provider Department Center   2/16/2023  2:00 PM STV PFT RM 1 [facility]VZ PFT [facility]   2/17/2023  4:15 PM SCHEDULE, [facility] BARIATRIC DIETICIAN bariatric shen [facility]   3/1/2023  3:20 PM Radhika Ag MD [address] IM [facility]   3/6/2023 11:30 AM STA SCR MAMMO RM 2 STAZ MAMMO STA Radiolog   3/20/2023  3:15 PM [facility] D[facility]   3/29/2023  4:20 PM LOY Ross - CNP Neuro Spec Neurology -   4/3/2023  3:45 PM Zoë Martell MD [facility]   5/24/2023  2:20 PM Radhika Ag MD [address] IM [facility]            Patient Active Problem List:     Essential hypertension     Pure hypercholesterolemia     Cerebrovascular accident (CVA), 2011, no residual deficit (HCC)     CKD (chronic kidney disease) stage 3, GFR 30-59 ml/min (HCC)     Peripheral vascular disease (HCC)     TIBURCIO (obstructive sleep apnea)     Class 3 severe obesity due to excess calories with serious comorbidity and body mass index (BMI) of 50.0 to 59.9 in adult [facility])     Primary osteoarthritis of left knee     Carpal tunnel syndrome, bilateral-not on medication due to kidney disease     Microcytic anemia     Alpha thalassemia trait     Cervical stenosis of spinal canal     Chronic bilateral low back pain with bilateral sciatica     Lumbar radicular pain     History of stroke     Vertigo     Occlusion of right internal carotid artery     Thyroid nodule     Occlusion of vertebral artery

## 2023-02-08 RX ORDER — TICAGRELOR 90 MG/1
TABLET ORAL
Qty: 60 TABLET | Refills: 1 | Status: SHIPPED | OUTPATIENT
Start: 2023-02-08

## 2023-02-09 RX ORDER — ATORVASTATIN CALCIUM 80 MG/1
80 TABLET, FILM COATED ORAL DAILY
Qty: 28 TABLET | Refills: 4 | Status: SHIPPED | OUTPATIENT
Start: 2023-02-09

## 2023-02-09 RX ORDER — OMEPRAZOLE 20 MG/1
CAPSULE, DELAYED RELEASE ORAL
Qty: 56 CAPSULE | Refills: 1 | Status: SHIPPED | OUTPATIENT
Start: 2023-02-09

## 2023-02-16 ENCOUNTER — HOSPITAL ENCOUNTER (OUTPATIENT)
Dept: PULMONOLOGY | Age: 68
Discharge: HOME | End: 2023-02-16

## 2023-02-16 DIAGNOSIS — E78.00 PURE HYPERCHOLESTEROLEMIA: ICD-10-CM

## 2023-02-16 DIAGNOSIS — Z86.73 HISTORY OF STROKE: ICD-10-CM

## 2023-02-16 DIAGNOSIS — N18.30 STAGE 3 CHRONIC KIDNEY DISEASE, UNSPECIFIED WHETHER STAGE 3A OR 3B CKD (HCC): ICD-10-CM

## 2023-02-16 DIAGNOSIS — D50.9 MICROCYTIC ANEMIA: ICD-10-CM

## 2023-02-16 DIAGNOSIS — I10 ESSENTIAL HYPERTENSION: ICD-10-CM

## 2023-02-16 DIAGNOSIS — G47.33 OSA (OBSTRUCTIVE SLEEP APNEA): ICD-10-CM

## 2023-02-21 ENCOUNTER — OFFICE VISIT (OUTPATIENT)
Dept: BARIATRICS/WEIGHT MGMT | Age: 68
End: 2023-02-21
Payer: COMMERCIAL

## 2023-02-21 VITALS
BODY MASS INDEX: 45.99 KG/M2 | RESPIRATION RATE: 20 BRPM | SYSTOLIC BLOOD PRESSURE: 113 MMHG | HEART RATE: 86 BPM | DIASTOLIC BLOOD PRESSURE: 87 MMHG | HEIGHT: 67 IN | WEIGHT: 293 LBS

## 2023-02-21 DIAGNOSIS — G47.33 OSA (OBSTRUCTIVE SLEEP APNEA): ICD-10-CM

## 2023-02-21 DIAGNOSIS — N18.30 STAGE 3 CHRONIC KIDNEY DISEASE, UNSPECIFIED WHETHER STAGE 3A OR 3B CKD (HCC): ICD-10-CM

## 2023-02-21 DIAGNOSIS — I10 ESSENTIAL HYPERTENSION: Primary | ICD-10-CM

## 2023-02-21 DIAGNOSIS — E66.01 CLASS 3 SEVERE OBESITY DUE TO EXCESS CALORIES WITH SERIOUS COMORBIDITY AND BODY MASS INDEX (BMI) OF 50.0 TO 59.9 IN ADULT (HCC): ICD-10-CM

## 2023-02-21 DIAGNOSIS — D56.3 ALPHA THALASSEMIA TRAIT: ICD-10-CM

## 2023-02-21 DIAGNOSIS — Z86.73 HISTORY OF STROKE: ICD-10-CM

## 2023-02-21 DIAGNOSIS — D50.9 MICROCYTIC ANEMIA: ICD-10-CM

## 2023-02-21 DIAGNOSIS — M54.16 LUMBAR RADICULAR PAIN: ICD-10-CM

## 2023-02-21 DIAGNOSIS — E78.00 PURE HYPERCHOLESTEROLEMIA: ICD-10-CM

## 2023-02-21 PROCEDURE — 3079F DIAST BP 80-89 MM HG: CPT | Performed by: NURSE PRACTITIONER

## 2023-02-21 PROCEDURE — 1123F ACP DISCUSS/DSCN MKR DOCD: CPT | Performed by: NURSE PRACTITIONER

## 2023-02-21 PROCEDURE — 99213 OFFICE O/P EST LOW 20 MIN: CPT | Performed by: NURSE PRACTITIONER

## 2023-02-21 PROCEDURE — 3074F SYST BP LT 130 MM HG: CPT | Performed by: NURSE PRACTITIONER

## 2023-02-21 NOTE — PROGRESS NOTES
Medical Weight Management Progress Note    Subjective     Patient being seen for medically supervised weight loss for the chronic conditions of HTN, TIBURCIO, HLD, Chronic Back and Knee Pain, Hx CVA, CKD, PVD, Anemia, Prediabetes, Vertigo. She is working on the behavior changes discussed at the initial appointment. Patient continues on diet plan. Physical activity includes walking as tolerated. Weight loss of 0 lbs since last visit. Quit smoking in 2010. Using CPAP. Psych eval completed and clearance received. Needs to reschedule EGD. Needs pulmonary, and neurology clearances. Cardiac clearance received. No current issues. Working toward bariatric surgery:    [x] Sleeve Gastrectomy                                                           [] Sunday-en-Y Gastric Bypass    Allergies: Allergies   Allergen Reactions    Duricef [Cefadroxil Monohydrate] Hives    Fish-Derived Products Hives    Pcn [Penicillins] Hives    Tomato Hives       Past Medical History:     Past Medical History:   Diagnosis Date    Ambulates with cane     Bleeding     while on aggrenox    Cataracts, bilateral     Cerebrovascular disease 2003,2011    cva    Chronic pain in left foot     CKD (chronic kidney disease) stage 3, GFR 30-59 ml/min ([facility] [address]     COVID-19 11/2021    states hpspitalized    GERD (gastroesophageal reflux disease)     Hx of blood clots     Hyperlipidemia     Hypertension     Iron (Fe) deficiency anemia     Nicotine abuse     Obesity     TIBURCIO on CPAP     Osteoarthritis     Peripheral vascular disease ([address] 03/13/2014    Substance abuse ([facility] [address]     cocaine, canabis    Thalassemia minor     Thyroid nodule 11/22/2022    Unspecified cerebral artery occlusion with cerebral infarction     Wears dentures     upper and lower   .     Past Surgical History:  Past Surgical History:   Procedure Laterality Date    BREAST SURGERY      biopsy    COLONOSCOPY  12/2007    adenomatous polyp    COLONOSCOPY  2013    normal, repeat in 5 years COLONOSCOPY N/A 2019    COLONOSCOPY POLYPECTOMY SNARE/COLD BIOPSY performed by Russell Mcgowan MD at [address] GASTROINTESTINAL ENDOSCOPY  2009    negative    UPPER GASTROINTESTINAL ENDOSCOPY N/A 2019    [facility]SY [facility]       Family History:  Family History   Problem Relation Age of Onset    High Blood Pressure Mother     Asthma Mother     Heart Disease Mother     Heart Disease Father     High Blood Pressure Father     Diabetes Brother     High Blood Pressure Brother     Heart Disease Brother     High Blood Pressure Maternal Grandmother     High Blood Pressure Maternal Grandfather     Heart Disease Maternal Grandfather        Social History:  Social History     Socioeconomic History    Marital status:      Spouse name: Not on file    Number of children: Not on file    Years of education: Not on file    Highest education level: Not on file   Occupational History    Not on file   Tobacco Use    Smoking status: Former     Packs/day: 2.00     Years: 35.00     Pack years: 70.00     Types: Cigarettes     Quit date: 10/11/2011     Years since quittin.3     Passive exposure: Current (Outside of the home)    Smokeless tobacco: Never   Vaping Use    Vaping Use: Never used   Substance and Sexual Activity    Alcohol use:  Yes     Alcohol/week: 6.0 standard drinks     Types: 6 Cans of beer per week     Comment: rare    Drug use: No     Types: Cocaine, Marijuana (Weed)     Comment: per pt did years ago; cocaine & marij, 17 denies current use    Sexual activity: Yes     Partners: Male   Other Topics Concern    Not on file   Social History Narrative    Not on file     Social Determinants of Health     Financial Resource Strain: Low Risk     Difficulty of Paying Living Expenses: Not hard at all   Food Insecurity: No Food Insecurity    Worried About Running Out of Food in the Last Year: Never true    Ran Out of Food in the Last Year: Never true   Transportation Needs: Not on file   Physical Activity: Not on file   Stress: Not on file   Social Connections: Not on file   Intimate Partner Violence: Not on file   Housing Stability: Not on file       Current Medications:  Current Outpatient Medications   Medication Sig Dispense Refill    atorvastatin (LIPITOR) 80 MG tablet TAKE 1 TABLET BY MOUTH DAILY 28 tablet 4    omeprazole (PRILOSEC) 20 MG delayed release capsule TAKE 1 CAPSULE BY MOUTH TWICE A DAY WITH MEALS 56 capsule 1    BRILINTA 90 MG TABS tablet TAKE 1 TABLET BY MOUTH TWICE A DAY 60 tablet 1    docusate sodium (COLACE) 100 MG capsule TAKE 1 CAPSULE BY MOUTH 2 TIMES DAILY AS NEEDED FOR CONSTIPATION (BOTTLE) 60 capsule 0    folic acid (FOLVITE) 1 MG tablet TAKE 1 TABLET BY MOUTH DAILY 30 tablet 2    losartan (COZAAR) 25 MG tablet Take 1 tablet by mouth daily 90 tablet 1    aspirin 81 MG EC tablet TAKE 1 TABLET BY MOUTH DAILY 28 tablet 1    Vitamin D (CHOLECALCIFEROL) 25 MCG (1000 UT) TABS tablet       gabapentin (NEURONTIN) 600 MG tablet Take 600 mg by mouth 3 times daily. acetaminophen (TYLENOL) 325 MG tablet TAKE 2 TABLETS BY MOUTH EVERY 4 HOURS AS NEEDED FOR PAIN 60 tablet 3    ferrous sulfate (IRON 325) 325 (65 Fe) MG tablet Take 1 tab three days days a week 90 tablet 1    vitamin B-1 (THIAMINE) 100 MG tablet Take 1 tablet by mouth daily 30 tablet 3    diclofenac sodium (VOLTAREN) 1 % GEL Apply 4 g topically 4 times daily 4 Tube 1     No current facility-administered medications for this visit. Vital Signs:  /87 (Site: Left Lower Arm, Position: Sitting, Cuff Size: Large Adult)   Pulse 86   Resp 20   Ht 5' 7\" (1.702 m)   Wt (!) 369 lb (167.4 kg)   BMI 57.79 kg/m²     BMI/Height/Weight:  Body mass index is 57.79 kg/m². Review of Systems - A review of systems was performed. All was negative unless otherwise documented in HPI.     Constitutional: Negative for fever, chills. HENT: Negative for trouble swallowing. Eyes: Negative for visual disturbance. Respiratory: Negative for cough, shortness of breath. Cardiovascular: Negative for chest pain and palpitations. Gastrointestinal: Negative for nausea, vomiting, abdominal pain, diarrhea, constipation, blood in stool and abdominal distention. Endocrine: Negative for polydipsia, polyphagia and polyuria. Genitourinary: Negative for dysuria, frequency, hematuria and difficulty urinating. Musculoskeletal: Negative for myalgias, joint swelling. Skin: Negative for pallor and rash. Neurological: Negative for dizziness, tremors, light-headedness and headaches. Psychiatric/Behavioral: Negative for sleep disturbance and dysphoric mood. Objective:      Physical Exam   Vital signs reviewed. General: Well-developed and well-nourished. No acute distress. Skin: Warm, dry and intact. HEENT: Normocephalic. EOMs intact. Conjunctivae normal. Neck supple. Cardiovascular: Normal rate, regular rhythm. Pulmonary/Chest: Normal effort. Lungs clear to auscultation. No rales, rhonchi or wheezing. Abdominal: Positive bowel sounds. Soft, nontender. Nondistended. Musculoskeletal: Movement x4. No edema. Neurological: Ambulates with a cane. Alert and oriented to person, place, and time. Psychiatric: Normal mood and affect. Speech and behavior normal. Judgment and thought content normal. Cognition and memory intact. Assessment:       Diagnosis Orders   1. Essential hypertension        2. TIBURCIO (obstructive sleep apnea)        3. Pure hypercholesterolemia        4. Stage 3 chronic kidney disease, unspecified whether stage 3a or 3b CKD ([facility] [address]        5. Microcytic anemia        6. Alpha thalassemia trait        7. History of stroke        8. Lumbar radicular pain        9.  Class 3 severe obesity due to excess calories with serious comorbidity and body mass index (BMI) of 50.0 to 59.9 in adult [facility]) Plan:    Dietitian visit today. Patient was encouraged to journal all food intake. Keep calorie level at approximately [id number]. Protein intake is to be a minimum of 60-80 grams per day. Water drinking was encouraged with a goal of 64oz-128oz daily. Beverages to be calorie free except for milk. Every other beverage should be water. Avoid soda. Continue to increase level of physical activity. Encouraged use of exercise log. Patients will undergo thorough nutritional evaluation and counseling with our registered dietician. Our program provides long term nutritional counseling with unlimited consults with the dietician. All patients are nicotine tested and require a negative nicotine level prior to surgery. Patient has been educated about the risks associated with substance use, as well as the risks of using nicotine and alcohol after surgery. Patient has been educated that theses substances need to be avoided lifelong after surgery to reduce the risk of complications and sub-optimal weight loss. Follow-up  Return in about 1 month (around 3/21/2023). Orders this encounter:  No orders of the defined types were placed in this encounter. Prescriptions this encounter:  No orders of the defined types were placed in this encounter.       Electronically signed by:  Kyler Cortez CNP

## 2023-02-21 NOTE — PROGRESS NOTES
Medical Nutrition Therapy   Metabolic and Bariatric Surgery         Supervised diet and exercise preparation  Visit 4 out of 6    Vitals: Wt Readings from Last 3 Encounters:   02/21/23 (!) 369 lb (167.4 kg)   01/24/23 (!) 369 lb (167.4 kg)   01/18/23 (!) 370 lb 12.8 oz (168.2 kg)         Nutrition Assessment:   PES: Knowledge deficit related to healthy behaviors that support weight management post weight loss surgery as evidenced by Body mass index is 57.79 kg/m². Nutrition Assessment of Goal Attainment:  TREATMENT GOALS:    1. Pt  Completed 4 out of 4 goals. 2.TREATMENT GOALS FOR UPCOMING WEEK: continue all previous goals and add: # 16    All goals were planned with and agreed on by the patient. appt # NA G What is your next step? C 1 2 3 4 5 6 7 8 9     1  1 I will read the education binder provided to me and the   x  100            1  2 I will make my pschological evaluation appoinment. x  100            4  3 I will bring this goal card to every appointment. 100 100 100           x 4 I will eliminate all tobacco/nicotine. x 5 I will limit alcoholic beverages to 8-3FF per week. x 6 I will limit dining out to 3 times per week or less. 2  7 I will eliminate sugary beverages. x   100            x 8 I will eliminate carbonated beverages. 3  9 I will eliminate drinking with a straw. x    100          2  10 I will limit caffeinated beverages to 16oz daily. x   100           4  11 I will limit log my exercise daily. 100 100 100          3  12 I will determine my calcium and mvi plan. x    100           x 13 I will have 1-2 servings of lean protein present at each meal and minimeal.                x 14 I will eat every 3-5 hours. 2  15 I will drink 64oz of fluid daily. x   100           4  16 I will eat slowly during meals and snacks. 17 I will limit fluids 4oz before after and during meals.                  18 I will eat protein first at all meals followed by vegetables,  Fruit and lastly whole grains. 23 My first weight neutral approach is:                 20 My second weight neutral approach is:                 21  My Thirds weight neutral approach is:                   Questions asked for goal understanding:                                                  Do you understand your goals? Do you have the information you need to achieve your goals? Do you have any questions  right now? [x]  Consistent goal achievement in the program thus far and further success with goals is expected. []  Unable to consistently make progress in goal achievement. At this time patient is not moving forward  in developing the skills needed for success after surgery. Plan:    Continue to follow monthly and review goals.          [x]  Nutrition visits complete    []

## 2023-03-01 ENCOUNTER — OFFICE VISIT (OUTPATIENT)
Dept: INTERNAL MEDICINE | Age: 68
End: 2023-03-01
Payer: COMMERCIAL

## 2023-03-01 VITALS
WEIGHT: 293 LBS | HEIGHT: 67 IN | BODY MASS INDEX: 45.99 KG/M2 | OXYGEN SATURATION: 96 % | HEART RATE: 62 BPM | SYSTOLIC BLOOD PRESSURE: 130 MMHG | DIASTOLIC BLOOD PRESSURE: 90 MMHG

## 2023-03-01 DIAGNOSIS — Z23 NEEDS FLU SHOT: ICD-10-CM

## 2023-03-01 DIAGNOSIS — E66.01 CLASS 3 SEVERE OBESITY DUE TO EXCESS CALORIES WITH SERIOUS COMORBIDITY AND BODY MASS INDEX (BMI) OF 50.0 TO 59.9 IN ADULT (HCC): ICD-10-CM

## 2023-03-01 DIAGNOSIS — I10 ESSENTIAL HYPERTENSION: Primary | ICD-10-CM

## 2023-03-01 DIAGNOSIS — M54.16 LUMBAR RADICULAR PAIN: ICD-10-CM

## 2023-03-01 PROBLEM — R42 VERTIGO: Status: RESOLVED | Noted: 2022-11-20 | Resolved: 2023-03-01

## 2023-03-01 PROCEDURE — 3078F DIAST BP <80 MM HG: CPT | Performed by: STUDENT IN AN ORGANIZED HEALTH CARE EDUCATION/TRAINING PROGRAM

## 2023-03-01 PROCEDURE — 1123F ACP DISCUSS/DSCN MKR DOCD: CPT | Performed by: STUDENT IN AN ORGANIZED HEALTH CARE EDUCATION/TRAINING PROGRAM

## 2023-03-01 PROCEDURE — 3074F SYST BP LT 130 MM HG: CPT | Performed by: STUDENT IN AN ORGANIZED HEALTH CARE EDUCATION/TRAINING PROGRAM

## 2023-03-01 PROCEDURE — 99213 OFFICE O/P EST LOW 20 MIN: CPT | Performed by: STUDENT IN AN ORGANIZED HEALTH CARE EDUCATION/TRAINING PROGRAM

## 2023-03-01 ASSESSMENT — PATIENT HEALTH QUESTIONNAIRE - PHQ9
SUM OF ALL RESPONSES TO PHQ QUESTIONS 1-9: 0
DEPRESSION UNABLE TO ASSESS: PT REFUSES
1. LITTLE INTEREST OR PLEASURE IN DOING THINGS: 0
SUM OF ALL RESPONSES TO PHQ QUESTIONS 1-9: 0
SUM OF ALL RESPONSES TO PHQ QUESTIONS 1-9: 0
SUM OF ALL RESPONSES TO PHQ9 QUESTIONS 1 & 2: 0
2. FEELING DOWN, DEPRESSED OR HOPELESS: 0
SUM OF ALL RESPONSES TO PHQ QUESTIONS 1-9: 0

## 2023-03-01 ASSESSMENT — ENCOUNTER SYMPTOMS
RESPIRATORY NEGATIVE: 1
GASTROINTESTINAL NEGATIVE: 1

## 2023-03-01 NOTE — PROGRESS NOTES
[facility] [address] [address]  Dept: [phone]  Dept Fax: [phone]    Office Progress/Follow Up Note  Date ofpatient's visit: 3/1/2023  Patient's Name:  Almas Huff YOB: 1955            Patient Care Team:  Sebastian Espinosa MD as PCP - General (Internal Medicine)  Saul Sweet DPM as Consulting Physician (Podiatry)  ================================================================    REASON FOR VISIT/CHIEF COMPLAINT:  Follow-up (3month) and Hypertension    HISTORY OF PRESENTING ILLNESS:  History was obtained from: patient. Donny Senior a 69 y.o. is here for follow-up visit for hypertension. Today her blood pressure is 160/90 repeat check 130/90, she has been compliant with Cozaar 25 daily, checks her blood pressure is usually around 130s to 140s over 80s to 90s    She is requesting a elevated toilet seat because of her weight and back issues. Almas Huff requires a elevated toilet seat due to being morbidly obese and and having chronic lower back pain with bilateral sciatica, L4-L5 level moderate spinal stenosis with foraminal narrowing and is physically incapable of utilizing regular toilet facilities. Current body weight is Weight: (!) 368 lb (166.9 kg).      Patient Active Problem List   Diagnosis    Essential hypertension    Pure hypercholesterolemia    Cerebrovascular accident (CVA), 2011, no residual deficit (HCC)    CKD (chronic kidney disease) stage 3, GFR 30-59 ml/min (HCC)    Peripheral vascular disease (HCC)    TIBURCIO (obstructive sleep apnea)    Class 3 severe obesity due to excess calories with serious comorbidity and body mass index (BMI) of 50.0 to 59.9 in [facility])    Primary osteoarthritis of left knee    Carpal tunnel syndrome, bilateral-not on medication due to kidney disease    Microcytic anemia    Alpha thalassemia trait    Cervical stenosis of spinal canal    Chronic bilateral low back pain with bilateral sciatica    Lumbar radicular pain    History of stroke    Occlusion of right internal carotid artery    Thyroid nodule    Occlusion of vertebral artery       Health Maintenance Due   Topic Date Due    Pneumococcal 65+ years Vaccine (3) 12/20/2021    COVID-19 Vaccine (4 - Booster for Pfizer series) 03/17/2022    Colorectal Cancer Screen  08/21/2022    Annual Wellness Visit (AWV)  10/08/2022       Allergies   Allergen Reactions    Duricef [Cefadroxil Monohydrate] Hives    Fish-Derived Products Hives    Pcn [Penicillins] Hives    Tomato Hives         Current Outpatient Medications   Medication Sig Dispense Refill    atorvastatin (LIPITOR) 80 MG tablet TAKE 1 TABLET BY MOUTH DAILY 28 tablet 4    omeprazole (PRILOSEC) 20 MG delayed release capsule TAKE 1 CAPSULE BY MOUTH TWICE A DAY WITH MEALS 56 capsule 1    BRILINTA 90 MG TABS tablet TAKE 1 TABLET BY MOUTH TWICE A DAY 60 tablet 1    docusate sodium (COLACE) 100 MG capsule TAKE 1 CAPSULE BY MOUTH 2 TIMES DAILY AS NEEDED FOR CONSTIPATION (BOTTLE) 60 capsule 0    folic acid (FOLVITE) 1 MG tablet TAKE 1 TABLET BY MOUTH DAILY 30 tablet 2    losartan (COZAAR) 25 MG tablet Take 1 tablet by mouth daily 90 tablet 1    aspirin 81 MG EC tablet TAKE 1 TABLET BY MOUTH DAILY 28 tablet 1    Vitamin D (CHOLECALCIFEROL) 25 MCG (1000 UT) TABS tablet       gabapentin (NEURONTIN) 600 MG tablet Take 600 mg by mouth 3 times daily.      acetaminophen (TYLENOL) 325 MG tablet TAKE 2 TABLETS BY MOUTH EVERY 4 HOURS AS NEEDED FOR PAIN 60 tablet 3    ferrous sulfate (IRON 325) 325 (65 Fe) MG tablet Take 1 tab three days days a week 90 tablet 1    vitamin B-1 (THIAMINE) 100 MG tablet Take 1 tablet by mouth daily 30 tablet 3    diclofenac sodium (VOLTAREN) 1 % GEL Apply 4 g topically 4 times daily 4 Tube 1     No current facility-administered medications for this visit.       Social History     Tobacco Use    Smoking status: Former     Packs/day: 2.00     Years: 35.00     Pack  years: 70.00     Types: Cigarettes     Quit date: 10/11/2011     Years since quittin.3     Passive exposure: Current (Outside of the home)    Smokeless tobacco: Never   Vaping Use    Vaping Use: Never used   Substance Use Topics    Alcohol use: Yes     Alcohol/week: 6.0 standard drinks     Types: 6 Cans of beer per week     Comment: rare    Drug use: No     Types: Cocaine, Marijuana (Weed)     Comment: per pt did years ago; cocaine & marij, - denies current use       Family History   Problem Relation Age of Onset    High Blood Pressure Mother     Asthma Mother     Heart Disease Mother     Heart Disease Father     High Blood Pressure Father     Diabetes Brother     High Blood Pressure Brother     Heart Disease Brother     High Blood Pressure Maternal Grandmother     High Blood Pressure Maternal Grandfather     Heart Disease Maternal Grandfather         REVIEW OF SYSTEMS:  Review of Systems   Constitutional: Negative. HENT: Negative. Respiratory: Negative. Cardiovascular: Negative. Gastrointestinal: Negative. Neurological: Negative. Psychiatric/Behavioral: Negative. PHYSICAL EXAM:  Vitals:    23 1515 23 1557   BP: (!) 160/90 (!) 130/90   Site: Left Upper Arm Left Upper Arm   Position: Sitting    Cuff Size: Large Adult    Pulse: 62    SpO2: 96%    Weight: (!) 368 lb (166.9 kg)    Height: 5' 7\" (1.702 m)      BP Readings from Last 3 Encounters:   23 (!) 130/90   23 113/87   23 139/72        Physical Exam  Constitutional:       Appearance: Normal appearance. Cardiovascular:      Rate and Rhythm: Normal rate and regular rhythm. Pulses: Normal pulses. Heart sounds: Normal heart sounds. Pulmonary:      Effort: Pulmonary effort is normal.      Breath sounds: Normal breath sounds. Abdominal:      General: Abdomen is flat. Palpations: Abdomen is soft. Neurological:      Mental Status: She is alert. Mental status is at baseline. Psychiatric:         Mood and Affect: Mood normal.         DIAGNOSTIC FINDINGS:  CBC:  Lab Results   Component Value Date/Time    WBC 6.5 11/28/2022 01:35 PM    HGB 11.1 11/28/2022 01:35 PM     11/28/2022 01:35 PM     04/05/2012 09:05 AM       BMP:    Lab Results   Component Value Date/Time     12/27/2022 02:50 PM    K 3.9 12/27/2022 02:50 PM     12/27/2022 02:50 PM    CO2 26 12/27/2022 02:50 PM    BUN 24 12/27/2022 02:50 PM    CREATININE 1.11 12/27/2022 02:50 PM    GLUCOSE 92 12/27/2022 02:50 PM    GLUCOSE 88 04/12/2012 10:20 AM       HEMOGLOBIN A1C:   Lab Results   Component Value Date/Time    LABA1C 5.8 11/28/2022 01:35 PM       FASTING LIPID PANEL:  Lab Results   Component Value Date    CHOL 152 11/28/2022    HDL 43 11/28/2022    TRIG 115 11/28/2022       ASSESSMENT AND PLAN:  Bethany Moulton was seen today for follow-up and hypertension. Diagnoses and all orders for this visit:    Essential hypertension    Needs flu shot  -     Influenza, AFLURIA, (age 1 y+), IM, Preservative Free, 0.5 mL  -     [place] IM ADM PRQ ID SUBQ/IM NJXS 1 VACCINE    Class 3 severe obesity due to excess calories with serious comorbidity and body mass index (BMI) of 50.0 to 59.9 in adult (HCC)  -     Elevated toilet seat    Lumbar radicular pain  -     Elevated toilet seat    FOLLOW UP AND INSTRUCTIONS:  No follow-ups on file. Filomena received counseling on the following healthy behaviors: nutrition    Discussed use, benefit, and side effects of prescribed medications. Barriers to medication compliance addressed. All patient questions answered. Pt voiced understanding.     Patient given educational materials - see patient instructions    Gladys Gilliland MD  Internal Medicine Resident, PGY-3  PANK[facility] Boucher [address] [place] [address]  7/7/1162,7:19 PM         This note is created with the assistance of a speech-recognition program. While intending to generate a document that actually reflects the content of thevisit, the document can still have some mistakes which may not have been identified and corrected by editing.

## 2023-03-01 NOTE — PROGRESS NOTES
Attending Physician Statement  I have discussed the care of [place] [id number] including pertinent history and exam findings,  with the resident. I have reviewed the key elements of all parts of the encounter with the resident. I agree with the assessment, plan and orders as documented by the resident.   (GE Modifier)    MD JANIA Adame  Attending Physician, [address] [place] [address], Internal Medicine Residency Program  [address] [place] [address]  3/1/2023, 3:58 PM

## 2023-03-06 ENCOUNTER — HOSPITAL ENCOUNTER (OUTPATIENT)
Dept: MAMMOGRAPHY | Age: 68
Discharge: HOME | End: 2023-03-08
Payer: COMMERCIAL

## 2023-03-06 DIAGNOSIS — Z12.31 ENCOUNTER FOR SCREENING MAMMOGRAM FOR BREAST CANCER: ICD-10-CM

## 2023-03-06 PROCEDURE — 77063 BREAST TOMOSYNTHESIS BI: CPT

## 2023-03-07 DIAGNOSIS — I63.00 CEREBROVASCULAR ACCIDENT (CVA) DUE TO THROMBOSIS OF PRECEREBRAL ARTERY (HCC): ICD-10-CM

## 2023-03-08 RX ORDER — ASPIRIN 81 MG/1
TABLET ORAL
Qty: 28 TABLET | Refills: 1 | Status: SHIPPED | OUTPATIENT
Start: 2023-03-08

## 2023-03-08 RX ORDER — CHOLECALCIFEROL (VITAMIN D3) 25 MCG
TABLET ORAL
Qty: 28 TABLET | Refills: 1 | Status: SHIPPED | OUTPATIENT
Start: 2023-03-08

## 2023-03-08 NOTE — TELEPHONE ENCOUNTER
Request for vitamin d and aspirin. Next vladimir on 5/24/23    Next Visit Date:  Future Appointments   Date Time Provider Mary Marinelli   3/14/2023  4:15 PM SCHEDULE, P BARIATRIC DIETICIAN bariatric shen Benjy Fields   3/20/2023  3:15 PM Harshil Zapien DPM [facility] Podiatry [facility]   3/29/2023  4:20 PM Vikash Gardiner MD Neuro Spec Neurology -   4/3/2023  3:45 PM Ricky Mancuso  [facility]   5/24/2023  2:20 PM Louis Lloyd MD [address]   Topic Date Due    Pneumococcal 65+ years Vaccine (3) 12/20/2021    COVID-19 Vaccine (4 - Booster for Pfizer series) 03/17/2022    Colorectal Cancer Screen  08/21/2022    Annual Wellness Visit (AWV)  10/08/2022    Low dose CT lung screening  11/15/2023    A1C test (Diabetic or Prediabetic)  11/28/2023    Lipids  11/28/2023    GFR test (Diabetes, CKD 3-4, OR last GFR 15-59)  12/27/2023    Depression Screen  03/01/2024    Breast cancer screen  03/06/2025    DTaP/Tdap/Td vaccine (2 - Td or Tdap) 03/08/2028    DEXA (modify frequency per FRAX score)  Completed    Flu vaccine  Completed    Shingles vaccine  Completed    Hepatitis C screen  Completed    Hepatitis A vaccine  Aged Out    Hib vaccine  Aged Out    Meningococcal (ACWY) vaccine  Aged Out       Hemoglobin A1C (%)   Date Value   11/28/2022 5.8   11/03/2022 5.8   09/29/2021 5.8             ( goal A1C is < 7)   Microalb/Crt.  Ratio (mcg/mg creat)   Date Value   05/09/2017 6     LDL Cholesterol (mg/dL)   Date Value   11/28/2022 86       (goal LDL is <100)   AST (U/L)   Date Value   11/28/2022 18     ALT (U/L)   Date Value   11/28/2022 13     BUN (mg/dL)   Date Value   12/27/2022 24 (H)     BP Readings from Last 3 Encounters:   03/01/23 (!) 130/90   02/21/23 113/87   01/24/23 139/72          (goal 120/80)    All Future Testing planned in [facility]  Lab Frequency Next Occurrence   COLONOSCOPY (Screening) Once 10/19/2022   Ferritin Once 10/03/2022   Iron and TIBC Once 10/03/2022   Transferrin Once 10/03/2022   XR CHEST STANDARD (2 VW) Once 01/24/2023   CBC with Auto Differential     Comprehensive Metabolic Panel     CBC with Auto Differential           Patient Active Problem List:     Essential hypertension     Pure hypercholesterolemia     Cerebrovascular accident (CVA), 2011, no residual deficit (HCC)     CKD (chronic kidney disease) stage 3, GFR 30-59 ml/min (HCC)     Peripheral vascular disease (HCC)     TIBURCIO (obstructive sleep apnea)     Class 3 severe obesity due to excess calories with serious comorbidity and body mass index (BMI) of 50.0 to 59.9 in adult [facility])     Primary osteoarthritis of left knee     Carpal tunnel syndrome, bilateral-not on medication due to kidney disease     Microcytic anemia     Alpha thalassemia trait     Cervical stenosis of spinal canal     Chronic bilateral low back pain with bilateral sciatica     Lumbar radicular pain     History of stroke     Occlusion of right internal carotid artery     Thyroid nodule     Occlusion of vertebral artery

## 2023-03-13 ENCOUNTER — ANESTHESIA EVENT (OUTPATIENT)
Dept: OPERATING ROOM | Age: 68
End: 2023-03-13

## 2023-03-14 ENCOUNTER — OFFICE VISIT (OUTPATIENT)
Dept: BARIATRICS/WEIGHT MGMT | Age: 68
End: 2023-03-14
Payer: COMMERCIAL

## 2023-03-14 VITALS
WEIGHT: 293 LBS | DIASTOLIC BLOOD PRESSURE: 72 MMHG | HEART RATE: 76 BPM | HEIGHT: 67 IN | SYSTOLIC BLOOD PRESSURE: 128 MMHG | RESPIRATION RATE: 20 BRPM | BODY MASS INDEX: 45.99 KG/M2

## 2023-03-14 DIAGNOSIS — G47.33 OSA (OBSTRUCTIVE SLEEP APNEA): ICD-10-CM

## 2023-03-14 DIAGNOSIS — N18.30 STAGE 3 CHRONIC KIDNEY DISEASE, UNSPECIFIED WHETHER STAGE 3A OR 3B CKD (HCC): ICD-10-CM

## 2023-03-14 DIAGNOSIS — M54.16 LUMBAR RADICULAR PAIN: ICD-10-CM

## 2023-03-14 DIAGNOSIS — D50.9 MICROCYTIC ANEMIA: ICD-10-CM

## 2023-03-14 DIAGNOSIS — Z86.73 HISTORY OF STROKE: ICD-10-CM

## 2023-03-14 DIAGNOSIS — E78.00 PURE HYPERCHOLESTEROLEMIA: ICD-10-CM

## 2023-03-14 DIAGNOSIS — I10 ESSENTIAL HYPERTENSION: Primary | ICD-10-CM

## 2023-03-14 PROCEDURE — 3074F SYST BP LT 130 MM HG: CPT | Performed by: NURSE PRACTITIONER

## 2023-03-14 PROCEDURE — 1123F ACP DISCUSS/DSCN MKR DOCD: CPT | Performed by: NURSE PRACTITIONER

## 2023-03-14 PROCEDURE — 99213 OFFICE O/P EST LOW 20 MIN: CPT | Performed by: NURSE PRACTITIONER

## 2023-03-14 PROCEDURE — 3078F DIAST BP <80 MM HG: CPT | Performed by: NURSE PRACTITIONER

## 2023-03-14 NOTE — PROGRESS NOTES
Medical Weight Management Progress Note    Subjective     Patient being seen for medically supervised weight loss for the chronic conditions of HTN, TIBURCIO, HLD, Chronic Back and Knee Pain, Hx CVA, CKD, PVD, Anemia, Prediabetes, Vertigo. She is working on the behavior changes discussed at the initial appointment. Patient continues on diet plan. Physical activity includes walking as tolerated. Weight loss of 0 lbs since last visit. Quit smoking in 2010. Using CPAP. Psych eval completed and clearance received. EGD scheduled 3/17/23. Needs pulmonary, and neurology clearances. Cardiac clearance received. No current issues. Working toward bariatric surgery:    [x] Sleeve Gastrectomy                                                           [] Sunday-en-Y Gastric Bypass    Allergies: Allergies   Allergen Reactions    Duricef [Cefadroxil Monohydrate] Hives    Fish-Derived Products Hives    Pcn [Penicillins] Hives    Tomato Hives       Past Medical History:     Past Medical History:   Diagnosis Date    Ambulates with cane     Bleeding     while on aggrenox    Cataracts, bilateral     Cerebrovascular disease 2003,2011    cva    Chronic pain in left foot     CKD (chronic kidney disease) stage 3, GFR 30-59 ml/min ([address]     COVID-19 11/2021    states hpspitalized    GERD (gastroesophageal reflux disease)     Hx of blood clots     Hyperlipidemia     Hypertension     Iron (Fe) deficiency anemia     Nicotine abuse     Obesity     TIBURCIO on CPAP     Osteoarthritis     Peripheral vascular disease ([address] 03/13/2014    Substance abuse ([facility] [address]     cocaine, canabis    Thalassemia minor     Thyroid nodule 11/22/2022    Unspecified cerebral artery occlusion with cerebral infarction     Wears dentures     upper and lower   .     Past Surgical History:  Past Surgical History:   Procedure Laterality Date    BREAST SURGERY      biopsy    COLONOSCOPY  12/2007    adenomatous polyp    COLONOSCOPY  2013    normal, repeat in 5 years COLONOSCOPY N/A 2019    COLONOSCOPY POLYPECTOMY SNARE/COLD BIOPSY performed by Bradley Bishop MD at [address] GASTROINTESTINAL ENDOSCOPY  2009    negative    UPPER GASTROINTESTINAL ENDOSCOPY N/A 2019    EGD BIOPSY performed [facility] MD at [facility]       Family History:  Family History   Problem Relation Age of Onset    High Blood Pressure Mother     Asthma Mother     Heart Disease Mother     Heart Disease Father     High Blood Pressure Father     Diabetes Brother     High Blood Pressure Brother     Heart Disease Brother     High Blood Pressure Maternal Grandmother     High Blood Pressure Maternal Grandfather     Heart Disease Maternal Grandfather        Social History:  Social History     Socioeconomic History    Marital status:      Spouse name: Not on file    Number of children: Not on file    Years of education: Not on file    Highest education level: Not on file   Occupational History    Not on file   Tobacco Use    Smoking status: Former     Packs/day: 2.00     Years: 35.00     Pack years: 70.00     Types: Cigarettes     Quit date: 10/11/2011     Years since quittin.4     Passive exposure: Current (Outside of the home)    Smokeless tobacco: Never   Vaping Use    Vaping Use: Never used   Substance and Sexual Activity    Alcohol use:  Yes     Alcohol/week: 6.0 standard drinks     Types: 6 Cans of beer per week     Comment: rare    Drug use: No     Types: Cocaine, Marijuana (Weed)     Comment: per pt did years ago; cocaine & marij, 17 denies current use    Sexual activity: Yes     Partners: Male   Other Topics Concern    Not on file   Social History Narrative    Not on file     Social Determinants of Health     Financial Resource Strain: Low Risk     Difficulty of Paying Living Expenses: Not hard at all   Food Insecurity: No Food Insecurity    Worried About Running Out of Food in the Last Year: Never true    Ran Out of Food in the Last Year: Never true   Transportation Needs: Not on file   Physical Activity: Not on file   Stress: Not on file   Social Connections: Not on file   Intimate Partner Violence: Not on file   Housing Stability: Not on file       Current Medications:  Current Outpatient Medications   Medication Sig Dispense Refill    aspirin 81 MG EC tablet TAKE 1 TABLET BY MOUTH DAILY 28 tablet 1    Cholecalciferol (VITAMIN D3) 25 MCG TABS TAKE 1 TABLET BY MOUTH DAILY 28 tablet 1    atorvastatin (LIPITOR) 80 MG tablet TAKE 1 TABLET BY MOUTH DAILY 28 tablet 4    omeprazole (PRILOSEC) 20 MG delayed release capsule TAKE 1 CAPSULE BY MOUTH TWICE A DAY WITH MEALS 56 capsule 1    BRILINTA 90 MG TABS tablet TAKE 1 TABLET BY MOUTH TWICE A DAY 60 tablet 1    docusate sodium (COLACE) 100 MG capsule TAKE 1 CAPSULE BY MOUTH 2 TIMES DAILY AS NEEDED FOR CONSTIPATION (BOTTLE) 60 capsule 0    folic acid (FOLVITE) 1 MG tablet TAKE 1 TABLET BY MOUTH DAILY 30 tablet 2    losartan (COZAAR) 25 MG tablet Take 1 tablet by mouth daily 90 tablet 1    gabapentin (NEURONTIN) 600 MG tablet Take 600 mg by mouth 3 times daily. acetaminophen (TYLENOL) 325 MG tablet TAKE 2 TABLETS BY MOUTH EVERY 4 HOURS AS NEEDED FOR PAIN 60 tablet 3    ferrous sulfate (IRON 325) 325 (65 Fe) MG tablet Take 1 tab three days days a week 90 tablet 1    vitamin B-1 (THIAMINE) 100 MG tablet Take 1 tablet by mouth daily 30 tablet 3    diclofenac sodium (VOLTAREN) 1 % GEL Apply 4 g topically 4 times daily 4 Tube 1     No current facility-administered medications for this visit. Vital Signs:  /72 (Site: Right Upper Arm, Position: Sitting, Cuff Size: Large Adult)   Pulse 76   Resp 20   Ht 5' 7\" (1.702 m)   Wt (!) 369 lb (167.4 kg)   BMI 57.79 kg/m²     BMI/Height/Weight:  Body mass index is 57.79 kg/m². Review of Systems - A review of systems was performed.   All was negative unless otherwise documented in HPI.    Constitutional: Negative for fever, chills. HENT: Negative for trouble swallowing. Eyes: Negative for visual disturbance. Respiratory: Negative for cough, shortness of breath. Cardiovascular: Negative for chest pain and palpitations. Gastrointestinal: Negative for nausea, vomiting, abdominal pain, diarrhea, constipation, blood in stool and abdominal distention. Endocrine: Negative for polydipsia, polyphagia and polyuria. Genitourinary: Negative for dysuria, frequency, hematuria and difficulty urinating. Musculoskeletal: Negative for myalgias, joint swelling. Skin: Negative for pallor and rash. Neurological: Negative for dizziness, tremors, light-headedness and headaches. Psychiatric/Behavioral: Negative for sleep disturbance and dysphoric mood. Objective:      Physical Exam   Vital signs reviewed. General: Well-developed and well-nourished. No acute distress. Skin: Warm, dry and intact. HEENT: Normocephalic. EOMs intact. Conjunctivae normal. Neck supple. Cardiovascular: Normal rate, regular rhythm. Pulmonary/Chest: Normal effort. Lungs clear to auscultation. No rales, rhonchi or wheezing. Abdominal: Positive bowel sounds. Soft, nontender. Nondistended. Musculoskeletal: Movement x4. No edema. Neurological: Ambulates with a cane. Alert and oriented to person, place, and time. Psychiatric: Normal mood and affect. Speech and behavior normal. Judgment and thought content normal. Cognition and memory intact. Assessment:       Diagnosis Orders   1. Essential hypertension        2. Pure hypercholesterolemia        3. TIBURCIO (obstructive sleep apnea)        4. Lumbar radicular pain        5. History of stroke        6. Stage 3 chronic kidney disease, unspecified whether stage 3a or 3b CKD ([facility] [address]        7. Microcytic anemia            Plan:    Dietitian visit today. Patient was encouraged to journal all food intake. Keep calorie level at approximately [id number].  Protein intake is to be a minimum of 60-80 grams per day. Water drinking was encouraged with a goal of 64oz-128oz daily. Beverages to be calorie free except for milk. Every other beverage should be water. Avoid soda. Continue to increase level of physical activity. Encouraged use of exercise log. Patients will undergo thorough nutritional evaluation and counseling with our registered dietician. Our program provides long term nutritional counseling with unlimited consults with the dietician. All patients are nicotine tested and require a negative nicotine level prior to surgery. Patient has been educated about the risks associated with substance use, as well as the risks of using nicotine and alcohol after surgery. Patient has been educated that theses substances need to be avoided lifelong after surgery to reduce the risk of complications and sub-optimal weight loss. Follow-up  Return in about 1 month (around 4/14/2023). Orders this encounter:  No orders of the defined types were placed in this encounter. Prescriptions this encounter:  No orders of the defined types were placed in this encounter.       Electronically signed by:  Yoan Fields CNP

## 2023-03-14 NOTE — PROGRESS NOTES
Medical Nutrition Therapy   Metabolic and Bariatric Surgery         Supervised diet and exercise preparation  Visit 5 out of 6    Vitals: Wt Readings from Last 3 Encounters:   03/14/23 (!) 369 lb (167.4 kg)   03/01/23 (!) 368 lb (166.9 kg)   02/21/23 (!) 369 lb (167.4 kg)         Nutrition Assessment:   PES: Knowledge deficit related to healthy behaviors that support weight management post weight loss surgery as evidenced by Body mass index is 57.79 kg/m². Nutrition Assessment of Goal Attainment:  TREATMENT GOALS:    1. Pt  Completed 3 out of 3 goals. 2.TREATMENT GOALS FOR UPCOMING WEEK: continue all previous goals and add: # 17, 18    All goals were planned with and agreed on by the patient. appt # NA G What is your next step? C 1 2 3 4 5 6 7 8 9     1  1 I will read the education binder provided to me and the   x  100            1  2 I will make my pschological evaluation appoinment. x  100            5  3 I will bring this goal card to every appointment. 100 100 100 100          x 4 I will eliminate all tobacco/nicotine. x 5 I will limit alcoholic beverages to 8-7PL per week. x 6 I will limit dining out to 3 times per week or less. 2  7 I will eliminate sugary beverages. x   100            x 8 I will eliminate carbonated beverages. 3  9 I will eliminate drinking with a straw. x    100          2  10 I will limit caffeinated beverages to 16oz daily. x   100           5  11 I will limit log my exercise daily. 100 100 100 100         3  12 I will determine my calcium and mvi plan. x    100           x 13 I will have 1-2 servings of lean protein present at each meal and minimeal.                x 14 I will eat every 3-5 hours. 2  15 I will drink 64oz of fluid daily. x   100           4  16 I will eat slowly during meals and snacks. x     100         5  17 I will limit fluids 4oz before after and during meals. 5  18 I will eat protein first at all meals followed by vegetables,  Fruit and lastly whole grains. 23 My first weight neutral approach is:                 20 My second weight neutral approach is:                 21  My Thirds weight neutral approach is:                   Questions asked for goal understanding:                                                  Do you understand your goals? Do you have the information you need to achieve your goals? Do you have any questions  right now? [x]  Consistent goal achievement in the program thus far and further success with goals is expected. []  Unable to consistently make progress in goal achievement. At this time patient is not moving forward  in developing the skills needed for success after surgery. Plan:    Continue to follow monthly and review goals.          [x]  Nutrition visits complete    []

## 2023-03-17 ENCOUNTER — ANESTHESIA (OUTPATIENT)
Dept: OPERATING ROOM | Age: 68
End: 2023-03-17

## 2023-03-17 NOTE — ANESTHESIA PRE PROCEDURE
Department of Anesthesiology  Preprocedure Note       Name:  Enio Lopez   Age:  69 y.o.  :  1955                                          MRN:  [id number]         Date:  3/17/2023      Surgeon: Aidan Garcia):  Thee Kruse DO    Procedure: Procedure(s):  EGD BIOPSY    Medications prior to admission:   Prior to Admission medications    Medication Sig Start Date End Date Taking? Authorizing Provider   aspirin 81 MG EC tablet TAKE 1 TABLET BY MOUTH DAILY 3/8/23   Pravni Grullon MD   Cholecalciferol (VITAMIN D3) 25 MCG TABS TAKE 1 TABLET BY MOUTH DAILY 3/8/23   Pravin Grullon MD   atorvastatin (LIPITOR) 80 MG tablet TAKE 1 TABLET BY MOUTH DAILY 23   Pravin Grullon MD   omeprazole (PRILOSEC) 20 MG delayed release capsule TAKE 1 CAPSULE BY MOUTH TWICE A DAY WITH MEALS 23   Adolph Epperson MD   BRILINTA 90 MG TABS tablet TAKE 1 TABLET BY MOUTH TWICE A DAY 23   LOY Shabazz CNP   docusate sodium (COLACE) 100 MG capsule TAKE 1 CAPSULE BY MOUTH 2 TIMES DAILY AS NEEDED FOR CONSTIPATION (BOTTLE) 23   Ann Chapman MD   folic acid (FOLVITE) 1 MG tablet TAKE 1 TABLET BY MOUTH DAILY 23   Pravin Grullon MD   losartan (COZAAR) 25 MG tablet Take 1 tablet by mouth daily 22   Samantha Josue MD   gabapentin (NEURONTIN) 600 MG tablet Take 600 mg by mouth 3 times daily. Historical Provider, MD   acetaminophen (TYLENOL) 325 MG tablet TAKE 2 TABLETS BY MOUTH EVERY 4 HOURS AS NEEDED FOR PAIN 10/30/22   Pravin Grullon MD   ferrous sulfate (IRON 325) 325 (65 Fe) MG tablet Take 1 tab three days days a week 10/3/22   Ann Chapman MD   vitamin B-1 (THIAMINE) 100 MG tablet Take 1 tablet by mouth daily 4/15/21   Elena Morales MD   diclofenac sodium (VOLTAREN) 1 % GEL Apply 4 g topically 4 times daily 20   Sofia Dobbins DO       Current medications:    No current facility-administered medications for this encounter.        Allergies: Allergies   Allergen Reactions    Duricef [Cefadroxil Monohydrate] Hives    Fish-Derived Products Hives    Pcn [Penicillins] Hives    Tomato Hives       Problem List:    Patient Active Problem List   Diagnosis Code    Essential hypertension I10    Pure hypercholesterolemia E78.00    Cerebrovascular accident (CVA), 2011, no residual deficit (HCC) I63.00    CKD (chronic kidney disease) stage 3, GFR 30-59 ml/min (HCC) N18.30    Peripheral vascular disease (HCC) I73.9    TIBURCIO (obstructive sleep apnea) G47.33    Class 3 severe obesity due to excess calories with serious comorbidity and body mass index (BMI) of 50.0 to 59.9 in adult ([facility] [address] E66.01, Z68.43    Primary osteoarthritis of left knee M19.90    Carpal tunnel syndrome, bilateral-not on medication due to kidney disease G56.03    Microcytic anemia D50.9    Alpha thalassemia trait D56.3    Cervical stenosis of spinal canal M48.02    Chronic bilateral low back pain with bilateral sciatica M54.42, M54.41, G89.29    Lumbar radicular pain M54.16    History of stroke Z86.73    Occlusion of right internal carotid artery I65.21    Thyroid nodule E04.1    Occlusion of vertebral artery I65.09       Past Medical History:        Diagnosis Date    Ambulates with cane     Bleeding     while on aggrenox    Cataracts, bilateral     Cerebrovascular disease 9357,7295    cva    Chronic pain in left foot     CKD (chronic kidney disease) stage 3, GFR 30-59 ml/min ([facility] [address]     COVID-19 11/2021    states hpspitalized    GERD (gastroesophageal reflux disease)     Hx of blood clots     Hyperlipidemia     Hypertension     Iron (Fe) deficiency anemia     Nicotine abuse     Obesity     TIBURCIO on CPAP     Osteoarthritis     Peripheral vascular disease ([facility] [address] 03/13/2014    Substance abuse ([facility] [address]     cocaine, canabis    Thalassemia minor     Thyroid nodule 11/22/2022    Unspecified cerebral artery occlusion with cerebral infarction     Wears dentures     upper and lower       Past Surgical History:        Procedure Laterality Date   • BREAST SURGERY      biopsy   • COLONOSCOPY  2007    adenomatous polyp   • COLONOSCOPY      normal, repeat in 5 years   • COLONOSCOPY N/A 2019    COLONOSCOPY POLYPECTOMY SNARE/COLD BIOPSY performed by Dhruv Bentley MD at [facility]   • ENDOMETRIAL BIOPSY     • TONSILLECTOMY AND ADENOIDECTOMY     • TUBAL LIGATION     • UPPER GASTROINTESTINAL ENDOSCOPY      negative   • UPPER GASTROINTESTINAL ENDOSCOPY N/A 2019    EGD BIOPSY performed by Dhruv Bentley MD at [facility]       Social History:    Social History     Tobacco Use   • Smoking status: Former     Packs/day: 2.00     Years: 35.00     Pack years: 70.00     Types: Cigarettes     Quit date: 10/11/2011     Years since quittin.4     Passive exposure: Current (Outside of the home)   • Smokeless tobacco: Never   Substance Use Topics   • Alcohol use: Yes     Alcohol/week: 6.0 standard drinks     Types: 6 Cans of beer per week     Comment: rare                                Counseling given: Not Answered      Vital Signs (Current):   Vitals:    23 0751   Weight: (!) 366 lb (166 kg)   Height: 5' 7\" (1.702 m)                                              BP Readings from Last 3 Encounters:   23 128/72   23 (!) 130/90   23 113/87       NPO Status:                                                                                 BMI:   Wt Readings from Last 3 Encounters:   23 (!) 366 lb (166 kg)   23 (!) 369 lb (167.4 kg)   23 (!) 368 lb (166.9 kg)     Body mass index is 57.32 kg/m².    CBC:   Lab Results   Component Value Date/Time    WBC 6.5 2022 01:35 PM    RBC 5.13 2022 01:35 PM    RBC 4.79 2012 09:05 AM    HGB 11.1 2022 01:35 PM    HCT 36.6 2022 01:35 PM    MCV 71.3 2022 01:35 PM    RDW 16.8 2022 01:35 PM     2022 01:35 PM     2012 09:05 AM  CMP:   Lab Results   Component Value Date/Time     12/27/2022 02:50 PM    K 3.9 12/27/2022 02:50 PM     12/27/2022 02:50 PM    CO2 26 12/27/2022 02:50 PM    BUN 24 12/27/2022 02:50 PM    CREATININE 1.11 12/27/2022 02:50 PM    GFRAA 50 09/28/2022 04:41 PM    LABGLOM 54 12/27/2022 02:50 PM    GLUCOSE 92 12/27/2022 02:50 PM    GLUCOSE 88 04/12/2012 10:20 AM    PROT 7.7 11/28/2022 01:35 PM    PROT 6.3 10/02/2011 10:05 AM    CALCIUM 9.4 12/27/2022 02:50 PM    BILITOT 0.6 11/28/2022 01:35 PM    ALKPHOS 167 11/28/2022 01:35 PM    AST 18 11/28/2022 01:35 PM    ALT 13 11/28/2022 01:35 PM       POC Tests: No results for input(s): POCGLU, POCNA, POCK, POCCL, POCBUN, POCHEMO, POCHCT in the last 72 hours.     Coags:   Lab Results   Component Value Date/Time    PROTIME 10.2 06/13/2022 01:30 PM    PROTIME 9.7 10/06/2011 07:29 AM    INR 0.9 06/13/2022 01:30 PM    APTT 84.3 11/22/2022 02:40 AM       HCG (If Applicable): No results found for: PREGTESTUR, PREGSERUM, HCG, HCGQUANT     ABGs: No results found for: PHART, PO2ART, ZHJ1IAA, ZAJ8CGW, BEART, I4BUQUKN     Type & Screen (If Applicable):  No results found for: LABABO, LABRH    Drug/Infectious Status (If Applicable):  Lab Results   Component Value Date/Time    HEPCAB NONREACTIVE 11/28/2017 09:58 AM       COVID-19 Screening (If Applicable):   Lab Results   Component Value Date/Time    COVID19 DETECTED 11/14/2021 09:39 AM           Anesthesia Evaluation  Patient summary reviewed and Nursing notes reviewed no history of anesthetic complications:   Airway: Mallampati: II  TM distance: >3 FB   Neck ROM: full  Mouth opening: > = 3 FB   Dental:    (+) edentulous      Pulmonary:normal exam  breath sounds clear to auscultation  (+) sleep apnea: on CPAP,                             Cardiovascular:    (+) hypertension: no interval change, hyperlipidemia        Rhythm: regular  Rate: normal                    Neuro/Psych:   (+) CVA:, neuromuscular disease:,              ROS comment: Chronic bilateral low back pain with bilateral sciatica  Cervical stenosis of spinal canal GI/Hepatic/Renal:   (+) GERD:, renal disease: CRI and no interval change, morbid obesity          Endo/Other:    (+) : arthritis: OA and no interval change. , .                  ROS comment: Alpha thalassemia trait Abdominal:   (+) obese,     Abdomen: soft. Vascular:   + PVD, aortic or cerebral, . ROS comment: Occlusion of right internal carotid artery. Other Findings:           Anesthesia Plan      MAC     ASA 3             Anesthetic plan and risks discussed with patient. Plan discussed with CRNA.                     Gloria Venegas MD   3/17/2023

## 2023-03-20 ENCOUNTER — OFFICE VISIT (OUTPATIENT)
Dept: PODIATRY | Age: 68
End: 2023-03-20
Payer: COMMERCIAL

## 2023-03-20 VITALS
SYSTOLIC BLOOD PRESSURE: 176 MMHG | HEART RATE: 82 BPM | HEIGHT: 67 IN | BODY MASS INDEX: 45.99 KG/M2 | DIASTOLIC BLOOD PRESSURE: 84 MMHG | WEIGHT: 293 LBS

## 2023-03-20 DIAGNOSIS — M79.674 PAIN IN TOES OF BOTH FEET: ICD-10-CM

## 2023-03-20 DIAGNOSIS — E66.01 CLASS 3 SEVERE OBESITY WITH BODY MASS INDEX (BMI) OF 50.0 TO 59.9 IN ADULT, UNSPECIFIED OBESITY TYPE, UNSPECIFIED WHETHER SERIOUS COMORBIDITY PRESENT (HCC): ICD-10-CM

## 2023-03-20 DIAGNOSIS — B35.1 ONYCHOMYCOSIS: Primary | ICD-10-CM

## 2023-03-20 DIAGNOSIS — I73.9 PAD (PERIPHERAL ARTERY DISEASE) (HCC): ICD-10-CM

## 2023-03-20 DIAGNOSIS — I73.9 PVD (PERIPHERAL VASCULAR DISEASE) (HCC): ICD-10-CM

## 2023-03-20 DIAGNOSIS — M79.675 PAIN IN TOES OF BOTH FEET: ICD-10-CM

## 2023-03-20 PROCEDURE — 11721 DEBRIDE NAIL 6 OR MORE: CPT | Performed by: PODIATRIST

## 2023-03-20 PROCEDURE — 99212 OFFICE O/P EST SF 10 MIN: CPT

## 2023-03-20 PROCEDURE — 99999 PR OFFICE/OUTPT VISIT,PROCEDURE ONLY: CPT | Performed by: PODIATRIST

## 2023-03-22 NOTE — PROGRESS NOTES
over 1 year ago - Order Fasting lipids and refer to PCP for follow up   [] LDL is controlled. LDL < 100 and checked within the last year     Blood Pressure  BP Readings from Last 3 Encounters:   03/01/23 (!) 130/90   02/21/23 113/87   01/24/23 139/72      []  If SBP >140 mmhg - refer to PCP for follow up   []  If DBP > 90 mmhg - refer to PCP for follow up   [] BP is controlled <140/90     Order labs as PCP ordered.   (ie: Lipids, A1C, CMP)
maceration absent, B/L. Nails 1-5 are elongated, thickened with subungual debris. Hair growth is absent with atrophic skin extending distally past the ankle. Class A Findings (Q7) - One Class A finding  [] Non traumatic amputation of foot or integral skeletal portion thereof  Class B Findings (Q8) - Two Class B findings  [x]Absent posterior tibial pulse   []Absent dorsalis pedis pulse   [x]Advanced trophic changes; three of the following are required:   [x]hair growth (decrease or absence)   [x]nail changes (thickening)   []pigmentary changes (discoloration)   [x]skin texture (thin, shiny)   []skin color (rubor or redness)  Class C Findings (Q9) - One Class B and two Class C findings  []Claudication   []Temperature changes   []Edema   []Paresthesia   []Burning    Q Modifier Met: Q8: Two Class B findings    Assessment   Vijaya Bennett is a 69 y.o. female with     Diagnosis Orders   1. Onychomycosis        2. PVD (peripheral vascular disease) ([facility])  VL ARTERIAL PVR LOWER WO EXERCISE      3. Class 3 severe obesity with body mass index (BMI) of 50.0 to 59.9 in adult, unspecified obesity type, unspecified whether serious comorbidity present ([facility])  VL ARTERIAL PVR LOWER WO EXERCISE      4. Pain in toes of both feet  VL ARTERIAL PVR LOWER WO EXERCISE      5. PAD (peripheral artery disease) ([facility])  VL ARTERIAL PVR LOWER WO EXERCISE          Plan   Patient examined and evaluated for routine nail care. Diagnosis and treatment options discussed in detail  Debrided dystrophic nails 1-5 B/L with sterile nail nippers without incident. Educated patient to monitor for signs of claudication including rest pain or cramping of the calf muscles. Educated pt to try wearing compressive socks to help with LE edema. Pt states she will try to get new shoes, presented in slip on shoe with no socks.   New pvrs ordered  Educated patient on signs of infection and to report to ED if arise  Patient to RTC in 3 month(s) or sooner if

## 2023-03-27 ENCOUNTER — HOSPITAL ENCOUNTER (OUTPATIENT)
Age: 68
Discharge: HOME | End: 2023-03-27
Payer: COMMERCIAL

## 2023-03-27 DIAGNOSIS — D50.9 MICROCYTIC ANEMIA: ICD-10-CM

## 2023-03-27 DIAGNOSIS — R79.89 ELEVATED FERRITIN: ICD-10-CM

## 2023-03-27 DIAGNOSIS — E66.01 MORBID OBESITY WITH BMI OF 50.0-59.9, ADULT (HCC): ICD-10-CM

## 2023-03-27 DIAGNOSIS — D56.3 THALASSEMIA TRAIT, ALPHA: ICD-10-CM

## 2023-03-27 DIAGNOSIS — E61.1 IRON DEFICIENCY: ICD-10-CM

## 2023-03-27 LAB
ABSOLUTE EOS #: 0.33 K/UL (ref 0–0.44)
ABSOLUTE IMMATURE GRANULOCYTE: 0.02 K/UL (ref 0–0.3)
ABSOLUTE LYMPH #: 1.87 K/UL (ref 1.1–3.7)
ABSOLUTE MONO #: 0.59 K/UL (ref 0.1–1.2)
ALBUMIN SERPL-MCNC: 3.9 G/DL (ref 3.5–5.2)
ALP SERPL-CCNC: 160 U/L (ref 35–104)
ALT SERPL-CCNC: 12 U/L (ref 5–33)
ANION GAP SERPL CALCULATED.3IONS-SCNC: 12 MMOL/L (ref 9–17)
AST SERPL-CCNC: 16 U/L
BASOPHILS # BLD: 1 % (ref 0–2)
BASOPHILS ABSOLUTE: 0.06 K/UL (ref 0–0.2)
BILIRUB SERPL-MCNC: 0.3 MG/DL (ref 0.3–1.2)
BUN SERPL-MCNC: 21 MG/DL (ref 8–23)
BUN/CREAT BLD: 17 (ref 9–20)
CALCIUM SERPL-MCNC: 9.4 MG/DL (ref 8.6–10.4)
CHLORIDE SERPL-SCNC: 106 MMOL/L (ref 98–107)
CO2 SERPL-SCNC: 25 MMOL/L (ref 20–31)
CREAT SERPL-MCNC: 1.25 MG/DL (ref 0.5–0.9)
EOSINOPHILS RELATIVE PERCENT: 5 % (ref 1–4)
FERRITIN SERPL-MCNC: 556 NG/ML (ref 13–150)
GFR SERPL CREATININE-BSD FRML MDRD: 47 ML/MIN/1.73M2
GLUCOSE SERPL-MCNC: 95 MG/DL (ref 70–99)
HCT VFR BLD AUTO: 32.3 % (ref 36.3–47.1)
HGB BLD-MCNC: 9.6 G/DL (ref 11.9–15.1)
IMMATURE GRANULOCYTES: 0 %
IRON SATURATION: 15 % (ref 20–55)
IRON SERPL-MCNC: 31 UG/DL (ref 37–145)
LYMPHOCYTES # BLD: 26 % (ref 24–43)
MCH RBC QN AUTO: 21.1 PG (ref 25.2–33.5)
MCHC RBC AUTO-ENTMCNC: 29.7 G/DL (ref 28.4–34.8)
MCV RBC AUTO: 71.1 FL (ref 82.6–102.9)
MONOCYTES # BLD: 8 % (ref 3–12)
NRBC AUTOMATED: 0 PER 100 WBC
PDW BLD-RTO: 17.3 % (ref 11.8–14.4)
PLATELET # BLD AUTO: 346 K/UL (ref 138–453)
PMV BLD AUTO: 9.3 FL (ref 8.1–13.5)
POTASSIUM SERPL-SCNC: 4 MMOL/L (ref 3.7–5.3)
PROT SERPL-MCNC: 7.2 G/DL (ref 6.4–8.3)
RBC # BLD: 4.54 M/UL (ref 3.95–5.11)
RBC # BLD: ABNORMAL 10*6/UL
SEG NEUTROPHILS: 60 % (ref 36–65)
SEGMENTED NEUTROPHILS ABSOLUTE COUNT: 4.22 K/UL (ref 1.5–8.1)
SODIUM SERPL-SCNC: 143 MMOL/L (ref 135–144)
TIBC SERPL-MCNC: 205 UG/DL (ref 250–450)
TRANSFERRIN SERPL-MCNC: 175 MG/DL (ref 200–360)
UNSATURATED IRON BINDING CAPACITY: 174 UG/DL (ref 112–347)
WBC # BLD AUTO: 7.1 K/UL (ref 3.5–11.3)

## 2023-03-27 PROCEDURE — 80053 COMPREHEN METABOLIC PANEL: CPT

## 2023-03-27 PROCEDURE — 85025 COMPLETE CBC W/AUTO DIFF WBC: CPT

## 2023-03-27 PROCEDURE — 84466 ASSAY OF TRANSFERRIN: CPT

## 2023-03-27 PROCEDURE — 83540 ASSAY OF IRON: CPT

## 2023-03-27 PROCEDURE — 83550 IRON BINDING TEST: CPT

## 2023-03-27 PROCEDURE — 82728 ASSAY OF FERRITIN: CPT

## 2023-03-27 PROCEDURE — 36415 COLL VENOUS BLD VENIPUNCTURE: CPT

## 2023-03-28 ENCOUNTER — HOSPITAL ENCOUNTER (OUTPATIENT)
Dept: VASCULAR LAB | Age: 68
Discharge: HOME | End: 2023-03-28
Payer: COMMERCIAL

## 2023-03-28 DIAGNOSIS — I73.9 PVD (PERIPHERAL VASCULAR DISEASE) (HCC): ICD-10-CM

## 2023-03-28 DIAGNOSIS — M79.674 PAIN IN TOES OF BOTH FEET: ICD-10-CM

## 2023-03-28 DIAGNOSIS — E66.01 CLASS 3 SEVERE OBESITY WITH BODY MASS INDEX (BMI) OF 50.0 TO 59.9 IN ADULT, UNSPECIFIED OBESITY TYPE, UNSPECIFIED WHETHER SERIOUS COMORBIDITY PRESENT (HCC): ICD-10-CM

## 2023-03-28 DIAGNOSIS — I73.9 PAD (PERIPHERAL ARTERY DISEASE) (HCC): ICD-10-CM

## 2023-03-28 DIAGNOSIS — M79.675 PAIN IN TOES OF BOTH FEET: ICD-10-CM

## 2023-03-28 PROCEDURE — 93923 UPR/LXTR ART STDY 3+ LVLS: CPT

## 2023-03-29 ENCOUNTER — OFFICE VISIT (OUTPATIENT)
Dept: NEUROLOGY | Age: 68
End: 2023-03-29
Payer: COMMERCIAL

## 2023-03-29 VITALS
HEIGHT: 67 IN | HEART RATE: 56 BPM | WEIGHT: 293 LBS | SYSTOLIC BLOOD PRESSURE: 194 MMHG | DIASTOLIC BLOOD PRESSURE: 85 MMHG | BODY MASS INDEX: 45.99 KG/M2

## 2023-03-29 DIAGNOSIS — G56.03 CARPAL TUNNEL SYNDROME ON BOTH SIDES: Primary | ICD-10-CM

## 2023-03-29 PROCEDURE — 3079F DIAST BP 80-89 MM HG: CPT | Performed by: STUDENT IN AN ORGANIZED HEALTH CARE EDUCATION/TRAINING PROGRAM

## 2023-03-29 PROCEDURE — 3077F SYST BP >= 140 MM HG: CPT | Performed by: STUDENT IN AN ORGANIZED HEALTH CARE EDUCATION/TRAINING PROGRAM

## 2023-03-29 PROCEDURE — 99214 OFFICE O/P EST MOD 30 MIN: CPT | Performed by: STUDENT IN AN ORGANIZED HEALTH CARE EDUCATION/TRAINING PROGRAM

## 2023-03-29 PROCEDURE — 1123F ACP DISCUSS/DSCN MKR DOCD: CPT | Performed by: STUDENT IN AN ORGANIZED HEALTH CARE EDUCATION/TRAINING PROGRAM

## 2023-03-29 NOTE — PROGRESS NOTES
management due to weight. She was referred to bariatrics but is not able to bariatric surgery due to being on anticoagulants.    Continue gabapentin 600 mg 3 times a day    Princess Andersen MD   Neurology & Sleep Medicine  [facility]

## 2023-03-30 DIAGNOSIS — I10 ESSENTIAL HYPERTENSION: ICD-10-CM

## 2023-03-30 DIAGNOSIS — R09.81 SINUS CONGESTION: ICD-10-CM

## 2023-03-30 DIAGNOSIS — E55.9 VITAMIN D DEFICIENCY: ICD-10-CM

## 2023-03-30 RX ORDER — TICAGRELOR 90 MG/1
TABLET ORAL
Qty: 60 TABLET | Refills: 2 | Status: SHIPPED | OUTPATIENT
Start: 2023-03-30

## 2023-03-30 NOTE — TELEPHONE ENCOUNTER
Pharmacy requesting refill of Brilinta 90 mg.      Medication active on med list yes      Date of last Rx: 2/8/2023 with 1 refills          verified by TERI MONTERO      Date of last appointment 3/29/2023    Next Visit Date:  6/29/2023

## 2023-04-03 ENCOUNTER — OFFICE VISIT (OUTPATIENT)
Dept: ONCOLOGY | Age: 68
End: 2023-04-03
Payer: COMMERCIAL

## 2023-04-03 ENCOUNTER — TELEPHONE (OUTPATIENT)
Dept: ONCOLOGY | Age: 68
End: 2023-04-03

## 2023-04-03 VITALS
RESPIRATION RATE: 20 BRPM | DIASTOLIC BLOOD PRESSURE: 74 MMHG | TEMPERATURE: 98.3 F | SYSTOLIC BLOOD PRESSURE: 156 MMHG | WEIGHT: 293 LBS | BODY MASS INDEX: 56.85 KG/M2 | HEART RATE: 57 BPM

## 2023-04-03 DIAGNOSIS — D50.9 MICROCYTIC ANEMIA: Primary | ICD-10-CM

## 2023-04-03 DIAGNOSIS — R79.89 ELEVATED FERRITIN: ICD-10-CM

## 2023-04-03 DIAGNOSIS — D56.3 THALASSEMIA TRAIT, ALPHA: ICD-10-CM

## 2023-04-03 DIAGNOSIS — E61.1 IRON DEFICIENCY: ICD-10-CM

## 2023-04-03 PROCEDURE — 3077F SYST BP >= 140 MM HG: CPT | Performed by: INTERNAL MEDICINE

## 2023-04-03 PROCEDURE — 99214 OFFICE O/P EST MOD 30 MIN: CPT | Performed by: INTERNAL MEDICINE

## 2023-04-03 PROCEDURE — 99211 OFF/OP EST MAY X REQ PHY/QHP: CPT | Performed by: INTERNAL MEDICINE

## 2023-04-03 PROCEDURE — 1123F ACP DISCUSS/DSCN MKR DOCD: CPT | Performed by: INTERNAL MEDICINE

## 2023-04-03 PROCEDURE — 3078F DIAST BP <80 MM HG: CPT | Performed by: INTERNAL MEDICINE

## 2023-04-03 RX ORDER — FOLIC ACID 1 MG/1
1 TABLET ORAL DAILY
Qty: 28 TABLET | Refills: 2 | Status: SHIPPED | OUTPATIENT
Start: 2023-04-03

## 2023-04-03 RX ORDER — OMEPRAZOLE 20 MG/1
CAPSULE, DELAYED RELEASE ORAL
Qty: 56 CAPSULE | Refills: 1 | Status: SHIPPED | OUTPATIENT
Start: 2023-04-03

## 2023-04-03 RX ORDER — FERROUS SULFATE 325(65) MG
325 TABLET ORAL 2 TIMES DAILY
Qty: 180 TABLET | Refills: 1 | Status: SHIPPED | OUTPATIENT
Start: 2023-04-03

## 2023-04-03 RX ORDER — DOCUSATE SODIUM 100 MG/1
CAPSULE, LIQUID FILLED ORAL
Qty: 180 CAPSULE | Refills: 1 | Status: SHIPPED | OUTPATIENT
Start: 2023-04-03

## 2023-04-03 RX ORDER — UBIDECARENONE 75 MG
1000 CAPSULE ORAL DAILY
COMMUNITY

## 2023-04-03 NOTE — TELEPHONE ENCOUNTER
Last seen 03/03/23  Omeprazole refill    Health Maintenance   Topic Date Due    Pneumococcal 65+ years Vaccine (3 - PPSV23 if available, else PCV20) 12/20/2021    COVID-19 Vaccine (4 - Booster for Pfizer series) 03/17/2022    Colorectal Cancer Screen  08/21/2022    Annual Wellness Visit (AWV)  10/08/2022    Low dose CT lung screening  11/15/2023    A1C test (Diabetic or Prediabetic)  11/28/2023    Lipids  11/28/2023    Depression Screen  03/01/2024    GFR test (Diabetes, CKD 3-4, OR last GFR 15-59)  03/27/2024    Breast cancer screen  03/06/2025    DTaP/Tdap/Td vaccine (2 - Td or Tdap) 03/08/2028    DEXA (modify frequency per FRAX score)  Completed    Flu vaccine  Completed    Shingles vaccine  Completed    Hepatitis C screen  Completed    Hepatitis A vaccine  Aged Out    Hib vaccine  Aged Out    Meningococcal (ACWY) vaccine  Aged Out             (applicable per patient's age: Cancer Screenings, Depression Screening, Fall Risk Screening, Immunizations)    Hemoglobin A1C (%)   Date Value   11/28/2022 5.8   11/03/2022 5.8   09/29/2021 5.8     Microalb/Crt.  Ratio (mcg/mg creat)   Date Value   05/09/2017 6     LDL Cholesterol (mg/dL)   Date Value   11/28/2022 86     AST (U/L)   Date Value   03/27/2023 16     ALT (U/L)   Date Value   03/27/2023 12     BUN (mg/dL)   Date Value   03/27/2023 21      (goal A1C is < 7)   (goal LDL is <100) need 30-50% reduction from baseline     BP Readings from Last 3 Encounters:   03/29/23 (!) 194/85   03/20/23 (!) 176/84   03/17/23 (!) 171/88    (goal /80)      All Future Testing planned in [facility]:  Lab Frequency Next Occurrence   XR CHEST STANDARD (2 VW) Once 01/24/2023   CBC with Auto Differential     Comprehensive Metabolic Panel     CBC with Auto Differential         Next Visit Date:  Future Appointments   Date Time Provider Mary Marinelli   4/11/2023  4:15 PM SCHEDULE, [facility] bariatric shen [facility]   5/24/2023  2:20 PM Mercedes Hawk MD [facility] Sebas Bolivar

## 2023-04-03 NOTE — TELEPHONE ENCOUNTER
Vivian Shipman MD VISIT  Rv in 6 months with labs prior   LABS CDP BMP FERRITIN FE TIBC ORDERS GIVEN TO PT ON EXIT TO BE DONE 9/25/23  MD VISIT 10/2/23 @ 3:15PM  AVS PRINTED W/ INSTRUCTIONS AND GIVEN TO PT ON EXIT

## 2023-04-03 NOTE — PROGRESS NOTES
Today's Date: 4/3/2023  Patient Name: Domingo Holman  Patient's age: 69 y.o., 1955      Reason for Consult: management recommendations    CHIEF COMPLAINT:  Microcytic anemia    Chief Complaint   Patient presents with    Follow-up     labs       History Obtained From:  patient, electronic medical record    Interval history:    Patient presents to the clinic for a follow-up visit and to review results of lab work. Patient is being worked up for bariatric surgery/gastric sleeve. She is concerned about going on for antiplatelet therapy with the surgery. Iron saturation continues to be low. During this visit patient's allergy, social, medical, surgical history and medications were reviewed and updated. HISTORY OF PRESENT ILLNESS:      The patient is a 69 y.o.  female who presents to the office to establish care and for further treatment evaluation and recommendations. Patient gives a long-standing history of microcytic anemia. According to the patient she was first diagnosed with anemia when she was pregnant and has been anemic since. Patient has had GI workup done in the past including colonoscopy which only revealed a polyp without any malignancy. Patient's blood workup in the past has shown iron deficiency but her latest iron studies showed sufficient iron stores, K43 and folic acid levels. Patient complains of fatigue at times. Denies noticing any gross bleeding. Patient is on iron supplement with one tablet a day. Patient peripheral blood smear showed microcytic anemia with reticulocytosis. Patient also had hemoglobin electrophoresis which came back within normal range. Patient denies any history of gastric bypass surgery.     Past Medical History:   has a past medical history of Ambulates with cane, Bleeding, Cataracts, bilateral, Cerebrovascular disease, Chronic pain in left foot, CKD (chronic kidney disease) stage 3, GFR 30-59 ml/min ([address] COVID-19, GERD

## 2023-04-20 DIAGNOSIS — M17.12 PRIMARY OSTEOARTHRITIS OF LEFT KNEE: ICD-10-CM

## 2023-04-20 RX ORDER — GABAPENTIN 600 MG/1
TABLET ORAL
Qty: 90 TABLET | Refills: 2 | Status: SHIPPED | OUTPATIENT
Start: 2023-04-20 | End: 2023-07-19

## 2023-04-20 NOTE — TELEPHONE ENCOUNTER
Pharmacy requesting refill of Gabapentin.       Medication active on med list yes      Date of last fill: 9/26/22  verified on 4/20/2023   verified by [facility] LPN      Date of last appointment 3/29/23    Next Visit Date:  6/29/2023

## 2023-04-24 ENCOUNTER — OFFICE VISIT (OUTPATIENT)
Dept: PODIATRY | Age: 68
End: 2023-04-24
Payer: COMMERCIAL

## 2023-04-24 VITALS
BODY MASS INDEX: 45.99 KG/M2 | DIASTOLIC BLOOD PRESSURE: 88 MMHG | SYSTOLIC BLOOD PRESSURE: 198 MMHG | WEIGHT: 293 LBS | HEART RATE: 76 BPM | HEIGHT: 67 IN

## 2023-04-24 DIAGNOSIS — B35.1 ONYCHOMYCOSIS: Primary | ICD-10-CM

## 2023-04-24 DIAGNOSIS — R60.0 EDEMA OF LOWER EXTREMITY: ICD-10-CM

## 2023-04-24 DIAGNOSIS — I73.9 PVD (PERIPHERAL VASCULAR DISEASE) (HCC): ICD-10-CM

## 2023-04-24 DIAGNOSIS — I73.9 PAD (PERIPHERAL ARTERY DISEASE) (HCC): ICD-10-CM

## 2023-04-24 DIAGNOSIS — E66.01 CLASS 3 SEVERE OBESITY WITH BODY MASS INDEX (BMI) OF 50.0 TO 59.9 IN ADULT, UNSPECIFIED OBESITY TYPE, UNSPECIFIED WHETHER SERIOUS COMORBIDITY PRESENT (HCC): ICD-10-CM

## 2023-04-24 PROCEDURE — 99213 OFFICE O/P EST LOW 20 MIN: CPT

## 2023-04-24 RX ORDER — ACETAMINOPHEN 325 MG/1
TABLET ORAL
Qty: 60 TABLET | Refills: 3 | Status: SHIPPED | OUTPATIENT
Start: 2023-04-24

## 2023-04-25 ENCOUNTER — OFFICE VISIT (OUTPATIENT)
Dept: BARIATRICS/WEIGHT MGMT | Age: 68
End: 2023-04-25
Payer: COMMERCIAL

## 2023-04-25 VITALS — DIASTOLIC BLOOD PRESSURE: 84 MMHG | SYSTOLIC BLOOD PRESSURE: 134 MMHG | WEIGHT: 293 LBS | BODY MASS INDEX: 56.7 KG/M2

## 2023-04-25 DIAGNOSIS — G89.29 CHRONIC BILATERAL LOW BACK PAIN WITH BILATERAL SCIATICA: ICD-10-CM

## 2023-04-25 DIAGNOSIS — G47.33 OSA (OBSTRUCTIVE SLEEP APNEA): ICD-10-CM

## 2023-04-25 DIAGNOSIS — D50.9 MICROCYTIC ANEMIA: ICD-10-CM

## 2023-04-25 DIAGNOSIS — M54.41 CHRONIC BILATERAL LOW BACK PAIN WITH BILATERAL SCIATICA: ICD-10-CM

## 2023-04-25 DIAGNOSIS — I10 ESSENTIAL HYPERTENSION: Primary | ICD-10-CM

## 2023-04-25 DIAGNOSIS — E78.00 PURE HYPERCHOLESTEROLEMIA: ICD-10-CM

## 2023-04-25 DIAGNOSIS — E66.01 CLASS 3 SEVERE OBESITY DUE TO EXCESS CALORIES WITH SERIOUS COMORBIDITY AND BODY MASS INDEX (BMI) OF 50.0 TO 59.9 IN ADULT (HCC): ICD-10-CM

## 2023-04-25 DIAGNOSIS — I73.9 PERIPHERAL VASCULAR DISEASE (HCC): ICD-10-CM

## 2023-04-25 DIAGNOSIS — Z86.73 HISTORY OF STROKE: ICD-10-CM

## 2023-04-25 DIAGNOSIS — M54.42 CHRONIC BILATERAL LOW BACK PAIN WITH BILATERAL SCIATICA: ICD-10-CM

## 2023-04-25 PROCEDURE — 99213 OFFICE O/P EST LOW 20 MIN: CPT | Performed by: NURSE PRACTITIONER

## 2023-04-25 PROCEDURE — 1123F ACP DISCUSS/DSCN MKR DOCD: CPT | Performed by: NURSE PRACTITIONER

## 2023-04-25 PROCEDURE — 3079F DIAST BP 80-89 MM HG: CPT | Performed by: NURSE PRACTITIONER

## 2023-04-25 PROCEDURE — 3075F SYST BP GE 130 - 139MM HG: CPT | Performed by: NURSE PRACTITIONER

## 2023-04-25 RX ORDER — MULTIVIT-MIN/IRON/FOLIC ACID/K 18-600-40
CAPSULE ORAL
COMMUNITY
Start: 2023-04-04

## 2023-04-25 NOTE — PROGRESS NOTES
[address]  [address] [place] [address],  S Balta Colbert  Tel: [phone]   Fax: [phone]    Subjective     CC: Routine nail care    Interval History:  Patient presents to the clinic today for follow up of thickened and discolored nails. She ambulates with cane. She states she is not able to perform nail care and foot exams due to her weight and cervical spinal stenosis. She does not wear any compression stockings because they to hard for her to put on. She states that she has no new pedal complaints at this time besides nails. Patient is obese and has difficulty walking. Accompanied w . HPI:  Christopher Palomares is a 69y.o. year old female who presents clinic today for follow-up of thickened and discolored nails. She states that she has been doing well and denies any new complaints. She walks around with a cane and has troubling cutting her own nails. She does not wear any compression stockings as they are hard to put on. Patient denies any nausea, vomiting, fever, chills or shortness of breath. Primary care physician is Clara Watts MD.    ROS:    Constitutional: Denies nausea, vomiting, fever, chills. Neurologic: Denies numbness, tingling, and burning in the feet. Vascular: Denies symptoms of lower extremity claudication. Skin: Denies open wounds. Otherwise negative except as noted in the HPI.      PMH:  Past Medical History:   Diagnosis Date    Ambulates with cane     Bleeding     while on aggrenox    Cataracts, bilateral     Cerebrovascular disease 2003,2011    cva    Chronic pain in left foot     CKD (chronic kidney disease) stage 3, GFR 30-59 ml/min ([facility])     COVID-19 11/2021    states hpspitalized    GERD (gastroesophageal reflux disease)     Hx of blood clots     Hyperlipidemia     Hypertension     Iron (Fe) deficiency anemia     Nicotine abuse     Obesity     TIBURCIO on CPAP     Osteoarthritis     Peripheral vascular disease ([facility] 03/13/2014    Substance

## 2023-04-26 ENCOUNTER — TELEPHONE (OUTPATIENT)
Dept: NEUROLOGY | Age: 68
End: 2023-04-26

## 2023-04-28 NOTE — TELEPHONE ENCOUNTER
Dr. Bass [place] 3/29/23 office note stated \"She was considering bariatric surgery but would not like to be off anticoagulants for a week given the risk of stroke\". Writer placed call to the patient to see if she had changed her mind and wanted to proceed with the surgery. Patient stated that she didn't want to be off her blood thinners for a week, fearing another stroke. This information given to Dr. Pio Kim. Neuro clearance form successfully faxed back to Dr. Rolanda Holden at [phone].

## 2023-05-02 DIAGNOSIS — I63.00 CEREBROVASCULAR ACCIDENT (CVA) DUE TO THROMBOSIS OF PRECEREBRAL ARTERY (HCC): ICD-10-CM

## 2023-05-03 RX ORDER — ASPIRIN 81 MG/1
TABLET ORAL
Qty: 30 TABLET | Refills: 5 | Status: SHIPPED | OUTPATIENT
Start: 2023-05-03

## 2023-05-03 RX ORDER — CHOLECALCIFEROL (VITAMIN D3) 25 MCG
TABLET ORAL
Qty: 30 TABLET | Refills: 5 | Status: SHIPPED | OUTPATIENT
Start: 2023-05-03

## 2023-05-25 ENCOUNTER — OFFICE VISIT (OUTPATIENT)
Dept: BARIATRICS/WEIGHT MGMT | Age: 68
End: 2023-05-25
Payer: COMMERCIAL

## 2023-05-25 VITALS
WEIGHT: 293 LBS | HEART RATE: 70 BPM | BODY MASS INDEX: 45.99 KG/M2 | HEIGHT: 67 IN | DIASTOLIC BLOOD PRESSURE: 80 MMHG | SYSTOLIC BLOOD PRESSURE: 120 MMHG

## 2023-05-25 DIAGNOSIS — E66.01 CLASS 3 SEVERE OBESITY DUE TO EXCESS CALORIES WITH SERIOUS COMORBIDITY AND BODY MASS INDEX (BMI) OF 50.0 TO 59.9 IN ADULT (HCC): ICD-10-CM

## 2023-05-25 DIAGNOSIS — E78.00 PURE HYPERCHOLESTEROLEMIA: ICD-10-CM

## 2023-05-25 DIAGNOSIS — D50.9 MICROCYTIC ANEMIA: ICD-10-CM

## 2023-05-25 DIAGNOSIS — G47.33 OSA (OBSTRUCTIVE SLEEP APNEA): ICD-10-CM

## 2023-05-25 DIAGNOSIS — M54.16 LUMBAR RADICULAR PAIN: ICD-10-CM

## 2023-05-25 DIAGNOSIS — N18.30 STAGE 3 CHRONIC KIDNEY DISEASE, UNSPECIFIED WHETHER STAGE 3A OR 3B CKD (HCC): ICD-10-CM

## 2023-05-25 DIAGNOSIS — I10 ESSENTIAL HYPERTENSION: Primary | ICD-10-CM

## 2023-05-25 DIAGNOSIS — I73.9 PERIPHERAL VASCULAR DISEASE (HCC): ICD-10-CM

## 2023-05-25 DIAGNOSIS — Z86.73 HISTORY OF STROKE: ICD-10-CM

## 2023-05-25 PROCEDURE — 3079F DIAST BP 80-89 MM HG: CPT | Performed by: NURSE PRACTITIONER

## 2023-05-25 PROCEDURE — 99213 OFFICE O/P EST LOW 20 MIN: CPT | Performed by: NURSE PRACTITIONER

## 2023-05-25 PROCEDURE — 3074F SYST BP LT 130 MM HG: CPT | Performed by: NURSE PRACTITIONER

## 2023-05-25 PROCEDURE — 1123F ACP DISCUSS/DSCN MKR DOCD: CPT | Performed by: NURSE PRACTITIONER

## 2023-05-25 NOTE — PROGRESS NOTES
Medical Weight Management Progress Note    Subjective     Patient being seen for medically supervised weight loss for the chronic conditions of HTN, TIBURCIO, HLD, Chronic Back and Knee Pain, Hx CVA, CKD, PVD, Anemia, Prediabetes, Vertigo. She is working on the behavior changes discussed at the initial appointment. Patient continues on diet plan. Physical activity includes walking as tolerated. Weight gain of 2 lbs since last visit. Quit smoking in 2010. Using CPAP. Psych eval completed and clearance received. EGD done by GI in 2019. Needs pulmonary, and neurology clearances. Cardiac clearance received. No current issues. Working toward bariatric surgery:    [x] Sleeve Gastrectomy                                                           [] Sunday-en-Y Gastric Bypass    Allergies: Allergies   Allergen Reactions    Duricef [Cefadroxil Monohydrate] Hives    Fish-Derived Products Hives    Pcn [Penicillins] Hives    Tomato Hives       Past Medical History:     Past Medical History:   Diagnosis Date    Ambulates with cane     Bleeding     while on aggrenox    Cataracts, bilateral     Cerebrovascular disease 2003,2011    cva    Chronic pain in left foot     CKD (chronic kidney disease) stage 3, GFR 30-59 ml/min ([facility] [address]     COVID-19 11/2021    states hpspitalized    GERD (gastroesophageal reflux disease)     Hx of blood clots     Hyperlipidemia     Hypertension     Iron (Fe) deficiency anemia     Nicotine abuse     Obesity     TIBURCIO on CPAP     Osteoarthritis     Peripheral vascular disease ([address] 03/13/2014    Substance abuse ([facility] [address]     cocaine, canabis    Thalassemia minor     Thyroid nodule 11/22/2022    Unspecified cerebral artery occlusion with cerebral infarction     Wears dentures     upper and lower   .     Past Surgical History:  Past Surgical History:   Procedure Laterality Date    BREAST SURGERY      biopsy    COLONOSCOPY  12/2007    adenomatous polyp    COLONOSCOPY  2013    normal, repeat in 5 years

## 2023-05-31 ENCOUNTER — OFFICE VISIT (OUTPATIENT)
Dept: INTERNAL MEDICINE | Age: 68
End: 2023-05-31
Payer: COMMERCIAL

## 2023-05-31 VITALS
HEART RATE: 75 BPM | TEMPERATURE: 97 F | BODY MASS INDEX: 45.99 KG/M2 | WEIGHT: 293 LBS | OXYGEN SATURATION: 98 % | HEIGHT: 67 IN | DIASTOLIC BLOOD PRESSURE: 80 MMHG | SYSTOLIC BLOOD PRESSURE: 150 MMHG

## 2023-05-31 DIAGNOSIS — R09.81 SINUS CONGESTION: ICD-10-CM

## 2023-05-31 DIAGNOSIS — I10 ESSENTIAL HYPERTENSION: Primary | ICD-10-CM

## 2023-05-31 DIAGNOSIS — I10 ESSENTIAL HYPERTENSION: ICD-10-CM

## 2023-05-31 PROCEDURE — 1123F ACP DISCUSS/DSCN MKR DOCD: CPT | Performed by: STUDENT IN AN ORGANIZED HEALTH CARE EDUCATION/TRAINING PROGRAM

## 2023-05-31 PROCEDURE — 3079F DIAST BP 80-89 MM HG: CPT | Performed by: STUDENT IN AN ORGANIZED HEALTH CARE EDUCATION/TRAINING PROGRAM

## 2023-05-31 PROCEDURE — 3077F SYST BP >= 140 MM HG: CPT | Performed by: STUDENT IN AN ORGANIZED HEALTH CARE EDUCATION/TRAINING PROGRAM

## 2023-05-31 PROCEDURE — 99213 OFFICE O/P EST LOW 20 MIN: CPT | Performed by: STUDENT IN AN ORGANIZED HEALTH CARE EDUCATION/TRAINING PROGRAM

## 2023-05-31 RX ORDER — LOSARTAN POTASSIUM 50 MG/1
50 TABLET ORAL DAILY
Qty: 30 TABLET | Refills: 3 | Status: SHIPPED | OUTPATIENT
Start: 2023-05-31

## 2023-05-31 SDOH — ECONOMIC STABILITY: INCOME INSECURITY: HOW HARD IS IT FOR YOU TO PAY FOR THE VERY BASICS LIKE FOOD, HOUSING, MEDICAL CARE, AND HEATING?: NOT HARD AT ALL

## 2023-05-31 SDOH — ECONOMIC STABILITY: FOOD INSECURITY: WITHIN THE PAST 12 MONTHS, YOU WORRIED THAT YOUR FOOD WOULD RUN OUT BEFORE YOU GOT MONEY TO BUY MORE.: NEVER TRUE

## 2023-05-31 SDOH — ECONOMIC STABILITY: HOUSING INSECURITY
IN THE LAST 12 MONTHS, WAS THERE A TIME WHEN YOU DID NOT HAVE A STEADY PLACE TO SLEEP OR SLEPT IN A SHELTER (INCLUDING NOW)?: NO

## 2023-05-31 SDOH — ECONOMIC STABILITY: FOOD INSECURITY: WITHIN THE PAST 12 MONTHS, THE FOOD YOU BOUGHT JUST DIDN'T LAST AND YOU DIDN'T HAVE MONEY TO GET MORE.: NEVER TRUE

## 2023-05-31 ASSESSMENT — ENCOUNTER SYMPTOMS
RESPIRATORY NEGATIVE: 1
GASTROINTESTINAL NEGATIVE: 1

## 2023-05-31 NOTE — PROGRESS NOTES
[facility]  [facility] [address] [address]  Dept: [phone]  Dept Fax: [phone]    Office Progress/Follow Up Note  Date ofpatient's visit: 5/31/2023  Patient's Name:  Angelito Iglesias YOB: 1955            Patient Care Team:  Maurizio Burns MD as PCP - General (Internal Medicine)  Germán Conner DPM as Consulting Physician (Podiatry)  ================================================================    REASON FOR VISIT/CHIEF COMPLAINT:  Hypertension (2 month f/u) and Weight Management (Pt says her neurologist is stopping bariatric surgery because pt is on blood thinners and needs to be to avoid stroke. Pt asking about Adipex to help curb appetite to help get off blood thinners.)    HISTORY OF PRESENTING ILLNESS:  History was obtained from: patient. Alex Freeman a 69 y.o. is here for follow-up visit for hypertension. Today her blood pressure is 170/82, repeat check 150/80, she has been on Cozaar 25 mg daily, compliant with medications. Known history of stroke in the past, currently on aspirin, Brilinta, folic acid, Lipitor  Known history of cervical stenosis, L4-L5 stenosis,  follows up with neurology, currently symptoms controlled with gabapentin    Patient was being worked up for bariatric surgery, but her being on antiplatelets for stroke, they could not do EGD/colonoscopy and the plan for surgery was deferred . Patient wanted to try oral medications to help her lose weight, specifically interested in Adipex but with her blood pressure being on the higher side, she understands and wants to defer that option for now. Known history of alpha thalassemia, follows up with Dr. Bela Sadler as outpatient    History of CKD, currently stable    Known history of TIBURCIO, compliant with CPAP    Known history of carpal tunnel syndrome, uses splint for her symptoms, does not want surgery.     Known history of thyroid nodule, lung nodule due for

## 2023-06-02 RX ORDER — LOSARTAN POTASSIUM 25 MG/1
25 TABLET ORAL DAILY
Qty: 28 TABLET | Refills: 1 | OUTPATIENT
Start: 2023-06-02

## 2023-06-05 RX ORDER — OMEPRAZOLE 20 MG/1
CAPSULE, DELAYED RELEASE ORAL
Qty: 56 CAPSULE | Refills: 1 | Status: SHIPPED | OUTPATIENT
Start: 2023-06-05

## 2023-06-26 DIAGNOSIS — E55.9 VITAMIN D DEFICIENCY: ICD-10-CM

## 2023-06-26 DIAGNOSIS — I63.00 CEREBROVASCULAR ACCIDENT (CVA) DUE TO THROMBOSIS OF PRECEREBRAL ARTERY (HCC): ICD-10-CM

## 2023-06-26 DIAGNOSIS — I10 ESSENTIAL HYPERTENSION: ICD-10-CM

## 2023-06-26 DIAGNOSIS — E78.00 PURE HYPERCHOLESTEROLEMIA: ICD-10-CM

## 2023-06-28 RX ORDER — ATORVASTATIN CALCIUM 80 MG/1
80 TABLET, FILM COATED ORAL DAILY
Qty: 28 TABLET | Refills: 4 | Status: SHIPPED | OUTPATIENT
Start: 2023-06-28

## 2023-06-28 RX ORDER — FOLIC ACID 1 MG/1
1 TABLET ORAL DAILY
Qty: 28 TABLET | Refills: 2 | Status: SHIPPED | OUTPATIENT
Start: 2023-06-28

## 2023-06-30 RX ORDER — TICAGRELOR 90 MG/1
TABLET ORAL
Qty: 60 TABLET | Refills: 0 | Status: SHIPPED | OUTPATIENT
Start: 2023-06-30

## 2023-07-18 NOTE — PROGRESS NOTES
Patient instructed to remove shoes and socks and instructed to sit in exam chair. Current PCP is Vansea Boles MD and date of last visit was 5/31/23. Do you have a follow up visit scheduled?   Yes  If yes, the date is 8/30/23

## 2023-07-24 ENCOUNTER — OFFICE VISIT (OUTPATIENT)
Dept: PODIATRY | Age: 68
End: 2023-07-24
Payer: COMMERCIAL

## 2023-07-24 VITALS
BODY MASS INDEX: 45.99 KG/M2 | HEIGHT: 67 IN | DIASTOLIC BLOOD PRESSURE: 96 MMHG | SYSTOLIC BLOOD PRESSURE: 170 MMHG | WEIGHT: 293 LBS

## 2023-07-24 DIAGNOSIS — R60.0 EDEMA OF LOWER EXTREMITY: ICD-10-CM

## 2023-07-24 DIAGNOSIS — E66.01 CLASS 3 SEVERE OBESITY WITH BODY MASS INDEX (BMI) OF 50.0 TO 59.9 IN ADULT, UNSPECIFIED OBESITY TYPE, UNSPECIFIED WHETHER SERIOUS COMORBIDITY PRESENT (HCC): ICD-10-CM

## 2023-07-24 DIAGNOSIS — M79.609 PAIN DUE TO ONYCHOMYCOSIS OF NAIL: ICD-10-CM

## 2023-07-24 DIAGNOSIS — I73.9 PVD (PERIPHERAL VASCULAR DISEASE) (HCC): ICD-10-CM

## 2023-07-24 DIAGNOSIS — B35.1 PAIN DUE TO ONYCHOMYCOSIS OF NAIL: ICD-10-CM

## 2023-07-24 DIAGNOSIS — R09.81 SINUS CONGESTION: ICD-10-CM

## 2023-07-24 DIAGNOSIS — B35.1 ONYCHOMYCOSIS: Primary | ICD-10-CM

## 2023-07-24 PROCEDURE — 11721 DEBRIDE NAIL 6 OR MORE: CPT

## 2023-07-24 PROCEDURE — 99213 OFFICE O/P EST LOW 20 MIN: CPT

## 2023-07-24 PROCEDURE — 99213 OFFICE O/P EST LOW 20 MIN: CPT | Performed by: PODIATRIST

## 2023-07-24 PROCEDURE — 3074F SYST BP LT 130 MM HG: CPT

## 2023-07-24 PROCEDURE — 1123F ACP DISCUSS/DSCN MKR DOCD: CPT

## 2023-07-24 PROCEDURE — 3078F DIAST BP <80 MM HG: CPT

## 2023-07-24 RX ORDER — OMEPRAZOLE 20 MG/1
CAPSULE, DELAYED RELEASE ORAL
Qty: 60 CAPSULE | Refills: 5 | Status: SHIPPED | OUTPATIENT
Start: 2023-07-24

## 2023-07-24 NOTE — TELEPHONE ENCOUNTER
Pharmacy requesting refill of BRILINTA 90 MG TABS tablet       Medication active on med list yes      Date of last Rx: 6/30/2023 with 0 refills          verified by TERI PRAJAPATI      Date of last appointment 3/29/2023    Next Visit Date:  Visit date not found

## 2023-07-24 NOTE — PROGRESS NOTES
[address] [facility] [address] [place] [place] [address] [address] [place] [address]  Tel: [phone]   Fax: [phone]    Subjective     CC: Routine nail care    Interval History:  Patient presents to clinic today for follow-up of diabetic foot care and elongated nails. She ambulates with a cane. She does not perform nail care as she is unable to reach her feet. She does not wear compression stockings as they are too hard for her to put on. She states that she gets relief with regular professional departments and is interested in having her nails debrided. No other pedal complaints at this time    HPI:  Andreina Bermudez is a 77y.o. year old female who presents clinic today for follow-up of thickened and discolored nails. She states that she has been doing well and denies any new complaints. She walks around with a cane and has troubling cutting her own nails. She does not wear any compression stockings as they are hard to put on. Patient denies any nausea, vomiting, fever, chills or shortness of breath. Primary care physician is Serenity Rivera MD.    ROS:    Constitutional: Denies nausea, vomiting, fever, chills. Neurologic: Denies numbness, tingling, and burning in the feet. Vascular: Denies symptoms of lower extremity claudication. Skin: Denies open wounds. Otherwise negative except as noted in the HPI.      PMH:  Past Medical History:   Diagnosis Date    Ambulates with cane     Bleeding     while on aggrenox    Cataracts, bilateral     Cerebrovascular disease 2003,2011    cva    Chronic pain in left foot     CKD (chronic kidney disease) stage 3, GFR 30-59 ml/min ([facility])     COVID-19 11/2021    states hpspitalized    GERD (gastroesophageal reflux disease)     Hx of blood clots     Hyperlipidemia     Hypertension     Iron (Fe) deficiency anemia     Nicotine abuse     Obesity     TIBURCIO on CPAP     Osteoarthritis     Peripheral vascular disease ([facility]) 03/13/2014    Substance abuse ([facility])

## 2023-07-24 NOTE — TELEPHONE ENCOUNTER
Last visit: 5/31/23  Last Med refill:   Does patient have enough medication for 72 hours: No:     Next Visit Date:  Future Appointments   Date Time Provider [address]   8/11/2023  2:30 PM Indiana Cervantes MD Resp Spec [address]   8/30/2023  3:00 PM Erasto Angeles MD [facility] IM [facility]   10/2/2023  3:15 PM [address], MD [facility] [address]   10/30/2023 12:45 PM Syed Winter,  [facility] Maintenance   Topic Date Due    Pneumococcal 65+ years Vaccine (3 - PPSV23 if available, else PCV20) 12/20/2021    COVID-19 Vaccine (4 - Booster for Pfizer series) 03/17/2022    Colorectal Cancer Screen  08/21/2022    Annual Wellness Visit (AWV)  10/08/2022    Flu vaccine (1) 08/01/2023    Low dose CT lung screening &/or counseling  11/15/2023    A1C test (Diabetic or Prediabetic)  11/28/2023    Lipids  11/28/2023    Depression Screen  03/01/2024    GFR test (Diabetes, CKD 3-4, OR last GFR 15-59)  03/27/2024    Breast cancer screen  03/06/2025    DTaP/Tdap/Td vaccine (2 - Td or Tdap) 03/08/2028    DEXA (modify frequency per FRAX score)  Completed    Shingles vaccine  Completed    Hepatitis C screen  Completed    Hepatitis A vaccine  Aged Out    Hib vaccine  Aged Out    Meningococcal (ACWY) vaccine  Aged Out       Hemoglobin A1C (%)   Date Value   11/28/2022 5.8   11/03/2022 5.8   09/29/2021 5.8             ( goal A1C is < 7)   No components found for: LABMICR  LDL Cholesterol (mg/dL)   Date Value   11/28/2022 86   11/03/2022 88       (goal LDL is <100)   AST (U/L)   Date Value   03/27/2023 16     ALT (U/L)   Date Value   03/27/2023 12     BUN (mg/dL)   Date Value   03/27/2023 21     BP Readings from Last 3 Encounters:   07/24/23 (!) 170/96   05/31/23 (!) 150/80   05/25/23 120/80          (goal 120/80)    All Future Testing planned in [facility]  Lab Frequency Next Occurrence   XR CHEST STANDARD (2 VW) Once 01/24/2023   Ferritin Once 04/03/2023   Iron and TIBC Once 04/03/2023   CBC with Auto

## 2023-07-25 NOTE — TELEPHONE ENCOUNTER
Approved medication  Kindly ask the patient to see me in the office by scheduling the appointment if not scheduled yet for further refills.

## 2023-07-26 RX ORDER — TICAGRELOR 90 MG/1
TABLET ORAL
Qty: 56 TABLET | Refills: 0 | Status: SHIPPED | OUTPATIENT
Start: 2023-07-26

## 2023-07-27 ENCOUNTER — OFFICE VISIT (OUTPATIENT)
Dept: NEUROLOGY | Age: 68
End: 2023-07-27

## 2023-07-27 VITALS
HEIGHT: 67 IN | HEART RATE: 70 BPM | BODY MASS INDEX: 45.99 KG/M2 | SYSTOLIC BLOOD PRESSURE: 150 MMHG | DIASTOLIC BLOOD PRESSURE: 92 MMHG | WEIGHT: 293 LBS
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
## 2023-07-27 NOTE — PROGRESS NOTES
with opacification of the supraclinoid segment. Severe narrowing is again   noted in the cavernous segment of right ICA. Occlusion of right V1 and proximal V2 segments of right vertebral artery, new   compared to the previous exam          MRI brain: 11/21/2022  1. No evidence of an acute infarct. 2. Severe chronic microvascular white matter ischemic disease with chronic   infarcts involving bilateral cerebral hemispheres, the left thalamus, left   globus pallidus, and right caudate head. Current Outpatient Medications   Medication Sig Dispense Refill    BRILINTA 90 MG TABS tablet TAKE 1 TABLET BY MOUTH TWICE A DAY 56 tablet 0    omeprazole (PRILOSEC) 20 MG delayed release capsule TAKE 1 CAPSULE BY MOUTH TWICE A DAY WITH MEALS 60 capsule 5    atorvastatin (LIPITOR) 80 MG tablet TAKE 1 TABLET BY MOUTH DAILY 28 tablet 4    folic acid (FOLVITE) 1 MG tablet TAKE 1 TABLET BY MOUTH DAILY 28 tablet 2    losartan (COZAAR) 50 MG tablet Take 1 tablet by mouth daily 30 tablet 3    aspirin (ASPIRIN LOW DOSE) 81 MG EC tablet TAKE 1 TABLET BY MOUTH DAILY 30 tablet 5    Cholecalciferol (VITAMIN D3) 25 MCG TABS TAKE 1 TABLET BY MOUTH DAILY 30 tablet 5    acetaminophen (TYLENOL) 325 MG tablet TAKE 2 TABLETS BY MOUTH EVERY 4 HOURS AS NEEDED FOR PAIN 60 tablet 3    vitamin B-12 (CYANOCOBALAMIN) 100 MCG tablet Take 10 tablets by mouth daily      Misc Natural Products (IMMUNE ESSENTIALS PO) Take by mouth      docusate sodium (COLACE) 100 MG capsule TAKE 1 CAPSULE BY MOUTH 2 TIMES DAILY AS NEEDED FOR CONSTIPATION (BOTTLE) 180 capsule 1    ferrous sulfate (IRON 325) 325 (65 Fe) MG tablet Take 1 tablet by mouth in the morning and at bedtime 180 tablet 1    gabapentin (NEURONTIN) 600 MG tablet Take 1 tablet by mouth 3 times daily for 30 days.  90 tablet 5    vitamin B-1 (THIAMINE) 100 MG tablet Take 1 tablet by mouth daily 30 tablet 3    diclofenac sodium (VOLTAREN) 1 % GEL Apply 4 g topically 4 times daily 4 Tube 1

## 2023-08-03 ENCOUNTER — HOSPITAL ENCOUNTER (OUTPATIENT)
Dept: CT IMAGING | Age: 68
Discharge: HOME | End: 2023-08-05
Attending: STUDENT IN AN ORGANIZED HEALTH CARE EDUCATION/TRAINING PROGRAM
Payer: COMMERCIAL
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
## 2023-08-03 PROCEDURE — 70450 CT HEAD/BRAIN W/O DYE: CPT

## 2023-08-11 ENCOUNTER — OFFICE VISIT (OUTPATIENT)
Dept: PULMONOLOGY | Age: 68
End: 2023-08-11

## 2023-08-11 VITALS
HEIGHT: 67 IN | SYSTOLIC BLOOD PRESSURE: 177 MMHG | DIASTOLIC BLOOD PRESSURE: 90 MMHG | OXYGEN SATURATION: 97 % | BODY MASS INDEX: 45.99 KG/M2 | RESPIRATION RATE: 14 BRPM | WEIGHT: 293 LBS | HEART RATE: 68 BPM

## 2023-08-11 DIAGNOSIS — Z87.891 HISTORY OF SMOKING 30 OR MORE PACK YEARS: ICD-10-CM

## 2023-08-11 DIAGNOSIS — G47.33 OSA ON CPAP: ICD-10-CM

## 2023-08-11 DIAGNOSIS — Z87.891 PERSONAL HISTORY OF TOBACCO USE: ICD-10-CM

## 2023-08-11 DIAGNOSIS — Z99.89 OSA ON CPAP: ICD-10-CM

## 2023-08-11 DIAGNOSIS — E66.01 MORBID OBESITY WITH BMI OF 50.0-59.9, ADULT (HCC): ICD-10-CM

## 2023-08-11 DIAGNOSIS — J44.9 CHRONIC OBSTRUCTIVE PULMONARY DISEASE, UNSPECIFIED COPD TYPE (HCC): Primary | ICD-10-CM

## 2023-08-11 DIAGNOSIS — R91.1 LUNG NODULE: ICD-10-CM

## 2023-08-11 RX ORDER — ALBUTEROL SULFATE 90 UG/1
2 AEROSOL, METERED RESPIRATORY (INHALATION) 4 TIMES DAILY PRN
Qty: 18 G | Refills: 5 | Status: SHIPPED | OUTPATIENT
Start: 2023-08-11

## 2023-08-11 NOTE — PROGRESS NOTES
to patient she had skipped beats at times she was seen by cardiology she was getting preop evaluation for weight loss surgery and apparently had EKG done. She does not complain of palpitations dizziness or chest pain. She had pulmonary function test done in February 2023 and according to pulmonary function test her FEV1 was 63% her FVC was also moderately reduced but she was not able to do the lung volume diffusion capacity was normal.    Past her last low-dose screening CT scan was done on 11/15/2022 which is compared to CT scan on 11/14/2021 shows medial groundglass change around the spine and right-sided azygous recess which was the same without much change there was 2 mm right lung nodule was reported by radiology on 11/15/2022.         Past Medical History:        Diagnosis Date    Ambulates with cane     Bleeding     while on aggrenox    Cataracts, bilateral     Cerebrovascular disease 2003,2011    cva    Chronic pain in left foot     CKD (chronic kidney disease) stage 3, GFR 30-59 ml/min ([facility])     COVID-19 11/2021    states hpspitalized    GERD (gastroesophageal reflux disease)     Hx of blood clots     Hyperlipidemia     Hypertension     Iron (Fe) deficiency anemia     Nicotine abuse     Obesity     TIBURCIO on CPAP     Osteoarthritis     Peripheral vascular disease ([address]) 03/13/2014    Substance abuse ([address])     cocaine, canabis    Thalassemia minor     Thyroid nodule 11/22/2022    Unspecified cerebral artery occlusion with cerebral infarction     Wears dentures     upper and lower       Past Surgical History:        Procedure Laterality Date    BREAST SURGERY      biopsy    COLONOSCOPY  12/2007    adenomatous polyp    COLONOSCOPY  2013    normal, repeat in 5 years    COLONOSCOPY N/A 8/21/2019    COLONOSCOPY POLYPECTOMY SNARE/COLD BIOPSY performed by Cristina Hopkins MD at [facility] ENDOSCOPY  2009

## 2023-08-11 NOTE — PATIENT INSTRUCTIONS
@[facility]@        YOU ARE BEING REFERRED TO:    EB HOSKINS (a [facility])    [address] [facility], [facility] Cristina Olmos    Main Phone Number: [phone]    Phone number for scheduling sleep study: [phone]      Please contact the above numbers to schedule your sleep study. Once you have completed the FIRST NIGHT you may be asked to return for a SECOND NIGHT if necessary. After the SECOND NIGHT the sleep center will order a CPAP/BiPAP machine for you. An order for the machine will be sent to a durable medical equipment company ([email or web address]). The sleep center will provide you with the [email or web address] companies information. Once you have your machine please contact our office to schedule a follow up appointment. Your follow up will be scheduled for a date 30-90 days after you have received your machine. At that follow up visit we will need to have a compliance download from your DME company. Please contact your [email or web address] company to have the compliance download faxed to our office at   [phone] for your follow up appointment. IF YOU HAVE ANY QUESTIONS ABOUT YOUR SLEEP STUDY   PLEASE CONTACT THE SLEEP CENTER. Learning About Lung Cancer Screening  What is screening for lung cancer? Lung cancer screening is a way to find some lung cancers early, before a person has any symptoms of the cancer. Lung cancer screening may help those who have the highest risk for lung cancer--people age 48 and older who are or were heavy smokers. For most people, who aren't at increased risk, screening for lung cancer probably isn't helpful. Screening won't prevent cancer. And it may not find all lung cancers. Lung cancer screening may lower the risk of dying from lung cancer in a small number of people. How is it done? Lung cancer screening is done with a low-dose CT (computed tomography) scan.  A CT scan uses X-rays, or radiation, to make detailed

## 2023-08-14 ENCOUNTER — TELEPHONE (OUTPATIENT)
Dept: PULMONOLOGY | Age: 68
End: 2023-08-14

## 2023-08-15 RX ORDER — UMECLIDINIUM 62.5 UG/1
1 AEROSOL, POWDER ORAL DAILY
Qty: 30 EACH | Refills: 5 | Status: SHIPPED | OUTPATIENT
Start: 2023-08-15

## 2023-08-15 NOTE — TELEPHONE ENCOUNTER
Dr. Rider Number,   insurance has denied Spiriva Respimat because it is not on formulary for them. They will cover Incruse Ellipta or Atrovent HFA. Please place an order for one of the inhalers that are covered or you can do an appeal letter.

## 2023-08-21 RX ORDER — TICAGRELOR 90 MG/1
TABLET ORAL
Qty: 56 TABLET | Refills: 1 | Status: SHIPPED | OUTPATIENT
Start: 2023-08-21

## 2023-08-21 NOTE — TELEPHONE ENCOUNTER
Pharmacy requesting refill of Brilinta 90 mg.      Medication active on med list yes      Date of last Rx: 07/26/23 with 0 refills          verified by SR, RMA      Date of last appointment 07/27/23    Next Visit Date:  Visit date not found

## 2023-09-13 DIAGNOSIS — E61.1 IRON DEFICIENCY: ICD-10-CM

## 2023-09-13 DIAGNOSIS — R79.89 ELEVATED FERRITIN: ICD-10-CM

## 2023-09-13 DIAGNOSIS — D56.3 THALASSEMIA TRAIT, ALPHA: ICD-10-CM

## 2023-09-13 DIAGNOSIS — D50.9 MICROCYTIC ANEMIA: ICD-10-CM

## 2023-09-15 ENCOUNTER — OFFICE VISIT (OUTPATIENT)
Dept: INTERNAL MEDICINE | Age: 68
End: 2023-09-15
Payer: COMMERCIAL

## 2023-09-15 VITALS
HEART RATE: 64 BPM | DIASTOLIC BLOOD PRESSURE: 95 MMHG | BODY MASS INDEX: 45.99 KG/M2 | TEMPERATURE: 98.1 F | WEIGHT: 293 LBS | OXYGEN SATURATION: 98 % | HEIGHT: 67 IN | SYSTOLIC BLOOD PRESSURE: 152 MMHG

## 2023-09-15 DIAGNOSIS — N18.31 STAGE 3A CHRONIC KIDNEY DISEASE (HCC): ICD-10-CM

## 2023-09-15 DIAGNOSIS — Z86.73 H/O: STROKE: ICD-10-CM

## 2023-09-15 DIAGNOSIS — E78.00 PURE HYPERCHOLESTEROLEMIA: ICD-10-CM

## 2023-09-15 DIAGNOSIS — J44.9 CHRONIC OBSTRUCTIVE PULMONARY DISEASE, UNSPECIFIED COPD TYPE (HCC): ICD-10-CM

## 2023-09-15 DIAGNOSIS — D50.9 MICROCYTIC ANEMIA: ICD-10-CM

## 2023-09-15 DIAGNOSIS — Z23 NEEDS FLU SHOT: ICD-10-CM

## 2023-09-15 DIAGNOSIS — I10 ESSENTIAL HYPERTENSION: Primary | ICD-10-CM

## 2023-09-15 DIAGNOSIS — G47.33 OSA (OBSTRUCTIVE SLEEP APNEA): ICD-10-CM

## 2023-09-15 DIAGNOSIS — G56.03 CARPAL TUNNEL SYNDROME, BILATERAL: ICD-10-CM

## 2023-09-15 DIAGNOSIS — E66.01 CLASS 3 SEVERE OBESITY DUE TO EXCESS CALORIES WITH SERIOUS COMORBIDITY AND BODY MASS INDEX (BMI) OF 50.0 TO 59.9 IN ADULT (HCC): ICD-10-CM

## 2023-09-15 PROCEDURE — 90686 IIV4 VACC NO PRSV 0.5 ML IM: CPT | Performed by: STUDENT IN AN ORGANIZED HEALTH CARE EDUCATION/TRAINING PROGRAM

## 2023-09-15 PROCEDURE — 3078F DIAST BP <80 MM HG: CPT

## 2023-09-15 PROCEDURE — 99214 OFFICE O/P EST MOD 30 MIN: CPT

## 2023-09-15 PROCEDURE — 1123F ACP DISCUSS/DSCN MKR DOCD: CPT

## 2023-09-15 PROCEDURE — 3074F SYST BP LT 130 MM HG: CPT

## 2023-09-15 RX ORDER — FERROUS SULFATE 325(65) MG
325 TABLET ORAL 2 TIMES DAILY
Qty: 56 TABLET | Refills: 1 | Status: SHIPPED | OUTPATIENT
Start: 2023-09-15

## 2023-09-15 RX ORDER — LOSARTAN POTASSIUM 100 MG/1
100 TABLET ORAL DAILY
Qty: 30 TABLET | Refills: 3 | Status: SHIPPED | OUTPATIENT
Start: 2023-09-15

## 2023-09-15 RX ORDER — VITAMIN B COMPLEX
1 TABLET ORAL DAILY
COMMUNITY
Start: 2023-08-24

## 2023-09-15 ASSESSMENT — ENCOUNTER SYMPTOMS
SHORTNESS OF BREATH: 0
ALLERGIC/IMMUNOLOGIC NEGATIVE: 1
ABDOMINAL PAIN: 0
CONSTIPATION: 0
COUGH: 0
VOMITING: 0
CHEST TIGHTNESS: 0
DIARRHEA: 0
NAUSEA: 0
BACK PAIN: 0
SORE THROAT: 0

## 2023-09-15 ASSESSMENT — PATIENT HEALTH QUESTIONNAIRE - PHQ9
7. TROUBLE CONCENTRATING ON THINGS, SUCH AS READING THE NEWSPAPER OR WATCHING TELEVISION: 0
SUM OF ALL RESPONSES TO PHQ QUESTIONS 1-9: 3
9. THOUGHTS THAT YOU WOULD BE BETTER OFF DEAD, OR OF HURTING YOURSELF: 0
5. POOR APPETITE OR OVEREATING: 0
SUM OF ALL RESPONSES TO PHQ QUESTIONS 1-9: 3
1. LITTLE INTEREST OR PLEASURE IN DOING THINGS: 3
2. FEELING DOWN, DEPRESSED OR HOPELESS: 0
6. FEELING BAD ABOUT YOURSELF - OR THAT YOU ARE A FAILURE OR HAVE LET YOURSELF OR YOUR FAMILY DOWN: 0
10. IF YOU CHECKED OFF ANY PROBLEMS, HOW DIFFICULT HAVE THESE PROBLEMS MADE IT FOR YOU TO DO YOUR WORK, TAKE CARE OF THINGS AT HOME, OR GET ALONG WITH OTHER PEOPLE: 0
SUM OF ALL RESPONSES TO PHQ QUESTIONS 1-9: 3
4. FEELING TIRED OR HAVING LITTLE ENERGY: 0
3. TROUBLE FALLING OR STAYING ASLEEP: 0
8. MOVING OR SPEAKING SO SLOWLY THAT OTHER PEOPLE COULD HAVE NOTICED. OR THE OPPOSITE, BEING SO FIGETY OR RESTLESS THAT YOU HAVE BEEN MOVING AROUND A LOT MORE THAN USUAL: 0
SUM OF ALL RESPONSES TO PHQ QUESTIONS 1-9: 3
SUM OF ALL RESPONSES TO PHQ9 QUESTIONS 1 & 2: 3

## 2023-09-15 NOTE — PROGRESS NOTES
Attending Physician Statement  I have discussed the care of Aydee Rock, including pertinent history and exam findings with the resident. I have reviewed the key elements of all parts of the encounter with the resident. I have seen and examined the patient with the resident and the key elements of all parts of the encounter have been performed by me. I agree with the assessment, and status of the problem list as documented. The plan and orders should include   Orders Placed This Encounter   Procedures    Influenza, FLUARIX, (age 10 mo+),  IM, Preservative Free, 0.5 mL    and this was also documented by the resident. The medication list was reviewed with the resident and is up to date. The return visit should be in 3 months . Diagnosis Orders   1. Essential hypertension  losartan (COZAAR) 100 MG tablet      2. Pure hypercholesterolemia        3. Class 3 severe obesity due to excess calories with serious comorbidity and body mass index (BMI) of 50.0 to 59.9 in adult (HCC)        4. Stage 3a chronic kidney disease ([address])        5. H/O: stroke        6. TIBURCIO (obstructive sleep apnea)        7. Carpal tunnel syndrome, bilateral        8. Chronic obstructive pulmonary disease, unspecified COPD type ([address])        9. Microcytic anemia        10.  Needs flu shot             Waqar Wilks MD   Attending Physician, [address] [place] [address], Internal Medicine Residency Program  [address]

## 2023-09-15 NOTE — PROGRESS NOTES
[facility]  [facility] [address]   [address] [facility] [address]  Dept: [phone]  Dept Fax: [phone]    Office Progress/Follow Up Note  Date of patient's visit: 9/15/2023  Patient's Name:  Earmon Sicard YOB: 1955            Patient Care Team:  Lo Delaney MD as PCP - General (Internal Medicine)  Florentin Schneider DPM as Consulting Physician (Podiatry)  Chaparro Martin MD as Consulting Physician (Pulmonology)  ________________________________________________________________________      Reason for Visit: Routine outpatient follow up/ Establish care with new provider  ________________________________________________________________________  Chief Complaint:  Medication refill    ________________________________________________________________________  History of Presenting Illness:  Patient, 77years old female with past medical history of.  - Hypertension.  - Cerebrovascular accident.  - Obstructive sleep apnea. - COPD. - Bilateral carpal tunnel syndrome. - CKD. - Obesity    Presents to clinic today to establish care with new primary provider. Patient states that she is compliant with her medications. Patient denies any recent hospital admissions or ED visits. Her initial blood pressure in clinic was 154/90 repeat blood pressure is 152/95. Patient denies any chest pain, shortness of breath. She also denies any fatigue and weakness. Patient was recently seen by pulmonology. She has been diagnosed with TIBURCIO and she uses CPAP at night. Pulmonology commended the patient Spiriva, albuterol as needed. She has her sleep study scheduled. Patient is also following with neurology for her history of strokes and bilateral carpal tunnel syndrome. She is well maintained on ASA, Brilinta, Lipitor and folic acid. She is taking gabapentin 600 mg 3 times daily and she states that her capital syndrome is stable.   Patient was followed with bariatric surgery for

## 2023-09-18 DIAGNOSIS — R79.89 ELEVATED FERRITIN: ICD-10-CM

## 2023-09-18 DIAGNOSIS — D56.3 THALASSEMIA TRAIT, ALPHA: ICD-10-CM

## 2023-09-18 DIAGNOSIS — E61.1 IRON DEFICIENCY: ICD-10-CM

## 2023-09-18 DIAGNOSIS — D50.9 MICROCYTIC ANEMIA: ICD-10-CM

## 2023-09-19 RX ORDER — DOCUSATE SODIUM 100 MG/1
CAPSULE, LIQUID FILLED ORAL
Qty: 180 CAPSULE | Refills: 1 | Status: SHIPPED | OUTPATIENT
Start: 2023-09-19

## 2023-09-25 ENCOUNTER — HOSPITAL ENCOUNTER (OUTPATIENT)
Age: 68
Discharge: HOME | End: 2023-09-25

## 2023-09-25 DIAGNOSIS — I10 ESSENTIAL HYPERTENSION: ICD-10-CM

## 2023-09-25 DIAGNOSIS — E55.9 VITAMIN D DEFICIENCY: ICD-10-CM

## 2023-09-26 ENCOUNTER — HOSPITAL ENCOUNTER (OUTPATIENT)
Age: 68
Discharge: HOME | End: 2023-09-26
Payer: COMMERCIAL

## 2023-09-26 DIAGNOSIS — R79.89 ELEVATED FERRITIN: ICD-10-CM

## 2023-09-26 DIAGNOSIS — D56.3 THALASSEMIA TRAIT, ALPHA: ICD-10-CM

## 2023-09-26 DIAGNOSIS — I10 ESSENTIAL HYPERTENSION: ICD-10-CM

## 2023-09-26 DIAGNOSIS — E61.1 IRON DEFICIENCY: ICD-10-CM

## 2023-09-26 DIAGNOSIS — D50.9 MICROCYTIC ANEMIA: ICD-10-CM

## 2023-09-26 DIAGNOSIS — E55.9 VITAMIN D DEFICIENCY: ICD-10-CM

## 2023-09-26 LAB
BASOPHILS # BLD: 0.04 K/UL (ref 0–0.2)
BASOPHILS NFR BLD: 1 % (ref 0–2)
EOSINOPHIL # BLD: 0.33 K/UL (ref 0–0.44)
EOSINOPHILS RELATIVE PERCENT: 5 % (ref 1–4)
ERYTHROCYTE [DISTWIDTH] IN BLOOD BY AUTOMATED COUNT: 18.5 % (ref 11.8–14.4)
FERRITIN SERPL-MCNC: 589 NG/ML (ref 13–150)
HCT VFR BLD AUTO: 32.7 % (ref 36.3–47.1)
HGB BLD-MCNC: 9.8 G/DL (ref 11.9–15.1)
IMM GRANULOCYTES # BLD AUTO: 0.04 K/UL (ref 0–0.3)
IMM GRANULOCYTES NFR BLD: 1 %
IRON SATN MFR SERPL: 18 % (ref 20–55)
IRON SERPL-MCNC: 36 UG/DL (ref 37–145)
LYMPHOCYTES NFR BLD: 1.87 K/UL (ref 1.1–3.7)
LYMPHOCYTES RELATIVE PERCENT: 28 % (ref 24–43)
MCH RBC QN AUTO: 21.4 PG (ref 25.2–33.5)
MCHC RBC AUTO-ENTMCNC: 30 G/DL (ref 28.4–34.8)
MCV RBC AUTO: 71.6 FL (ref 82.6–102.9)
MONOCYTES NFR BLD: 0.53 K/UL (ref 0.1–1.2)
MONOCYTES NFR BLD: 8 % (ref 3–12)
NEUTROPHILS NFR BLD: 57 % (ref 36–65)
NEUTS SEG NFR BLD: 3.96 K/UL (ref 1.5–8.1)
NRBC BLD-RTO: 0 PER 100 WBC
PLATELET # BLD AUTO: 349 K/UL (ref 138–453)
PMV BLD AUTO: 9.4 FL (ref 8.1–13.5)
RBC # BLD AUTO: 4.57 M/UL (ref 3.95–5.11)
RBC # BLD: ABNORMAL 10*6/UL
TIBC SERPL-MCNC: 202 UG/DL (ref 250–450)
UNSATURATED IRON BINDING CAPACITY: 166 UG/DL (ref 112–347)
WBC OTHER # BLD: 6.8 K/UL (ref 3.5–11.3)

## 2023-09-26 PROCEDURE — 36415 COLL VENOUS BLD VENIPUNCTURE: CPT

## 2023-09-26 PROCEDURE — 83550 IRON BINDING TEST: CPT

## 2023-09-26 PROCEDURE — 83540 ASSAY OF IRON: CPT

## 2023-09-26 PROCEDURE — 85025 COMPLETE CBC W/AUTO DIFF WBC: CPT

## 2023-09-26 PROCEDURE — 82728 ASSAY OF FERRITIN: CPT

## 2023-09-26 RX ORDER — FOLIC ACID 1 MG/1
1 TABLET ORAL DAILY
Qty: 28 TABLET | Refills: 2 | Status: SHIPPED | OUTPATIENT
Start: 2023-09-26

## 2023-09-26 NOTE — TELEPHONE ENCOUNTER
Pharmacy requesting refills for Folic Acid. Please review and e-scribe to pharmacy listed in chart if appropriate. Thank you.     Next Visit Date: 10/24/23  Last Visit Date: 9/15/23    Future Appointments   Date Time Provider [address]   10/2/2023  3:15 PM Joe Palma MD [facility] TOLP   10/2/2023  8:00 PM ST[facility] SLEEP RM [address] HO   10/24/2023  3:30 PM Radha Ambrocio MD [facility] IM TOLP   10/30/2023 12:45 PM [facility]   11/16/2023  4:30 PM STV CT ROOM 1 STVZ CT SCAN STV Radiolog   12/8/2023  1:30 PM Luther Beard MD [facility] Maintenance   Topic Date Due    Pneumococcal 65+ years Vaccine (3 - PPSV23 or PCV20) 12/20/2021    COVID-19 Vaccine (4 - Pfizer series) 03/17/2022    Colorectal Cancer Screen  08/21/2022    Annual Wellness Visit (AWV)  10/08/2022    Low dose CT lung screening &/or counseling  11/15/2023    A1C test (Diabetic or Prediabetic)  11/28/2023    Lipids  11/28/2023    GFR test (Diabetes, CKD 3-4, OR last GFR 15-59)  03/27/2024    Depression Screen  09/15/2024    Breast cancer screen  03/06/2025    DTaP/Tdap/Td vaccine (2 - Td or Tdap) 03/08/2028    DEXA (modify frequency per FRAX score)  Completed    Flu vaccine  Completed    Shingles vaccine  Completed    Hepatitis C screen  Completed    Hepatitis A vaccine  Aged Out    Hepatitis B vaccine  Aged Out    Hib vaccine  Aged Out    Meningococcal (ACWY) vaccine  Aged Out       Hemoglobin A1C (%)   Date Value   11/28/2022 5.8   11/03/2022 5.8   09/29/2021 5.8             ( goal A1C is < 7)   No components found for: \"LABMICR\"  LDL Cholesterol (mg/dL)   Date Value   11/28/2022 86       (goal LDL is <100)   AST (U/L)   Date Value   03/27/2023 16     ALT (U/L)   Date Value   03/27/2023 12     BUN (mg/dL)   Date Value   03/27/2023 21     BP Readings from Last 3 Encounters:   09/15/23 (!) 152/95   08/11/23 (!) 177/90   07/27/23 (!) 150/92          (goal 120/80)    All Future

## 2023-10-02 ENCOUNTER — OFFICE VISIT (OUTPATIENT)
Dept: ONCOLOGY | Age: 68
End: 2023-10-02
Payer: COMMERCIAL

## 2023-10-02 ENCOUNTER — TELEPHONE (OUTPATIENT)
Dept: ONCOLOGY | Age: 68
End: 2023-10-02

## 2023-10-02 ENCOUNTER — HOSPITAL ENCOUNTER (OUTPATIENT)
Dept: SLEEP CENTER | Age: 68
Discharge: HOME | End: 2023-10-04
Payer: COMMERCIAL

## 2023-10-02 VITALS
HEART RATE: 62 BPM | TEMPERATURE: 97.4 F | SYSTOLIC BLOOD PRESSURE: 155 MMHG | WEIGHT: 293 LBS | BODY MASS INDEX: 54.77 KG/M2 | DIASTOLIC BLOOD PRESSURE: 85 MMHG | RESPIRATION RATE: 18 BRPM

## 2023-10-02 DIAGNOSIS — D56.3 THALASSEMIA TRAIT, ALPHA: ICD-10-CM

## 2023-10-02 DIAGNOSIS — D50.9 MICROCYTIC ANEMIA: Primary | ICD-10-CM

## 2023-10-02 DIAGNOSIS — G47.33 OSA ON CPAP: ICD-10-CM

## 2023-10-02 DIAGNOSIS — R79.89 ELEVATED FERRITIN: ICD-10-CM

## 2023-10-02 PROCEDURE — 95810 POLYSOM 6/> YRS 4/> PARAM: CPT

## 2023-10-02 PROCEDURE — 1123F ACP DISCUSS/DSCN MKR DOCD: CPT | Performed by: INTERNAL MEDICINE

## 2023-10-02 PROCEDURE — 99213 OFFICE O/P EST LOW 20 MIN: CPT | Performed by: INTERNAL MEDICINE

## 2023-10-02 PROCEDURE — 3079F DIAST BP 80-89 MM HG: CPT | Performed by: INTERNAL MEDICINE

## 2023-10-02 PROCEDURE — 99211 OFF/OP EST MAY X REQ PHY/QHP: CPT | Performed by: INTERNAL MEDICINE

## 2023-10-02 PROCEDURE — 3077F SYST BP >= 140 MM HG: CPT | Performed by: INTERNAL MEDICINE

## 2023-10-02 NOTE — TELEPHONE ENCOUNTER
[facility] ARRIVES AMBULATORY FOR MD VISIT  DR CAMPA IN TO SEE PATIENT  ORDERS RECEIVED  RV 6 MONTHS WITH LABS PRIOR  LABS CDP FERRITIN VITAMIN B12 & FOLATE DUE 04/01/24, Cally Ferris PT  MD VISIT 04/08/24 @3:15PM  AVS PRINTED AND GIVEN TO PATIENT WITH INSTRUCTIONS  PATIENT DISCHARGED AMBULATORY

## 2023-10-02 NOTE — PROGRESS NOTES
orders placed or performed during the hospital encounter of 09/26/23   Iron and TIBC   Result Value Ref Range    Iron 36 (L) 37 - 145 ug/dL    TIBC 202 (L) 250 - 450 ug/dL    Iron Saturation 18 (L) 20 - 55 %    UIBC 166 112 - 347 ug/dL   Ferritin   Result Value Ref Range    Ferritin 589 (H) 13 - 150 ng/mL   CBC with Auto Differential   Result Value Ref Range    WBC 6.8 3.5 - 11.3 k/uL    RBC 4.57 3.95 - 5.11 m/uL    Hemoglobin 9.8 (L) 11.9 - 15.1 g/dL    Hematocrit 32.7 (L) 36.3 - 47.1 %    MCV 71.6 (L) 82.6 - 102.9 fL    MCH 21.4 (L) 25.2 - 33.5 pg    MCHC 30.0 28.4 - 34.8 g/dL    RDW 18.5 (H) 11.8 - 14.4 %    Platelets 480 005 - 386 k/uL    MPV 9.4 8.1 - 13.5 fL    NRBC Automated 0.0 0.0 per 100 WBC    Neutrophils % 57 36 - 65 %    Lymphocytes % 28 24 - 43 %    Monocytes % 8 3 - 12 %    Eosinophils % 5 (H) 1 - 4 %    Basophils % 1 0 - 2 %    Immature Granulocytes 1 (H) 0 %    Neutrophils Absolute 3.96 1.50 - 8.10 k/uL    Lymphocytes Absolute 1.87 1.10 - 3.70 k/uL    Monocytes Absolute 0.53 0.10 - 1.20 k/uL    Eosinophils Absolute 0.33 0.00 - 0.44 k/uL    Basophils Absolute 0.04 0.00 - 0.20 k/uL    Absolute Immature Granulocyte 0.04 0.00 - 0.30 k/uL    RBC Morphology ANISOCYTOSIS PRESENT MICROCYTOSIS PRESENT            IMAGING DATA:      IMPRESSION:   Microcytic anemia, 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 Ferritin remains elevated likely reactive. DC oral iron supplementation. Do not believe patient has iron overload. Iron studies suggest anemia of chronic disease. Additionally patient also has alpha thalassemia trait  Ferritin remains below 1000. Patient remains transfusion free. No indication for initiation of iron chelating agent.   Previously

## 2023-10-10 LAB — STATUS: NORMAL

## 2023-10-11 RX ORDER — GABAPENTIN 600 MG/1
600 TABLET ORAL 3 TIMES DAILY
Qty: 90 TABLET | Refills: 5 | Status: SHIPPED | OUTPATIENT
Start: 2023-10-11 | End: 2024-04-08

## 2023-10-11 NOTE — TELEPHONE ENCOUNTER
Patient  requesting refill of gabapentin.       Medication active on med list yes      Date of last fill: 4/20/23  with 2 refills verified on 10/11/23  verified by SHAINA RN      Date of last appointment 7/27/23    Next Visit Date:  Visit date not found

## 2023-10-16 DIAGNOSIS — I63.00 CEREBROVASCULAR ACCIDENT (CVA) DUE TO THROMBOSIS OF PRECEREBRAL ARTERY (HCC): ICD-10-CM

## 2023-10-16 RX ORDER — TICAGRELOR 90 MG/1
TABLET ORAL
Qty: 60 TABLET | Refills: 1 | Status: SHIPPED | OUTPATIENT
Start: 2023-10-16 | End: 2023-12-16

## 2023-10-16 NOTE — TELEPHONE ENCOUNTER
Request for   Requested Prescriptions     Pending Prescriptions Disp Refills    aspirin (ASPIRIN LOW DOSE) 81 MG EC tablet 30 tablet 5     Sig: TAKE 1 TABLET BY MOUTH DAILY    Vitamin D (CHOLECALCIFEROL) 25 MCG (1000 UT) TABS tablet 60 tablet      Sig: Take 1 tablet by mouth daily    . Please review and e-scribe to pharmacy listed in chart if appropriate. Thank you.       Last Visit Date: 9/15/2023  Next Visit Date: 10/24/2023    Future Appointments   Date Time Provider [address]   10/24/2023  3:30 PM Davi Reed MD [facility]   10/29/2023  7:30 PM ST[facility] SLEEP RM 1 [facility]LE[facility] HO   11/13/2023 12:45 PM Lucinda Nolen DPM ACC Podiatry [facility]   11/16/2023  4:30 PM STV CT ROOM 1 STVZ CT SCAN STV Radiolog   12/8/2023  1:30 PM Herrera Moyer MD Resp Spec [facility]   4/8/2024  3:15 PM Hilda Irizarry MD [address] Maintenance   Topic Date Due    Pneumococcal 65+ years Vaccine (3 - PPSV23 or PCV20) 12/20/2021    COVID-19 Vaccine (4 - Pfizer series) 03/17/2022    Colorectal Cancer Screen  08/21/2022    Annual Wellness Visit (AWV)  10/08/2022    Low dose CT lung screening &/or counseling  11/15/2023    A1C test (Diabetic or Prediabetic)  11/28/2023    Lipids  11/28/2023    GFR test (Diabetes, CKD 3-4, OR last GFR 15-59)  03/27/2024    Depression Screen  09/15/2024    Breast cancer screen  03/06/2025    DTaP/Tdap/Td vaccine (2 - Td or Tdap) 03/08/2028    DEXA (modify frequency per FRAX score)  Completed    Flu vaccine  Completed    Shingles vaccine  Completed    Hepatitis C screen  Completed    Hepatitis A vaccine  Aged Out    Hepatitis B vaccine  Aged Out    Hib vaccine  Aged Out    Meningococcal (ACWY) vaccine  Aged Out       Hemoglobin A1C (%)   Date Value   11/28/2022 5.8   11/03/2022 5.8   09/29/2021 5.8             ( goal A1C is < 7)   No components found for: \"LABMICR\"  LDL Cholesterol (mg/dL)   Date Value   11/28/2022 86       (goal LDL is <100)   AST (U/L)   Date

## 2023-10-16 NOTE — TELEPHONE ENCOUNTER
Pharmacy requesting refill of: Brilinta 90 mg tabs      Medication active on med list:  yes      Date of last Rx: 08/21/2023  with 1 refills   verified on 10/16/2023   verified by TERI PRESTON      Date of last appointment: 0727/2023    Next Visit Date:  Visit date not found

## 2023-10-17 RX ORDER — VITAMIN B COMPLEX
1000 TABLET ORAL DAILY
Qty: 60 TABLET | Refills: 1 | Status: SHIPPED | OUTPATIENT
Start: 2023-10-17

## 2023-10-17 RX ORDER — ASPIRIN 81 MG/1
TABLET ORAL
Qty: 30 TABLET | Refills: 5 | Status: SHIPPED | OUTPATIENT
Start: 2023-10-17

## 2023-10-24 ENCOUNTER — OFFICE VISIT (OUTPATIENT)
Dept: INTERNAL MEDICINE | Age: 68
End: 2023-10-24
Payer: COMMERCIAL

## 2023-10-24 VITALS
DIASTOLIC BLOOD PRESSURE: 99 MMHG | SYSTOLIC BLOOD PRESSURE: 175 MMHG | TEMPERATURE: 78 F | OXYGEN SATURATION: 97 % | WEIGHT: 293 LBS | HEIGHT: 67 IN | BODY MASS INDEX: 45.99 KG/M2 | HEART RATE: 76 BPM

## 2023-10-24 DIAGNOSIS — G56.03 BILATERAL CARPAL TUNNEL SYNDROME: ICD-10-CM

## 2023-10-24 DIAGNOSIS — G47.33 OSA (OBSTRUCTIVE SLEEP APNEA): ICD-10-CM

## 2023-10-24 DIAGNOSIS — Z86.73 HISTORY OF STROKE: ICD-10-CM

## 2023-10-24 DIAGNOSIS — D50.9 MICROCYTIC ANEMIA: ICD-10-CM

## 2023-10-24 DIAGNOSIS — K51.40: ICD-10-CM

## 2023-10-24 DIAGNOSIS — I10 ESSENTIAL HYPERTENSION: Primary | ICD-10-CM

## 2023-10-24 PROCEDURE — 99211 OFF/OP EST MAY X REQ PHY/QHP: CPT | Performed by: HOSPITALIST

## 2023-10-24 PROCEDURE — 99214 OFFICE O/P EST MOD 30 MIN: CPT

## 2023-10-24 PROCEDURE — 3078F DIAST BP <80 MM HG: CPT

## 2023-10-24 PROCEDURE — 3074F SYST BP LT 130 MM HG: CPT

## 2023-10-24 PROCEDURE — 1123F ACP DISCUSS/DSCN MKR DOCD: CPT

## 2023-10-24 RX ORDER — HYDROCHLOROTHIAZIDE 25 MG/1
25 TABLET ORAL EVERY MORNING
Qty: 30 TABLET | Refills: 0 | Status: SHIPPED | OUTPATIENT
Start: 2023-10-24 | End: 2023-11-23

## 2023-10-24 ASSESSMENT — ENCOUNTER SYMPTOMS
CHEST TIGHTNESS: 0
CHOKING: 0
VOMITING: 0
DIARRHEA: 0
BACK PAIN: 0
APNEA: 0
ABDOMINAL PAIN: 0
SHORTNESS OF BREATH: 0
RHINORRHEA: 0
CONSTIPATION: 0
NAUSEA: 0
COUGH: 0

## 2023-10-25 NOTE — PROGRESS NOTES
Attending Physician Statement  I have discussed the care of Pablo Galeano, including pertinent history and exam findings with the resident. I have reviewed the key elements of all parts of the encounter with the resident. I have seen and examined the patient with the resident and the key elements of all parts of the encounter have been performed by me. I agree with the assessment, and status of the problem list as documented. The plan and orders should include   Orders Placed This Encounter   Procedures    Comprehensive Metabolic Panel    Corie Fajardo MD, Gastroenterology, [facility]    and this was also documented by the resident. I agree with the referral to Gastroenterology. The medication list was reviewed with the resident and is up to date. The return visit should be in 4 weeks .     Kelly Alberto MD  Attending Physician,  Department of Internal Medicine  [address] [place] [address] Internal Medicine  [facility]      10/25/2023, 10:28 AM
Value Date    CHOL 152 11/28/2022    HDL 43 11/28/2022    TRIG 115 11/28/2022     ________________________________________________________________________  Assessment and Plan:  Wing Frey was seen today for hypertension and hand pain. Diagnoses and all orders for this visit:    Essential hypertension  Uncontrolled. Continue taking losartan 100 mg daily. Start taking new medication,-     hydroCHLOROthiazide (HYDRODIURIL) 25 MG tablet; Take 1 tablet by mouth every morning    Follow-up with in 2 weeks-     Comprehensive Metabolic Panel; Future    TIBURCIO (obstructive sleep apnea)  Follows with pulmonology as an outpatient. Sleep study showed patient had TIBURCIO and she has to undergo CPAP/BiPAP testing    Bilateral carpal tunnel syndrome  Follows with neurology as an outpatient. Patient has persistent weakness in her bilateral wrist.  Initially she was counseled regarding surgery but patient does not want to follow-up for surgical evaluation and she was counseled to follow-up with her neurologist.  Continue taking gabapentin    Microcytic anemia  Follows with hematology oncology as an outpatient. Patient has been counseled to follow-up with lab work-up as given by hematologist.  As per hematology, patient likely suffering from anemia of chronic disease. Discontinue taking iron supplements. History of stroke  Continue taking blood pressure control with losartan and hydrochlorothiazide. Continue taking aspirin and Brilinta. Patient has been asked to follow-up with neurologist.    Shante Lorenz of ascending colon, unspecified complication status ([address])  -     Terri Brito MD, Gastroenterology, [facility]  Patient has been counseled to follow-up with GI. Her colonoscopy was done in 2019.  7 mm polyp was removed from ascending colon and a 5 mm polyp was removed from the transverse colon. Biopsy showed sessile serrated adenoma.   As per gastroenterology patient had 2 follow-up with them but patient was lost to

## 2023-10-29 ENCOUNTER — HOSPITAL ENCOUNTER (OUTPATIENT)
Dept: SLEEP CENTER | Age: 68
Discharge: HOME | End: 2023-10-31
Payer: COMMERCIAL

## 2023-10-29 VITALS — HEIGHT: 67 IN | WEIGHT: 293 LBS | BODY MASS INDEX: 45.99 KG/M2

## 2023-10-29 DIAGNOSIS — G47.33 OSA ON CPAP: ICD-10-CM

## 2023-10-29 PROCEDURE — 95811 POLYSOM 6/>YRS CPAP 4/> PARM: CPT

## 2023-11-02 ENCOUNTER — TELEPHONE (OUTPATIENT)
Dept: INTERNAL MEDICINE | Age: 68
End: 2023-11-02

## 2023-11-02 DIAGNOSIS — M19.90 ARTHRITIS: ICD-10-CM

## 2023-11-02 DIAGNOSIS — G56.03 CARPAL TUNNEL SYNDROME, BILATERAL: Primary | ICD-10-CM

## 2023-11-02 NOTE — TELEPHONE ENCOUNTER
Pt called in stating she forgot to ask PCP for Voltaren Gel to help with arthritic and carpal tunnel pain at last visit. Script pended.

## 2023-11-02 NOTE — TELEPHONE ENCOUNTER
Becky from [address] called just to report that when she saw the patient today her blood pressure was 155/110. Just and ELEONORA as they know she was just started on HCTZ recently.

## 2023-11-06 RX ORDER — ALBUTEROL SULFATE 90 UG/1
2 AEROSOL, METERED RESPIRATORY (INHALATION) 4 TIMES DAILY PRN
Qty: 18 G | Refills: 5 | OUTPATIENT
Start: 2023-11-06

## 2023-11-07 LAB — STATUS: NORMAL

## 2023-11-09 ENCOUNTER — TELEPHONE (OUTPATIENT)
Dept: ONCOLOGY | Age: 68
End: 2023-11-09

## 2023-11-11 DIAGNOSIS — D50.9 MICROCYTIC ANEMIA: ICD-10-CM

## 2023-11-11 DIAGNOSIS — D56.3 THALASSEMIA TRAIT, ALPHA: ICD-10-CM

## 2023-11-11 DIAGNOSIS — R79.89 ELEVATED FERRITIN: ICD-10-CM

## 2023-11-11 DIAGNOSIS — E61.1 IRON DEFICIENCY: ICD-10-CM

## 2023-11-11 DIAGNOSIS — I10 ESSENTIAL HYPERTENSION: ICD-10-CM

## 2023-11-13 RX ORDER — HYDROCHLOROTHIAZIDE 25 MG/1
25 TABLET ORAL EVERY MORNING
Qty: 28 TABLET | Refills: 0 | Status: SHIPPED | OUTPATIENT
Start: 2023-11-13 | End: 2023-12-13

## 2023-11-13 RX ORDER — FERROUS SULFATE 325(65) MG
325 TABLET ORAL 2 TIMES DAILY
Qty: 56 TABLET | Refills: 1 | Status: SHIPPED | OUTPATIENT
Start: 2023-11-13

## 2023-11-13 NOTE — TELEPHONE ENCOUNTER
Request for   HCTZ    Please review and e-scribe to pharmacy listed in chart if appropriate. Thank you.       Last Visit Date: 10/24/2023  Next Visit Date: 12/5/2023 [Negative] : Heme/Lymph [de-identified] : as per HPI

## 2023-11-20 ENCOUNTER — TELEPHONE (OUTPATIENT)
Dept: SURGERY | Age: 68
End: 2023-11-20

## 2023-11-20 ENCOUNTER — TELEPHONE (OUTPATIENT)
Dept: INTERNAL MEDICINE | Age: 68
End: 2023-11-20

## 2023-11-20 DIAGNOSIS — E78.00 PURE HYPERCHOLESTEROLEMIA: ICD-10-CM

## 2023-11-20 DIAGNOSIS — I63.00 CEREBROVASCULAR ACCIDENT (CVA) DUE TO THROMBOSIS OF PRECEREBRAL ARTERY (HCC): ICD-10-CM

## 2023-11-20 NOTE — TELEPHONE ENCOUNTER
----- Message from Carson Beltran sent at 11/9/2023 10:33 AM EST -----  Subject: Referral Request    Reason for referral request? pt is in need of a new referral to a   different GI specialist because Dr. Whitney Duke will be leaving so pt is unable   to see him. contact pt when this is approved and ready for scheduling. Provider patient wants to be referred to(if known):     Provider Phone Number(if known):     Additional Information for Provider?   ---------------------------------------------------------------------------  --------------  Jackelyn [place] Mel    [phone]; OK to leave message on voicemail  ---------------------------------------------------------------------------  --------------

## 2023-11-20 NOTE — TELEPHONE ENCOUNTER
Request for Atorvastatin. Please review and e-scribe to pharmacy listed in chart if appropriate. Thank you.       Last Visit Date: 10/24/2023  Next Visit Date: 12/5/2023    Future Appointments   Date Time Provider [address]   11/27/2023  2:45 PM Michelle Rankin DPM [facility]   11/27/2023  5:00 PM STV CT ROOM 1 STVZ CT SCAN STV Radiolog   12/5/2023  3:30 PM [facility]   12/8/2023  1:30 PM Mari Queen MD Resp Spec [facility]   1/11/2024 11:40 AM Katty Kidd MD Neuro Spec Neurology -   4/8/2024  3:15 PM Madeline Aase, MD [address] Maintenance   Topic Date Due    Pneumococcal 65+ years Vaccine (3 - PPSV23 or PCV20) 12/20/2021    Colorectal Cancer Screen  08/21/2022    Annual Wellness Visit (AWV)  10/08/2022    COVID-19 Vaccine (4 - 2023-24 season) 09/01/2023    Low dose CT lung screening &/or counseling  11/15/2023    A1C test (Diabetic or Prediabetic)  11/28/2023    Lipids  11/28/2023    GFR test (Diabetes, CKD 3-4, OR last GFR 15-59)  03/27/2024    Depression Screen  09/15/2024    Breast cancer screen  03/06/2025    DTaP/Tdap/Td vaccine (2 - Td or Tdap) 03/08/2028    DEXA (modify frequency per FRAX score)  Completed    Flu vaccine  Completed    Shingles vaccine  Completed    Hepatitis C screen  Completed    Hepatitis A vaccine  Aged Out    Hepatitis B vaccine  Aged Out    Hib vaccine  Aged Out    Meningococcal (ACWY) vaccine  Aged Out       Hemoglobin A1C (%)   Date Value   11/28/2022 5.8   11/03/2022 5.8   09/29/2021 5.8             ( goal A1C is < 7)   No components found for: \"LABMICR\"  LDL Cholesterol (mg/dL)   Date Value   11/28/2022 86       (goal LDL is <100)   AST (U/L)   Date Value   03/27/2023 16     ALT (U/L)   Date Value   03/27/2023 12     BUN (mg/dL)   Date Value   03/27/2023 21     BP Readings from Last 3 Encounters:   10/24/23 (!) 175/99   10/02/23 (!) 155/85   09/15/23 (!) 152/95          (goal 120/80)    All Future Testing

## 2023-11-20 NOTE — TELEPHONE ENCOUNTER
PC back to pt and advised pt to call the office and see another provider there. Pt agreeable and was provided office number.

## 2023-11-20 NOTE — TELEPHONE ENCOUNTER
Patient was referred to Dr. Almas Bowden, but needs to make a new appointment with Dr. Paige Araiza and requests a call back.

## 2023-11-21 RX ORDER — ATORVASTATIN CALCIUM 80 MG/1
80 TABLET, FILM COATED ORAL DAILY
Qty: 28 TABLET | Refills: 4 | Status: SHIPPED | OUTPATIENT
Start: 2023-11-21

## 2023-11-22 ENCOUNTER — HOSPITAL ENCOUNTER (EMERGENCY)
Age: 68
Discharge: HOME | End: 2023-11-22
Attending: EMERGENCY MEDICINE
Payer: COMMERCIAL

## 2023-11-22 ENCOUNTER — APPOINTMENT (OUTPATIENT)
Dept: GENERAL RADIOLOGY | Age: 68
End: 2023-11-22
Payer: COMMERCIAL

## 2023-11-22 VITALS
OXYGEN SATURATION: 97 % | BODY MASS INDEX: 53.56 KG/M2 | SYSTOLIC BLOOD PRESSURE: 176 MMHG | WEIGHT: 293 LBS | DIASTOLIC BLOOD PRESSURE: 79 MMHG | TEMPERATURE: 97.9 F | RESPIRATION RATE: 17 BRPM | HEART RATE: 80 BPM

## 2023-11-22 DIAGNOSIS — U07.1 COVID: Primary | ICD-10-CM

## 2023-11-22 LAB
FLUAV AG SPEC QL: NEGATIVE
FLUBV AG SPEC QL: NEGATIVE
SARS-COV-2 RDRP RESP QL NAA+PROBE: DETECTED
SPECIMEN DESCRIPTION: ABNORMAL
SPECIMEN SOURCE: NORMAL
STREP A, MOLECULAR: NEGATIVE

## 2023-11-22 PROCEDURE — 87804 INFLUENZA ASSAY W/OPTIC: CPT

## 2023-11-22 PROCEDURE — 71046 X-RAY EXAM CHEST 2 VIEWS: CPT

## 2023-11-22 PROCEDURE — 87651 STREP A DNA AMP PROBE: CPT

## 2023-11-22 PROCEDURE — 87635 SARS-COV-2 COVID-19 AMP PRB: CPT

## 2023-11-22 PROCEDURE — 99283 EMERGENCY DEPT VISIT LOW MDM: CPT

## 2023-11-22 NOTE — DISCHARGE INSTRUCTIONS
You are seen in the emergency department for cough congestion. You are found to have COVID-19. Influenza and strep swab are negative. X-ray of the chest was also negative. Please remain well-hydrated. Please take Tylenol Motrin at home for pain and fevers. Please follow-up with primary care provider in the next 1 to 2 weeks for reevaluation. Please return the emergency department for any new or worsening symptoms including chest pain, shortness of breath, fevers, or any symptoms deemed concerning.

## 2023-11-22 NOTE — ED TRIAGE NOTES
Pt ED for a cough, sore throat and nasal congestion that started Sunday. Pt states she has not been exposed to anyone sick but her  is also sick.

## 2023-11-22 NOTE — ED PROVIDER NOTES
[address] [place] [address] ED  Emergency Department Encounter  Emergency Medicine Resident     Pt Name:Filomena Hall  MRN: [id number]  [address] [facility] 1955  Date of evaluation: 11/22/23  PCP:  Gaston Lee MD  Note Started: 6:49 PM EST      CHIEF COMPLAINT       Chief Complaint   Patient presents with    Cough     Symptoms started sunday    Pharyngitis    Nasal Congestion       HISTORY OF PRESENT ILLNESS  (Location/Symptom, Timing/Onset, Context/Setting, Quality, Duration, Modifying Factors, Severity.)      Felicitas Moulton is a 77 y.o. female who presents with cough and congestion. Patient states she has had the symptoms for approximately a week. She also reports sore throat during this time. She denies any fevers, chest pain, new shortness of breath, abdominal pain, nausea, vomiting. She states that her  who is here as well as sick with similar symptoms. Has a history of COPD and has been using inhalers at home with much relief. Denies any other complaints at this time. PAST MEDICAL / SURGICAL / SOCIAL / FAMILY HISTORY      has a past medical history of Ambulates with cane, Bleeding, Cataracts, bilateral, Cerebrovascular disease, Chronic pain in left foot, CKD (chronic kidney disease) stage 3, GFR 30-59 ml/min ([facility]), COVID-19, GERD (gastroesophageal reflux disease), Hx of blood clots, Hyperlipidemia, Hypertension, Iron (Fe) deficiency anemia, Nicotine abuse, Obesity, TIBURCIO on CPAP, Osteoarthritis, Peripheral vascular disease ([address]), Substance abuse ([address]), Thalassemia minor, Thyroid nodule, Unspecified cerebral artery occlusion with cerebral infarction, and Wears dentures. has a past surgical history that includes Tonsillectomy and adenoidectomy; Tubal ligation; Breast surgery; Endometrial biopsy (1998); Upper gastrointestinal endoscopy (2009); Colonoscopy (12/2007); Colonoscopy (2013); Upper gastrointestinal endoscopy (N/A, 8/21/2019); and Colonoscopy (N/A, 8/21/2019).       Social Age of Onset    High Blood Pressure Mother     Asthma Mother     Heart Disease Mother     Heart Disease Father     High Blood Pressure Father     Diabetes Brother     High Blood Pressure Brother     Heart Disease Brother     High Blood Pressure Maternal Grandmother     High Blood Pressure Maternal Grandfather     Heart Disease Maternal Grandfather        Allergies:  Duricef [cefadroxil monohydrate], Fish-derived products, Pcn [penicillins], and Tomato    Home Medications:  Prior to Admission medications    Medication Sig Start Date End Date Taking? Authorizing Provider   atorvastatin (LIPITOR) 80 MG tablet TAKE 1 TABLET BY MOUTH DAILY 11/21/23   Becky York MD   hydroCHLOROthiazide (HYDRODIURIL) 25 MG tablet TAKE 1 TABLET BY MOUTH EVERY MORNING 11/13/23 12/13/23  Maximiliano Gordon MD   ferrous sulfate (IRON 325) 325 (65 Fe) MG tablet TAKE 1 TABLET BY MOUTH IN THE MORNING AND AT BEDTIME 11/13/23   Judi Hensley MD   diclofenac sodium (VOLTAREN) 1 % GEL Apply 2 g topically 4 times daily 11/2/23   Maximiliano Gordon MD   aspirin (ASPIRIN LOW DOSE) 81 MG EC tablet TAKE 1 TABLET BY MOUTH DAILY 10/17/23   Maximiliano Gordon MD   Vitamin D (CHOLECALCIFEROL) 25 MCG (1000 UT) TABS tablet Take 1 tablet by mouth daily 10/17/23   Maximiliano Gordon MD   BRILINTA 90 MG TABS tablet TAKE 1 TABLET BY MOUTH TWICE A DAY 10/16/23 12/16/23  Man Page MD   gabapentin (NEURONTIN) 600 MG tablet Take 1 tablet by mouth 3 times daily for 180 days. 10/11/23 4/8/24  Man Page MD   Misc.  Devices (CPAP MACHINE) MISC by Does not apply route    Provider, MD Drew   folic acid (FOLVITE) 1 MG tablet Take 1 tablet by mouth daily 9/26/23   Maximiliano Gordon MD   docusate sodium (COLACE) 100 MG capsule TAKE 1 CAPSULE BY MOUTH 2 TIMES DAILY AS NEEDED FOR CONSTIPATION (BOTTLE) 9/19/23   Judi Hensley MD   losartan (COZAAR) 100 MG tablet Take 1 tablet by mouth daily 9/15/23   Maximiliano Gordon MD   umeclidinium bromide (INCRUSE ELLIPTA)

## 2023-11-23 ASSESSMENT — ENCOUNTER SYMPTOMS
NAUSEA: 0
CONSTIPATION: 0
VOMITING: 0
WHEEZING: 0
SINUS PRESSURE: 0
SORE THROAT: 1
BACK PAIN: 0
COUGH: 1
ABDOMINAL PAIN: 0
SHORTNESS OF BREATH: 0
DIARRHEA: 0

## 2023-11-27 ENCOUNTER — HOSPITAL ENCOUNTER (OUTPATIENT)
Age: 68
Discharge: HOME | End: 2023-11-27
Attending: INTERNAL MEDICINE
Payer: COMMERCIAL

## 2023-11-27 ENCOUNTER — HOSPITAL ENCOUNTER (OUTPATIENT)
Dept: CT IMAGING | Age: 68
Discharge: HOME | End: 2023-11-29
Attending: INTERNAL MEDICINE
Payer: COMMERCIAL

## 2023-11-27 DIAGNOSIS — Z87.891 PERSONAL HISTORY OF TOBACCO USE: ICD-10-CM

## 2023-11-27 DIAGNOSIS — I10 ESSENTIAL HYPERTENSION: ICD-10-CM

## 2023-11-27 LAB
ALBUMIN SERPL-MCNC: 3.6 G/DL (ref 3.5–5.2)
ALBUMIN/GLOB SERPL: 0.9 {RATIO} (ref 1–2.5)
ALP SERPL-CCNC: 145 U/L (ref 35–104)
ALT SERPL-CCNC: 15 U/L (ref 5–33)
ANION GAP SERPL CALCULATED.3IONS-SCNC: 12 MMOL/L (ref 9–17)
AST SERPL-CCNC: 27 U/L
BILIRUB SERPL-MCNC: 0.3 MG/DL (ref 0.3–1.2)
BUN SERPL-MCNC: 18 MG/DL (ref 8–23)
CALCIUM SERPL-MCNC: 9.6 MG/DL (ref 8.6–10.4)
CHLORIDE SERPL-SCNC: 101 MMOL/L (ref 98–107)
CO2 SERPL-SCNC: 29 MMOL/L (ref 20–31)
CREAT SERPL-MCNC: 1.3 MG/DL (ref 0.5–0.9)
GFR SERPL CREATININE-BSD FRML MDRD: 44 ML/MIN/1.73M2
GLUCOSE SERPL-MCNC: 89 MG/DL (ref 70–99)
POTASSIUM SERPL-SCNC: 4.5 MMOL/L (ref 3.7–5.3)
PROT SERPL-MCNC: 7.4 G/DL (ref 6.4–8.3)
SODIUM SERPL-SCNC: 142 MMOL/L (ref 135–144)

## 2023-11-27 PROCEDURE — 36415 COLL VENOUS BLD VENIPUNCTURE: CPT

## 2023-11-27 PROCEDURE — 80053 COMPREHEN METABOLIC PANEL: CPT

## 2023-11-27 PROCEDURE — 71271 CT THORAX LUNG CANCER SCR C-: CPT

## 2023-11-29 ENCOUNTER — TELEPHONE (OUTPATIENT)
Age: 68
End: 2023-11-29

## 2023-11-29 NOTE — TELEPHONE ENCOUNTER
Malka I am passing this to you as it looks like you were trying to contact patient for appt,.       Thank you

## 2023-12-05 ENCOUNTER — OFFICE VISIT (OUTPATIENT)
Dept: INTERNAL MEDICINE | Age: 68
End: 2023-12-05
Payer: COMMERCIAL

## 2023-12-05 VITALS
SYSTOLIC BLOOD PRESSURE: 150 MMHG | WEIGHT: 293 LBS | HEIGHT: 67 IN | BODY MASS INDEX: 45.99 KG/M2 | HEART RATE: 51 BPM | TEMPERATURE: 97 F | DIASTOLIC BLOOD PRESSURE: 88 MMHG | OXYGEN SATURATION: 94 %

## 2023-12-05 DIAGNOSIS — G47.33 OSA (OBSTRUCTIVE SLEEP APNEA): ICD-10-CM

## 2023-12-05 DIAGNOSIS — N18.30 STAGE 3 CHRONIC KIDNEY DISEASE, UNSPECIFIED WHETHER STAGE 3A OR 3B CKD (HCC): Primary | ICD-10-CM

## 2023-12-05 DIAGNOSIS — N18.31 STAGE 3A CHRONIC KIDNEY DISEASE (HCC): ICD-10-CM

## 2023-12-05 DIAGNOSIS — I63.00 CEREBROVASCULAR ACCIDENT (CVA) DUE TO THROMBOSIS OF PRECEREBRAL ARTERY (HCC): ICD-10-CM

## 2023-12-05 DIAGNOSIS — G56.03 CARPAL TUNNEL SYNDROME, BILATERAL: ICD-10-CM

## 2023-12-05 DIAGNOSIS — I10 ESSENTIAL HYPERTENSION: ICD-10-CM

## 2023-12-05 DIAGNOSIS — Z86.73 HISTORY OF STROKE: ICD-10-CM

## 2023-12-05 PROCEDURE — 1123F ACP DISCUSS/DSCN MKR DOCD: CPT

## 2023-12-05 PROCEDURE — 3077F SYST BP >= 140 MM HG: CPT

## 2023-12-05 PROCEDURE — 99213 OFFICE O/P EST LOW 20 MIN: CPT

## 2023-12-05 PROCEDURE — 3079F DIAST BP 80-89 MM HG: CPT

## 2023-12-05 RX ORDER — AMLODIPINE BESYLATE 5 MG/1
5 TABLET ORAL DAILY
Qty: 90 TABLET | Refills: 1 | Status: SHIPPED | OUTPATIENT
Start: 2023-12-05

## 2023-12-05 NOTE — PROGRESS NOTES
hypercholesterolemia    Cerebrovascular accident (CVA), 2011, no residual deficit (HCC)    CKD (chronic kidney disease) stage 3, GFR 30-59 ml/min (HCC)    Peripheral vascular disease (HCC)    TIBURCIO (obstructive sleep apnea)    Class 3 severe obesity due to excess calories with serious comorbidity and body mass index (BMI) of 50.0 to 59.9 in adult [facility])    Primary osteoarthritis of left knee    Carpal tunnel syndrome, bilateral-not on medication due to kidney disease    Microcytic anemia    Alpha thalassemia trait    Cervical stenosis of spinal canal    Chronic bilateral low back pain with bilateral sciatica    Lumbar radicular pain    History of stroke    Occlusion of right internal carotid artery    Thyroid nodule    Occlusion of vertebral artery    COPD (chronic obstructive pulmonary disease) ([address])       Health Maintenance Due   Topic Date Due    Respiratory Syncytial Virus (RSV) age 61 yrs+ (1 - 1-dose 60+ series) Never done    Pneumococcal 65+ years Vaccine (3 - PPSV23 or PCV20) 12/20/2021    Colorectal Cancer Screen  08/21/2022    Annual Wellness Visit (AWV)  10/08/2022    COVID-19 Vaccine (4 - 2023-24 season) 09/01/2023    A1C test (Diabetic or Prediabetic)  11/28/2023    Lipids  11/28/2023       Allergies   Allergen Reactions    Duricef [Cefadroxil Monohydrate] Hives    Fish-Derived Products Hives    Pcn [Penicillins] Hives    Tomato Hives         Current Outpatient Medications   Medication Sig Dispense Refill    Handicap Placard MISC by Does not apply route 1 each 0    amLODIPine (NORVASC) 5 MG tablet Take 1 tablet by mouth daily 90 tablet 1    atorvastatin (LIPITOR) 80 MG tablet TAKE 1 TABLET BY MOUTH DAILY 28 tablet 4    ferrous sulfate (IRON 325) 325 (65 Fe) MG tablet TAKE 1 TABLET BY MOUTH IN THE MORNING AND AT BEDTIME 56 tablet 1    diclofenac sodium (VOLTAREN) 1 % GEL Apply 2 g topically 4 times daily 350 g 3    aspirin (ASPIRIN LOW DOSE) 81 MG EC tablet TAKE 1 TABLET BY MOUTH DAILY 30 tablet 5

## 2023-12-08 ENCOUNTER — TELEPHONE (OUTPATIENT)
Dept: PULMONOLOGY | Age: 68
End: 2023-12-08

## 2023-12-08 ENCOUNTER — OFFICE VISIT (OUTPATIENT)
Dept: PULMONOLOGY | Age: 68
End: 2023-12-08
Payer: COMMERCIAL

## 2023-12-08 VITALS
WEIGHT: 293 LBS | DIASTOLIC BLOOD PRESSURE: 78 MMHG | OXYGEN SATURATION: 99 % | RESPIRATION RATE: 20 BRPM | SYSTOLIC BLOOD PRESSURE: 179 MMHG | HEART RATE: 68 BPM | HEIGHT: 67 IN | BODY MASS INDEX: 45.99 KG/M2

## 2023-12-08 DIAGNOSIS — R91.1 LUNG NODULE: ICD-10-CM

## 2023-12-08 DIAGNOSIS — E66.01 MORBID OBESITY WITH BMI OF 50.0-59.9, ADULT (HCC): ICD-10-CM

## 2023-12-08 DIAGNOSIS — G47.33 OSA ON CPAP: ICD-10-CM

## 2023-12-08 DIAGNOSIS — J44.9 CHRONIC OBSTRUCTIVE PULMONARY DISEASE, UNSPECIFIED COPD TYPE (HCC): Primary | ICD-10-CM

## 2023-12-08 DIAGNOSIS — Z87.891 HISTORY OF SMOKING 30 OR MORE PACK YEARS: ICD-10-CM

## 2023-12-08 PROCEDURE — 3077F SYST BP >= 140 MM HG: CPT | Performed by: INTERNAL MEDICINE

## 2023-12-08 PROCEDURE — 99214 OFFICE O/P EST MOD 30 MIN: CPT | Performed by: INTERNAL MEDICINE

## 2023-12-08 PROCEDURE — 1123F ACP DISCUSS/DSCN MKR DOCD: CPT | Performed by: INTERNAL MEDICINE

## 2023-12-08 PROCEDURE — 3078F DIAST BP <80 MM HG: CPT | Performed by: INTERNAL MEDICINE

## 2023-12-08 NOTE — PROGRESS NOTES
([address] [facility])  E66.01     Z68.43       4. History of smoking 30 or more pack years  Z87.891       5. Lung nodule  R91.1             Assessment:    Low-dose CT scan of the chest on 11/27/2023 did not show any nodule or mass. She still have cough and wheezing on a Spiriva and will change it to LAMA/LABA combination to Stiolto I have discussed with the patient she is to use albuterol 3 times a day we will start her on the Stiolto once daily technique of inhaler discussed with patient. She had a sleep study done she is currently using her old CPAP she is very compliant with CPAP. Sleep study shows severe obstructive sleep apnea which have discussed with patient and she had a titration study done which I do not have the results but patient was supposed to go to [facility] to get her new CPAP/BiPAP will get the results of titration study. I have discussed the pulmonary function test results with the patient that she does have stage II COPD also mild emphysematous changes seen on the last CT scan was finding discussed with patient she does have cough and sometimes sputum production discussed that she is going to need long-acting bronchodilators     Plan and recommendation:    Start patient on Stiolto 2 puff once daily. Advised patient to discontinue Spiriva once she get her Stiolto  Patient instructed how to use the inhalers  Albuterol to be used as needed. Advise increase activity and exercise. Pulmonary function test results seen. Low-dose CT scan of the chest reviewed from November 2022 and November 2021. Low-dose CT scan from November 2023 seen    Vaccinations recommended annually for flu patient had received a flu vaccine this year  Patient does not COVID-vaccine  Recommendation given regarding pneumonia vaccine  Up to date with vaccinations from [address] [place] [address] an active lifestyle       Will get the results of the titration study. Advised patient to continue with her old CPAP machine now.   Patient will get

## 2023-12-09 DIAGNOSIS — I10 ESSENTIAL HYPERTENSION: ICD-10-CM

## 2023-12-09 DIAGNOSIS — E55.9 VITAMIN D DEFICIENCY: ICD-10-CM

## 2023-12-11 DIAGNOSIS — I10 ESSENTIAL HYPERTENSION: ICD-10-CM

## 2023-12-11 RX ORDER — FOLIC ACID 1 MG/1
1 TABLET ORAL DAILY
Qty: 28 TABLET | Refills: 2 | Status: SHIPPED | OUTPATIENT
Start: 2023-12-11

## 2023-12-11 RX ORDER — HYDROCHLOROTHIAZIDE 25 MG/1
25 TABLET ORAL EVERY MORNING
Qty: 28 TABLET | Refills: 0 | OUTPATIENT
Start: 2023-12-11 | End: 2024-01-10

## 2023-12-11 RX ORDER — TICAGRELOR 90 MG/1
TABLET ORAL
Qty: 56 TABLET | Refills: 1 | Status: SHIPPED | OUTPATIENT
Start: 2023-12-11 | End: 2024-02-10

## 2023-12-11 RX ORDER — UMECLIDINIUM BROMIDE AND VILANTEROL TRIFENATATE 62.5; 25 UG/1; UG/1
1 POWDER RESPIRATORY (INHALATION) DAILY
Qty: 30 EACH | Refills: 5 | Status: SHIPPED | OUTPATIENT
Start: 2023-12-11

## 2023-12-11 NOTE — TELEPHONE ENCOUNTER
Pharmacy requesting refill of Brilinta 90mg.       Medication active on med list yes      Date of last Rx: 10/16/2023 with 1 refills          verified by Baird Collet, LPN      Date of last appointment 7/27/2023    Next Visit Date:  1/11/2024

## 2023-12-11 NOTE — TELEPHONE ENCOUNTER
Dr. Sridevi Wang, Insurance will not coverStiolto Respimat 2.5-2.5 because she had not tried formulary inhalers. Formulary inhalers are:  Combivernt  Anoro  Bevespi  Ipratropium Bromide/ Albuterol   Please place in order for one of the above inhalers or you can write a medical necessity letter as to why the patient can not take formulary's and has to be on Stiolto.

## 2023-12-11 NOTE — TELEPHONE ENCOUNTER
Request for Folic Acid. Please review and e-scribe to pharmacy listed in chart if appropriate. Thank you.       Last Visit Date: 12/5/2023  Next Visit Date: 1/9/2024    Future Appointments   Date Time Provider [address]   12/11/2023  3:15 PM Zuri Ramirez [facility] Podiatry [facility]   1/9/2024  2:30 PM [facility]   1/11/2024 11:40 AM Mai Coto MD Neuro Spec Neurology -   2/12/2024 10:15 AM Roger Wilder MD [facility]   4/8/2024  3:15 PM Regina Portillo MD [address]   4/12/2024  3:30 PM Aye Wright MD [facility] Maintenance   Topic Date Due    Respiratory Syncytial Virus (RSV) age 61 yrs+ (3 - 1-dose 60+ series) Never done    Pneumococcal 65+ years Vaccine (3 - PPSV23 or PCV20) 12/20/2021    Colorectal Cancer Screen  08/21/2022    Annual Wellness Visit (AWV)  10/08/2022    COVID-19 Vaccine (4 - 2023-24 season) 09/01/2023    A1C test (Diabetic or Prediabetic)  11/28/2023    Lipids  11/28/2023    Depression Screen  09/15/2024    GFR test (Diabetes, CKD 3-4, OR last GFR 15-59)  11/27/2024    Low dose CT lung screening &/or counseling  11/27/2024    Breast cancer screen  03/06/2025    DTaP/Tdap/Td vaccine (2 - Td or Tdap) 03/08/2028    DEXA (modify frequency per FRAX score)  Completed    Flu vaccine  Completed    Shingles vaccine  Completed    Hepatitis C screen  Completed    Hepatitis A vaccine  Aged Out    Hepatitis B vaccine  Aged Out    Hib vaccine  Aged Out    Meningococcal (ACWY) vaccine  Aged Out       Hemoglobin A1C (%)   Date Value   11/28/2022 5.8   11/03/2022 5.8   09/29/2021 5.8             ( goal A1C is < 7)   No components found for: \"LABMICR\"  LDL Cholesterol (mg/dL)   Date Value   11/28/2022 86       (goal LDL is <100)   AST (U/L)   Date Value   11/27/2023 27     ALT (U/L)   Date Value   11/27/2023 15     BUN (mg/dL)   Date Value   11/27/2023 18     BP Readings from Last 3 Encounters:   12/08/23 (!) 179/78   12/05/23 (!)

## 2024-01-06 DIAGNOSIS — E61.1 IRON DEFICIENCY: ICD-10-CM

## 2024-01-06 DIAGNOSIS — R79.89 ELEVATED FERRITIN: ICD-10-CM

## 2024-01-06 DIAGNOSIS — I10 ESSENTIAL HYPERTENSION: ICD-10-CM

## 2024-01-06 DIAGNOSIS — D50.9 MICROCYTIC ANEMIA: ICD-10-CM

## 2024-01-06 DIAGNOSIS — D56.3 THALASSEMIA TRAIT, ALPHA: ICD-10-CM

## 2024-01-06 DIAGNOSIS — R09.81 SINUS CONGESTION: ICD-10-CM

## 2024-01-08 RX ORDER — LOSARTAN POTASSIUM 100 MG/1
100 TABLET ORAL DAILY
Qty: 28 TABLET | Refills: 3 | Status: SHIPPED | OUTPATIENT
Start: 2024-01-08

## 2024-01-08 RX ORDER — OMEPRAZOLE 20 MG/1
CAPSULE, DELAYED RELEASE ORAL
Qty: 56 CAPSULE | Refills: 5 | Status: SHIPPED | OUTPATIENT
Start: 2024-01-08

## 2024-01-08 NOTE — TELEPHONE ENCOUNTER
..Request for Omeprazole.    Please review and e-scribe to pharmacy listed in chart if appropriate. Thank you.      Last Visit Date: 12/5/2023  Next Visit Date: 1/9/2024    Future Appointments   Date Time Provider Department Center   1/9/2024  2:30 PM Jacobo Robles MD [facility] IM [facility]   1/11/2024 11:40 AM Domenica Elise MD Neuro Spec Neurology -   2/12/2024 10:15 AM Ceci Morales MD [facility]   3/11/2024  3:15 PM Jose Angel Quach DPM [facility] Pod[facility]   4/8/2024  3:15 PM [facility]   4/12/2024  3:30 PM [facility]       Health Maintenance   Topic Date Due    Respiratory Syncytial Virus (RSV) Pregnant or age 60 yrs+ (1 - 1-dose 60+ series) Never done    Pneumococcal 65+ years Vaccine (3 - PPSV23 or PCV20) 12/20/2021    Colorectal Cancer Screen  08/21/2022    COVID-19 Vaccine (4 - 2023-24 season) 09/01/2023    A1C test (Diabetic or Prediabetic)  11/28/2023    Lipids  11/28/2023    Annual Wellness Visit (Medicare Advantage)  01/01/2024    Depression Screen  09/15/2024    GFR test (Diabetes, CKD 3-4, OR last GFR 15-59)  11/27/2024    Low dose CT lung screening &/or counseling  11/27/2024    Breast cancer screen  03/06/2025    DTaP/Tdap/Td vaccine (2 - Td or Tdap) 03/08/2028    DEXA (modify frequency per FRAX score)  Completed    Flu vaccine  Completed    Shingles vaccine  Completed    Hepatitis C screen  Completed    Hepatitis A vaccine  Aged Out    Hepatitis B vaccine  Aged Out    Hib vaccine  Aged Out    Polio vaccine  Aged Out    Meningococcal (ACWY) vaccine  Aged Out       Hemoglobin A1C (%)   Date Value   11/28/2022 5.8   11/03/2022 5.8   09/29/2021 5.8             ( goal A1C is < 7)   No components found for: \"LABMICR\"  LDL Cholesterol (mg/dL)   Date Value   11/28/2022 86       (goal LDL is <100)   AST (U/L)   Date Value   11/27/2023 27     ALT (U/L)   Date Value   11/27/2023 15     BUN (mg/dL)   Date Value   11/27/2023 18     BP Readings from

## 2024-01-08 NOTE — TELEPHONE ENCOUNTER
..Request for Losartan.      Please review and e-scribe to pharmacy listed in chart if appropriate. Thank you.      Last Visit Date: 12/5/2023  Next Visit Date: 1/6/2024    Future Appointments   Date Time Provider Department Center   1/9/2024  2:30 PM Jacobo Robles MD [facility] IM [facility]   1/11/2024 11:40 AM Domenica Elise MD Neuro Spec Neurology -   2/12/2024 10:15 AM Ceci Morales MD [facility]   3/11/2024  3:15 PM Jose Angel Quach DPM [facility] Podiatry [facility]   4/8/2024  3:15 PM [facility]   4/12/2024  3:30 PM [facility]       Health Maintenance   Topic Date Due    Respiratory Syncytial Virus (RSV) Pregnant or age 60 yrs+ (1 - 1-dose 60+ series) Never done    Pneumococcal 65+ years Vaccine (3 - PPSV23 or PCV20) 12/20/2021    Colorectal Cancer Screen  08/21/2022    COVID-19 Vaccine (4 - 2023-24 season) 09/01/2023    A1C test (Diabetic or Prediabetic)  11/28/2023    Lipids  11/28/2023    Annual Wellness Visit (Medicare Advantage)  01/01/2024    Depression Screen  09/15/2024    GFR test (Diabetes, CKD 3-4, OR last GFR 15-59)  11/27/2024    Low dose CT lung screening &/or counseling  11/27/2024    Breast cancer screen  03/06/2025    DTaP/Tdap/Td vaccine (2 - Td or Tdap) 03/08/2028    DEXA (modify frequency per FRAX score)  Completed    Flu vaccine  Completed    Shingles vaccine  Completed    Hepatitis C screen  Completed    Hepatitis A vaccine  Aged Out    Hepatitis B vaccine  Aged Out    Hib vaccine  Aged Out    Polio vaccine  Aged Out    Meningococcal (ACWY) vaccine  Aged Out       Hemoglobin A1C (%)   Date Value   11/28/2022 5.8   11/03/2022 5.8   09/29/2021 5.8             ( goal A1C is < 7)   No components found for: \"LABMICR\"  LDL Cholesterol (mg/dL)   Date Value   11/28/2022 86       (goal LDL is <100)   AST (U/L)   Date Value   11/27/2023 27     ALT (U/L)   Date Value   11/27/2023 15     BUN (mg/dL)   Date Value   11/27/2023 18     BP Readings from

## 2024-01-09 ENCOUNTER — OFFICE VISIT (OUTPATIENT)
Dept: INTERNAL MEDICINE | Age: 69
End: 2024-01-09
Payer: COMMERCIAL

## 2024-01-09 VITALS
OXYGEN SATURATION: 98 % | WEIGHT: 293 LBS | HEIGHT: 67 IN | DIASTOLIC BLOOD PRESSURE: 82 MMHG | SYSTOLIC BLOOD PRESSURE: 140 MMHG | TEMPERATURE: 96.8 F | BODY MASS INDEX: 45.99 KG/M2 | HEART RATE: 73 BPM

## 2024-01-09 DIAGNOSIS — I10 ESSENTIAL HYPERTENSION: Primary | ICD-10-CM

## 2024-01-09 DIAGNOSIS — G47.33 OSA (OBSTRUCTIVE SLEEP APNEA): ICD-10-CM

## 2024-01-09 DIAGNOSIS — J44.9 CHRONIC OBSTRUCTIVE PULMONARY DISEASE, UNSPECIFIED COPD TYPE (HCC): ICD-10-CM

## 2024-01-09 DIAGNOSIS — I63.00 CEREBROVASCULAR ACCIDENT (CVA) DUE TO THROMBOSIS OF PRECEREBRAL ARTERY (HCC): ICD-10-CM

## 2024-01-09 DIAGNOSIS — N18.30 STAGE 3 CHRONIC KIDNEY DISEASE, UNSPECIFIED WHETHER STAGE 3A OR 3B CKD (HCC): ICD-10-CM

## 2024-01-09 PROCEDURE — 1123F ACP DISCUSS/DSCN MKR DOCD: CPT

## 2024-01-09 PROCEDURE — 3079F DIAST BP 80-89 MM HG: CPT

## 2024-01-09 PROCEDURE — 3077F SYST BP >= 140 MM HG: CPT

## 2024-01-09 PROCEDURE — 99213 OFFICE O/P EST LOW 20 MIN: CPT

## 2024-01-09 RX ORDER — FERROUS SULFATE 325(65) MG
325 TABLET ORAL 2 TIMES DAILY
Qty: 56 TABLET | Refills: 1 | Status: SHIPPED | OUTPATIENT
Start: 2024-01-09

## 2024-01-09 RX ORDER — AMLODIPINE BESYLATE 5 MG/1
5 TABLET ORAL DAILY
Qty: 90 TABLET | Refills: 1 | Status: SHIPPED | OUTPATIENT
Start: 2024-01-09

## 2024-01-09 ASSESSMENT — PATIENT HEALTH QUESTIONNAIRE - PHQ9
SUM OF ALL RESPONSES TO PHQ QUESTIONS 1-9: 0
2. FEELING DOWN, DEPRESSED OR HOPELESS: 0
1. LITTLE INTEREST OR PLEASURE IN DOING THINGS: 0
SUM OF ALL RESPONSES TO PHQ QUESTIONS 1-9: 0
SUM OF ALL RESPONSES TO PHQ9 QUESTIONS 1 & 2: 0

## 2024-01-09 NOTE — PROGRESS NOTES
Attending Physician Statement  I have discussed the care of Filomena Lopez, including pertinent history and exam findings with the resident. I have reviewed the key elements of all parts of the encounter with the resident. I agree with the assessment, and status of the problem list as documented.    and this was also documented by the resident..The medication list was reviewed with the resident and is up to date.    Diagnosis Orders   1. Essential hypertension  amLODIPine (NORVASC) 5 MG tablet      2. Cerebrovascular accident (CVA), 2011, no residual deficit (HCC)        3. TIBURCIO (obstructive sleep apnea)        4. Chronic obstructive pulmonary disease, unspecified COPD type (HCC)        5. Stage 3 chronic kidney disease, unspecified whether stage 3a or 3b CKD ([facility])             Mary Gomes MD   Attending Physician, [facility]   Faculty, Internal Medicine Residency Program  [facility]

## 2024-01-10 NOTE — PROGRESS NOTES
[facility]  [address] [place] [id number]  [facility] [address]  Dept: [phone]  Dept Fax: [phone]    Office Progress/Follow Up Note  Date of patient's visit: 1/9/2024  Patient's Name:  Filomena Lopez YOB: 1955            Patient Care Team:  Jacobo Robles MD as PCP - General (Internal Medicine)  Delonte Conner DPM as Consulting Physician (Podiatry)  Luther García MD as Consulting Physician (Pulmonology)  ________________________________________________________________________      Reason for Visit: Routine outpatient follow up  ________________________________________________________________________  Chief Complaint:  Regular follow up    ________________________________________________________________________  History of Presenting Illness:  Patient, 68 years female, presents today for regular follow-up.  Patient stated that she is following with her pulmonologist.  She was just discharged on inhaler.  Patient is using CPAP machine regularly.  Patient still has to follow-up with her nephrologist.  Patient has no concerns and complaints.  Patient wanted travel note.  She states that she has to meet her grandson out of [place] in long-term but she is unable to go.   Otherwise her BP is better controlled    Patient Active Problem List   Diagnosis    Essential hypertension    Pure hypercholesterolemia    Cerebrovascular accident (CVA), 2011, no residual deficit (HCC)    CKD (chronic kidney disease) stage 3, GFR 30-59 ml/min ([facility])    Onychomycosis    Peripheral vascular disease ([facility])    TIBURCIO (obstructive sleep apnea)    Class 3 severe obesity due to excess calories with serious comorbidity and body mass index (BMI) of 50.0 to 59.9 in adult ([facility])    Primary osteoarthritis of left knee    Carpal tunnel syndrome, bilateral-not on medication due to kidney disease    Microcytic anemia    Alpha thalassemia trait    Cervical stenosis of spinal canal    Chronic bilateral

## 2024-01-11 ENCOUNTER — TELEPHONE (OUTPATIENT)
Dept: INTERNAL MEDICINE | Age: 69
End: 2024-01-11

## 2024-01-11 ENCOUNTER — OFFICE VISIT (OUTPATIENT)
Dept: NEUROLOGY | Age: 69
End: 2024-01-11
Payer: COMMERCIAL

## 2024-01-11 VITALS
HEART RATE: 79 BPM | WEIGHT: 293 LBS | BODY MASS INDEX: 45.99 KG/M2 | SYSTOLIC BLOOD PRESSURE: 113 MMHG | HEIGHT: 67 IN | DIASTOLIC BLOOD PRESSURE: 62 MMHG

## 2024-01-11 DIAGNOSIS — E66.01 CLASS 3 SEVERE OBESITY DUE TO EXCESS CALORIES WITH SERIOUS COMORBIDITY AND BODY MASS INDEX (BMI) OF 50.0 TO 59.9 IN ADULT (HCC): ICD-10-CM

## 2024-01-11 DIAGNOSIS — I63.00 CEREBROVASCULAR ACCIDENT (CVA) DUE TO THROMBOSIS OF PRECEREBRAL ARTERY (HCC): Primary | ICD-10-CM

## 2024-01-11 DIAGNOSIS — G56.03 BILATERAL CARPAL TUNNEL SYNDROME: ICD-10-CM

## 2024-01-11 DIAGNOSIS — G56.03 CARPAL TUNNEL SYNDROME ON BOTH SIDES: Primary | ICD-10-CM

## 2024-01-11 DIAGNOSIS — J44.9 CHRONIC OBSTRUCTIVE PULMONARY DISEASE, UNSPECIFIED COPD TYPE (HCC): ICD-10-CM

## 2024-01-11 PROCEDURE — 1123F ACP DISCUSS/DSCN MKR DOCD: CPT | Performed by: STUDENT IN AN ORGANIZED HEALTH CARE EDUCATION/TRAINING PROGRAM

## 2024-01-11 PROCEDURE — 99214 OFFICE O/P EST MOD 30 MIN: CPT | Performed by: STUDENT IN AN ORGANIZED HEALTH CARE EDUCATION/TRAINING PROGRAM

## 2024-01-11 PROCEDURE — 3078F DIAST BP <80 MM HG: CPT | Performed by: STUDENT IN AN ORGANIZED HEALTH CARE EDUCATION/TRAINING PROGRAM

## 2024-01-11 PROCEDURE — 3074F SYST BP LT 130 MM HG: CPT | Performed by: STUDENT IN AN ORGANIZED HEALTH CARE EDUCATION/TRAINING PROGRAM

## 2024-01-11 NOTE — PROGRESS NOTES
[facility] NEUROLOGY SPECIALIST  [address]  [place] [address]  Dept: [phone]    PATIENT NAME: Filomena Lopez  PATIENT MRN: [id number]  PRIMARY CARE PHYSICIAN: Jacobo Robles MD    HPI:      Filomena Lopez is a 68 y.o. female who presents to clinic today for follow up for treatment of history of two strokes in the bilateral cerebral hemispheres, bilateral carpal tunnel syndrome and cervical stenosis.    Interim History:    Patient has had a history of 2 strokes in 2003 in 2011 affecting the bilateral cerebral hemispheres.  For secondary stroke prevention, she is taking Brilinta 90 mg twice daily, aspirin 81 mg daily, Lipitor 80 mg daily and folic acid 1 mg daily.  Denies any further symptoms concerning for TIA or stroke.    Patient notes she continues to have numbness and tingling in bilateral hands that is worse at night.  Patient notes her left hand is worse than her right hand.  She notes she did wear the wrist splints but does not see any improvement. She had an EMG done in 2019 which showed CTS L>R. Left hand showed moderate to severe CTS and right hand was moderate CTS.  She is interested in a othropedic referral at this time.      For lower back pain, patient is prescribed gabapentin 600 mg 3 times a day.  Patient notes has been controlling her symptoms well.      She notes she was recommended a colonoscopy by GI but is worried about being off her brilinta and ASA. Discussed she can be off her medications if medically indicated procedure is necessary but she would be at an increase risk for stroke.       PREVIOUS WORKUP:     - Reviewed results of prior labs and imaging.  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 There is approximately 50%   stenosis in the origin of right ICA by NASCET criteria.       Stable occlusion of the left petrous and cavernous segments of intracranial   ICA with opacification of the supraclinoid segment. Severe

## 2024-01-11 NOTE — TELEPHONE ENCOUNTER
Pt calling in the office requesting new handicap placard, pt states she need the length of time on prescription. Please advise

## 2024-02-01 RX ORDER — TICAGRELOR 90 MG/1
TABLET ORAL
Qty: 56 TABLET | Refills: 1 | Status: SHIPPED | OUTPATIENT
Start: 2024-02-01 | End: 2024-04-02

## 2024-02-01 NOTE — TELEPHONE ENCOUNTER
Pharmacy requesting refill of Brilinta.      Medication active on med list yes      Date of last fill: 12/11/23  verified on 2/1/2024   verified by SF LPN      Date of last appointment 1/11/24    Next Visit Date:  Visit date not found

## 2024-02-02 NOTE — TELEPHONE ENCOUNTER
A Refill Has Been Requested for Filomena SEO Tanishasuzy    Medication Requested  Requested Prescriptions     Pending Prescriptions Disp Refills    Cholecalciferol (VITAMIN D3) 25 MCG TABS [Pharmacy Med Name: VIT D 1000IU 25MCG TAB 25 Tablet] 28 tablet 1     Sig: TAKE 1 TABLET BY MOUTH DAILY       Last Visit Date (If Applicable)  1/9/2024    Next Visit Date (If Applicable)  3/12/2024

## 2024-02-03 RX ORDER — CHOLECALCIFEROL (VITAMIN D3) 25 MCG
1 TABLET ORAL DAILY
Qty: 28 TABLET | Refills: 1 | Status: SHIPPED | OUTPATIENT
Start: 2024-02-03

## 2024-02-05 ENCOUNTER — TELEPHONE (OUTPATIENT)
Dept: INTERNAL MEDICINE | Age: 69
End: 2024-02-05

## 2024-02-05 DIAGNOSIS — I63.00 CEREBROVASCULAR ACCIDENT (CVA) DUE TO THROMBOSIS OF PRECEREBRAL ARTERY (HCC): Primary | ICD-10-CM

## 2024-02-05 NOTE — TELEPHONE ENCOUNTER
Pt calling the office for her handicap placard,  the handicap placard need to be printed in office and sign and given to pt to take to the DM[employer]. Please advise

## 2024-02-13 ENCOUNTER — OFFICE VISIT (OUTPATIENT)
Dept: ORTHOPEDIC SURGERY | Age: 69
End: 2024-02-13
Payer: COMMERCIAL

## 2024-02-13 VITALS — RESPIRATION RATE: 14 BRPM | BODY MASS INDEX: 45.99 KG/M2 | HEIGHT: 67 IN | WEIGHT: 293 LBS

## 2024-02-13 DIAGNOSIS — G56.03 BILATERAL CARPAL TUNNEL SYNDROME: Primary | ICD-10-CM

## 2024-02-13 PROCEDURE — 1123F ACP DISCUSS/DSCN MKR DOCD: CPT | Performed by: ORTHOPAEDIC SURGERY

## 2024-02-13 PROCEDURE — 99213 OFFICE O/P EST LOW 20 MIN: CPT | Performed by: ORTHOPAEDIC SURGERY

## 2024-02-13 NOTE — PROGRESS NOTES
Passive exposure: Current (Outside of the home)    Smokeless tobacco: Never   Vaping Use    Vaping Use: Never used   Substance and Sexual Activity    Alcohol use: Yes     Alcohol/week: 6.0 standard drinks of alcohol     Types: 6 Cans of beer per week     Comment: rare    Drug use: No     Types: Cocaine, Marijuana (Weed)     Comment: per pt did years ago; cocaine & marij, 8-23-19 denies current use    Sexual activity: Yes     Partners: Male   Other Topics Concern    Not on file   Social History Narrative    Not on file     Social Determinants of Health     Financial Resource Strain: Low Risk  (5/31/2023)    Overall Financial Resource Strain (CARDIA)     Difficulty of Paying Living Expenses: Not hard at all   Food Insecurity: Not on file (5/31/2023)   Transportation Needs: Unknown (5/31/2023)    PRAPARE - Transportation     Lack of Transportation (Medical): Not on file     Lack of Transportation (Non-Medical): No   Physical Activity: Not on file   Stress: Not on file   Social Connections: Not on file   Intimate Partner Violence: Not on file   Housing Stability: Unknown (5/31/2023)    Housing Stability Vital Sign     Unable to Pay for Housing in the Last Year: Not on file     Number of Places Lived in the Last Year: Not on file     Unstable Housing in the Last Year: No     Past Medical History:   Diagnosis Date    Ambulates with cane     Bleeding     while on aggrenox    Cataracts, bilateral     Cerebrovascular disease 2003,2011    cva    Chronic pain in left foot     CKD (chronic kidney disease) stage 3, GFR 30-59 ml/min ([facility])     COVID-19 11/2021    states hpspitalized    GERD (gastroesophageal reflux disease)     Hx of blood clots     Hyperlipidemia     Hypertension     Iron (Fe) deficiency anemia     Nicotine abuse     Obesity     TIBURCIO on CPAP     Osteoarthritis     Peripheral vascular disease (HCC) 03/13/2014    Substance abuse (HCC)     cocaine, canabis    Thalassemia minor     Thyroid nodule 11/22/2022

## 2024-02-14 ENCOUNTER — TELEPHONE (OUTPATIENT)
Dept: NEUROLOGY | Age: 69
End: 2024-02-14

## 2024-02-14 NOTE — TELEPHONE ENCOUNTER
Mrs. Lopez called the office this morning stating that Dr. Guerrero was planning on doing carpal tunnel surgery on her and asked her to call about her blood thinners.  Writer advised that Dr. Guerrero would need to fax a clearance request of what he was requesting.  Mrs. Lopez verbally stated her understanding and said she would let his office know.

## 2024-02-26 DIAGNOSIS — G56.03 CARPAL TUNNEL SYNDROME, BILATERAL: ICD-10-CM

## 2024-02-26 DIAGNOSIS — M19.90 ARTHRITIS: ICD-10-CM

## 2024-02-26 NOTE — TELEPHONE ENCOUNTER
A Refill Has Been Requested for Filomena SEO Jessica    Medication Requested  Requested Prescriptions     Pending Prescriptions Disp Refills    diclofenac sodium (VOLTAREN) 1 % GEL [Pharmacy Med Name: DICLOFENAC 1 % GEL 1 Gel] 300 g 3     Sig: APPLY 2 GRAMS TOPICALLY 4 TIMES A DAY       Last Visit Date (If Applicable)  1/9/2024    Next Visit Date (If Applicable)  3/12/2024

## 2024-03-05 DIAGNOSIS — D56.3 THALASSEMIA TRAIT, ALPHA: ICD-10-CM

## 2024-03-05 DIAGNOSIS — R79.89 ELEVATED FERRITIN: ICD-10-CM

## 2024-03-05 DIAGNOSIS — E55.9 VITAMIN D DEFICIENCY: ICD-10-CM

## 2024-03-05 DIAGNOSIS — I10 ESSENTIAL HYPERTENSION: ICD-10-CM

## 2024-03-05 DIAGNOSIS — D50.9 MICROCYTIC ANEMIA: ICD-10-CM

## 2024-03-05 DIAGNOSIS — E61.1 IRON DEFICIENCY: ICD-10-CM

## 2024-03-05 RX ORDER — FOLIC ACID 1 MG/1
1 TABLET ORAL DAILY
Qty: 28 TABLET | Refills: 2 | Status: SHIPPED | OUTPATIENT
Start: 2024-03-05

## 2024-03-05 RX ORDER — FERROUS SULFATE 325(65) MG
1 TABLET ORAL 2 TIMES DAILY
Qty: 56 TABLET | Refills: 1 | Status: SHIPPED | OUTPATIENT
Start: 2024-03-05

## 2024-03-05 NOTE — TELEPHONE ENCOUNTER
..Request for Folic Acid.    Please review and e-scribe to pharmacy listed in chart if appropriate. Thank you.      Last Visit Date: 1/9/2024  Next Visit Date: 3/12/2024    Future Appointments   Date Time Provider Department Center   3/11/2024  3:15 PM Jose Angel Quach DPM [facility] Podiatry [facility]   3/12/2024  3:00 PM Jacobo Robles MD [facility]   4/8/2024  3:15 PM Oc Irizarry MD [facility]   4/12/2024  3:30 PM Luther García MD Resp Spec [facility]   5/20/2024  1:30 PM Ceci Morales MD [facility]   5/24/2024  9:10 AM Armando Lugo MD [facility] None       Health Maintenance   Topic Date Due    Respiratory Syncytial Virus (RSV) Pregnant or age 60 yrs+ (1 - 1-dose 60+ series) Never done    Pneumococcal 65+ years Vaccine (3 - PPSV23 or PCV20) 12/20/2021    Colorectal Cancer Screen  08/21/2022    COVID-19 Vaccine (4 - 2023-24 season) 09/01/2023    A1C test (Diabetic or Prediabetic)  11/28/2023    Lipids  11/28/2023    Annual Wellness Visit (Medicare Advantage)  01/01/2024    GFR test (Diabetes, CKD 3-4, OR last GFR 15-59)  11/27/2024    Low dose CT lung screening &/or counseling  11/27/2024    Depression Screen  01/09/2025    Breast cancer screen  03/06/2025    DTaP/Tdap/Td vaccine (2 - Td or Tdap) 03/08/2028    DEXA (modify frequency per FRAX score)  Completed    Flu vaccine  Completed    Shingles vaccine  Completed    Hepatitis C screen  Completed    Hepatitis A vaccine  Aged Out    Hepatitis B vaccine  Aged Out    Hib vaccine  Aged Out    Polio vaccine  Aged Out    Meningococcal (ACWY) vaccine  Aged Out       Hemoglobin A1C (%)   Date Value   11/28/2022 5.8   11/03/2022 5.8   09/29/2021 5.8             ( goal A1C is < 7)   No components found for: \"LABMICR\"  LDL Cholesterol (mg/dL)   Date Value   11/28/2022 86       (goal LDL is <100)   AST (U/L)   Date Value   11/27/2023 27     ALT (U/L)   Date Value   11/27/2023 15     BUN (mg/dL)   Date Value   11/27/2023 18     BP Readings from

## 2024-03-08 NOTE — PROGRESS NOTES
Patient instructed to remove shoes and socks and instructed to sit in exam chair.  Current PCP is Jacobo Robles MD and date of last visit was 01/09/2024.   Do you have a follow up visit scheduled?  Yes  If yes, the date is 03/12/2024.

## 2024-03-11 ENCOUNTER — OFFICE VISIT (OUTPATIENT)
Dept: PODIATRY | Age: 69
End: 2024-03-11
Payer: COMMERCIAL

## 2024-03-11 VITALS
DIASTOLIC BLOOD PRESSURE: 96 MMHG | HEART RATE: 78 BPM | SYSTOLIC BLOOD PRESSURE: 191 MMHG | WEIGHT: 293 LBS | HEIGHT: 67 IN | BODY MASS INDEX: 45.99 KG/M2

## 2024-03-11 DIAGNOSIS — I73.9 PVD (PERIPHERAL VASCULAR DISEASE) (HCC): ICD-10-CM

## 2024-03-11 DIAGNOSIS — B35.1 PAIN DUE TO ONYCHOMYCOSIS OF NAIL: ICD-10-CM

## 2024-03-11 DIAGNOSIS — E66.01 CLASS 3 SEVERE OBESITY WITH BODY MASS INDEX (BMI) OF 50.0 TO 59.9 IN ADULT, UNSPECIFIED OBESITY TYPE, UNSPECIFIED WHETHER SERIOUS COMORBIDITY PRESENT (HCC): ICD-10-CM

## 2024-03-11 DIAGNOSIS — R60.0 EDEMA OF LOWER EXTREMITY: ICD-10-CM

## 2024-03-11 DIAGNOSIS — M79.609 PAIN DUE TO ONYCHOMYCOSIS OF NAIL: ICD-10-CM

## 2024-03-11 DIAGNOSIS — B35.1 ONYCHOMYCOSIS: Primary | ICD-10-CM

## 2024-03-11 PROCEDURE — 99999 PR OFFICE/OUTPT VISIT,PROCEDURE ONLY: CPT

## 2024-03-11 PROCEDURE — 11721 DEBRIDE NAIL 6 OR MORE: CPT

## 2024-03-11 PROCEDURE — 99213 OFFICE O/P EST LOW 20 MIN: CPT | Performed by: PODIATRIST

## 2024-03-11 NOTE — PROGRESS NOTES
[facility]  [address]   [address] [place] [address]  Tel: [phone]   Fax: [phone]    Subjective     CC: Routine nail care    Interval History:  Patient presents to clinic today for follow-up of diabetic foot care and elongated nails. States she gets relief from regular professional debridements . No new open wounds, infections and recent hospitalizations due to the patients feet. No other pedal complaints    HPI:  Filomena Lopez is a 68 y.o. year old female who presents clinic today for follow-up of thickened and discolored nails. She states that she has been doing well and denies any new complaints. She walks around with a cane and has troubling cutting her own nails. She does not wear any compression stockings as they are hard to put on. Patient denies any nausea, vomiting, fever, chills or shortness of breath.    Primary care physician is Jacobo Robles MD.    ROS:    Constitutional: Denies nausea, vomiting, fever, chills.  Neurologic: Denies numbness, tingling, and burning in the feet.    Vascular: Denies symptoms of lower extremity claudication.    Skin: Denies open wounds.  Otherwise negative except as noted in the HPI.     PMH:  Past Medical History:   Diagnosis Date    Ambulates with cane     Bleeding     while on aggrenox    Cataracts, bilateral     Cerebrovascular disease 2003,2011    cva    Chronic pain in left foot     CKD (chronic kidney disease) stage 3, GFR 30-59 ml/min ([facility])     COVID-19 11/2021    states hpspitalized    GERD (gastroesophageal reflux disease)     Hx of blood clots     Hyperlipidemia     Hypertension     Iron (Fe) deficiency anemia     Nicotine abuse     Obesity     TIBURCIO on CPAP     Osteoarthritis     Peripheral vascular disease (HCC) 03/13/2014    Substance abuse ([facility])     cocaine, canabis    Thalassemia minor     Thyroid nodule 11/22/2022    Unspecified cerebral artery occlusion with cerebral infarction     Wears dentures     upper and lower

## 2024-03-12 ENCOUNTER — OFFICE VISIT (OUTPATIENT)
Dept: INTERNAL MEDICINE | Age: 69
End: 2024-03-12
Payer: COMMERCIAL

## 2024-03-12 VITALS
DIASTOLIC BLOOD PRESSURE: 86 MMHG | HEART RATE: 68 BPM | HEIGHT: 67 IN | OXYGEN SATURATION: 98 % | SYSTOLIC BLOOD PRESSURE: 142 MMHG | BODY MASS INDEX: 45.99 KG/M2 | WEIGHT: 293 LBS | TEMPERATURE: 97 F

## 2024-03-12 DIAGNOSIS — I63.00 CEREBROVASCULAR ACCIDENT (CVA) DUE TO THROMBOSIS OF PRECEREBRAL ARTERY (HCC): ICD-10-CM

## 2024-03-12 DIAGNOSIS — R73.03 PREDIABETES: ICD-10-CM

## 2024-03-12 DIAGNOSIS — N18.30 STAGE 3 CHRONIC KIDNEY DISEASE, UNSPECIFIED WHETHER STAGE 3A OR 3B CKD (HCC): ICD-10-CM

## 2024-03-12 DIAGNOSIS — J44.9 CHRONIC OBSTRUCTIVE PULMONARY DISEASE, UNSPECIFIED COPD TYPE (HCC): ICD-10-CM

## 2024-03-12 DIAGNOSIS — I10 ESSENTIAL HYPERTENSION: Primary | ICD-10-CM

## 2024-03-12 DIAGNOSIS — G47.33 OSA (OBSTRUCTIVE SLEEP APNEA): ICD-10-CM

## 2024-03-12 DIAGNOSIS — G56.03 BILATERAL CARPAL TUNNEL SYNDROME: ICD-10-CM

## 2024-03-12 PROCEDURE — 99211 OFF/OP EST MAY X REQ PHY/QHP: CPT | Performed by: INTERNAL MEDICINE

## 2024-03-12 PROCEDURE — 3077F SYST BP >= 140 MM HG: CPT

## 2024-03-12 PROCEDURE — 1123F ACP DISCUSS/DSCN MKR DOCD: CPT

## 2024-03-12 PROCEDURE — 99213 OFFICE O/P EST LOW 20 MIN: CPT

## 2024-03-12 PROCEDURE — 3079F DIAST BP 80-89 MM HG: CPT

## 2024-03-12 RX ORDER — PREGABALIN 25 MG/1
25 CAPSULE ORAL 3 TIMES DAILY
Qty: 60 CAPSULE | Refills: 2 | Status: SHIPPED | OUTPATIENT
Start: 2024-03-12 | End: 2024-05-11

## 2024-03-12 ASSESSMENT — ENCOUNTER SYMPTOMS
DIARRHEA: 0
SHORTNESS OF BREATH: 0
ABDOMINAL PAIN: 0
CHEST TIGHTNESS: 0
CHOKING: 0
COUGH: 0
NAUSEA: 0
VOMITING: 0
CONSTIPATION: 0

## 2024-03-12 ASSESSMENT — PATIENT HEALTH QUESTIONNAIRE - PHQ9
1. LITTLE INTEREST OR PLEASURE IN DOING THINGS: 0
SUM OF ALL RESPONSES TO PHQ QUESTIONS 1-9: 0
2. FEELING DOWN, DEPRESSED OR HOPELESS: 0
SUM OF ALL RESPONSES TO PHQ QUESTIONS 1-9: 0
SUM OF ALL RESPONSES TO PHQ9 QUESTIONS 1 & 2: 0
SUM OF ALL RESPONSES TO PHQ QUESTIONS 1-9: 0
SUM OF ALL RESPONSES TO PHQ QUESTIONS 1-9: 0

## 2024-03-12 NOTE — PROGRESS NOTES
[facility]  [facility] [id number] [place] [address]  Dept: [phone]  Dept Fax: [phone]    Office Progress/Follow Up Note  Date of patient's visit: 3/12/2024  Patient's Name:  Filomena Lopez YOB: 1955            Patient Care Team:  Jacobo Robles MD as PCP - General (Internal Medicine)  Delonte Conner DPM as Consulting Physician (Podiatry)  Luther García MD as Consulting Physician (Pulmonology)  ________________________________________________________________________      Reason for Visit: Routine outpatient follow up  ________________________________________________________________________  Chief Complaint:  Hypertension (8 week follow up htn) and Carpal Tunnel (Left hand worsening )    ________________________________________________________________________  History of Presenting Illness:  Patient, 68-year-old female, presents to clinic for regular follow-up    Patient states that she was seen by her neurologist for her bilateral carpal tunnel syndrome.  She was initially started on the splint, that did not work and afterwards she was started on gabapentin.  She is currently taking gabapentin 600 3 times daily.  Patient stated that her symptoms were not better controlled and afterwards she was referred to orthopedics from her neurologist.  Orthopedic surgeon wanted to do her surgery but considering that she has a history of stroke and she is on secondary prevention including dual antiplatelet therapy with aspirin and Brilinta along with Lipitor.  Neurology did not recommended the patient to undergo surgery    Patient presents to clinic today with a concerns of either being started on steroids or further management for her bilateral carpal syndrome.  She has been counseled to follow-up with neurology for further plan, her gabapentin has been changed to pregabalin.      Patient Active Problem List   Diagnosis    Essential hypertension    Pure

## 2024-03-13 ENCOUNTER — APPOINTMENT (OUTPATIENT)
Dept: GENERAL RADIOLOGY | Age: 69
End: 2024-03-13
Payer: COMMERCIAL

## 2024-03-13 ENCOUNTER — APPOINTMENT (OUTPATIENT)
Dept: MRI IMAGING | Age: 69
End: 2024-03-13
Payer: COMMERCIAL

## 2024-03-13 ENCOUNTER — HOSPITAL ENCOUNTER (EMERGENCY)
Age: 69
Discharge: HOME | End: 2024-03-13
Attending: EMERGENCY MEDICINE
Payer: COMMERCIAL

## 2024-03-13 ENCOUNTER — APPOINTMENT (OUTPATIENT)
Dept: CT IMAGING | Age: 69
End: 2024-03-13
Payer: COMMERCIAL

## 2024-03-13 VITALS
BODY MASS INDEX: 53.72 KG/M2 | SYSTOLIC BLOOD PRESSURE: 159 MMHG | DIASTOLIC BLOOD PRESSURE: 100 MMHG | OXYGEN SATURATION: 96 % | RESPIRATION RATE: 25 BRPM | HEART RATE: 79 BPM | TEMPERATURE: 97.6 F | WEIGHT: 293 LBS

## 2024-03-13 DIAGNOSIS — R42 DIZZINESS: Primary | ICD-10-CM

## 2024-03-13 LAB
ALBUMIN SERPL-MCNC: 3.9 G/DL (ref 3.5–5.2)
ALBUMIN/GLOB SERPL: 1 {RATIO} (ref 1–2.5)
ALP SERPL-CCNC: 157 U/L (ref 35–104)
ALT SERPL-CCNC: 11 U/L (ref 10–35)
ANION GAP SERPL CALCULATED.3IONS-SCNC: 14 MMOL/L (ref 9–16)
APAP SERPL-MCNC: <5 UG/ML (ref 10–30)
AST SERPL-CCNC: 22 U/L (ref 10–35)
BACTERIA URNS QL MICRO: NORMAL
BASOPHILS # BLD: 0.06 K/UL (ref 0–0.2)
BASOPHILS NFR BLD: 1 % (ref 0–2)
BILIRUB SERPL-MCNC: 0.4 MG/DL (ref 0–1.2)
BILIRUB UR QL STRIP: NEGATIVE
BUN SERPL-MCNC: 21 MG/DL (ref 8–23)
CALCIUM SERPL-MCNC: 9.4 MG/DL (ref 8.6–10.4)
CASTS #/AREA URNS LPF: NORMAL /LPF (ref 0–8)
CHLORIDE SERPL-SCNC: 104 MMOL/L (ref 98–107)
CLARITY UR: CLEAR
CO2 SERPL-SCNC: 23 MMOL/L (ref 20–31)
COLOR UR: YELLOW
CREAT SERPL-MCNC: 1 MG/DL (ref 0.5–0.9)
EOSINOPHIL # BLD: 0.25 K/UL (ref 0–0.44)
EOSINOPHILS RELATIVE PERCENT: 4 % (ref 1–4)
EPI CELLS #/AREA URNS HPF: NORMAL /HPF (ref 0–5)
ERYTHROCYTE [DISTWIDTH] IN BLOOD BY AUTOMATED COUNT: 18.3 % (ref 11.8–14.4)
ETHANOL PERCENT: ABNORMAL %
ETHANOLAMINE SERPL-MCNC: <10 MG/DL
GFR SERPL CREATININE-BSD FRML MDRD: >60 ML/MIN/1.73M2
GLUCOSE SERPL-MCNC: 117 MG/DL (ref 74–99)
GLUCOSE UR STRIP-MCNC: NEGATIVE MG/DL
HCT VFR BLD AUTO: 35.4 % (ref 36.3–47.1)
HGB BLD-MCNC: 10.6 G/DL (ref 11.9–15.1)
HGB UR QL STRIP.AUTO: NEGATIVE
IMM GRANULOCYTES # BLD AUTO: <0.03 K/UL (ref 0–0.3)
IMM GRANULOCYTES NFR BLD: 0 %
KETONES UR STRIP-MCNC: NEGATIVE MG/DL
LEUKOCYTE ESTERASE UR QL STRIP: NEGATIVE
LYMPHOCYTES NFR BLD: 1.22 K/UL (ref 1.1–3.7)
LYMPHOCYTES RELATIVE PERCENT: 18 % (ref 24–43)
MAGNESIUM SERPL-MCNC: 2.1 MG/DL (ref 1.6–2.4)
MCH RBC QN AUTO: 21.3 PG (ref 25.2–33.5)
MCHC RBC AUTO-ENTMCNC: 29.9 G/DL (ref 28.4–34.8)
MCV RBC AUTO: 71.2 FL (ref 82.6–102.9)
MONOCYTES NFR BLD: 0.52 K/UL (ref 0.1–1.2)
MONOCYTES NFR BLD: 8 % (ref 3–12)
NEUTROPHILS NFR BLD: 69 % (ref 36–65)
NEUTS SEG NFR BLD: 4.68 K/UL (ref 1.5–8.1)
NITRITE UR QL STRIP: NEGATIVE
NRBC BLD-RTO: 0 PER 100 WBC
PH UR STRIP: 8 [PH] (ref 5–8)
PLATELET # BLD AUTO: 302 K/UL (ref 138–453)
PMV BLD AUTO: 9.2 FL (ref 8.1–13.5)
POTASSIUM SERPL-SCNC: 3.5 MMOL/L (ref 3.7–5.3)
PROT SERPL-MCNC: 7.2 G/DL (ref 6.6–8.7)
PROT UR STRIP-MCNC: NEGATIVE MG/DL
RBC # BLD AUTO: 4.97 M/UL (ref 3.95–5.11)
RBC # BLD: ABNORMAL 10*6/UL
RBC #/AREA URNS HPF: NORMAL /HPF (ref 0–4)
SALICYLATES SERPL-MCNC: 2.2 MG/DL (ref 0–10)
SODIUM SERPL-SCNC: 141 MMOL/L (ref 136–145)
SP GR UR STRIP: 1.01 (ref 1–1.03)
TROPONIN I SERPL HS-MCNC: 26 NG/L (ref 0–14)
TSH SERPL DL<=0.05 MIU/L-ACNC: 2.35 UIU/ML (ref 0.27–4.2)
UROBILINOGEN UR STRIP-ACNC: NORMAL EU/DL (ref 0–1)
WBC #/AREA URNS HPF: NORMAL /HPF (ref 0–5)
WBC OTHER # BLD: 6.8 K/UL (ref 3.5–11.3)

## 2024-03-13 PROCEDURE — 93005 ELECTROCARDIOGRAM TRACING: CPT | Performed by: EMERGENCY MEDICINE

## 2024-03-13 PROCEDURE — 83735 ASSAY OF MAGNESIUM: CPT

## 2024-03-13 PROCEDURE — 80307 DRUG TEST PRSMV CHEM ANLYZR: CPT

## 2024-03-13 PROCEDURE — 6370000000 HC RX 637 (ALT 250 FOR IP): Performed by: STUDENT IN AN ORGANIZED HEALTH CARE EDUCATION/TRAINING PROGRAM

## 2024-03-13 PROCEDURE — 71045 X-RAY EXAM CHEST 1 VIEW: CPT

## 2024-03-13 PROCEDURE — 70498 CT ANGIOGRAPHY NECK: CPT

## 2024-03-13 PROCEDURE — 6370000000 HC RX 637 (ALT 250 FOR IP): Performed by: EMERGENCY MEDICINE

## 2024-03-13 PROCEDURE — 99285 EMERGENCY DEPT VISIT HI MDM: CPT

## 2024-03-13 PROCEDURE — 70551 MRI BRAIN STEM W/O DYE: CPT

## 2024-03-13 PROCEDURE — 84443 ASSAY THYROID STIM HORMONE: CPT

## 2024-03-13 PROCEDURE — 6360000004 HC RX CONTRAST MEDICATION: Performed by: STUDENT IN AN ORGANIZED HEALTH CARE EDUCATION/TRAINING PROGRAM

## 2024-03-13 PROCEDURE — G0480 DRUG TEST DEF 1-7 CLASSES: HCPCS

## 2024-03-13 PROCEDURE — 70450 CT HEAD/BRAIN W/O DYE: CPT

## 2024-03-13 PROCEDURE — 80179 DRUG ASSAY SALICYLATE: CPT

## 2024-03-13 PROCEDURE — 84484 ASSAY OF TROPONIN QUANT: CPT

## 2024-03-13 PROCEDURE — 80143 DRUG ASSAY ACETAMINOPHEN: CPT

## 2024-03-13 PROCEDURE — 81001 URINALYSIS AUTO W/SCOPE: CPT

## 2024-03-13 PROCEDURE — 85025 COMPLETE CBC W/AUTO DIFF WBC: CPT

## 2024-03-13 PROCEDURE — 80053 COMPREHEN METABOLIC PANEL: CPT

## 2024-03-13 RX ORDER — MECLIZINE HCL 12.5 MG/1
25 TABLET ORAL ONCE
Status: COMPLETED | OUTPATIENT
Start: 2024-03-13 | End: 2024-03-13

## 2024-03-13 RX ORDER — AMLODIPINE BESYLATE 10 MG/1
5 TABLET ORAL DAILY
Status: DISCONTINUED | OUTPATIENT
Start: 2024-03-13 | End: 2024-03-13 | Stop reason: HOSPADM

## 2024-03-13 RX ORDER — LOSARTAN POTASSIUM 50 MG/1
100 TABLET ORAL DAILY
Status: DISCONTINUED | OUTPATIENT
Start: 2024-03-13 | End: 2024-03-13 | Stop reason: HOSPADM

## 2024-03-13 RX ADMIN — LOSARTAN POTASSIUM 100 MG: 50 TABLET ORAL at 13:41

## 2024-03-13 RX ADMIN — MECLIZINE 25 MG: 12.5 TABLET ORAL at 01:00

## 2024-03-13 RX ADMIN — IOPAMIDOL 75 ML: 755 INJECTION, SOLUTION INTRAVENOUS at 03:42

## 2024-03-13 RX ADMIN — AMLODIPINE BESYLATE 5 MG: 10 TABLET ORAL at 13:28

## 2024-03-13 ASSESSMENT — PAIN DESCRIPTION - DESCRIPTORS: DESCRIPTORS: ACHING

## 2024-03-13 ASSESSMENT — PAIN DESCRIPTION - PAIN TYPE: TYPE: ACUTE PAIN

## 2024-03-13 ASSESSMENT — PAIN SCALES - GENERAL: PAINLEVEL_OUTOF10: 6

## 2024-03-13 ASSESSMENT — ENCOUNTER SYMPTOMS
ABDOMINAL PAIN: 0
VOMITING: 0
COUGH: 0
NAUSEA: 1
SHORTNESS OF BREATH: 0

## 2024-03-13 ASSESSMENT — PAIN DESCRIPTION - LOCATION: LOCATION: HEAD

## 2024-03-13 NOTE — ED PROVIDER NOTES
[facility] ED  Emergency Department Encounter  Emergency Medicine Resident     Pt Name:Filomena Lopez  MRN: [id number]  Birthdate 1955  Date of evaluation: 3/13/24  PCP:  Jacobo Robles MD  Note Started: 12:46 AM EDT      CHIEF COMPLAINT       Chief Complaint   Patient presents with    Dizziness     Dizziness got really bad tonight hx of vertigo        HISTORY OF PRESENT ILLNESS  (Location/Symptom, Timing/Onset, Context/Setting, Quality, Duration, Modifying Factors, Severity.)      Filomena Lopez is a 68 y.o. female who presents with complaint of vertigo which started earlier today.  History of vertigo in the past, admitted last year for this, history of multiple cerebral infarcts and vertebral artery blockage.  States medication has helped in the past which appears to likely be meclizine, states she was taken off this as she is no longer having issues with vertigo.  Denies headache, changes in vision, weakness, numbness, fever, chills, chest pain, shortness of breath, cough, abdominal pain.  Reports associated nausea without vomiting.  Endorses pain in left hand from carpal tunnel syndrome.    PAST MEDICAL / SURGICAL / SOCIAL / FAMILY HISTORY      has a past medical history of Ambulates with cane, Bleeding, Cataracts, bilateral, Cerebrovascular disease, Chronic pain in left foot, CKD (chronic kidney disease) stage 3, GFR 30-59 ml/min ([facility]), COVID-19, GERD (gastroesophageal reflux disease), Hx of blood clots, Hyperlipidemia, Hypertension, Iron (Fe) deficiency anemia, Nicotine abuse, Obesity, TIBURCIO on CPAP, Osteoarthritis, Peripheral vascular disease ([facility]), Substance abuse ([facility]), Thalassemia minor, Thyroid nodule, Unspecified cerebral artery occlusion with cerebral infarction, and Wears dentures.       has a past surgical history that includes Tonsillectomy and adenoidectomy; Tubal ligation; Breast surgery; Endometrial biopsy (1998); Upper gastrointestinal endoscopy (2009); Colonoscopy

## 2024-03-13 NOTE — ED PROVIDER NOTES
FACULTY SIGN-OUT  ADDENDUM     Care of this patient was assumed from previous attending physician. The patient's initial evaluation and plan have been discussed with the prior provider who initially evaluated the patient.      Note Started: 7:18 AM EDT    Attestation  I was available and discussed any additional care issues that arose and coordinated the management plans with the resident(s) caring for the patient during my duty period. Any areas of disagreement with resident's documentation of care or procedures are noted on the chart. I was personally present for the key portions of any/all procedures, during my duty period. I have documented in the chart those procedures where I was not present during the key portions.       ED COURSE      The patient was given the following medications:  Orders Placed This Encounter   Medications    meclizine (ANTIVERT) tablet 25 mg    iopamidol (ISOVUE-370) 76 % injection 75 mL       RECENT VITALS:   Temp: 97.6 °F (36.4 °C), Pulse: 52, Respirations: 18, BP: (!) 161/79    MEDICAL DECISION MAKING        Filomena Lopez is a 68 y.o. female who presents to the Emergency Department with complaints of dizziness. New stenosis on imaging. Neuro consulting. Awaiting recs.    MRI was negative for any acute findings.  I spoke with neurology who states that they are okay for patient being discharged.  Patient's home blood pressure medications were given and blood pressure has decreased.  Patient is agreeable with discharge.  Will discharge patient home with instructions to follow-up with primary care physician or to return here if symptoms worsen.    Kathleen Dotson MD  Attending Emergency Physician    (Please note that portions of this note were completed with a voice recognition program.  Efforts were made to edit the dictations but occasionally words are mis-transcribed.)

## 2024-03-13 NOTE — CONSULTS
Endovascular Neurosurgery Consult      Reason for evaluation: L ICA chronic occlusion    SUBJECTIVE:   History of Chief Complaint:    68 y.o. female who presents with complaint of vertigo which started yesterday.  History of vertigo in the past, admitted last year for this, history of multiple cerebral infarcts and vertebral artery blockage. States medication has helped in the past which appears to likely be meclizine, states she was taken off this as she is no longer having issues with vertigo.      In the ED, patient is back to normal.    Endovascular consulted for left ICA and R VA occlusion that is known in the past.     Allergies  is allergic to duricef [cefadroxil monohydrate], fish-derived products, pcn [penicillins], and tomato.  Medications  Prior to Admission medications    Medication Sig Start Date End Date Taking? Authorizing Provider   metFORMIN (GLUCOPHAGE) 500 MG tablet Take 1 tablet by mouth daily (with breakfast) 3/12/24 6/10/24  Jacobo Robles MD   pregabalin (LYRICA) 25 MG capsule Take 1 capsule by mouth 3 times daily for 60 days. Max Daily Amount: 75 mg 3/12/24 5/11/24  Jacobo Robles MD   FEROSUL 325 (65 Fe) MG tablet TAKE 1 TABLET BY MOUTH IN THE MORNING AND AT BEDTIME 3/5/24   Oc Irizarry MD   folic acid (FOLVITE) 1 MG tablet TAKE 1 TABLET BY MOUTH DAILY 3/5/24   Jacobo Robles MD   diclofenac sodium (VOLTAREN) 1 % GEL APPLY 2 GRAMS TOPICALLY 4 TIMES A DAY 2/26/24   Jacobo Robles MD   Handicap Placard MISC by Does not apply route 2/6/24   Jacobo Robles MD   Cholecalciferol (VITAMIN D3) 25 MCG TABS TAKE 1 TABLET BY MOUTH DAILY 2/3/24   Jacobo Robles MD   BRILINTA 90 MG TABS tablet TAKE 1 TABLET BY MOUTH TWICE A DAY 2/1/24 4/2/24  Domenica Elise MD   amLODIPine (NORVASC) 5 MG tablet Take 1 tablet by mouth daily 1/9/24   Mary Gomes MD   losartan (COZAAR) 100 MG tablet TAKE 1 TABLET BY MOUTH DAILY 1/8/24   Jacobo Robles MD   omeprazole (PRILOSEC) 20 MG delayed

## 2024-03-13 NOTE — ED PROVIDER NOTES
[facility]     Emergency Department     Faculty Attestation    I performed a history and physical examination of the patient and discussed management with the resident. I have reviewed and agree with the resident’s findings including all diagnostic interpretations, and treatment plans as written. Any areas of disagreement are noted on the chart. I was personally present for the key portions of any procedures. I have documented in the chart those procedures where I was not present during the key portions. I have reviewed the emergency nurses triage note. I agree with the chief complaint, past medical history, past surgical history, allergies, medications, social and family history as documented unless otherwise noted below. Documentation of the HPI, Physical Exam and Medical Decision Making performed by maria gibdiego is based on my personal performance of the HPI, PE and MDM. For Physician Assistant/ Nurse Practitioner cases/documentation I have personally evaluated this patient and have completed at least one if not all key elements of the E/M (history, physical exam, and MDM). Additional findings are as noted.    Note Started: 12:44 AM EDT     69 yo F c/o dizziness, nausea, no cp, no syncope, no fever,   PE gcs 15 daphney eomi, speech clear, face symmetric, neck supple, no meningeal endings,    -cta L vertebral artery stenosis,   Neuro consult, care turned over to day shift    EKG Interpretation    Interpreted by me  Sinus bradycardia, sinus arrhythmia, heart rate 57 no ischemia, normal axis, QT corrected 430    CRITICAL CARE: There was a high probability of clinically significant/life threatening deterioration in this patient's condition which required my urgent intervention.  Total critical care time was 5 minutes.  This excludes any time for separately reportable procedures.       Surjit Xie DO  03/13/24 0045       Surjit Curry,

## 2024-03-13 NOTE — ED NOTES
ED to inpatient nurses report      Chief Complaint:  Chief Complaint   Patient presents with    Dizziness     Dizziness got really bad tonight hx of vertigo      Present to ED from: home     MOA:     LOC: alert and orientated to name, place, date  Mobility: Requires assistance * 1 (cane and wheel chair)   Oxygen Baseline: room air     Current needs require:   Pending ED orders:   Present condition: resting in     Why did the patient come to the ED?   What is the plan? Neuro follow up   Any procedures or intervention occur?   Any safety concerns??    Mental Status:  Level of Consciousness: Alert (0)    Psych Assessment:   Psychosocial  Psychosocial (WDL): Within Defined Limits  Vital signs   Vitals:    03/13/24 0044 03/13/24 0200 03/13/24 0201 03/13/24 0315   BP: (!) 188/68 (!) 148/68  (!) 161/79   Pulse:   53 52   Resp:    18   Temp:       TempSrc:       SpO2: 99% 98%  99%   Weight:            Vitals:  Patient Vitals for the past 24 hrs:   BP Temp Temp src Pulse Resp SpO2 Weight   03/13/24 0315 (!) 161/79 -- -- 52 18 99 % --   03/13/24 0201 -- -- -- 53 -- -- --   03/13/24 0200 (!) 148/68 -- -- -- -- 98 % --   03/13/24 0044 (!) 188/68 -- -- -- -- 99 % --   03/13/24 0041 -- 97.6 °F (36.4 °C) Oral 66 20 -- (!) 155.6 kg (343 lb)      Visit Vitals  BP (!) 161/79   Pulse 52   Temp 97.6 °F (36.4 °C) (Oral)   Resp 18   Wt (!) 155.6 kg (343 lb)   SpO2 99%   BMI 53.72 kg/m²        LDAs:      Ambulatory Status:  Presents to emergency department  because of falls (Syncope, seizure, or loss of consciousness): No, Age > 70: Yes, Altered Mental Status, Intoxication with alcohol or substance confusion (Disorientation, impaired judgment, poor safety awaremess, or inability to follow instructions): No, Impaired Mobility: Ambulates or transfers with assistive devices or assistance; Unable to ambulate or transer.: Yes, Nursing Judgement: Yes    Diagnosis:  DISPOSITION     Final diagnoses:   None        Consults:  IP CONSULT TO

## 2024-03-13 NOTE — ED NOTES
Pt. To the ED via medic 9 c/o dizziness and headache. Pt. States that she is treated for vertigo but this feels a little different. Pt. Is A&Ox4, RR even and non-labored, NAD noted at this time. Pt. Placed on full cardiac monitor. Dr. Curry and Dr. Walker at bedside to assess.

## 2024-03-13 NOTE — DISCHARGE INSTRUCTIONS
Read and follow all instructions.    Return to the ER if symptoms worsen, you develop chest pain or difficulty breathing, or if you become concerned for any reason.

## 2024-03-13 NOTE — ED NOTES
The following labs labeled with pt sticker and tubed to lab:     [] Blue     [] Lavender   [] on ice  [x] Green/yellow  [] Green/black [] on ice  [] Yellow  [] Red  [] Pink      [] COVID-19 swab    [] Rapid  [] PCR  [] Flu Swab  [] Strep Swab  [] Peds Viral Panel     [] Urine Sample  [] Pelvic Cultures  [] Blood Cultures   [] Wound Cultures

## 2024-03-13 NOTE — ED NOTES
Dr. Dotson notified of patient's systolic BP of 200s. PT states she has not taken her home meds today. Awaiting orders at this time

## 2024-03-13 NOTE — ED NOTES
Pt returned to exam room from bathroom via wheelchair  Pt assisted into bed, call light in place and all questions answered at this time

## 2024-03-14 LAB
EKG ATRIAL RATE: 57 BPM
EKG P AXIS: 65 DEGREES
EKG P-R INTERVAL: 158 MS
EKG Q-T INTERVAL: 442 MS
EKG QRS DURATION: 94 MS
EKG QTC CALCULATION (BAZETT): 430 MS
EKG R AXIS: 27 DEGREES
EKG T AXIS: 30 DEGREES
EKG VENTRICULAR RATE: 57 BPM

## 2024-03-21 ENCOUNTER — HOSPITAL ENCOUNTER (OUTPATIENT)
Dept: MAMMOGRAPHY | Age: 69
Discharge: HOME | End: 2024-03-23
Payer: COMMERCIAL

## 2024-03-21 DIAGNOSIS — Z12.31 ENCOUNTER FOR SCREENING MAMMOGRAM FOR MALIGNANT NEOPLASM OF BREAST: ICD-10-CM

## 2024-03-21 PROCEDURE — 77063 BREAST TOMOSYNTHESIS BI: CPT

## 2024-03-26 DIAGNOSIS — E78.00 PURE HYPERCHOLESTEROLEMIA: ICD-10-CM

## 2024-03-26 DIAGNOSIS — I63.00 CEREBROVASCULAR ACCIDENT (CVA) DUE TO THROMBOSIS OF PRECEREBRAL ARTERY (HCC): ICD-10-CM

## 2024-03-26 NOTE — TELEPHONE ENCOUNTER
A Refill Has Been Requested for Filomena A Larance    Medication Requested  Lipitor      Last Visit Date (If Applicable)  Visit date not found    Next Visit Date (If Applicable)  Visit date not found

## 2024-03-26 NOTE — TELEPHONE ENCOUNTER
A Refill Has Been Requested for Filomena GABBI Lopez    Medication Requested  Multiple Medications      Last Visit Date (If Applicable)  3/12/2024    Next Visit Date (If Applicable)  5/28/2024

## 2024-03-27 NOTE — TELEPHONE ENCOUNTER
Pharmacy requesting refill of BRILINTA 90 MG TABS tablet      Medication active on med list yes      Date of last Rx: 021/2024 with 1 refills          verified by TERI PRAJAPATI      Date of last appointment 1/11/2024    Next Visit Date:  Visit date not found

## 2024-03-28 RX ORDER — CHOLECALCIFEROL (VITAMIN D3) 25 MCG
1 TABLET ORAL DAILY
Qty: 28 TABLET | Refills: 1 | Status: SHIPPED | OUTPATIENT
Start: 2024-03-28

## 2024-03-28 RX ORDER — ATORVASTATIN CALCIUM 80 MG/1
80 TABLET, FILM COATED ORAL DAILY
Qty: 28 TABLET | Refills: 4 | Status: SHIPPED | OUTPATIENT
Start: 2024-03-28

## 2024-03-28 RX ORDER — TICAGRELOR 90 MG/1
TABLET ORAL
Qty: 60 TABLET | Refills: 5 | Status: SHIPPED | OUTPATIENT
Start: 2024-03-28 | End: 2024-05-28

## 2024-03-28 RX ORDER — ASPIRIN 81 MG/1
TABLET ORAL
Qty: 28 TABLET | Refills: 5 | Status: SHIPPED | OUTPATIENT
Start: 2024-03-28

## 2024-04-01 ENCOUNTER — HOSPITAL ENCOUNTER (OUTPATIENT)
Age: 69
Setting detail: SPECIMEN
Discharge: HOME | End: 2024-04-01
Payer: COMMERCIAL

## 2024-04-01 DIAGNOSIS — D56.3 THALASSEMIA TRAIT, ALPHA: ICD-10-CM

## 2024-04-01 DIAGNOSIS — R79.89 ELEVATED FERRITIN: ICD-10-CM

## 2024-04-01 DIAGNOSIS — D50.9 MICROCYTIC ANEMIA: ICD-10-CM

## 2024-04-01 LAB
BASOPHILS # BLD: 0.06 K/UL (ref 0–0.2)
BASOPHILS NFR BLD: 1 % (ref 0–2)
EOSINOPHIL # BLD: 0.31 K/UL (ref 0–0.44)
EOSINOPHILS RELATIVE PERCENT: 5 % (ref 1–4)
ERYTHROCYTE [DISTWIDTH] IN BLOOD BY AUTOMATED COUNT: 17.7 % (ref 11.8–14.4)
FERRITIN SERPL-MCNC: 695 NG/ML (ref 13–150)
FOLATE SERPL-MCNC: >20 NG/ML (ref 4.8–24.2)
HCT VFR BLD AUTO: 36.7 % (ref 36.3–47.1)
HGB BLD-MCNC: 10.9 G/DL (ref 11.9–15.1)
IMM GRANULOCYTES # BLD AUTO: 0.06 K/UL (ref 0–0.3)
IMM GRANULOCYTES NFR BLD: 1 %
LYMPHOCYTES NFR BLD: 1.28 K/UL (ref 1.1–3.7)
LYMPHOCYTES RELATIVE PERCENT: 21 % (ref 24–43)
MCH RBC QN AUTO: 21.5 PG (ref 25.2–33.5)
MCHC RBC AUTO-ENTMCNC: 29.7 G/DL (ref 28.4–34.8)
MCV RBC AUTO: 72.4 FL (ref 82.6–102.9)
MONOCYTES NFR BLD: 0.37 K/UL (ref 0.1–1.2)
MONOCYTES NFR BLD: 6 % (ref 3–12)
NEUTROPHILS NFR BLD: 66 % (ref 36–65)
NEUTS SEG NFR BLD: 4.02 K/UL (ref 1.5–8.1)
NRBC BLD-RTO: 0 PER 100 WBC
PLATELET # BLD AUTO: ABNORMAL K/UL (ref 138–453)
PLATELET, FLUORESCENCE: 346 K/UL (ref 138–453)
PLATELETS.RETICULATED NFR BLD AUTO: 1.4 % (ref 1.1–10.3)
RBC # BLD AUTO: 5.07 M/UL (ref 3.95–5.11)
VIT B12 SERPL-MCNC: 1729 PG/ML (ref 232–1245)
WBC OTHER # BLD: 6.1 K/UL (ref 3.5–11.3)

## 2024-04-01 PROCEDURE — 85055 RETICULATED PLATELET ASSAY: CPT

## 2024-04-01 PROCEDURE — 85025 COMPLETE CBC W/AUTO DIFF WBC: CPT

## 2024-04-01 PROCEDURE — 82746 ASSAY OF FOLIC ACID SERUM: CPT

## 2024-04-01 PROCEDURE — 82728 ASSAY OF FERRITIN: CPT

## 2024-04-01 PROCEDURE — 82607 VITAMIN B-12: CPT

## 2024-04-01 PROCEDURE — 36415 COLL VENOUS BLD VENIPUNCTURE: CPT

## 2024-04-08 ENCOUNTER — TELEPHONE (OUTPATIENT)
Dept: INTERNAL MEDICINE | Age: 69
End: 2024-04-08

## 2024-04-08 ENCOUNTER — OFFICE VISIT (OUTPATIENT)
Dept: ONCOLOGY | Age: 69
End: 2024-04-08
Payer: COMMERCIAL

## 2024-04-08 ENCOUNTER — TELEPHONE (OUTPATIENT)
Dept: ONCOLOGY | Age: 69
End: 2024-04-08

## 2024-04-08 VITALS
SYSTOLIC BLOOD PRESSURE: 150 MMHG | TEMPERATURE: 98.6 F | BODY MASS INDEX: 55.19 KG/M2 | WEIGHT: 293 LBS | DIASTOLIC BLOOD PRESSURE: 92 MMHG | RESPIRATION RATE: 18 BRPM

## 2024-04-08 DIAGNOSIS — D50.9 MICROCYTIC ANEMIA: ICD-10-CM

## 2024-04-08 DIAGNOSIS — D56.3 THALASSEMIA TRAIT, ALPHA: Primary | ICD-10-CM

## 2024-04-08 DIAGNOSIS — R79.89 ELEVATED FERRITIN: ICD-10-CM

## 2024-04-08 PROCEDURE — 99214 OFFICE O/P EST MOD 30 MIN: CPT | Performed by: INTERNAL MEDICINE

## 2024-04-08 PROCEDURE — 1123F ACP DISCUSS/DSCN MKR DOCD: CPT | Performed by: INTERNAL MEDICINE

## 2024-04-08 PROCEDURE — 3077F SYST BP >= 140 MM HG: CPT | Performed by: INTERNAL MEDICINE

## 2024-04-08 PROCEDURE — 3080F DIAST BP >= 90 MM HG: CPT | Performed by: INTERNAL MEDICINE

## 2024-04-08 PROCEDURE — 99211 OFF/OP EST MAY X REQ PHY/QHP: CPT | Performed by: INTERNAL MEDICINE

## 2024-04-08 NOTE — PROGRESS NOTES
hemoglobinopathy  Alpha thalassemia trait, Predicted Genotype: -a/-a   History of iron deficiency   Morbid obesity  Obstructive sleep apnea  Vitamin D deficiency  Hepatic steatosis (CT 11/2021)    PLAN:  Personally reviewed results of lab work-up and other relevant clinical data.  Ferritin remains elevated likely reactive in nature.  Patient not on any iron supplementation..  Patient's alpha thalassemia trait also contributing to elevated ferritin.  No plan for iron chelation at this point.  Continue to monitor hemoglobin which has been stable.  No plan for bone marrow biopsy at this point.  Discontinue B12 supplementation.  Continue age-appropriate screening studies  Return in 6 months with repeat lab work.         EMILIA CAMPA MD                    This note is created with the assistance of a speech recognition program.  While intending to generate a document that actually reflects the content of the visit, the document can still have some errors including those of syntax and sound a like substitutions which may escape proof reading.  It such instances, actual meaning can be extrapolated by contextual diversion.

## 2024-04-08 NOTE — TELEPHONE ENCOUNTER
MILO HERE FOR FOLLOW UP   Rv in 6 months with labs priror  LABS ORDERED: CBC, IRON & TIBC, AND FERRITIN   MD VISIT: 10/7/24 @ 3:30PM   LABS PRINTED AND GIVEN ON EXIT   AVS PRINTED AND GIVEN ON EXIT

## 2024-04-09 ENCOUNTER — TELEPHONE (OUTPATIENT)
Dept: NEUROLOGY | Age: 69
End: 2024-04-09

## 2024-04-09 NOTE — TELEPHONE ENCOUNTER
Filomena called asking for a refill on Gabapentin.  I looked at her chart and I see that another physician has prescribed pregabalin.   Should she be taking both?  She gets her medications in a bubble pack so she wasn't certain what else she was taking.

## 2024-04-12 ENCOUNTER — OFFICE VISIT (OUTPATIENT)
Dept: PULMONOLOGY | Age: 69
End: 2024-04-12
Payer: COMMERCIAL

## 2024-04-12 VITALS
HEIGHT: 67 IN | RESPIRATION RATE: 16 BRPM | HEART RATE: 78 BPM | WEIGHT: 293 LBS | OXYGEN SATURATION: 97 % | DIASTOLIC BLOOD PRESSURE: 87 MMHG | SYSTOLIC BLOOD PRESSURE: 130 MMHG | BODY MASS INDEX: 45.99 KG/M2

## 2024-04-12 DIAGNOSIS — E66.01 MORBID OBESITY WITH BMI OF 50.0-59.9, ADULT (HCC): ICD-10-CM

## 2024-04-12 DIAGNOSIS — G47.33 OSA ON CPAP: ICD-10-CM

## 2024-04-12 DIAGNOSIS — Z87.891 HISTORY OF SMOKING 30 OR MORE PACK YEARS: ICD-10-CM

## 2024-04-12 DIAGNOSIS — R91.1 LUNG NODULE: ICD-10-CM

## 2024-04-12 DIAGNOSIS — J44.9 CHRONIC OBSTRUCTIVE PULMONARY DISEASE, UNSPECIFIED COPD TYPE (HCC): Primary | ICD-10-CM

## 2024-04-12 PROCEDURE — 99214 OFFICE O/P EST MOD 30 MIN: CPT | Performed by: INTERNAL MEDICINE

## 2024-04-12 PROCEDURE — 1123F ACP DISCUSS/DSCN MKR DOCD: CPT | Performed by: INTERNAL MEDICINE

## 2024-04-12 PROCEDURE — 3079F DIAST BP 80-89 MM HG: CPT | Performed by: INTERNAL MEDICINE

## 2024-04-12 PROCEDURE — 3075F SYST BP GE 130 - 139MM HG: CPT | Performed by: INTERNAL MEDICINE

## 2024-04-12 RX ORDER — UMECLIDINIUM BROMIDE AND VILANTEROL TRIFENATATE 62.5; 25 UG/1; UG/1
1 POWDER RESPIRATORY (INHALATION) DAILY
Qty: 30 EACH | Refills: 11 | Status: SHIPPED | OUTPATIENT
Start: 2024-04-12

## 2024-04-12 ASSESSMENT — SLEEP AND FATIGUE QUESTIONNAIRES
ESS TOTAL SCORE: 13
HOW LIKELY ARE YOU TO NOD OFF OR FALL ASLEEP IN A CAR, WHILE STOPPED FOR A FEW MINUTES IN TRAFFIC: MODERATE CHANCE OF DOZING
HOW LIKELY ARE YOU TO NOD OFF OR FALL ASLEEP WHILE SITTING AND TALKING TO SOMEONE: MODERATE CHANCE OF DOZING
HOW LIKELY ARE YOU TO NOD OFF OR FALL ASLEEP WHILE SITTING INACTIVE IN A PUBLIC PLACE: SLIGHT CHANCE OF DOZING
HOW LIKELY ARE YOU TO NOD OFF OR FALL ASLEEP WHEN YOU ARE A PASSENGER IN A CAR FOR AN HOUR WITHOUT A BREAK: SLIGHT CHANCE OF DOZING
HOW LIKELY ARE YOU TO NOD OFF OR FALL ASLEEP WHILE SITTING QUIETLY AFTER LUNCH WITHOUT ALCOHOL: MODERATE CHANCE OF DOZING
HOW LIKELY ARE YOU TO NOD OFF OR FALL ASLEEP WHILE WATCHING TV: MODERATE CHANCE OF DOZING
HOW LIKELY ARE YOU TO NOD OFF OR FALL ASLEEP WHILE SITTING AND READING: MODERATE CHANCE OF DOZING
HOW LIKELY ARE YOU TO NOD OFF OR FALL ASLEEP WHILE LYING DOWN TO REST IN THE AFTERNOON WHEN CIRCUMSTANCES PERMIT: SLIGHT CHANCE OF DOZING

## 2024-04-12 NOTE — PROGRESS NOTES
09/26/2023 349 138 - 453 k/uL Final     BMP:   Sodium   Date Value Ref Range Status   03/13/2024 141 136 - 145 mmol/L Final   11/27/2023 142 135 - 144 mmol/L Final   03/27/2023 143 135 - 144 mmol/L Final     Potassium   Date Value Ref Range Status   03/13/2024 3.5 (L) 3.7 - 5.3 mmol/L Final   11/27/2023 4.5 3.7 - 5.3 mmol/L Final   03/27/2023 4.0 3.7 - 5.3 mmol/L Final     Chloride   Date Value Ref Range Status   03/13/2024 104 98 - 107 mmol/L Final   11/27/2023 101 98 - 107 mmol/L Final   03/27/2023 106 98 - 107 mmol/L Final     CO2   Date Value Ref Range Status   03/13/2024 23 20 - 31 mmol/L Final   11/27/2023 29 20 - 31 mmol/L Final   03/27/2023 25 20 - 31 mmol/L Final     BUN   Date Value Ref Range Status   03/13/2024 21 8 - 23 mg/dL Final   11/27/2023 18 8 - 23 mg/dL Final   03/27/2023 21 8 - 23 mg/dL Final     Creatinine   Date Value Ref Range Status   03/13/2024 1.0 (H) 0.50 - 0.90 mg/dL Final   11/27/2023 1.3 (H) 0.5 - 0.9 mg/dL Final   03/27/2023 1.25 (H) 0.50 - 0.90 mg/dL Final     Glucose   Date Value Ref Range Status   03/13/2024 117 (H) 74 - 99 mg/dL Final   11/27/2023 89 70 - 99 mg/dL Final   03/27/2023 95 70 - 99 mg/dL Final   04/12/2012 88 74 - 106 mg/dL Final   01/12/2012 84 74 - 106 mg/dL Final   10/03/2011 115 (H) 74 - 106 mg/dL Final     Hepatic:       Amylase: No results found for: \"AMYLASE\"  Lipase:     CARDIAC ENZYMES:       BNP: No results found for: \"BNP\"  Lipids:         INR:     Thyroid:   TSH   Date Value Ref Range Status   03/13/2024 2.35 0.27 - 4.20 uIU/mL Final     Urinalysis:       Cultures:-  -----------------------------------------------------------------    ABGs: No results found for: \"PHART\", \"PO2ART\", \"LCH8SLB\"    Pulmonary Functions Testing Results:    No results found for: \"FEV1\", \"FVC\", \"FGH5SET\", \"TLC\", \"DLCO\"    CXR      CT Scans        Assessment and Plan       ICD-10-CM    1. Chronic obstructive pulmonary disease, unspecified COPD type ([facility])  J44.9       2. TIBURCIO on CPAP

## 2024-04-22 NOTE — TELEPHONE ENCOUNTER
I called [facility] and lm asking them to check her profile and to please call me back to review her meds.

## 2024-04-27 DIAGNOSIS — I10 ESSENTIAL HYPERTENSION: ICD-10-CM

## 2024-04-29 DIAGNOSIS — E61.1 IRON DEFICIENCY: ICD-10-CM

## 2024-04-29 DIAGNOSIS — D50.9 MICROCYTIC ANEMIA: ICD-10-CM

## 2024-04-29 DIAGNOSIS — D56.3 THALASSEMIA TRAIT, ALPHA: ICD-10-CM

## 2024-04-29 DIAGNOSIS — R79.89 ELEVATED FERRITIN: ICD-10-CM

## 2024-04-29 RX ORDER — FERROUS SULFATE 325(65) MG
1 TABLET ORAL 2 TIMES DAILY
Qty: 56 TABLET | Refills: 1 | Status: SHIPPED | OUTPATIENT
Start: 2024-04-29

## 2024-04-29 RX ORDER — LOSARTAN POTASSIUM 100 MG/1
100 TABLET ORAL DAILY
Qty: 28 TABLET | Refills: 3 | Status: SHIPPED | OUTPATIENT
Start: 2024-04-29

## 2024-04-29 NOTE — TELEPHONE ENCOUNTER
Filomena Lopez is calling to request a refill on the following medication(s):    Medication Request:  Losartan      Last Visit Date (If Applicable):  3/12/2024    Next Visit Date:    5/28/2024

## 2024-05-20 ENCOUNTER — TELEPHONE (OUTPATIENT)
Dept: GASTROENTEROLOGY | Age: 69
End: 2024-05-20

## 2024-05-20 ENCOUNTER — OFFICE VISIT (OUTPATIENT)
Dept: GASTROENTEROLOGY | Age: 69
End: 2024-05-20
Payer: COMMERCIAL

## 2024-05-20 VITALS
HEART RATE: 78 BPM | WEIGHT: 293 LBS | HEIGHT: 67 IN | SYSTOLIC BLOOD PRESSURE: 167 MMHG | BODY MASS INDEX: 45.99 KG/M2 | TEMPERATURE: 97.2 F | DIASTOLIC BLOOD PRESSURE: 87 MMHG | RESPIRATION RATE: 20 BRPM

## 2024-05-20 DIAGNOSIS — R74.8 ELEVATED ALKALINE PHOSPHATASE LEVEL: ICD-10-CM

## 2024-05-20 DIAGNOSIS — Z86.010 HISTORY OF COLON POLYPS: Primary | ICD-10-CM

## 2024-05-20 PROCEDURE — 3077F SYST BP >= 140 MM HG: CPT | Performed by: INTERNAL MEDICINE

## 2024-05-20 PROCEDURE — 1123F ACP DISCUSS/DSCN MKR DOCD: CPT | Performed by: INTERNAL MEDICINE

## 2024-05-20 PROCEDURE — 99214 OFFICE O/P EST MOD 30 MIN: CPT | Performed by: INTERNAL MEDICINE

## 2024-05-20 PROCEDURE — 3079F DIAST BP 80-89 MM HG: CPT | Performed by: INTERNAL MEDICINE

## 2024-05-20 ASSESSMENT — ENCOUNTER SYMPTOMS
ABDOMINAL PAIN: 0
SORE THROAT: 0
CONSTIPATION: 0
COUGH: 1
WHEEZING: 1
VOICE CHANGE: 0
TROUBLE SWALLOWING: 0
NAUSEA: 0
VOMITING: 0
ABDOMINAL DISTENTION: 0
CHOKING: 1
COLOR CHANGE: 0
ANAL BLEEDING: 0
DIARRHEA: 0
BLOOD IN STOOL: 0
RECTAL PAIN: 0

## 2024-05-20 NOTE — TELEPHONE ENCOUNTER
Pt saw Andrew today in clinic. Per Andrew:  Pt needs adult scope  2-Day Bowel Prep  Pt needs clearance for ASA and Brilinta. Dr. Morales said he is OK to do procedure if we can't get clearance for ASA in time, however still need clearance for Brilinta. Pt's Neurologist, Dr. Domenica Elise manages Brilinta.    Writer told pt once clearance is received, she will receive a call to schedule procedure.    Pt is OK with [facility] for procedure location. Pt prefers Tuesdays.

## 2024-05-20 NOTE — PROGRESS NOTES
Disp: 1 each, Rfl: 0    amLODIPine (NORVASC) 5 MG tablet, Take 1 tablet by mouth daily, Disp: 90 tablet, Rfl: 1    omeprazole (PRILOSEC) 20 MG delayed release capsule, TAKE 1 CAPSULE BY MOUTH TWICE A DAY WITH MEALS, Disp: 56 capsule, Rfl: 5    tiotropium-olodaterol (STIOLTO RESPIMAT) 2.5-2.5 MCG/ACT AERS, Inhale 2 puffs into the lungs daily, Disp: 4 g, Rfl: 11    Misc. Devices (CPAP MACHINE) MISC, by Does not apply route, Disp: , Rfl:     docusate sodium (COLACE) 100 MG capsule, TAKE 1 CAPSULE BY MOUTH 2 TIMES DAILY AS NEEDED FOR CONSTIPATION (BOTTLE), Disp: 180 capsule, Rfl: 1    albuterol sulfate HFA (VENTOLIN HFA) 108 (90 Base) MCG/ACT inhaler, Inhale 2 puffs into the lungs 4 times daily as needed for Wheezing, Disp: 18 g, Rfl: 5    acetaminophen (TYLENOL) 325 MG tablet, TAKE 2 TABLETS BY MOUTH EVERY 4 HOURS AS NEEDED FOR PAIN, Disp: 60 tablet, Rfl: 3    vitamin B-12 (CYANOCOBALAMIN) 100 MCG tablet, Take 10 tablets by mouth daily, Disp: , Rfl:     Misc Natural Products (IMMUNE ESSENTIALS PO), Take by mouth, Disp: , Rfl:     vitamin B-1 (THIAMINE) 100 MG tablet, Take 1 tablet by mouth daily, Disp: 30 tablet, Rfl: 3    pregabalin (LYRICA) 25 MG capsule, Take 1 capsule by mouth 3 times daily for 60 days. Max Daily Amount: 75 mg, Disp: 60 capsule, Rfl: 2    ALLERGIES:   Allergies   Allergen Reactions    Duricef [Cefadroxil Monohydrate] Hives    Fish-Derived Products Hives    Pcn [Penicillins] Hives    Tomato Hives       FAMILY HISTORY:       Problem Relation Age of Onset    High Blood Pressure Mother     Asthma Mother     Heart Disease Mother     Heart Disease Father     High Blood Pressure Father     Diabetes Brother     High Blood Pressure Brother     Heart Disease Brother     High Blood Pressure Maternal Grandmother     High Blood Pressure Maternal Grandfather     Heart Disease Maternal Grandfather          SOCIAL HISTORY:   Social History     Socioeconomic History    Marital status:      Spouse name: Not

## 2024-05-21 ENCOUNTER — HOSPITAL ENCOUNTER (OUTPATIENT)
Age: 69
Setting detail: SPECIMEN
Discharge: HOME | End: 2024-05-21

## 2024-05-21 ENCOUNTER — TELEPHONE (OUTPATIENT)
Dept: NEUROLOGY | Age: 69
End: 2024-05-21

## 2024-05-21 DIAGNOSIS — D50.9 MICROCYTIC ANEMIA: ICD-10-CM

## 2024-05-21 DIAGNOSIS — R74.8 ELEVATED ALKALINE PHOSPHATASE LEVEL: ICD-10-CM

## 2024-05-21 DIAGNOSIS — D56.3 THALASSEMIA TRAIT, ALPHA: ICD-10-CM

## 2024-05-21 DIAGNOSIS — R79.89 ELEVATED FERRITIN: ICD-10-CM

## 2024-05-21 LAB
BASOPHILS # BLD: 0.05 K/UL (ref 0–0.2)
BASOPHILS NFR BLD: 1 % (ref 0–2)
EOSINOPHIL # BLD: 0.44 K/UL (ref 0–0.44)
EOSINOPHILS RELATIVE PERCENT: 7 % (ref 1–4)
ERYTHROCYTE [DISTWIDTH] IN BLOOD BY AUTOMATED COUNT: 18.1 % (ref 11.8–14.4)
FERRITIN SERPL-MCNC: 639 NG/ML (ref 13–150)
GGT SERPL-CCNC: 16 U/L (ref 5–36)
HCT VFR BLD AUTO: 33.5 % (ref 36.3–47.1)
HGB BLD-MCNC: 10 G/DL (ref 11.9–15.1)
IMM GRANULOCYTES # BLD AUTO: 0.03 K/UL (ref 0–0.3)
IMM GRANULOCYTES NFR BLD: 0 %
IRON SATN MFR SERPL: 18 % (ref 20–55)
IRON SERPL-MCNC: 41 UG/DL (ref 37–145)
LYMPHOCYTES NFR BLD: 1.52 K/UL (ref 1.1–3.7)
LYMPHOCYTES RELATIVE PERCENT: 23 % (ref 24–43)
MCH RBC QN AUTO: 21.4 PG (ref 25.2–33.5)
MCHC RBC AUTO-ENTMCNC: 29.9 G/DL (ref 28.4–34.8)
MCV RBC AUTO: 71.7 FL (ref 82.6–102.9)
MONOCYTES NFR BLD: 0.53 K/UL (ref 0.1–1.2)
MONOCYTES NFR BLD: 8 % (ref 3–12)
NEUTROPHILS NFR BLD: 61 % (ref 36–65)
NEUTS SEG NFR BLD: 4.2 K/UL (ref 1.5–8.1)
NRBC BLD-RTO: 0 PER 100 WBC
PLATELET # BLD AUTO: 376 K/UL (ref 138–453)
PMV BLD AUTO: 10.4 FL (ref 8.1–13.5)
RBC # BLD AUTO: 4.67 M/UL (ref 3.95–5.11)
RBC # BLD: ABNORMAL 10*6/UL
TIBC SERPL-MCNC: 234 UG/DL (ref 250–450)
UNSATURATED IRON BINDING CAPACITY: 193 UG/DL (ref 112–347)
WBC OTHER # BLD: 6.8 K/UL (ref 3.5–11.3)

## 2024-05-23 DIAGNOSIS — I10 ESSENTIAL HYPERTENSION: ICD-10-CM

## 2024-05-23 DIAGNOSIS — R73.03 PREDIABETES: ICD-10-CM

## 2024-05-23 DIAGNOSIS — E55.9 VITAMIN D DEFICIENCY: ICD-10-CM

## 2024-05-23 NOTE — TELEPHONE ENCOUNTER
Filomena Lopez is calling to request a refill on the following medication(s):    Medication Request:  Multiple Medications      Last Visit Date (If Applicable):  3/12/2024    Next Visit Date:    5/28/2024

## 2024-05-25 RX ORDER — FOLIC ACID 1 MG/1
1 TABLET ORAL DAILY
Qty: 28 TABLET | Refills: 2 | Status: SHIPPED | OUTPATIENT
Start: 2024-05-25

## 2024-05-25 RX ORDER — CHOLECALCIFEROL (VITAMIN D3) 25 MCG
1 TABLET ORAL DAILY
Qty: 28 TABLET | Refills: 1 | Status: SHIPPED | OUTPATIENT
Start: 2024-05-25

## 2024-05-28 ENCOUNTER — OFFICE VISIT (OUTPATIENT)
Dept: INTERNAL MEDICINE | Age: 69
End: 2024-05-28
Payer: COMMERCIAL

## 2024-05-28 VITALS
HEART RATE: 61 BPM | WEIGHT: 293 LBS | BODY MASS INDEX: 45.99 KG/M2 | OXYGEN SATURATION: 97 % | DIASTOLIC BLOOD PRESSURE: 91 MMHG | TEMPERATURE: 98.2 F | SYSTOLIC BLOOD PRESSURE: 160 MMHG | HEIGHT: 67 IN

## 2024-05-28 DIAGNOSIS — E66.01 CLASS 3 SEVERE OBESITY DUE TO EXCESS CALORIES WITH SERIOUS COMORBIDITY AND BODY MASS INDEX (BMI) OF 50.0 TO 59.9 IN ADULT (HCC): ICD-10-CM

## 2024-05-28 DIAGNOSIS — L30.4 INTERTRIGO: ICD-10-CM

## 2024-05-28 DIAGNOSIS — I65.09 OCCLUSION OF VERTEBRAL ARTERY, UNSPECIFIED LATERALITY: ICD-10-CM

## 2024-05-28 DIAGNOSIS — N18.30 STAGE 3 CHRONIC KIDNEY DISEASE, UNSPECIFIED WHETHER STAGE 3A OR 3B CKD (HCC): ICD-10-CM

## 2024-05-28 DIAGNOSIS — I65.21 OCCLUSION OF RIGHT INTERNAL CAROTID ARTERY: ICD-10-CM

## 2024-05-28 DIAGNOSIS — I10 ESSENTIAL HYPERTENSION: ICD-10-CM

## 2024-05-28 DIAGNOSIS — R73.03 PREDIABETES: Primary | ICD-10-CM

## 2024-05-28 LAB — HBA1C MFR BLD: 5.5 %

## 2024-05-28 PROCEDURE — 99214 OFFICE O/P EST MOD 30 MIN: CPT

## 2024-05-28 PROCEDURE — 3080F DIAST BP >= 90 MM HG: CPT

## 2024-05-28 PROCEDURE — 3077F SYST BP >= 140 MM HG: CPT

## 2024-05-28 PROCEDURE — 83036 HEMOGLOBIN GLYCOSYLATED A1C: CPT

## 2024-05-28 PROCEDURE — 1123F ACP DISCUSS/DSCN MKR DOCD: CPT

## 2024-05-28 RX ORDER — CLINDAMYCIN HYDROCHLORIDE 300 MG/1
300 CAPSULE ORAL 4 TIMES DAILY
Qty: 40 CAPSULE | Refills: 0 | Status: SHIPPED | OUTPATIENT
Start: 2024-05-28 | End: 2024-06-07

## 2024-05-28 RX ORDER — AMLODIPINE BESYLATE 5 MG/1
5 TABLET ORAL DAILY
Qty: 90 TABLET | Refills: 1 | Status: SHIPPED | OUTPATIENT
Start: 2024-05-28

## 2024-05-28 RX ORDER — NYSTATIN 100000 [USP'U]/G
POWDER TOPICAL
Qty: 15 G | Refills: 2 | Status: SHIPPED | OUTPATIENT
Start: 2024-05-28

## 2024-05-28 ASSESSMENT — ENCOUNTER SYMPTOMS
SHORTNESS OF BREATH: 0
ABDOMINAL PAIN: 0
DIARRHEA: 0
NAUSEA: 0
CHOKING: 0
CONSTIPATION: 0
COUGH: 0
VOMITING: 0
CHEST TIGHTNESS: 0

## 2024-05-28 NOTE — PROGRESS NOTES
Alcohol use: Yes     Alcohol/week: 6.0 standard drinks of alcohol     Types: 6 Cans of beer per week     Comment: rare    Drug use: No     Types: Cocaine, Marijuana (Weed)     Comment: per pt did years ago; cocaine & marij, 8-23-19 denies current use       Family History   Problem Relation Age of Onset    High Blood Pressure Mother     Asthma Mother     Heart Disease Mother     Heart Disease Father     High Blood Pressure Father     Diabetes Brother     High Blood Pressure Brother     Heart Disease Brother     High Blood Pressure Maternal Grandmother     High Blood Pressure Maternal Grandfather     Heart Disease Maternal Grandfather       ________________________________________________________________________  Review of Systems   Constitutional:  Negative for appetite change, fatigue and fever.   HENT: Negative.     Respiratory:  Negative for cough, choking, chest tightness and shortness of breath.    Cardiovascular:  Negative for chest pain.   Gastrointestinal:  Negative for abdominal pain, constipation, diarrhea, nausea and vomiting.   Genitourinary: Negative.    Skin:  Positive for wound.   Neurological:  Negative for weakness, light-headedness and headaches.   Hematological: Negative.    Psychiatric/Behavioral: Negative.     _____________________________________________________________  Physical Exam:  Vitals:    05/28/24 1543   BP: (!) 160/91   Site: Left Lower Arm   Position: Sitting   Cuff Size: Large Adult   Pulse: 61   Temp: 98.2 °F (36.8 °C)   SpO2: 97%   Weight: (!) 159.7 kg (352 lb)   Height: 1.702 m (5' 7\")     BP Readings from Last 3 Encounters:   05/28/24 (!) 160/91   05/20/24 (!) 167/87   04/12/24 130/87      Physical Exam  Vitals and nursing note reviewed.   Constitutional:       General: She is not in acute distress.     Appearance: Normal appearance. She is not ill-appearing.   HENT:      Head: Normocephalic.      Nose: Nose normal.   Eyes:      General: No scleral icterus.

## 2024-05-31 ENCOUNTER — HOSPITAL ENCOUNTER (OUTPATIENT)
Age: 69
Setting detail: SPECIMEN
Discharge: HOME | End: 2024-05-31

## 2024-05-31 DIAGNOSIS — I10 ESSENTIAL HYPERTENSION: ICD-10-CM

## 2024-05-31 LAB
CHOLEST SERPL-MCNC: 141 MG/DL (ref 0–199)
CHOLESTEROL/HDL RATIO: 3
HDLC SERPL-MCNC: 45 MG/DL
LDLC SERPL CALC-MCNC: 77 MG/DL (ref 0–100)
TRIGL SERPL-MCNC: 97 MG/DL (ref 0–149)
VLDLC SERPL CALC-MCNC: 19 MG/DL

## 2024-06-03 NOTE — DISCHARGE INSTRUCTIONS
[facility] WOUND and HYPERBARIC TREATMENT  CENTER      Visit  Discharge Instructions / Physician Orders  DATE:6/5/24     Home Care:     SUPPLIES ORDERED THRU:                     DATE LAST SUPPLIED     Wound Location:  Left post knee no open wound     Cleanse with: Liquid antibacterial soap and water, rinse well      Dressing Orders: Use interdry at night behind knee may apply with 2 small pieces of paper tape  During day continue nystatin powder     Frequency:       Additional Orders: Increase protein to diet (meat, cheese, eggs, fish, peanut butter, nuts and beans)  Multivitamin daily    OFFLOADING [] YES  TYPE:                  [] NA    Weekly wound care visits until determined otherwise.    Antibiotic therapy-wound care related YES [] NO [] NA[]    MY CHART []     Smart Device  []     HYPERBARIC TREATMENT-                TREATMENT #                          Your next appointment with the Wound Care Center is call if needed                                                                                                   (Please note your next appointment above and if you are unable to keep, kindly give a 24 hour notice. Thank you.)  If more than 15 min late we cannot guarantee you will be seen due to clinician schedule  Per Policy, Excessive cancellation will call for dismissal from program.  If you experience any of the following, please call the Wound Care Center during business hours:  [phone]     * Increase in Pain  * Temperature over 101  * Increase in drainage from your wound  * Drainage with a foul odor  * Bleeding  * Increase in swelling  * Need for compression bandage changes due to slippage, breakthrough drainage.     If you need medical attention outside of the business hours of the Wound Care Centers please contact your PCP or go to the nearest emergency room.     The information contained in the After Visit Summary has been reviewed with me, the patient and/or responsible adult, by  health

## 2024-06-05 ENCOUNTER — HOSPITAL ENCOUNTER (OUTPATIENT)
Dept: WOUND CARE | Age: 69
Discharge: HOME | End: 2024-06-05
Payer: COMMERCIAL

## 2024-06-05 VITALS
HEART RATE: 79 BPM | SYSTOLIC BLOOD PRESSURE: 165 MMHG | DIASTOLIC BLOOD PRESSURE: 95 MMHG | RESPIRATION RATE: 20 BRPM | TEMPERATURE: 98.9 F

## 2024-06-05 DIAGNOSIS — B37.2 YEAST DERMATITIS: Primary | ICD-10-CM

## 2024-06-05 PROCEDURE — 99202 OFFICE O/P NEW SF 15 MIN: CPT | Performed by: NURSE PRACTITIONER

## 2024-06-05 PROCEDURE — 99212 OFFICE O/P EST SF 10 MIN: CPT

## 2024-06-05 ASSESSMENT — ENCOUNTER SYMPTOMS
SHORTNESS OF BREATH: 0
RHINORRHEA: 0
COUGH: 0
VOMITING: 0
NAUSEA: 0
DIARRHEA: 0

## 2024-06-05 NOTE — PROGRESS NOTES
[facility]   Progress Note and Procedure Note      Filomena Lopez  MEDICAL RECORD NUMBER:  [id number]  AGE: 68 y.o.   GENDER: female  : 1955  EPISODE DATE:  2024    Subjective:     Chief Complaint   Patient presents with    Wound Check     Left posterior knee         HISTORY of PRESENT ILLNESS HPI     Filomena Lopez is a 68 y.o. female who presents today for wound/ulcer evaluation.   History of Wound Context: presents for initial evaluation of left posterior knee wound that is now healed. She had foul odor and irritation behind knee leading to wound. Has been taking clindamycin and nystatin powder per PCP. On exam today, no open wound.   Wound/Ulcer Pain Timing/Severity: none  Quality of pain: N/A  Severity:  0 / 10   Modifying Factors: None  Associated Signs/Symptoms: none    Ulcer Identification:  Ulcer Type:  no open wound          PAST MEDICAL HISTORY        Diagnosis Date    Ambulates with cane     Bleeding     while on aggrenox    Cataracts, bilateral     Cerebrovascular disease ,    cva    Chronic pain in left foot     CKD (chronic kidney disease) stage 3, GFR 30-59 ml/min ([facility])     COVID-19 2021    states hpspitalized    GERD (gastroesophageal reflux disease)     Hx of blood clots     Hyperlipidemia     Hypertension     Iron (Fe) deficiency anemia     Nicotine abuse     Obesity     TIBURCIO on CPAP     Osteoarthritis     Peripheral vascular disease (HCC) 2014    Substance abuse (HCC)     cocaine, canabis    Thalassemia minor     Thyroid nodule 2022    Unspecified cerebral artery occlusion with cerebral infarction     Wears dentures     upper and lower       PAST SURGICAL HISTORY    Past Surgical History:   Procedure Laterality Date    BREAST SURGERY      biopsy    COLONOSCOPY  2007    adenomatous polyp    COLONOSCOPY      normal, repeat in 5 years    COLONOSCOPY N/A 2019    COLONOSCOPY POLYPECTOMY SNARE/COLD BIOPSY performed by

## 2024-06-13 DIAGNOSIS — G56.03 BILATERAL CARPAL TUNNEL SYNDROME: ICD-10-CM

## 2024-06-13 RX ORDER — PREGABALIN 25 MG/1
25 CAPSULE ORAL 3 TIMES DAILY
Qty: 90 CAPSULE | Refills: 0 | Status: SHIPPED | OUTPATIENT
Start: 2024-06-13 | End: 2024-07-13

## 2024-06-13 NOTE — TELEPHONE ENCOUNTER
Filomena Lopez is calling to request a refill on the following medication(s):    Medication Request:  Requested Prescriptions     Pending Prescriptions Disp Refills    pregabalin (LYRICA) 25 MG capsule [Pharmacy Med Name: PREGABALIN 25 MG CAPS 25 Capsule] 60 capsule 2     Sig: Take 1 capsule by mouth 3 times daily.       Last Visit Date (If Applicable):  5/28/2024    Next Visit Date:    Visit date not found

## 2024-06-17 ENCOUNTER — OFFICE VISIT (OUTPATIENT)
Dept: PODIATRY | Age: 69
End: 2024-06-17
Payer: COMMERCIAL

## 2024-06-17 VITALS
HEART RATE: 88 BPM | WEIGHT: 293 LBS | SYSTOLIC BLOOD PRESSURE: 151 MMHG | BODY MASS INDEX: 45.99 KG/M2 | DIASTOLIC BLOOD PRESSURE: 95 MMHG | HEIGHT: 67 IN

## 2024-06-17 DIAGNOSIS — B35.1 PAIN DUE TO ONYCHOMYCOSIS OF NAIL: ICD-10-CM

## 2024-06-17 DIAGNOSIS — R60.0 EDEMA OF BOTH LOWER LEGS: ICD-10-CM

## 2024-06-17 DIAGNOSIS — B35.1 ONYCHOMYCOSIS: Primary | ICD-10-CM

## 2024-06-17 DIAGNOSIS — I73.9 PVD (PERIPHERAL VASCULAR DISEASE) (HCC): ICD-10-CM

## 2024-06-17 DIAGNOSIS — M79.609 PAIN DUE TO ONYCHOMYCOSIS OF NAIL: ICD-10-CM

## 2024-06-17 PROCEDURE — 11721 DEBRIDE NAIL 6 OR MORE: CPT | Performed by: PODIATRIST

## 2024-06-17 PROCEDURE — 99999 PR OFFICE/OUTPT VISIT,PROCEDURE ONLY: CPT

## 2024-06-17 PROCEDURE — 11721 DEBRIDE NAIL 6 OR MORE: CPT

## 2024-06-17 NOTE — PROGRESS NOTES
[facility]  [address]   [address] [place] [address]  Tel: [phone]   Fax: [phone]    Subjective     CC: Diabetic foot examination    Interval History:  Patient presents to clinic today for diabetic foot examination and requests trimming of painful elongated toenails. Patient states that she has been unable to wear normal shoe gear due to the pain. Denies any new wounds or hyperkeratotic lesions. Denies any other complaints at this time.    HPI:  Filomena Lopez is a 68 y.o. year old female who presents clinic today for follow-up of thickened and discolored nails. She states that she has been doing well and denies any new complaints. She walks around with a cane and has troubling cutting her own nails. She does not wear any compression stockings as they are hard to put on. Patient denies any nausea, vomiting, fever, chills or shortness of breath.    Primary care physician is Jacobo Robles MD.    ROS:    Constitutional: Denies nausea, vomiting, fever, chills.  Neurologic: Denies numbness, tingling, and burning in the feet.    Vascular: Denies symptoms of lower extremity claudication.    Skin: Denies open wounds.  Otherwise negative except as noted in the HPI.     PMH:  Past Medical History:   Diagnosis Date    Ambulates with cane     Bleeding     while on aggrenox    Cataracts, bilateral     Cerebrovascular disease 2003,2011    cva    Chronic pain in left foot     CKD (chronic kidney disease) stage 3, GFR 30-59 ml/min ([facility])     COVID-19 11/2021    states hpspitalized    GERD (gastroesophageal reflux disease)     Hx of blood clots     Hyperlipidemia     Hypertension     Iron (Fe) deficiency anemia     Nicotine abuse     Obesity     TIBURCIO on CPAP     Osteoarthritis     Peripheral vascular disease (HCC) 03/13/2014    Substance abuse ([facility])     cocaine, canabis    Thalassemia minor     Thyroid nodule 11/22/2022    Unspecified cerebral artery occlusion with cerebral infarction     
and over 1 year ago - Order Fasting lipids and refer to PCP for follow up   [] LDL is controlled.  LDL < 100 and checked within the last year     Blood Pressure  BP Readings from Last 3 Encounters:   05/28/24 (!) 160/91   05/20/24 (!) 167/87   04/12/24 130/87      []  If SBP >140 mmhg - refer to PCP for follow up   []  If DBP > 90 mmhg - refer to PCP for follow up   [] BP is controlled <140/90     Order labs as PCP ordered.  (ie: Lipids, A1C, CMP)

## 2024-06-18 ENCOUNTER — OFFICE VISIT (OUTPATIENT)
Dept: NEUROLOGY | Age: 69
End: 2024-06-18
Payer: COMMERCIAL

## 2024-06-18 VITALS
BODY MASS INDEX: 45.99 KG/M2 | DIASTOLIC BLOOD PRESSURE: 92 MMHG | HEIGHT: 67 IN | HEART RATE: 70 BPM | WEIGHT: 293 LBS | SYSTOLIC BLOOD PRESSURE: 163 MMHG

## 2024-06-18 DIAGNOSIS — G56.03 BILATERAL CARPAL TUNNEL SYNDROME: ICD-10-CM

## 2024-06-18 DIAGNOSIS — I63.00 CEREBROVASCULAR ACCIDENT (CVA) DUE TO THROMBOSIS OF PRECEREBRAL ARTERY (HCC): Primary | ICD-10-CM

## 2024-06-18 PROCEDURE — 3077F SYST BP >= 140 MM HG: CPT | Performed by: STUDENT IN AN ORGANIZED HEALTH CARE EDUCATION/TRAINING PROGRAM

## 2024-06-18 PROCEDURE — 3080F DIAST BP >= 90 MM HG: CPT | Performed by: STUDENT IN AN ORGANIZED HEALTH CARE EDUCATION/TRAINING PROGRAM

## 2024-06-18 PROCEDURE — 1123F ACP DISCUSS/DSCN MKR DOCD: CPT | Performed by: STUDENT IN AN ORGANIZED HEALTH CARE EDUCATION/TRAINING PROGRAM

## 2024-06-18 PROCEDURE — G2211 COMPLEX E/M VISIT ADD ON: HCPCS | Performed by: STUDENT IN AN ORGANIZED HEALTH CARE EDUCATION/TRAINING PROGRAM

## 2024-06-18 PROCEDURE — 99214 OFFICE O/P EST MOD 30 MIN: CPT | Performed by: STUDENT IN AN ORGANIZED HEALTH CARE EDUCATION/TRAINING PROGRAM

## 2024-06-18 RX ORDER — PREGABALIN 25 MG/1
25 CAPSULE ORAL 3 TIMES DAILY
Qty: 60 CAPSULE | Refills: 2 | OUTPATIENT
Start: 2024-06-18

## 2024-06-18 NOTE — PROGRESS NOTES
evidence of an acute infarct.   2. Severe chronic microvascular white matter ischemic disease with chronic   infarcts involving bilateral cerebral hemispheres, the left thalamus, left   globus pallidus, and right caudate head.     Current Outpatient Medications   Medication Sig Dispense Refill    pregabalin (LYRICA) 25 MG capsule Take 1 capsule by mouth 3 times daily for 30 days. Max Daily Amount: 75 mg 90 capsule 0    metFORMIN (GLUCOPHAGE) 500 MG tablet Take 1 tablet by mouth daily (with breakfast) 28 tablet 2    amLODIPine (NORVASC) 5 MG tablet Take 1 tablet by mouth daily 90 tablet 1    nystatin (MYCOSTATIN) 336919 UNIT/GM powder Apply 3 times daily. 15 g 2    Cholecalciferol (VITAMIN D3) 25 MCG TABS TAKE 1 TABLET BY MOUTH DAILY 28 tablet 1    folic acid (FOLVITE) 1 MG tablet TAKE 1 TABLET BY MOUTH DAILY 28 tablet 2    losartan (COZAAR) 100 MG tablet TAKE 1 TABLET BY MOUTH DAILY 28 tablet 3    umeclidinium-vilanterol (ANORO ELLIPTA) 62.5-25 MCG/ACT inhaler Inhale 1 puff into the lungs daily 30 each 11    aspirin (ASPIRIN LOW DOSE) 81 MG EC tablet TAKE 1 TABLET BY MOUTH DAILY 28 tablet 5    atorvastatin (LIPITOR) 80 MG tablet TAKE 1 TABLET BY MOUTH DAILY 28 tablet 4    BRILINTA 90 MG TABS tablet TAKE 1 TABLET BY MOUTH TWICE A DAY 60 tablet 5    diclofenac sodium (VOLTAREN) 1 % GEL APPLY 2 GRAMS TOPICALLY 4 TIMES A  g 3    omeprazole (PRILOSEC) 20 MG delayed release capsule TAKE 1 CAPSULE BY MOUTH TWICE A DAY WITH MEALS 56 capsule 5    tiotropium-olodaterol (STIOLTO RESPIMAT) 2.5-2.5 MCG/ACT AERS Inhale 2 puffs into the lungs daily 4 g 11    Misc. Devices (CPAP MACHINE) MISC by Does not apply route      albuterol sulfate HFA (VENTOLIN HFA) 108 (90 Base) MCG/ACT inhaler Inhale 2 puffs into the lungs 4 times daily as needed for Wheezing 18 g 5    acetaminophen (TYLENOL) 325 MG tablet TAKE 2 TABLETS BY MOUTH EVERY 4 HOURS AS NEEDED FOR PAIN 60 tablet 3    Misc Natural Products (IMMUNE ESSENTIALS PO) Take by

## 2024-06-19 ENCOUNTER — TELEPHONE (OUTPATIENT)
Dept: INTERNAL MEDICINE | Age: 69
End: 2024-06-19

## 2024-06-19 NOTE — TELEPHONE ENCOUNTER
Becky (Nurse) called and stated patient's BP today was 150/100 on Right Arm Manually and 164/108 on Left with electronic.    Pulse was 60 regular and has +1 pedal edema bilateral lower extremities. No chest pain, no shortness of breath.    Patient has had a headache for the past 2 days and just attributed that to hot weather.    Patient did take medications this morning.    Please Advise

## 2024-06-20 DIAGNOSIS — G56.03 BILATERAL CARPAL TUNNEL SYNDROME: ICD-10-CM

## 2024-06-20 RX ORDER — PREGABALIN 25 MG/1
25 CAPSULE ORAL 3 TIMES DAILY
Qty: 60 CAPSULE | Refills: 2 | OUTPATIENT
Start: 2024-06-20

## 2024-06-22 DIAGNOSIS — R79.89 ELEVATED FERRITIN: ICD-10-CM

## 2024-06-22 DIAGNOSIS — D50.9 MICROCYTIC ANEMIA: ICD-10-CM

## 2024-06-22 DIAGNOSIS — D56.3 THALASSEMIA TRAIT, ALPHA: ICD-10-CM

## 2024-06-22 DIAGNOSIS — R09.81 SINUS CONGESTION: ICD-10-CM

## 2024-06-22 DIAGNOSIS — I10 ESSENTIAL HYPERTENSION: ICD-10-CM

## 2024-06-22 DIAGNOSIS — E61.1 IRON DEFICIENCY: ICD-10-CM

## 2024-06-24 RX ORDER — FERROUS SULFATE 325(65) MG
1 TABLET ORAL 2 TIMES DAILY
Qty: 56 TABLET | Refills: 1 | Status: SHIPPED | OUTPATIENT
Start: 2024-06-24

## 2024-06-24 RX ORDER — OMEPRAZOLE 20 MG/1
CAPSULE, DELAYED RELEASE ORAL
Qty: 56 CAPSULE | Refills: 5 | Status: SHIPPED | OUTPATIENT
Start: 2024-06-24

## 2024-06-24 RX ORDER — AMLODIPINE BESYLATE 5 MG/1
5 TABLET ORAL DAILY
Qty: 28 TABLET | Refills: 1 | Status: SHIPPED | OUTPATIENT
Start: 2024-06-24

## 2024-06-24 NOTE — TELEPHONE ENCOUNTER
Filomena Lopez is calling to request a refill on the following medication(s):    Medication Request:        Last Visit Date (If Applicable):  5/28/2024    Next Visit Date:    6/22/2024

## 2024-06-24 NOTE — TELEPHONE ENCOUNTER
Filomena Lopez is calling to request a refill on the following medication(s):    Medication Request:  Multiple prescriptions      Last Visit Date (If Applicable):  5/28/2024    Next Visit Date:    Visit date not found

## 2024-06-25 DIAGNOSIS — G56.03 BILATERAL CARPAL TUNNEL SYNDROME: ICD-10-CM

## 2024-06-25 RX ORDER — PREGABALIN 25 MG/1
25 CAPSULE ORAL 3 TIMES DAILY
Qty: 60 CAPSULE | Refills: 3 | Status: SHIPPED | OUTPATIENT
Start: 2024-06-25 | End: 2024-06-25 | Stop reason: SDUPTHER

## 2024-06-25 RX ORDER — PREGABALIN 25 MG/1
25 CAPSULE ORAL 3 TIMES DAILY
Qty: 60 CAPSULE | Refills: 3 | Status: SHIPPED | OUTPATIENT
Start: 2024-06-25 | End: 2024-09-13

## 2024-06-25 NOTE — TELEPHONE ENCOUNTER
Filomena Lopez is calling to request a refill on the following medication(s):    Medication Request:      Last Visit Date (If Applicable):  5/28/2024    Next Visit Date:    Visit date not fou

## 2024-07-05 DIAGNOSIS — G56.03 BILATERAL CARPAL TUNNEL SYNDROME: ICD-10-CM

## 2024-07-05 RX ORDER — PREGABALIN 25 MG/1
25 CAPSULE ORAL 3 TIMES DAILY
Qty: 60 CAPSULE | Refills: 3 | Status: SHIPPED | OUTPATIENT
Start: 2024-07-05 | End: 2024-09-23

## 2024-07-05 RX ORDER — PREGABALIN 25 MG/1
25 CAPSULE ORAL 3 TIMES DAILY
Qty: 60 CAPSULE | Refills: 2 | OUTPATIENT
Start: 2024-07-05

## 2024-07-05 NOTE — TELEPHONE ENCOUNTER
Request for   Requested Prescriptions     Pending Prescriptions Disp Refills    pregabalin (LYRICA) 25 MG capsule [Pharmacy Med Name: PREGABALIN 25 MG CAPS 25 Capsule] 60 capsule 2     Sig: Take 1 capsule by mouth 3 times daily.    .      Please review and e-scribe to pharmacy listed in chart if appropriate. Thank you.      Last Visit Date: 5/28/2024  Next Visit Date: Visit date not found    Future Appointments   Date Time Provider Department Center   8/20/2024  9:45 AM Ceci Morales MD [facility]   9/23/2024  3:15 PM [facility]   10/7/2024  3:30 PM Oc Irizarry MD [facility]   10/14/2024  3:30 PM Luther García MD Resp Spec [facility]       Health Maintenance   Topic Date Due    Respiratory Syncytial Virus (RSV) Pregnant or age 60 yrs+ (1 - 1-dose 60+ series) Never done    Pneumococcal 65+ years Vaccine (3 of 3 - PPSV23 or PCV20) 12/20/2021    Colorectal Cancer Screen  08/21/2022    COVID-19 Vaccine (4 - 2023-24 season) 09/01/2023    Annual Wellness Visit (Medicare Advantage)  01/01/2024    Flu vaccine (1) 08/01/2024    Low dose CT lung screening &/or counseling  11/27/2024    Depression Screen  03/12/2025    GFR test (Diabetes, CKD 3-4, OR last GFR 15-59)  03/13/2025    Lipids  05/31/2025    Breast cancer screen  03/21/2026    Diabetes screen  05/28/2027    DTaP/Tdap/Td vaccine (2 - Td or Tdap) 03/08/2028    DEXA (modify frequency per FRAX score)  Completed    Shingles vaccine  Completed    Hepatitis C screen  Completed    Hepatitis A vaccine  Aged Out    Hepatitis B vaccine  Aged Out    Hib vaccine  Aged Out    Polio vaccine  Aged Out    Meningococcal (ACWY) vaccine  Aged Out    A1C test (Diabetic or Prediabetic)  Discontinued       Hemoglobin A1C (%)   Date Value   05/28/2024 5.5   11/28/2022 5.8   11/03/2022 5.8             ( goal A1C is < 7)   No components found for: \"LABMICR\"  No components found for: \"LDLCHOLESTEROL\", \"LDLCALC\"    (goal LDL is <100)

## 2024-07-05 NOTE — TELEPHONE ENCOUNTER
Filomena Lopez is calling to request a refill on the following medication(s): Pharmacy states they have no refills on file for this medication. Pt has been out since June    Medication Request:  Requested Prescriptions     Pending Prescriptions Disp Refills    pregabalin (LYRICA) 25 MG capsule 60 capsule 3     Sig: Take 1 capsule by mouth 3 times daily for 80 days. Max Daily Amount: 75 mg       Last Visit Date (If Applicable):  5/28/2024    Next Visit Date:    Visit date not found

## 2024-07-22 NOTE — TELEPHONE ENCOUNTER
Filomena Lopez is calling to request a refill on the following medication(s):    Medication Request:  Requested Prescriptions     Pending Prescriptions Disp Refills    Cholecalciferol (VITAMIN D3) 25 MCG TABS [Pharmacy Med Name: VIT D 1000IU 25MCG TAB 25 Tablet] 28 tablet 1     Sig: TAKE 1 TABLET BY MOUTH DAILY       Last Visit Date (If Applicable):  5/28/2024    Next Visit Date:    Visit date not found

## 2024-07-23 RX ORDER — CHOLECALCIFEROL (VITAMIN D3) 25 MCG
1 TABLET ORAL DAILY
Qty: 28 TABLET | Refills: 1 | Status: SHIPPED | OUTPATIENT
Start: 2024-07-23

## 2024-07-29 NOTE — TELEPHONE ENCOUNTER
Pt called again about her Handicap Placard she said she needs it to last for more than 3 mths wants it written for some yrs. please review and call pt when ready Name band;

## 2024-08-13 DIAGNOSIS — L30.4 INTERTRIGO: ICD-10-CM

## 2024-08-13 RX ORDER — NYSTATIN 100000 [USP'U]/G
POWDER TOPICAL
Qty: 15 G | Refills: 2 | Status: SHIPPED | OUTPATIENT
Start: 2024-08-13

## 2024-08-13 NOTE — TELEPHONE ENCOUNTER
Filomena Lopez is calling to request a refill on the following medication(s):    Medication Request:  Requested Prescriptions     Pending Prescriptions Disp Refills    nystatin 949915 UNIT/GM powder [Pharmacy Med Name: NYSTATIN TOPICAL POWDER [id number] Powder] 15 g 2     Sig: APPLY TOPICALL 3 TIMES A DAY       Last Visit Date (If Applicable):  5/28/2024    Next Visit Date:    Visit date not found

## 2024-08-14 DIAGNOSIS — I63.00 CEREBROVASCULAR ACCIDENT (CVA) DUE TO THROMBOSIS OF PRECEREBRAL ARTERY (HCC): ICD-10-CM

## 2024-08-14 DIAGNOSIS — R79.89 ELEVATED FERRITIN: ICD-10-CM

## 2024-08-14 DIAGNOSIS — D56.3 THALASSEMIA TRAIT, ALPHA: ICD-10-CM

## 2024-08-14 DIAGNOSIS — I10 ESSENTIAL HYPERTENSION: ICD-10-CM

## 2024-08-14 DIAGNOSIS — E55.9 VITAMIN D DEFICIENCY: ICD-10-CM

## 2024-08-14 DIAGNOSIS — E78.00 PURE HYPERCHOLESTEROLEMIA: ICD-10-CM

## 2024-08-14 DIAGNOSIS — D50.9 MICROCYTIC ANEMIA: ICD-10-CM

## 2024-08-14 DIAGNOSIS — E61.1 IRON DEFICIENCY: ICD-10-CM

## 2024-08-14 RX ORDER — FERROUS SULFATE 325(65) MG
1 TABLET ORAL 2 TIMES DAILY
Qty: 56 TABLET | Refills: 1 | Status: SHIPPED | OUTPATIENT
Start: 2024-08-14

## 2024-08-16 RX ORDER — FOLIC ACID 1 MG/1
1 TABLET ORAL DAILY
Qty: 28 TABLET | Refills: 5 | Status: SHIPPED | OUTPATIENT
Start: 2024-08-16

## 2024-08-16 RX ORDER — ATORVASTATIN CALCIUM 80 MG/1
80 TABLET, FILM COATED ORAL DAILY
Qty: 28 TABLET | Refills: 5 | Status: SHIPPED | OUTPATIENT
Start: 2024-08-16

## 2024-08-16 RX ORDER — LOSARTAN POTASSIUM 100 MG/1
100 TABLET ORAL DAILY
Qty: 28 TABLET | Refills: 5 | Status: SHIPPED | OUTPATIENT
Start: 2024-08-16

## 2024-08-20 ENCOUNTER — OFFICE VISIT (OUTPATIENT)
Dept: GASTROENTEROLOGY | Age: 69
End: 2024-08-20
Payer: COMMERCIAL

## 2024-08-20 ENCOUNTER — TELEPHONE (OUTPATIENT)
Dept: GASTROENTEROLOGY | Age: 69
End: 2024-08-20

## 2024-08-20 VITALS
DIASTOLIC BLOOD PRESSURE: 85 MMHG | BODY MASS INDEX: 45.99 KG/M2 | SYSTOLIC BLOOD PRESSURE: 151 MMHG | WEIGHT: 293 LBS | HEART RATE: 59 BPM | HEIGHT: 67 IN | TEMPERATURE: 97.5 F

## 2024-08-20 DIAGNOSIS — Z12.11 COLON CANCER SCREENING: Primary | ICD-10-CM

## 2024-08-20 DIAGNOSIS — Z86.010 HISTORY OF COLON POLYPS: Primary | ICD-10-CM

## 2024-08-20 DIAGNOSIS — R74.8 ELEVATED ALKALINE PHOSPHATASE LEVEL: ICD-10-CM

## 2024-08-20 PROCEDURE — 1123F ACP DISCUSS/DSCN MKR DOCD: CPT | Performed by: INTERNAL MEDICINE

## 2024-08-20 PROCEDURE — 3077F SYST BP >= 140 MM HG: CPT | Performed by: INTERNAL MEDICINE

## 2024-08-20 PROCEDURE — 99214 OFFICE O/P EST MOD 30 MIN: CPT | Performed by: INTERNAL MEDICINE

## 2024-08-20 PROCEDURE — 3079F DIAST BP 80-89 MM HG: CPT | Performed by: INTERNAL MEDICINE

## 2024-08-20 RX ORDER — POLYETHYLENE GLYCOL 3350, SODIUM CHLORIDE, SODIUM BICARBONATE, POTASSIUM CHLORIDE 420; 11.2; 5.72; 1.48 G/4L; G/4L; G/4L; G/4L
POWDER, FOR SOLUTION ORAL
Qty: 2 EACH | Refills: 0 | Status: SHIPPED | OUTPATIENT
Start: 2024-08-20

## 2024-08-20 ASSESSMENT — ENCOUNTER SYMPTOMS
SORE THROAT: 0
TROUBLE SWALLOWING: 0
CONSTIPATION: 0
NAUSEA: 0
BLOOD IN STOOL: 0
VOMITING: 0
ABDOMINAL DISTENTION: 0
COUGH: 1
ANAL BLEEDING: 0
VOICE CHANGE: 0
COLOR CHANGE: 0
RECTAL PAIN: 0
WHEEZING: 1
ABDOMINAL PAIN: 0
DIARRHEA: 0
CHOKING: 1

## 2024-08-20 NOTE — PROGRESS NOTES
Reason for Referral:       No referring provider defined for this encounter.    Chief Complaint   Patient presents with    Follow-up     Labs, Hx of Colon Polyps, Elevated Alkaline Phosphatase Level           HISTORY OF PRESENT ILLNESS: Ms.Cheryl GABBI Lopez is a 69 y.o. female with a past history remarkable for morbid obesity, CKD, hypertension, hyperlipidemia, TIBURCIO, on CPAP, cannabis use in the past, cocaine in the past, prior history of CVA x2, on DAPT , referred for evaluation of repeating colonoscopy given history of polyps.  The patient denies any active symptoms.  She is currently taking aspirin and Brilinta that is being prescribed by her neurologist for prior history of stroke.  Her last colonoscopy was in 2019 with poor prep and 2 sessile serrated adenomas removed.    Interval history 8/20/2024:  No new symptoms reported today.  She is currently on aspirin daily and not taking Brilinta.  She is due to change her neurologist.      Previous Endoscopies    Colonoscopy 2019:  Poor prep.  7 mm ascending colon polyp.  5 mm transverse colon polyp.  2.  Colon, polyps, biopsies:        Sessile serrated adenoma.     EGD 2019:  Erosive gastritis in antrum, biopsies obtained  Biopsy with mild chronic inflammation, negative for H. pylori    Previous GI workup   Alkaline phosphatase elevated on multiple labs previous    Past Medical,Family, and Social History reviewed and does not contribute to the patient presentingcondition.    Patient's PMH/PSH,SH,PSYCH Hx, MEDs, ALLERGIES, and ROS were all reviewed and updated in the appropriate sections.    PAST MEDICAL HISTORY:  Past Medical History:   Diagnosis Date    Ambulates with cane     Bleeding     while on aggrenox    Cataracts, bilateral     Cerebrovascular disease 2003,2011    cva    Chronic pain in left foot     CKD (chronic kidney disease) stage 3, GFR 30-59 ml/min ([facility])     COVID-19 11/2021    states hpspitalized    GERD (gastroesophageal reflux disease)     Hx of

## 2024-08-20 NOTE — TELEPHONE ENCOUNTER
Pt saw Andrew today in clinic. Per Andrew, pt is on Aspirin, ok to continue using Aspirin during bowel prep/day of procedure. Per Andrew pt is to be on 2-day bowel prep. Pt states she does not take Brilinta anymore. Pt will need adult scope.     Procedure scheduled/Dr Morales  Procedure: Colonoscopy (Screening)  Dx: Hx of colon polyps  Date: Tuesday 09/03/24   Time: 3:15 pm/Arrive at 1:15 pm  Hospital: [facility]; Surgery Entrance, back of hospital  PAT Phone Call: Monday 08/26/24 at 12:30 pm  Bowel Prep instructions given: Nulytely 2-Day Split-Bowel Prep  In office/via phone: in office  Clearance needed: N/A    Pt informed they will receive a PAT Phone Call from a [facility] PAT Nurse 1-2 weeks prior to procedure. Pt informed they must complete PAT Call or procedure may be cancelled.     Pt informed it is required they must have a /responsible adult that takes them to their procedure, stays at the hospital (before, during, and after procedure), and drives pt home. Pt informed /responsible adult must stay inside the hospital during their procedure. Pt voiced understanding of  protocol for procedure.     Writer told pt when she picks up medication/it is delivered, she will be getting 2, 1 gallon containers of prep.

## 2024-08-21 RX ORDER — BISACODYL 5 MG/1
TABLET, DELAYED RELEASE ORAL
Qty: 4 TABLET | Refills: 0 | Status: SHIPPED | OUTPATIENT
Start: 2024-08-21

## 2024-08-21 NOTE — TELEPHONE ENCOUNTER
Patient called asking that Dulcolax tablets be sent to [facility] pharmacy for her bowel prep.

## 2024-08-26 ENCOUNTER — HOSPITAL ENCOUNTER (OUTPATIENT)
Dept: PREADMISSION TESTING | Age: 69
Discharge: HOME | End: 2024-08-30

## 2024-08-26 VITALS — BODY MASS INDEX: 45.99 KG/M2 | HEIGHT: 67 IN | WEIGHT: 293 LBS

## 2024-08-26 NOTE — PROGRESS NOTES
Pre-op Instructions For Out-Patient Endoscopy Surgery    Medication Instructions:  Please stop herbs and any supplements now (includes vitamins and minerals).    Please contact your surgeon and prescribing physician for pre-op instructions for any blood thinners. Aspirin     If you have inhalers/aerosol treatments at home, please use them the morning of your surgery and bring the inhalers with you to the hospital.    Please take the following medications the morning of your surgery with a sip of water:    Amlodipine and Inhaler     Surgery Instructions:  After midnight before surgery:  Do not eat or drink anything, including water, mints, gum, and hard candy.  You may brush your teeth without swallowing.  No smoking, chewing tobacco, or street drugs.    Please shower or bathe before surgery.       Please do not wear any cologne, lotion, powder, jewelry, piercings, perfume, makeup, nail polish, hair accessories, or hair spray on the day of surgery.  Wear loose comfortable clothing.    Leave your valuables at home but bring a payment source for any after-surgery prescriptions you plan to fill at [facility].  Bring a storage case for any glasses/contacts.    An adult who is responsible for you MUST drive you home and should be with you for the first 24 hours after surgery.     The Day of Surgery:  Arrive at [facility] Surgery Entrance at the time directed by your surgeon and check in at the desk.  1:15 pm     If you have a living will or healthcare power of , please bring a copy.    You will be taken to the pre-op holding area where you will be prepared for surgery.  A physical assessment will be performed by a nurse practitioner or house officer.  Your IV will be started and you will meet your anesthesiologist.    When you go to surgery, your family will be directed to the surgical waiting room, where the doctor should speak with them after your surgery.    After surgery, you will be

## 2024-08-29 NOTE — PRE-PROCEDURE INSTRUCTIONS
No answer, left message ?       yes                      Unable to leave message ?    When were you told to arrive at hospital ?  5685    Do you have a  ?    Are you on any blood thinners ?                     If yes when did you stop taking ?    Do you have your prep Rx filled and instruction ?      Nothing to eat the day before , only clear liquids.    Are you experiencing any covid symptoms ?     Do you have any infections or rash we should be aware of ?      Do you have the Hibiclens soap to use the night before and the morning of surgery ?    Nothing to eat or drink after midnight, only a sip of water to take any medication instructed to take the night before.  Wear comfortable clothing, leave any valuables at home, remove any jewelry and body piercing .

## 2024-08-30 ENCOUNTER — ANESTHESIA EVENT (OUTPATIENT)
Dept: ENDOSCOPY | Age: 69
End: 2024-08-30
Payer: COMMERCIAL

## 2024-09-03 ENCOUNTER — ANESTHESIA (OUTPATIENT)
Dept: ENDOSCOPY | Age: 69
End: 2024-09-03
Payer: COMMERCIAL

## 2024-09-03 ENCOUNTER — HOSPITAL ENCOUNTER (OUTPATIENT)
Age: 69
Setting detail: OUTPATIENT SURGERY
Discharge: HOME | End: 2024-09-03
Attending: INTERNAL MEDICINE | Admitting: INTERNAL MEDICINE
Payer: COMMERCIAL

## 2024-09-03 VITALS
DIASTOLIC BLOOD PRESSURE: 85 MMHG | OXYGEN SATURATION: 98 % | RESPIRATION RATE: 17 BRPM | WEIGHT: 293 LBS | SYSTOLIC BLOOD PRESSURE: 164 MMHG | BODY MASS INDEX: 45.99 KG/M2 | TEMPERATURE: 98.4 F | HEIGHT: 67 IN | HEART RATE: 63 BPM

## 2024-09-03 DIAGNOSIS — Z86.010 HISTORY OF COLON POLYPS: ICD-10-CM

## 2024-09-03 PROCEDURE — 7100000011 HC PHASE II RECOVERY - ADDTL 15 MIN: Performed by: INTERNAL MEDICINE

## 2024-09-03 PROCEDURE — 45385 COLONOSCOPY W/LESION REMOVAL: CPT | Performed by: INTERNAL MEDICINE

## 2024-09-03 PROCEDURE — 2500000003 HC RX 250 WO HCPCS: Performed by: NURSE ANESTHETIST, CERTIFIED REGISTERED

## 2024-09-03 PROCEDURE — 3609010600 HC COLONOSCOPY POLYPECTOMY SNARE/COLD BIOPSY: Performed by: INTERNAL MEDICINE

## 2024-09-03 PROCEDURE — 88305 TISSUE EXAM BY PATHOLOGIST: CPT

## 2024-09-03 PROCEDURE — 6360000002 HC RX W HCPCS: Performed by: NURSE ANESTHETIST, CERTIFIED REGISTERED

## 2024-09-03 PROCEDURE — 6360000002 HC RX W HCPCS: Performed by: ANESTHESIOLOGY

## 2024-09-03 PROCEDURE — 3700000000 HC ANESTHESIA ATTENDED CARE: Performed by: INTERNAL MEDICINE

## 2024-09-03 PROCEDURE — 2580000003 HC RX 258: Performed by: ANESTHESIOLOGY

## 2024-09-03 PROCEDURE — 3700000001 HC ADD 15 MINUTES (ANESTHESIA): Performed by: INTERNAL MEDICINE

## 2024-09-03 PROCEDURE — 2709999900 HC NON-CHARGEABLE SUPPLY: Performed by: INTERNAL MEDICINE

## 2024-09-03 PROCEDURE — 7100000010 HC PHASE II RECOVERY - FIRST 15 MIN: Performed by: INTERNAL MEDICINE

## 2024-09-03 RX ORDER — LIDOCAINE HYDROCHLORIDE 10 MG/ML
1 INJECTION, SOLUTION EPIDURAL; INFILTRATION; INTRACAUDAL; PERINEURAL
Status: DISCONTINUED | OUTPATIENT
Start: 2024-09-03 | End: 2024-09-03 | Stop reason: HOSPADM

## 2024-09-03 RX ORDER — SODIUM CHLORIDE 0.9 % (FLUSH) 0.9 %
5-40 SYRINGE (ML) INJECTION PRN
Status: DISCONTINUED | OUTPATIENT
Start: 2024-09-03 | End: 2024-09-03 | Stop reason: HOSPADM

## 2024-09-03 RX ORDER — PROPOFOL 10 MG/ML
INJECTION, EMULSION INTRAVENOUS PRN
Status: DISCONTINUED | OUTPATIENT
Start: 2024-09-03 | End: 2024-09-03 | Stop reason: SDUPTHER

## 2024-09-03 RX ORDER — PROPOFOL 10 MG/ML
INJECTION, EMULSION INTRAVENOUS PRN
Status: DISCONTINUED | OUTPATIENT
Start: 2024-09-03 | End: 2024-09-03

## 2024-09-03 RX ORDER — SODIUM CHLORIDE 9 MG/ML
INJECTION, SOLUTION INTRAVENOUS PRN
Status: DISCONTINUED | OUTPATIENT
Start: 2024-09-03 | End: 2024-09-03 | Stop reason: HOSPADM

## 2024-09-03 RX ORDER — HYDRALAZINE HYDROCHLORIDE 20 MG/ML
10 INJECTION INTRAMUSCULAR; INTRAVENOUS EVERY 10 MIN PRN
Status: DISCONTINUED | OUTPATIENT
Start: 2024-09-03 | End: 2024-09-03 | Stop reason: HOSPADM

## 2024-09-03 RX ORDER — SODIUM CHLORIDE 9 MG/ML
INJECTION, SOLUTION INTRAVENOUS CONTINUOUS
Status: DISCONTINUED | OUTPATIENT
Start: 2024-09-03 | End: 2024-09-03 | Stop reason: HOSPADM

## 2024-09-03 RX ORDER — LIDOCAINE HYDROCHLORIDE 10 MG/ML
INJECTION, SOLUTION EPIDURAL; INFILTRATION; INTRACAUDAL; PERINEURAL PRN
Status: DISCONTINUED | OUTPATIENT
Start: 2024-09-03 | End: 2024-09-03 | Stop reason: SDUPTHER

## 2024-09-03 RX ORDER — SODIUM CHLORIDE 0.9 % (FLUSH) 0.9 %
5-40 SYRINGE (ML) INJECTION EVERY 12 HOURS SCHEDULED
Status: DISCONTINUED | OUTPATIENT
Start: 2024-09-03 | End: 2024-09-03 | Stop reason: HOSPADM

## 2024-09-03 RX ADMIN — SODIUM CHLORIDE: 9 INJECTION, SOLUTION INTRAVENOUS at 13:46

## 2024-09-03 RX ADMIN — LIDOCAINE HYDROCHLORIDE 50 MG: 10 INJECTION, SOLUTION EPIDURAL; INFILTRATION; INTRACAUDAL; PERINEURAL at 16:05

## 2024-09-03 RX ADMIN — HYDRALAZINE HYDROCHLORIDE 10 MG: 20 INJECTION INTRAMUSCULAR; INTRAVENOUS at 17:32

## 2024-09-03 RX ADMIN — PROPOFOL 540 MG: 10 INJECTION, EMULSION INTRAVENOUS at 16:07

## 2024-09-03 ASSESSMENT — ENCOUNTER SYMPTOMS
WHEEZING: 1
COUGH: 1
GASTROINTESTINAL NEGATIVE: 1

## 2024-09-03 ASSESSMENT — PAIN - FUNCTIONAL ASSESSMENT
PAIN_FUNCTIONAL_ASSESSMENT: 0-10
PAIN_FUNCTIONAL_ASSESSMENT: NONE - DENIES PAIN

## 2024-09-03 NOTE — OP NOTE
Colonoscopy report    Colonoscopy Procedure Note    Procedure:  Colonoscopy with polypectomy with snare    Procedure Date: 9/3/2024    Indications:  History colon polyps    Sedation:  MAC    Attending Physician:  Dr. Ceci Morales MD.     Assistant Physician: None    Consent:   Informed consent was obtained for the procedure after explaining the risks including bleeding, perforation, aspiration, arrhythmia, risks related to sedation, reaction to medications and rarely death, benefits and alternatives to the patient. The patient verbalized understanding and agreed to proceed with the procedure.       Procedure Details:  The patient was placed in the left lateral decubitus position.  Oxygen and cardiac monitoring equipment was attached. The patient's vital signs were monitored continuously  throughout the procedure. After appropriate sedation was achieved, a rectal examination was performed.  The colonoscope was inserted into the rectum and advanced under direct vision to the cecum which was identified by ileocecal valve and appendiceal orifice.  The quality of the colonic preparation was good.  A careful inspection was made as the colonoscope was withdrawn, including a retroflexed view of the rectum; findings and interventions are described below.  Appropriate photodocumentation was obtained.           Complications:  None    Estimated blood loss:  Minimal           Disposition:  Home           Condition: stable    Withdrawal Time: 13 minutes    Findings:   The bowel prep was good.  A 6 mm cecal polyp noted at the cecum removed with with cold snare.  A 5 mm transverse colon polyp that was resected with cold snare.  Multiple small and large mouth diverticula noted in the sigmoid colon.  Retroflexion examination in the rectum with moderate internal hemorrhoids.    Specimen Removed: Colon polyps    Endoscopic Impression:    Good prep.  2 polyps (1X cecum and 1X transverse colon) between 5 to 6 mm, resected with cold

## 2024-09-03 NOTE — ANESTHESIA POSTPROCEDURE EVALUATION
Department of Anesthesiology  Postprocedure Note    Patient: Filomena Lopez  MRN: [id number]  YOB: 1955  Date of evaluation: 9/3/2024    Procedure Summary       Date: 09/03/24 Room / Location: [place] [address] / [facility]    Anesthesia Start: 1601 Anesthesia Stop: 1645    Procedure: COLONOSCOPY DIAGNOSTIC, ADULT SCOPE Diagnosis:       History of colon polyps      (History of colon polyps [Z86.010])    Surgeons: Ceci Morales MD Responsible Provider: Amanda Travis MD    Anesthesia Type: general ASA Status: 3            Anesthesia Type: No value filed.    Fadia Phase I: Fadia Score: 10    Fadia Phase II: Fadia Score: 8    Anesthesia Post Evaluation    Comments: POST- ANESTHESIA EVALUATION       Pt Name: Filomena Lopez  MRN: [id number]  YOB: 1955  Date of evaluation: 9/3/2024  Time:  5:56 PM      BP (!) 164/85   Pulse 63   Temp 98.4 °F (36.9 °C)   Resp 17   Ht 1.702 m (5' 7\")   Wt (!) 156.5 kg (345 lb)   SpO2 98%   BMI 54.03 kg/m²      Consciousness Level  Awake  Cardiopulmonary Status  Stable  Pain Adequately Treated YES  Nausea / Vomiting  NO  Adequate Hydration  YES  Anesthesia Related Complications NONE      Electronically signed by Amanda Travis MD on 9/3/2024 at 5:56 PM           No notable events documented.

## 2024-09-03 NOTE — DISCHARGE INSTRUCTIONS
bath.  Do not drink alcohol.  Do not make any important decisions or sign legal documents.  Stay with an adult.  The first meal following your procedure should be light and small. Avoid solid foods if you feel sick to your stomach (nauseous) or if you throw up (vomit).  Drink enough fluids to keep your urine clear or pale yellow.  Only take your usual medicines or new medicines if your caregiver approves them.  Only take over-the-counter or prescription medicines for pain, discomfort, or fever as directed by your caregiver.  Keep all follow-up appointments as directed by your caregiver.  SEEK IMMEDIATE MEDICAL CARE IF:   You are not feeling normal or behaving normally after 24 hours.  You have persistent nausea and vomiting.  You are unable to drink fluids or eat food.  You have difficulty urinating.  You have difficulty breathing or speaking.  You have blue or gray skin.  There is difficulty waking or you cannot be woken up.  You have heavy bleeding, redness, or a lot of swelling where the sedative or anesthesia entered your skin (intravenous site).  You have a rash.  MAKE SURE YOU:  Understand these instructions.  Will watch your condition.  Will get help right away if you are not doing well or get worse.  Document Released: 12/18/2006 Document Revised: 06/18/2013 Document Reviewed: 04/17/2013  [employer]® Patient Information ©2013 [employer].

## 2024-09-03 NOTE — ANESTHESIA PRE PROCEDURE
Department of Anesthesiology  Preprocedure Note       Name:  Filomena Lopez   Age:  69 y.o.  :  1955                                          MRN:  [id number]         Date:  9/3/2024      Surgeon: Surgeon(s):  Ceci Morales MD    Procedure: Procedure(s):  COLONOSCOPY DIAGNOSTIC, ADULT SCOPE    Medications prior to admission:   Prior to Admission medications    Medication Sig Start Date End Date Taking? Authorizing Provider   bisacodyl 5 MG EC tablet Take as directed for bowel prep/colonoscopy 24  Yes Ceci Morales MD   polyethylene glycol-electrolytes (NULYTELY) 420 g solution Take as directed for 2-day bowel prep. 24  Yes Ceci Morales MD   atorvastatin (LIPITOR) 80 MG tablet TAKE 1 TABLET BY MOUTH DAILY 24  Yes Jacobo Robles MD   folic acid (FOLVITE) 1 MG tablet TAKE 1 TABLET BY MOUTH DAILY 24  Yes Jacobo Robles MD   losartan (COZAAR) 100 MG tablet TAKE 1 TABLET BY MOUTH DAILY 24  Yes Jacobo Robles MD   nystatin 002978 UNIT/GM powder APPLY TOPICALL 3 TIMES A DAY 24  Yes Jacobo Robles MD   Cholecalciferol (VITAMIN D3) 25 MCG TABS TAKE 1 TABLET BY MOUTH DAILY 24  Yes Jacobo Robles MD   pregabalin (LYRICA) 25 MG capsule Take 1 capsule by mouth 3 times daily for 80 days. Max Daily Amount: 75 mg 24 Yes Jacobo Robles MD   amLODIPine (NORVASC) 5 MG tablet TAKE 1 TABLET BY MOUTH DAILY 24  Yes Jacobo Robles MD   omeprazole (PRILOSEC) 20 MG delayed release capsule TAKE 1 CAPSULE BY MOUTH TWICE A DAY WITH MEALS 24  Yes Jacobo Robles MD   umeclidinium-vilanterol (ANORO ELLIPTA) 62.5-25 MCG/ACT inhaler Inhale 1 puff into the lungs daily 24  Yes Luther García MD   aspirin (ASPIRIN LOW DOSE) 81 MG EC tablet TAKE 1 TABLET BY MOUTH DAILY 3/28/24  Yes Jacobo Robles MD   diclofenac sodium (VOLTAREN) 1 % GEL APPLY 2 GRAMS TOPICALLY 4 TIMES A DAY 24  Yes Jacobo Robles MD   tiotropium-olodaterol (STIOLTO RESPIMAT)

## 2024-09-03 NOTE — H&P
thalassemia trait 09/03/2020    Bilateral carpal tunnel syndrome 01/06/2019    Primary osteoarthritis of left knee 05/01/2018    TIBURCIO (obstructive sleep apnea) 05/17/2016    Class 3 severe obesity due to excess calories with serious comorbidity and body mass index (BMI) of 50.0 to 59.9 in adult ([facility]) 05/17/2016    Peripheral vascular disease (HCC) 03/13/2014    Onychomycosis 05/13/2013    CKD (chronic kidney disease) stage 3, GFR 30-59 ml/min ([facility])     Essential hypertension     Pure hypercholesterolemia     Cerebrovascular accident (CVA), 2011, no residual deficit ([facility])            LOY Guillory CNP on 9/3/2024 at 12:56 PM

## 2024-09-05 LAB — SURGICAL PATHOLOGY REPORT: NORMAL

## 2024-09-13 DIAGNOSIS — R73.03 PREDIABETES: ICD-10-CM

## 2024-09-13 DIAGNOSIS — I63.00 CEREBROVASCULAR ACCIDENT (CVA) DUE TO THROMBOSIS OF PRECEREBRAL ARTERY (HCC): ICD-10-CM

## 2024-09-14 RX ORDER — CHOLECALCIFEROL (VITAMIN D3) 25 MCG
1 TABLET ORAL DAILY
Qty: 28 TABLET | Refills: 1 | Status: SHIPPED | OUTPATIENT
Start: 2024-09-14

## 2024-09-14 RX ORDER — ASPIRIN 81 MG/1
TABLET ORAL
Qty: 28 TABLET | Refills: 5 | Status: SHIPPED | OUTPATIENT
Start: 2024-09-14

## 2024-09-26 ENCOUNTER — HOSPITAL ENCOUNTER (OUTPATIENT)
Age: 69
Discharge: HOME | End: 2024-09-26
Payer: COMMERCIAL

## 2024-09-26 DIAGNOSIS — D50.9 MICROCYTIC ANEMIA: Primary | ICD-10-CM

## 2024-09-26 DIAGNOSIS — D50.9 MICROCYTIC ANEMIA: ICD-10-CM

## 2024-09-26 LAB
BASOPHILS # BLD: 0.06 K/UL (ref 0–0.2)
BASOPHILS NFR BLD: 1 % (ref 0–2)
EOSINOPHIL # BLD: 0.33 K/UL (ref 0–0.44)
EOSINOPHILS RELATIVE PERCENT: 5 % (ref 1–4)
ERYTHROCYTE [DISTWIDTH] IN BLOOD BY AUTOMATED COUNT: 17.8 % (ref 11.8–14.4)
FERRITIN SERPL-MCNC: 738 NG/ML (ref 13–150)
FOLATE SERPL-MCNC: >20 NG/ML (ref 4.8–24.2)
HCT VFR BLD AUTO: 33.1 % (ref 36.3–47.1)
HGB BLD-MCNC: 10 G/DL (ref 11.9–15.1)
IMM GRANULOCYTES # BLD AUTO: 0.04 K/UL (ref 0–0.3)
IMM GRANULOCYTES NFR BLD: 1 %
IRON SATN MFR SERPL: 19 % (ref 20–55)
IRON SERPL-MCNC: 43 UG/DL (ref 37–145)
LYMPHOCYTES NFR BLD: 1.95 K/UL (ref 1.1–3.7)
LYMPHOCYTES RELATIVE PERCENT: 29 % (ref 24–43)
MCH RBC QN AUTO: 21.8 PG (ref 25.2–33.5)
MCHC RBC AUTO-ENTMCNC: 30.2 G/DL (ref 28.4–34.8)
MCV RBC AUTO: 72.3 FL (ref 82.6–102.9)
MONOCYTES NFR BLD: 0.46 K/UL (ref 0.1–1.2)
MONOCYTES NFR BLD: 7 % (ref 3–12)
NEUTROPHILS NFR BLD: 57 % (ref 36–65)
NEUTS SEG NFR BLD: 4.01 K/UL (ref 1.5–8.1)
NRBC BLD-RTO: 0 PER 100 WBC
PLATELET # BLD AUTO: 341 K/UL (ref 138–453)
PMV BLD AUTO: 9.3 FL (ref 8.1–13.5)
RBC # BLD AUTO: 4.58 M/UL (ref 3.95–5.11)
RBC # BLD: ABNORMAL 10*6/UL
TIBC SERPL-MCNC: 225 UG/DL (ref 250–450)
UNSATURATED IRON BINDING CAPACITY: 182 UG/DL (ref 112–347)
VIT B12 SERPL-MCNC: 1171 PG/ML (ref 232–1245)
WBC OTHER # BLD: 6.9 K/UL (ref 3.5–11.3)

## 2024-09-26 PROCEDURE — 82607 VITAMIN B-12: CPT

## 2024-09-26 PROCEDURE — 83550 IRON BINDING TEST: CPT

## 2024-09-26 PROCEDURE — 82746 ASSAY OF FOLIC ACID SERUM: CPT

## 2024-09-26 PROCEDURE — 83540 ASSAY OF IRON: CPT

## 2024-09-26 PROCEDURE — 85025 COMPLETE CBC W/AUTO DIFF WBC: CPT

## 2024-09-26 PROCEDURE — 36415 COLL VENOUS BLD VENIPUNCTURE: CPT

## 2024-09-26 PROCEDURE — 82728 ASSAY OF FERRITIN: CPT

## 2024-10-07 ENCOUNTER — TELEPHONE (OUTPATIENT)
Dept: ONCOLOGY | Age: 69
End: 2024-10-07

## 2024-10-07 ENCOUNTER — OFFICE VISIT (OUTPATIENT)
Dept: ONCOLOGY | Age: 69
End: 2024-10-07
Payer: COMMERCIAL

## 2024-10-07 VITALS
SYSTOLIC BLOOD PRESSURE: 153 MMHG | TEMPERATURE: 97.9 F | HEART RATE: 67 BPM | DIASTOLIC BLOOD PRESSURE: 82 MMHG | RESPIRATION RATE: 20 BRPM | BODY MASS INDEX: 56.15 KG/M2 | WEIGHT: 293 LBS

## 2024-10-07 DIAGNOSIS — D56.3 THALASSEMIA TRAIT, ALPHA: Primary | ICD-10-CM

## 2024-10-07 DIAGNOSIS — D50.9 MICROCYTIC ANEMIA: ICD-10-CM

## 2024-10-07 DIAGNOSIS — R79.89 ELEVATED FERRITIN: ICD-10-CM

## 2024-10-07 PROCEDURE — 3077F SYST BP >= 140 MM HG: CPT | Performed by: INTERNAL MEDICINE

## 2024-10-07 PROCEDURE — 3079F DIAST BP 80-89 MM HG: CPT | Performed by: INTERNAL MEDICINE

## 2024-10-07 PROCEDURE — 1123F ACP DISCUSS/DSCN MKR DOCD: CPT | Performed by: INTERNAL MEDICINE

## 2024-10-07 PROCEDURE — 99214 OFFICE O/P EST MOD 30 MIN: CPT | Performed by: INTERNAL MEDICINE

## 2024-10-07 PROCEDURE — 99211 OFF/OP EST MAY X REQ PHY/QHP: CPT | Performed by: INTERNAL MEDICINE

## 2024-10-07 RX ORDER — VITAMIN B COMPLEX
TABLET ORAL
COMMUNITY
Start: 2024-09-14 | End: 2024-10-08

## 2024-10-07 NOTE — TELEPHONE ENCOUNTER
[facility] HERE FOR MD VISIT  Rv in 6 month with labs   LAB ORDERS GIVEN TO PT ON EXIT  TO BE DONE BEFORE RV 4/7/25  MD VISIT 4/7/25 @ 3PM  AVS PRINTED W/ INSTRUCTIONS AND GIVEN TO PT ON EXIT

## 2024-10-07 NOTE — PROGRESS NOTES
Today's Date: 10/7/2024  Patient Name: Filomena Lopez  Patient's age: 69 y.o., 1955      Reason for Consult: management recommendations    CHIEF COMPLAINT:  Microcytic anemia    Chief Complaint   Patient presents with    Follow-up    Discuss Labs       History Obtained From:  patient, electronic medical record    Interval history:  Patient presents to the clinic for a follow-up visit and to discuss results of her lab work-up.  Hemoglobin remains relatively stable.  Patient is not on any iron or B12 supplementation.  Underwent colonoscopy which shows tubular adenoma.    During this visit patient's allergy, social, medical, surgical history and medications were reviewed and updated.    HISTORY OF PRESENT ILLNESS:      The patient is a 69 y.o.  female who presents to the office to establish care and for further treatment evaluation and recommendations.    Patient gives a long-standing history of microcytic anemia.  According to the patient she was first diagnosed with anemia when she was pregnant and has been anemic since.  Patient has had GI workup done in the past including colonoscopy which only revealed a polyp without any malignancy.  Patient's blood workup in the past has shown iron deficiency but her latest iron studies showed sufficient iron stores, B12 and folic acid levels.  Patient complains of fatigue at times.  Denies noticing any gross bleeding.  Patient is on iron supplement with one tablet a day.  Patient peripheral blood smear showed microcytic anemia with reticulocytosis.  Patient also had hemoglobin electrophoresis which came back within normal range.  Patient denies any history of gastric bypass surgery.    Past Medical History:   has a past medical history of Ambulates with cane, Bleeding, Cataracts, bilateral, Cerebrovascular disease, Chronic pain in left foot, CKD (chronic kidney disease) stage 3, GFR 30-59 ml/min ([facility]), COVID-19, GERD (gastroesophageal reflux disease),

## 2024-10-08 ENCOUNTER — HOSPITAL ENCOUNTER (OUTPATIENT)
Age: 69
Setting detail: SPECIMEN
Discharge: HOME | End: 2024-10-08
Payer: COMMERCIAL

## 2024-10-08 ENCOUNTER — OFFICE VISIT (OUTPATIENT)
Dept: INTERNAL MEDICINE | Age: 69
End: 2024-10-08
Payer: COMMERCIAL

## 2024-10-08 VITALS
HEART RATE: 60 BPM | WEIGHT: 293 LBS | BODY MASS INDEX: 45.99 KG/M2 | OXYGEN SATURATION: 98 % | HEIGHT: 67 IN | DIASTOLIC BLOOD PRESSURE: 92 MMHG | SYSTOLIC BLOOD PRESSURE: 146 MMHG

## 2024-10-08 DIAGNOSIS — G47.33 OSA (OBSTRUCTIVE SLEEP APNEA): ICD-10-CM

## 2024-10-08 DIAGNOSIS — J44.9 CHRONIC OBSTRUCTIVE PULMONARY DISEASE, UNSPECIFIED COPD TYPE (HCC): ICD-10-CM

## 2024-10-08 DIAGNOSIS — G56.03 BILATERAL CARPAL TUNNEL SYNDROME: ICD-10-CM

## 2024-10-08 DIAGNOSIS — I10 HYPERTENSION, UNSPECIFIED TYPE: ICD-10-CM

## 2024-10-08 DIAGNOSIS — I10 HYPERTENSION, UNSPECIFIED TYPE: Primary | ICD-10-CM

## 2024-10-08 DIAGNOSIS — N18.30 STAGE 3 CHRONIC KIDNEY DISEASE, UNSPECIFIED WHETHER STAGE 3A OR 3B CKD (HCC): ICD-10-CM

## 2024-10-08 DIAGNOSIS — I63.00 CEREBROVASCULAR ACCIDENT (CVA) DUE TO THROMBOSIS OF PRECEREBRAL ARTERY (HCC): ICD-10-CM

## 2024-10-08 DIAGNOSIS — D36.9 TUBULAR ADENOMA: ICD-10-CM

## 2024-10-08 DIAGNOSIS — D50.9 MICROCYTIC ANEMIA: ICD-10-CM

## 2024-10-08 LAB
ANION GAP SERPL CALCULATED.3IONS-SCNC: 11 MMOL/L (ref 9–16)
BUN SERPL-MCNC: 21 MG/DL (ref 8–23)
CALCIUM SERPL-MCNC: 10.1 MG/DL (ref 8.6–10.4)
CHLORIDE SERPL-SCNC: 103 MMOL/L (ref 98–107)
CO2 SERPL-SCNC: 26 MMOL/L (ref 20–31)
CREAT SERPL-MCNC: 1.2 MG/DL (ref 0.5–0.9)
GFR, ESTIMATED: 49 ML/MIN/1.73M2
GLUCOSE SERPL-MCNC: 89 MG/DL (ref 74–99)
POTASSIUM SERPL-SCNC: 4.8 MMOL/L (ref 3.7–5.3)
SODIUM SERPL-SCNC: 140 MMOL/L (ref 136–145)

## 2024-10-08 PROCEDURE — 80048 BASIC METABOLIC PNL TOTAL CA: CPT

## 2024-10-08 PROCEDURE — 90653 IIV ADJUVANT VACCINE IM: CPT | Performed by: INTERNAL MEDICINE

## 2024-10-08 PROCEDURE — 36415 COLL VENOUS BLD VENIPUNCTURE: CPT

## 2024-10-08 RX ORDER — PREGABALIN 25 MG/1
25 CAPSULE ORAL 3 TIMES DAILY
Qty: 90 CAPSULE | Refills: 1 | Status: SHIPPED | OUTPATIENT
Start: 2024-10-08 | End: 2024-12-07

## 2024-10-08 SDOH — ECONOMIC STABILITY: FOOD INSECURITY: WITHIN THE PAST 12 MONTHS, YOU WORRIED THAT YOUR FOOD WOULD RUN OUT BEFORE YOU GOT MONEY TO BUY MORE.: NEVER TRUE

## 2024-10-08 SDOH — ECONOMIC STABILITY: INCOME INSECURITY: HOW HARD IS IT FOR YOU TO PAY FOR THE VERY BASICS LIKE FOOD, HOUSING, MEDICAL CARE, AND HEATING?: NOT VERY HARD

## 2024-10-08 SDOH — ECONOMIC STABILITY: FOOD INSECURITY: WITHIN THE PAST 12 MONTHS, THE FOOD YOU BOUGHT JUST DIDN'T LAST AND YOU DIDN'T HAVE MONEY TO GET MORE.: NEVER TRUE

## 2024-10-08 NOTE — TELEPHONE ENCOUNTER
Filomena Lopez is calling to request a refill on the following medication(s):    Medication Request:  Requested Prescriptions     Pending Prescriptions Disp Refills    pregabalin (LYRICA) 25 MG capsule [Pharmacy Med Name: PREGABALIN 25 MG CAPS 25 Capsule] 60 capsule 3     Sig: TAKE 1 CAPSULE BY MOUTH THREE TIMES A DAY FOR 80 DAYS       Last Visit Date (If Applicable):  5/28/2024    Next Visit Date:    10/8/2024

## 2024-10-08 NOTE — PROGRESS NOTES
[facility]  [facility]  [address] [place] [address]  Dept: [phone]  Dept Fax: [phone]    Office Progress/Follow Up Note  Date of patient's visit: 10/8/2024  Patient's Name:  Filomena Lopez YOB: 1955            Patient Care Team:  Jacobo Robles MD as PCP - General (Internal Medicine)  Delonte Conner DPM as Consulting Physician (Podiatry)  Luther García MD as Consulting Physician (Pulmonology)  ________________________________________________________________________      Reason for Visit: Routine outpatient follow up  ________________________________________________________________________  Chief Complaint:  Hypertension (Follow up/Checks bp sometimes at home ranges 150/80 ranges)    ________________________________________________________________________  History of Presenting Illness:  Patient, 69 years old female, is coming into the clinic for her regular follow-up visit.    She has no concerns and complaints.  She is regular with her medications.  Patient is regular with her follow-up visits.    Since her last office visit she followed with oncologist, who recommended to continue iron supplements.  She recently had colonoscopy done found to have 2 polyps, polypectomy done for lung biopsy showed tubular adenoma considering that patient will have repeat colonoscopy in 7 years.  She is compliant with her CPAP machine.  She is complaining of having bilateral carpal tunnel symptoms especially at night.  Patient is not using her splints while sleeping, I counseled her to use her splints especially when she is sleeping and she verbalized understanding.  She is taking all of her medications.  Patient stated that she is unable to follow through bariatric surgery as she is on Brilinta and aspirin and her neurologist considers that she should be taking both.    She has an upcoming appointment with her pulmonologist, podiatrist and oncologist and she

## 2024-10-10 DIAGNOSIS — D56.3 THALASSEMIA TRAIT, ALPHA: ICD-10-CM

## 2024-10-10 DIAGNOSIS — R79.89 ELEVATED FERRITIN: ICD-10-CM

## 2024-10-10 DIAGNOSIS — D50.9 MICROCYTIC ANEMIA: ICD-10-CM

## 2024-10-10 DIAGNOSIS — E61.1 IRON DEFICIENCY: ICD-10-CM

## 2024-10-10 RX ORDER — FERROUS SULFATE 325(65) MG
1 TABLET ORAL 2 TIMES DAILY
Qty: 56 TABLET | Refills: 1 | Status: SHIPPED | OUTPATIENT
Start: 2024-10-10

## 2024-10-14 ENCOUNTER — OFFICE VISIT (OUTPATIENT)
Dept: PULMONOLOGY | Age: 69
End: 2024-10-14
Payer: COMMERCIAL

## 2024-10-14 VITALS
RESPIRATION RATE: 19 BRPM | HEIGHT: 67 IN | OXYGEN SATURATION: 98 % | WEIGHT: 293 LBS | SYSTOLIC BLOOD PRESSURE: 158 MMHG | HEART RATE: 67 BPM | BODY MASS INDEX: 45.99 KG/M2 | DIASTOLIC BLOOD PRESSURE: 86 MMHG

## 2024-10-14 DIAGNOSIS — Z87.891 PERSONAL HISTORY OF TOBACCO USE: ICD-10-CM

## 2024-10-14 DIAGNOSIS — E66.01 MORBID OBESITY WITH BMI OF 50.0-59.9, ADULT: ICD-10-CM

## 2024-10-14 DIAGNOSIS — R91.1 LUNG NODULE: ICD-10-CM

## 2024-10-14 DIAGNOSIS — G47.33 OSA ON CPAP: Primary | ICD-10-CM

## 2024-10-14 DIAGNOSIS — Z87.891 HISTORY OF SMOKING 30 OR MORE PACK YEARS: ICD-10-CM

## 2024-10-14 DIAGNOSIS — J44.9 CHRONIC OBSTRUCTIVE PULMONARY DISEASE, UNSPECIFIED COPD TYPE (HCC): ICD-10-CM

## 2024-10-14 PROCEDURE — 3077F SYST BP >= 140 MM HG: CPT | Performed by: INTERNAL MEDICINE

## 2024-10-14 PROCEDURE — G0296 VISIT TO DETERM LDCT ELIG: HCPCS | Performed by: INTERNAL MEDICINE

## 2024-10-14 PROCEDURE — 3079F DIAST BP 80-89 MM HG: CPT | Performed by: INTERNAL MEDICINE

## 2024-10-14 PROCEDURE — 99214 OFFICE O/P EST MOD 30 MIN: CPT | Performed by: INTERNAL MEDICINE

## 2024-10-14 PROCEDURE — 1123F ACP DISCUSS/DSCN MKR DOCD: CPT | Performed by: INTERNAL MEDICINE

## 2024-10-14 NOTE — PROGRESS NOTES
OUTPATIENT PULMONARY PROGRESS NOTE      Patient:  Filomena Lopez  MRN: [id number]    Consulting Physician: Erasto Redman MD  Reason for Consult: Obstructive sleep apnea/COPD  Primacy Care Physician: Jacobo Robles MD    HISTORY OF PRESENT ILLNESS:     She is here for follow-up of COPD/chronic dyspnea, obstructive sleep apnea and history of smoking.    Since she was seen last time she did not have any exacerbation in steroids or antibiotic use.  According to patient she is doing good.  She gets shortness of breath which is chronic on activities and exertion.  She denies any change in her chronic dyspnea.  She has mild occasional cough she denies worsening of the cough cough is only occasional she denies daily or persistent cough.  She denies nocturnal awakening with cough wheezing chest tightness or shortness of breath and during the daytime she does not complain of wheezing.  She denies sputum production denies any change in the color of the sputum volume the sputum.  She is using Anoro once daily.  According patient she use albuterol as needed not more than 2-3 times a week.    She is using her BiPAP according patient she use it every night she denies any problem with the BiPAP she has oronasal mask.  She usually go to sleep around 9 PM and wake up around 6 and then go back to sleep and wake up around 8:52 in the morning.  She denies any problem with the mask or BiPAP machine and according to patient she occasionally take nap during the daytime when she wake up in the morning she usually feels fresh.    Since she was seen last time compliance data is not available but last compliance data from 01/14/2024 to 04/12/24 showed usage of 3 hours 36 minutes average on BiPAP of 18/14 compliance was 96% and average AHI was 2.6 on BiPAP.    She had a low-dose CT scan of the chest on 11/27/2023 we did not show any mass or nodule.    She had history of smoking for about 45 years she used to smoke 2 pack/day

## 2024-10-17 NOTE — PROGRESS NOTES
Patient instructed to remove shoes and socks and instructed to sit in exam chair.  Current PCP is Jacobo Robles MD and date of last visit was 10/08/2024.   Do you have a follow up visit scheduled?  Yes  If yes, the date is unknown

## 2024-10-21 ENCOUNTER — OFFICE VISIT (OUTPATIENT)
Dept: INTERNAL MEDICINE | Age: 69
End: 2024-10-21
Payer: COMMERCIAL

## 2024-10-21 ENCOUNTER — OFFICE VISIT (OUTPATIENT)
Dept: PODIATRY | Age: 69
End: 2024-10-21
Payer: COMMERCIAL

## 2024-10-21 VITALS
RESPIRATION RATE: 20 BRPM | OXYGEN SATURATION: 98 % | HEIGHT: 67 IN | WEIGHT: 293 LBS | BODY MASS INDEX: 45.99 KG/M2 | DIASTOLIC BLOOD PRESSURE: 84 MMHG | SYSTOLIC BLOOD PRESSURE: 128 MMHG | HEART RATE: 88 BPM | TEMPERATURE: 97.3 F

## 2024-10-21 VITALS — BODY MASS INDEX: 45.99 KG/M2 | HEIGHT: 67 IN | WEIGHT: 293 LBS

## 2024-10-21 DIAGNOSIS — I73.9 PAD (PERIPHERAL ARTERY DISEASE) (HCC): ICD-10-CM

## 2024-10-21 DIAGNOSIS — B35.1 ONYCHOMYCOSIS: Primary | ICD-10-CM

## 2024-10-21 DIAGNOSIS — E66.813 CLASS 3 SEVERE OBESITY DUE TO EXCESS CALORIES WITH SERIOUS COMORBIDITY AND BODY MASS INDEX (BMI) OF 50.0 TO 59.9 IN ADULT: ICD-10-CM

## 2024-10-21 DIAGNOSIS — R60.0 EDEMA OF BOTH LOWER LEGS: ICD-10-CM

## 2024-10-21 DIAGNOSIS — M79.675 PAIN IN TOES OF BOTH FEET: ICD-10-CM

## 2024-10-21 DIAGNOSIS — E66.01 MORBID OBESITY: Primary | ICD-10-CM

## 2024-10-21 DIAGNOSIS — J44.9 CHRONIC OBSTRUCTIVE PULMONARY DISEASE, UNSPECIFIED COPD TYPE (HCC): ICD-10-CM

## 2024-10-21 DIAGNOSIS — Z86.73 HISTORY OF STROKE: ICD-10-CM

## 2024-10-21 DIAGNOSIS — E66.01 CLASS 3 SEVERE OBESITY DUE TO EXCESS CALORIES WITH SERIOUS COMORBIDITY AND BODY MASS INDEX (BMI) OF 50.0 TO 59.9 IN ADULT: ICD-10-CM

## 2024-10-21 DIAGNOSIS — I10 ESSENTIAL HYPERTENSION: ICD-10-CM

## 2024-10-21 DIAGNOSIS — M79.674 PAIN IN TOES OF BOTH FEET: ICD-10-CM

## 2024-10-21 PROCEDURE — 11721 DEBRIDE NAIL 6 OR MORE: CPT

## 2024-10-21 PROCEDURE — 3074F SYST BP LT 130 MM HG: CPT

## 2024-10-21 PROCEDURE — 1123F ACP DISCUSS/DSCN MKR DOCD: CPT

## 2024-10-21 PROCEDURE — 99999 PR OFFICE/OUTPT VISIT,PROCEDURE ONLY: CPT

## 2024-10-21 PROCEDURE — 99213 OFFICE O/P EST LOW 20 MIN: CPT

## 2024-10-21 PROCEDURE — 99211 OFF/OP EST MAY X REQ PHY/QHP: CPT | Performed by: INTERNAL MEDICINE

## 2024-10-21 PROCEDURE — 3079F DIAST BP 80-89 MM HG: CPT

## 2024-10-21 SDOH — ECONOMIC STABILITY: INCOME INSECURITY: HOW HARD IS IT FOR YOU TO PAY FOR THE VERY BASICS LIKE FOOD, HOUSING, MEDICAL CARE, AND HEATING?: SOMEWHAT HARD

## 2024-10-21 SDOH — ECONOMIC STABILITY: FOOD INSECURITY: WITHIN THE PAST 12 MONTHS, YOU WORRIED THAT YOUR FOOD WOULD RUN OUT BEFORE YOU GOT MONEY TO BUY MORE.: NEVER TRUE

## 2024-10-21 SDOH — ECONOMIC STABILITY: FOOD INSECURITY: WITHIN THE PAST 12 MONTHS, THE FOOD YOU BOUGHT JUST DIDN'T LAST AND YOU DIDN'T HAVE MONEY TO GET MORE.: NEVER TRUE

## 2024-10-21 ASSESSMENT — ANXIETY QUESTIONNAIRES
5. BEING SO RESTLESS THAT IT IS HARD TO SIT STILL: NOT AT ALL
2. NOT BEING ABLE TO STOP OR CONTROL WORRYING: NOT AT ALL
1. FEELING NERVOUS, ANXIOUS, OR ON EDGE: NOT AT ALL
4. TROUBLE RELAXING: NOT AT ALL
IF YOU CHECKED OFF ANY PROBLEMS ON THIS QUESTIONNAIRE, HOW DIFFICULT HAVE THESE PROBLEMS MADE IT FOR YOU TO DO YOUR WORK, TAKE CARE OF THINGS AT HOME, OR GET ALONG WITH OTHER PEOPLE: NOT DIFFICULT AT ALL
6. BECOMING EASILY ANNOYED OR IRRITABLE: NOT AT ALL
GAD7 TOTAL SCORE: 0
3. WORRYING TOO MUCH ABOUT DIFFERENT THINGS: NOT AT ALL
7. FEELING AFRAID AS IF SOMETHING AWFUL MIGHT HAPPEN: NOT AT ALL

## 2024-10-21 ASSESSMENT — PATIENT HEALTH QUESTIONNAIRE - PHQ9
SUM OF ALL RESPONSES TO PHQ QUESTIONS 1-9: 0
DEPRESSION UNABLE TO ASSESS: PT REFUSES
SUM OF ALL RESPONSES TO PHQ QUESTIONS 1-9: 0
SUM OF ALL RESPONSES TO PHQ9 QUESTIONS 1 & 2: 0
SUM OF ALL RESPONSES TO PHQ QUESTIONS 1-9: 0
1. LITTLE INTEREST OR PLEASURE IN DOING THINGS: NOT AT ALL
2. FEELING DOWN, DEPRESSED OR HOPELESS: NOT AT ALL
SUM OF ALL RESPONSES TO PHQ QUESTIONS 1-9: 0

## 2024-10-21 ASSESSMENT — ENCOUNTER SYMPTOMS
ABDOMINAL DISTENTION: 0
EYE DISCHARGE: 0
ABDOMINAL PAIN: 0
CHEST TIGHTNESS: 0
BACK PAIN: 0
EYE ITCHING: 0
APNEA: 0

## 2024-10-21 NOTE — PROGRESS NOTES
[facility]  [address]   [address] [place] [address]  Tel: [phone]   Fax: [phone]    Subjective     CC: Diabetic foot examination    Interval History:  Patient presents to clinic today for trimming of painful elongated toenails. Patient states that she has been unable to wear normal shoe gear due to the pain. Denies any new wounds or hyperkeratotic lesions. Denies any other complaints at this time.    HPI:  Filomena Lopez is a 69 y.o. year old female who presents clinic today for follow-up of thickened and discolored nails. She states that she has been doing well and denies any new complaints. She walks around with a cane and has troubling cutting her own nails. She does not wear any compression stockings as they are hard to put on. Patient denies any nausea, vomiting, fever, chills or shortness of breath.    Primary care physician is Jacobo Robles MD.    ROS:    Constitutional: Denies nausea, vomiting, fever, chills.  Neurologic: Denies numbness, tingling, and burning in the feet.    Vascular: Denies symptoms of lower extremity claudication.    Skin: Denies open wounds.  Otherwise negative except as noted in the HPI.     PMH:  Past Medical History:   Diagnosis Date    Ambulates with cane     Bleeding     while on aggrenox    Cataracts, bilateral     Cerebrovascular disease 2003,2011    cva    Chronic pain in left foot     CKD (chronic kidney disease) stage 3, GFR 30-59 ml/min ([facility])     COVID-19 11/2021    states hpspitalized    GERD (gastroesophageal reflux disease)     Hx of blood clots     Hyperlipidemia     Hypertension     Iron (Fe) deficiency anemia     Nicotine abuse     Obesity     TIBURCIO on CPAP     Osteoarthritis     Peripheral vascular disease (HCC) 03/13/2014    Substance abuse ([facility])     cocaine, canabis    Thalassemia minor     Thyroid nodule 11/22/2022    Unspecified cerebral artery occlusion with cerebral infarction     Wears dentures     upper and lower

## 2024-10-21 NOTE — PROGRESS NOTES
Attending Physician Statement  I have discussed the care of Filomena Larance, including pertinent history and exam findings,  with the resident. I have reviewed the key elements of all parts of the encounter with the resident.  I agree with the assessment, plan and orders as documented by the resident.  (GE Modifier)   
Filomena Lopez was evaluated today and a DME order was entered for a wheeled walker with seat because she requires this to successfully complete daily living tasks of eating, bathing, toileting, personal cares, ambulating, and grooming.  A wheeled walker with seat is necessary due to the patient's unsteady gait, upper body weakness, inability to  and ambulation device, ambulating only short distances by pushing a walker, and the need to sit for a short time before resuming ambulation.  These tasks cannot be completed with a lesser ambulation device such as a cane, crutch, or standard walker.  The need for this equipment was discussed with the patient and she understands and is in agreement.       Jarad Ahn MD  Internal Medicine Resident,PGY 3  [facility],[place]   
Pure hypercholesterolemia    Cerebrovascular accident (CVA), 2011, no residual deficit (HCC)    CKD (chronic kidney disease) stage 3, GFR 30-59 ml/min (HCC)    Onychomycosis    Peripheral vascular disease (HCC)    TIBURCIO (obstructive sleep apnea)    Class 3 severe obesity due to excess calories with serious comorbidity and body mass index (BMI) of 50.0 to 59.9 in adult    Primary osteoarthritis of left knee    Bilateral carpal tunnel syndrome    Microcytic anemia    Alpha thalassemia trait    Cervical stenosis of spinal canal    Chronic bilateral low back pain with bilateral sciatica    Lumbar radicular pain    History of stroke    Dizziness    Occlusion of right internal carotid artery    Thyroid nodule    Occlusion of vertebral artery    COPD (chronic obstructive pulmonary disease) (HCC)    Yeast dermatitis    Tubular adenoma       Health Maintenance Due   Topic Date Due    Respiratory Syncytial Virus (RSV) Pregnant or age 60 yrs+ (1 - 1-dose 60+ series) Never done    Pneumococcal 65+ years Vaccine (3 of 3 - PPSV23 or PCV20) 12/20/2021    Annual Wellness Visit (Medicare Advantage)  01/01/2024    COVID-19 Vaccine (4 - 2023-24 season) 09/01/2024       Allergies   Allergen Reactions    Duricef [Cefadroxil Monohydrate] Hives    Fish-Derived Products Hives    Pcn [Penicillins] Hives    Tomato Hives         Current Outpatient Medications   Medication Sig Dispense Refill    FEROSUL 325 (65 Fe) MG tablet TAKE 1 TABLET BY MOUTH IN THE MORNING AND AT BEDTIME 56 tablet 1    pregabalin (LYRICA) 25 MG capsule Take 1 capsule by mouth 3 times daily for 60 days. Max Daily Amount: 75 mg 90 capsule 1    Cholecalciferol (VITAMIN D3) 25 MCG TABS TAKE 1 TABLET BY MOUTH DAILY 28 tablet 1    metFORMIN (GLUCOPHAGE) 500 MG tablet TAKE 1 TABLET BY MOUTH DAILY (WITH BREAKFAST) 28 tablet 2    aspirin (ASPIRIN LOW DOSE) 81 MG EC tablet TAKE 1 TABLET BY MOUTH DAILY 28 tablet 5    atorvastatin (LIPITOR) 80 MG tablet TAKE 1 TABLET BY MOUTH DAILY 28

## 2024-10-22 DIAGNOSIS — L30.4 INTERTRIGO: ICD-10-CM

## 2024-10-22 RX ORDER — NYSTATIN 100000 [USP'U]/G
POWDER TOPICAL
Qty: 15 G | Refills: 2 | Status: SHIPPED | OUTPATIENT
Start: 2024-10-22

## 2024-10-22 NOTE — TELEPHONE ENCOUNTER
..Request for   Requested Prescriptions     Pending Prescriptions Disp Refills    nystatin 004057 UNIT/GM powder [Pharmacy Med Name: NYSTATIN TOPICAL POWDER [id number] Powder] 15 g 2     Sig: APPLY TOPICALL 3 TIMES A DAY    .      Please review and e-scribe to pharmacy listed in chart if appropriate. Thank you.      Last Visit Date: 10/21/2024  Next Visit Date: Visit date not found    Future Appointments   Date Time Provider Department Center   10/25/2024  2:10 PM Armando Lguo MD [facility] None   10/31/2024  4:00 PM Reyna Shepherd MD Neuro Spec Neurology -   12/5/2024  3:30 PM STV CT ROOM 1 [facility] CT SCAN [facility] Radiolog   1/27/2025  3:15 PM Jose Angel Quach DPM [facility] Pod[facility]ry [facility]   4/7/2025  3:00 PM Oc Irizarry MD [facility]   4/14/2025  1:30 PM [facility]       Health Maintenance   Topic Date Due    Respiratory Syncytial Virus (RSV) Pregnant or age 60 yrs+ (1 - 1-dose 60+ series) Never done    Pneumococcal 65+ years Vaccine (3 of 3 - PPSV23 or PCV20) 12/20/2021    Annual Wellness Visit (Medicare Advantage)  01/01/2024    COVID-19 Vaccine (4 - 2023-24 season) 09/01/2024    Lung Cancer Screening &/or Counseling  11/27/2024    Lipids  05/31/2025    GFR test (Diabetes, CKD 3-4, OR last GFR 15-59)  10/08/2025    Depression Screen  10/21/2025    Breast cancer screen  03/21/2026    Colorectal Cancer Screen  09/03/2027    DTaP/Tdap/Td vaccine (2 - Td or Tdap) 03/08/2028    DEXA (modify frequency per FRAX score)  Completed    Flu vaccine  Completed    Shingles vaccine  Completed    Hepatitis C screen  Completed    Hepatitis A vaccine  Aged Out    Hepatitis B vaccine  Aged Out    Hib vaccine  Aged Out    Polio vaccine  Aged Out    Meningococcal (ACWY) vaccine  Aged Out    A1C test (Diabetic or Prediabetic)  Discontinued    Diabetes screen  Discontinued       Hemoglobin A1C (%)   Date Value   05/28/2024 5.5   11/28/2022 5.8   11/03/2022 5.8             ( goal A1C is < 7)   No

## 2024-10-24 ENCOUNTER — HOSPITAL ENCOUNTER (OUTPATIENT)
Age: 69
Setting detail: SPECIMEN
Discharge: HOME | End: 2024-10-24
Payer: COMMERCIAL

## 2024-10-24 LAB
ANION GAP SERPL CALCULATED.3IONS-SCNC: 11 MMOL/L (ref 9–16)
BUN SERPL-MCNC: 27 MG/DL (ref 8–23)
CALCIUM SERPL-MCNC: 10.2 MG/DL (ref 8.6–10.4)
CHLORIDE SERPL-SCNC: 103 MMOL/L (ref 98–107)
CO2 SERPL-SCNC: 28 MMOL/L (ref 20–31)
CREAT SERPL-MCNC: 1.1 MG/DL (ref 0.5–0.9)
GFR, ESTIMATED: 52 ML/MIN/1.73M2
GLUCOSE SERPL-MCNC: 51 MG/DL (ref 74–99)
POTASSIUM SERPL-SCNC: 4.5 MMOL/L (ref 3.7–5.3)
SODIUM SERPL-SCNC: 142 MMOL/L (ref 136–145)

## 2024-10-24 PROCEDURE — 80048 BASIC METABOLIC PNL TOTAL CA: CPT

## 2024-10-24 PROCEDURE — 36415 COLL VENOUS BLD VENIPUNCTURE: CPT

## 2024-10-25 ENCOUNTER — TELEPHONE (OUTPATIENT)
Dept: INTERNAL MEDICINE | Age: 69
End: 2024-10-25

## 2024-10-25 DIAGNOSIS — I10 ESSENTIAL HYPERTENSION: Primary | ICD-10-CM

## 2024-10-25 RX ORDER — BLOOD PRESSURE TEST KIT
KIT MISCELLANEOUS
Qty: 1 KIT | Refills: 0 | Status: CANCELLED | OUTPATIENT
Start: 2024-10-25

## 2024-10-25 NOTE — TELEPHONE ENCOUNTER
[facility] office on Aging is calling for an order for blood pressure cuff.  Pended DME order.  Soni request order be faxed to her at Fax [phone] Attn Soni

## 2024-10-26 NOTE — TELEPHONE ENCOUNTER
Patient has a history of hypertension and will benefit from monitoring her blood pressure.  DME order for BP cuff has been entered.  Patient is agreeable.

## 2024-10-31 ENCOUNTER — OFFICE VISIT (OUTPATIENT)
Dept: NEUROLOGY | Age: 69
End: 2024-10-31

## 2024-10-31 VITALS
HEART RATE: 68 BPM | BODY MASS INDEX: 45.99 KG/M2 | SYSTOLIC BLOOD PRESSURE: 165 MMHG | WEIGHT: 293 LBS | HEIGHT: 67 IN | DIASTOLIC BLOOD PRESSURE: 86 MMHG

## 2024-10-31 DIAGNOSIS — G56.03 BILATERAL CARPAL TUNNEL SYNDROME: Primary | ICD-10-CM

## 2024-10-31 DIAGNOSIS — I63.00 CEREBROVASCULAR ACCIDENT (CVA) DUE TO THROMBOSIS OF PRECEREBRAL ARTERY (HCC): ICD-10-CM

## 2024-10-31 DIAGNOSIS — M48.02 CERVICAL STENOSIS OF SPINAL CANAL: ICD-10-CM

## 2024-10-31 NOTE — PROGRESS NOTES
normal, repeat in 5 years    COLONOSCOPY N/A 2019    COLONOSCOPY POLYPECTOMY SNARE/COLD BIOPSY performed by Dhruv Bentley MD at [facility]    COLONOSCOPY N/A 9/3/2024    COLONOSCOPY POLYPECTOMY SNARE/BIOPSY performed by Ceci Morales MD at [facility]    ENDOMETRIAL BIOPSY      TONSILLECTOMY AND ADENOIDECTOMY      TUBAL LIGATION      UPPER GASTROINTESTINAL ENDOSCOPY  2009    negative    UPPER GASTROINTESTINAL ENDOSCOPY N/A 2019    EGD BIOPSY performed by Dhruv Bentley MD at [facility]         Social History:  Social History     Socioeconomic History    Marital status:      Spouse name: Not on file    Number of children: Not on file    Years of education: Not on file    Highest education level: Not on file   Occupational History    Not on file   Tobacco Use    Smoking status: Former     Current packs/day: 0.00     Average packs/day: 2.0 packs/day for 35.8 years (71.6 ttl pk-yrs)     Types: Cigarettes     Start date:      Quit date: 10/11/2011     Years since quittin.0     Passive exposure: Current (Outside of the home)    Smokeless tobacco: Never   Vaping Use    Vaping status: Never Used   Substance and Sexual Activity    Alcohol use: Yes     Alcohol/week: 6.0 standard drinks of alcohol     Types: 6 Cans of beer per week     Comment: rare    Drug use: No     Types: Cocaine, Marijuana (Weed)     Comment: per pt did years ago; cocaine & marij, 17 denies current use    Sexual activity: Yes     Partners: Male   Other Topics Concern    Not on file   Social History Narrative    Not on file     Social Determinants of Health     Financial Resource Strain: Medium Risk (10/21/2024)    Overall Financial Resource Strain (CARDIA)     Difficulty of Paying Living Expenses: Somewhat hard   Food Insecurity: No Food Insecurity (10/21/2024)    Hunger Vital Sign     Worried About Running Out of Food in the Last Year: Never true     Ran Out of Food in the Last Year: Never true 
headaches or visual disturbances.   No symptoms suggestive of respiratory, genitourinary or muskuloskeletal dysfunction.   No concerns of emotional disturbances.    Review of systems is remarkable for ***.  All other systems negative    Physical Exam:  BP (!) 165/86 (Site: Left Lower Arm, Position: Sitting, Cuff Size: Large Adult)   Pulse 68   Ht 1.702 m (5' 7\")   Wt (!) 165.1 kg (364 lb)   BMI 57.01 kg/m²   General Appearance: ***  Head: \"Normocephalic, without obvious abnormality\",\"atraumatic\"    Mental Status: Orientation oriented to person, place, problem, and time.   Mood/Affect: appropriate mood and affect.  Attention Span/Concentration: three part command intact.   Language: can appropriately name objects and has spontaneous speech.  Memory: recent memory intact and remote memory intact.   Fund of Knowledge: current events and past history.    Cranial Nerves:   CN II: pupil normal size and reactive to light and dark and visual acuity intact to confrontation.   CN III, IV, VI: no nystagmus, extraocular muscle strength normal, and normal ocular motility pursuits.   CN V : normal sensation.  CN VII : normal facial expression.  CN VIII : normal hearing to finger rub.   CN IX, X: normal palatal movement.   CN XI : normal trapezii.   CN XII: tongue protrudes midline.    Motor Exam:  Right Upper Extremity: 5/5 strength, no drift, normal bulk and tone. Left Upper Extremity: 5/5 strength. no drift, normal bulk and tone.   Right Lower Extremity: 5/5 strength, no drift, normal bulk and tone. Left Lower Extremity:5/5 strength, no drift, normal bulk and tone      Reflexes:        R          L  B          2          2  T          2          2  BR       2          2  P          2          2  A          2          2  Plantars    Down   Down    Coordination: normal finger-to-nose and heel-to-shin, normal rapid alternating movements, normal circular movements.     Sensation: normal temperature, pin, vibration, joint

## 2024-11-05 ENCOUNTER — HOSPITAL ENCOUNTER (OUTPATIENT)
Dept: ULTRASOUND IMAGING | Age: 69
Discharge: HOME | End: 2024-11-07
Attending: INTERNAL MEDICINE
Payer: COMMERCIAL

## 2024-11-05 ENCOUNTER — HOSPITAL ENCOUNTER (OUTPATIENT)
Age: 69
Discharge: HOME | End: 2024-11-05
Attending: INTERNAL MEDICINE
Payer: COMMERCIAL

## 2024-11-05 DIAGNOSIS — N18.32 STAGE 3B CHRONIC KIDNEY DISEASE (HCC): ICD-10-CM

## 2024-11-05 DIAGNOSIS — R53.82 CHRONIC FATIGUE, UNSPECIFIED: ICD-10-CM

## 2024-11-05 LAB
25(OH)D3 SERPL-MCNC: 40 NG/ML (ref 30–100)
ALBUMIN SERPL-MCNC: 4.2 G/DL (ref 3.5–5.2)
ALBUMIN/GLOB SERPL: 1.3 {RATIO} (ref 1–2.5)
ALP SERPL-CCNC: 147 U/L (ref 35–104)
ALT SERPL-CCNC: 18 U/L (ref 10–35)
ANION GAP SERPL CALCULATED.3IONS-SCNC: 12 MMOL/L (ref 9–16)
AST SERPL-CCNC: 26 U/L (ref 10–35)
BACTERIA URNS QL MICRO: NORMAL
BASOPHILS # BLD: 0.06 K/UL (ref 0–0.2)
BASOPHILS NFR BLD: 1 % (ref 0–2)
BILIRUB SERPL-MCNC: 0.3 MG/DL (ref 0–1.2)
BILIRUB UR QL STRIP: NEGATIVE
BUN SERPL-MCNC: 25 MG/DL (ref 8–23)
C3 SERPL-MCNC: 171 MG/DL (ref 90–180)
C4 SERPL-MCNC: 41 MG/DL (ref 10–40)
CA-I BLD-SCNC: 1.18 MMOL/L (ref 1.13–1.33)
CALCIUM SERPL-MCNC: 9.3 MG/DL (ref 8.6–10.4)
CASTS #/AREA URNS LPF: NORMAL /LPF (ref 0–8)
CHLORIDE SERPL-SCNC: 104 MMOL/L (ref 98–107)
CLARITY UR: CLEAR
CO2 SERPL-SCNC: 25 MMOL/L (ref 20–31)
COLOR UR: YELLOW
CREAT SERPL-MCNC: 1.2 MG/DL (ref 0.6–0.9)
CREAT UR-MCNC: 121 MG/DL (ref 28–217)
EOSINOPHIL # BLD: 0.2 K/UL (ref 0–0.44)
EOSINOPHIL,URINE: NORMAL
EOSINOPHILS RELATIVE PERCENT: 4 % (ref 1–4)
EPI CELLS #/AREA URNS HPF: NORMAL /HPF (ref 0–5)
ERYTHROCYTE [DISTWIDTH] IN BLOOD BY AUTOMATED COUNT: 18.3 % (ref 11.8–14.4)
FREE KAPPA/LAMBDA RATIO: 1.7 (ref 0.22–1.74)
GFR, ESTIMATED: 48 ML/MIN/1.73M2
GLUCOSE SERPL-MCNC: 82 MG/DL (ref 74–99)
GLUCOSE UR STRIP-MCNC: NEGATIVE MG/DL
HAV IGM SERPL QL IA: NONREACTIVE
HBV CORE IGM SERPL QL IA: NONREACTIVE
HBV SURFACE AG SERPL QL IA: NONREACTIVE
HCT VFR BLD AUTO: 33.8 % (ref 36.3–47.1)
HCV AB SERPL QL IA: NONREACTIVE
HGB BLD-MCNC: 10.2 G/DL (ref 11.9–15.1)
HGB UR QL STRIP.AUTO: NEGATIVE
IMM GRANULOCYTES # BLD AUTO: <0.03 K/UL (ref 0–0.3)
IMM GRANULOCYTES NFR BLD: 0 %
KAPPA LC FREE SER-MCNC: 33.2 MG/L
KETONES UR STRIP-MCNC: NEGATIVE MG/DL
LAMBDA LC FREE SERPL-MCNC: 19.5 MG/L (ref 4.2–27.7)
LEUKOCYTE ESTERASE UR QL STRIP: ABNORMAL
LYMPHOCYTES NFR BLD: 1.71 K/UL (ref 1.1–3.7)
LYMPHOCYTES RELATIVE PERCENT: 30 % (ref 24–43)
MCH RBC QN AUTO: 21.5 PG (ref 25.2–33.5)
MCHC RBC AUTO-ENTMCNC: 30.2 G/DL (ref 28.4–34.8)
MCV RBC AUTO: 71.2 FL (ref 82.6–102.9)
MONOCYTES NFR BLD: 0.49 K/UL (ref 0.1–1.2)
MONOCYTES NFR BLD: 9 % (ref 3–12)
NEUTROPHILS NFR BLD: 56 % (ref 36–65)
NEUTS SEG NFR BLD: 3.14 K/UL (ref 1.5–8.1)
NITRITE UR QL STRIP: NEGATIVE
NRBC BLD-RTO: 0 PER 100 WBC
PH UR STRIP: 6 [PH] (ref 5–8)
PHOSPHATE SERPL-MCNC: 3.3 MG/DL (ref 2.5–4.5)
PLATELET # BLD AUTO: 337 K/UL (ref 138–453)
PMV BLD AUTO: 9.5 FL (ref 8.1–13.5)
POTASSIUM SERPL-SCNC: 3.8 MMOL/L (ref 3.7–5.3)
PROT SERPL-MCNC: 7.4 G/DL (ref 6.6–8.7)
PROT UR STRIP-MCNC: ABNORMAL MG/DL
PTH-INTACT SERPL-MCNC: 72 PG/ML (ref 15–65)
RBC # BLD AUTO: 4.75 M/UL (ref 3.95–5.11)
RBC # BLD: ABNORMAL 10*6/UL
RBC #/AREA URNS HPF: NORMAL /HPF (ref 0–4)
SODIUM SERPL-SCNC: 141 MMOL/L (ref 136–145)
SP GR UR STRIP: 1.02 (ref 1–1.03)
TOTAL PROTEIN, URINE: 19 MG/DL
UROBILINOGEN UR STRIP-ACNC: NORMAL EU/DL (ref 0–1)
WBC #/AREA URNS HPF: NORMAL /HPF (ref 0–5)
WBC OTHER # BLD: 5.6 K/UL (ref 3.5–11.3)

## 2024-11-05 PROCEDURE — 86160 COMPLEMENT ANTIGEN: CPT

## 2024-11-05 PROCEDURE — 86038 ANTINUCLEAR ANTIBODIES: CPT

## 2024-11-05 PROCEDURE — 87205 SMEAR GRAM STAIN: CPT

## 2024-11-05 PROCEDURE — 84100 ASSAY OF PHOSPHORUS: CPT

## 2024-11-05 PROCEDURE — 80053 COMPREHEN METABOLIC PANEL: CPT

## 2024-11-05 PROCEDURE — 81001 URINALYSIS AUTO W/SCOPE: CPT

## 2024-11-05 PROCEDURE — 76770 US EXAM ABDO BACK WALL COMP: CPT

## 2024-11-05 PROCEDURE — 82330 ASSAY OF CALCIUM: CPT

## 2024-11-05 PROCEDURE — 85025 COMPLETE CBC W/AUTO DIFF WBC: CPT

## 2024-11-05 PROCEDURE — 82306 VITAMIN D 25 HYDROXY: CPT

## 2024-11-05 PROCEDURE — 86334 IMMUNOFIX E-PHORESIS SERUM: CPT

## 2024-11-05 PROCEDURE — 84156 ASSAY OF PROTEIN URINE: CPT

## 2024-11-05 PROCEDURE — 36415 COLL VENOUS BLD VENIPUNCTURE: CPT

## 2024-11-05 PROCEDURE — 86225 DNA ANTIBODY NATIVE: CPT

## 2024-11-05 PROCEDURE — 80074 ACUTE HEPATITIS PANEL: CPT

## 2024-11-05 PROCEDURE — 84155 ASSAY OF PROTEIN SERUM: CPT

## 2024-11-05 PROCEDURE — 84165 PROTEIN E-PHORESIS SERUM: CPT

## 2024-11-05 PROCEDURE — 83970 ASSAY OF PARATHORMONE: CPT

## 2024-11-05 PROCEDURE — 83521 IG LIGHT CHAINS FREE EACH: CPT

## 2024-11-05 PROCEDURE — 82570 ASSAY OF URINE CREATININE: CPT

## 2024-11-06 LAB
ALBUMIN PERCENT: 54 % (ref 45–65)
ALBUMIN SERPL-MCNC: 4 G/DL (ref 3.2–5.2)
ALPHA 2 PERCENT: 12 % (ref 6–13)
ALPHA1 GLOB SERPL ELPH-MCNC: 0.4 G/DL (ref 0.1–0.4)
ALPHA1 GLOB SERPL ELPH-MCNC: 6 % (ref 3–6)
ALPHA2 GLOB SERPL ELPH-MCNC: 0.9 G/DL (ref 0.5–0.9)
ANA SER QL IA: NEGATIVE
B-GLOBULIN SERPL ELPH-MCNC: 1 G/DL (ref 0.5–1.1)
B-GLOBULIN SERPL ELPH-MCNC: 14 % (ref 11–19)
DSDNA IGG SER QL IA: 0.8 IU/ML
GAMMA GLOB SERPL ELPH-MCNC: 1.1 G/DL (ref 0.5–1.5)
GAMMA GLOBULIN %: 15 % (ref 9–20)
ITYP INTERPRETATION: NORMAL
NUCLEAR IGG SER IA-RTO: 0.2 U/ML
PATH REV: NORMAL
PATHOLOGIST: NORMAL
PROT PATTERN SERPL ELPH-IMP: NORMAL
PROT SERPL-MCNC: 7.4 G/DL
TOTAL PROT. SUM,%: 101 % (ref 98–102)
TOTAL PROT. SUM: 7.4 G/DL (ref 6.3–8.2)

## 2024-11-07 NOTE — TELEPHONE ENCOUNTER
Filomena Lopez is calling to request a refill on the following medication(s):    Medication Request:  Requested Prescriptions     Pending Prescriptions Disp Refills    VITAMIN D3 25 MCG TABS tablet [Pharmacy Med Name: VIT D 1000IU 25MCG TAB 25 Tablet] 28 tablet 1     Sig: TAKE 1 TABLET BY MOUTH DAILY       Last Visit Date (If Applicable):  10/21/2024    Next Visit Date:    Visit date not found

## 2024-11-10 RX ORDER — CHOLECALCIFEROL (VITAMIN D3) 25 MCG
1 TABLET ORAL DAILY
Qty: 28 TABLET | Refills: 1 | Status: SHIPPED | OUTPATIENT
Start: 2024-11-10

## 2024-11-21 DIAGNOSIS — R73.03 PREDIABETES: ICD-10-CM

## 2024-11-22 NOTE — TELEPHONE ENCOUNTER
from Last 3 Encounters:   10/31/24 (!) 165/86   10/25/24 130/78   10/21/24 128/84          (goal 120/80)    All Future Testing planned in [facility]  Lab Frequency Next Occurrence   COLONOSCOPY (Screening) Once 05/27/2024   CBC with Auto Differential Once 10/07/2024   Lactate Dehydrogenase Once 10/07/2024   Ferritin Once 10/07/2024   Iron and TIBC Once 10/07/2024   Vitamin B12 Once 10/07/2024   CT Lung Screen (Initial/Annual/Baseline) Once 11/28/2024         Patient Active Problem List:     Essential hypertension     Pure hypercholesterolemia     Cerebrovascular accident (CVA), 2011, no residual deficit (HCC)     CKD (chronic kidney disease) stage 3, GFR 30-59 ml/min ([facility])     Onychomycosis     Peripheral vascular disease ([facility])     TIBURCIO (obstructive sleep apnea)     Class 3 severe obesity due to excess calories with serious comorbidity and body mass index (BMI) of 50.0 to 59.9 in adult     Primary osteoarthritis of left knee     Bilateral carpal tunnel syndrome     Microcytic anemia     Alpha thalassemia trait     Cervical stenosis of spinal canal     Chronic bilateral low back pain with bilateral sciatica     Lumbar radicular pain     History of stroke     Dizziness     Occlusion of right internal carotid artery     Thyroid nodule     Occlusion of vertebral artery     COPD (chronic obstructive pulmonary disease) (HCC)     Yeast dermatitis     Tubular adenoma

## 2024-11-26 DIAGNOSIS — L30.4 INTERTRIGO: ICD-10-CM

## 2024-11-26 RX ORDER — NYSTATIN 100000 [USP'U]/G
POWDER TOPICAL
Qty: 15 G | Refills: 2 | Status: SHIPPED | OUTPATIENT
Start: 2024-11-26

## 2024-11-26 NOTE — TELEPHONE ENCOUNTER
Filomena Lopez is calling to request a refill on the following medication(s):    Medication Request:  Requested Prescriptions     Pending Prescriptions Disp Refills    nystatin [id number] UNIT/GM powder [Pharmacy Med Name: NYSTATIN TOPICAL POWDER [id number] Powder] 15 g 2     Sig: APPLY TOPICALLY TO AFFECTED AREAS THREE TIMES A DAY       Last Visit Date (If Applicable):  10/21/2024    Next Visit Date:    Visit date not found

## 2024-12-04 DIAGNOSIS — I10 ESSENTIAL HYPERTENSION: ICD-10-CM

## 2024-12-05 ENCOUNTER — HOSPITAL ENCOUNTER (OUTPATIENT)
Dept: CT IMAGING | Age: 69
Discharge: HOME | End: 2024-12-07
Attending: INTERNAL MEDICINE
Payer: COMMERCIAL

## 2024-12-05 DIAGNOSIS — Z87.891 PERSONAL HISTORY OF TOBACCO USE: ICD-10-CM

## 2024-12-05 PROCEDURE — 71271 CT THORAX LUNG CANCER SCR C-: CPT

## 2024-12-05 RX ORDER — AMLODIPINE BESYLATE 5 MG/1
5 TABLET ORAL DAILY
Qty: 28 TABLET | Refills: 1 | Status: SHIPPED | OUTPATIENT
Start: 2024-12-05

## 2024-12-05 NOTE — TELEPHONE ENCOUNTER
Filomena Lopez is calling to request a refill on the following medication(s):    Medication Request:        Last Visit Date (If Applicable):  10/21/2024    Next Visit Date:    Visit date not found

## 2024-12-09 ENCOUNTER — HOSPITAL ENCOUNTER (EMERGENCY)
Age: 69
Discharge: HOME | End: 2024-12-09
Attending: EMERGENCY MEDICINE
Payer: COMMERCIAL

## 2024-12-09 ENCOUNTER — APPOINTMENT (OUTPATIENT)
Dept: CT IMAGING | Age: 69
End: 2024-12-09
Payer: COMMERCIAL

## 2024-12-09 ENCOUNTER — APPOINTMENT (OUTPATIENT)
Dept: GENERAL RADIOLOGY | Age: 69
End: 2024-12-09
Payer: COMMERCIAL

## 2024-12-09 VITALS
HEART RATE: 89 BPM | RESPIRATION RATE: 18 BRPM | DIASTOLIC BLOOD PRESSURE: 114 MMHG | SYSTOLIC BLOOD PRESSURE: 148 MMHG | TEMPERATURE: 97.3 F | OXYGEN SATURATION: 99 %
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
## 2024-12-09 LAB
25(OH)D3 SERPL-MCNC: 42.3 NG/ML (ref 30–100)
ABO + RH BLD: NORMAL
ALBUMIN SERPL-MCNC: 4 G/DL (ref 3.5–5.2)
ANION GAP SERPL CALCULATED.3IONS-SCNC: 12 MMOL/L (ref 9–16)
ARM BAND NUMBER: NORMAL
BLOOD BANK SAMPLE EXPIRATION: NORMAL
BLOOD BANK SPECIMEN: ABNORMAL
BLOOD GROUP ANTIBODIES SERPL: NEGATIVE
BODY TEMPERATURE: 37
BUN SERPL-MCNC: 23 MG/DL (ref 8–23)
CHLORIDE SERPL-SCNC: 105 MMOL/L (ref 98–107)
CK SERPL-CCNC: 232 U/L (ref 26–192)
CO2 SERPL-SCNC: 24 MMOL/L (ref 20–31)
COHGB MFR BLD: 1.4 % (ref 0–5)
CREAT SERPL-MCNC: 1.2 MG/DL (ref 0.6–0.9)
ERYTHROCYTE [DISTWIDTH] IN BLOOD BY AUTOMATED COUNT: 18.6 % (ref 11.8–14.4)
EST. AVERAGE GLUCOSE BLD GHB EST-MCNC: 100 MG/DL
ETHANOL PERCENT: <0.01 %
ETHANOLAMINE SERPL-MCNC: <10 MG/DL (ref 0–0.08)
FIBRINOGEN, FUNCTIONAL TEG: 35.2 MM (ref 15–32)
FIO2 ON VENT: ABNORMAL %
GFR, ESTIMATED: 48 ML/MIN/1.73M2
GLUCOSE SERPL-MCNC: 86 MG/DL (ref 74–99)
HBA1C MFR BLD: 5.1 % (ref 4–6)
HCG SERPL QL: NEGATIVE
HCO3 VENOUS: 27 MMOL/L (ref 24–30)
HCT VFR BLD AUTO: 35.8 % (ref 36.3–47.1)
HGB BLD-MCNC: 10.5 G/DL (ref 11.9–15.1)
INR PPP: 1
LY30 (LYSIS) TEG: 2.1 % (ref 0–2.6)
MA(MAX CLOT) RAPID TEG: 69.4 MM (ref 52–70)
MCH RBC QN AUTO: 21.3 PG (ref 25.2–33.5)
MCHC RBC AUTO-ENTMCNC: 29.3 G/DL (ref 28.4–34.8)
MCV RBC AUTO: 72.5 FL (ref 82.6–102.9)
MYOGLOBIN SERPL-MCNC: 106 NG/ML (ref 25–58)
NRBC BLD-RTO: 0 PER 100 WBC
O2 SAT, VEN: 53.2 % (ref 60–85)
PARTIAL THROMBOPLASTIN TIME: 23.4 SEC (ref 23–36.5)
PCO2 VENOUS: 49.4 MM HG (ref 39–55)
PERFORMING LOCATION: ABNORMAL
PH VENOUS: 7.36 (ref 7.32–7.42)
PLATELET # BLD AUTO: 347 K/UL (ref 138–453)
PMV BLD AUTO: 9.5 FL (ref 8.1–13.5)
PO2 VENOUS: 32.4 MM HG (ref 30–50)
POSITIVE BASE EXCESS, VEN: 1.6 MMOL/L (ref 0–2)
POTASSIUM SERPL-SCNC: 4.2 MMOL/L (ref 3.7–5.3)
PROTHROMBIN TIME: 12.9 SEC (ref 11.7–14.9)
RBC # BLD AUTO: 4.94 M/UL (ref 3.95–5.11)
REACTION TIME TEG: 6.7 MIN (ref 4.6–9.1)
SODIUM SERPL-SCNC: 141 MMOL/L (ref 136–145)
T4 FREE SERPL-MCNC: 1.1 NG/DL (ref 0.92–1.68)
TSH SERPL DL<=0.05 MIU/L-ACNC: 1.51 UIU/ML (ref 0.27–4.2)
VIT B12 SERPL-MCNC: 961 PG/ML (ref 232–1245)
WBC OTHER # BLD: 6.6 K/UL (ref 3.5–11.3)

## 2024-12-09 PROCEDURE — 82306 VITAMIN D 25 HYDROXY: CPT

## 2024-12-09 PROCEDURE — G0480 DRUG TEST DEF 1-7 CLASSES: HCPCS

## 2024-12-09 PROCEDURE — 82607 VITAMIN B-12: CPT

## 2024-12-09 PROCEDURE — 82805 BLOOD GASES W/O2 SATURATION: CPT

## 2024-12-09 PROCEDURE — 82947 ASSAY GLUCOSE BLOOD QUANT: CPT

## 2024-12-09 PROCEDURE — 85730 THROMBOPLASTIN TIME PARTIAL: CPT

## 2024-12-09 PROCEDURE — 85610 PROTHROMBIN TIME: CPT

## 2024-12-09 PROCEDURE — 86850 RBC ANTIBODY SCREEN: CPT

## 2024-12-09 PROCEDURE — 84520 ASSAY OF UREA NITROGEN: CPT

## 2024-12-09 PROCEDURE — 85027 COMPLETE CBC AUTOMATED: CPT

## 2024-12-09 PROCEDURE — 99285 EMERGENCY DEPT VISIT HI MDM: CPT | Performed by: EMERGENCY MEDICINE

## 2024-12-09 PROCEDURE — 86901 BLOOD TYPING SEROLOGIC RH(D): CPT

## 2024-12-09 PROCEDURE — 85576 BLOOD PLATELET AGGREGATION: CPT

## 2024-12-09 PROCEDURE — 85390 FIBRINOLYSINS SCREEN I&R: CPT

## 2024-12-09 PROCEDURE — 70450 CT HEAD/BRAIN W/O DYE: CPT

## 2024-12-09 PROCEDURE — 71260 CT THORAX DX C+: CPT

## 2024-12-09 PROCEDURE — 84703 CHORIONIC GONADOTROPIN ASSAY: CPT

## 2024-12-09 PROCEDURE — 83036 HEMOGLOBIN GLYCOSYLATED A1C: CPT

## 2024-12-09 PROCEDURE — 86900 BLOOD TYPING SEROLOGIC ABO: CPT

## 2024-12-09 PROCEDURE — 6360000004 HC RX CONTRAST MEDICATION

## 2024-12-09 PROCEDURE — 82040 ASSAY OF SERUM ALBUMIN: CPT

## 2024-12-09 PROCEDURE — 85347 COAGULATION TIME ACTIVATED: CPT

## 2024-12-09 PROCEDURE — 80051 ELECTROLYTE PANEL: CPT

## 2024-12-09 PROCEDURE — 73030 X-RAY EXAM OF SHOULDER: CPT

## 2024-12-09 PROCEDURE — 72125 CT NECK SPINE W/O DYE: CPT

## 2024-12-09 PROCEDURE — 82565 ASSAY OF CREATININE: CPT

## 2024-12-09 PROCEDURE — 83874 ASSAY OF MYOGLOBIN: CPT

## 2024-12-09 PROCEDURE — 99284 EMERGENCY DEPT VISIT MOD MDM: CPT | Performed by: SURGERY

## 2024-12-09 PROCEDURE — 73110 X-RAY EXAM OF WRIST: CPT

## 2024-12-09 PROCEDURE — 82550 ASSAY OF CK (CPK): CPT

## 2024-12-09 PROCEDURE — 73502 X-RAY EXAM HIP UNI 2-3 VIEWS: CPT

## 2024-12-09 PROCEDURE — 73562 X-RAY EXAM OF KNEE 3: CPT

## 2024-12-09 PROCEDURE — 84443 ASSAY THYROID STIM HORMONE: CPT

## 2024-12-09 PROCEDURE — 85384 FIBRINOGEN ACTIVITY: CPT

## 2024-12-09 PROCEDURE — 84439 ASSAY OF FREE THYROXINE: CPT

## 2024-12-09 RX ORDER — IOPAMIDOL 755 MG/ML
130 INJECTION, SOLUTION INTRAVASCULAR
Status: COMPLETED | OUTPATIENT
Start: 2024-12-09 | End: 2024-12-09

## 2024-12-09 RX ADMIN — IOPAMIDOL 130 ML: 755 INJECTION, SOLUTION INTRAVENOUS at 18:00

## 2024-12-09 ASSESSMENT — ENCOUNTER SYMPTOMS
CHEST TIGHTNESS: 0
WHEEZING: 0
VOMITING: 0
BACK PAIN: 0
STRIDOR: 0
NAUSEA: 0
SHORTNESS OF BREATH: 0
ABDOMINAL PAIN: 0

## 2024-12-09 NOTE — H&P
Reviewed   TRAUMA PANEL - Abnormal; Notable for the following components:       Result Value    Hemoglobin 10.5 (*)     Hematocrit 35.8 (*)     MCV 72.5 (*)     MCH 21.3 (*)     RDW 18.6 (*)     O2 Sat, Alfredo 53.2 (*)     All other components within normal limits   POC GLOBAL HEMOSTASIS TEG W/LYSIS - Abnormal; Notable for the following components:    Fibrinogen, Functional TEG 35.2 (*)     All other components within normal limits   ALBUMIN   VITAMIN B12   CK   T4, FREE   HEMOGLOBIN A1C   MYOGLOBIN, BLOOD   TSH   VITAMIN D 25 HYDROXY   TEG GLOBAL HEMOSTASIS WITH LYSIS   TYPE AND SCREEN         Randall Kothari DO  12/9/24, 6:59 PM

## 2024-12-09 NOTE — ED PROVIDER NOTES
[facility] EMERGENCY DEPARTMENT  Emergency Department Encounter  Emergency Medicine Resident     Pt Name:Filomena Lopez  MRN: [id number]  Birthdate 1955  Date of evaluation: 12/9/24  PCP:  Jacobo Robles MD  Note Started: 6:08 PM EST      CHIEF COMPLAINT       Chief Complaint   Patient presents with    Fall     On thinners       HISTORY OF PRESENT ILLNESS  (Location/Symptom, Timing/Onset, Context/Setting, Quality, Duration, Modifying Factors, Severity.)      Filomena Lopez is a 69 y.o. female who presents with fall.  Patient originally presented through triage for a fall from standing that occurred at approximately 11:30 AM.  Patient states that she was walking when her leg gave out from underneath her, which happens frequently.  States that she landed onto her right side and hit her head.  Patient stated that she was on blood thinners, which had her come back to the trauma bay as a priority.  Patient is complaining of right shoulder pain, right wrist pain, and dizziness.  No abdominal pain, nausea, vomiting.  No neck or back pain.  States that she is unsure if she lost consciousness but her family member was able to assist her up from the ground after a few minutes.    PAST MEDICAL / SURGICAL / SOCIAL / FAMILY HISTORY      has a past medical history of Ambulates with cane, Bleeding, Cataracts, bilateral, Cerebrovascular disease, Chronic pain in left foot, CKD (chronic kidney disease) stage 3, GFR 30-59 ml/min ([facility]), COVID-19, GERD (gastroesophageal reflux disease), Hx of blood clots, Hyperlipidemia, Hypertension, Iron (Fe) deficiency anemia, Nicotine abuse, Obesity, TIBURCIO on CPAP, Osteoarthritis, Peripheral vascular disease ([facility]), Substance abuse ([facility]), Thalassemia minor, Thyroid nodule, Unspecified cerebral artery occlusion with cerebral infarction, and Wears dentures.       has a past surgical history that includes Tonsillectomy and adenoidectomy; Tubal ligation; Breast surgery; Endometrial

## 2024-12-09 NOTE — ED PROVIDER NOTES
[facility] EMERGENCY DEPARTMENT     Emergency Department     Faculty Attestation        I performed a history and physical examination of the patient and discussed management with the resident. I reviewed the resident’s note and agree with the documented findings and plan of care. Any areas of disagreement are noted on the chart. I was personally present for the key portions of any procedures. I have documented in the chart those procedures where I was not present during the key portions. I have reviewed the emergency nurses triage note. I agree with the chief complaint, past medical history, past surgical history, allergies, medications, social and family history as documented unless otherwise noted below.  For Physician Assistant/ Nurse Practitioner cases/documentation I have personally evaluated this patient and have completed at least one if not all key elements of the E/M (history, physical exam, and MDM). Additional findings are as noted.    Please refer to Trauma Flow Sheet as well.    Note Started: 12/9/24, 5:50 PM EST    Pertinent Comments:     The patient is a trauma with being 69 yof on \"blood thinners\" who fell earlier today.    A Trauma Priority was called for the patient, and the Trauma team was in the room.   On Brillinta c/o wrist/shoulder/head pain on the right.   Neurovascular intact with lungs clear to station bilateral with midline trachea and heart regular rate and rhythm as well as abdomen soft/nontender with pelvis stable.    Awaiting scans and x-rays      CRITICAL CARE: There was a high probability of clinically significant/life threatening deterioration in this patient's condition which required my urgent intervention.  Total critical care time was 15 minutes.  This excludes any time for separately reportable procedures.     CT scan of the brain was ordered after minor head trauma of less than 24 hours with a GCS of 15 due to:  Ordered by

## 2024-12-10 NOTE — PROGRESS NOTES
[facility]     Emergency/Trauma Note    PATIENT NAME: Filomena Lopez    Shift date: 12/09/2024  Shift day: Monday   Shift # 2    Room # 35/35   Name: Filomena Lopez            Age: 69 y.o.  Gender: female          Religious: Synagogue   Place of Yazidi:     Trauma/Incident type: Adult Trauma Priority  Admit Date & Time: 12/9/2024  5:49 PM  TRAUMA NAME: N/A. Arrived under legal name.    ADVANCE DIRECTIVES IN CHART?  Yes    NAME OF DECISION MAKER: Debbie Sorto     RELATIONSHIP OF DECISION MAKER TO PATIENT: Daughter    PATIENT/EVENT DESCRIPTION:  Filomena Lopez is a 69 y.o. female who arrived via ED department. Patient was brought to Trauma B as a trauma priority due to fall per perfect serve. Pt to be admitted to 35/35.         SPIRITUAL ASSESSMENT-INTERVENTION-OUTCOME:   provided a ministry of presence to patient upon arrival. Upon returning to room, writer introduced self as . Patient did not appear to mind  presence and engaged in conversation. Patient discussed events which led to her hospital visit. Patient stated her daughter is in route to hospital.  provided a supportive presence through active listening and words of affirmation.     PATIENT BELONGINGS:  Writer did not handle patient belongings    ANY BELONGINGS OF SIGNIFICANT VALUE NOTED:  N/A    REGISTRATION STAFF NOTIFIED?  Yes      WHAT IS YOUR SPIRITUAL CARE PLAN FOR THIS PATIENT?:   Chaplains will remain available to offer spiritual and emotional support as needed.     Electronically signed by Chaplain Misha, on 12/9/2024 at 8:00 PM.  [facility]  [phone]

## 2024-12-10 NOTE — DISCHARGE INSTRUCTIONS
Thank you for visiting [facility] Emergency Department.    Your workup today was negative for any injury in your head, neck, back, chest, belly.  The x-rays of your right side were also negative for any broken bones.  You will likely be sore from the fall.  Please take Motrin and/or Tylenol as needed.  Follow-up with a primary care provider.  Continue to use your cane and use safe measures at home and attempt to prevent falling.    You need to call Jacobo Robles MD to make an appointment as directed for follow up.    Should you have any questions regarding your care or further treatment, please call [facility] Emergency Department at [phone].

## 2024-12-11 DIAGNOSIS — D50.9 MICROCYTIC ANEMIA: ICD-10-CM

## 2024-12-11 DIAGNOSIS — R79.89 ELEVATED FERRITIN: ICD-10-CM

## 2024-12-11 DIAGNOSIS — E61.1 IRON DEFICIENCY: ICD-10-CM

## 2024-12-11 DIAGNOSIS — D56.3 THALASSEMIA TRAIT, ALPHA: ICD-10-CM

## 2024-12-11 RX ORDER — FERROUS SULFATE 325(65) MG
1 TABLET ORAL 2 TIMES DAILY
Qty: 56 TABLET | Refills: 1 | Status: SHIPPED | OUTPATIENT
Start: 2024-12-11

## 2024-12-31 RX ORDER — CHOLECALCIFEROL (VITAMIN D3) 25 MCG
1 TABLET ORAL DAILY
Qty: 28 TABLET | Refills: 1 | Status: SHIPPED | OUTPATIENT
Start: 2024-12-31

## 2024-12-31 NOTE — TELEPHONE ENCOUNTER
Filomena Lopez is calling to request a refill on the following medication(s):    Medication Request:  Requested Prescriptions     Pending Prescriptions Disp Refills    VITAMIN D3 25 MCG TABS tablet [Pharmacy Med Name: VIT D 1000IU 25MCG TAB 25 Tablet] 28 tablet 1     Sig: TAKE 1 TABLET BY MOUTH DAILY       Last Visit Date (If Applicable):  10/21/2024    Next Visit Date:    1/7/2025
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
## 2025-01-27 ENCOUNTER — OFFICE VISIT (OUTPATIENT)
Dept: PODIATRY | Age: 70
End: 2025-01-27
Payer: COMMERCIAL

## 2025-01-27 VITALS
BODY MASS INDEX: 45.99 KG/M2 | DIASTOLIC BLOOD PRESSURE: 110 MMHG | SYSTOLIC BLOOD PRESSURE: 169 MMHG | WEIGHT: 293 LBS | HEART RATE: 73 BPM | HEIGHT: 67 IN

## 2025-01-27 DIAGNOSIS — R60.0 EDEMA OF BOTH LOWER LEGS: ICD-10-CM

## 2025-01-27 DIAGNOSIS — I73.9 PAD (PERIPHERAL ARTERY DISEASE) (HCC): ICD-10-CM

## 2025-01-27 DIAGNOSIS — L84 CORNS AND CALLOSITIES: ICD-10-CM

## 2025-01-27 DIAGNOSIS — B35.1 ONYCHOMYCOSIS: Primary | ICD-10-CM

## 2025-01-27 PROCEDURE — 11721 DEBRIDE NAIL 6 OR MORE: CPT

## 2025-01-27 NOTE — PROGRESS NOTES
Patient instructed to remove shoes and socks and instructed to sit in exam chair.  Current PCP is Jacobo Robles MD and date of last visit was [id number].   Do you have a follow up visit scheduled?  Yes  If yes, the date is [id number]    
findings          Assessment   Filomena Lopez is a 69 y.o. female with     Diagnosis Orders   1. Onychomycosis        2. PAD (peripheral artery disease) (HCC)        3. Edema of both lower legs        4. Corns and callosities            Plan   Patient examined and evaluated for routine nail care.  Diagnosis and treatment options discussed in detail  Debrided dystrophic nails 1-5 B/L with sterile nail nippers without incident.  Debrided bilateral calluses with sterile #15 blade without incident.  Educated patient to monitor for signs of claudication including rest pain or cramping of the calf muscles. Educated pt to try wearing compressive socks to help with LE edema.   Patient to RTC in 3 month(s) or sooner if problems arise  Please, call the office with any questions or concerns    Electronically signed by Jah Sahu DPM on 1/27/2025 at 3:48 PM

## 2025-01-28 ENCOUNTER — HOSPITAL ENCOUNTER (OUTPATIENT)
Age: 70
Setting detail: SPECIMEN
Discharge: HOME | End: 2025-01-28
Payer: COMMERCIAL

## 2025-01-28 ENCOUNTER — OFFICE VISIT (OUTPATIENT)
Dept: INTERNAL MEDICINE | Age: 70
End: 2025-01-28

## 2025-01-28 VITALS
DIASTOLIC BLOOD PRESSURE: 88 MMHG | SYSTOLIC BLOOD PRESSURE: 155 MMHG | TEMPERATURE: 97 F | OXYGEN SATURATION: 97 % | WEIGHT: 293 LBS | HEART RATE: 66 BPM | BODY MASS INDEX: 45.99 KG/M2 | HEIGHT: 67 IN

## 2025-01-28 DIAGNOSIS — E66.01 CLASS 3 SEVERE OBESITY DUE TO EXCESS CALORIES WITH SERIOUS COMORBIDITY AND BODY MASS INDEX (BMI) OF 50.0 TO 59.9 IN ADULT: ICD-10-CM

## 2025-01-28 DIAGNOSIS — D50.9 MICROCYTIC ANEMIA: ICD-10-CM

## 2025-01-28 DIAGNOSIS — N18.32 STAGE 3B CHRONIC KIDNEY DISEASE (HCC): ICD-10-CM

## 2025-01-28 DIAGNOSIS — G56.03 BILATERAL CARPAL TUNNEL SYNDROME: ICD-10-CM

## 2025-01-28 DIAGNOSIS — G47.33 OSA (OBSTRUCTIVE SLEEP APNEA): ICD-10-CM

## 2025-01-28 DIAGNOSIS — N18.30 STAGE 3 CHRONIC KIDNEY DISEASE, UNSPECIFIED WHETHER STAGE 3A OR 3B CKD (HCC): ICD-10-CM

## 2025-01-28 DIAGNOSIS — I63.00 CEREBROVASCULAR ACCIDENT (CVA) DUE TO THROMBOSIS OF PRECEREBRAL ARTERY (HCC): ICD-10-CM

## 2025-01-28 DIAGNOSIS — M25.531 RIGHT WRIST PAIN: ICD-10-CM

## 2025-01-28 DIAGNOSIS — D56.3 ALPHA THALASSEMIA TRAIT: ICD-10-CM

## 2025-01-28 DIAGNOSIS — N39.46 MIXED STRESS AND URGE URINARY INCONTINENCE: ICD-10-CM

## 2025-01-28 DIAGNOSIS — J44.9 CHRONIC OBSTRUCTIVE PULMONARY DISEASE, UNSPECIFIED COPD TYPE (HCC): ICD-10-CM

## 2025-01-28 DIAGNOSIS — E66.813 CLASS 3 SEVERE OBESITY DUE TO EXCESS CALORIES WITH SERIOUS COMORBIDITY AND BODY MASS INDEX (BMI) OF 50.0 TO 59.9 IN ADULT: ICD-10-CM

## 2025-01-28 DIAGNOSIS — M65.4 DE QUERVAIN'S TENOSYNOVITIS: Primary | ICD-10-CM

## 2025-01-28 LAB
25(OH)D3 SERPL-MCNC: 41.7 NG/ML (ref 30–100)
ANION GAP SERPL CALCULATED.3IONS-SCNC: 13 MMOL/L (ref 9–16)
BASOPHILS # BLD: 0.05 K/UL (ref 0–0.2)
BASOPHILS NFR BLD: 1 % (ref 0–2)
BUN SERPL-MCNC: 24 MG/DL (ref 8–23)
CA-I BLD-SCNC: 1.2 MMOL/L (ref 1.13–1.33)
CALCIUM SERPL-MCNC: 10.3 MG/DL (ref 8.6–10.4)
CHLORIDE SERPL-SCNC: 101 MMOL/L (ref 98–107)
CO2 SERPL-SCNC: 28 MMOL/L (ref 20–31)
CREAT SERPL-MCNC: 1.2 MG/DL (ref 0.6–0.9)
EOSINOPHIL # BLD: 0.25 K/UL (ref 0–0.44)
EOSINOPHILS RELATIVE PERCENT: 4 % (ref 1–4)
ERYTHROCYTE [DISTWIDTH] IN BLOOD BY AUTOMATED COUNT: 19.9 % (ref 11.8–14.4)
GFR, ESTIMATED: 48 ML/MIN/1.73M2
GLUCOSE SERPL-MCNC: 68 MG/DL (ref 74–99)
HCT VFR BLD AUTO: 38.2 % (ref 36.3–47.1)
HGB BLD-MCNC: 10.9 G/DL (ref 11.9–15.1)
IMM GRANULOCYTES # BLD AUTO: <0.03 K/UL (ref 0–0.3)
IMM GRANULOCYTES NFR BLD: 0 %
LYMPHOCYTES NFR BLD: 1.72 K/UL (ref 1.1–3.7)
LYMPHOCYTES RELATIVE PERCENT: 31 % (ref 24–43)
MCH RBC QN AUTO: 20.8 PG (ref 25.2–33.5)
MCHC RBC AUTO-ENTMCNC: 28.5 G/DL (ref 28.4–34.8)
MCV RBC AUTO: 72.9 FL (ref 82.6–102.9)
MONOCYTES NFR BLD: 0.43 K/UL (ref 0.1–1.2)
MONOCYTES NFR BLD: 8 % (ref 3–12)
NEUTROPHILS NFR BLD: 56 % (ref 36–65)
NEUTS SEG NFR BLD: 3.17 K/UL (ref 1.5–8.1)
NRBC BLD-RTO: 0 PER 100 WBC
PHOSPHATE SERPL-MCNC: 3.1 MG/DL (ref 2.5–4.5)
PLATELET # BLD AUTO: 383 K/UL (ref 138–453)
PMV BLD AUTO: 10 FL (ref 8.1–13.5)
POTASSIUM SERPL-SCNC: 4.4 MMOL/L (ref 3.7–5.3)
PTH-INTACT SERPL-MCNC: 48 PG/ML (ref 15–65)
RBC # BLD AUTO: 5.24 M/UL (ref 3.95–5.11)
RBC # BLD: ABNORMAL 10*6/UL
SODIUM SERPL-SCNC: 142 MMOL/L (ref 136–145)
WBC OTHER # BLD: 5.6 K/UL (ref 3.5–11.3)

## 2025-01-28 PROCEDURE — 84156 ASSAY OF PROTEIN URINE: CPT

## 2025-01-28 PROCEDURE — 82570 ASSAY OF URINE CREATININE: CPT

## 2025-01-28 PROCEDURE — 84100 ASSAY OF PHOSPHORUS: CPT

## 2025-01-28 PROCEDURE — 82330 ASSAY OF CALCIUM: CPT

## 2025-01-28 PROCEDURE — 82306 VITAMIN D 25 HYDROXY: CPT

## 2025-01-28 PROCEDURE — 80048 BASIC METABOLIC PNL TOTAL CA: CPT

## 2025-01-28 PROCEDURE — 85025 COMPLETE CBC W/AUTO DIFF WBC: CPT

## 2025-01-28 PROCEDURE — 83970 ASSAY OF PARATHORMONE: CPT

## 2025-01-28 PROCEDURE — 36415 COLL VENOUS BLD VENIPUNCTURE: CPT

## 2025-01-28 RX ORDER — CHOLECALCIFEROL (VITAMIN D3) 10(400)/ML
10 DROPS ORAL DAILY
Qty: 10 EACH | Refills: 2 | Status: SHIPPED | OUTPATIENT
Start: 2025-01-28

## 2025-01-28 ASSESSMENT — PATIENT HEALTH QUESTIONNAIRE - PHQ9
SUM OF ALL RESPONSES TO PHQ9 QUESTIONS 1 & 2: 0
SUM OF ALL RESPONSES TO PHQ QUESTIONS 1-9: 0
SUM OF ALL RESPONSES TO PHQ QUESTIONS 1-9: 0
1. LITTLE INTEREST OR PLEASURE IN DOING THINGS: NOT AT ALL
2. FEELING DOWN, DEPRESSED OR HOPELESS: NOT AT ALL
SUM OF ALL RESPONSES TO PHQ QUESTIONS 1-9: 0
SUM OF ALL RESPONSES TO PHQ QUESTIONS 1-9: 0

## 2025-01-28 ASSESSMENT — ENCOUNTER SYMPTOMS
VOMITING: 0
CHEST TIGHTNESS: 0
BACK PAIN: 0
DIARRHEA: 0
CONSTIPATION: 0
NAUSEA: 0
ABDOMINAL PAIN: 0
ALLERGIC/IMMUNOLOGIC NEGATIVE: 1
COUGH: 0
EYES NEGATIVE: 1
SHORTNESS OF BREATH: 0

## 2025-01-28 NOTE — PROGRESS NOTES
[facility]  [facility]  [address] [address]  Dept: [phone]  Dept Fax: [phone]    Office Progress/Follow Up Note  Date of patient's visit: 1/28/2025  Patient's Name:  Filomena Lopez YOB: 1955            Patient Care Team:  Jacobo Robles MD as PCP - General (Internal Medicine)  Delonte Conner DPM as Consulting Physician (Podiatry)  Luther García MD as Consulting Physician (Pulmonology)  ________________________________________________________________________      Reason for Visit: Routine outpatient follow up  ________________________________________________________________________  Chief Complaint:  Hypertension    ________________________________________________________________________  History of Presenting Illness:  Patient, 69 years old female, is coming into the clinic for her regular follow-up visit.    Patient has concerns and complaints regarding her right wrist pain.  Patient stated that back in September when she got her colonoscopy done, she had an IV access past on the right wrist near the thumb.  She was experiencing mild pain which was tolerable but quite recently she had a fall at home.  She says that she tripped over something that she cannot remember but she remembers the whole event.  She denied hitting her head but she did tell me that she hit her wrist on the table.  Afterward she went into ED and her trauma scan were negative.  Patient stated that she her pain has gotten worse to the point that she is unable to move her hand.  She denies any skin changes, movement changes but she does relate pain on the movement.     She also added that she has been experiencing urinary incontinence for past 2 to 3 years.  She told that she had 8 vaginal deliveries.  When she was pregnant she was having urinary incontinence but it resolved on its own and quite recently she has been experiencing stress incontinence mixed with urge

## 2025-01-29 ENCOUNTER — HOSPITAL ENCOUNTER (OUTPATIENT)
Age: 70
Setting detail: SPECIMEN
Discharge: HOME | End: 2025-01-29

## 2025-01-29 DIAGNOSIS — R73.03 PREDIABETES: ICD-10-CM

## 2025-01-29 NOTE — TELEPHONE ENCOUNTER
Filomena Lopez is calling to request a refill on the following medication(s):    Medication Request:  Requested Prescriptions     Pending Prescriptions Disp Refills    metFORMIN (GLUCOPHAGE) 500 MG tablet 28 tablet 2     Sig: Take 1 tablet by mouth daily (with breakfast)       Last Visit Date (If Applicable):  1/28/2025    Next Visit Date:    Visit date not found

## 2025-01-30 DIAGNOSIS — D56.3 THALASSEMIA TRAIT, ALPHA: ICD-10-CM

## 2025-01-30 DIAGNOSIS — E55.9 VITAMIN D DEFICIENCY: ICD-10-CM

## 2025-01-30 DIAGNOSIS — D50.9 MICROCYTIC ANEMIA: ICD-10-CM

## 2025-01-30 DIAGNOSIS — E78.00 PURE HYPERCHOLESTEROLEMIA: ICD-10-CM

## 2025-01-30 DIAGNOSIS — I63.00 CEREBROVASCULAR ACCIDENT (CVA) DUE TO THROMBOSIS OF PRECEREBRAL ARTERY (HCC): ICD-10-CM

## 2025-01-30 DIAGNOSIS — I10 ESSENTIAL HYPERTENSION: ICD-10-CM

## 2025-01-30 DIAGNOSIS — R79.89 ELEVATED FERRITIN: ICD-10-CM

## 2025-01-30 DIAGNOSIS — E61.1 IRON DEFICIENCY: ICD-10-CM

## 2025-01-30 LAB
CREAT UR-MCNC: 92.5 MG/DL (ref 28–217)
TOTAL PROTEIN, URINE: 15 MG/DL

## 2025-01-30 RX ORDER — FERROUS SULFATE 325(65) MG
1 TABLET ORAL 2 TIMES DAILY
Qty: 56 TABLET | Refills: 1 | Status: SHIPPED | OUTPATIENT
Start: 2025-01-30

## 2025-01-30 RX ORDER — ATORVASTATIN CALCIUM 80 MG/1
80 TABLET, FILM COATED ORAL DAILY
Qty: 28 TABLET | Refills: 5 | Status: SHIPPED | OUTPATIENT
Start: 2025-01-30

## 2025-01-30 RX ORDER — AMLODIPINE BESYLATE 5 MG/1
5 TABLET ORAL DAILY
Qty: 28 TABLET | Refills: 1 | Status: SHIPPED | OUTPATIENT
Start: 2025-01-30

## 2025-01-30 RX ORDER — LOSARTAN POTASSIUM 100 MG/1
100 TABLET ORAL DAILY
Qty: 28 TABLET | Refills: 5 | Status: SHIPPED | OUTPATIENT
Start: 2025-01-30

## 2025-01-30 RX ORDER — FOLIC ACID 1 MG/1
1 TABLET ORAL DAILY
Qty: 28 TABLET | Refills: 5 | Status: SHIPPED | OUTPATIENT
Start: 2025-01-30

## 2025-01-30 NOTE — PROGRESS NOTES
I have reviewed patient's lab including BMP and HbA1c. I am stopping metformin considering her recent Hba1c <5.7 and also her Blood sugars are low. Please inform the patient about stopping metformin.     Electronically signed by Jacobo Robles MD on 1/29/2025 at 8:37 PM    Jacobo Robles MD  Internal Medicine Resident, PGY- 3  [facility],  [place], [place].  8:37 PM     This note is created with the assistance of a speech-recognition program. While intending to generate a document that actually reflects the content of the visit, no guarantees can be provided that every mistake has been identified and corrected by editing.

## 2025-01-30 NOTE — TELEPHONE ENCOUNTER
Filomena Lopez is calling to request a refill on the following medication(s):    Medication Request:  Multiple Medications      Last Visit Date (If Applicable):  1/28/2025    Next Visit Date:    Visit date not found

## 2025-01-30 NOTE — TELEPHONE ENCOUNTER
..Request for   Requested Prescriptions     Pending Prescriptions Disp Refills    ticagrelor (BRILINTA) 90 MG TABS tablet 60 tablet 5     Sig: Take 1 tablet by mouth 2 times daily    .      Please review and e-scribe to pharmacy listed in chart if appropriate. Thank you.      Last Visit Date: 1/28/2025  Next Visit Date: Visit date not found    Future Appointments   Date Time Provider Department Center   2/13/2025  2:30 PM Manuel Padilla PA ORTHO SPECIA [facility]   4/7/2025  3:00 PM Oc Irizarry MD [facility]   4/14/2025  1:30 PM Luther García MD Resp Spec [facility]   4/28/2025 12:45 PM [facility]   7/25/2025  1:50 PM Armando Lugo MD [facility] None       Health Maintenance   Topic Date Due    Respiratory Syncytial Virus (RSV) Pregnant or age 60 yrs+ (1 - Risk 60-74 years 1-dose series) Never done    Pneumococcal 65+ years Vaccine (3 of 3 - PPSV23 or PCV20) 12/20/2021    COVID-19 Vaccine (4 - 2023-24 season) 09/01/2024    Annual Wellness Visit (Medicare Advantage)  01/01/2025    Lipids  05/31/2025    Lung Cancer Screening &/or Counseling  12/09/2025    Depression Screen  01/28/2026    GFR test (Diabetes, CKD 3-4, OR last GFR 15-59)  01/28/2026    Breast cancer screen  03/21/2026    Colorectal Cancer Screen  09/03/2027    DTaP/Tdap/Td vaccine (2 - Td or Tdap) 03/08/2028    DEXA (modify frequency per FRAX score)  Completed    Flu vaccine  Completed    Shingles vaccine  Completed    Hepatitis C screen  Completed    Hepatitis A vaccine  Aged Out    Hepatitis B vaccine  Aged Out    Hib vaccine  Aged Out    Polio vaccine  Aged Out    Meningococcal (ACWY) vaccine  Aged Out    A1C test (Diabetic or Prediabetic)  Discontinued    Diabetes screen  Discontinued       Hemoglobin A1C (%)   Date Value   12/09/2024 5.1   05/28/2024 5.5   11/28/2022 5.8             ( goal A1C is < 7)   No components found for: \"LABMICR\"  No components found for: \"LDLCHOLESTEROL\", \"LDLCALC\"    (goal

## 2025-02-03 ENCOUNTER — TELEPHONE (OUTPATIENT)
Dept: INTERNAL MEDICINE | Age: 70
End: 2025-02-03

## 2025-02-03 DIAGNOSIS — N39.46 MIXED STRESS AND URGE URINARY INCONTINENCE: ICD-10-CM

## 2025-02-03 NOTE — TELEPHONE ENCOUNTER
Patient called regarding recent diaper order. Patient states that [facility] has received the order but is missing the medical documentation and insurance information. Routing to office RN to ensure correct documents are faxed.    Electronically signed by Med Patton MA on 2/3/2025 at 9:16 AM

## 2025-02-03 NOTE — TELEPHONE ENCOUNTER
[employer] received a Rx & Notes for wheeled walker, this pt has an active rental on a heavy duty wheeled walker she got in 10/22/2024. She is not eligible for a replacement.    Scanned form to pt chart

## 2025-02-12 ENCOUNTER — TELEPHONE (OUTPATIENT)
Dept: INTERNAL MEDICINE | Age: 70
End: 2025-02-12

## 2025-02-12 DIAGNOSIS — M54.42 CHRONIC BILATERAL LOW BACK PAIN WITH BILATERAL SCIATICA: Primary | ICD-10-CM

## 2025-02-12 DIAGNOSIS — G89.29 CHRONIC BILATERAL LOW BACK PAIN WITH BILATERAL SCIATICA: Primary | ICD-10-CM

## 2025-02-12 DIAGNOSIS — M54.41 CHRONIC BILATERAL LOW BACK PAIN WITH BILATERAL SCIATICA: Primary | ICD-10-CM

## 2025-02-12 DIAGNOSIS — M19.90 ARTHRITIS: ICD-10-CM

## 2025-02-12 DIAGNOSIS — J44.9 CHRONIC OBSTRUCTIVE PULMONARY DISEASE, UNSPECIFIED COPD TYPE (HCC): ICD-10-CM

## 2025-02-12 NOTE — TELEPHONE ENCOUNTER
Pt need the 1/28/2025 chart notes to be addened to show the reason why pt need the walker with a seat and breaks.  Please advise    [facility], form to pt chart

## 2025-02-12 NOTE — TELEPHONE ENCOUNTER
[facility] the rx the office sent dated 10/21/24 is for a standard walker - no wheels no seat. The f2f documentation the office sent is for a wheeled walker with a seat. Please advise    Scanned form to pt chart

## 2025-02-20 NOTE — TELEPHONE ENCOUNTER
Pt needs walker with seat and brakes per PCP.    Dr. Robles, please addend your note from 1/28/25 and specifically use the smart phrase <dot>dmewalkerwithseat. An updated order is also pended as the first order is for a standard walker. Kindly route encounter back to me so I can make sure needed information is faxed appropriately.

## 2025-02-21 NOTE — TELEPHONE ENCOUNTER
Filomena Lopez was evaluated today and a DME order was entered for a wheeled walker with seat because she requires this to successfully complete daily living tasks of eating, bathing, toileting, personal cares, and ambulating.  A wheeled walker with seat is necessary due to the patient's unsteady gait, upper body weakness, inability to  and ambulation device, ambulating only short distances by pushing a walker, and the need to sit for a short time before resuming ambulation.  These tasks cannot be completed with a lesser ambulation device such as a cane, crutch, or standard walker.  The need for this equipment was discussed with the patient and she understands and is in agreement.      Electronically signed by Jacobo Robles MD on 2/21/2025 at 12:20 PM    Jacobo Robles MD  Internal Medicine Resident, PGY- 3  [facility],  [place], [place].  12:20 PM     This note is created with the assistance of a speech-recognition program. While intending to generate a document that actually reflects the content of the visit, no guarantees can be provided that every mistake has been identified and corrected by editing.

## 2025-02-24 ENCOUNTER — OFFICE VISIT (OUTPATIENT)
Dept: ORTHOPEDIC SURGERY | Age: 70
End: 2025-02-24
Payer: COMMERCIAL

## 2025-02-24 VITALS — BODY MASS INDEX: 57.31 KG/M2 | HEIGHT: 67 IN

## 2025-02-24 DIAGNOSIS — M65.4 DE QUERVAIN'S TENOSYNOVITIS, RIGHT: Primary | ICD-10-CM

## 2025-02-24 PROCEDURE — 1123F ACP DISCUSS/DSCN MKR DOCD: CPT | Performed by: PHYSICIAN ASSISTANT

## 2025-02-24 PROCEDURE — 1125F AMNT PAIN NOTED PAIN PRSNT: CPT | Performed by: PHYSICIAN ASSISTANT

## 2025-02-24 PROCEDURE — 99204 OFFICE O/P NEW MOD 45 MIN: CPT | Performed by: PHYSICIAN ASSISTANT

## 2025-02-24 PROCEDURE — 1159F MED LIST DOCD IN RCRD: CPT | Performed by: PHYSICIAN ASSISTANT

## 2025-02-24 RX ORDER — METHYLPREDNISOLONE 4 MG/1
4 TABLET ORAL SEE ADMIN INSTRUCTIONS
Qty: 1 KIT | Refills: 0 | Status: SHIPPED | OUTPATIENT
Start: 2025-02-24 | End: 2025-03-02

## 2025-02-24 NOTE — PROGRESS NOTES
[facility]  [address]  [facility] [address]  Dept Phone: [phone]  Dept Fax: [phone]      Orthopaedic Trauma New Patient      CHIEF COMPLAINT:    Chief Complaint   Patient presents with    New Patient     Right wrist pain       HISTORY OF PRESENT ILLNESS:    The patient is a 69 y.o. female who is being seen as a new patient for evaluation of chronic right wrist pain.  Patient reports she first noticed this right wrist and thumb pain at the end of September after she underwent a colonoscopy reporting that they had trouble finding her veins and did do a IV start in her dorsal right hand near the area of onset of pain.  She states the pain did gradually improve after that but never fully resolved and over the last couple months has gradually worsened.    Patient localized pain most severely to the dorsal radial wrist.  She especially notes difficulty with pinch strength and turning doors and keys secondary to this pain.  She did undergo x-rays of the right wrist in December 2024 revealing mild DJD but no evidence of acute fracture.    Treatment attempted this point includes activity modification as well as a home thumb wrap which seems to help but does not resolve the pain entirely.  Patient denies any numbness or tingling at this time..      Past Medical History:    Past Medical History:   Diagnosis Date    Ambulates with cane     Bleeding     while on aggrenox    Cataracts, bilateral     Cerebrovascular disease 2003,2011    cva    Chronic pain in left foot     CKD (chronic kidney disease) stage 3, GFR 30-59 ml/min ([facility])     COVID-19 11/2021    states hpspitalized    GERD (gastroesophageal reflux disease)     Hx of blood clots     Hyperlipidemia     Hypertension     Iron (Fe) deficiency anemia     Nicotine abuse     Obesity     TIBURCIO on CPAP     Osteoarthritis     Peripheral vascular disease 03/13/2014    Substance abuse ([facility])     cocaine, canabis    Thalassemia minor

## 2025-02-25 DIAGNOSIS — N39.46 MIXED STRESS AND URGE URINARY INCONTINENCE: ICD-10-CM

## 2025-02-25 DIAGNOSIS — I63.00 CEREBROVASCULAR ACCIDENT (CVA) DUE TO THROMBOSIS OF PRECEREBRAL ARTERY (HCC): ICD-10-CM

## 2025-02-25 RX ORDER — CHOLECALCIFEROL (VITAMIN D3) 10(400)/ML
10 DROPS ORAL DAILY
Qty: 100 EACH | Refills: 3 | Status: SHIPPED | OUTPATIENT
Start: 2025-02-25

## 2025-02-25 NOTE — TELEPHONE ENCOUNTER
..Request for   Requested Prescriptions     Pending Prescriptions Disp Refills    Incontinence Supply Disposable (COMFORT PROTECT ADULT DIAPER/L) MISC 10 each 2     Sig: 10 each by Does not apply route daily    .      Please review and e-scribe to pharmacy listed in chart if appropriate. Thank you.      Last Visit Date: 1/28/2025  Next Visit Date: Visit date not found    Future Appointments   Date Time Provider Department Center   4/10/2025  3:30 PM Manuel Padilla PA ORTHO SPECIA [facility]   4/14/2025  1:30 PM Luther García MD Resp Spec [facility]   4/28/2025 12:45 PM Jose Angel Quach DPM [facility] Podiatry [facility]   4/28/2025  3:00 PM Oc Irizarry MD [facility]   7/25/2025  1:50 PM Armando Lugo MD [facility] None       Health Maintenance   Topic Date Due    Respiratory Syncytial Virus (RSV) Pregnant or age 60 yrs+ (1 - Risk 60-74 years 1-dose series) Never done    Pneumococcal 50+ years Vaccine (3 of 3 - PCV20 or PCV21) 10/19/2023    COVID-19 Vaccine (4 - 2024-25 season) 09/01/2024    Annual Wellness Visit (Medicare Advantage)  01/01/2025    Lipids  05/31/2025    Lung Cancer Screening &/or Counseling  12/09/2025    Depression Screen  01/28/2026    GFR test (Diabetes, CKD 3-4, OR last GFR 15-59)  01/28/2026    Breast cancer screen  03/21/2026    Colorectal Cancer Screen  09/03/2027    DTaP/Tdap/Td vaccine (2 - Td or Tdap) 03/08/2028    DEXA (modify frequency per FRAX score)  Completed    Flu vaccine  Completed    Shingles vaccine  Completed    Hepatitis C screen  Completed    Hepatitis A vaccine  Aged Out    Hepatitis B vaccine  Aged Out    Hib vaccine  Aged Out    Polio vaccine  Aged Out    Meningococcal (ACWY) vaccine  Aged Out    A1C test (Diabetic or Prediabetic)  Discontinued    Diabetes screen  Discontinued       Hemoglobin A1C (%)   Date Value   12/09/2024 5.1   05/28/2024 5.5   11/28/2022 5.8             ( goal A1C is < 7)   No components found for: \"LABMICR\"  No components found for:

## 2025-02-25 NOTE — TELEPHONE ENCOUNTER
Patient states she was told by [facility] that she can have a supply of 300 diapers. Please advise.

## 2025-02-26 RX ORDER — CHOLECALCIFEROL (VITAMIN D3) 25 MCG
1 TABLET ORAL DAILY
Qty: 28 TABLET | Refills: 1 | Status: SHIPPED | OUTPATIENT
Start: 2025-02-26

## 2025-02-26 RX ORDER — ASPIRIN 81 MG/1
TABLET ORAL
Qty: 28 TABLET | Refills: 5 | Status: SHIPPED | OUTPATIENT
Start: 2025-02-26

## 2025-02-26 NOTE — TELEPHONE ENCOUNTER
Last visit: 1/28/25  Last Med refill: 9/14/24  Does patient have enough medication for 72 hours: No:     Next Visit Date: Patient is on the waitlist for an appt  Future Appointments   Date Time Provider Department Center   4/10/2025  3:30 PM [facility]   4/14/2025  1:30 PM Luther García MD Resp Spec [facility]   4/28/2025 12:45 PM Jose Angel Quach DPM [facility]   4/28/2025  3:00 PM Oc Irizarry MD [facility]   7/25/2025  1:50 PM Armando Lugo MD AFL [facility] None       Health Maintenance   Topic Date Due    Respiratory Syncytial Virus (RSV) Pregnant or age 60 yrs+ (1 - Risk 60-74 years 1-dose series) Never done    Pneumococcal 50+ years Vaccine (3 of 3 - PCV20 or PCV21) 10/19/2023    COVID-19 Vaccine (4 - 2024-25 season) 09/01/2024    Annual Wellness Visit (Medicare Advantage)  01/01/2025    Lipids  05/31/2025    Lung Cancer Screening &/or Counseling  12/09/2025    Depression Screen  01/28/2026    GFR test (Diabetes, CKD 3-4, OR last GFR 15-59)  01/28/2026    Breast cancer screen  03/21/2026    Colorectal Cancer Screen  09/03/2027    DTaP/Tdap/Td vaccine (2 - Td or Tdap) 03/08/2028    DEXA (modify frequency per FRAX score)  Completed    Flu vaccine  Completed    Shingles vaccine  Completed    Hepatitis C screen  Completed    Hepatitis A vaccine  Aged Out    Hepatitis B vaccine  Aged Out    Hib vaccine  Aged Out    Polio vaccine  Aged Out    Meningococcal (ACWY) vaccine  Aged Out    A1C test (Diabetic or Prediabetic)  Discontinued    Diabetes screen  Discontinued       Hemoglobin A1C (%)   Date Value   12/09/2024 5.1   05/28/2024 5.5   11/28/2022 5.8             ( goal A1C is < 7)   No components found for: \"LABMICR\"  No components found for: \"LDLCHOLESTEROL\", \"LDLCALC\"    (goal LDL is <100)   AST (U/L)   Date Value   11/05/2024 26     ALT (U/L)   Date Value   11/05/2024 18     BUN (mg/dL)   Date Value   01/28/2025 24 (H)     BP Readings from Last 3 Encounters:

## 2025-03-10 DIAGNOSIS — G89.29 CHRONIC BILATERAL LOW BACK PAIN WITH BILATERAL SCIATICA: Primary | ICD-10-CM

## 2025-03-10 DIAGNOSIS — M17.12 PRIMARY OSTEOARTHRITIS OF LEFT KNEE: ICD-10-CM

## 2025-03-10 DIAGNOSIS — M54.41 CHRONIC BILATERAL LOW BACK PAIN WITH BILATERAL SCIATICA: Primary | ICD-10-CM

## 2025-03-10 DIAGNOSIS — M54.42 CHRONIC BILATERAL LOW BACK PAIN WITH BILATERAL SCIATICA: Primary | ICD-10-CM

## 2025-03-11 RX ORDER — PREGABALIN 25 MG/1
25 CAPSULE ORAL 3 TIMES DAILY
Qty: 90 CAPSULE | Refills: 1 | Status: SHIPPED | OUTPATIENT
Start: 2025-03-11 | End: 2025-05-10

## 2025-03-11 RX ORDER — ACETAMINOPHEN 325 MG/1
650 TABLET ORAL EVERY 4 HOURS PRN
Qty: 60 TABLET | Refills: 3 | Status: SHIPPED | OUTPATIENT
Start: 2025-03-11

## 2025-03-11 NOTE — TELEPHONE ENCOUNTER
vaccine  Aged Out    A1C test (Diabetic or Prediabetic)  Discontinued    Diabetes screen  Discontinued       Hemoglobin A1C (%)   Date Value   12/09/2024 5.1   05/28/2024 5.5   11/28/2022 5.8             ( goal A1C is < 7)   No components found for: \"LABMICR\"  No components found for: \"LDLCHOLESTEROL\", \"LDLCALC\"    (goal LDL is <100)   AST (U/L)   Date Value   11/05/2024 26     ALT (U/L)   Date Value   11/05/2024 18     BUN (mg/dL)   Date Value   01/28/2025 24 (H)     BP Readings from Last 3 Encounters:   01/28/25 (!) 155/88   01/27/25 (!) 169/110   12/09/24 (!) 148/114          (goal 120/80)    All Future Testing planned in [facility]  Lab Frequency Next Occurrence   COLONOSCOPY (Screening) Once 05/27/2024   CBC with Auto Differential Once 10/07/2024   Lactate Dehydrogenase Once 10/07/2024   Ferritin Once 10/07/2024   Iron and TIBC Once 10/07/2024   Vitamin B12 Once 10/07/2024         Patient Active Problem List:     Essential hypertension     Pure hypercholesterolemia     Cerebrovascular accident (CVA), 2011, no residual deficit (HCC)     CKD (chronic kidney disease) stage 3, GFR 30-59 ml/min (HCC)     Onychomycosis     Peripheral vascular disease     TIBURCIO (obstructive sleep apnea)     Class 3 severe obesity due to excess calories with serious comorbidity and body mass index (BMI) of 50.0 to 59.9 in adult     Primary osteoarthritis of left knee     Bilateral carpal tunnel syndrome     Microcytic anemia     Alpha thalassemia trait     Cervical stenosis of spinal canal     Chronic bilateral low back pain with bilateral sciatica     Lumbar radicular pain     History of stroke     Dizziness     Occlusion of right internal carotid artery     Thyroid nodule     Occlusion of vertebral artery     COPD (chronic obstructive pulmonary disease) (HCC)     Yeast dermatitis     Tubular adenoma

## 2025-03-24 DIAGNOSIS — I10 ESSENTIAL HYPERTENSION: ICD-10-CM

## 2025-03-24 DIAGNOSIS — D56.3 THALASSEMIA TRAIT, ALPHA: ICD-10-CM

## 2025-03-24 DIAGNOSIS — D50.9 MICROCYTIC ANEMIA: ICD-10-CM

## 2025-03-24 DIAGNOSIS — E61.1 IRON DEFICIENCY: ICD-10-CM

## 2025-03-24 DIAGNOSIS — R79.89 ELEVATED FERRITIN: ICD-10-CM

## 2025-03-24 RX ORDER — FERROUS SULFATE 325(65) MG
1 TABLET ORAL 2 TIMES DAILY
Qty: 56 TABLET | Refills: 1 | Status: SHIPPED | OUTPATIENT
Start: 2025-03-24

## 2025-03-24 NOTE — TELEPHONE ENCOUNTER
Last visit: 1/28/25  Last Med refill: 1/30/25  Does patient have enough medication for 72 hours: No:     Next Visit Date:  Future Appointments   Date Time Provider Department Center   4/10/2025  3:30 PM Manuel Padilla PA ORTHO SPECIA [facility]   4/14/2025  1:30 PM Luther García MD Resp Spec [facility]   4/28/2025 12:45 PM Jose Angel Quach DPM ACC Podiatry [facility]   4/28/2025  3:00 PM [facility]c [facility]   5/13/2025  3:40 PM Reyna Shepherd MD Neuro Spec Neurology -   7/25/2025  1:50 PM Armando Lugo MD [facility] None       Health Maintenance   Topic Date Due    Respiratory Syncytial Virus (RSV) Pregnant or age 60 yrs+ (1 - Risk 60-74 years 1-dose series) Never done    Pneumococcal 50+ years Vaccine (3 of 3 - PCV20 or PCV21) 10/19/2023    COVID-19 Vaccine (4 - 2024-25 season) 09/01/2024    Annual Wellness Visit (Medicare Advantage)  01/01/2025    Lipids  05/31/2025    Lung Cancer Screening &/or Counseling  12/09/2025    Depression Screen  01/28/2026    GFR test (Diabetes, CKD 3-4, OR last GFR 15-59)  01/28/2026    Breast cancer screen  03/21/2026    Colorectal Cancer Screen  09/03/2027    DTaP/Tdap/Td vaccine (2 - Td or Tdap) 03/08/2028    DEXA (modify frequency per FRAX score)  Completed    Flu vaccine  Completed    Shingles vaccine  Completed    Hepatitis C screen  Completed    Hepatitis A vaccine  Aged Out    Hepatitis B vaccine  Aged Out    Hib vaccine  Aged Out    Polio vaccine  Aged Out    Meningococcal (ACWY) vaccine  Aged Out    Meningococcal B vaccine  Aged Out    A1C test (Diabetic or Prediabetic)  Discontinued    Diabetes screen  Discontinued       Hemoglobin A1C (%)   Date Value   12/09/2024 5.1   05/28/2024 5.5   11/28/2022 5.8             ( goal A1C is < 7)   No components found for: \"LABMICR\"  No components found for: \"LDLCHOLESTEROL\", \"LDLCALC\"    (goal LDL is <100)   AST (U/L)   Date Value   11/05/2024 26     ALT (U/L)   Date Value   11/05/2024 18     BUN (mg/dL)   Date Value

## 2025-03-25 RX ORDER — TICAGRELOR 90 MG/1
90 TABLET ORAL 2 TIMES DAILY
Qty: 56 TABLET | Refills: 0 | Status: SHIPPED | OUTPATIENT
Start: 2025-03-25

## 2025-03-25 RX ORDER — AMLODIPINE BESYLATE 5 MG/1
5 TABLET ORAL DAILY
Qty: 28 TABLET | Refills: 1 | Status: SHIPPED | OUTPATIENT
Start: 2025-03-25

## 2025-03-28 DIAGNOSIS — M54.41 CHRONIC BILATERAL LOW BACK PAIN WITH BILATERAL SCIATICA: ICD-10-CM

## 2025-03-28 DIAGNOSIS — M54.42 CHRONIC BILATERAL LOW BACK PAIN WITH BILATERAL SCIATICA: ICD-10-CM

## 2025-03-28 DIAGNOSIS — G89.29 CHRONIC BILATERAL LOW BACK PAIN WITH BILATERAL SCIATICA: ICD-10-CM

## 2025-03-28 RX ORDER — PREGABALIN 25 MG/1
25 CAPSULE ORAL 3 TIMES DAILY
Qty: 90 CAPSULE | Refills: 1 | OUTPATIENT
Start: 2025-03-28

## 2025-04-10 ENCOUNTER — OFFICE VISIT (OUTPATIENT)
Dept: ORTHOPEDIC SURGERY | Age: 70
End: 2025-04-10
Payer: COMMERCIAL

## 2025-04-10 VITALS — BODY MASS INDEX: 57.31 KG/M2 | HEIGHT: 67 IN

## 2025-04-10 DIAGNOSIS — M65.4 DE QUERVAIN'S TENOSYNOVITIS, RIGHT: Primary | ICD-10-CM

## 2025-04-10 PROCEDURE — 99214 OFFICE O/P EST MOD 30 MIN: CPT | Performed by: PHYSICIAN ASSISTANT

## 2025-04-10 PROCEDURE — 1123F ACP DISCUSS/DSCN MKR DOCD: CPT | Performed by: PHYSICIAN ASSISTANT

## 2025-04-10 NOTE — PROGRESS NOTES
this point patient has failed to improve with immobilization with a thumb spica brace, extensive home exercise program over the last 6 weeks as well as topical NSAIDs as well as oral steroids with minimal improvement  Examination continues to be concerning for de Quervain's tenosynovitis.  Patient with evidence of underlying CMC OA and likely component of carpal tunnel syndrome but the majority the pain is directly related to the first dorsal compartment.  After discussion of treatment options including possibility of for stress compartment injection patient wishes to pursue further diagnostic evaluation with an MRI of the right wrist to evaluate for first dorsal compartment tendon injury as well as de Quervain's tenosynovitis.  Will await MRI results and treatment recommendations will be made accordingly.  In the meantime continue with thumb spica bracing and home exercises.  May continue with topical medication as needed    No follow-ups on file.    No orders of the defined types were placed in this encounter.      Orders Placed This Encounter   Procedures    MRI WRIST RIGHT WO CONTRAST     Standing Status:   Future     Expected Date:   4/10/2025     Expiration Date:   4/10/2026        Electronically signed by JEFFERSON Concepcion on 4/10/2025 at 3:55 PM    This note is created with the assistance of a speech recognition program.  While intending to generate a document that actually reflects the content of the visit, the document can still have some errors including those of syntax and sound a like substitutions which may escape proof reading.  In such instances, actual meaning can be extrapolated by contextual diversion

## 2025-04-14 ENCOUNTER — OFFICE VISIT (OUTPATIENT)
Dept: PULMONOLOGY | Age: 70
End: 2025-04-14
Payer: COMMERCIAL

## 2025-04-14 VITALS
DIASTOLIC BLOOD PRESSURE: 79 MMHG | WEIGHT: 293 LBS | HEART RATE: 80 BPM | BODY MASS INDEX: 45.99 KG/M2 | HEIGHT: 67 IN | SYSTOLIC BLOOD PRESSURE: 145 MMHG | OXYGEN SATURATION: 98 % | RESPIRATION RATE: 20 BRPM

## 2025-04-14 DIAGNOSIS — E66.01 MORBID OBESITY WITH BMI OF 50.0-59.9, ADULT: ICD-10-CM

## 2025-04-14 DIAGNOSIS — G47.33 OSA TREATED WITH BIPAP: ICD-10-CM

## 2025-04-14 DIAGNOSIS — Z87.891 HISTORY OF SMOKING 30 OR MORE PACK YEARS: ICD-10-CM

## 2025-04-14 DIAGNOSIS — R91.1 LUNG NODULE: ICD-10-CM

## 2025-04-14 DIAGNOSIS — J44.9 CHRONIC OBSTRUCTIVE PULMONARY DISEASE, UNSPECIFIED COPD TYPE (HCC): Primary | ICD-10-CM

## 2025-04-14 PROCEDURE — 1123F ACP DISCUSS/DSCN MKR DOCD: CPT | Performed by: INTERNAL MEDICINE

## 2025-04-14 PROCEDURE — 99214 OFFICE O/P EST MOD 30 MIN: CPT | Performed by: INTERNAL MEDICINE

## 2025-04-14 PROCEDURE — 3078F DIAST BP <80 MM HG: CPT | Performed by: INTERNAL MEDICINE

## 2025-04-14 PROCEDURE — 3077F SYST BP >= 140 MM HG: CPT | Performed by: INTERNAL MEDICINE

## 2025-04-14 RX ORDER — ALBUTEROL SULFATE 90 UG/1
2 INHALANT RESPIRATORY (INHALATION) 4 TIMES DAILY PRN
Qty: 18 G | Refills: 5 | Status: SHIPPED | OUTPATIENT
Start: 2025-04-14

## 2025-04-14 RX ORDER — UMECLIDINIUM BROMIDE AND VILANTEROL TRIFENATATE 62.5; 25 UG/1; UG/1
1 POWDER RESPIRATORY (INHALATION) DAILY
Qty: 30 EACH | Refills: 11 | Status: SHIPPED | OUTPATIENT
Start: 2025-04-14

## 2025-04-14 ASSESSMENT — SLEEP AND FATIGUE QUESTIONNAIRES
HOW LIKELY ARE YOU TO NOD OFF OR FALL ASLEEP WHILE SITTING AND READING: WOULD NEVER DOZE
HOW LIKELY ARE YOU TO NOD OFF OR FALL ASLEEP WHILE LYING DOWN TO REST IN THE AFTERNOON WHEN CIRCUMSTANCES PERMIT: MODERATE CHANCE OF DOZING
HOW LIKELY ARE YOU TO NOD OFF OR FALL ASLEEP IN A CAR, WHILE STOPPED FOR A FEW MINUTES IN TRAFFIC: MODERATE CHANCE OF DOZING
HOW LIKELY ARE YOU TO NOD OFF OR FALL ASLEEP WHILE SITTING QUIETLY AFTER LUNCH WITHOUT ALCOHOL: MODERATE CHANCE OF DOZING
ESS TOTAL SCORE: 15
HOW LIKELY ARE YOU TO NOD OFF OR FALL ASLEEP WHEN YOU ARE A PASSENGER IN A CAR FOR AN HOUR WITHOUT A BREAK: MODERATE CHANCE OF DOZING
HOW LIKELY ARE YOU TO NOD OFF OR FALL ASLEEP WHILE SITTING INACTIVE IN A PUBLIC PLACE: MODERATE CHANCE OF DOZING
HOW LIKELY ARE YOU TO NOD OFF OR FALL ASLEEP WHILE WATCHING TV: HIGH CHANCE OF DOZING
HOW LIKELY ARE YOU TO NOD OFF OR FALL ASLEEP WHILE SITTING AND TALKING TO SOMEONE: MODERATE CHANCE OF DOZING

## 2025-04-14 NOTE — PROGRESS NOTES
Patient instructed to remove shoes and socks and instructed to sit in exam chair.  Current PCP is Jacobo Robles MD and date of last visit was 1/28/25.   Do you have a follow up visit scheduled?  No  If yes, the date is unknown

## 2025-04-14 NOTE — PROGRESS NOTES
OUTPATIENT PULMONARY PROGRESS NOTE      Patient:  Filomena Lopez  MRN: [id number]    Consulting Physician: Erasto Redman MD  Reason for Consult: Obstructive sleep apnea/COPD  Primacy Care Physician: Jacobo Robles MD    HISTORY OF PRESENT ILLNESS:     She is here for follow-up of COPD/chronic dyspnea, obstructive sleep apnea and history of smoking.    History of Present Illness  The patient is a 69-year-old female here for a follow-up of chronic obstructive pulmonary disease, obstructive sleep apnea treated with BiPAP, morbid obesity, a 30-year pack history of smoking, and a history of a lung nodule. She was last seen in the office 6 months ago, in 10/2024.    She reports a stable condition with no recent exacerbations of COPD necessitating steroid or antibiotic therapy. She experiences dyspnea after walking approximately 20 feet, a symptom that has remained consistent. She occasionally coughs but does not produce any phlegm, particularly not yellow-green phlegm. She does not experience nighttime symptoms such as coughing, wheezing, or shortness of breath due to her use of CPAP. Her current medication regimen includes albuterol, administered as 2 puffs daily, and Anoro, taken as 1 puff once daily.    She typically goes to bed around 8:30 or 9:00 PM, falls asleep within 20 minutes, and wakes up around 2:00 or 3:00 AM to use the bathroom. After this, she returns to bed, reapplies her mask, and resumes sleep. In the morning, she follows a routine of showering and assisting her  before resting again. She occasionally naps during the day for about 1.5 hours, sometimes using her BiPAP. She also reports dozing off while watching TV. She uses a full mask that covers her nose and mouth, along with heated humidification. She has ordered new supplies for her CPAP machine.  Compliance data from 12/20/2024 to 03/19/2025 shows, compliance is 99% average usage is 4 hours 1 minute average AHI is 2.9 on BiPAP

## 2025-04-22 RX ORDER — CHOLECALCIFEROL (VITAMIN D3) 25 MCG
1 TABLET ORAL DAILY
Qty: 28 TABLET | Refills: 1 | Status: SHIPPED | OUTPATIENT
Start: 2025-04-22

## 2025-04-22 RX ORDER — TICAGRELOR 90 MG/1
90 TABLET ORAL 2 TIMES DAILY
Qty: 56 TABLET | Refills: 0 | Status: SHIPPED | OUTPATIENT
Start: 2025-04-22

## 2025-04-28 ENCOUNTER — OFFICE VISIT (OUTPATIENT)
Dept: PODIATRY | Age: 70
End: 2025-04-28
Payer: COMMERCIAL

## 2025-04-28 VITALS
BODY MASS INDEX: 45.99 KG/M2 | DIASTOLIC BLOOD PRESSURE: 76 MMHG | HEART RATE: 72 BPM | WEIGHT: 293 LBS | SYSTOLIC BLOOD PRESSURE: 166 MMHG | HEIGHT: 67 IN

## 2025-04-28 DIAGNOSIS — B35.1 PAIN DUE TO ONYCHOMYCOSIS OF NAIL: Primary | ICD-10-CM

## 2025-04-28 DIAGNOSIS — M79.609 PAIN DUE TO ONYCHOMYCOSIS OF NAIL: Primary | ICD-10-CM

## 2025-04-28 DIAGNOSIS — M79.674 PAIN IN TOES OF BOTH FEET: ICD-10-CM

## 2025-04-28 DIAGNOSIS — M79.675 PAIN IN TOES OF BOTH FEET: ICD-10-CM

## 2025-04-28 PROCEDURE — 11056 PARNG/CUTG B9 HYPRKR LES 2-4: CPT

## 2025-04-28 PROCEDURE — 3078F DIAST BP <80 MM HG: CPT

## 2025-04-28 PROCEDURE — 1123F ACP DISCUSS/DSCN MKR DOCD: CPT

## 2025-04-28 PROCEDURE — 99213 OFFICE O/P EST LOW 20 MIN: CPT

## 2025-04-28 PROCEDURE — 11721 DEBRIDE NAIL 6 OR MORE: CPT

## 2025-04-28 PROCEDURE — 3077F SYST BP >= 140 MM HG: CPT

## 2025-04-29 ENCOUNTER — HOSPITAL ENCOUNTER (OUTPATIENT)
Age: 70
Discharge: HOME | End: 2025-04-29
Payer: COMMERCIAL

## 2025-04-29 DIAGNOSIS — R79.89 ELEVATED FERRITIN: ICD-10-CM

## 2025-04-29 DIAGNOSIS — D56.3 THALASSEMIA TRAIT, ALPHA: ICD-10-CM

## 2025-04-29 DIAGNOSIS — D50.9 MICROCYTIC ANEMIA: ICD-10-CM

## 2025-04-29 LAB
ANION GAP SERPL CALCULATED.3IONS-SCNC: 11 MMOL/L (ref 9–16)
BASOPHILS # BLD: 0.05 K/UL (ref 0–0.2)
BASOPHILS NFR BLD: 1 % (ref 0–2)
BUN SERPL-MCNC: 23 MG/DL (ref 8–23)
CALCIUM SERPL-MCNC: 9.5 MG/DL (ref 8.8–10.2)
CHLORIDE SERPL-SCNC: 103 MMOL/L (ref 98–107)
CO2 SERPL-SCNC: 26 MMOL/L (ref 20–31)
CREAT SERPL-MCNC: 1.3 MG/DL (ref 0.5–0.9)
EOSINOPHIL # BLD: 0.27 K/UL (ref 0–0.44)
EOSINOPHILS RELATIVE PERCENT: 4 % (ref 1–4)
ERYTHROCYTE [DISTWIDTH] IN BLOOD BY AUTOMATED COUNT: 18.4 % (ref 11.8–14.4)
FERRITIN SERPL-MCNC: 646 NG/ML
GFR, ESTIMATED: 46 ML/MIN/1.73M2
GLUCOSE SERPL-MCNC: 93 MG/DL (ref 82–115)
HCT VFR BLD AUTO: 34.6 % (ref 36.3–47.1)
HGB BLD-MCNC: 10.9 G/DL (ref 11.9–15.1)
IMM GRANULOCYTES # BLD AUTO: 0.03 K/UL (ref 0–0.3)
IMM GRANULOCYTES NFR BLD: 0 %
IRON SATN MFR SERPL: 25 % (ref 20–55)
IRON SERPL-MCNC: 59 UG/DL (ref 37–145)
LDH SERPL-CCNC: 217 U/L (ref 135–214)
LYMPHOCYTES NFR BLD: 1.72 K/UL (ref 1.1–3.7)
LYMPHOCYTES RELATIVE PERCENT: 25 % (ref 24–43)
MCH RBC QN AUTO: 22.3 PG (ref 25.2–33.5)
MCHC RBC AUTO-ENTMCNC: 31.5 G/DL (ref 28.4–34.8)
MCV RBC AUTO: 70.8 FL (ref 82.6–102.9)
MONOCYTES NFR BLD: 0.59 K/UL (ref 0.1–1.2)
MONOCYTES NFR BLD: 9 % (ref 3–12)
NEUTROPHILS NFR BLD: 61 % (ref 36–65)
NEUTS SEG NFR BLD: 4.21 K/UL (ref 1.5–8.1)
NRBC BLD-RTO: 0 PER 100 WBC
PLATELET # BLD AUTO: 335 K/UL (ref 138–453)
PMV BLD AUTO: 9.1 FL (ref 8.1–13.5)
POTASSIUM SERPL-SCNC: 4.5 MMOL/L (ref 3.7–5.3)
RBC # BLD AUTO: 4.89 M/UL (ref 3.95–5.11)
RBC # BLD: ABNORMAL 10*6/UL
SODIUM SERPL-SCNC: 140 MMOL/L (ref 136–145)
TIBC SERPL-MCNC: 239 UG/DL (ref 250–450)
UNSATURATED IRON BINDING CAPACITY: 180 UG/DL (ref 112–347)
VIT B12 SERPL-MCNC: 1025 PG/ML (ref 232–1245)
WBC OTHER # BLD: 6.9 K/UL (ref 3.5–11.3)

## 2025-04-29 PROCEDURE — 36415 COLL VENOUS BLD VENIPUNCTURE: CPT

## 2025-04-29 PROCEDURE — 80048 BASIC METABOLIC PNL TOTAL CA: CPT

## 2025-04-29 PROCEDURE — 83550 IRON BINDING TEST: CPT

## 2025-04-29 PROCEDURE — 82728 ASSAY OF FERRITIN: CPT

## 2025-04-29 PROCEDURE — 82607 VITAMIN B-12: CPT

## 2025-04-29 PROCEDURE — 85025 COMPLETE CBC W/AUTO DIFF WBC: CPT

## 2025-04-29 PROCEDURE — 83615 LACTATE (LD) (LDH) ENZYME: CPT

## 2025-04-29 PROCEDURE — 83540 ASSAY OF IRON: CPT

## 2025-04-30 ENCOUNTER — TELEPHONE (OUTPATIENT)
Age: 70
End: 2025-04-30

## 2025-04-30 NOTE — TELEPHONE ENCOUNTER
Patient called stating she had her bunion shaved at appointment on 4/28/25, and now is unable to get a shoe on or walk. Please contact patient at [phone]

## 2025-05-02 NOTE — PROGRESS NOTES
[facility]  [address]   [address] [place] [address]  Tel: [phone]   Fax: [phone]    Subjective     CC: Diabetic foot examination    Interval History:  Patient presents to clinic today for trimming of painful elongated toenails and callus pain.  Impede daily activities while ambulating in shoegear.  Hemaglobin A1C controlled, 5.1 in December 2024    HPI:  Filomena Lopez is a 69 y.o. year old female who presents clinic today for follow-up of thickened and discolored nails. She states that she has been doing well and denies any new complaints. She walks around with a cane and has troubling cutting her own nails. She does not wear any compression stockings as they are hard to put on. Patient denies any nausea, vomiting, fever, chills or shortness of breath.    Primary care physician is Jacobo Robles MD.    ROS:    Constitutional: Denies nausea, vomiting, fever, chills.  Neurologic: Denies numbness, tingling, and burning in the feet.    Vascular: Denies symptoms of lower extremity claudication.    Skin: Denies open wounds.  Otherwise negative except as noted in the HPI.     PMH:  Past Medical History:   Diagnosis Date    Ambulates with cane     Bleeding     while on aggrenox    Cataracts, bilateral     Cerebrovascular disease 2003,2011    cva    Chronic pain in left foot     CKD (chronic kidney disease) stage 3, GFR 30-59 ml/min ([facility])     COVID-19 11/2021    states hpspitalized    GERD (gastroesophageal reflux disease)     Hx of blood clots     Hyperlipidemia     Hypertension     Iron (Fe) deficiency anemia     Nicotine abuse     Obesity     TIBURCIO on CPAP     Osteoarthritis     Peripheral vascular disease 03/13/2014    Substance abuse ([facility])     cocaine, canabis    Thalassemia minor     Thyroid nodule 11/22/2022    Unspecified cerebral artery occlusion with cerebral infarction     Wears dentures     upper and lower       Surgical History:   Past Surgical History:   Procedure

## 2025-05-05 ENCOUNTER — TELEPHONE (OUTPATIENT)
Age: 70
End: 2025-05-05

## 2025-05-05 ENCOUNTER — OFFICE VISIT (OUTPATIENT)
Age: 70
End: 2025-05-05
Payer: COMMERCIAL

## 2025-05-05 VITALS
HEIGHT: 67 IN | WEIGHT: 293 LBS | BODY MASS INDEX: 45.99 KG/M2 | DIASTOLIC BLOOD PRESSURE: 89 MMHG | RESPIRATION RATE: 22 BRPM | SYSTOLIC BLOOD PRESSURE: 152 MMHG | TEMPERATURE: 97 F | HEART RATE: 65 BPM

## 2025-05-05 DIAGNOSIS — D56.3 THALASSEMIA TRAIT, ALPHA: ICD-10-CM

## 2025-05-05 DIAGNOSIS — R79.89 ELEVATED FERRITIN: ICD-10-CM

## 2025-05-05 DIAGNOSIS — D50.9 MICROCYTIC ANEMIA: Primary | ICD-10-CM

## 2025-05-05 PROCEDURE — 99212 OFFICE O/P EST SF 10 MIN: CPT | Performed by: INTERNAL MEDICINE

## 2025-05-05 PROCEDURE — 3079F DIAST BP 80-89 MM HG: CPT | Performed by: INTERNAL MEDICINE

## 2025-05-05 PROCEDURE — 3077F SYST BP >= 140 MM HG: CPT | Performed by: INTERNAL MEDICINE

## 2025-05-05 PROCEDURE — 1123F ACP DISCUSS/DSCN MKR DOCD: CPT | Performed by: INTERNAL MEDICINE

## 2025-05-05 PROCEDURE — 99213 OFFICE O/P EST LOW 20 MIN: CPT | Performed by: INTERNAL MEDICINE

## 2025-05-05 NOTE — TELEPHONE ENCOUNTER
RICO HERE FOR FOLLOW UP   Rv in 6 months with labs 1 week before rv   LABS ORDERED: IRON & TIBC, FERRITIN, LDH, CBC  MD VISIT: 11/10/25 @ 3PM   LABS PRINTED AND GIVEN ON EXIT   AVS PRINTED AND GIVEN ON EXIT

## 2025-05-05 NOTE — PROGRESS NOTES
Today's Date: 5/5/2025  Patient Name: Filomena Lopez  Patient's age: 69 y.o., 1955      Reason for Consult: management recommendations    CHIEF COMPLAINT:  Microcytic anemia    Chief Complaint   Patient presents with    Follow-up    Discuss Labs       History Obtained From:  patient, electronic medical record    Interval history:  Patient presents to the clinic for a follow-up visit and to discuss her lab workup.  Hemoglobin remains low but stable.  Ferritin also remains elevated but stable.  Patient not on any iron supplements.    During this visit patient's allergy, social, medical, surgical history and medications were reviewed and updated.    HISTORY OF PRESENT ILLNESS:      The patient is a 69 y.o.  female who presents to the office to establish care and for further treatment evaluation and recommendations.    Patient gives a long-standing history of microcytic anemia.  According to the patient she was first diagnosed with anemia when she was pregnant and has been anemic since.  Patient has had GI workup done in the past including colonoscopy which only revealed a polyp without any malignancy.  Patient's blood workup in the past has shown iron deficiency but her latest iron studies showed sufficient iron stores, B12 and folic acid levels.  Patient complains of fatigue at times.  Denies noticing any gross bleeding.  Patient is on iron supplement with one tablet a day.  Patient peripheral blood smear showed microcytic anemia with reticulocytosis.  Patient also had hemoglobin electrophoresis which came back within normal range.  Patient denies any history of gastric bypass surgery.    Past Medical History:   has a past medical history of Ambulates with cane, Bleeding, Cataracts, bilateral, Cerebrovascular disease, Chronic pain in left foot, CKD (chronic kidney disease) stage 3, GFR 30-59 ml/min ([facility]), COVID-19, GERD (gastroesophageal reflux disease), Hx of blood clots, Hyperlipidemia,

## 2025-05-13 ENCOUNTER — OFFICE VISIT (OUTPATIENT)
Dept: NEUROLOGY | Age: 70
End: 2025-05-13
Payer: COMMERCIAL

## 2025-05-13 VITALS — BODY MASS INDEX: 45.99 KG/M2 | HEIGHT: 67 IN | WEIGHT: 293 LBS

## 2025-05-13 DIAGNOSIS — M54.42 CHRONIC BILATERAL LOW BACK PAIN WITH BILATERAL SCIATICA: ICD-10-CM

## 2025-05-13 DIAGNOSIS — G56.03 BILATERAL CARPAL TUNNEL SYNDROME: Primary | ICD-10-CM

## 2025-05-13 DIAGNOSIS — I66.9 STENOSIS OF INTRACRANIAL VESSEL: ICD-10-CM

## 2025-05-13 DIAGNOSIS — M54.41 CHRONIC BILATERAL LOW BACK PAIN WITH BILATERAL SCIATICA: ICD-10-CM

## 2025-05-13 DIAGNOSIS — G89.29 CHRONIC BILATERAL LOW BACK PAIN WITH BILATERAL SCIATICA: ICD-10-CM

## 2025-05-13 PROCEDURE — 99214 OFFICE O/P EST MOD 30 MIN: CPT | Performed by: STUDENT IN AN ORGANIZED HEALTH CARE EDUCATION/TRAINING PROGRAM

## 2025-05-13 PROCEDURE — 1123F ACP DISCUSS/DSCN MKR DOCD: CPT | Performed by: STUDENT IN AN ORGANIZED HEALTH CARE EDUCATION/TRAINING PROGRAM

## 2025-05-13 RX ORDER — PREGABALIN 75 MG/1
75 CAPSULE ORAL ONCE
Qty: 90 CAPSULE | Refills: 1 | Status: SHIPPED | OUTPATIENT
Start: 2025-05-13 | End: 2025-05-13

## 2025-05-13 NOTE — PROGRESS NOTES
Neurology Clinic Note    [facility]  Department of Neurology  Date of Service: 5/13/2025 at 3:09 PM      CLINIC NOTE -follow-up note    Filomena Lopez is a 69 y.o. right handed female who is here for follow up for stroke and b/l CTS.   Patient is unaccompanied.    Filomena has a history of bilateral carpal tunnel syndrome, more severe in the left hand (dominant). Reports worsening pain and numbness, especially at night. Last EMG/NCS was performed in 2019. She wears wrist splints at night but has not been evaluated by surgery. Will repeat EMG/NCS to assess for progression. If evidence of significant worsening or active denervation is present, referral to hand surgery will be considered.  Also noted to be taking both aspirin and Brilinta, though she reports she stopped Brilinta some time ago and is unsure of the indication for dual antiplatelet therapy. She has a history of two strokes. Will refer to stroke neurology for further management of antiplatelet regimen.  Patient has history of two ischemic strokes in 2003 and 2011. She has been on Brilinta 90 mg twice daily and ASA 81 mg daily.  She denies any symptoms concerning for stroke.    Patient also has history of bilateral carpal tunnel syndrome.  He had an EMG done in 2019 which showed moderate to severe CTS on the left side and moderate on right side.  She is not interested in carpal tunnel release surgery at this point.  She wears splints at night which is helping her with the symptoms.  She is taking Lyrica 25 mg 3 times daily without any side effects.          PREVIOUS EVALUATION:    EMG/nerve conduction studies 8/14/2019:  Bilateral median nerve without neuropathy left more than right.    PAST MEDICAL HISTORY:    Past Medical History:   Diagnosis Date    Ambulates with cane     Bleeding     while on aggrenox    Cataracts, bilateral     Cerebrovascular disease 2003,2011    cva    Chronic pain in left foot     CKD (chronic kidney disease) stage 3,

## 2025-05-21 DIAGNOSIS — I10 ESSENTIAL HYPERTENSION: ICD-10-CM

## 2025-05-22 RX ORDER — AMLODIPINE BESYLATE 5 MG/1
5 TABLET ORAL DAILY
Qty: 28 TABLET | Refills: 1 | Status: SHIPPED | OUTPATIENT
Start: 2025-05-22

## 2025-05-22 RX ORDER — TICAGRELOR 90 MG/1
90 TABLET ORAL 2 TIMES DAILY
Qty: 56 TABLET | Refills: 0 | Status: SHIPPED | OUTPATIENT
Start: 2025-05-22

## 2025-05-22 NOTE — TELEPHONE ENCOUNTER
Filomena Lopez is calling to request a refill on the following medication(s):    Medication Request:  Multiple Medications      Last Visit Date (If Applicable):  Visit date not found    Next Visit Date:    Visit date not found

## 2025-05-30 ENCOUNTER — OFFICE VISIT (OUTPATIENT)
Dept: NEUROLOGY | Age: 70
End: 2025-05-30
Payer: MEDICARE

## 2025-05-30 VITALS
WEIGHT: 293 LBS | HEART RATE: 73 BPM | SYSTOLIC BLOOD PRESSURE: 159 MMHG | DIASTOLIC BLOOD PRESSURE: 87 MMHG | HEIGHT: 67 IN | BODY MASS INDEX: 45.99 KG/M2

## 2025-05-30 DIAGNOSIS — I65.21 OCCLUSION OF RIGHT INTERNAL CAROTID ARTERY: Primary | ICD-10-CM

## 2025-05-30 DIAGNOSIS — I66.9 STENOSIS OF INTRACRANIAL VESSEL: ICD-10-CM

## 2025-05-30 DIAGNOSIS — I65.01 OCCLUSION OF RIGHT VERTEBRAL ARTERY: ICD-10-CM

## 2025-05-30 PROCEDURE — 3077F SYST BP >= 140 MM HG: CPT | Performed by: PSYCHIATRY & NEUROLOGY

## 2025-05-30 PROCEDURE — 3079F DIAST BP 80-89 MM HG: CPT | Performed by: PSYCHIATRY & NEUROLOGY

## 2025-05-30 PROCEDURE — 99205 OFFICE O/P NEW HI 60 MIN: CPT | Performed by: PSYCHIATRY & NEUROLOGY

## 2025-05-30 PROCEDURE — 1123F ACP DISCUSS/DSCN MKR DOCD: CPT | Performed by: PSYCHIATRY & NEUROLOGY

## 2025-05-30 NOTE — PROGRESS NOTES
Endovascular Neurosurgery Clinic Note      Reason for evaluation: extensive ICAD    SUBJECTIVE:   History of Chief Complaint:      69 y.o. right handed female who is here referred from neuro clinic for severe ICAD.      History of two prior ischemic strokes (2003 and 2011) presents for follow-up. She has been taking dual antiplatelet therapy (Brilinta 90 mg BID and aspirin 81 mg daily), although she reports self-discontinuation of Brilinta at an unknown time and is unsure of the indication for dual therapy. She denies any new neurological symptoms or concerns suggestive of recurrent stroke.    Her past cerebrovascular events necessitate reevaluation of her secondary stroke prevention strategy. She remains on high-intensity statin therapy (atorvastatin 80 mg daily), and her vascular risk profile includes hyperlipidemia and possible undertreated dyslipidemia, for which referral to endocrinology is planned.    For surveillance and to assess any need for escalation of care, interval imaging with repeat CTA head and neck with contrast and MRI brain without contrast is planned. From a stroke prevention standpoint, she may continue on baby aspirin alone pending stroke neurology input.    The patient also has longstanding bilateral carpal tunnel syndrome, with moderate to severe involvement of the left (dominant) hand confirmed on EMG/NCS in 2019. She reports worsening nocturnal symptoms but is not interested in surgical intervention at this time. She uses wrist splints nightly and takes low-dose pregabalin with benefit. Repeat EMG/NCS will be performed to evaluate for progression.    Allergies  is allergic to duricef [cefadroxil monohydrate], fish-derived products, pcn [penicillins], and tomato.  Medications  Prior to Admission medications    Medication Sig Start Date End Date Taking? Authorizing Provider   amLODIPine (NORVASC) 5 MG tablet TAKE 1 TABLET BY MOUTH DAILY 5/22/25   Jacobo Robles MD   BRILINTA 90 MG

## 2025-06-05 ENCOUNTER — TELEPHONE (OUTPATIENT)
Age: 70
End: 2025-06-05

## 2025-06-05 NOTE — TELEPHONE ENCOUNTER
Notification received that pt was given 30-day supply of ticagrelor but that medication is not formulary and insurance will not continue to pay for it.    Review of chart shows long-term therapy with this medication.    Review of formulary online shows medication is in fact covered.    PA completed and sent to insurance via [email or web address]

## 2025-06-05 NOTE — ED PROVIDER NOTES
[facility] GERIATRICS    PRIMARY CARE PROVIDER AND CLINIC:  Park Wilson, APRN CNP, [address] / [place] [address]***  Chief Complaint   Patient presents with    [facility] Medical Record Number:  [id number]  Place of Service where encounter took place:  [facility] [[id number]]    July Huang  is a 82 year old  (1942), living in above facility since 4/16/25 and now choosing to change PCPs to [facility]. .   HPI:    ***    CODE STATUS/ADVANCE DIRECTIVES DISCUSSION:  Prior  DNR / DNI  ALLERGIES:   Allergies   Allergen Reactions    Diltiazem Palpitations    Hydrochlorothiazide W-Amiloride Palpitations    Lisinopril Palpitations    Losartan Potassium Visual Disturbance    Meclizine Palpitations    Metoprolol Palpitations    Tetracyclines & Related Nausea and Vomiting      PAST MEDICAL HISTORY:   Past Medical History:   Diagnosis Date    Bladder cancer (H)     Cancer of ovary (H)     Cerebrovascular accident (CVA) (H) 11/30/2020    R MCA stroke; with residual left sided weakness    Depression, major, single episode, severe (H) 12/23/2022    Eye muscle weakness, left 02/17/2016    GERD (gastroesophageal reflux disease) 07/08/2009    H/O total hysterectomy with bilateral salpingo-oophorectomy (BSO) 1995    ovarian cancer    HTN (hypertension)     Hyperlipidemia     Irregular heart beat 2009    nl CT angiogram    Meningioma (H)     Non-cardiac chest pain 07/08/2009    S/P coronary angiogram 2005    negative    Stress 12/22/2010    Upper back pain 02/17/2016      PAST SURGICAL HISTORY:   has a past surgical history that includes Ovary surgery; hysterectomy, pap no longer indicated; Hysterectomy total abdominal, bilateral salpingo-oophorectomy, combined (1995); and Biopsy breast.  FAMILY HISTORY: family history includes Asthma in her sister and sister; Cancer in her sister; Cerebrovascular Disease in her mother; Diabetes in her father; Heart Disease in her father;  [facility] ED  eMERGENCY dEPARTMENT eNCOUnter   Attending Attestation     Pt Name: Yogesh Abrams  MRN: [id number]  Marygfkalpesh 1955  Date of evaluation: 11/14/21       Yogesh Abrams is a 74 y.o. female who presents with Shortness of Breath, Nausea & Vomiting, and Diarrhea      History: Patient has shortness of breath, nausea and vomiting with diarrhea. Patient has low oxygen saturation on arrival.    Exam: Heart rate and rhythm are regular. Lungs are clear to station bilaterally. Abdomen soft, nontender. Plan for Covid testing and labs, plan for admission after rechecking oxygen saturation while standing and marching. Patient has elevated D-dimer. Plan for CT PE.    EKG shows normal sinus rhythm with rate 80 beats minute. Normal axis. No ST elevation or depression. T waves upright. No Q waves in aVL. In V2. No blocks or arrhythmias. Nonspecific EKG. I performed a history and physical examination of the patient and discussed management with the resident. I reviewed the residents note and agree with the documented findings and plan of care. Any areas of disagreement are noted on the chart. I was personally present for the key portions of any procedures. I have documented in the chart those procedures where I was not present during the key portions. I have personally reviewed all images and agree with the resident's interpretation. I have reviewed the emergency nurses triage note. I agree with the chief complaint, past medical history, past surgical history, allergies, medications, social and family history as documented unless otherwise noted below. Documentation of the HPI, Physical Exam and Medical Decision Making performed by medical students or scribes is based on my personal performance of the HPI, PE and MDM.  For Phys Assistant/ Nurse Practitioner cases/documentation I have had a face to face evaluation of this patient and have completed at least one if not all key Lung Cancer in her sister; Neurologic Disorder in her sister; Parkinsonism in her sister.  SOCIAL HISTORY:   reports that she quit smoking about 30 years ago. Her smoking use included cigarettes. She has never used smokeless tobacco. She reports current alcohol use. She reports that she does not use drugs.  Patient's living condition: lives in a nursing home    Post Discharge Medication Reconciliation Status:   MED REC REQUIRED{TIP  Click the link below to document or use med rec list, use list to pull in response :238484}  Post Medication Reconciliation Status: {MED REC LIST:504204}       Current Outpatient Medications   Medication Sig Dispense Refill    acetaminophen (TYLENOL) 500 MG tablet Take 1,000 mg by mouth every 8 hours as needed for mild pain.      Baclofen (LIORESAL) 5 MG tablet Take 5 mg by mouth 2 times daily.      bisacodyl (DULCOLAX) 10 MG suppository Place 1 suppository (10 mg) rectally daily as needed for constipation 10 suppository 0    carboxymethylcellulose (ARTIFICIAL TEARS) 1 % ophthalmic solution Place 2 drops into both eyes daily as needed for dry eyes.      citalopram (CELEXA) 10 MG tablet Take 20 mg by mouth daily.      clonazePAM (KLONOPIN) 0.5 MG tablet Take 1 tablet (0.5 mg) by mouth at bedtime 30 tablet 0    famotidine (PEPCID) 20 MG tablet Take 1 tablet (20 mg) by mouth 2 times daily as needed (Patient taking differently: Take 20 mg by mouth daily as needed.) 180 tablet 3    guaiFENesin (ORGANIDIN) 200 MG tablet Take 200 mg by mouth every 4 hours as needed for cough.      haloperidol (HALDOL) 2 MG/ML (HIGH CONC) solution Take 0.5 mLs by mouth every 4 hours as needed for agitation.      HYDROmorphone (DILAUDID) 2 MG tablet Take 2 mg by mouth every 3 hours as needed for severe pain.      Lidocaine (LIDOCARE) 4 % Patch Place 1 patch onto the skin every 8 hours as needed for moderate pain. To prevent lidocaine toxicity, patient should be patch free for 12 hrs daily.      meclizine  elements of the E/M (history, physical exam, and MDM). Additional findings are as noted. For APC cases I have personally evaluated and examined the patient in conjunction with the APC and agree with the treatment plan and disposition of the patient as recorded by the APC.     Leida Wood MD  Attending Emergency  Physician       Alvarado Larsen MD  11/14/21 0770 (ANTIVERT) 25 MG tablet Take 25 mg by mouth every 6 hours as needed for dizziness.      methadone (DOLOPHINE) 5 MG tablet Take 2.5 mg by mouth 2 times daily.      OLANZapine (ZYPREXA) 2.5 MG tablet Take 2.5 mg by mouth at bedtime.      oxyBUTYnin (DITROPAN) 5 MG tablet Take 5 mg by mouth every 6 hours as needed for bladder spasms.      oxymetazoline (AFRIN) 0.05 % nasal spray Spray 3 sprays into both nostrils 2 times daily as needed for congestion.      saline (RA STERILE SALINE NASAL MIST) 0.9 % AERS Spray 2 sprays into both nostrils 3 times daily as needed.      senna-docusate (SENOKOT-S/PERICOLACE) 8.6-50 MG tablet Take 2 tablets by mouth 2 times daily. AND 1 TAB PO BID PRN      acetaminophen (TYLENOL) 325 MG tablet Take 2 tablets (650 mg) by mouth every 6 hours as needed for pain 30 tablet 0    amLODIPine (NORVASC) 2.5 MG tablet Take 1 tablet (2.5 mg) by mouth daily 90 tablet 0    aspirin (ASA) 81 MG EC tablet Take 81 mg by mouth daily      Baclofen (LIORESAL) 5 MG tablet Take 1 tablet (5 mg) by mouth 3 times daily as needed for muscle spasms 90 tablet 0    busPIRone (BUSPAR) 5 MG tablet Take 1 tablet (5 mg) by mouth 2 times daily 60 tablet 0    calcium citrate-vitamin D (CITRACAL) 315-5 MG-MCG TABS per tablet Take 1 tablet by mouth daily      cyanocobalamin (VITAMIN B-12) 1000 MCG tablet Take 1 tablet (1,000 mcg) by mouth daily 100 tablet 1    diclofenac (VOLTAREN) 1 % topical gel Apply 2 g topically 4 times daily To painful area of back (Patient taking differently: Apply 2 g topically daily as needed for other (Pain) To painful area of back) 350 g 0    haloperidol (HALDOL) 0.5 MG tablet Take 1 tablet (0.5 mg) by mouth 2 times daily as needed for agitation 5 tablet 0    Lidocaine (LIDOCARE) 4 % Patch Place 2 patches onto the skin every 24 hours To prevent lidocaine toxicity, patient should be patch free for 12 hrs daily. Do NOT apply heat over patch      melatonin 5 MG tablet Take 1 tablet (5 mg) by mouth at  bedtime (Patient taking differently: Take 5 mg by mouth at bedtime And 5mg at bedtime PRN)      polyethylene glycol (MIRALAX) 17 g packet Take 17 g by mouth 2 times daily as needed for constipation (constipation) (Patient taking differently: Take 17 g by mouth daily as needed for constipation (constipation).) 30 packet 0    rosuvastatin (CRESTOR) 10 MG tablet Take 10 mg by mouth daily      senna-docusate (SENOKOT-S/PERICOLACE) 8.6-50 MG tablet Take 1 tablet by mouth 2 times daily 30 tablet 0    valACYclovir (VALTREX) 1000 mg tablet Take 1,000 mg by mouth 2 times daily as needed (HSV ulcer)      Vitamin D3 (CHOLECALCIFEROL) 25 mcg (1000 units) tablet Take 1 tablet by mouth daily       No current facility-administered medications for this visit.       ROS:  {ROS FGS:352904}    Vitals:  /66   Pulse 84   Temp 97.8  F (36.6  C)   Resp 18   Ht 1.524 m (5')   Wt 46.9 kg (103 lb 6.4 oz)   SpO2 97%   BMI 20.19 kg/m    Exam:  {Nursing home physical exam :563903}    Lab/Diagnostic data:  {fgslab:226851}    ASSESSMENT/PLAN:    {FGS DX2:382511}    Orders:  {fgsorders:194334}  ***    Electronically signed by:  Yasmin Mccollum ***

## 2025-06-09 NOTE — TELEPHONE ENCOUNTER
In completing request for additional documentation it appears neurology has cleared pt to be on aspirin monotherapy. PA deleted and archived on [employer]. Medication discontinued per note 5/30/25: Previously on dual antiplatelet therapy (aspirin + Brilinta); now appropriate for aspirin monotherapy

## 2025-06-16 DIAGNOSIS — E78.00 PURE HYPERCHOLESTEROLEMIA: ICD-10-CM

## 2025-06-16 DIAGNOSIS — I10 ESSENTIAL HYPERTENSION: ICD-10-CM

## 2025-06-16 DIAGNOSIS — I63.00 CEREBROVASCULAR ACCIDENT (CVA) DUE TO THROMBOSIS OF PRECEREBRAL ARTERY (HCC): ICD-10-CM

## 2025-06-16 NOTE — TELEPHONE ENCOUNTER
Request for   Requested Prescriptions     Pending Prescriptions Disp Refills    losartan (COZAAR) 100 MG tablet 28 tablet 5     Sig: Take 1 tablet by mouth daily    vitamin D3 (CHOLECALCIFEROL) 25 MCG (1000 UT) TABS tablet 28 tablet 5     Sig: Take 1 tablet by mouth daily    atorvastatin (LIPITOR) 80 MG tablet 28 tablet 5     Sig: Take 1 tablet by mouth daily    aspirin 81 MG EC tablet 28 tablet 5     Sig: TAKE 1 TABLET BY MOUTH DAILY    amLODIPine (NORVASC) 5 MG tablet 28 tablet 5     Sig: Take 1 tablet by mouth daily    .      Please review and e-scribe to pharmacy listed in chart if appropriate. Thank you.      Last Visit Date: 1/28/25 - Writer received PC from Becky HERNANDEZ with [employer]. She states pt's BP was elevated over the weekend at 156/112 and 150/110 - pt's bubble packs are missing losartan. Unclear why pharmacy never reached out to refill this medication instead of just not including the medication in bubble packs.    Next Visit Date: PC to pt to schedule NTP appt, no answer. Left HIPAA compliant message identifying self and nature of call, requested call back to writer, phone number given.      Future Appointments   Date Time Provider Department Center   6/30/2025  2:45 PM oJse Angel Quach DPM ACC Podiatry [facility]   7/25/2025  1:50 PM Armando Lugo MD AFL Neph [facility] None   10/13/2025  1:30 PM [facility]   11/10/2025  3:00 PM Oc Irizarry MD SV Cancer [facility]       Health Maintenance   Topic Date Due    Respiratory Syncytial Virus (RSV) Pregnant or age 60 yrs+ (1 - Risk 60-74 years 1-dose series) Never done    Pneumococcal 50+ years Vaccine (3 of 3 - PCV20 or PCV21) 10/19/2023    COVID-19 Vaccine (4 - 2024-25 season) 09/01/2024    Annual Wellness Visit (Medicare Advantage)  01/01/2025    Lipids  05/31/2025    Lung Cancer Screening &/or Counseling  12/09/2025    Depression Screen  01/28/2026    Breast cancer screen  03/21/2026    GFR test (Diabetes, CKD 3-4, OR last

## 2025-06-17 RX ORDER — ASPIRIN 81 MG/1
TABLET ORAL
Qty: 28 TABLET | Refills: 1 | Status: SHIPPED | OUTPATIENT
Start: 2025-06-17

## 2025-06-17 RX ORDER — CHOLECALCIFEROL (VITAMIN D3) 25 MCG
1 TABLET ORAL DAILY
Qty: 28 TABLET | Refills: 1 | Status: SHIPPED | OUTPATIENT
Start: 2025-06-17

## 2025-06-17 RX ORDER — LOSARTAN POTASSIUM 100 MG/1
100 TABLET ORAL DAILY
Qty: 28 TABLET | Refills: 1 | Status: SHIPPED | OUTPATIENT
Start: 2025-06-17

## 2025-06-17 RX ORDER — AMLODIPINE BESYLATE 5 MG/1
5 TABLET ORAL DAILY
Qty: 28 TABLET | Refills: 1 | Status: SHIPPED | OUTPATIENT
Start: 2025-06-17

## 2025-06-17 RX ORDER — ATORVASTATIN CALCIUM 80 MG/1
80 TABLET, FILM COATED ORAL DAILY
Qty: 28 TABLET | Refills: 1 | Status: SHIPPED | OUTPATIENT
Start: 2025-06-17

## 2025-06-20 ENCOUNTER — OFFICE VISIT (OUTPATIENT)
Age: 70
End: 2025-06-20
Payer: MEDICARE

## 2025-06-20 VITALS
HEIGHT: 67 IN | WEIGHT: 293 LBS | BODY MASS INDEX: 45.99 KG/M2 | SYSTOLIC BLOOD PRESSURE: 144 MMHG | DIASTOLIC BLOOD PRESSURE: 90 MMHG | HEART RATE: 113 BPM

## 2025-06-20 DIAGNOSIS — R42 DIZZINESS: ICD-10-CM

## 2025-06-20 DIAGNOSIS — G90.A POTS (POSTURAL ORTHOSTATIC TACHYCARDIA SYNDROME): ICD-10-CM

## 2025-06-20 DIAGNOSIS — R06.02 SHORTNESS OF BREATH: Primary | ICD-10-CM

## 2025-06-20 PROCEDURE — 99213 OFFICE O/P EST LOW 20 MIN: CPT

## 2025-06-20 PROCEDURE — 3077F SYST BP >= 140 MM HG: CPT

## 2025-06-20 PROCEDURE — 1159F MED LIST DOCD IN RCRD: CPT

## 2025-06-20 PROCEDURE — 3080F DIAST BP >= 90 MM HG: CPT

## 2025-06-20 PROCEDURE — 1123F ACP DISCUSS/DSCN MKR DOCD: CPT

## 2025-06-20 RX ORDER — METOPROLOL SUCCINATE 25 MG/1
25 TABLET, EXTENDED RELEASE ORAL DAILY
Qty: 90 TABLET | Refills: 1 | Status: SHIPPED | OUTPATIENT
Start: 2025-06-20

## 2025-06-20 SDOH — ECONOMIC STABILITY: FOOD INSECURITY: WITHIN THE PAST 12 MONTHS, THE FOOD YOU BOUGHT JUST DIDN'T LAST AND YOU DIDN'T HAVE MONEY TO GET MORE.: NEVER TRUE

## 2025-06-20 SDOH — ECONOMIC STABILITY: FOOD INSECURITY: WITHIN THE PAST 12 MONTHS, YOU WORRIED THAT YOUR FOOD WOULD RUN OUT BEFORE YOU GOT MONEY TO BUY MORE.: NEVER TRUE

## 2025-06-20 NOTE — PROGRESS NOTES
[facility]  [facility]  [address] [place] [address]  Dept: [phone]  Dept Fax: [phone]    Acute care visit   Date of patient's visit: 6/20/2025  Patient's Name:  Filomena Lopez YOB: 1955            Patient Care Team:  Jacobo Robles MD as PCP - General (Internal Medicine)  Delonte Conner DPM as Consulting Physician (Podiatry)  Luther García MD as Consulting Physician (Pulmonology)  ______________________________________________________________________    Reason for visit: Establish care/Preventative care  ______________________________________________________________________  Chief Compliant   Dizziness (Onset for 3 weeks/Patient believes it is due to blood pressure/)      ______________________________________________________________________  History of Presenting Illness:  History was obtained from the patient. Filomena Lopez is a 69 y.o. past medical history of stage III obesity, hypertension, hyperlipidemia, COPD, chronic left ICA occlusion, history of strokes in the past, peripheral vascular disease, history of TIBURCIO, chronic back pain with bilateral sciatica, CKD stage III who presents for acute care visit for dizziness.    Patient states that she has been having dizziness for the past 3 weeks.  Patient is a poor historian about this symptom.  Patient states that at times she does get what seems to be dizziness when standing from sitting position.  Patient states that at times she mostly has to stay standing in the same spot after standing from sitting position.  Patient states she has to take her time when doing this.  Patient also states that she does have leg weakness when standing up which can affect her walking as well.  She states that she is taking losartan and Norvasc at home for blood pressure.  Blood pressure in the office today is 140 and 100.    Your exam does not reveal any abnormality of the tympanic membrane.  Patient is

## 2025-06-20 NOTE — PROGRESS NOTES
Attending Physician Statement  I have discussed the care of Filomena Lopez, including pertinent history and exam findings, with the resident. I have seen and examined the patient and the key elements of all parts of the encounter have been performed by me.  I agree with the assessment, plan and orders as documented by the resident.  (GC Modifier)    MD JANIA Cid  Attending Physician  Internal Medicine Residency Program, Nephrology   [facility]  6/20/2025, 3:13 PM

## 2025-06-23 RX ORDER — TICAGRELOR 90 MG/1
90 TABLET ORAL 2 TIMES DAILY
Qty: 56 TABLET | Refills: 0 | Status: SHIPPED | OUTPATIENT
Start: 2025-06-23

## 2025-06-23 NOTE — TELEPHONE ENCOUNTER
..Request for   Requested Prescriptions     Pending Prescriptions Disp Refills    BRILINTA 90 MG TABS tablet [Pharmacy Med Name: BRILINTA 90MG TAB 90 Tablet] 56 tablet 0     Sig: TAKE 1 TABLET BY MOUTH 2 TIMES DAILY    .      Please review and e-scribe to pharmacy listed in chart if appropriate. Thank you.      Last Visit Date: 6/20/2025  Next Visit Date: 8/12/2025    Future Appointments   Date Time Provider Department Center   6/30/2025  2:45 PM Jose Angel Quach DPM ACC Podiatry [facility]   7/25/2025  1:50 PM Armando Lugo MD AFL Neph Nicole None   8/12/2025  1:45 PM Lazara Mccormack MD [facility] ECC DEP   10/13/2025  1:30 PM Luther García MD Resp Spec [facility]   11/10/2025  3:00 PM Oc Irizarry MD SV Cancer Ct [facility]       Health Maintenance   Topic Date Due    Respiratory Syncytial Virus (RSV) Pregnant or age 60 yrs+ (1 - Risk 60-74 years 1-dose series) Never done    Pneumococcal 50+ years Vaccine (3 of 3 - PCV20 or PCV21) 10/19/2023    COVID-19 Vaccine (4 - 2024-25 season) 09/01/2024    Annual Wellness Visit (Medicare Advantage)  01/01/2025    Lipids  05/31/2025    Lung Cancer Screening &/or Counseling  12/09/2025    Depression Screen  01/28/2026    Breast cancer screen  03/21/2026    GFR test (Diabetes, CKD 3-4, OR last GFR 15-59)  04/29/2026    Colorectal Cancer Screen  09/03/2027    DTaP/Tdap/Td vaccine (2 - Td or Tdap) 03/08/2028    DEXA (modify frequency per FRAX score)  Completed    Flu vaccine  Completed    Shingles vaccine  Completed    Hepatitis C screen  Completed    Hepatitis A vaccine  Aged Out    Hepatitis B vaccine  Aged Out    Hib vaccine  Aged Out    Polio vaccine  Aged Out    Meningococcal (ACWY) vaccine  Aged Out    Meningococcal B vaccine  Aged Out    A1C test (Diabetic or Prediabetic)  Discontinued    Diabetes screen  Discontinued       Hemoglobin A1C (%)   Date Value   12/09/2024 5.1   05/28/2024 5.5   11/28/2022 5.8             ( goal A1C is < 7)   No components found for:

## 2025-06-30 ENCOUNTER — OFFICE VISIT (OUTPATIENT)
Age: 70
End: 2025-06-30

## 2025-06-30 VITALS — HEIGHT: 67 IN | WEIGHT: 293 LBS | BODY MASS INDEX: 45.99 KG/M2

## 2025-06-30 DIAGNOSIS — M79.609 PAIN DUE TO ONYCHOMYCOSIS OF NAIL: Primary | ICD-10-CM

## 2025-06-30 DIAGNOSIS — B35.1 ONYCHOMYCOSIS: ICD-10-CM

## 2025-06-30 DIAGNOSIS — L84 CORNS AND CALLOSITIES: ICD-10-CM

## 2025-06-30 DIAGNOSIS — M79.674 PAIN IN TOES OF BOTH FEET: ICD-10-CM

## 2025-06-30 DIAGNOSIS — I73.9 PVD (PERIPHERAL VASCULAR DISEASE): ICD-10-CM

## 2025-06-30 DIAGNOSIS — B35.1 PAIN DUE TO ONYCHOMYCOSIS OF NAIL: Primary | ICD-10-CM

## 2025-06-30 DIAGNOSIS — R60.0 EDEMA OF LOWER EXTREMITY: ICD-10-CM

## 2025-06-30 DIAGNOSIS — M79.675 PAIN IN TOES OF BOTH FEET: ICD-10-CM

## 2025-06-30 NOTE — PROGRESS NOTES
[facility]  [address]   [address] [place] [address]  Tel: [phone]   Fax: [phone]    Subjective     CC: Diabetic foot examination    Interval History 6/30/25:  Patient presents to clinic today for trimming of painful elongated toenails and callus pain.  Impede daily activities while ambulating in shoegear.  Hemaglobin A1C controlled, 5.1 in December 2024    HPI:  Filomena Lopez is a 69 y.o. year old female who presents clinic today for follow-up of thickened and discolored nails. She states that she has been doing well and denies any new complaints. She walks around with a cane and has troubling cutting her own nails. She does not wear any compression stockings as they are hard to put on. Patient denies any nausea, vomiting, fever, chills or shortness of breath.    Primary care physician is Jacobo Robles MD.    ROS:    Constitutional: Denies nausea, vomiting, fever, chills.  Neurologic: Denies numbness, tingling, and burning in the feet.    Vascular: Denies symptoms of lower extremity claudication.    Skin: Denies open wounds.  Otherwise negative except as noted in the HPI.     PMH:  Past Medical History:   Diagnosis Date    Ambulates with cane     Bleeding     while on aggrenox    Cataracts, bilateral     Cerebrovascular disease 2003,2011    cva    Chronic pain in left foot     CKD (chronic kidney disease) stage 3, GFR 30-59 ml/min ([facility])     COVID-19 11/2021    states hpspitalized    GERD (gastroesophageal reflux disease)     Hx of blood clots     Hyperlipidemia     Hypertension     Iron (Fe) deficiency anemia     Nicotine abuse     Obesity     TIBURCIO on CPAP     Osteoarthritis     Peripheral vascular disease 03/13/2014    Substance abuse ([facility])     cocaine, canabis    Thalassemia minor     Thyroid nodule 11/22/2022    Unspecified cerebral artery occlusion with cerebral infarction     Wears dentures     upper and lower       Surgical History:   Past Surgical History:

## 2025-07-17 ENCOUNTER — OFFICE VISIT (OUTPATIENT)
Dept: ORTHOPEDIC SURGERY | Age: 70
End: 2025-07-17

## 2025-07-17 VITALS — HEIGHT: 67 IN | WEIGHT: 293 LBS | BODY MASS INDEX: 45.99 KG/M2

## 2025-07-17 DIAGNOSIS — M65.4 DE QUERVAIN'S TENOSYNOVITIS, RIGHT: Primary | ICD-10-CM

## 2025-07-17 RX ORDER — LIDOCAINE HYDROCHLORIDE 10 MG/ML
0.5 INJECTION, SOLUTION INFILTRATION; PERINEURAL ONCE
Status: COMPLETED | OUTPATIENT
Start: 2025-07-17 | End: 2025-07-17

## 2025-07-17 RX ORDER — BUPIVACAINE HYDROCHLORIDE 2.5 MG/ML
0.5 INJECTION, SOLUTION INFILTRATION; PERINEURAL ONCE
Status: COMPLETED | OUTPATIENT
Start: 2025-07-17 | End: 2025-07-17

## 2025-07-17 RX ORDER — BETAMETHASONE SODIUM PHOSPHATE AND BETAMETHASONE ACETATE 3; 3 MG/ML; MG/ML
6 INJECTION, SUSPENSION INTRA-ARTICULAR; INTRALESIONAL; INTRAMUSCULAR; SOFT TISSUE ONCE
Status: COMPLETED | OUTPATIENT
Start: 2025-07-17 | End: 2025-07-17

## 2025-07-17 RX ADMIN — LIDOCAINE HYDROCHLORIDE 0.5 ML: 10 INJECTION, SOLUTION INFILTRATION; PERINEURAL at 16:08

## 2025-07-17 RX ADMIN — BUPIVACAINE HYDROCHLORIDE 1.25 MG: 2.5 INJECTION, SOLUTION INFILTRATION; PERINEURAL at 16:08

## 2025-07-17 RX ADMIN — BETAMETHASONE SODIUM PHOSPHATE AND BETAMETHASONE ACETATE 6 MG: 3; 3 INJECTION, SUSPENSION INTRA-ARTICULAR; INTRALESIONAL; INTRAMUSCULAR; SOFT TISSUE at 16:07

## 2025-07-18 NOTE — PROGRESS NOTES
[facility]  [address]  [place] [address]  Dept Phone: [phone]  Dept Fax: [phone]      Orthopaedic Trauma New Patient      CHIEF COMPLAINT:    Chief Complaint   Patient presents with    Follow-up     Rt wrist        HISTORY OF PRESENT ILLNESS:    The patient is a 70 y.o. female who is being seen as a new patient for evaluation of chronic right wrist pain.  Patient reports she first noticed this right wrist and thumb pain at the end of September after she underwent a colonoscopy reporting that they had trouble finding her veins and did do a IV start in her dorsal right hand near the area of onset of pain.      Patient was last evaluated in our clinic on 4/10/2025 to that point had failed bracing, topical medication and home exercise program so elected to proceed with an MRI of the right wrist.  Unfortunately due to positioning patient was unable to tolerate laying for the length of time required for an MRI so never completed the test.  She states she is overall feeling better but continues to have pain daily.  She continues to localize pain most severely to the right dorsal radial wrist and thumb region states that it continues to be aggravated by any gripping based activity.    Patient did have an EMG in 2019 which showed bilateral carpal tunnel syndrome she does continue to note intermittent symptoms related to this but states this is far more tolerable than her pain that she is currently having.      Past Medical History:    Past Medical History:   Diagnosis Date    Ambulates with cane     Bleeding     while on aggrenox    Cataracts, bilateral     Cerebrovascular disease 2003,2011    cva    Chronic pain in left foot     CKD (chronic kidney disease) stage 3, GFR 30-59 ml/min ([facility])     COVID-19 11/2021    states hpspitalized    GERD (gastroesophageal reflux disease)     Hx of blood clots     Hyperlipidemia     Hypertension     Iron (Fe) deficiency anemia     Nicotine

## 2025-08-01 ENCOUNTER — TELEPHONE (OUTPATIENT)
Dept: NEUROLOGY | Age: 70
End: 2025-08-01

## 2025-08-01 NOTE — TELEPHONE ENCOUNTER
Made several attempts to schedule follow up appointment. Unable to contact patient, letter has been sent.

## 2025-08-18 RX ORDER — ALBUTEROL SULFATE 90 UG/1
2 INHALANT RESPIRATORY (INHALATION) 4 TIMES DAILY PRN
Qty: 18 G | Refills: 5 | Status: SHIPPED | OUTPATIENT
Start: 2025-08-18

## (undated) DEVICE — TRAP SPEC RETRV CLR PLAS POLYP IN LN SUCT QUIK CTCH

## (undated) DEVICE — FORCEPS BX L240CM WRK CHN 2.8MM STD CAP W/ NDL MIC MESH

## (undated) DEVICE — SNARE ENDOSCP L240CM OD24MM LOOP W10MM RND INSUL STIFF BRAID

## (undated) DEVICE — SNARE ENDOSCP M L240CM LOOP W27MM SHTH DIA2.4MM OVL FLX

## (undated) DEVICE — ENDO KIT W/SYRINGE: Brand: MEDLINE INDUSTRIES, INC.

## (undated) DEVICE — DEFENDO AIR WATER SUCTION AND BIOPSY VALVE KIT FOR  OLYMPUS: Brand: DEFENDO AIR/WATER/SUCTION AND BIOPSY VALVE